# Patient Record
Sex: FEMALE | Race: BLACK OR AFRICAN AMERICAN | NOT HISPANIC OR LATINO | Employment: OTHER | URBAN - METROPOLITAN AREA
[De-identification: names, ages, dates, MRNs, and addresses within clinical notes are randomized per-mention and may not be internally consistent; named-entity substitution may affect disease eponyms.]

---

## 2017-01-13 ENCOUNTER — OFFICE VISIT (OUTPATIENT)
Dept: RHEUMATOLOGY | Facility: CLINIC | Age: 62
End: 2017-01-13
Payer: MEDICARE

## 2017-01-13 ENCOUNTER — HOSPITAL ENCOUNTER (OUTPATIENT)
Dept: RADIOLOGY | Facility: HOSPITAL | Age: 62
Discharge: HOME | End: 2017-01-13
Attending: INTERNAL MEDICINE
Payer: MEDICARE

## 2017-01-13 ENCOUNTER — PATIENT MESSAGE (OUTPATIENT)
Dept: RHEUMATOLOGY | Facility: CLINIC | Age: 62
End: 2017-01-13

## 2017-01-13 VITALS
DIASTOLIC BLOOD PRESSURE: 90 MMHG | HEIGHT: 65 IN | HEART RATE: 85 BPM | BODY MASS INDEX: 38.57 KG/M2 | SYSTOLIC BLOOD PRESSURE: 170 MMHG | WEIGHT: 231.5 LBS

## 2017-01-13 DIAGNOSIS — M10.9 ACUTE GOUT OF LEFT WRIST, UNSPECIFIED CAUSE: ICD-10-CM

## 2017-01-13 DIAGNOSIS — M25.50 ARTHRALGIA, UNSPECIFIED JOINT: ICD-10-CM

## 2017-01-13 DIAGNOSIS — M25.50 ARTHRALGIA, UNSPECIFIED JOINT: Primary | ICD-10-CM

## 2017-01-13 PROCEDURE — 3080F DIAST BP >= 90 MM HG: CPT | Mod: S$GLB,,, | Performed by: INTERNAL MEDICINE

## 2017-01-13 PROCEDURE — 77077 JOINT SURVEY SINGLE VIEW: CPT | Mod: TC

## 2017-01-13 PROCEDURE — 3077F SYST BP >= 140 MM HG: CPT | Mod: S$GLB,,, | Performed by: INTERNAL MEDICINE

## 2017-01-13 PROCEDURE — 99499 UNLISTED E&M SERVICE: CPT | Mod: S$PBB,,, | Performed by: INTERNAL MEDICINE

## 2017-01-13 PROCEDURE — 99214 OFFICE O/P EST MOD 30 MIN: CPT | Mod: S$GLB,,, | Performed by: INTERNAL MEDICINE

## 2017-01-13 PROCEDURE — 99999 PR PBB SHADOW E&M-EST. PATIENT-LVL IV: CPT | Mod: PBBFAC,,, | Performed by: INTERNAL MEDICINE

## 2017-01-13 PROCEDURE — 77077 JOINT SURVEY SINGLE VIEW: CPT | Mod: 26,,, | Performed by: RADIOLOGY

## 2017-01-13 PROCEDURE — 1159F MED LIST DOCD IN RCRD: CPT | Mod: S$GLB,,, | Performed by: INTERNAL MEDICINE

## 2017-01-13 RX ORDER — COLCHICINE 0.6 MG/1
TABLET ORAL
Qty: 90 TABLET | Refills: 3 | Status: SHIPPED | OUTPATIENT
Start: 2017-01-13 | End: 2017-03-06 | Stop reason: SDUPTHER

## 2017-01-13 RX ORDER — ALLOPURINOL 300 MG/1
300 TABLET ORAL DAILY
Qty: 90 TABLET | Refills: 1 | Status: SHIPPED | OUTPATIENT
Start: 2017-01-13 | End: 2017-05-02 | Stop reason: SDUPTHER

## 2017-01-13 NOTE — PROGRESS NOTES
Subjective:       Patient ID: Wendy Viveros is a 59 y.o. female.    Chief Complaint:joint pain      HPI 58 yo F with PMH of gout, gerd, DMII, CKD, HTN , left shoulder adhesive capsulitis here for f/u of gout.  Diagnosed with gout in early 50s. She had podagra episodes and also has affected ankles to.  She has been taking for allopurinol for years. She is on allopurinol 200mg  A day since April.  She takes indomethacin twice a week.  Tries to avoid shrimp. Denies alcohol use or juices.  Last gout flare around sep 2015.   Reports  Left shoulder  Pain for a year.     Interval history: Patient is here for follow of gout.  She takes indomethacin for gout attacks.  Denies any gout attacks since last visit.  She is taking allopurinol 100mg a day instead of 300mg for a year.  She has pain in hands and wrists. Reports that when she does something repetitive, the hand will lock and cramp.  Reports pain in right ankle that shoots up the hip.  Reports stiffness in left knee.  Denies tenderness or swelling of the big toe.  Denies alcohol use or seafood. She eats fresh foods.   She takes tramadol one morning, one in afternoon and one at bedtime.    Reports that pain is worse in morning. She has tried tramadol alone with no improvement.  Reports taking flexeril as needed.          Review of Systems   Constitutional: Negative for chills, diaphoresis, activity change and appetite change.   HENT: Negative for congestion, ear discharge, ear pain, facial swelling, mouth sores, sinus pressure, sneezing, sore throat, tinnitus and trouble swallowing.    Eyes: Negative for photophobia, pain, discharge, redness, itching and visual disturbance.   Respiratory: Negative for apnea, chest tightness, shortness of breath, wheezing and stridor.    Cardiovascular: Negative for leg swelling.   Gastrointestinal: Negative for nausea, abdominal pain, diarrhea, constipation, blood in stool, abdominal distention and anal bleeding.   Endocrine: Negative  for cold intolerance and heat intolerance.   Genitourinary: Negative for dysuria and difficulty urinating.   Musculoskeletal: Positive for arthralgias. Negative for myalgias, back pain, joint swelling, gait problem, neck pain and neck stiffness.   Skin: Negative for color change, pallor, rash and wound.   Neurological: Negative for dizziness, seizures, light-headedness and numbness.   Hematological: Negative for adenopathy. Does not bruise/bleed easily.   Psychiatric/Behavioral: Negative for sleep disturbance. The patient is not nervous/anxious.              Objective:      Physical Exam   Constitutional: She is oriented to person, place, and time.   HENT:   Head: Normocephalic and atraumatic.   Right Ear: External ear normal.   Left Ear: External ear normal.   Nose: Nose normal.   Mouth/Throat: Oropharynx is clear and moist. No oropharyngeal exudate.   Eyes: Conjunctivae and EOM are normal. Pupils are equal, round, and reactive to light. Right eye exhibits no discharge. Left eye exhibits no discharge. No scleral icterus.   Neck: Neck supple. No JVD present. No thyromegaly present.   Cardiovascular: Normal rate, regular rhythm, normal heart sounds and intact distal pulses.  Exam reveals no gallop and no friction rub.    No murmur heard.  Pulmonary/Chest: Effort normal and breath sounds normal. No respiratory distress. She has no wheezes. She has no rales. She exhibits no tenderness.   Abdominal: Soft. Bowel sounds are normal. She exhibits no distension and no mass. There is no tenderness. There is no rebound and no guarding.   Lymphadenopathy:     She has no cervical adenopathy.   Neurological: She is alert and oriented to person, place, and time. No cranial nerve deficit. Gait normal. Coordination normal.   Skin: Skin is dry. No rash noted. No erythema. No pallor.     Psychiatric: Affect and judgment normal.   Musculoskeletal: She exhibits tenderness. She exhibits no edema.       Shoulder L- limited abduction to  160 degrees   hands, wrists, knees- FROM  Right ankle/right foot - tenderness, swelling and warmth with podagra    LABS:  Uric acid- 6.3 <7.8<7.9  Esr-35  Ccp,rf-negative    Imaging:   Shoulder xrays: (2011)   There is degenerative change on the left with humeral spurring as well as   some cystic change in both the glenoid and humeral head. On the right,   there is a lesser degree of degenerative change. The acromiohumeral   interval is maintained bilaterally.      Assessment:     58 yo F with PMH of gout, gerd, DMII, CKD, HTN , left shoulder adhesive capsulitis s/p repair on march 2016 here for f/u of gout. She also has adhesive capsulitis of left shoulder since 2009 and received PT.   She comes in today with flare of left.wrist/hand. She has no been taking allopurinol at 300 mg a day which I had recommended.  Will treat acute flare and also increase her allopurinol    Regarding her diffuse arthralgias, I suspect she has DJD in setting of obesity and deconditioning.  She takes tramadol 50mg pO TID with no improvement. I told her that given her CKD, I cannot give her NSAIDS and recommended referral to pain management but patient declined.  Plan:       *  -start colchicine 1.2 mg po x1 then 0.6 mg an hour later than 0.6 mg po day  -labs today  -for now,continue allopurinol 300mg po qday until I get repeat uric acid  -asked patient to avoid nsaids  Encouraged weight loss for obesity  rtc in 3-4 months

## 2017-01-13 NOTE — MR AVS SNAPSHOT
[facility] Rheumatology  [address] [address]  Phone: [phone]  Fax: [phone]                  Wendy Viveros   2017 11:30 AM   Office Visit    Description:  Female : 1955   Provider:  Mindy Vazquez MD   Department:  [facility] - Rheumatology           Diagnoses this Visit        Comments    Arthralgia, unspecified joint    -  Primary            To Do List           Future Appointments        Provider Department Dept Phone    2017 12:30 PM NOM [id number] LIMIT [facility] [phone]    3/10/2017 11:30 AM SPECIMEN, [facility] [phone]    3/14/2017 1:00 PM Mindy Vazquez MD [facility] [phone]      Goals (5 Years of Data)     None       These Medications        Disp Refills Start End    colchicine 0.6 mg tablet 90 tablet 3 2017     Take 2 tablets at once and then one tablet an hour later and then start one pill daily the next day    Pharmacy: [facility] [address] [place] [address] Ph #: [phone]       allopurinol (ZYLOPRIM) 300 MG tablet 90 tablet 1 2017     Take 1 tablet (300 mg total) by mouth once daily. - Oral    Pharmacy: [address] Ph #: [phone]         [facility]     [facility] Care [facility] -  Assistance  Registered nurses in the [facility] provide clinical advisement, health education, appointment booking, and other advisory services.  Call for this free service at [phone].             Medications           Message regarding Medications     Verify the changes and/or additions to your medication regime listed below are the same as discussed with your clinician today.  If any of these changes or additions are incorrect, please notify your healthcare provider.        START taking these NEW medications        Refills    colchicine 0.6  "mg tablet 3    Sig: Take 2 tablets at once and then one tablet an hour later and then start one pill daily the next day    Class: Normal           Verify that the below list of medications is an accurate representation of the medications you are currently taking.  If none reported, the list may be blank. If incorrect, please contact your healthcare provider. Carry this list with you in case of emergency.           Current Medications     allopurinol (ZYLOPRIM) 100 MG tablet Take 1 tablet (100 mg total) by mouth once daily.    allopurinol (ZYLOPRIM) 300 MG tablet Take 1 tablet (300 mg total) by mouth once daily.    amlodipine (NORVASC) 10 MG tablet Take 1 tablet (10 mg total) by mouth once daily.    aspirin 81 mg Tab Take 1 tablet by mouth Daily.    blood sugar diagnostic (TRUETEST TEST STRIPS) Strp Pt is testing 3 times daily    cholecalciferol, vitamin D3, 2,000 unit Tab Take 1 tablet by mouth Once a week.    cloNIDine (CATAPRES) 0.1 MG tablet Take 1 tablet (0.1 mg total) by mouth 2 (two) times daily.    colchicine 0.6 mg tablet Take 2 tablets and one hour later take a third tablet then tomorrow take one tablet daily    cyanocobalamin, vitamin B-12, (VITAMIN B-12) 50 mcg tablet Take 50 mcg by mouth once daily.    cyclobenzaprine (FLEXERIL) 10 MG tablet TAKE ONE TABLET BY MOUTH ONCE AT NIGHT    indomethacin (INDOCIN) 50 MG capsule TAKE ONE CAPSULE BY MOUTH THREE TIMES DAILY AS NEEDED    insulin aspart (NOVOLOG) 100 unit/mL injection Inject 10 Units into the skin 3 (three) times daily before meals.    insulin glargine (LANTUS) 100 unit/mL injection Inject 30 Units into the skin every evening.    insulin syringe-needle U-100 0.3 mL 30 gauge x 5/16 Syrg Pt is injecting 4 times daily    insulin syringe-needle U-100 1/2 mL 30 x 5/16" Syrg     lancets Misc by Misc.(Non-Drug; Combo Route) route. Pt is testing 3 times daily    metoprolol tartrate (LOPRESSOR) 50 MG tablet Take 1 tablet (50 mg total) by mouth once daily.    " "promethazine (PHENERGAN) 6.25 mg/5 mL syrup Take 20 mLs (25 mg total) by mouth once daily.    RANITIDINE HCL (ZANTAC 75 ORAL) Take 1 tablet by mouth.    spironolactone (ALDACTONE) 50 MG tablet Take 1 tablet (50 mg total) by mouth once daily.    SYRINGE, DISPOSABLE, MISC by Misc.(Non-Drug; Combo Route) route. Pt is injecting 4 times daily    tramadol (ULTRAM) 50 mg tablet Take 1 tablet (50 mg total) by mouth once daily.    colchicine 0.6 mg tablet Take 2 tablets at once and then one tablet an hour later and then start one pill daily the next day    furosemide (LASIX) 40 MG tablet Take 1 tablet (40 mg total) by mouth once daily.           Clinical Reference Information           Vital Signs - Last Recorded  Most recent update: 1/13/2017 11:35 AM by Vilma Crespo MA    BP Pulse Ht Wt BMI    (!) 170/90 85 5' 5" (1.651 m) 105 kg (231 lb 8 oz) 38.52 kg/m2      Blood Pressure          Most Recent Value    BP  (!)  170/90      Allergies as of 1/13/2017     Cozaar  [Losartan]    Lisinopril      Immunizations Administered on Date of Encounter - 1/13/2017     None      Orders Placed During Today's Visit     Future Labs/Procedures Expected by Expires    Comprehensive metabolic panel  1/13/2017 3/14/2018    URIC ACID  1/13/2017 3/14/2018    XR Arthritis Survey  1/13/2017 1/13/2018      "

## 2017-02-17 RX ORDER — INDOMETHACIN 50 MG/1
CAPSULE ORAL
Qty: 90 CAPSULE | Refills: 0 | Status: SHIPPED | OUTPATIENT
Start: 2017-02-17 | End: 2017-10-17 | Stop reason: SDUPTHER

## 2017-03-02 ENCOUNTER — HOSPITAL ENCOUNTER (EMERGENCY)
Facility: OTHER | Age: 62
Discharge: HOME | End: 2017-03-03
Attending: EMERGENCY MEDICINE
Payer: MEDICARE

## 2017-03-02 VITALS
BODY MASS INDEX: 38.32 KG/M2 | RESPIRATION RATE: 18 BRPM | HEIGHT: 65 IN | SYSTOLIC BLOOD PRESSURE: 155 MMHG | DIASTOLIC BLOOD PRESSURE: 80 MMHG | OXYGEN SATURATION: 98 % | WEIGHT: 230 LBS | TEMPERATURE: 98 F | HEART RATE: 72 BPM

## 2017-03-02 DIAGNOSIS — R19.7 NAUSEA, VOMITING, AND DIARRHEA: ICD-10-CM

## 2017-03-02 DIAGNOSIS — R10.84 ABDOMINAL PAIN, GENERALIZED: Primary | ICD-10-CM

## 2017-03-02 DIAGNOSIS — R11.2 NAUSEA, VOMITING, AND DIARRHEA: ICD-10-CM

## 2017-03-02 LAB
ALBUMIN SERPL BCP-MCNC: 4.2 G/DL
ALP SERPL-CCNC: 76 U/L
ALT SERPL W/O P-5'-P-CCNC: 20 U/L
ANION GAP SERPL CALC-SCNC: 12 MMOL/L
AST SERPL-CCNC: 19 U/L
BASOPHILS # BLD AUTO: 0.02 K/UL
BASOPHILS NFR BLD: 0.2 %
BILIRUB SERPL-MCNC: 4.3 MG/DL
BUN SERPL-MCNC: 19 MG/DL
CALCIUM SERPL-MCNC: 9.9 MG/DL
CHLORIDE SERPL-SCNC: 102 MMOL/L
CO2 SERPL-SCNC: 26 MMOL/L
CREAT SERPL-MCNC: 1.2 MG/DL
DIFFERENTIAL METHOD: ABNORMAL
EOSINOPHIL # BLD AUTO: 0.1 K/UL
EOSINOPHIL NFR BLD: 0.9 %
ERYTHROCYTE [DISTWIDTH] IN BLOOD BY AUTOMATED COUNT: 18.9 %
EST. GFR  (AFRICAN AMERICAN): 56 ML/MIN/1.73 M^2
EST. GFR  (NON AFRICAN AMERICAN): 49 ML/MIN/1.73 M^2
GLUCOSE SERPL-MCNC: 203 MG/DL
HCT VFR BLD AUTO: 33.9 %
HGB BLD-MCNC: 12.1 G/DL
LIPASE SERPL-CCNC: 20 U/L
LYMPHOCYTES # BLD AUTO: 2.5 K/UL
LYMPHOCYTES NFR BLD: 19 %
MCH RBC QN AUTO: 32.7 PG
MCHC RBC AUTO-ENTMCNC: 35.7 %
MCV RBC AUTO: 92 FL
MONOCYTES # BLD AUTO: 1 K/UL
MONOCYTES NFR BLD: 7.4 %
NEUTROPHILS # BLD AUTO: 9.3 K/UL
NEUTROPHILS NFR BLD: 72 %
PLATELET # BLD AUTO: 114 K/UL
PMV BLD AUTO: 10.6 FL
POTASSIUM SERPL-SCNC: 3.5 MMOL/L
PROT SERPL-MCNC: 7.6 G/DL
RBC # BLD AUTO: 3.7 M/UL
SODIUM SERPL-SCNC: 140 MMOL/L
WBC # BLD AUTO: 12.92 K/UL

## 2017-03-02 PROCEDURE — 80053 COMPREHEN METABOLIC PANEL: CPT

## 2017-03-02 PROCEDURE — 25000003 PHARM REV CODE 250: Performed by: PHYSICIAN ASSISTANT

## 2017-03-02 PROCEDURE — 96360 HYDRATION IV INFUSION INIT: CPT

## 2017-03-02 PROCEDURE — 25500020 PHARM REV CODE 255: Performed by: EMERGENCY MEDICINE

## 2017-03-02 PROCEDURE — 99284 EMERGENCY DEPT VISIT MOD MDM: CPT | Mod: 25

## 2017-03-02 PROCEDURE — 85025 COMPLETE CBC W/AUTO DIFF WBC: CPT

## 2017-03-02 PROCEDURE — 83690 ASSAY OF LIPASE: CPT

## 2017-03-02 RX ORDER — ONDANSETRON 4 MG/1
4 TABLET, ORALLY DISINTEGRATING ORAL EVERY 8 HOURS PRN
Qty: 12 TABLET | Refills: 0 | Status: SHIPPED | OUTPATIENT
Start: 2017-03-02 | End: 2017-07-13

## 2017-03-02 RX ORDER — DICYCLOMINE HYDROCHLORIDE 10 MG/1
10 CAPSULE ORAL
Qty: 20 CAPSULE | Refills: 0 | Status: SHIPPED | OUTPATIENT
Start: 2017-03-02 | End: 2017-04-01

## 2017-03-02 RX ORDER — SYRINGE,SAFETY WITH NEEDLE,1ML 25GX1"
SYRINGE (EA) MISCELLANEOUS
Qty: 200 EACH | Refills: 0 | Status: ON HOLD | OUTPATIENT
Start: 2017-03-02 | End: 2017-07-20 | Stop reason: HOSPADM

## 2017-03-02 RX ADMIN — IOHEXOL 75 ML: 350 INJECTION, SOLUTION INTRAVENOUS at 10:03

## 2017-03-02 RX ADMIN — SODIUM CHLORIDE 1000 ML: 0.9 INJECTION, SOLUTION INTRAVENOUS at 09:03

## 2017-03-02 NOTE — ED AVS SNAPSHOT
[facility]  [address]  [facility] [address]               Wendy Viveros   3/2/2017  8:01 PM   ED    Description:  Female : 1955   Department:  [facility]           Your Care was Coordinated By:     Provider Role From To    Yobany Ramírez MD Attending Provider 17 --    Katherine Brunson PA-C Physician Assistant 17 --      Reason for Visit     GI Problem           Diagnoses this Visit        Comments    Abdominal pain, generalized    -  Primary     Nausea, vomiting, and diarrhea           ED Disposition     None           To Do List           Follow-up Information     Follow up with Roger Miller MD In 2 days.    Specialty:  Family Medicine    Contact information:    [address]  [facility] [address]  [facility] [id number] [phone]          Follow up with [facility].    Specialty:  Emergency Medicine    Why:  If symptoms worsen    Contact information:    [address]  [facility] [address] [phone]       These Medications        Disp Refills Start End    dicyclomine (BENTYL) 10 MG capsule 20 capsule 0 3/2/2017 2017    Take 1 capsule (10 mg total) by mouth 4 (four) times daily before meals and nightly. - Oral    Pharmacy: [facility] [address] [place] [address] Ph #: [phone]       ondansetron (ZOFRAN-ODT) 4 MG TbDL 12 tablet 0 3/2/2017     Take 1 tablet (4 mg total) by mouth every 8 (eight) hours as needed. - Oral    Pharmacy: [facility] [address] [place], [place] [address] [facility] Ph #: [phone]         [facility]     [facility] Care [facility] -  Assistance  Registered nurses in the [facility] provide clinical advisement, health education, appointment booking, and other advisory services.  Call for this free service at [phone].             Medications           Message regarding  Medications     Verify the changes and/or additions to your medication regime listed below are the same as discussed with your clinician today.  If any of these changes or additions are incorrect, please notify your healthcare provider.        START taking these NEW medications        Refills    dicyclomine (BENTYL) 10 MG capsule 0    Sig: Take 1 capsule (10 mg total) by mouth 4 (four) times daily before meals and nightly.    Class: Print    Route: Oral    ondansetron (ZOFRAN-ODT) 4 MG TbDL 0    Sig: Take 1 tablet (4 mg total) by mouth every 8 (eight) hours as needed.    Class: Print    Route: Oral      These medications were administered today        Dose Freq    sodium chloride 0.9% bolus 1,000 mL 1,000 mL ED 1 Time    Sig: Inject 1,000 mLs into the vein ED 1 Time.    Class: Normal    Route: Intravenous    omnipaque 350 iohexol 350 mg iodine/mL      Notes to Pharmacy: Created by cabinet override    omnipaque 350 iohexol 75 mL 75 mL IMG once as needed    Sig: Inject 75 mLs into the vein ONCE PRN for contrast.    Class: Normal    Route: Intravenous           Verify that the below list of medications is an accurate representation of the medications you are currently taking.  If none reported, the list may be blank. If incorrect, please contact your healthcare provider. Carry this list with you in case of emergency.           Current Medications     amlodipine (NORVASC) 10 MG tablet Take 1 tablet (10 mg total) by mouth once daily.    aspirin 81 mg Tab Take 1 tablet by mouth Daily.    cholecalciferol, vitamin D3, 2,000 unit Tab Take 1 tablet by mouth Once a week.    cloNIDine (CATAPRES) 0.1 MG tablet Take 1 tablet (0.1 mg total) by mouth 2 (two) times daily.    colchicine 0.6 mg tablet Take 2 tablets and one hour later take a third tablet then tomorrow take one tablet daily    cyanocobalamin, vitamin B-12, (VITAMIN B-12) 50 mcg tablet Take 50 mcg by mouth once daily.    indomethacin (INDOCIN) 50 MG capsule TAKE ONE  CAPSULE BY MOUTH THREE TIMES DAILY AS NEEDED    insulin aspart (NOVOLOG) 100 unit/mL injection Inject 10 Units into the skin 3 (three) times daily before meals.    insulin glargine (LANTUS) 100 unit/mL injection Inject 30 Units into the skin every evening.    metoprolol tartrate (LOPRESSOR) 50 MG tablet Take 1 tablet (50 mg total) by mouth once daily.    RANITIDINE HCL (ZANTAC 75 ORAL) Take 1 tablet by mouth.    spironolactone (ALDACTONE) 50 MG tablet Take 1 tablet (50 mg total) by mouth once daily.    tramadol (ULTRAM) 50 mg tablet Take 1 tablet (50 mg total) by mouth once daily.    allopurinol (ZYLOPRIM) 100 MG tablet Take 1 tablet (100 mg total) by mouth once daily.    allopurinol (ZYLOPRIM) 300 MG tablet Take 1 tablet (300 mg total) by mouth once daily.    blood sugar diagnostic (TRUETEST TEST STRIPS) Strp Pt is testing 3 times daily    colchicine 0.6 mg tablet Take 2 tablets at once and then one tablet an hour later and then start one pill daily the next day    cyclobenzaprine (FLEXERIL) 10 MG tablet TAKE ONE TABLET BY MOUTH ONCE AT NIGHT    dicyclomine (BENTYL) 10 MG capsule Take 1 capsule (10 mg total) by mouth 4 (four) times daily before meals and nightly.    furosemide (LASIX) 40 MG tablet Take 1 tablet (40 mg total) by mouth once daily.    insulin syringe-needle U-100 0.3 mL 30 gauge x 5/16 Syrg Pt is injecting 4 times daily    insulin syringe-needle U-100 1/2 mL 30 gauge x 5/16 Syrg USE AS DIRECTED 4 TIMES DAILY    lancets Misc by Misc.(Non-Drug; Combo Route) route. Pt is testing 3 times daily    omnipaque 350 iohexol 350 mg iodine/mL     ondansetron (ZOFRAN-ODT) 4 MG TbDL Take 1 tablet (4 mg total) by mouth every 8 (eight) hours as needed.    promethazine (PHENERGAN) 6.25 mg/5 mL syrup Take 20 mLs (25 mg total) by mouth once daily.    SYRINGE, DISPOSABLE, MISC by Misc.(Non-Drug; Combo Route) route. Pt is injecting 4 times daily           Clinical Reference Information           Your Vitals Were     BP  "Pulse Temp Resp Height Weight    177/77 76 98 °F (36.7 °C) 18 5' 5" (1.651 m) 104.3 kg (230 lb)    SpO2 BMI             98% 38.27 kg/m2         Allergies as of 3/2/2017        Reactions    Cozaar  [Losartan]     Other reaction(s): blurry vision    Lisinopril     Other reaction(s): cough      Immunizations Administered on Date of Encounter - 3/2/2017     None      ED Micro, Lab, POCT     Start Ordered       Status Ordering Provider    03/02/17 2040 03/02/17 2039  CBC auto differential  STAT      Final result     03/02/17 2040 03/02/17 2039  Comprehensive metabolic panel  STAT      Final result     03/02/17 2040 03/02/17 2039  Lipase  STAT      Final result       ED Imaging Orders     Start Ordered       Status Ordering Provider    03/02/17 2205 03/02/17 2205  CT Abdomen Pelvis With Contrast  1 time imaging      Final result       Discharge References/Attachments     ABDOMINAL PAIN, ADULT (ENGLISH)    ABDOMINAL PAIN, UNKNOWN CAUSE, (FEMALE) (ENGLISH)    NAUSEA AND VOMITING, HOW TO CONTROL (ENGLISH)    VOMITING AND DIARRHEA, SELF-CARE FOR (ENGLISH)      Your Scheduled Appointments     Mar 10, 2017 11:30 AM CST   Non-Fasting Lab with SPECIMEN, [facility]    [address]  [place] [id number] [phone]            Mar 14, 2017  1:00 PM CDT   Established Patient Visit with MD Milo Edwards valerio - Rheumatology ([facility] )    [address] [facility] [place] [address] [phone]               [facility] complies with applicable Federal civil rights laws and does not discriminate on the basis of race, color, national origin, age, disability, or sex.        Language Assistance Services     ATTENTION: Language assistance services are available, free of charge. Please call [phone].      ATENCIÓN: Si habla español, tiene a rajput disposición servicios gratuitos de asistencia lingüística. Llame al [phone].     CHÚ Ý: N?u b?n " nói Ti?ng Vi?t, có các d?ch v? h? tr? ngôn ng? mi?n phí dành cho b?n. G?i s? [phone].

## 2017-03-03 NOTE — ED PROVIDER NOTES
Encounter Date: 3/2/2017       History     Chief Complaint   Patient presents with    GI Problem     abd pain, vomiting and diarrhea that began yesterday     Review of patient's allergies indicates:   Allergen Reactions    Cozaar  [losartan]      Other reaction(s): blurry vision    Lisinopril      Other reaction(s): cough     HPI Comments: Patient is a 61 y.o. female with a past medical history of HTN, HLD, DM, CKD, presenting to the emergency department with complaints of abdominal pain, nausea, vomiting and diarrhea.  The patient reports that yesterday evening she had one episode of nausea and vomiting with associated abdominal pain and cramping.  She reports that she had another episode early this morning at approximately 3:30 AM.  She states that throughout the day, she's had persistent diarrhea, is unable to quantify, episode she has had.  She denies fever or chills.  She denies taking any medication for symptoms thus far.    The history is provided by the patient.     Past Medical History:   Diagnosis Date    Abnormal EKG     Aortic valve regurgitation     Arthritis     CKD (chronic kidney disease) stage 2, GFR 60-89 ml/min 8/8/2014    CKD (chronic kidney disease) stage 2, GFR 60-89 ml/min 8/8/2014    Diabetes mellitus     Diabetes mellitus type II     GERD (gastroesophageal reflux disease)     Gout, unspecified     Heart murmur     Hyperlipidemia     Hypertension     Tendonitis      Past Surgical History:   Procedure Laterality Date    breast reduction      BREAST SURGERY      CHOLECYSTECTOMY      JOINT REPLACEMENT      POLYPECTOMY  05/05/2016    TOTAL SHOULDER ARTHROPLASTY Left     TUBAL LIGATION       Family History   Problem Relation Age of Onset    Heart disease Mother      MI at 71    Hypertension Mother     Hypertension Brother     Diabetes Brother     Hypertension Brother     Breast cancer Neg Hx     Colon cancer Neg Hx     Ovarian cancer Neg Hx      Social History    Substance Use Topics    Smoking status: Never Smoker    Smokeless tobacco: None    Alcohol use No     Review of Systems   Constitutional: Negative for activity change, appetite change, chills, fatigue and fever.   HENT: Negative for congestion, rhinorrhea, sinus pressure, sneezing, sore throat and trouble swallowing.    Eyes: Negative for photophobia and visual disturbance.   Respiratory: Negative for cough, chest tightness, shortness of breath and wheezing.    Cardiovascular: Negative for chest pain and palpitations.   Gastrointestinal: Positive for abdominal pain, diarrhea, nausea and vomiting. Negative for constipation.   Genitourinary: Negative for dysuria, hematuria and urgency.   Musculoskeletal: Negative for back pain, myalgias, neck pain and neck stiffness.   Skin: Negative for color change and wound.   Neurological: Negative for dizziness, weakness, light-headedness, numbness and headaches.   Psychiatric/Behavioral: Negative for agitation and confusion.       Physical Exam   Initial Vitals   BP Pulse Resp Temp SpO2   03/02/17 1947 03/02/17 1947 03/02/17 1947 03/02/17 1947 03/02/17 1947   172/80 104 16 98.4 °F (36.9 °C) 100 %     Physical Exam    Nursing note and vitals reviewed.  Constitutional: She appears well-developed and well-nourished. She is not diaphoretic. She is cooperative.  Non-toxic appearance. She does not have a sickly appearance. She does not appear ill. No distress.   Well appearing, obese, -American female unaccompanied in the emergency department.  She is speaking in clear and full sentences, moving all extremities, ambulates without difficulty.  She is in no acute distress.   HENT:   Head: Normocephalic and atraumatic.   Right Ear: External ear normal.   Left Ear: External ear normal.   Nose: Nose normal.   Mouth/Throat: Uvula is midline, oropharynx is clear and moist and mucous membranes are normal.   Eyes: Conjunctivae and EOM are normal.   Neck: Normal range of motion.  Neck supple.   Cardiovascular: Normal rate, regular rhythm and normal heart sounds.   Pulmonary/Chest: Breath sounds normal. No respiratory distress. She has no wheezes. She has no rhonchi. She has no rales.   Abdominal: Soft. Bowel sounds are normal. She exhibits no distension. There is generalized tenderness. There is no rebound and no guarding.   Musculoskeletal: Normal range of motion.   Neurological: She is alert and oriented to person, place, and time. GCS eye subscore is 4. GCS verbal subscore is 5. GCS motor subscore is 6.   Skin: Skin is warm.   Psychiatric: She has a normal mood and affect. Her behavior is normal. Judgment and thought content normal.         ED Course   Procedures  Labs Reviewed   CBC W/ AUTO DIFFERENTIAL - Abnormal; Notable for the following:        Result Value    WBC 12.92 (*)     RBC 3.70 (*)     Hematocrit 33.9 (*)     MCH 32.7 (*)     RDW 18.9 (*)     Platelets 114 (*)     Gran # 9.3 (*)     All other components within normal limits   COMPREHENSIVE METABOLIC PANEL - Abnormal; Notable for the following:     Glucose 203 (*)     Total Bilirubin 4.3 (*)     eGFR if  56 (*)     eGFR if non  49 (*)     All other components within normal limits   LIPASE        Imaging Results         CT Abdomen Pelvis With Contrast (Final result) Result time:  03/02/17 23:35:17    Final result by Marin Ramos MD (03/02/17 23:35:17)    Impression:       Mild wall thickening and fluid distention of the small bowel loops without evidence for transition point.  The findings are suggestive of mild nonspecific enteritis.    Hepatosplenomegaly.    Status post cholecystectomy.    Additional findings as above.              Electronically signed by: MARIN RAMOS MD  Date:     03/02/17  Time:    23:35     Narrative:    Exam: [id number]  03/02/17  22:23:37 [id number] ([facility]) : CT ABDOMEN PELVIS WITH CONTRAST    Technique:    Axial CT Scan of the abdomen and pelvis was performed from the lung base  to the public symphysis after the intravenous administration of  75 cc of Omni 350. Coronal and Sagittal reformats were obtained. Delayed images were also obtained    Comparison:     None     Findings:      The lung bases are within normal limits.  The heart is normal in appearance.  There are no pericardial effusions.  There is tortuosity of the abdomen aorta.  There are scattered atherosclerotic calcifications involving the abdominal aorta.  The portal vein and mesenteric veins are within normal limits.  There is no evidence of lymphadenopathy in the abdomen or pelvis.    There is a small hiatal hernia.  The distal stomach is unremarkable.  The duodenum is within normal limits.  Mild wall thickening involving the small bowel loops.  There is fluid distention of the small bowel loops.  No definitive transition point is identified.  No significant air-fluid levels are noted.  The appendix is not visualized.  There are no secondary findings of acute appendicitis.  There is scattered colonic diverticula without evidence of acute diverticulitis.    The liver is enlarged.  The patient is status post cholecystectomy.  The biliary tree is within normal limits.  The spleen is enlarged measuring 15 cm.  The pancreas is within normal limits.  The adrenal glands are unremarkable.    There is a 1.2 cm cyst in the upper pole of the right kidney.  The reminder of the kidneys are within normal limits.  The ureters and the urinary bladder are within normal limits.  The uterus is unremarkable.    There is no evidence of free fluid in the abdomen or pelvis.  There is no evidence of free air.      The patient appears status post an anterior abdominal wall hernia repair.  There is no evidence of a recurrent hernia.  There is a surgical clip in the left paracolic gutter.    There are extensive degenerative changes with vacuum disc phenomenon at the L5-S1 level.  No fractures are identified.  There are degenerative changes throughout  the thoracolumbar spine.                Medical Decision Making:   Clinical Tests:   Lab Tests: Reviewed and Ordered  The following lab test(s) were unremarkable: CBC, CMP and Lipase  Other:   I have discussed this case with another health care provider.       <> Summary of the Discussion: Dr. Armando  This note was created using Dragon Medical Dictation. There may be typographical errors secondary to dictation.     Urgent evaluation of a 61 y.o. female with a past medical history of HTN, HLD, DM, CKD, presenting to the emergency department complaining of abdominal pain with nausea, vomiting and diarrhea. Patient is afebrile, nontoxic appearing and hemodynamically stable.  Physical exam reveals regular rate and rhythm, lungs are clear to auscultation bilaterally.  Generalized tenderness to palpation of the abdomen with no rebound, guarding, mass.  Moist mucous membranes.  I'll plan to obtain blood work, administer IV fluids and reassess.  CBC shows leukocytosis at 12.9, H&H is 12.1/33.9.  CMP shows glucose of 203.  Lipase is normal.  Will obtain CT scan.    CT scan shows mild wall thickening and fluid distention of the small bowel loops suggestive for mild nonspecific enteritis. Will plan to discharge home with a prescription for bentyl and zofran. She will be counseled on symptomatic care and treatment. She is stable for discharge home. The patient was instructed to follow up with a primary care provider in 2 days or to return to the emergency department for worsening symptoms. The treatment plan was discussed with the patient who demonstrated understanding and comfort with plan. The patient's history, physical exam, and plan of care was discussed with and agreed upon with my supervising physician.     Past Medical History:   Diagnosis Date    Abnormal EKG     Aortic valve regurgitation     Arthritis     CKD (chronic kidney disease) stage 2, GFR 60-89 ml/min 8/8/2014    CKD (chronic kidney disease) stage 2,  GFR 60-89 ml/min 8/8/2014    Diabetes mellitus     Diabetes mellitus type II     GERD (gastroesophageal reflux disease)     Gout, unspecified     Heart murmur     Hyperlipidemia     Hypertension     Tendonitis                      ED Course     Clinical Impression:     1. Abdominal pain, generalized    2. Nausea, vomiting, and diarrhea       Disposition:   Disposition: Discharged  Condition: Stable       Katherine Brunson PA-C  03/03/17 0032

## 2017-03-03 NOTE — ED NOTES
Vomiting and diarrhea started yesterday. States diarrhea is black     LOC: The patient is awake, alert and aware of environment with an appropriate affect, the patient is oriented x 3 and speaking appropriately.  APPEARANCE: Patient resting comfortably and in no acute distress, patient is clean and well groomed, patient's clothing is properly fastened.  SKIN: The skin is warm and dry, patient has normal skin turgor and moist mucus membranes, skin intact, no breakdown or brusing noted.  MUSKULOSKELETAL: Patient moving all extremities well, no obvious swelling or deformities noted.  RESPIRATORY: Airway is open and patent, respirations are spontaneous, patient has a normal effort and rate. Breath sounds are clear and equal bilaterally.  CARDIAC: Normal heart sounds. No peripheral edema.  ABDOMEN: Soft and non tender to palpation, no distention noted. Bowel sounds present.  NEURO: No neuro deficits, hand grasp equal, no drift noted, no facial droop noted. Speech is clear.

## 2017-03-06 ENCOUNTER — OFFICE VISIT (OUTPATIENT)
Dept: INTERNAL MEDICINE | Facility: CLINIC | Age: 62
End: 2017-03-06
Attending: FAMILY MEDICINE
Payer: MEDICARE

## 2017-03-06 VITALS
DIASTOLIC BLOOD PRESSURE: 70 MMHG | OXYGEN SATURATION: 97 % | HEART RATE: 77 BPM | SYSTOLIC BLOOD PRESSURE: 150 MMHG | WEIGHT: 230.81 LBS | BODY MASS INDEX: 38.45 KG/M2 | HEIGHT: 65 IN

## 2017-03-06 DIAGNOSIS — R11.0 NAUSEA: Primary | ICD-10-CM

## 2017-03-06 PROCEDURE — 1160F RVW MEDS BY RX/DR IN RCRD: CPT | Mod: S$GLB,,, | Performed by: FAMILY MEDICINE

## 2017-03-06 PROCEDURE — 3077F SYST BP >= 140 MM HG: CPT | Mod: S$GLB,,, | Performed by: FAMILY MEDICINE

## 2017-03-06 PROCEDURE — 3078F DIAST BP <80 MM HG: CPT | Mod: S$GLB,,, | Performed by: FAMILY MEDICINE

## 2017-03-06 PROCEDURE — 99999 PR PBB SHADOW E&M-EST. PATIENT-LVL V: CPT | Mod: PBBFAC,,, | Performed by: FAMILY MEDICINE

## 2017-03-06 PROCEDURE — 99213 OFFICE O/P EST LOW 20 MIN: CPT | Mod: S$GLB,,, | Performed by: FAMILY MEDICINE

## 2017-03-06 RX ORDER — AMLODIPINE BESYLATE 10 MG/1
TABLET ORAL
COMMUNITY
Start: 2017-02-17 | End: 2017-03-06 | Stop reason: SDUPTHER

## 2017-03-10 ENCOUNTER — LAB VISIT (OUTPATIENT)
Dept: LAB | Facility: OTHER | Age: 62
End: 2017-03-10
Attending: INTERNAL MEDICINE
Payer: MEDICARE

## 2017-03-10 DIAGNOSIS — E11.9 TYPE 2 DIABETES MELLITUS WITHOUT COMPLICATION: ICD-10-CM

## 2017-03-10 DIAGNOSIS — M25.50 ARTHRALGIA, UNSPECIFIED JOINT: ICD-10-CM

## 2017-03-10 LAB
ALBUMIN SERPL BCP-MCNC: 4.1 G/DL
ALP SERPL-CCNC: 75 U/L
ALT SERPL W/O P-5'-P-CCNC: 19 U/L
ANION GAP SERPL CALC-SCNC: 12 MMOL/L
AST SERPL-CCNC: 18 U/L
BILIRUB SERPL-MCNC: 2.5 MG/DL
BUN SERPL-MCNC: 20 MG/DL
CALCIUM SERPL-MCNC: 10.2 MG/DL
CHLORIDE SERPL-SCNC: 104 MMOL/L
CO2 SERPL-SCNC: 24 MMOL/L
CREAT SERPL-MCNC: 1.2 MG/DL
CREAT UR-MCNC: 190 MG/DL
EST. GFR  (AFRICAN AMERICAN): 56 ML/MIN/1.73 M^2
EST. GFR  (NON AFRICAN AMERICAN): 48.6 ML/MIN/1.73 M^2
GLUCOSE SERPL-MCNC: 218 MG/DL
MICROALBUMIN UR DL<=1MG/L-MCNC: 943 UG/ML
MICROALBUMIN/CREATININE RATIO: 496.3 UG/MG
POTASSIUM SERPL-SCNC: 4.1 MMOL/L
PROT SERPL-MCNC: 7.5 G/DL
SODIUM SERPL-SCNC: 140 MMOL/L
URATE SERPL-MCNC: 5.4 MG/DL

## 2017-03-10 PROCEDURE — 84550 ASSAY OF BLOOD/URIC ACID: CPT

## 2017-03-10 PROCEDURE — 36415 COLL VENOUS BLD VENIPUNCTURE: CPT

## 2017-03-10 PROCEDURE — 80053 COMPREHEN METABOLIC PANEL: CPT

## 2017-03-10 PROCEDURE — 82570 ASSAY OF URINE CREATININE: CPT

## 2017-03-10 NOTE — PROGRESS NOTES
Called [facility] pharmacy to dc colchicine 0.6 mg tablet as advised by RONI Miller

## 2017-03-13 ENCOUNTER — TELEPHONE (OUTPATIENT)
Dept: RHEUMATOLOGY | Facility: CLINIC | Age: 62
End: 2017-03-13

## 2017-03-13 NOTE — PROGRESS NOTES
Subjective:       Patient ID: Wendy Viveros is a 61 y.o. female.    Chief Complaint: Follow-up    HPI Comments: Pt presents today for an ED f/u for n/v and black stools. In ED w/u neg and pt asked to see GI. She has this appt in 1 week. Today states that symptoms are better but still has some black stools. When asked further, pt does agree that this may be related to the iron that she has been asked to take for anemia.      Review of Systems   Constitutional: Negative.    Eyes: Negative.    Respiratory: Negative for cough, chest tightness and shortness of breath.    Cardiovascular: Negative for chest pain, palpitations and leg swelling.   Gastrointestinal: Positive for blood in stool, constipation, nausea and vomiting. Negative for abdominal distention, abdominal pain, anal bleeding, diarrhea and rectal pain.   Musculoskeletal: Negative.  Negative for joint swelling.   Skin: Negative.    Neurological: Negative for dizziness, weakness, light-headedness and headaches.       Objective:      Physical Exam   Constitutional: She is oriented to person, place, and time. She appears well-developed and well-nourished.   HENT:   Head: Normocephalic and atraumatic.   Eyes: Conjunctivae and EOM are normal. Pupils are equal, round, and reactive to light.   Neck: Normal range of motion. Neck supple. No thyromegaly present.   Cardiovascular: Normal rate, regular rhythm, normal heart sounds and intact distal pulses.    No murmur heard.  Pulmonary/Chest: Effort normal and breath sounds normal. No respiratory distress. She has no wheezes. She has no rales. She exhibits no tenderness.   Abdominal: Soft. Bowel sounds are normal. She exhibits no distension and no mass. There is no tenderness. There is no rebound and no guarding.   Musculoskeletal: Normal range of motion. She exhibits no edema.   Lymphadenopathy:     She has no cervical adenopathy.   Neurological: She is alert and oriented to person, place, and time.   Skin: Skin is  warm. No erythema.   Psychiatric: She has a normal mood and affect. Her behavior is normal. Judgment and thought content normal.       Assessment:       1. Nausea        Plan:       F/u w GI. ED prompts d/w pt  Pt clinically stable and suspect that she had viral enteritis and dark stool as a result of the iron. But, will defer to GI for additional work up  F/u with PCP for symptoms

## 2017-03-14 ENCOUNTER — TELEPHONE (OUTPATIENT)
Dept: INTERNAL MEDICINE | Facility: CLINIC | Age: 62
End: 2017-03-14

## 2017-03-14 ENCOUNTER — OFFICE VISIT (OUTPATIENT)
Dept: RHEUMATOLOGY | Facility: CLINIC | Age: 62
End: 2017-03-14
Payer: MEDICARE

## 2017-03-14 VITALS
HEART RATE: 64 BPM | DIASTOLIC BLOOD PRESSURE: 77 MMHG | WEIGHT: 234.69 LBS | BODY MASS INDEX: 39.1 KG/M2 | SYSTOLIC BLOOD PRESSURE: 151 MMHG | HEIGHT: 65 IN

## 2017-03-14 DIAGNOSIS — M75.02 ADHESIVE CAPSULITIS OF LEFT SHOULDER: Primary | ICD-10-CM

## 2017-03-14 DIAGNOSIS — M10.9 GOUT, ARTHRITIS: ICD-10-CM

## 2017-03-14 PROCEDURE — 99214 OFFICE O/P EST MOD 30 MIN: CPT | Mod: S$GLB,,, | Performed by: INTERNAL MEDICINE

## 2017-03-14 PROCEDURE — 1160F RVW MEDS BY RX/DR IN RCRD: CPT | Mod: S$GLB,,, | Performed by: INTERNAL MEDICINE

## 2017-03-14 PROCEDURE — 3078F DIAST BP <80 MM HG: CPT | Mod: S$GLB,,, | Performed by: INTERNAL MEDICINE

## 2017-03-14 PROCEDURE — 99499 UNLISTED E&M SERVICE: CPT | Mod: S$PBB,,, | Performed by: INTERNAL MEDICINE

## 2017-03-14 PROCEDURE — 99999 PR PBB SHADOW E&M-EST. PATIENT-LVL IV: CPT | Mod: PBBFAC,,, | Performed by: INTERNAL MEDICINE

## 2017-03-14 PROCEDURE — 3077F SYST BP >= 140 MM HG: CPT | Mod: S$GLB,,, | Performed by: INTERNAL MEDICINE

## 2017-03-14 RX ORDER — PREDNISONE 20 MG/1
20 TABLET ORAL DAILY
Qty: 10 TABLET | Refills: 0 | Status: SHIPPED | OUTPATIENT
Start: 2017-03-14 | End: 2017-03-24

## 2017-03-14 ASSESSMENT — ROUTINE ASSESSMENT OF PATIENT INDEX DATA (RAPID3)
PSYCHOLOGICAL DISTRESS SCORE: 2.2
AM STIFFNESS SCORE: 1, YES
TOTAL RAPID3 SCORE: 6.67
FATIGUE SCORE: 5
MDHAQ FUNCTION SCORE: .9
PATIENT GLOBAL ASSESSMENT SCORE: 7
PAIN SCORE: 10

## 2017-03-14 NOTE — TELEPHONE ENCOUNTER
----- Message from Mindy Vzaquez MD sent at 3/14/2017  1:26 PM CDT -----  Dear Dr. Miller    My name is Mindy Vazquez, rheumatologist for patient above.  She comes in with swelling of left wrist and left knee.  I would like to give her prednisone 20mg a day for 5 days.  She is a diabetic. Would you mind overseeing her glucose if she has issues with prednisone induced hyperglycemia.  Thank you in advance.    Mindy

## 2017-03-14 NOTE — PROGRESS NOTES
Subjective:       Patient ID: Wendy Viveros is a 59 y.o. female.    Chief Complaint:joint pain      HPI 58 yo F with PMH of gout, gerd, DMII, CKD, HTN , left shoulder adhesive capsulitis here for f/u of gout.  Diagnosed with gout in early 50s. She had podagra episodes and also has affected ankles to.  She has been taking for allopurinol for years. She is on allopurinol 200mg  A day since April.  She takes indomethacin twice a week.  Tries to avoid shrimp. Denies alcohol use or juices.  Last gout flare around sep 2015.   Reports  Left shoulder  Pain for a year.     Interval history: Patient is here for follow of gout.  She has continued pain in left wrist.   Reports that she has pain in left wrist with lifting.  Reports stiffness in knees.She is taking allopurinol 300mg a day .  Denies tenderness or swelling of the big toe.  Denies alcohol use or seafood. She eats fresh foods.   She takes tramadol as needed.   Pain in left wrist is 6/10.  Reports taking flexeril as needed.          Review of Systems   Constitutional: Negative for chills, diaphoresis, activity change and appetite change.   HENT: Negative for congestion, ear discharge, ear pain, facial swelling, mouth sores, sinus pressure, sneezing, sore throat, tinnitus and trouble swallowing.    Eyes: Negative for photophobia, pain, discharge, redness, itching and visual disturbance.   Respiratory: Negative for apnea, chest tightness, shortness of breath, wheezing and stridor.    Cardiovascular: Negative for leg swelling.   Gastrointestinal: Negative for nausea, abdominal pain, diarrhea, constipation, blood in stool, abdominal distention and anal bleeding.   Endocrine: Negative for cold intolerance and heat intolerance.   Genitourinary: Negative for dysuria and difficulty urinating.   Musculoskeletal: Positive for arthralgias. Negative for myalgias, back pain, joint swelling, gait problem, neck pain and neck stiffness.   Skin: Negative for color change, pallor,  rash and wound.   Neurological: Negative for dizziness, seizures, light-headedness and numbness.   Hematological: Negative for adenopathy. Does not bruise/bleed easily.   Psychiatric/Behavioral: Negative for sleep disturbance. The patient is not nervous/anxious.              Objective:      Physical Exam   Constitutional: She is oriented to person, place, and time.   HENT:   Head: Normocephalic and atraumatic.   Right Ear: External ear normal.   Left Ear: External ear normal.   Nose: Nose normal.   Mouth/Throat: Oropharynx is clear and moist. No oropharyngeal exudate.   Eyes: Conjunctivae and EOM are normal. Pupils are equal, round, and reactive to light. Right eye exhibits no discharge. Left eye exhibits no discharge. No scleral icterus.   Neck: Neck supple. No JVD present. No thyromegaly present.   Cardiovascular: Normal rate, regular rhythm, normal heart sounds and intact distal pulses.  Exam reveals no gallop and no friction rub.    No murmur heard.  Pulmonary/Chest: Effort normal and breath sounds normal. No respiratory distress. She has no wheezes. She has no rales. She exhibits no tenderness.   Abdominal: Soft. Bowel sounds are normal. She exhibits no distension and no mass. There is no tenderness. There is no rebound and no guarding.   Lymphadenopathy:     She has no cervical adenopathy.   Neurological: She is alert and oriented to person, place, and time. No cranial nerve deficit. Gait normal. Coordination normal.   Skin: Skin is dry. No rash noted. No erythema. No pallor.     Psychiatric: Affect and judgment normal.   Musculoskeletal: She exhibits tenderness. She exhibits no edema.       Shoulder L- limited abduction to 160 degrees  Left wrist with mild swelling and tenderness  Left knee with mild swelling and tenderness      LABS:  Uric acid- 6.3 <7.8<7.9  Esr-35  Ccp,rf-negative    Imaging:   Shoulder xrays: (2011)   There is degenerative change on the left with humeral spurring as well as   some cystic  change in both the glenoid and humeral head. On the right,   there is a lesser degree of degenerative change. The acromiohumeral   interval is maintained bilaterally.      Assessment:     60 yo F with PMH of gout, gerd, DMII, CKD, HTN , left shoulder adhesive capsulitis s/p repair on march 2016 here for f/u of gout. She also has adhesive capsulitis of left shoulder since 2009 and received PT.   She comes in today with left wrist/swelling and left knee swelling. Told patient I suspect she may have early RA in addition to gout.  Her gout history consists of history of podagra and hyperuricemia.   I told her that given her CKD, I cannot give her NSAIDS and recommended referral to pain management but patient declined.  Plan:       *  -labs reviewed  -continue allopurinol 300mg po qday   (did not tolerate higher doses of colcrys and her uric acid is at goal,so do need to add colcrys)  Start prednisone 20mg a day for 5 days ( I told patient and pcp). Patient will contact pcp if she has any issues with hyperglycemia  -asked patient to avoid nsaids  Encouraged weight loss for obesity  Follow up with pcp for HTN  rtc in 3-4 months

## 2017-03-14 NOTE — MR AVS SNAPSHOT
[facility] - Rheumatology  [address]  [facility] [address]  Phone: [phone]  Fax: [phone]                  Wendy Viveros   3/14/2017 1:00 PM   Office Visit    Description:  Female : 1955   Provider:  Mindy Vazquez MD   Department:  [facility] - Rheumatology           Reason for Visit     Follow-up                To Do List           Future Appointments        Provider Department Dept Phone    2017 1:30 PM Mindy Vazquez MD [facility] Rheumatology [phone]      Goals (5 Years of Data)     None       These Medications        Disp Refills Start End    predniSONE (DELTASONE) 20 MG tablet 10 tablet 0 3/14/2017 3/24/2017    Take 1 tablet (20 mg total) by mouth once daily. - Oral    Pharmacy: [facility] [address] [place] [address] [address] [place] [address] #: [phone]         [facility]     [facility] Nurse Care Line -  Assistance  Registered nurses in the [facility] Call Center provide clinical advisement, health education, appointment booking, and other advisory services.  Call for this free service at [phone].             Medications           Message regarding Medications     Verify the changes and/or additions to your medication regime listed below are the same as discussed with your clinician today.  If any of these changes or additions are incorrect, please notify your healthcare provider.        START taking these NEW medications        Refills    predniSONE (DELTASONE) 20 MG tablet 0    Sig: Take 1 tablet (20 mg total) by mouth once daily.    Class: Normal    Route: Oral           Verify that the below list of medications is an accurate representation of the medications you are currently taking.  If none reported, the list may be blank. If incorrect, please contact your healthcare provider. Carry this list with you in case of emergency.           Current Medications     allopurinol (ZYLOPRIM) 300 MG tablet Take 1 tablet (300 mg  total) by mouth once daily.    amlodipine (NORVASC) 10 MG tablet Take 1 tablet (10 mg total) by mouth once daily.    aspirin 81 mg Tab Take 1 tablet by mouth Daily.    blood sugar diagnostic (TRUETEST TEST STRIPS) Strp Pt is testing 3 times daily    cloNIDine (CATAPRES) 0.1 MG tablet Take 1 tablet (0.1 mg total) by mouth 2 (two) times daily.    cyanocobalamin, vitamin B-12, (VITAMIN B-12) 50 mcg tablet Take 50 mcg by mouth once daily.    cyclobenzaprine (FLEXERIL) 10 MG tablet TAKE ONE TABLET BY MOUTH ONCE AT NIGHT    dicyclomine (BENTYL) 10 MG capsule Take 1 capsule (10 mg total) by mouth 4 (four) times daily before meals and nightly.    furosemide (LASIX) 40 MG tablet Take 1 tablet (40 mg total) by mouth once daily.    indomethacin (INDOCIN) 50 MG capsule TAKE ONE CAPSULE BY MOUTH THREE TIMES DAILY AS NEEDED    insulin aspart (NOVOLOG) 100 unit/mL injection Inject 10 Units into the skin 3 (three) times daily before meals.    insulin glargine (LANTUS) 100 unit/mL injection Inject 30 Units into the skin every evening.    insulin syringe-needle U-100 0.3 mL 30 gauge x 5/16 Syrg Pt is injecting 4 times daily    insulin syringe-needle U-100 1/2 mL 30 gauge x 5/16 Syrg USE AS DIRECTED 4 TIMES DAILY    lancets Misc by Misc.(Non-Drug; Combo Route) route. Pt is testing 3 times daily    metoprolol tartrate (LOPRESSOR) 50 MG tablet Take 1 tablet (50 mg total) by mouth once daily.    ondansetron (ZOFRAN-ODT) 4 MG TbDL Take 1 tablet (4 mg total) by mouth every 8 (eight) hours as needed.    RANITIDINE HCL (ZANTAC 75 ORAL) Take 1 tablet by mouth.    spironolactone (ALDACTONE) 50 MG tablet Take 1 tablet (50 mg total) by mouth once daily.    SYRINGE, DISPOSABLE, MISC by Misc.(Non-Drug; Combo Route) route. Pt is injecting 4 times daily    tramadol (ULTRAM) 50 mg tablet Take 1 tablet (50 mg total) by mouth once daily.    allopurinol (ZYLOPRIM) 100 MG tablet Take 1 tablet (100 mg total) by mouth once daily.    cholecalciferol,  "vitamin D3, 2,000 unit Tab Take 1 tablet by mouth Once a week.    colchicine 0.6 mg tablet Take 2 tablets and one hour later take a third tablet then tomorrow take one tablet daily    predniSONE (DELTASONE) 20 MG tablet Take 1 tablet (20 mg total) by mouth once daily.    promethazine (PHENERGAN) 6.25 mg/5 mL syrup Take 20 mLs (25 mg total) by mouth once daily.           Clinical Reference Information           Your Vitals Were     BP Pulse Height Weight BMI    151/77 (BP Location: Left arm, Patient Position: Sitting, BP Method: Automatic) 64 5' 5" (1.651 m) 106.5 kg (234 lb 11.2 oz) 39.06 kg/m2      Blood Pressure          Most Recent Value    BP  (!)  151/77      Allergies as of 3/14/2017     Cozaar  [Losartan]    Lisinopril      Immunizations Administered on Date of Encounter - 3/14/2017     None      Language Assistance Services     ATTENTION: Language assistance services are available, free of charge. Please call [phone].      ATENCIÓN: Si betitola manuel, tiene a rajput disposición servicios gratuitos de asistencia lingüística. Llame al [phone].     [facility] Ý: N?u b?n nói Ti?ng Vi?t, có các d?ch v? h? tr? ngôn ng? mi?n phí dành cho b?n. G?i s? [phone].         [facility] complies with applicable Federal civil rights laws and does not discriminate on the basis of race, color, national origin, age, disability, or sex.        "

## 2017-05-02 ENCOUNTER — OFFICE VISIT (OUTPATIENT)
Dept: RHEUMATOLOGY | Facility: CLINIC | Age: 62
End: 2017-05-02
Payer: MEDICARE

## 2017-05-02 VITALS
BODY MASS INDEX: 38.82 KG/M2 | WEIGHT: 233 LBS | SYSTOLIC BLOOD PRESSURE: 145 MMHG | DIASTOLIC BLOOD PRESSURE: 70 MMHG | HEART RATE: 69 BPM | HEIGHT: 65 IN

## 2017-05-02 DIAGNOSIS — M25.551 RIGHT HIP PAIN: ICD-10-CM

## 2017-05-02 DIAGNOSIS — M10.9 GOUTY ARTHRITIS: Primary | ICD-10-CM

## 2017-05-02 PROCEDURE — 3077F SYST BP >= 140 MM HG: CPT | Mod: S$GLB,,, | Performed by: INTERNAL MEDICINE

## 2017-05-02 PROCEDURE — 99999 PR PBB SHADOW E&M-EST. PATIENT-LVL IV: CPT | Mod: PBBFAC,,, | Performed by: INTERNAL MEDICINE

## 2017-05-02 PROCEDURE — 99214 OFFICE O/P EST MOD 30 MIN: CPT | Mod: S$GLB,,, | Performed by: INTERNAL MEDICINE

## 2017-05-02 PROCEDURE — 99499 UNLISTED E&M SERVICE: CPT | Mod: S$PBB,,, | Performed by: INTERNAL MEDICINE

## 2017-05-02 PROCEDURE — 3078F DIAST BP <80 MM HG: CPT | Mod: S$GLB,,, | Performed by: INTERNAL MEDICINE

## 2017-05-02 PROCEDURE — 1160F RVW MEDS BY RX/DR IN RCRD: CPT | Mod: S$GLB,,, | Performed by: INTERNAL MEDICINE

## 2017-05-02 RX ORDER — ALLOPURINOL 300 MG/1
300 TABLET ORAL DAILY
Qty: 90 TABLET | Refills: 1 | Status: SHIPPED | OUTPATIENT
Start: 2017-05-02 | End: 2017-07-27 | Stop reason: SDUPTHER

## 2017-05-02 RX ORDER — DICLOFENAC SODIUM 10 MG/G
2 GEL TOPICAL 3 TIMES DAILY
Qty: 100 G | Refills: 11 | Status: ON HOLD | OUTPATIENT
Start: 2017-05-02 | End: 2017-07-20 | Stop reason: HOSPADM

## 2017-05-02 ASSESSMENT — ROUTINE ASSESSMENT OF PATIENT INDEX DATA (RAPID3)
TOTAL RAPID3 SCORE: 6.61
WHEN YOU AWAKENED IN THE MORNING OVER THE LAST WEEK, PLEASE INDICATE THE AMOUNT OF TIME IT TAKES UNTIL YOU ARE AS LIMBER AS YOU WILL BE FOR THE DAY: 3 HRS.
PAIN SCORE: 9
FATIGUE SCORE: 9
PSYCHOLOGICAL DISTRESS SCORE: 2.2
MDHAQ FUNCTION SCORE: .7
AM STIFFNESS SCORE: 1, YES
PATIENT GLOBAL ASSESSMENT SCORE: 8.5

## 2017-05-02 NOTE — MR AVS SNAPSHOT
[facility] - Rheumatology  [address]  [facility] [address]  Phone: [phone]  Fax: [phone]                  Wendy Viveros   2017 1:30 PM   Office Visit    Description:  Female : 1955   Provider:  Mindy Vazquez MD   Department:  [facility] - Rheumatology           Reason for Visit     Follow-up                To Do List           Future Appointments        Provider Department Dept Phone    2017 2:40 PM Roger Miller MD [facility] Internal Medicine [phone]      Goals (5 Years of Data)     None       These Medications        Disp Refills Start End    allopurinol (ZYLOPRIM) 300 MG tablet 90 tablet 1 2017     Take 1 tablet (300 mg total) by mouth once daily. - Oral    Pharmacy: [facility] [address] [place] [address] Ph #: [phone]       diclofenac sodium 1 % Gel 100 g 11 2017    Apply 2 g topically 3 (three) times daily. - Topical    Pharmacy: [address] Ph #: [phone]         [facility] Call     [facility] On Call Nurse Care Line -  Assistance  Unless otherwise directed by your provider, please contact [facility], our nurse care line that is available for  assistance.     Registered nurses in the [facility] Center provide: appointment scheduling, clinical advisement, health education, and other advisory services.  Call: [phone] (toll free)               Medications           Message regarding Medications     Verify the changes and/or additions to your medication regime listed below are the same as discussed with your clinician today.  If any of these changes or additions are incorrect, please notify your healthcare provider.        START taking these NEW medications        Refills    diclofenac sodium 1 % Gel 11    Sig: Apply 2 g topically 3 (three) times daily.    Class: Normal    Route: Topical      STOP taking these  medications     diclofenac (VOLTAREN) 75 MG EC tablet Take 1 tablet (75 mg total) by mouth 2 (two) times daily. For gout attack           Verify that the below list of medications is an accurate representation of the medications you are currently taking.  If none reported, the list may be blank. If incorrect, please contact your healthcare provider. Carry this list with you in case of emergency.           Current Medications     allopurinol (ZYLOPRIM) 300 MG tablet Take 1 tablet (300 mg total) by mouth once daily.    amlodipine (NORVASC) 10 MG tablet Take 1 tablet (10 mg total) by mouth once daily.    aspirin 81 mg Tab Take 1 tablet by mouth Daily.    blood sugar diagnostic (TRUETEST TEST STRIPS) Strp Pt is testing 3 times daily    cholecalciferol, vitamin D3, 2,000 unit Tab Take 1 tablet by mouth Once a week.    cloNIDine (CATAPRES) 0.1 MG tablet Take 1 tablet (0.1 mg total) by mouth 2 (two) times daily.    cyanocobalamin, vitamin B-12, (VITAMIN B-12) 50 mcg tablet Take 50 mcg by mouth once daily.    cyclobenzaprine (FLEXERIL) 10 MG tablet TAKE ONE TABLET BY MOUTH ONCE AT NIGHT    furosemide (LASIX) 40 MG tablet Take 1 tablet (40 mg total) by mouth once daily.    indomethacin (INDOCIN) 50 MG capsule TAKE ONE CAPSULE BY MOUTH THREE TIMES DAILY AS NEEDED    insulin aspart (NOVOLOG) 100 unit/mL injection Inject 10 Units into the skin 3 (three) times daily before meals.    insulin glargine (LANTUS) 100 unit/mL injection Inject 30 Units into the skin every evening.    insulin syringe-needle U-100 0.3 mL 30 gauge x 5/16 Syrg Pt is injecting 4 times daily    insulin syringe-needle U-100 1/2 mL 30 gauge x 5/16 Syrg USE AS DIRECTED 4 TIMES DAILY    lancets Misc by Misc.(Non-Drug; Combo Route) route. Pt is testing 3 times daily    metoprolol tartrate (LOPRESSOR) 50 MG tablet Take 1 tablet (50 mg total) by mouth once daily.    promethazine (PHENERGAN) 6.25 mg/5 mL syrup Take 20 mLs (25 mg total) by mouth once daily.     "RANITIDINE HCL (ZANTAC 75 ORAL) Take 1 tablet by mouth.    spironolactone (ALDACTONE) 50 MG tablet Take 1 tablet (50 mg total) by mouth once daily.    SYRINGE, DISPOSABLE, MISC by Misc.(Non-Drug; Combo Route) route. Pt is injecting 4 times daily    tramadol (ULTRAM) 50 mg tablet Take 1 tablet (50 mg total) by mouth once daily.    allopurinol (ZYLOPRIM) 100 MG tablet Take 1 tablet (100 mg total) by mouth once daily.    colchicine 0.6 mg tablet Take 2 tablets and one hour later take a third tablet then tomorrow take one tablet daily    diclofenac sodium 1 % Gel Apply 2 g topically 3 (three) times daily.    ondansetron (ZOFRAN-ODT) 4 MG TbDL Take 1 tablet (4 mg total) by mouth every 8 (eight) hours as needed.           Clinical Reference Information           Your Vitals Were     BP Pulse Height Weight BMI    145/70 (BP Location: Left arm, Patient Position: Sitting, BP Method: Automatic) 69 5' 5" (1.651 m) 105.7 kg (233 lb) 38.77 kg/m2      Blood Pressure          Most Recent Value    BP  (!)  145/70      Allergies as of 5/2/2017     Colchicine Analogues    Cozaar  [Losartan]    Lisinopril      Immunizations Administered on Date of Encounter - 5/2/2017     None      Language Assistance Services     ATTENTION: Language assistance services are available, free of charge. Please call [phone].      ATENCIÓN: Si habla español, tiene a rajput disposición servicios gratuitos de asistencia lingüística. Llame al [phone].     [facility] Ý: N?u b?n nói Ti?ng Vi?t, có các d?ch v? h? tr? ngôn ng? mi?n phí dành cho b?n. G?i s? [phone].         [facility] complies with applicable Federal civil rights laws and does not discriminate on the basis of race, color, national origin, age, disability, or sex.        "

## 2017-05-02 NOTE — PROGRESS NOTES
Subjective:       Patient ID: Wendy Viveros is a 59 y.o. female.    Chief Complaint:joint pain      HPI 60 yo F with PMH of gout, gerd, DMII, CKD, HTN , left shoulder adhesive capsulitis here for f/u of gout.  Diagnosed with gout in early 50s. She had podagra episodes and also has affected ankles to.  She has been taking for allopurinol for years. She is on allopurinol 200mg  A day since April.  She takes indomethacin twice a week.  Tries to avoid shrimp. Denies alcohol use or juices.  Last gout flare around sep 2015.   Reports  Left shoulder  Pain for a year.     Interval history: Patient is here for follow of gout and joint pain. She took prednisone for a few days and it improved the swelling and pain but glucose increased.  She has pain in right lateral hip for a few days. Pain today is 8/10 and aching.  She also has pain in lower back.  Reports swelling in ankles but not painful.    Reports stiffness in knees.She is taking allopurinol 300mg a day .  Denies tenderness or swelling of the big toe.  Denies alcohol use or seafood. She eats fresh foods.   She takes tramadol as needed.          Review of Systems   Constitutional: Negative for chills, diaphoresis, activity change and appetite change.   HENT: Negative for congestion, ear discharge, ear pain, facial swelling, mouth sores, sinus pressure, sneezing, sore throat, tinnitus and trouble swallowing.    Eyes: Negative for photophobia, pain, discharge, redness, itching and visual disturbance.   Respiratory: Negative for apnea, chest tightness, shortness of breath, wheezing and stridor.    Cardiovascular: Negative for leg swelling.   Gastrointestinal: Negative for nausea, abdominal pain, diarrhea, constipation, blood in stool, abdominal distention and anal bleeding.   Endocrine: Negative for cold intolerance and heat intolerance.   Genitourinary: Negative for dysuria and difficulty urinating.   Musculoskeletal: Positive for arthralgias. Negative for myalgias,  back pain, joint swelling, gait problem, neck pain and neck stiffness.   Skin: Negative for color change, pallor, rash and wound.   Neurological: Negative for dizziness, seizures, light-headedness and numbness.   Hematological: Negative for adenopathy. Does not bruise/bleed easily.   Psychiatric/Behavioral: Negative for sleep disturbance. The patient is not nervous/anxious.              Objective:      Physical Exam   Constitutional: She is oriented to person, place, and time.   HENT:   Head: Normocephalic and atraumatic.   Right Ear: External ear normal.   Left Ear: External ear normal.   Nose: Nose normal.   Mouth/Throat: Oropharynx is clear and moist. No oropharyngeal exudate.   Eyes: Conjunctivae and EOM are normal. Pupils are equal, round, and reactive to light. Right eye exhibits no discharge. Left eye exhibits no discharge. No scleral icterus.   Neck: Neck supple. No JVD present. No thyromegaly present.   Cardiovascular: Normal rate, regular rhythm, normal heart sounds and intact distal pulses.  Exam reveals no gallop and no friction rub.    No murmur heard.  Pulmonary/Chest: Effort normal and breath sounds normal. No respiratory distress. She has no wheezes. She has no rales. She exhibits no tenderness.   Abdominal: Soft. Bowel sounds are normal. She exhibits no distension and no mass. There is no tenderness. There is no rebound and no guarding.   Lymphadenopathy:     She has no cervical adenopathy.   Neurological: She is alert and oriented to person, place, and time. No cranial nerve deficit. Gait normal. Coordination normal.   Skin: Skin is dry. No rash noted. No erythema. No pallor.     Psychiatric: Affect and judgment normal.   Musculoskeletal: She exhibits tenderness. She exhibits no edema.       Shoulder L- limited abduction to 160 degrees  Left wrist with mild swelling and tenderness  Left knee with mild swelling and tenderness      LABS:  Uric acid- 6.3 <7.8<7.9  Esr-35  Ccp,rf-negative    Imaging:    Shoulder xrays: (2011)   There is degenerative change on the left with humeral spurring as well as   some cystic change in both the glenoid and humeral head. On the right,   there is a lesser degree of degenerative change. The acromiohumeral   interval is maintained bilaterally.      Assessment:     60 yo F with PMH of gout, gerd, DMII, CKD, HTN , left shoulder adhesive capsulitis s/p repair on march 2016 here for f/u of gout. She also has adhesive capsulitis of left shoulder since 2009 and received PT.   She comes in for follow up of left wrist/swelling and left knee swelling which resolved with prednisone. Will continue to treat as  Gout.  If she continues to have swelling despite normal uric acid levels, need to consider seronegative RA as well.  Her gout history consists of history of podagra and hyperuricemia.   I told her that given her CKD, I cannot give her NSAIDS and recommended referral to pain management but patient declined.  She complains of right hip pain for which I offered PT but she declines.  Plan:       *  -labs reviewed  -continue allopurinol 300mg po qday   (did not tolerate higher doses of colcrys and her uric acid is at goal,so do need to add colcrys)  Start diclofenac gel to right hip  -asked patient to avoid nsaids  Encouraged weight loss for obesity  Follow up with pcp for HTN  rtc in 3-4 months

## 2017-05-09 ENCOUNTER — OFFICE VISIT (OUTPATIENT)
Dept: INTERNAL MEDICINE | Facility: CLINIC | Age: 62
End: 2017-05-09
Attending: FAMILY MEDICINE
Payer: MEDICARE

## 2017-05-09 ENCOUNTER — LAB VISIT (OUTPATIENT)
Dept: LAB | Facility: OTHER | Age: 62
End: 2017-05-09
Attending: FAMILY MEDICINE
Payer: MEDICARE

## 2017-05-09 VITALS
HEIGHT: 65 IN | WEIGHT: 231.69 LBS | SYSTOLIC BLOOD PRESSURE: 134 MMHG | BODY MASS INDEX: 38.6 KG/M2 | DIASTOLIC BLOOD PRESSURE: 80 MMHG | HEART RATE: 76 BPM

## 2017-05-09 DIAGNOSIS — Z00.00 PREVENTATIVE HEALTH CARE: Primary | ICD-10-CM

## 2017-05-09 DIAGNOSIS — I10 ESSENTIAL HYPERTENSION: ICD-10-CM

## 2017-05-09 DIAGNOSIS — K21.9 GASTROESOPHAGEAL REFLUX DISEASE WITHOUT ESOPHAGITIS: ICD-10-CM

## 2017-05-09 DIAGNOSIS — E11.8 TYPE 2 DIABETES MELLITUS WITH COMPLICATION, UNSPECIFIED LONG TERM INSULIN USE STATUS: ICD-10-CM

## 2017-05-09 PROCEDURE — 99396 PREV VISIT EST AGE 40-64: CPT | Mod: S$GLB,,, | Performed by: FAMILY MEDICINE

## 2017-05-09 PROCEDURE — 83036 HEMOGLOBIN GLYCOSYLATED A1C: CPT

## 2017-05-09 PROCEDURE — 3075F SYST BP GE 130 - 139MM HG: CPT | Mod: S$GLB,,, | Performed by: FAMILY MEDICINE

## 2017-05-09 PROCEDURE — 99499 UNLISTED E&M SERVICE: CPT | Mod: S$PBB,,, | Performed by: FAMILY MEDICINE

## 2017-05-09 PROCEDURE — 99999 PR PBB SHADOW E&M-EST. PATIENT-LVL III: CPT | Mod: PBBFAC,,, | Performed by: FAMILY MEDICINE

## 2017-05-09 PROCEDURE — 36415 COLL VENOUS BLD VENIPUNCTURE: CPT

## 2017-05-09 PROCEDURE — 3079F DIAST BP 80-89 MM HG: CPT | Mod: S$GLB,,, | Performed by: FAMILY MEDICINE

## 2017-05-09 RX ORDER — TRAMADOL HYDROCHLORIDE 50 MG/1
50 TABLET ORAL DAILY
Qty: 30 TABLET | Refills: 2 | Status: SHIPPED | OUTPATIENT
Start: 2017-05-09 | End: 2017-10-17 | Stop reason: SDUPTHER

## 2017-05-09 RX ORDER — DEXTROSE 4 G
TABLET,CHEWABLE ORAL
Qty: 1 EACH | Refills: 0 | Status: SHIPPED | OUTPATIENT
Start: 2017-05-09 | End: 2023-11-18

## 2017-05-09 RX ORDER — FUROSEMIDE 40 MG/1
40 TABLET ORAL
Qty: 90 TABLET | Refills: 3 | Status: SHIPPED | OUTPATIENT
Start: 2017-05-09 | End: 2019-08-05 | Stop reason: SDUPTHER

## 2017-05-09 RX ORDER — INSULIN GLARGINE 100 [IU]/ML
40 INJECTION, SOLUTION SUBCUTANEOUS NIGHTLY
Qty: 36 ML | Refills: 3 | Status: ON HOLD | OUTPATIENT
Start: 2017-05-09 | End: 2017-07-20 | Stop reason: HOSPADM

## 2017-05-09 NOTE — PROGRESS NOTES
"CHIEF COMPLAINT:  Annual checkup in a pt w/diabetes and gout    HISTORY OF PRESENT ILLNESS:  This 61-year-old woman is here for her annual checkup. She is really without complaints today except for recent poor control of her blood sugar.      She has CKD stage III with a baseline creatinine of 1.2.  She has tried many therapies for her gout.  She has true hyperuricemia.  She notes that only Indocin works for her.    She is diabetic taking only insulin.  She is no longer taking metformin due to diarrhea.  She has been feeling well recently, but states her blood sugars have been running greater than 200 in the morning.  They tend to improve throughout the day.     She is doing well on her antihypertensive medications.      The patient has a history of stable hyperlipidemia on current medications.  The patient denies chest pain or shortness of breath today.  The patient denies muscle aches or myalgias suggestive of myositis.    The patient used to see Dr. Grubbs.      MEDICAL HISTORY:  1.  Diabetes   2.  Hypertension.  3.  Hyperlipidemia.  4.  Gout.  5.  Osteoarthritis, particularly the right knee, which is bone on bone.  6.  Esophageal reflux.    REVIEW OF SYSTEMS:  Chronically overweight.  She gets a yearly eye exam and has   no known diabetic retinopathy.  No chest pain or shortness of breath.  No GI   symptoms.  No headaches or visual changes.  Overdue for routine colonoscopy, which has been recommended.    PHYSICAL EXAMINATION:  Blood pressure 134/80, pulse 76, height 5' 5" (1.651 m), weight 105.1 kg (231 lb 11.3 oz).    APPEARANCE:  Very pleasant 61-year-old black female.  HEENT: Normocephalic atraumatic anicteric  HEART:  Regular rhythm.  LUNGS:  Clear.  ABDOMEN:  Obese, soft, nontender.  Protective Sensation (w/ 10 gram monofilament): Intact  Visual Inspection (Evidence of Foot Deformity, Ulceration, Nails Intact, Skin Intact): Normal  Pedal Pulses: Present    Assessment:   Annual exam   Diabetes with " hyperglycemia.    Hypertension  Hyperlipidemia    PLAN:  1.  Increase Lantus to 40 units qhs.     2.  NovoLog 10 units with meals.  3.  endocrine and rheumatology.  4.  She will continue on antihypertensive medications.  We will update her A1c today and have her back in 3 months

## 2017-05-10 LAB
ESTIMATED AVG GLUCOSE: 169 MG/DL
HBA1C MFR BLD HPLC: 7.5 %

## 2017-05-11 ENCOUNTER — TELEPHONE (OUTPATIENT)
Dept: INTERNAL MEDICINE | Facility: CLINIC | Age: 62
End: 2017-05-11

## 2017-05-11 DIAGNOSIS — E11.00 UNCONTROLLED TYPE 2 DIABETES MELLITUS WITH HYPEROSMOLARITY WITHOUT COMA, WITH LONG-TERM CURRENT USE OF INSULIN: Primary | ICD-10-CM

## 2017-05-11 DIAGNOSIS — Z79.4 UNCONTROLLED TYPE 2 DIABETES MELLITUS WITH HYPEROSMOLARITY WITHOUT COMA, WITH LONG-TERM CURRENT USE OF INSULIN: Primary | ICD-10-CM

## 2017-05-11 NOTE — TELEPHONE ENCOUNTER
----- Message from Roger Miller MD sent at 5/10/2017 10:13 AM CDT -----  A1c is surprisingly good at 7.5.  The increase in her Lantus insulin from yesterday should be sufficient.  Followup in 3 months with repeat A1c prior.

## 2017-05-19 ENCOUNTER — PATIENT MESSAGE (OUTPATIENT)
Dept: INTERNAL MEDICINE | Facility: CLINIC | Age: 62
End: 2017-05-19

## 2017-05-22 ENCOUNTER — TELEPHONE (OUTPATIENT)
Dept: INTERNAL MEDICINE | Facility: CLINIC | Age: 62
End: 2017-05-22

## 2017-05-22 NOTE — TELEPHONE ENCOUNTER
----- Message from Mayuri Herron sent at 5/19/2017  4:27 PM CDT -----  Contact: pt  _  1st Request  _  2nd Request  _  3rd Request        Who: pt    Why: did you receive medical clearance from [facility]?    What Number to Call Back:[phone]    When to Expect a call back: (Before the end of the day)   -- if the call is after 12:00, the call back will be tomorrow.

## 2017-05-30 ENCOUNTER — PATIENT MESSAGE (OUTPATIENT)
Dept: INTERNAL MEDICINE | Facility: CLINIC | Age: 62
End: 2017-05-30

## 2017-05-30 RX ORDER — METOPROLOL TARTRATE 50 MG/1
50 TABLET ORAL DAILY
Qty: 90 TABLET | Refills: 3 | Status: ON HOLD | OUTPATIENT
Start: 2017-05-30 | End: 2017-07-20 | Stop reason: HOSPADM

## 2017-05-30 RX ORDER — AMLODIPINE BESYLATE 10 MG/1
10 TABLET ORAL DAILY
Qty: 90 TABLET | Refills: 3 | Status: SHIPPED | OUTPATIENT
Start: 2017-05-30 | End: 2018-06-01 | Stop reason: SDUPTHER

## 2017-05-30 RX ORDER — SPIRONOLACTONE 50 MG/1
50 TABLET, FILM COATED ORAL DAILY
Qty: 90 TABLET | Refills: 3 | Status: SHIPPED | OUTPATIENT
Start: 2017-05-30 | End: 2017-10-17 | Stop reason: SDUPTHER

## 2017-06-13 ENCOUNTER — HOSPITAL ENCOUNTER (EMERGENCY)
Facility: OTHER | Age: 62
Discharge: HOME | End: 2017-06-13
Attending: EMERGENCY MEDICINE
Payer: MEDICARE

## 2017-06-13 VITALS
WEIGHT: 225 LBS | OXYGEN SATURATION: 98 % | TEMPERATURE: 99 F | SYSTOLIC BLOOD PRESSURE: 186 MMHG | BODY MASS INDEX: 37.49 KG/M2 | DIASTOLIC BLOOD PRESSURE: 91 MMHG | HEIGHT: 65 IN | RESPIRATION RATE: 16 BRPM | HEART RATE: 87 BPM

## 2017-06-13 DIAGNOSIS — B02.9 HERPES ZOSTER WITHOUT COMPLICATION: Primary | ICD-10-CM

## 2017-06-13 PROCEDURE — 99283 EMERGENCY DEPT VISIT LOW MDM: CPT

## 2017-06-13 RX ORDER — ACYCLOVIR 800 MG/1
800 TABLET ORAL
Qty: 40 TABLET | Refills: 0 | Status: ON HOLD | OUTPATIENT
Start: 2017-06-13 | End: 2017-07-20 | Stop reason: HOSPADM

## 2017-06-14 NOTE — ED TRIAGE NOTES
Patient presents to ED with c/o rash to chest and R side of neck that began today. She reports mild itching and pain. Denies fever.

## 2017-06-14 NOTE — ED PROVIDER NOTES
Encounter Date: 6/13/2017    SCRIBE #1 NOTE: I, Perla Kasper , am scribing for, and in the presence of, Dr. Drummond.       History     Chief Complaint   Patient presents with    Rash     with neck and head pain X a week where rash is     Review of patient's allergies indicates:   Allergen Reactions    Colchicine analogues      diarrhea    Cozaar  [losartan]      Other reaction(s): blurry vision    Lisinopril      Other reaction(s): cough     Time seen by provider: 10:01 PM     This is a 61 y.o. female who presents with complaint of rash. Symptoms began this morning. Pt noticed symptoms after showering. Rash is located on the right shoulder and neck, and described as mildly tender. Pt reports mild right ear pain, denies fever, chills, congestion, rhinorrhea, vision disturbance, nausea, vomiting, abdominal pain, chest pain, SOB, or myalgias. She reports taking Naproxen with little relief. Pt denies previously experiencing symptoms.       The history is provided by the patient.     Past Medical History:   Diagnosis Date    Abnormal EKG     Aortic valve regurgitation     Arthritis     CKD (chronic kidney disease) stage 2, GFR 60-89 ml/min 8/8/2014    CKD (chronic kidney disease) stage 2, GFR 60-89 ml/min 8/8/2014    Diabetes mellitus     Diabetes mellitus type II     GERD (gastroesophageal reflux disease)     Gout, unspecified     Heart murmur     Hyperlipidemia     Hypertension     Tendonitis      Past Surgical History:   Procedure Laterality Date    breast reduction      BREAST SURGERY      CHOLECYSTECTOMY      JOINT REPLACEMENT      POLYPECTOMY  05/05/2016    TOTAL SHOULDER ARTHROPLASTY Left     TUBAL LIGATION       Family History   Problem Relation Age of Onset    Heart disease Mother      MI at 71    Hypertension Mother     Hypertension Brother     Diabetes Brother     Hypertension Brother     Breast cancer Neg Hx     Colon cancer Neg Hx     Ovarian cancer Neg Hx      Social History    Substance Use Topics    Smoking status: Never Smoker    Smokeless tobacco: Not on file    Alcohol use No     Review of Systems   Constitutional: Negative for chills and fever.   HENT: Positive for ear pain (right). Negative for congestion, rhinorrhea and sore throat.    Eyes: Negative for visual disturbance.   Respiratory: Negative for shortness of breath.    Cardiovascular: Negative for chest pain.   Gastrointestinal: Negative for abdominal pain, nausea and vomiting.   Genitourinary: Negative for dysuria.   Musculoskeletal: Negative for back pain and myalgias.   Skin: Positive for rash (located on the neck and right shoulder).   Neurological: Negative for weakness.   Hematological: Does not bruise/bleed easily.       Physical Exam     Initial Vitals [06/13/17 2023]   BP Pulse Resp Temp SpO2   (!) 186/91 87 16 98.8 °F (37.1 °C) 98 %     Physical Exam    Nursing note and vitals reviewed.  Constitutional: She appears well-developed and well-nourished. She is not diaphoretic. No distress.   HENT:   Head: Normocephalic and atraumatic.   Right Ear: External ear normal.   Left Ear: External ear normal.   No lesions present in the ear canal or on the ear, nose, periorbital region, or any other area of the face.   Eyes: EOM are normal. Pupils are equal, round, and reactive to light. Right eye exhibits no discharge. Left eye exhibits no discharge.   Neck: Normal range of motion. Neck supple.   Cardiovascular: Normal rate, regular rhythm and normal heart sounds. Exam reveals no gallop and no friction rub.    No murmur heard.  Pulmonary/Chest: Breath sounds normal. No respiratory distress. She has no wheezes. She has no rhonchi. She has no rales.   Abdominal: Soft. There is no tenderness. There is no rebound and no guarding.   Musculoskeletal: Normal range of motion. She exhibits no edema or tenderness.   Neurological: She is alert and oriented to person, place, and time.   Skin: Skin is warm and dry. Rash noted. Rash is  vesicular.   Vesicular lesions on the right lateral neck and shoulder (posterior and anterior). Tender to touch.    Psychiatric: She has a normal mood and affect. Her behavior is normal. Judgment and thought content normal.         ED Course   Procedures  Labs Reviewed - No data to display          Medical Decision Making:   ED Management:  Patient with history and physical consistent with herpes zoster, shingles, outbreak.  No lesions noted in the ear, eyes, or nose.  Her neck pain is entirely superficial where the rash is.  There is no midline tenderness, she is very supple, very unlikely to be meningitic.  Afebrile as well.  Begun on acyclovir, counseled follow-up with primary care.    I did have an extensive talk regarding signs to return for and need for follow up. Patient expressed understanding and will monitor symptoms closely and follow-up as needed.    FARTUN Drummond M.D.  06/14/2017  4:22 AM              Scribe Attestation:   Scribe #1: I performed the above scribed service and the documentation accurately describes the services I performed. I attest to the accuracy of the note.    Attending Attestation:           Physician Attestation for Scribe:  Physician Attestation Statement for Scribe #1: I, Dr. Drummond, reviewed documentation, as scribed by Perla Kasper  in my presence, and it is both accurate and complete.                 ED Course     Clinical Impression:     1. Herpes zoster without complication                Alejo Drummond MD  06/14/17 0423

## 2017-06-14 NOTE — PROVIDER PROGRESS NOTES - EMERGENCY DEPT.
Encounter Date: 6/13/2017    ED Physician Progress Notes        Physician Note:   I evaluated the patient in triage and performed medical screening exam. Patient stable at this time and awaiting bed. Chief complaint and vital signs reviewed.

## 2017-06-23 ENCOUNTER — LAB VISIT (OUTPATIENT)
Dept: LAB | Facility: OTHER | Age: 62
End: 2017-06-23
Attending: FAMILY MEDICINE
Payer: MEDICARE

## 2017-06-23 ENCOUNTER — OFFICE VISIT (OUTPATIENT)
Dept: INTERNAL MEDICINE | Facility: CLINIC | Age: 62
End: 2017-06-23
Attending: FAMILY MEDICINE
Payer: MEDICARE

## 2017-06-23 VITALS
DIASTOLIC BLOOD PRESSURE: 72 MMHG | SYSTOLIC BLOOD PRESSURE: 138 MMHG | BODY MASS INDEX: 38.75 KG/M2 | HEIGHT: 65 IN | HEART RATE: 79 BPM | WEIGHT: 232.56 LBS

## 2017-06-23 DIAGNOSIS — I10 ESSENTIAL HYPERTENSION: ICD-10-CM

## 2017-06-23 DIAGNOSIS — E78.01 HYPERLIPIDEMIA TYPE II: ICD-10-CM

## 2017-06-23 DIAGNOSIS — E11.00 UNCONTROLLED TYPE 2 DIABETES MELLITUS WITH HYPEROSMOLARITY WITHOUT COMA, WITH LONG-TERM CURRENT USE OF INSULIN: ICD-10-CM

## 2017-06-23 DIAGNOSIS — E66.01 MORBID OBESITY DUE TO EXCESS CALORIES: ICD-10-CM

## 2017-06-23 DIAGNOSIS — B02.9 HERPES ZOSTER WITHOUT COMPLICATION: Primary | ICD-10-CM

## 2017-06-23 DIAGNOSIS — Z79.4 UNCONTROLLED TYPE 2 DIABETES MELLITUS WITH HYPEROSMOLARITY WITHOUT COMA, WITH LONG-TERM CURRENT USE OF INSULIN: ICD-10-CM

## 2017-06-23 LAB
ESTIMATED AVG GLUCOSE: 154 MG/DL
HBA1C MFR BLD HPLC: 7 %

## 2017-06-23 PROCEDURE — 83036 HEMOGLOBIN GLYCOSYLATED A1C: CPT

## 2017-06-23 PROCEDURE — 36415 COLL VENOUS BLD VENIPUNCTURE: CPT

## 2017-06-23 PROCEDURE — 99214 OFFICE O/P EST MOD 30 MIN: CPT | Mod: S$GLB,,, | Performed by: FAMILY MEDICINE

## 2017-06-23 PROCEDURE — 3045F PR MOST RECENT HEMOGLOBIN A1C LEVEL 7.0-9.0%: CPT | Mod: S$GLB,,, | Performed by: FAMILY MEDICINE

## 2017-06-23 PROCEDURE — 99499 UNLISTED E&M SERVICE: CPT | Mod: S$PBB,,, | Performed by: FAMILY MEDICINE

## 2017-06-23 PROCEDURE — 99999 PR PBB SHADOW E&M-EST. PATIENT-LVL III: CPT | Mod: PBBFAC,,, | Performed by: FAMILY MEDICINE

## 2017-06-23 RX ORDER — MUPIROCIN 20 MG/G
OINTMENT TOPICAL 3 TIMES DAILY
Qty: 30 G | Refills: 3 | Status: SHIPPED | OUTPATIENT
Start: 2017-06-23 | End: 2017-07-03

## 2017-06-23 RX ORDER — PEN NEEDLE, DIABETIC 29 G X1/2"
NEEDLE, DISPOSABLE MISCELLANEOUS
COMMUNITY
Start: 2017-05-31 | End: 2017-07-13 | Stop reason: SDUPTHER

## 2017-06-23 RX ORDER — CYCLOBENZAPRINE HCL 10 MG
TABLET ORAL
Qty: 30 TABLET | Refills: 2 | Status: SHIPPED | OUTPATIENT
Start: 2017-06-23 | End: 2017-10-17 | Stop reason: SDUPTHER

## 2017-06-23 NOTE — PROGRESS NOTES
"CHIEF COMPLAINT:  ER follow-up for shingles in a pt w/diabetes and gout    HISTORY OF PRESENT ILLNESS:  This 61-year-old woman is here for ER follow-up regarding right C-1 shingles.     She was treated appropriately with antivirals.  No vesicles are present at this time.  No neuropathic pain is present.  She does have some muscular pain secondary to all of the inflammation involving the trapezius on the right.      Other than that she is really without complaints today except for recent poor control of her blood sugar.      She has CKD stage III with a baseline creatinine of 1.2.  She has tried many therapies for her gout.  She has true hyperuricemia.  She notes that only Indocin works for her.    She is diabetic taking only insulin.  She is no longer taking metformin due to diarrhea.  She has been feeling well recently, but states her blood sugars have been all over the place.     She is doing well on her antihypertensive medications.      The patient has a history of stable hyperlipidemia on current medications.  The patient denies chest pain or shortness of breath today.  The patient denies muscle aches or myalgias suggestive of myositis.    The patient used to see Dr. Grubbs.      MEDICAL HISTORY:  1.  Diabetes   2.  Hypertension.  3.  Hyperlipidemia.  4.  Gout.  5.  Osteoarthritis, particularly the right knee, which is bone on bone.  6.  Esophageal reflux.    REVIEW OF SYSTEMS:  Chronically overweight.  She gets a yearly eye exam and has   no known diabetic retinopathy.  No chest pain or shortness of breath.  No GI   symptoms.  No headaches or visual changes.  Overdue for routine colonoscopy, which has been recommended.    PHYSICAL EXAMINATION:  Blood pressure 138/72, pulse 79, height 5' 5" (1.651 m), weight 105.5 kg (232 lb 9.4 oz).    APPEARANCE:  Very pleasant 61-year-old black female.  HEENT: Normocephalic atraumatic anicteric  HEART:  Regular rhythm.  LUNGS:  Clear.  ABDOMEN:  Obese, soft, " nontender.  Protective Sensation (w/ 10 gram monofilament): Intact  Visual Inspection (Evidence of Foot Deformity, Ulceration, Nails Intact, Skin Intact): Normal  Pedal Pulses: Present    Assessment:   Right-sided C1 zoster, improving  Diabetes with hyperglycemia.    Hypertension  Hyperlipidemia    PLAN:  1.  Lantus to 40 units qhs.    NovoLog 10 units with meals.  2.  Bactroban to prevent secondary bacterial infection  3.  endocrine and rheumatology.  4.  She will continue on antihypertensive medications.  We will update her A1c today.  If it is good we can see her back in 6 months.  If it is not good we will make a change and bring her back in 3 months.

## 2017-07-10 DIAGNOSIS — Z12.11 COLON CANCER SCREENING: ICD-10-CM

## 2017-07-13 ENCOUNTER — HOSPITAL ENCOUNTER (INPATIENT)
Facility: OTHER | Age: 62
LOS: 2 days | Discharge: HOME | DRG: 812 | End: 2017-07-20
Attending: EMERGENCY MEDICINE | Admitting: EMERGENCY MEDICINE
Payer: MEDICARE

## 2017-07-13 DIAGNOSIS — E11.8 TYPE 2 DIABETES MELLITUS WITH COMPLICATION, UNSPECIFIED LONG TERM INSULIN USE STATUS: ICD-10-CM

## 2017-07-13 DIAGNOSIS — D69.6 THROMBOCYTOPENIA: ICD-10-CM

## 2017-07-13 DIAGNOSIS — D59.9 ACQUIRED HEMOLYTIC ANEMIA: ICD-10-CM

## 2017-07-13 DIAGNOSIS — D59.8 OTHER ACQUIRED HEMOLYTIC ANEMIAS: ICD-10-CM

## 2017-07-13 DIAGNOSIS — D64.9 ANEMIA, UNSPECIFIED: Primary | ICD-10-CM

## 2017-07-13 DIAGNOSIS — R06.02 SOB (SHORTNESS OF BREATH): ICD-10-CM

## 2017-07-13 DIAGNOSIS — R17 SCLERAL ICTERUS: ICD-10-CM

## 2017-07-13 DIAGNOSIS — R17 JAUNDICE: ICD-10-CM

## 2017-07-13 DIAGNOSIS — E83.52 HYPERCALCEMIA: ICD-10-CM

## 2017-07-13 DIAGNOSIS — N28.9 RENAL INSUFFICIENCY: ICD-10-CM

## 2017-07-13 DIAGNOSIS — R73.9 HYPERGLYCEMIA: ICD-10-CM

## 2017-07-13 PROBLEM — R16.2 HEPATOSPLENOMEGALY: Status: ACTIVE | Noted: 2017-07-13

## 2017-07-13 PROBLEM — D58.9 HEMOLYTIC ANEMIA: Status: ACTIVE | Noted: 2017-07-13

## 2017-07-13 PROBLEM — E80.6 HYPERBILIRUBINEMIA: Status: ACTIVE | Noted: 2017-07-13

## 2017-07-13 LAB
25(OH)D3+25(OH)D2 SERPL-MCNC: 33 NG/ML
ABO + RH BLD: NORMAL
ALBUMIN SERPL BCP-MCNC: 4 G/DL
ALP SERPL-CCNC: 102 U/L
ALT SERPL W/O P-5'-P-CCNC: 16 U/L
ANION GAP SERPL CALC-SCNC: 11 MMOL/L
ANISOCYTOSIS BLD QL SMEAR: ABNORMAL
AST SERPL-CCNC: 16 U/L
BASOPHILS # BLD AUTO: 0.05 K/UL
BASOPHILS NFR BLD: 0.4 %
BILIRUB DIRECT SERPL-MCNC: 0.9 MG/DL
BILIRUB SERPL-MCNC: 4.1 MG/DL
BILIRUB UR QL STRIP: NEGATIVE
BLD GP AB SCN CELLS X3 SERPL QL: NORMAL
BNP SERPL-MCNC: 17 PG/ML
BUN SERPL-MCNC: 32 MG/DL
CALCIUM SERPL-MCNC: 10.7 MG/DL
CHLORIDE SERPL-SCNC: 105 MMOL/L
CLARITY UR: CLEAR
CO2 SERPL-SCNC: 23 MMOL/L
COLOR UR: ABNORMAL
CREAT SERPL-MCNC: 1.3 MG/DL
DAT IGG-SP REAG RBC-IMP: NORMAL
DIFFERENTIAL METHOD: ABNORMAL
EOSINOPHIL # BLD AUTO: 0.3 K/UL
EOSINOPHIL NFR BLD: 2.5 %
ERYTHROCYTE [DISTWIDTH] IN BLOOD BY AUTOMATED COUNT: 22.1 %
EST. GFR  (AFRICAN AMERICAN): 51 ML/MIN/1.73 M^2
EST. GFR  (NON AFRICAN AMERICAN): 44 ML/MIN/1.73 M^2
FERRITIN SERPL-MCNC: 639 NG/ML
FOLATE SERPL-MCNC: 16.2 NG/ML
GIANT PLATELETS BLD QL SMEAR: PRESENT
GLUCOSE SERPL-MCNC: 282 MG/DL
GLUCOSE UR QL STRIP: NEGATIVE
HAPTOGLOB SERPL-MCNC: <10 MG/DL
HCT VFR BLD AUTO: 22.5 %
HGB BLD-MCNC: 7.8 G/DL
HGB UR QL STRIP: NEGATIVE
INR PPP: 1
IRON SERPL-MCNC: 143 UG/DL
KETONES UR QL STRIP: NEGATIVE
LDH SERPL L TO P-CCNC: 208 U/L
LEUKOCYTE ESTERASE UR QL STRIP: NEGATIVE
LYMPHOCYTES # BLD AUTO: 2.5 K/UL
LYMPHOCYTES NFR BLD: 19.9 %
MAGNESIUM SERPL-MCNC: 1.8 MG/DL
MCH RBC QN AUTO: 34.2 PG
MCHC RBC AUTO-ENTMCNC: 34.7 %
MCV RBC AUTO: 99 FL
MONOCYTES # BLD AUTO: 0.9 K/UL
MONOCYTES NFR BLD: 7.1 %
NEUTROPHILS # BLD AUTO: 8.7 K/UL
NEUTROPHILS NFR BLD: 70.1 %
NITRITE UR QL STRIP: NEGATIVE
PH UR STRIP: 6 [PH] (ref 5–8)
PHOSPHATE SERPL-MCNC: 3.1 MG/DL
PLATELET # BLD AUTO: 116 K/UL
PLATELET BLD QL SMEAR: ABNORMAL
PMV BLD AUTO: 10.5 FL
POCT GLUCOSE: 276 MG/DL (ref 70–110)
POCT GLUCOSE: 281 MG/DL (ref 70–110)
POCT GLUCOSE: 350 MG/DL (ref 70–110)
POLYCHROMASIA BLD QL SMEAR: ABNORMAL
POTASSIUM SERPL-SCNC: 4.2 MMOL/L
PROT SERPL-MCNC: 7.7 G/DL
PROT UR QL STRIP: NEGATIVE
PROTHROMBIN TIME: 11.2 SEC
PTH-INTACT SERPL-MCNC: 44.7 PG/ML
RBC # BLD AUTO: 2.28 M/UL
RETICS/RBC NFR AUTO: 14.5 %
SATURATED IRON: 51 %
SODIUM SERPL-SCNC: 139 MMOL/L
SP GR UR STRIP: 1.01 (ref 1–1.03)
TOTAL IRON BINDING CAPACITY: 278 UG/DL
TRANSFERRIN SERPL-MCNC: 188 MG/DL
TRANSFERRIN SERPL-MCNC: 188 MG/DL
TROPONIN I SERPL DL<=0.01 NG/ML-MCNC: 0.01 NG/ML
TSH SERPL DL<=0.005 MIU/L-ACNC: 1.84 UIU/ML
URN SPEC COLLECT METH UR: ABNORMAL
UROBILINOGEN UR STRIP-ACNC: ABNORMAL EU/DL
VIT B12 SERPL-MCNC: 626 PG/ML
WBC # BLD AUTO: 12.61 K/UL

## 2017-07-13 PROCEDURE — 84100 ASSAY OF PHOSPHORUS: CPT

## 2017-07-13 PROCEDURE — G0378 HOSPITAL OBSERVATION PER HR: HCPCS

## 2017-07-13 PROCEDURE — 82746 ASSAY OF FOLIC ACID SERUM: CPT

## 2017-07-13 PROCEDURE — 81003 URINALYSIS AUTO W/O SCOPE: CPT

## 2017-07-13 PROCEDURE — 99204 OFFICE O/P NEW MOD 45 MIN: CPT | Mod: ,,, | Performed by: INTERNAL MEDICINE

## 2017-07-13 PROCEDURE — 80053 COMPREHEN METABOLIC PANEL: CPT

## 2017-07-13 PROCEDURE — 25000003 PHARM REV CODE 250: Performed by: PHYSICIAN ASSISTANT

## 2017-07-13 PROCEDURE — 96372 THER/PROPH/DIAG INJ SC/IM: CPT

## 2017-07-13 PROCEDURE — 83970 ASSAY OF PARATHORMONE: CPT

## 2017-07-13 PROCEDURE — 85025 COMPLETE CBC W/AUTO DIFF WBC: CPT

## 2017-07-13 PROCEDURE — 99285 EMERGENCY DEPT VISIT HI MDM: CPT | Mod: 25

## 2017-07-13 PROCEDURE — 83036 HEMOGLOBIN GLYCOSYLATED A1C: CPT

## 2017-07-13 PROCEDURE — 83880 ASSAY OF NATRIURETIC PEPTIDE: CPT

## 2017-07-13 PROCEDURE — 82306 VITAMIN D 25 HYDROXY: CPT

## 2017-07-13 PROCEDURE — 63600175 PHARM REV CODE 636 W HCPCS: Performed by: PHYSICIAN ASSISTANT

## 2017-07-13 PROCEDURE — 86880 COOMBS TEST DIRECT: CPT

## 2017-07-13 PROCEDURE — 84443 ASSAY THYROID STIM HORMONE: CPT

## 2017-07-13 PROCEDURE — 99223 1ST HOSP IP/OBS HIGH 75: CPT | Mod: ,,, | Performed by: HOSPITALIST

## 2017-07-13 PROCEDURE — 86850 RBC ANTIBODY SCREEN: CPT

## 2017-07-13 PROCEDURE — 93005 ELECTROCARDIOGRAM TRACING: CPT

## 2017-07-13 PROCEDURE — 82962 GLUCOSE BLOOD TEST: CPT

## 2017-07-13 PROCEDURE — 83540 ASSAY OF IRON: CPT

## 2017-07-13 PROCEDURE — 82248 BILIRUBIN DIRECT: CPT

## 2017-07-13 PROCEDURE — 83735 ASSAY OF MAGNESIUM: CPT

## 2017-07-13 PROCEDURE — 82607 VITAMIN B-12: CPT

## 2017-07-13 PROCEDURE — 11000001 HC ACUTE MED/SURG PRIVATE ROOM

## 2017-07-13 PROCEDURE — 84484 ASSAY OF TROPONIN QUANT: CPT

## 2017-07-13 PROCEDURE — 93010 ELECTROCARDIOGRAM REPORT: CPT | Mod: ,,, | Performed by: INTERNAL MEDICINE

## 2017-07-13 PROCEDURE — 36415 COLL VENOUS BLD VENIPUNCTURE: CPT

## 2017-07-13 PROCEDURE — 86900 BLOOD TYPING SEROLOGIC ABO: CPT

## 2017-07-13 PROCEDURE — 83010 ASSAY OF HAPTOGLOBIN QUANT: CPT

## 2017-07-13 PROCEDURE — 85610 PROTHROMBIN TIME: CPT

## 2017-07-13 PROCEDURE — 83615 LACTATE (LD) (LDH) ENZYME: CPT

## 2017-07-13 PROCEDURE — 85045 AUTOMATED RETICULOCYTE COUNT: CPT

## 2017-07-13 PROCEDURE — 82728 ASSAY OF FERRITIN: CPT

## 2017-07-13 RX ORDER — RAMELTEON 8 MG/1
8 TABLET ORAL NIGHTLY PRN
Status: DISCONTINUED | OUTPATIENT
Start: 2017-07-13 | End: 2017-07-20 | Stop reason: HOSPADM

## 2017-07-13 RX ORDER — FAMOTIDINE 20 MG/1
20 TABLET, FILM COATED ORAL DAILY
Status: DISCONTINUED | OUTPATIENT
Start: 2017-07-14 | End: 2017-07-13

## 2017-07-13 RX ORDER — CLONIDINE HYDROCHLORIDE 0.1 MG/1
0.1 TABLET ORAL 2 TIMES DAILY
Status: DISCONTINUED | OUTPATIENT
Start: 2017-07-13 | End: 2017-07-20 | Stop reason: HOSPADM

## 2017-07-13 RX ORDER — GLUCAGON 1 MG
1 KIT INJECTION
Status: DISCONTINUED | OUTPATIENT
Start: 2017-07-13 | End: 2017-07-20 | Stop reason: HOSPADM

## 2017-07-13 RX ORDER — METOPROLOL SUCCINATE 50 MG/1
50 TABLET, EXTENDED RELEASE ORAL DAILY
Status: DISCONTINUED | OUTPATIENT
Start: 2017-07-14 | End: 2017-07-20 | Stop reason: HOSPADM

## 2017-07-13 RX ORDER — HEPARIN SODIUM 5000 [USP'U]/ML
5000 INJECTION, SOLUTION INTRAVENOUS; SUBCUTANEOUS EVERY 12 HOURS
Status: DISCONTINUED | OUTPATIENT
Start: 2017-07-13 | End: 2017-07-20 | Stop reason: HOSPADM

## 2017-07-13 RX ORDER — IBUPROFEN 200 MG
16 TABLET ORAL
Status: DISCONTINUED | OUTPATIENT
Start: 2017-07-13 | End: 2017-07-20 | Stop reason: HOSPADM

## 2017-07-13 RX ORDER — ACETAMINOPHEN 325 MG/1
650 TABLET ORAL EVERY 8 HOURS PRN
Status: DISCONTINUED | OUTPATIENT
Start: 2017-07-13 | End: 2017-07-20 | Stop reason: HOSPADM

## 2017-07-13 RX ORDER — INSULIN ASPART 100 [IU]/ML
0-5 INJECTION, SOLUTION INTRAVENOUS; SUBCUTANEOUS
Status: DISCONTINUED | OUTPATIENT
Start: 2017-07-13 | End: 2017-07-14

## 2017-07-13 RX ORDER — NAPROXEN SODIUM 220 MG/1
81 TABLET, FILM COATED ORAL DAILY
Status: DISCONTINUED | OUTPATIENT
Start: 2017-07-14 | End: 2017-07-20 | Stop reason: HOSPADM

## 2017-07-13 RX ORDER — POLYETHYLENE GLYCOL 3350 17 G/17G
17 POWDER, FOR SOLUTION ORAL DAILY
Status: DISCONTINUED | OUTPATIENT
Start: 2017-07-14 | End: 2017-07-20 | Stop reason: HOSPADM

## 2017-07-13 RX ORDER — OXYCODONE HYDROCHLORIDE 5 MG/1
5 TABLET ORAL EVERY 4 HOURS PRN
Status: DISCONTINUED | OUTPATIENT
Start: 2017-07-13 | End: 2017-07-20 | Stop reason: HOSPADM

## 2017-07-13 RX ORDER — IBUPROFEN 200 MG
24 TABLET ORAL
Status: DISCONTINUED | OUTPATIENT
Start: 2017-07-13 | End: 2017-07-20 | Stop reason: HOSPADM

## 2017-07-13 RX ORDER — METOPROLOL SUCCINATE 50 MG/1
50 TABLET, EXTENDED RELEASE ORAL DAILY
COMMUNITY
End: 2017-07-13 | Stop reason: SDUPTHER

## 2017-07-13 RX ORDER — FAMOTIDINE 20 MG/1
20 TABLET, FILM COATED ORAL 2 TIMES DAILY
Status: DISCONTINUED | OUTPATIENT
Start: 2017-07-13 | End: 2017-07-20 | Stop reason: HOSPADM

## 2017-07-13 RX ORDER — AMLODIPINE BESYLATE 5 MG/1
10 TABLET ORAL DAILY
Status: DISCONTINUED | OUTPATIENT
Start: 2017-07-14 | End: 2017-07-20 | Stop reason: HOSPADM

## 2017-07-13 RX ORDER — INSULIN ASPART 100 [IU]/ML
7 INJECTION, SOLUTION INTRAVENOUS; SUBCUTANEOUS
Status: DISCONTINUED | OUTPATIENT
Start: 2017-07-13 | End: 2017-07-14

## 2017-07-13 RX ADMIN — INSULIN ASPART 7 UNITS: 100 INJECTION, SOLUTION INTRAVENOUS; SUBCUTANEOUS at 05:07

## 2017-07-13 RX ADMIN — FAMOTIDINE 20 MG: 20 TABLET, FILM COATED ORAL at 09:07

## 2017-07-13 RX ADMIN — INSULIN ASPART 2 UNITS: 100 INJECTION, SOLUTION INTRAVENOUS; SUBCUTANEOUS at 09:07

## 2017-07-13 RX ADMIN — INSULIN DETEMIR 30 UNITS: 100 INJECTION, SOLUTION SUBCUTANEOUS at 09:07

## 2017-07-13 RX ADMIN — HEPARIN SODIUM 5000 UNITS: 5000 INJECTION, SOLUTION INTRAVENOUS; SUBCUTANEOUS at 09:07

## 2017-07-13 RX ADMIN — CLONIDINE HYDROCHLORIDE 0.1 MG: 0.1 TABLET ORAL at 09:07

## 2017-07-13 NOTE — ED NOTES
Pt presents with SOB per registration, I went to assess pt, BBS=, clear to A&P, SaO2 100%, pt to go to triage for further vital signs and treatment

## 2017-07-13 NOTE — HPI
For pleasant 61-year-old -American female who awoke from sleep with shortness of breath shortly thereafter followed by dizziness.  She denies any antecedent symptoms.  Specifically, she had no fever, diarrhea, nausea, or abnormal bleeding.    She was recently treated for herpes zoster on June 13 but reports that she only took her antiviral medications for 3 or 4 days.  Otherwise she has no new medications.

## 2017-07-13 NOTE — PROGRESS NOTES
Pt arrived to floor via stretcher with transport, and transferred self to bed. VS takena and stable on RA and afebrile. Telemetry applied.  IVF started, SCDs/TEDs applied, oriented to room, call light placed within reach, bed low and locked, and family at bedside.Denied pain/SOB. No acute distress noted at this time. Will continue to monitor.

## 2017-07-13 NOTE — ASSESSMENT & PLAN NOTE
New onset anemia concerning for possible hemolysis. Supported by increased retic count. LDH and haptoglobin pending. The pre-existing mild thrombocytopenia may indicate some broader immunological issue.  Hepatosplenomegaly on ultrasound noted.  Depending on the results of additional lab studies she may need additional imaging.    Rec: Check SPEP, cold agglutinins. If LDH and hapto are consistent with hemolysis would start steroids -  1 mg/kg prenisone.

## 2017-07-13 NOTE — ED NOTES
Dr. Cordon at bedside - states at this time no blood transfusions until she is seen by GI and Heme - pt needs to have own blood worked up at this time

## 2017-07-13 NOTE — ASSESSMENT & PLAN NOTE
Present since march and unchanged- uncertain significance but may be part of an immune mediated process.    Rec: check ALFREDO, lupus anti-coagulant

## 2017-07-13 NOTE — H&P
History & Physical  Hospital Medicine      SUBJECTIVE:     Chief Complaint/Reason for Admission:   Shortness of breath    History of Present Illness:  Patient is a 61 y.o. female presents with 4 day history of worsening dyspnea on exertion.  Feels worse lying down flat, lying on side helps.  Feels fatigue.  Daughter states this has been going on for last year.  No pain.  No fever.  No NVDC.  Denies any bleeding, no blood in stools.  Denies heavy NSAID use.    Hgb was 12.1, and is now 7.8.  Total bilirubin elevated going back as far as 2010, now 4.1, with direct 0.9.  Has jaundice.      Denies ever having colonoscopy.      (Not in a hospital admission)  SEE HOME MED LIST      Review of patient's allergies indicates:   Allergen Reactions    Colchicine analogues      diarrhea    Cozaar  [losartan]      Other reaction(s): blurry vision    Lisinopril      Other reaction(s): cough        Past Medical History:   Diagnosis Date    Abnormal EKG     Anemia 7/13/2017    Aortic valve regurgitation     Arthritis     CKD (chronic kidney disease) stage 2, GFR 60-89 ml/min 8/8/2014    CKD (chronic kidney disease) stage 2, GFR 60-89 ml/min 8/8/2014    Diabetes mellitus     Diabetes mellitus type II     GERD (gastroesophageal reflux disease)     Gout, unspecified     Heart murmur     Hyperlipidemia     Hypertension     Tendonitis      Past Surgical History:   Procedure Laterality Date    breast reduction      BREAST SURGERY      CHOLECYSTECTOMY      JOINT REPLACEMENT      POLYPECTOMY  05/05/2016    TOTAL SHOULDER ARTHROPLASTY Left     TUBAL LIGATION       Family History   Problem Relation Age of Onset    Heart disease Mother      MI at 71    Hypertension Mother     Hypertension Brother     Diabetes Brother     Hypertension Brother     Breast cancer Neg Hx     Colon cancer Neg Hx     Ovarian cancer Neg Hx      Social History   Substance Use Topics    Smoking status: Never Smoker    Smokeless tobacco:  Not on file    Alcohol use No       Review of Systems:  Constitutional: no fever or chills  Eyes: no visual changes  ENT: no nasal congestion or sore throat  Respiratory: no cough or shortness of breath  Cardiovascular: no chest pain or palpitations  Gastrointestinal: no nausea or vomiting, no abdominal pain or change in bowel habits  Genitourinary: no hematuria or dysuria  Integument/Breast: no rash or pruritis  Hematologic/Lymphatic: no easy bruising or lymphadenopathy  Musculoskeletal: no arthralgias or myalgias  Neurological: no seizures or tremors  Behavioral/Psych: no auditory or visual hallucinations  Endocrine: no heat or cold intolerance    OBJECTIVE:     Vital Signs (Most Recent)  Temp: 98.6 °F (37 °C) (07/13/17 0843)  Pulse: 86 (07/13/17 1017)  Resp: 19 (07/13/17 1017)  BP: (!) 155/71 (07/13/17 1017)  SpO2: 100 % (07/13/17 1017)    Physical Exam:  General: well developed, well nourished, no distress  Head: normocephalic, atraumatic  Eyes:  jaundice  Throat: lips, mucosa, and tongue normal; teeth and gums normal and OP clear  Neck: supple, symmetrical, trachea midline, no JVD and thyroid not enlarged, symmetric, no tenderness/mass/nodules  Lungs:  clear to auscultation bilaterally and normal respiratory effort  Heart: regular rate and rhythm and systolic murmur: systolic ejection 3/6, L2ICS at 2nd left intercostal space  Abdomen: soft, non-tender non-distented; bowel sounds normal; no masses,  no organomegaly  Extremities: no cyanosis or edema, or clubbing  Skin: Skin color, texture, turgor normal. No rashes or lesions  Lymph Nodes: No cervical or supraclavicular adenopathy  Neurologic: Normal strength and tone. No focal numbness or weakness      Laboratory  CBC:     Recent Labs  Lab 07/13/17  0940   WBC 12.61   RBC 2.28*   HGB 7.8*   HCT 22.5*   *   MCV 99*   MCH 34.2*   MCHC 34.7     CMP:     Recent Labs  Lab 07/13/17  0940   *   CALCIUM 10.7*   ALBUMIN 4.0   PROT 7.7      K 4.2    CO2 23      BUN 32*   CREATININE 1.3   ALKPHOS 102   ALT 16   AST 16   BILITOT 4.1*       Recent Labs  Lab 07/13/17  1012   COLORU Margot   SPECGRAV 1.015   PHUR 6.0   PROTEINUA Negative   NITRITE Negative   LEUKOCYTESUR Negative   UROBILINOGEN 2.0-3.0*       Diagnostic Results:  CXR 7/13/17:  IMPRESSION:  No evidence of acute intrathoracic disease.      ABD US 7/13/17:  Findings: Pancreas obscured by gas.  Aorta and IVC unremarkable.  Gallbladder removed.  Liver measures 22.5 cm, the right kidney 10.5, the left kidney 10.2, and the spleen 13.6 cm.  There is a right midpole cyst measuring 1.4 cm.  Spleen is enlarged.  Kidney show nothing worrisome.  There is hepatomegaly without focal lesion.  IMPRESSION:  Hepatosplenomegaly.      ASSESSMENT/PLAN:     Active Hospital Problems    Diagnosis  POA    *Symptomatic anemia [D64.9]  Yes    Anemia [D64.9]  Yes    Jaundice [R17]  Yes    Hyperbilirubinemia [E80.6]  Yes    Shortness of breath [R06.02]  Yes    Hepatosplenomegaly [R16.2]  Yes    Hypercalcemia [E83.52]  Yes    Hemolytic anemia [D58.9]  Yes    Thrombocytopenia [D69.6]  Yes    Diabetes mellitus, type II [E11.9]  Yes    CKD (chronic kidney disease) stage 2, GFR 60-89 ml/min [N18.2]  Yes    Diabetes type 2, uncontrolled [E11.65]  Yes    HTN (hypertension) [I10]  Yes    Hyperlipidemia type II [E78.01]  Yes    Chronic gout [M1A.9XX0]  Yes      Resolved Hospital Problems    Diagnosis Date Resolved POA   No resolved problems to display.         Plan:     HENRY / SOB / Symptomatic anemia   - Suspect secondary to anemia  - Troponin 0.009; BNP = 17; EKG c LVH, stable    Anemia  - Denies any bleeding  - Consistent with hemolytic process  - Hgb 12.1 >> 7.8 over last 4 months  - Indirect bilirubin elevated  - Hepatosplenomegaly noted on CT 3/2/17 and Abd US 7/13/17  - Fe, B-12, folate, retic, LDH, haptoglobin, [place] pending.  - Hematology consult.    - GI consulted     Hepatosplenomegaly  - Hepatitis  profile negative 2015  - Hematology to see    Thrombocytopenia  - Mild  - Hematology to see.  - Monitor    Hypercalcemia  - PTH pending  - Vit D pending    HTN  - Continue home med and monitor    DM-2  - A1c = 7.0  - Accucheck and NISS for now.     Chronic Gout  - On allopurinol at home.    HLD  - Lipid profile historically fair control.     ADRIÁN SCD Heparin, consider holding heparin.

## 2017-07-13 NOTE — ED NOTES
Increasing SOB x4 days worse at night and on exertion. Slight jaundice, pale conjunctiva. AAOx3, respirations even and unlabored, HR regular and WNL. Peripheral pulses intact.     APPEARANCE: Patient resting comfortably and in no acute distress, patient is clean and well groomed, patient's clothing is properly fastened.  SKIN: The skin is warm and dry, patient has normal skin turgor and moist mucus membranes, skin intact, no breakdown or brusing noted.  MUSKULOSKELETAL: Patient moving all extremities well, no obvious swelling or deformities noted.  RESPIRATORY: Airway is open and patent, respirations are spontaneous, patient has a normal effort and rate. Breath sounds are clear and equal bilaterally.  CARDIAC: Normal heart sounds. No peripheral edema.  ABDOMEN: Soft and non tender to palpation, no distention noted. Bowel sounds present.

## 2017-07-13 NOTE — ED PROVIDER NOTES
Encounter Date: 7/13/2017    SCRIBE #1 NOTE: IYenny, am scribing for, and in the presence of, Dr. Ramírez.       History     Chief Complaint   Patient presents with    Shortness of Breath     pt with SOB , dizziness x one week.     07/13/2017  9: AM     Chief Complaint: SOB      The patient is a 61 y.o. female who is presenting to ED for evaluation of SOB since one week ago. She states that her episodes of SOB occurs at night when at rest and improves during the day. She notes one episode of SOB during the day while grocery shopping two days ago. She also notes that her SOB improves when laying on her left side. She also c/o lightheadedness, dizziness, weakness, and pruritus over generalized body. Her daughter notes that pt looks slightly pale. Pt states that her blood sugar levels have been in the 200s recently. She denies smoking cigarettes or EtOH intake. Pt has a PMHx of abnormal EKG; aortic valve regurgitation; DM type II; GERD; Gout, unspecified; Heart murmur; HLD and HTN. Pt has a PSHx that includes tubal ligation; cholecystectomy; breast reduction; breast surgery; joint replacement; total shoulder arthroplasty ; and POLYPECTOM.        The history is provided by the patient and a relative.     Review of patient's allergies indicates:   Allergen Reactions    Colchicine analogues      diarrhea    Cozaar  [losartan]      Other reaction(s): blurry vision    Lisinopril      Other reaction(s): cough     Past Medical History:   Diagnosis Date    Abnormal EKG     Aortic valve regurgitation     Arthritis     CKD (chronic kidney disease) stage 2, GFR 60-89 ml/min 8/8/2014    CKD (chronic kidney disease) stage 2, GFR 60-89 ml/min 8/8/2014    Diabetes mellitus     Diabetes mellitus type II     GERD (gastroesophageal reflux disease)     Gout, unspecified     Heart murmur     Hyperlipidemia     Hypertension     Tendonitis      Past Surgical History:   Procedure Laterality Date    breast reduction       BREAST SURGERY      CHOLECYSTECTOMY      JOINT REPLACEMENT      POLYPECTOMY  05/05/2016    TOTAL SHOULDER ARTHROPLASTY Left     TUBAL LIGATION       Family History   Problem Relation Age of Onset    Heart disease Mother      MI at 71    Hypertension Mother     Hypertension Brother     Diabetes Brother     Hypertension Brother     Breast cancer Neg Hx     Colon cancer Neg Hx     Ovarian cancer Neg Hx      Social History   Substance Use Topics    Smoking status: Never Smoker    Smokeless tobacco: Not on file    Alcohol use No     Review of Systems   Constitutional: Negative for fever.   HENT: Negative for sore throat.    Respiratory: Positive for shortness of breath.    Cardiovascular: Negative for chest pain.   Gastrointestinal: Negative for nausea.   Genitourinary: Negative for dysuria.   Musculoskeletal: Negative for back pain.   Skin: Positive for pallor. Negative for rash.        Positive for pruritus.   Neurological: Positive for dizziness, weakness and headaches.   Hematological: Does not bruise/bleed easily.       Physical Exam     Initial Vitals [07/13/17 0843]   BP Pulse Resp Temp SpO2   (!) 170/72 83 18 98.6 °F (37 °C) 98 %      MAP       104.67         Physical Exam    Nursing note and vitals reviewed.  Constitutional: She appears well-developed and well-nourished. She is not diaphoretic. No distress.   HENT:   Head: Normocephalic and atraumatic.   Mouth/Throat: Oropharynx is clear and moist.   Eyes: Conjunctivae and EOM are normal. Pupils are equal, round, and reactive to light. No scleral icterus.   Neck: Normal range of motion. Neck supple.   Cardiovascular: Normal rate, regular rhythm, S1 normal, S2 normal and normal heart sounds. Exam reveals no gallop and no friction rub.    No murmur heard.  Pulmonary/Chest: Breath sounds normal. No respiratory distress. She has no wheezes. She has no rhonchi. She has no rales.   Abdominal: Soft. Bowel sounds are normal. There is no tenderness.  There is no rebound and no guarding.   Genitourinary: Rectal exam shows guaiac negative stool. Guaiac negative stool.   Genitourinary Comments: Brown stool.    Musculoskeletal: Normal range of motion. She exhibits no edema or tenderness.   No lower extremity edema.    Lymphadenopathy:     She has no cervical adenopathy.   Neurological: She is alert and oriented to person, place, and time.   Skin: Skin is warm and dry. No rash noted. No pallor.   Psychiatric: She has a normal mood and affect. Her behavior is normal. Judgment and thought content normal.         ED Course   Procedures  Labs Reviewed   POCT GLUCOSE - Abnormal; Notable for the following:        Result Value    POCT Glucose 276 (*)     All other components within normal limits   CBC W/ AUTO DIFFERENTIAL   COMPREHENSIVE METABOLIC PANEL   URINALYSIS   PROTIME-INR   TROPONIN I   B-TYPE NATRIURETIC PEPTIDE   TYPE & SCREEN   POCT GLUCOSE MONITORING CONTINUOUS     EKG Readings: (Independently Interpreted)   9:28 AM - Normal Sinus Rhythm. 84 BPM. No ischemic changes.               Medical Decision Making:   Clinical Tests:   Lab Tests: Ordered  Radiological Study: Ordered and Reviewed            Scribe Attestation:   Scribe #1: I performed the above scribed service and the documentation accurately describes the services I performed. I attest to the accuracy of the note.    Attending Attestation:           Physician Attestation for Scribe:  Physician Attestation Statement for Scribe #1: I, Dr. Ramírez, reviewed documentation, as scribed by Yenny Mccabe in my presence, and it is both accurate and complete.         Attending ED Notes:   Emergent evaluation of 61-year-old female with progressively worsening shortness of breath over the past week.  Patient is afebrile, nontoxic-appearing with stable vital signs except for elevation in blood pressure.  Patient in no acute respiratory distress.  Lungs clear to auscultation bilaterally.  No acute findings on urinary  analysis.  Blood glucose is 276.  No clinical or diagnostic evidence of DKA.  No elevation of white blood cell count.  H&H is 7.8 and 22.5.  Platelets of 116.  On CMP patient has a BUN of 32.  Calcium at 10.7.  Blood glucose of 282.  Total bili of 4.1.  BNP is 17.  No acute findings on chest x-ray.  The patient is extensively counseled on her diagnosis and treatment including all diagnostic, laboratory and physical exam findings.  The patient is admitted in stable condition.          ED Course     Clinical Impression:     1. SOB (shortness of breath)                                 Yobany Ramírez MD  07/13/17 0615

## 2017-07-13 NOTE — CONSULTS
[facility]  Hematology/Oncology  Consult Note    Patient Name: Wendy Viveros  MRN: [id number]  Admission Date: 7/13/2017  Hospital Length of Stay: 1 days  Code Status: Full Code   Attending Provider: Yobany Ramírez MD  Consulting Provider: Haris Marvin MD  Primary Care Physician: Roger Miller MD  Principal Problem:Symptomatic anemia    Consults  Subjective:     HPI:  For pleasant 61-year-old -American female who awoke from sleep with shortness of breath shortly thereafter followed by dizziness.  She denies any antecedent symptoms.  Specifically, she had no fever, diarrhea, nausea, or abnormal bleeding.    She was recently treated for herpes zoster on June 13 but reports that she only took her antiviral medications for 3 or 4 days.  Otherwise she has no new medications.      Oncology Treatment Plan:   [No treatment plan]    Medications:  Continuous Infusions:   Scheduled Meds:   insulin aspart  7 Units Subcutaneous TIDWM    insulin detemir  30 Units Subcutaneous QHS     PRN Meds:dextrose 50%, dextrose 50%, glucagon (human recombinant), glucose, glucose, insulin aspart     Review of patient's allergies indicates:   Allergen Reactions    Colchicine analogues      diarrhea    Cozaar  [losartan]      Other reaction(s): blurry vision    Lisinopril      Other reaction(s): cough        Past Medical History:   Diagnosis Date    Abnormal EKG     Anemia 7/13/2017    Aortic valve regurgitation     Arthritis     CKD (chronic kidney disease) stage 2, GFR 60-89 ml/min 8/8/2014    CKD (chronic kidney disease) stage 2, GFR 60-89 ml/min 8/8/2014    Diabetes mellitus     Diabetes mellitus type II     GERD (gastroesophageal reflux disease)     Gout, unspecified     Heart murmur     Hyperlipidemia     Hypertension     Tendonitis      Past Surgical History:   Procedure Laterality Date    breast reduction      BREAST SURGERY      CHOLECYSTECTOMY      JOINT REPLACEMENT       POLYPECTOMY  05/05/2016    TOTAL SHOULDER ARTHROPLASTY Left     TUBAL LIGATION       Family History     Problem Relation (Age of Onset)    Diabetes Brother    Heart disease Mother    Hypertension Mother, Brother, Brother        Social History Main Topics    Smoking status: Never Smoker    Smokeless tobacco: Not on file    Alcohol use No    Drug use: No    Sexual activity: Yes     Partners: Male     Birth control/ protection: None       Review of Systems   Constitutional: Negative for activity change, appetite change, fever and unexpected weight change.   HENT: Negative for trouble swallowing.    Respiratory: Positive for shortness of breath. Negative for cough.    Gastrointestinal: Negative for abdominal pain, anal bleeding, constipation, diarrhea and nausea.   Genitourinary: Negative for hematuria.   Musculoskeletal: Negative for back pain.   Skin: Positive for rash (resolving zoster rash right neck).   Neurological: Positive for dizziness. Negative for weakness and headaches.   Psychiatric/Behavioral: Negative for dysphoric mood. The patient is not nervous/anxious.      Objective:     Vital Signs (Most Recent):  Temp: 98.6 °F (37 °C) (07/13/17 0843)  Pulse: 94 (07/13/17 1630)  Resp: 13 (07/13/17 1630)  BP: (!) 147/65 (07/13/17 1630)  SpO2: 99 % (07/13/17 1630) Vital Signs (24h Range):  Temp:  [98.6 °F (37 °C)] 98.6 °F (37 °C)  Pulse:  [80-94] 94  Resp:  [13-19] 13  SpO2:  [98 %-100 %] 99 %  BP: (147-170)/(65-72) 147/65     Weight: 99.8 kg (220 lb)  Body mass index is 36.61 kg/m².  Body surface area is 2.14 meters squared.    No intake or output data in the 24 hours ending 07/13/17 1701    Physical Exam   Constitutional: She is oriented to person, place, and time. She appears well-developed and well-nourished. No distress.   HENT:   Head: Normocephalic and atraumatic.   Mouth/Throat: Oropharynx is clear and moist. No oropharyngeal exudate.   Eyes: No scleral icterus.   Cardiovascular: Normal rate, regular  rhythm and normal heart sounds.    Pulmonary/Chest: Effort normal and breath sounds normal. No respiratory distress. She has no wheezes.   Abdominal: Soft. She exhibits no mass. There is no tenderness.   Lymphadenopathy:     She has no cervical adenopathy.   Neurological: She is alert and oriented to person, place, and time. No cranial nerve deficit.   Skin: Rash noted. Erythema: residual changes right base of the neck.   Psychiatric: She has a normal mood and affect. Her behavior is normal. Thought content normal.   Vitals reviewed.      Significant Labs: retic 14.5, Hgb 7.8, plts 116,000  Peripheral smear shows polychromasia without schistocytes.  All pertinent labs from the last 24 hours have been reviewed.    Diagnostic Results:  I have reviewed all pertinent imaging results/findings within the past 24 hours.    Assessment/Plan:     Thrombocytopenia    Present since march and unchanged- uncertain significance but may be part of an immune mediated process.    Rec: check ALFREDO, lupus anti-coagulant        Anemia    New onset anemia concerning for possible hemolysis. Supported by increased retic count. LDH and haptoglobin pending. The pre-existing mild thrombocytopenia may indicate some broader immunological issue.  Hepatosplenomegaly on ultrasound noted.  Depending on the results of additional lab studies she may need additional imaging.    Rec: Check SPEP, cold agglutinins. If LDH and hapto are consistent with hemolysis would start steroids -  1 mg/kg prenisone.            Thank you for your consult. I will follow-up with patient. Please contact us if you have any additional questions.    Haris Marvin MD  Hematology/Oncology  [facility]

## 2017-07-13 NOTE — SUBJECTIVE & OBJECTIVE
Oncology Treatment Plan:   [No treatment plan]    Medications:  Continuous Infusions:   Scheduled Meds:   insulin aspart  7 Units Subcutaneous TIDWM    insulin detemir  30 Units Subcutaneous QHS     PRN Meds:dextrose 50%, dextrose 50%, glucagon (human recombinant), glucose, glucose, insulin aspart     Review of patient's allergies indicates:   Allergen Reactions    Colchicine analogues      diarrhea    Cozaar  [losartan]      Other reaction(s): blurry vision    Lisinopril      Other reaction(s): cough        Past Medical History:   Diagnosis Date    Abnormal EKG     Anemia 7/13/2017    Aortic valve regurgitation     Arthritis     CKD (chronic kidney disease) stage 2, GFR 60-89 ml/min 8/8/2014    CKD (chronic kidney disease) stage 2, GFR 60-89 ml/min 8/8/2014    Diabetes mellitus     Diabetes mellitus type II     GERD (gastroesophageal reflux disease)     Gout, unspecified     Heart murmur     Hyperlipidemia     Hypertension     Tendonitis      Past Surgical History:   Procedure Laterality Date    breast reduction      BREAST SURGERY      CHOLECYSTECTOMY      JOINT REPLACEMENT      POLYPECTOMY  05/05/2016    TOTAL SHOULDER ARTHROPLASTY Left     TUBAL LIGATION       Family History     Problem Relation (Age of Onset)    Diabetes Brother    Heart disease Mother    Hypertension Mother, Brother, Brother        Social History Main Topics    Smoking status: Never Smoker    Smokeless tobacco: Not on file    Alcohol use No    Drug use: No    Sexual activity: Yes     Partners: Male     Birth control/ protection: None       Review of Systems   Constitutional: Negative for activity change, appetite change, fever and unexpected weight change.   HENT: Negative for trouble swallowing.    Respiratory: Positive for shortness of breath. Negative for cough.    Gastrointestinal: Negative for abdominal pain, anal bleeding, constipation, diarrhea and nausea.   Genitourinary: Negative for hematuria.    Musculoskeletal: Negative for back pain.   Skin: Positive for rash (resolving zoster rash right neck).   Neurological: Positive for dizziness. Negative for weakness and headaches.   Psychiatric/Behavioral: Negative for dysphoric mood. The patient is not nervous/anxious.      Objective:     Vital Signs (Most Recent):  Temp: 98.6 °F (37 °C) (07/13/17 0843)  Pulse: 94 (07/13/17 1630)  Resp: 13 (07/13/17 1630)  BP: (!) 147/65 (07/13/17 1630)  SpO2: 99 % (07/13/17 1630) Vital Signs (24h Range):  Temp:  [98.6 °F (37 °C)] 98.6 °F (37 °C)  Pulse:  [80-94] 94  Resp:  [13-19] 13  SpO2:  [98 %-100 %] 99 %  BP: (147-170)/(65-72) 147/65     Weight: 99.8 kg (220 lb)  Body mass index is 36.61 kg/m².  Body surface area is 2.14 meters squared.    No intake or output data in the 24 hours ending 07/13/17 1701    Physical Exam   Constitutional: She is oriented to person, place, and time. She appears well-developed and well-nourished. No distress.   HENT:   Head: Normocephalic and atraumatic.   Mouth/Throat: Oropharynx is clear and moist. No oropharyngeal exudate.   Eyes: No scleral icterus.   Cardiovascular: Normal rate, regular rhythm and normal heart sounds.    Pulmonary/Chest: Effort normal and breath sounds normal. No respiratory distress. She has no wheezes.   Abdominal: Soft. She exhibits no mass. There is no tenderness.   Lymphadenopathy:     She has no cervical adenopathy.   Neurological: She is alert and oriented to person, place, and time. No cranial nerve deficit.   Skin: Rash noted. Erythema: residual changes right base of the neck.   Psychiatric: She has a normal mood and affect. Her behavior is normal. Thought content normal.   Vitals reviewed.      Significant Labs: retic 14.5, Hgb 7.8, plts 116,000  Peripheral smear shows polychromasia without schistocytes.  All pertinent labs from the last 24 hours have been reviewed.    Diagnostic Results:  I have reviewed all pertinent imaging results/findings within the past 24  hours.

## 2017-07-14 ENCOUNTER — TELEPHONE (OUTPATIENT)
Dept: HEMATOLOGY/ONCOLOGY | Facility: CLINIC | Age: 62
End: 2017-07-14

## 2017-07-14 LAB
ALBUMIN SERPL BCP-MCNC: 3.7 G/DL
ALP SERPL-CCNC: 94 U/L
ALT SERPL W/O P-5'-P-CCNC: 17 U/L
ANION GAP SERPL CALC-SCNC: 9 MMOL/L
AST SERPL-CCNC: 15 U/L
BASOPHILS # BLD AUTO: 0.03 K/UL
BASOPHILS NFR BLD: 0.3 %
BILIRUB SERPL-MCNC: 4.1 MG/DL
BUN SERPL-MCNC: 27 MG/DL
CALCIUM SERPL-MCNC: 10.5 MG/DL
CHLORIDE SERPL-SCNC: 105 MMOL/L
CO2 SERPL-SCNC: 25 MMOL/L
CREAT SERPL-MCNC: 1.2 MG/DL
DIFFERENTIAL METHOD: ABNORMAL
EOSINOPHIL # BLD AUTO: 0.3 K/UL
EOSINOPHIL NFR BLD: 2.4 %
ERYTHROCYTE [DISTWIDTH] IN BLOOD BY AUTOMATED COUNT: 22.2 %
EST. GFR  (AFRICAN AMERICAN): 56 ML/MIN/1.73 M^2
EST. GFR  (NON AFRICAN AMERICAN): 49 ML/MIN/1.73 M^2
ESTIMATED AVG GLUCOSE: 154 MG/DL
GLUCOSE SERPL-MCNC: 184 MG/DL
HBA1C MFR BLD HPLC: 7 %
HCT VFR BLD AUTO: 20.2 %
HGB BLD-MCNC: 6.8 G/DL
LDH SERPL L TO P-CCNC: 192 U/L
LYMPHOCYTES # BLD AUTO: 3.1 K/UL
LYMPHOCYTES NFR BLD: 28.9 %
MCH RBC QN AUTO: 33.5 PG
MCHC RBC AUTO-ENTMCNC: 33.7 %
MCV RBC AUTO: 100 FL
MONOCYTES # BLD AUTO: 0.6 K/UL
MONOCYTES NFR BLD: 5.5 %
NEUTROPHILS # BLD AUTO: 6.6 K/UL
NEUTROPHILS NFR BLD: 62 %
PLATELET # BLD AUTO: 101 K/UL
PMV BLD AUTO: 9.7 FL
POCT GLUCOSE: 219 MG/DL (ref 70–110)
POCT GLUCOSE: 303 MG/DL (ref 70–110)
POCT GLUCOSE: 323 MG/DL (ref 70–110)
POCT GLUCOSE: 340 MG/DL (ref 70–110)
POTASSIUM SERPL-SCNC: 4 MMOL/L
PROT SERPL-MCNC: 7.1 G/DL
RBC # BLD AUTO: 2.03 M/UL
SODIUM SERPL-SCNC: 139 MMOL/L
WBC # BLD AUTO: 10.61 K/UL

## 2017-07-14 PROCEDURE — 85025 COMPLETE CBC W/AUTO DIFF WBC: CPT

## 2017-07-14 PROCEDURE — 63600175 PHARM REV CODE 636 W HCPCS: Performed by: HOSPITALIST

## 2017-07-14 PROCEDURE — 84165 PROTEIN E-PHORESIS SERUM: CPT | Mod: 26,,, | Performed by: PATHOLOGY

## 2017-07-14 PROCEDURE — 86334 IMMUNOFIX E-PHORESIS SERUM: CPT

## 2017-07-14 PROCEDURE — 80053 COMPREHEN METABOLIC PANEL: CPT

## 2017-07-14 PROCEDURE — 83615 LACTATE (LD) (LDH) ENZYME: CPT

## 2017-07-14 PROCEDURE — G0378 HOSPITAL OBSERVATION PER HR: HCPCS

## 2017-07-14 PROCEDURE — 99225 PR SUBSEQUENT OBSERVATION CARE,LEVEL II: CPT | Mod: ,,, | Performed by: PHYSICIAN ASSISTANT

## 2017-07-14 PROCEDURE — 11000001 HC ACUTE MED/SURG PRIVATE ROOM

## 2017-07-14 PROCEDURE — 86334 IMMUNOFIX E-PHORESIS SERUM: CPT | Mod: 26,,, | Performed by: PATHOLOGY

## 2017-07-14 PROCEDURE — 85598 HEXAGNAL PHOSPH PLTLT NEUTRL: CPT

## 2017-07-14 PROCEDURE — 25000003 PHARM REV CODE 250: Performed by: PHYSICIAN ASSISTANT

## 2017-07-14 PROCEDURE — 86038 ANTINUCLEAR ANTIBODIES: CPT

## 2017-07-14 PROCEDURE — 86157 COLD AGGLUTININ TITER: CPT

## 2017-07-14 PROCEDURE — 63600175 PHARM REV CODE 636 W HCPCS: Performed by: PHYSICIAN ASSISTANT

## 2017-07-14 PROCEDURE — 36415 COLL VENOUS BLD VENIPUNCTURE: CPT

## 2017-07-14 PROCEDURE — 85613 RUSSELL VIPER VENOM DILUTED: CPT

## 2017-07-14 PROCEDURE — 84165 PROTEIN E-PHORESIS SERUM: CPT

## 2017-07-14 RX ORDER — INSULIN ASPART 100 [IU]/ML
12 INJECTION, SOLUTION INTRAVENOUS; SUBCUTANEOUS
Status: DISCONTINUED | OUTPATIENT
Start: 2017-07-14 | End: 2017-07-14

## 2017-07-14 RX ORDER — INSULIN ASPART 100 [IU]/ML
10 INJECTION, SOLUTION INTRAVENOUS; SUBCUTANEOUS
Status: DISCONTINUED | OUTPATIENT
Start: 2017-07-14 | End: 2017-07-14

## 2017-07-14 RX ORDER — INSULIN ASPART 100 [IU]/ML
1-10 INJECTION, SOLUTION INTRAVENOUS; SUBCUTANEOUS
Status: DISCONTINUED | OUTPATIENT
Start: 2017-07-14 | End: 2017-07-20 | Stop reason: HOSPADM

## 2017-07-14 RX ORDER — PREDNISONE 20 MG/1
100 TABLET ORAL DAILY
Status: DISCONTINUED | OUTPATIENT
Start: 2017-07-14 | End: 2017-07-17

## 2017-07-14 RX ORDER — INSULIN ASPART 100 [IU]/ML
15 INJECTION, SOLUTION INTRAVENOUS; SUBCUTANEOUS
Status: DISCONTINUED | OUTPATIENT
Start: 2017-07-15 | End: 2017-07-15

## 2017-07-14 RX ADMIN — INSULIN ASPART 8 UNITS: 100 INJECTION, SOLUTION INTRAVENOUS; SUBCUTANEOUS at 05:07

## 2017-07-14 RX ADMIN — ASPIRIN 81 MG CHEWABLE TABLET 81 MG: 81 TABLET CHEWABLE at 09:07

## 2017-07-14 RX ADMIN — INSULIN ASPART 12 UNITS: 100 INJECTION, SOLUTION INTRAVENOUS; SUBCUTANEOUS at 01:07

## 2017-07-14 RX ADMIN — HEPARIN SODIUM 5000 UNITS: 5000 INJECTION, SOLUTION INTRAVENOUS; SUBCUTANEOUS at 09:07

## 2017-07-14 RX ADMIN — INSULIN ASPART 8 UNITS: 100 INJECTION, SOLUTION INTRAVENOUS; SUBCUTANEOUS at 01:07

## 2017-07-14 RX ADMIN — INSULIN DETEMIR 10 UNITS: 100 INJECTION, SOLUTION SUBCUTANEOUS at 10:07

## 2017-07-14 RX ADMIN — INSULIN ASPART 7 UNITS: 100 INJECTION, SOLUTION INTRAVENOUS; SUBCUTANEOUS at 08:07

## 2017-07-14 RX ADMIN — FAMOTIDINE 20 MG: 20 TABLET, FILM COATED ORAL at 09:07

## 2017-07-14 RX ADMIN — CLONIDINE HYDROCHLORIDE 0.1 MG: 0.1 TABLET ORAL at 09:07

## 2017-07-14 RX ADMIN — AMLODIPINE BESYLATE 10 MG: 5 TABLET ORAL at 09:07

## 2017-07-14 RX ADMIN — INSULIN ASPART 4 UNITS: 100 INJECTION, SOLUTION INTRAVENOUS; SUBCUTANEOUS at 09:07

## 2017-07-14 RX ADMIN — POLYETHYLENE GLYCOL 3350 17 G: 17 POWDER, FOR SOLUTION ORAL at 09:07

## 2017-07-14 RX ADMIN — INSULIN ASPART 2 UNITS: 100 INJECTION, SOLUTION INTRAVENOUS; SUBCUTANEOUS at 08:07

## 2017-07-14 RX ADMIN — INSULIN ASPART 12 UNITS: 100 INJECTION, SOLUTION INTRAVENOUS; SUBCUTANEOUS at 04:07

## 2017-07-14 RX ADMIN — METOPROLOL SUCCINATE 50 MG: 50 TABLET, EXTENDED RELEASE ORAL at 09:07

## 2017-07-14 NOTE — PLAN OF CARE
Problem: Patient Care Overview  Goal: Plan of Care Review  Outcome: Ongoing (interventions implemented as appropriate)  Patient remains free from injury or falls. Vital signs stable throughout night on room air. Positions self independently. No c/o of pain, dizziness, or SOB. Telemetry maintained. Pt was incontinent x1 per daughter. BG monitored and pt stated concern of lower dose detemir compared to home dosage. Education provided. Daughter at bedside. Bed in low locked position and call light within reach. Will continue to monitor.

## 2017-07-14 NOTE — PHYSICIAN QUERY
PT Name: Wendy Viveros                     7/14-Observation...DC  MR #: [id number]     Physician Query Form - Documentation Clarification      CDS: Xena Siegel RN, CCDS       Contact information: [email or web address]    This form is a permanent document in the medical record.     Query Date: July 14, 2017    By submitting this query, we are merely seeking further clarification of documentation. Please utilize your independent clinical judgment when addressing the question(s) below.    The Medical record reflects the following:    Supporting Clinical Findings Location in Medical Record     Diabetes type 2, uncontrolled    DM-2  - A1c = 7.0  - Accucheck and NISS for now.    7/13-H&P Active Problem List    7/13-H&P     Glucose 276, 281, 350, 219    HbA1C 7.0  Est Avg Glucose 154   7/13, 7/14-POCT    7/13-Labs                                                                            Doctor, Please specify diagnosis or diagnoses associated with above clinical findings.    Provider Use Only     [   ] DM-2 with hyperglycemia     [   ] DM-2 with hypoglycemia     [   ] Other dx (please specify)______________________                                                                                                          [   ] Clinically undetermined

## 2017-07-14 NOTE — PLAN OF CARE
Problem: Patient Care Overview  Goal: Plan of Care Review  Outcome: Ongoing (interventions implemented as appropriate)  Remains free from fall, injury, and skin breakdown. Ambulated independently to bathroom.  VS stable on RA and afebrile. Telemetry maintained. Positions self independently. Denied pain/SOB. TEDs/SCDs maintained. BG monitored. Tolerating ordered diet. IV site WNL. Pt refused prednisone; Dr Cordon made aware.  Plan of care reviewed with patient and all questions answered. Bed low, locked w/ bed alarm on. Call light within reach. Purposeful rounding performed. No other complaints at this time.

## 2017-07-14 NOTE — PLAN OF CARE
DC Planning:    Writer met with patient at bedside to discuss plan of care, daughter present as well. Patient reports living on her own, requests bedside commode, denies need for any other DME.     Patient requests info on Medicaid transportation, informed patient she can call [facility] customer service to initiate scheduled rides.     No other needs expressed, CM to follow and remain supportive.     07/14/17 1329   Discharge Assessment   Assessment Type Discharge Planning Assessment   Confirmed/corrected address and phone number on facesheet? Yes   Assessment information obtained from? Patient   Communicated expected length of stay with patient/caregiver yes   Prior to hospitilization cognitive status: Alert/Oriented   Prior to hospitalization functional status: Independent   Current cognitive status: Alert/Oriented   Current Functional Status: Independent   Lives With alone   Able to Return to Prior Arrangements yes   Is patient able to care for self after discharge? Yes   How many people do you have in your home that can help with your care after discharge? 0   Who are your caregiver(s) and their phone number(s)? Neftali Collier, brother, [phone]; Rosalba Guadarrama, [phone]   Patient currently being followed by outpatient case management? No   Patient currently receives home health services? No   Does the patient currently use HME? No   Patient currently receives private duty nursing? N/A   Patient currently receives any other outside agency services? No   Equipment Currently Used at Home none   Do you have any problems affording any of your prescribed medications? No   Is the patient taking medications as prescribed? yes   Do you have any financial concerns preventing you from receiving the healthcare you need? No   Does the patient have transportation to healthcare appointments? Yes   Transportation Available family or friend will provide   Discharge Plan A Home   Patient/Family In Agreement With Plan  yes

## 2017-07-14 NOTE — CONSULTS
[facility]  Gastroenterology  Consult Note    Patient Name: Wendy Viveros  MRN: [id number]  Admission Date: 7/13/2017  Hospital Length of Stay: 1 days  Code Status: Full Code   Attending Provider: Terry Mcgill MD   Consulting Provider: Placido Bryant MD  Primary Care Physician: Roger Miller MD  Principal Problem:Symptomatic anemia    Inpatient consult to Gastroenterology  Consult performed by: PLACIDO BRYANT  Consult ordered by: TERRY MCGILL        Subjective:     HPI: 62 y/o woman c/o weakness and dyspnea. Found to have a profound anemia. There has been no gross bleeding. She denies abdominal pain, N/V, constipation, diarrhea, melena and hematochezia. She has elevated bilirubin. No known hx of liver disease.     Past Medical History:   Diagnosis Date    Abnormal EKG     Anemia 7/13/2017    Aortic valve regurgitation     Arthritis     CKD (chronic kidney disease) stage 2, GFR 60-89 ml/min 8/8/2014    CKD (chronic kidney disease) stage 2, GFR 60-89 ml/min 8/8/2014    Diabetes mellitus     Diabetes mellitus type II     GERD (gastroesophageal reflux disease)     Gout, unspecified     Heart murmur     Hyperlipidemia     Hypertension     Tendonitis        Past Surgical History:   Procedure Laterality Date    breast reduction      BREAST SURGERY      CHOLECYSTECTOMY      JOINT REPLACEMENT      POLYPECTOMY  05/05/2016    TOTAL SHOULDER ARTHROPLASTY Left     TUBAL LIGATION         Review of patient's allergies indicates:   Allergen Reactions    Colchicine analogues      diarrhea    Cozaar  [losartan]      Other reaction(s): blurry vision    Lisinopril      Other reaction(s): cough     Family History     Problem Relation (Age of Onset)    Diabetes Brother    Heart disease Mother    Hypertension Mother, Brother, Brother        Social History Main Topics    Smoking status: Never Smoker    Smokeless tobacco: Not on file    Alcohol use No    Drug use: No    Sexual  activity: Yes     Partners: Male     Birth control/ protection: None     Review of Systems   Constitutional: Positive for fatigue. Negative for chills and fever.   HENT: Negative for sore throat and trouble swallowing.    Respiratory: Positive for shortness of breath. Negative for chest tightness.    Cardiovascular: Negative for chest pain and palpitations.   Gastrointestinal: Negative for abdominal distention, abdominal pain, anal bleeding, blood in stool, constipation, diarrhea, nausea, rectal pain and vomiting.   Genitourinary: Negative for dysuria.     Objective:     Vital Signs (Most Recent):  Temp: 98.2 °F (36.8 °C) (07/14/17 0754)  Pulse: 94 (07/14/17 0754)  Resp: 18 (07/14/17 0754)  BP: (!) 158/74 (07/14/17 0754)  SpO2: 96 % (07/14/17 0754) Vital Signs (24h Range):  Temp:  [98.2 °F (36.8 °C)-98.5 °F (36.9 °C)] 98.2 °F (36.8 °C)  Pulse:  [] 94  Resp:  [13-19] 18  SpO2:  [96 %-100 %] 96 %  BP: (141-177)/(65-74) 158/74     Weight: 99.8 kg (220 lb) (07/13/17 0843)  Body mass index is 36.61 kg/m².      Intake/Output Summary (Last 24 hours) at 07/14/17 0941  Last data filed at 07/14/17 0200   Gross per 24 hour   Intake              240 ml   Output                0 ml   Net              240 ml       Lines/Drains/Airways     Peripheral Intravenous Line                 Peripheral IV - Single Lumen 07/13/17 0917 Right Antecubital 1 day                Physical Exam   Constitutional: She appears well-developed and well-nourished.   HENT:   Head: Normocephalic and atraumatic.   Eyes: Scleral icterus is present.   Cardiovascular: Normal rate.    Murmur heard.  Pulmonary/Chest: Effort normal and breath sounds normal.   Abdominal: Soft. Bowel sounds are normal. She exhibits no distension and no mass. There is no tenderness. There is no rebound and no guarding.   Psychiatric: She has a normal mood and affect. Her behavior is normal.   Vitals reviewed.      Significant Labs:  CBC:   Recent Labs  Lab 07/13/17  0940  07/14/17  0513   WBC 12.61 10.61   HGB 7.8* 6.8*   HCT 22.5* 20.2*   * 101*     CMP:   Recent Labs  Lab 07/14/17  0513   *   CALCIUM 10.5   ALBUMIN 3.7   PROT 7.1      K 4.0   CO2 25      BUN 27*   CREATININE 1.2   ALKPHOS 94   ALT 17   AST 15   BILITOT 4.1*     Coagulation:   Recent Labs  Lab 07/13/17  0940   INR 1.0       Significant Imaging:  Imaging results within the past 24 hours have been reviewed.    Assessment/Plan:     Active Diagnoses:    Diagnosis Date Noted POA    PRINCIPAL PROBLEM:  Symptomatic anemia [D64.9] 07/13/2017 Yes    Anemia [D64.9] 07/13/2017 Yes    Jaundice [R17] 07/13/2017 Yes    Hyperbilirubinemia [E80.6] 07/13/2017 Yes    Shortness of breath [R06.02] 07/13/2017 Yes    Hepatosplenomegaly [R16.2] 07/13/2017 Yes    Hypercalcemia [E83.52] 07/13/2017 Yes    Hemolytic anemia [D58.9] 07/13/2017 Yes    Thrombocytopenia [D69.6] 07/13/2017 Yes    Diabetes mellitus, type II [E11.9] 03/01/2016 Yes    CKD (chronic kidney disease) stage 2, GFR 60-89 ml/min [N18.2] 08/08/2014 Yes    Diabetes type 2, uncontrolled [E11.65] 07/31/2012 Yes    HTN (hypertension) [I10] 07/31/2012 Yes    Hyperlipidemia type II [E78.01] 07/31/2012 Yes    Chronic gout [M1A.9XX0] 07/31/2012 Yes      Problems Resolved During this Admission:    Diagnosis Date Noted Date Resolved POA     Symptomatic anemia. No gross GI blood loss. Labs suggest hemolytic anemia (Low haptoglobin, elevated retic count, etc. Will defer to hematology. At some point she will need a colonoscopy for screening purposes. She has never had one.    Her elevated bili is likely from hemolysis. Previous hepatitis serology was negative.     Thank you for your consult.     Toribio Boothe MD  Gastroenterology  [facility]

## 2017-07-14 NOTE — TELEPHONE ENCOUNTER
----- Message from Briana Vasquez MD sent at 7/14/2017 11:25 AM CDT -----  Contact: [employer]   This consult was done by Dr. Marvin yesterday- are they calling in again?  If so they need to look at chart    ----- Message -----  From: Betty Hdez MA  Sent: 7/14/2017  11:19 AM  To: Briana Vasquez MD    I did confirm with the floor .  ----- Message -----  From: Grisel Adams  Sent: 7/14/2017  11:14 AM  To: Christina [facility]LETCH[facility] Staff    Inpatient consult is needed.  336   Call [phone]

## 2017-07-14 NOTE — PROGRESS NOTES
Emphasized to patient earlier today importance of taking prednisone for treatment per hematology recommendations.     Discussed with patient and family it is unclear how severe anemia will go and she would be at risk for consequences of inadequate perfusion including cardiac ischemia, stroke, or even death.      I discussed that insulin dose could be increased and patient still refuses prednisone.      Discussed case with Dr. Vasquez and hematology would be by tomorrow to see patient to discuss treatment options, or to recommend outpatient follow up if patient declines treatment.

## 2017-07-14 NOTE — PROGRESS NOTES
Patient seen by Dr. Marvin yesterday and his consult is charted  Labs reviewed  Recommend Prednisone 1 mg/kg qd  Will await further lab results that are pending

## 2017-07-15 LAB
ALBUMIN SERPL BCP-MCNC: 3.7 G/DL
ALP SERPL-CCNC: 96 U/L
ALT SERPL W/O P-5'-P-CCNC: 25 U/L
ANION GAP SERPL CALC-SCNC: 9 MMOL/L
AST SERPL-CCNC: 19 U/L
BASOPHILS # BLD AUTO: 0.04 K/UL
BASOPHILS NFR BLD: 0.4 %
BILIRUB SERPL-MCNC: 4.7 MG/DL
BUN SERPL-MCNC: 29 MG/DL
CALCIUM SERPL-MCNC: 10.4 MG/DL
CHLORIDE SERPL-SCNC: 105 MMOL/L
CO2 SERPL-SCNC: 25 MMOL/L
CREAT SERPL-MCNC: 1.3 MG/DL
DIFFERENTIAL METHOD: ABNORMAL
EOSINOPHIL # BLD AUTO: 0.2 K/UL
EOSINOPHIL NFR BLD: 1.8 %
ERYTHROCYTE [DISTWIDTH] IN BLOOD BY AUTOMATED COUNT: 23.4 %
EST. GFR  (AFRICAN AMERICAN): 51 ML/MIN/1.73 M^2
EST. GFR  (NON AFRICAN AMERICAN): 44 ML/MIN/1.73 M^2
GLUCOSE SERPL-MCNC: 231 MG/DL
HCT VFR BLD AUTO: 19.5 %
HGB BLD-MCNC: 6.8 G/DL
LYMPHOCYTES # BLD AUTO: 3.3 K/UL
LYMPHOCYTES NFR BLD: 29.7 %
MCH RBC QN AUTO: 35.1 PG
MCHC RBC AUTO-ENTMCNC: 34.9 %
MCV RBC AUTO: 101 FL
MONOCYTES # BLD AUTO: 0.7 K/UL
MONOCYTES NFR BLD: 6.7 %
NEUTROPHILS # BLD AUTO: 6.7 K/UL
NEUTROPHILS NFR BLD: 60.3 %
PLATELET # BLD AUTO: 99 K/UL
PMV BLD AUTO: 9.6 FL
POCT GLUCOSE: 178 MG/DL (ref 70–110)
POCT GLUCOSE: 250 MG/DL (ref 70–110)
POCT GLUCOSE: 272 MG/DL (ref 70–110)
POCT GLUCOSE: 289 MG/DL (ref 70–110)
POCT GLUCOSE: 347 MG/DL (ref 70–110)
POCT GLUCOSE: 410 MG/DL (ref 70–110)
POTASSIUM SERPL-SCNC: 4.2 MMOL/L
PROT SERPL-MCNC: 7.1 G/DL
RBC # BLD AUTO: 1.94 M/UL
SODIUM SERPL-SCNC: 139 MMOL/L
WBC # BLD AUTO: 11.02 K/UL

## 2017-07-15 PROCEDURE — G0378 HOSPITAL OBSERVATION PER HR: HCPCS

## 2017-07-15 PROCEDURE — 80053 COMPREHEN METABOLIC PANEL: CPT

## 2017-07-15 PROCEDURE — 11000001 HC ACUTE MED/SURG PRIVATE ROOM

## 2017-07-15 PROCEDURE — 36415 COLL VENOUS BLD VENIPUNCTURE: CPT

## 2017-07-15 PROCEDURE — 63600175 PHARM REV CODE 636 W HCPCS: Performed by: PHYSICIAN ASSISTANT

## 2017-07-15 PROCEDURE — 85025 COMPLETE CBC W/AUTO DIFF WBC: CPT

## 2017-07-15 PROCEDURE — 99225 PR SUBSEQUENT OBSERVATION CARE,LEVEL II: CPT | Mod: ,,, | Performed by: PHYSICIAN ASSISTANT

## 2017-07-15 PROCEDURE — 25000003 PHARM REV CODE 250: Performed by: PHYSICIAN ASSISTANT

## 2017-07-15 RX ORDER — SODIUM CHLORIDE 9 MG/ML
INJECTION, SOLUTION INTRAVENOUS CONTINUOUS
Status: DISCONTINUED | OUTPATIENT
Start: 2017-07-15 | End: 2017-07-20 | Stop reason: HOSPADM

## 2017-07-15 RX ORDER — INSULIN ASPART 100 [IU]/ML
20 INJECTION, SOLUTION INTRAVENOUS; SUBCUTANEOUS
Status: DISCONTINUED | OUTPATIENT
Start: 2017-07-15 | End: 2017-07-18

## 2017-07-15 RX ADMIN — FAMOTIDINE 20 MG: 20 TABLET, FILM COATED ORAL at 09:07

## 2017-07-15 RX ADMIN — HEPARIN SODIUM 5000 UNITS: 5000 INJECTION, SOLUTION INTRAVENOUS; SUBCUTANEOUS at 09:07

## 2017-07-15 RX ADMIN — INSULIN DETEMIR 10 UNITS: 100 INJECTION, SOLUTION SUBCUTANEOUS at 08:07

## 2017-07-15 RX ADMIN — ASPIRIN 81 MG CHEWABLE TABLET 81 MG: 81 TABLET CHEWABLE at 09:07

## 2017-07-15 RX ADMIN — INSULIN ASPART 4 UNITS: 100 INJECTION, SOLUTION INTRAVENOUS; SUBCUTANEOUS at 06:07

## 2017-07-15 RX ADMIN — INSULIN ASPART 20 UNITS: 100 INJECTION, SOLUTION INTRAVENOUS; SUBCUTANEOUS at 06:07

## 2017-07-15 RX ADMIN — SODIUM CHLORIDE: 0.9 INJECTION, SOLUTION INTRAVENOUS at 02:07

## 2017-07-15 RX ADMIN — INSULIN ASPART 15 UNITS: 100 INJECTION, SOLUTION INTRAVENOUS; SUBCUTANEOUS at 08:07

## 2017-07-15 RX ADMIN — POLYETHYLENE GLYCOL 3350 17 G: 17 POWDER, FOR SOLUTION ORAL at 09:07

## 2017-07-15 RX ADMIN — INSULIN ASPART 20 UNITS: 100 INJECTION, SOLUTION INTRAVENOUS; SUBCUTANEOUS at 02:07

## 2017-07-15 RX ADMIN — HEPARIN SODIUM 5000 UNITS: 5000 INJECTION, SOLUTION INTRAVENOUS; SUBCUTANEOUS at 08:07

## 2017-07-15 RX ADMIN — METOPROLOL SUCCINATE 50 MG: 50 TABLET, EXTENDED RELEASE ORAL at 08:07

## 2017-07-15 RX ADMIN — INSULIN ASPART 6 UNITS: 100 INJECTION, SOLUTION INTRAVENOUS; SUBCUTANEOUS at 08:07

## 2017-07-15 RX ADMIN — AMLODIPINE BESYLATE 10 MG: 5 TABLET ORAL at 08:07

## 2017-07-15 RX ADMIN — INSULIN ASPART 1 UNITS: 100 INJECTION, SOLUTION INTRAVENOUS; SUBCUTANEOUS at 10:07

## 2017-07-15 RX ADMIN — INSULIN ASPART 8 UNITS: 100 INJECTION, SOLUTION INTRAVENOUS; SUBCUTANEOUS at 02:07

## 2017-07-15 RX ADMIN — CLONIDINE HYDROCHLORIDE 0.1 MG: 0.1 TABLET ORAL at 09:07

## 2017-07-15 NOTE — ASSESSMENT & PLAN NOTE
- Low haptoglobin, elevated retic count  - advised by Heme/Onc to begin treatment with Prednisone  - patient with significant concern/distress regarding use of prednisone, and refusing this medication at this time. She and her daughter state their daughter/sister (respectivly) had polymyositis, and had been on long term prednisone treatment, and developed pneumonia and passed at a young age very suddenly 2/2 pulmonary infection while immunosuppressed. Request discussion with Heme/Onc before patient will even consider steroid treatment.

## 2017-07-15 NOTE — SUBJECTIVE & OBJECTIVE
Interval History: Still declines prednisone treatment, and awaiting d/w hematology. Otherwise feeling ok, no new concerns today. No significant overnight events.     Review of Systems   Constitutional: Positive for activity change (decreased), appetite change (decreased) and fatigue. Negative for chills, diaphoresis and fever.   Eyes: Negative for visual disturbance.   Respiratory: Positive for shortness of breath (occasional episodes, unprovoked). Negative for cough and wheezing.    Cardiovascular: Positive for palpitations (during episodes of SOB). Negative for chest pain and leg swelling.   Gastrointestinal: Negative for abdominal distention, abdominal pain, constipation, diarrhea, nausea and vomiting.   Genitourinary: Negative for dysuria, frequency, hematuria and urgency.   Skin: Positive for color change (yellowing of eyes) and pallor. Negative for rash and wound.   Neurological: Positive for weakness and light-headedness (with positional changes). Negative for dizziness, syncope, numbness and headaches.     Objective:     Vital Signs (Most Recent):  Temp: 98.5 °F (36.9 °C) (07/15/17 0800)  Pulse: 86 (07/15/17 1200)  Resp: 18 (07/15/17 0800)  BP: 139/67 (07/15/17 0800)  SpO2: 98 % (07/15/17 0800) Vital Signs (24h Range):  Temp:  [98.4 °F (36.9 °C)-98.5 °F (36.9 °C)] 98.5 °F (36.9 °C)  Pulse:  [] 86  Resp:  [18] 18  SpO2:  [98 %-99 %] 98 %  BP: (126-143)/(58-67) 139/67     Weight: 99.8 kg (220 lb)  Body mass index is 36.61 kg/m².    Intake/Output Summary (Last 24 hours) at 07/15/17 1417  Last data filed at 07/15/17 0300   Gross per 24 hour   Intake              360 ml   Output                0 ml   Net              360 ml      Physical Exam   Constitutional: She is oriented to person, place, and time. She appears well-developed and well-nourished. No distress.   Mildly obese, sitting on edge of bed.    HENT:   Head: Normocephalic and atraumatic.   Eyes: EOM are normal. Pupils are equal, round, and  reactive to light. No scleral icterus.   Jaundice, mild conjunctival pallor.    Neck: Normal range of motion. Neck supple. No JVD present. No tracheal deviation present.   Cardiovascular: Normal rate, regular rhythm and intact distal pulses.  Exam reveals no gallop and no friction rub.    Murmur heard.  Pulmonary/Chest: Effort normal and breath sounds normal. No respiratory distress. She has no wheezes. She has no rales.   Abdominal: Soft. Bowel sounds are normal. She exhibits no distension. There is no tenderness. There is no guarding.   Musculoskeletal: Normal range of motion. She exhibits no deformity.   Neurological: She is alert and oriented to person, place, and time. No cranial nerve deficit. She exhibits normal muscle tone.   Skin: Skin is warm and dry. No rash noted. She is not diaphoretic. No erythema. No pallor.   Psychiatric: She has a normal mood and affect. Her behavior is normal. Judgment and thought content normal.   Nursing note and vitals reviewed.      Significant Labs:   Recent Lab Results       07/15/17  0817 07/15/17  0521 07/14/17  2144 07/14/17  1709      Albumin  3.7       Alkaline Phosphatase  96       ALT  25       Anion Gap  9       AST  19       Baso #  0.04       Basophil%  0.4       Total Bilirubin  4.7  Comment:  For infants and newborns, interpretation of results should be based  on gestational age, weight and in agreement with clinical  observations.  Premature Infant recommended reference ranges:  Up to 24 hours.............<8.0 mg/dL  Up to 48 hours............<12.0 mg/dL  3-5 days..................<15.0 mg/dL  6-29 days.................<15.0 mg/dL  (H)       BUN, Bld  29(H)       Calcium  10.4       Chloride  105       CO2  25       Creatinine  1.3       Differential Method  Automated       eGFR if   51(A)       eGFR if non   44  Comment:  Calculation used to obtain the estimated glomerular filtration  rate (eGFR) is the CKD-EPI equation. Since  race is unknown   in our information system, the eGFR values for   -American and Non--American patients are given   for each creatinine result.  (A)       Eos #  0.2       Eosinophil%  1.8       Glucose  231(H)       Gran #  6.7       Gran%  60.3       Hematocrit  19.5  Comment:  HCT critical result(s) called and verbal readback obtained from   Margot ALONSO RN, 07/15/2017 06:12  (LL)       Hemoglobin  6.8(L)       Lymph #  3.3       Lymph%  29.7       MCH  35.1(H)       MCHC  34.9       MCV  101(H)       Mono #  0.7       Mono%  6.7       MPV  9.6       Platelets  99(L)       POCT Glucose 272(H)  303(H) 340(H)     Potassium  4.2       Total Protein  7.1       RBC  1.94(L)       RDW  23.4(H)       Sodium  139       WBC  11.02           All pertinent labs within the past 24 hours have been reviewed.    Significant Imaging: I have reviewed all pertinent imaging results/findings within the past 24 hours.

## 2017-07-15 NOTE — ASSESSMENT & PLAN NOTE
- Denies any bleeding  - Consistent with hemolytic process  - Hgb 12.1 >> 7.8 over last 4 months  - Indirect bilirubin elevated  - Hepatosplenomegaly noted on CT 3/2/17 and Abd US 7/13/17  - Fe 143, B-12 626, folate 16.2, retic 14.5, , haptoglobin <10, Selma negative.  - Hematology consult:    SPEP= 6.8   Cold agglutinins- pending   If LDH and hapto are consistent with hemolysis would start steroids -  1 mg/kg prenisone.   - Prednisone ordered   - GI consulted: needs screening colonoscopy

## 2017-07-15 NOTE — HPI
Patient is a 61 y.o. female presents with 4 day history of worsening dyspnea on exertion.  Feels worse lying down flat, lying on side helps.  Feels fatigue.  Daughter states this has been going on for last year.  No pain.  No fever.  No NVDC.  Denies any bleeding, no blood in stools.  Denies heavy NSAID use.    Hgb was 12.1, and is now 7.8.  Total bilirubin elevated going back as far as 2010, now 4.1, with direct 0.9.  Has jaundice.       Denies ever having colonoscopy.

## 2017-07-15 NOTE — ASSESSMENT & PLAN NOTE
- Denies any bleeding  - Consistent with hemolytic process  - Hgb 12.1 >> 7.8 over last 4 months  - Indirect bilirubin elevated  - Hepatosplenomegaly noted on CT 3/2/17 and Abd US 7/13/17  - Fe 143, B-12 626, folate 16.2, retic 14.5, , haptoglobin <10, Selma negative.  - Hematology consult: Check SPEP, cold agglutinins. If LDH and hapto are consistent with hemolysis would start steroids -  1 mg/kg prenisone.   - Prednisone ordered   - GI consulted: needs screening colonoscopy

## 2017-07-15 NOTE — PLAN OF CARE
Problem: Patient Care Overview  Goal: Plan of Care Review  Outcome: Ongoing (interventions implemented as appropriate)  Patient remains free from injury or falls. Vital signs stable throughout night on room air. Pt denies dizziness or SOB. Positions self independently. No c/o pain. Telemetry maintained. Daughter at bedside participating in care. POC and education provided frequently. Bed in low locked position and call light within reach. Will continue to monitor.

## 2017-07-15 NOTE — PROGRESS NOTES
[facility]  Gastroenterology  Progress Note    Patient Name: Wendy Viveros  MRN: [id number]  Admission Date: 7/13/2017  Hospital Length of Stay: 1 days  Code Status: Full Code   Attending Provider: Eugene Cordon MD  Consulting Provider: Toribio Boothe MD  Primary Care Physician: Roger Miller MD  Principal Problem: Symptomatic anemia    Subjective:     Interval History: No new complaints. No gross bleeding. No abdominal pain. Refusing prednisone because of complications her child had from the medication.    Review of Systems   Constitutional: Negative for fever.   HENT: Negative for trouble swallowing.    Respiratory: Negative for shortness of breath.    Cardiovascular: Negative for chest pain.   Gastrointestinal: Negative for abdominal pain, anal bleeding, blood in stool, nausea and vomiting.     Objective:     Vital Signs (Most Recent):  Temp: 98.5 °F (36.9 °C) (07/15/17 0800)  Pulse: 86 (07/15/17 1000)  Resp: 18 (07/15/17 0800)  BP: 139/67 (07/15/17 0800)  SpO2: 98 % (07/15/17 0800) Vital Signs (24h Range):  Temp:  [98 °F (36.7 °C)-98.5 °F (36.9 °C)] 98.5 °F (36.9 °C)  Pulse:  [] 86  Resp:  [18] 18  SpO2:  [97 %-99 %] 98 %  BP: (126-143)/(58-67) 139/67     Weight: 99.8 kg (220 lb) (07/13/17 0843)  Body mass index is 36.61 kg/m².      Intake/Output Summary (Last 24 hours) at 07/15/17 1217  Last data filed at 07/15/17 0300   Gross per 24 hour   Intake              360 ml   Output                0 ml   Net              360 ml       Lines/Drains/Airways     Peripheral Intravenous Line                 Peripheral IV - Single Lumen 07/13/17 0917 Right Antecubital 2 days                Physical Exam   Constitutional: She appears well-developed and well-nourished. No distress.   Eyes: Scleral icterus is present.   Cardiovascular: Normal rate, regular rhythm and normal heart sounds.    Pulmonary/Chest: Effort normal and breath sounds normal.   Abdominal: Soft. She exhibits no mass. There  is no tenderness. There is no guarding.   Psychiatric: She has a normal mood and affect.   Vitals reviewed.      Significant Labs:  CBC:   Recent Labs  Lab 07/14/17  0513 07/15/17  0521   WBC 10.61 11.02   HGB 6.8* 6.8*   HCT 20.2* 19.5*   * 99*     CMP:   Recent Labs  Lab 07/15/17  0521   *   CALCIUM 10.4   ALBUMIN 3.7   PROT 7.1      K 4.2   CO2 25      BUN 29*   CREATININE 1.3   ALKPHOS 96   ALT 25   AST 19   BILITOT 4.7*     Coagulation: No results for input(s): INR, APTT in the last 48 hours.    Invalid input(s): PT      Significant Imaging:  Imaging results within the past 24 hours have been reviewed.    Assessment/Plan:     Active Diagnoses:    Diagnosis Date Noted POA    PRINCIPAL PROBLEM:  Symptomatic anemia [D64.9] 07/13/2017 Yes    Jaundice [R17] 07/13/2017 Yes    Hyperbilirubinemia [E80.6] 07/13/2017 Yes    Shortness of breath [R06.02] 07/13/2017 Yes    Hepatosplenomegaly [R16.2] 07/13/2017 Yes    Hypercalcemia [E83.52] 07/13/2017 Yes    Hemolytic anemia [D58.9] 07/13/2017 Yes    Thrombocytopenia [D69.6] 07/13/2017 Yes    Anemia [D64.9] 07/13/2017 Yes    Diabetes mellitus, type II [E11.9] 03/01/2016 Yes    CKD (chronic kidney disease) stage 2, GFR 60-89 ml/min [N18.2] 08/08/2014 Yes    Diabetes type 2, uncontrolled [E11.65] 07/31/2012 Yes    HTN (hypertension) [I10] 07/31/2012 Yes    Hyperlipidemia type II [E78.01] 07/31/2012 Yes    Chronic gout [M1A.9XX0] 07/31/2012 Yes      Problems Resolved During this Admission:    Diagnosis Date Noted Date Resolved POA     Symptomatic anemia. No gross GI blood loss. Labs suggest hemolytic anemia (Low haptoglobin, elevated retic count, etc. Will defer to hematology. Currently refusing prednisone. At some point she will need a colonoscopy for screening purposes. She has never had one.     Her elevated bili is likely from hemolysis. Previous hepatitis serology was negative.      Thank you for your consult.     Toribio Boothe,  MD  Gastroenterology  [facility]

## 2017-07-15 NOTE — ASSESSMENT & PLAN NOTE
- present since March  - ? Immune mediated ?  - Mild  - Hematology:   - check ALFREDO, Lupus anti-coagulant  - Monitor

## 2017-07-15 NOTE — PROGRESS NOTES
[facility] Hospital Medicine  Progress Note    Patient Name: Wendy Viveros  MRN: [id number]  Patient Class: OP- Observation   Admission Date: 7/13/2017  Length of Stay: 1 days  Attending Physician: Eugene Cordon MD  Primary Care Provider: Roger Miller MD        Subjective:     Principal Problem:Symptomatic anemia    HPI:  Patient is a 61 y.o. female presents with 4 day history of worsening dyspnea on exertion.  Feels worse lying down flat, lying on side helps.  Feels fatigue.  Daughter states this has been going on for last year.  No pain.  No fever.  No NVDC.  Denies any bleeding, no blood in stools.  Denies heavy NSAID use.    Hgb was 12.1, and is now 7.8.  Total bilirubin elevated going back as far as 2010, now 4.1, with direct 0.9.  Has jaundice.       Denies ever having colonoscopy.    Hospital Course:  The patient was admitted for symptomatic anemia. She is being worked up for hemolytic anemia. Steroids were scheduled to begin on prednisone 7/14/17, but she refuses until further discussion with Hematologoy. Blood sugar continue to be elevated despite increase in long acting insulin begun on 7/15/17. Increased mealtime insulin, and will hydrate gently. Patient still awaiting conversation with hematology regarding alternative options to prednisone.     Interval History: Still declines prednisone treatment, and awaiting d/w hematology. Otherwise feeling ok, no new concerns today. No significant overnight events.     Review of Systems   Constitutional: Positive for activity change (decreased), appetite change (decreased) and fatigue. Negative for chills, diaphoresis and fever.   Eyes: Negative for visual disturbance.   Respiratory: Positive for shortness of breath (occasional episodes, unprovoked). Negative for cough and wheezing.    Cardiovascular: Positive for palpitations (during episodes of SOB). Negative for chest pain and leg swelling.   Gastrointestinal: Negative for abdominal  distention, abdominal pain, constipation, diarrhea, nausea and vomiting.   Genitourinary: Negative for dysuria, frequency, hematuria and urgency.   Skin: Positive for color change (yellowing of eyes) and pallor. Negative for rash and wound.   Neurological: Positive for weakness and light-headedness (with positional changes). Negative for dizziness, syncope, numbness and headaches.     Objective:     Vital Signs (Most Recent):  Temp: 98.5 °F (36.9 °C) (07/15/17 0800)  Pulse: 86 (07/15/17 1200)  Resp: 18 (07/15/17 0800)  BP: 139/67 (07/15/17 0800)  SpO2: 98 % (07/15/17 0800) Vital Signs (24h Range):  Temp:  [98.4 °F (36.9 °C)-98.5 °F (36.9 °C)] 98.5 °F (36.9 °C)  Pulse:  [] 86  Resp:  [18] 18  SpO2:  [98 %-99 %] 98 %  BP: (126-143)/(58-67) 139/67     Weight: 99.8 kg (220 lb)  Body mass index is 36.61 kg/m².    Intake/Output Summary (Last 24 hours) at 07/15/17 1417  Last data filed at 07/15/17 0300   Gross per 24 hour   Intake              360 ml   Output                0 ml   Net              360 ml      Physical Exam   Constitutional: She is oriented to person, place, and time. She appears well-developed and well-nourished. No distress.   Mildly obese, sitting on edge of bed.    HENT:   Head: Normocephalic and atraumatic.   Eyes: EOM are normal. Pupils are equal, round, and reactive to light. No scleral icterus.   Jaundice, mild conjunctival pallor.    Neck: Normal range of motion. Neck supple. No JVD present. No tracheal deviation present.   Cardiovascular: Normal rate, regular rhythm and intact distal pulses.  Exam reveals no gallop and no friction rub.    Murmur heard.  Pulmonary/Chest: Effort normal and breath sounds normal. No respiratory distress. She has no wheezes. She has no rales.   Abdominal: Soft. Bowel sounds are normal. She exhibits no distension. There is no tenderness. There is no guarding.   Musculoskeletal: Normal range of motion. She exhibits no deformity.   Neurological: She is alert and  oriented to person, place, and time. No cranial nerve deficit. She exhibits normal muscle tone.   Skin: Skin is warm and dry. No rash noted. She is not diaphoretic. No erythema. No pallor.   Psychiatric: She has a normal mood and affect. Her behavior is normal. Judgment and thought content normal.   Nursing note and vitals reviewed.      Significant Labs:   Recent Lab Results       07/15/17  0817 07/15/17  0521 07/14/17  2144 07/14/17  1709      Albumin  3.7       Alkaline Phosphatase  96       ALT  25       Anion Gap  9       AST  19       Baso #  0.04       Basophil%  0.4       Total Bilirubin  4.7  Comment:  For infants and newborns, interpretation of results should be based  on gestational age, weight and in agreement with clinical  observations.  Premature Infant recommended reference ranges:  Up to 24 hours.............<8.0 mg/dL  Up to 48 hours............<12.0 mg/dL  3-5 days..................<15.0 mg/dL  6-29 days.................<15.0 mg/dL  (H)       BUN, Bld  29(H)       Calcium  10.4       Chloride  105       CO2  25       Creatinine  1.3       Differential Method  Automated       eGFR if   51(A)       eGFR if non   44  Comment:  Calculation used to obtain the estimated glomerular filtration  rate (eGFR) is the CKD-EPI equation. Since race is unknown   in our information system, the eGFR values for   -American and Non--American patients are given   for each creatinine result.  (A)       Eos #  0.2       Eosinophil%  1.8       Glucose  231(H)       Gran #  6.7       Gran%  60.3       Hematocrit  19.5  Comment:  HCT critical result(s) called and verbal readback obtained from   Margot ALONSO RN, 07/15/2017 06:12  (LL)       Hemoglobin  6.8(L)       Lymph #  3.3       Lymph%  29.7       MCH  35.1(H)       MCHC  34.9       MCV  101(H)       Mono #  0.7       Mono%  6.7       MPV  9.6       Platelets  99(L)       POCT Glucose 272(H)  303(H) 340(H)     Potassium  4.2        Total Protein  7.1       RBC  1.94(L)       RDW  23.4(H)       Sodium  139       WBC  11.02           All pertinent labs within the past 24 hours have been reviewed.    Significant Imaging: I have reviewed all pertinent imaging results/findings within the past 24 hours.    Assessment/Plan:      Hemolytic anemia    - Low haptoglobin, elevated retic count  - advised by Heme/Onc to begin treatment with Prednisone  - patient with significant concern/distress regarding use of prednisone, and refusing this medication at this time. She and her daughter state their daughter/sister (respectivly) had polymyositis, and had been on long term prednisone treatment, and developed pneumonia and passed at a young age very suddenly 2/2 pulmonary infection while immunosuppressed. Request discussion with Heme/Onc before patient will even consider steroid treatment.           * Symptomatic anemia    - Suspect secondary to anemia  - Troponin 0.009; BNP = 17; EKG c LVH, stable  - schedule screening colonoscopy as outpatient         Hyperbilirubinemia    - 2/2 hemolysis          Anemia    - Denies any bleeding  - Consistent with hemolytic process  - Hgb 12.1 >> 7.8 over last 4 months  - Indirect bilirubin elevated  - Hepatosplenomegaly noted on CT 3/2/17 and Abd US 7/13/17  - Fe 143, B-12 626, folate 16.2, retic 14.5, , haptoglobin <10, Selma negative.  - Hematology consult:    SPEP= 6.8   Cold agglutinins- pending   If LDH and hapto are consistent with hemolysis would start steroids -  1 mg/kg prenisone.   - Prednisone ordered   - GI consulted: needs screening colonoscopy           Hepatosplenomegaly    - Hepatitis profile negative 2015  - Hematology to see          Jaundice    - 2/2 hemolysis          Thrombocytopenia    - present since March  - ? Immune mediated ?  - Mild  - Hematology:   - check ALFREDO, Lupus anti-coagulant  - Monitor        Hypercalcemia    - PTH 44.7  - Vit D 33          HTN (hypertension)    - Continue  home med and monitor          Diabetes mellitus, type II    - A1c = 7.0  - Accucheck and NISS for now          Diabetes type 2, uncontrolled    - A1c = 7.0  - Accucheck and NISS for now  - CBG very elevated today  - will begin gently rehydaration   - will increased detemir this am to 55  - will increase mealtime aspart to 20 u per meal   - monitor           Chronic gout    - On allopurinol at home.          Hyperlipidemia type II    - Lipid profile historically fair control.             VTE Risk Mitigation         Ordered     heparin (porcine) injection 5,000 Units  Every 12 hours     Route:  Subcutaneous        07/13/17 1742     Medium Risk of VTE  Once      07/13/17 1742     Place ADRIÁN hose  Until discontinued      07/13/17 1742     Place sequential compression device  Until discontinued      07/13/17 1742          Wendy Banegas PA-C  Department of Hospital Medicine   [facility]

## 2017-07-15 NOTE — SUBJECTIVE & OBJECTIVE
Interval History: Patient new to me. No new symptoms. GI & heme/onc following.No significant overnight events.         Review of Systems   Constitutional: Positive for activity change (decreased), appetite change (decreased) and fatigue. Negative for chills, diaphoresis and fever.   Eyes: Negative for visual disturbance.   Respiratory: Positive for shortness of breath (occasional episodes, unprovoked). Negative for cough and wheezing.    Cardiovascular: Positive for palpitations (during episodes of SOB). Negative for chest pain and leg swelling.   Gastrointestinal: Negative for abdominal distention, abdominal pain, constipation, diarrhea, nausea and vomiting.   Genitourinary: Negative for dysuria, frequency, hematuria and urgency.   Skin: Positive for color change (yellowing of eyes) and pallor. Negative for rash and wound.   Neurological: Positive for weakness and light-headedness (with positional changes). Negative for dizziness, syncope, numbness and headaches.     Objective:     Vital Signs (Most Recent):  Temp: 98.5 °F (36.9 °C) (07/1/17 1901)  Pulse: 91 (07/14/17 1901)  Resp: 18 (07/14/17 2002)  BP: (!) 135/62 (07/14/17 1901)  SpO2: 99 % (07/14/17 1901)      Weight: 99.8 kg (220 lb)  Body mass index is 36.61 kg/m².  No intake or output data in the 24 hours ending 07/14/17 0237   Physical Exam   Constitutional: She is oriented to person, place, and time. She appears well-developed and well-nourished. No distress.   Mildly obese, sitting on edge of bed.    HENT:   Head: Normocephalic and atraumatic.   Eyes: EOM are normal. Pupils are equal, round, and reactive to light. No scleral icterus.   Jaundice, mild conjunctival pallor.    Neck: Normal range of motion. Neck supple. No JVD present. No tracheal deviation present.   Cardiovascular: Normal rate, regular rhythm and intact distal pulses.  Exam reveals no gallop and no friction rub.    Murmur heard.  Pulmonary/Chest: Effort normal and breath sounds normal. No  respiratory distress. She has no wheezes. She has no rales.   Abdominal: Soft. Bowel sounds are normal. She exhibits no distension. There is no tenderness. There is no guarding.   Musculoskeletal: Normal range of motion. She exhibits no deformity.   Neurological: She is alert and oriented to person, place, and time. No cranial nerve deficit. She exhibits normal muscle tone.   Skin: Skin is warm and dry. No rash noted. She is not diaphoretic. No erythema. No pallor.   Psychiatric: She has a normal mood and affect. Her behavior is normal. Judgment and thought content normal.   Nursing note and vitals reviewed.      Significant Labs:   A1C:   Recent Labs  Lab 05/09/17  1519 06/23/17  1031 07/13/17  1747   HGBA1C 7.5* 7.0* 7.0*       CBC:   Recent Labs  Lab 07/13/17  0940 07/14/17  0513   WBC 12.61 10.61   HGB 7.8* 6.8*   HCT 22.5* 20.2*   * 101*     CMP:   Recent Labs  Lab 07/13/17  0940 07/14/17  0513    139   K 4.2 4.0    105   CO2 23 25   * 184*   BUN 32* 27*   CREATININE 1.3 1.2   CALCIUM 10.7* 10.5   PROT 7.7 7.1   ALBUMIN 4.0 3.7   BILITOT 4.1* 4.1*   ALKPHOS 102 94   AST 16 15   ALT 16 17   ANIONGAP 11 9   EGFRNONAA 44* 49*     Coagulation:   Recent Labs  Lab 07/13/17 0940   INR 1.0     All pertinent labs within the past 24 hours have been reviewed.    Significant Imaging: I have reviewed all pertinent imaging results/findings within the past 24 hours.

## 2017-07-15 NOTE — PROGRESS NOTES
[facility] Hospital Medicine  Progress Note    Patient Name: Wendy Viveros  MRN: [id number]  Patient Class: OP- Observation   Admission Date: 7/13/2017  Length of Stay: 1 days  Attending Physician: Eugene Cordon MD  Primary Care Provider: Roger Miller MD        Subjective:     Principal Problem:Symptomatic anemia    HPI:  Patient is a 61 y.o. female presents with 4 day history of worsening dyspnea on exertion.  Feels worse lying down flat, lying on side helps.  Feels fatigue.  Daughter states this has been going on for last year.  No pain.  No fever.  No NVDC.  Denies any bleeding, no blood in stools.  Denies heavy NSAID use.    Hgb was 12.1, and is now 7.8.  Total bilirubin elevated going back as far as 2010, now 4.1, with direct 0.9.  Has jaundice.       Denies ever having colonoscopy.    Hospital Course:  The patient was admitted for symptomatic anemia. She is being worked up for hemolytic anemia. Steroids were scheduled to begin on prednisone today, but refuses until further discussion with Hematologoy.     Interval History: Patient new to me. No new symptoms. GI & heme/onc following.No significant overnight events.         Review of Systems   Constitutional: Positive for activity change (decreased), appetite change (decreased) and fatigue. Negative for chills, diaphoresis and fever.   Eyes: Negative for visual disturbance.   Respiratory: Positive for shortness of breath (occasional episodes, unprovoked). Negative for cough and wheezing.    Cardiovascular: Positive for palpitations (during episodes of SOB). Negative for chest pain and leg swelling.   Gastrointestinal: Negative for abdominal distention, abdominal pain, constipation, diarrhea, nausea and vomiting.   Genitourinary: Negative for dysuria, frequency, hematuria and urgency.   Skin: Positive for color change (yellowing of eyes) and pallor. Negative for rash and wound.   Neurological: Positive for weakness and  light-headedness (with positional changes). Negative for dizziness, syncope, numbness and headaches.     Objective:     Vital Signs (Most Recent):  Temp: 98.5 °F (36.9 °C) (07/1/17 1901)  Pulse: 91 (07/14/17 1901)  Resp: 18 (07/14/17 2002)  BP: (!) 135/62 (07/14/17 1901)  SpO2: 99 % (07/14/17 1901)      Weight: 99.8 kg (220 lb)  Body mass index is 36.61 kg/m².  No intake or output data in the 24 hours ending 07/14/17 0237   Physical Exam   Constitutional: She is oriented to person, place, and time. She appears well-developed and well-nourished. No distress.   Mildly obese, sitting on edge of bed.    HENT:   Head: Normocephalic and atraumatic.   Eyes: EOM are normal. Pupils are equal, round, and reactive to light. No scleral icterus.   Jaundice, mild conjunctival pallor.    Neck: Normal range of motion. Neck supple. No JVD present. No tracheal deviation present.   Cardiovascular: Normal rate, regular rhythm and intact distal pulses.  Exam reveals no gallop and no friction rub.    Murmur heard.  Pulmonary/Chest: Effort normal and breath sounds normal. No respiratory distress. She has no wheezes. She has no rales.   Abdominal: Soft. Bowel sounds are normal. She exhibits no distension. There is no tenderness. There is no guarding.   Musculoskeletal: Normal range of motion. She exhibits no deformity.   Neurological: She is alert and oriented to person, place, and time. No cranial nerve deficit. She exhibits normal muscle tone.   Skin: Skin is warm and dry. No rash noted. She is not diaphoretic. No erythema. No pallor.   Psychiatric: She has a normal mood and affect. Her behavior is normal. Judgment and thought content normal.   Nursing note and vitals reviewed.      Significant Labs:   A1C:   Recent Labs  Lab 05/09/17  1519 06/23/17  1031 07/13/17  1747   HGBA1C 7.5* 7.0* 7.0*       CBC:   Recent Labs  Lab 07/13/17  0940 07/14/17  0513   WBC 12.61 10.61   HGB 7.8* 6.8*   HCT 22.5* 20.2*   * 101*     CMP:   Recent  Labs  Lab 07/13/17  0940 07/14/17  0513    139   K 4.2 4.0    105   CO2 23 25   * 184*   BUN 32* 27*   CREATININE 1.3 1.2   CALCIUM 10.7* 10.5   PROT 7.7 7.1   ALBUMIN 4.0 3.7   BILITOT 4.1* 4.1*   ALKPHOS 102 94   AST 16 15   ALT 16 17   ANIONGAP 11 9   EGFRNONAA 44* 49*     Coagulation:   Recent Labs  Lab 07/13/17  0940   INR 1.0     All pertinent labs within the past 24 hours have been reviewed.    Significant Imaging: I have reviewed all pertinent imaging results/findings within the past 24 hours.    Assessment/Plan:      Hemolytic anemia    - Low haptoglobin, elevated retic count  - advised by Heme/Onc to begin treatment with Prednisone  - patient with significant concern/distress regarding use of prednisone, and refusing this medication at this time. She and her daughter state their daughter/sister (respectivly) had polymyositis, and had been on long term prednisone treatment, and developed pneumonia and passed at a young age very suddenly 2/2 pulmonary infection while immunosuppressed. Request discussion with Heme/Onc before patient will even consider steroid treatment.           * Symptomatic anemia    - Suspect secondary to anemia  - Troponin 0.009; BNP = 17; EKG c LVH, stable  - schedule screening colonoscopy as outpatient         Hyperbilirubinemia    - 2/2 hemolysis          Anemia    - Denies any bleeding  - Consistent with hemolytic process  - Hgb 12.1 >> 7.8 over last 4 months  - Indirect bilirubin elevated  - Hepatosplenomegaly noted on CT 3/2/17 and Abd US 7/13/17  - Fe 143, B-12 626, folate 16.2, retic 14.5, , haptoglobin <10, Selma negative.  - Hematology consult: Check SPEP, cold agglutinins. If LDH and hapto are consistent with hemolysis would start steroids -  1 mg/kg prenisone.   - Prednisone ordered   - GI consulted: needs screening colonoscopy           Hepatosplenomegaly    - Hepatitis profile negative 2015  - Hematology to see          Jaundice    - 2/2  hemolysis          Thrombocytopenia    - present since March  - ? Immune mediated ?  - Mild  - Hematology:   - check ALFREDO, Lupus anti-coagulant  - Monitor        Hypercalcemia    - PTH pending  - Vit D pending          HTN (hypertension)    - Continue home med and monitor          Diabetes mellitus, type II    - A1c = 7.0  - Accucheck and NISS for now          Chronic gout    - On allopurinol at home.          Hyperlipidemia type II    - Lipid profile historically fair control.             VTE Risk Mitigation         Ordered     heparin (porcine) injection 5,000 Units  Every 12 hours     Route:  Subcutaneous        07/13/17 1742     Medium Risk of VTE  Once      07/13/17 1742     Place ADRIÁN hose  Until discontinued      07/13/17 1742     Place sequential compression device  Until discontinued      07/13/17 1742          Wendy Banegas PA-C  Department of Hospital Medicine   [facility]

## 2017-07-15 NOTE — HOSPITAL COURSE
"The patient was admitted for symptomatic anemia. She is being worked up for hemolytic anemia. Steroids were scheduled to begin on prednisone 7/14/17, but she refuses until further discussion with Hematology. Blood sugar continue to be elevated despite increase in long acting insulin begun on 7/15/17. Increased mealtime insulin, and will hydrate gently. Patient still awaiting conversation with hematology regarding alternative options to prednisone.   Discussed with patient initiation of steroid therapy with Dexamethasone. Encouraged she accept this therapy option. Patient requesting "time to decide." Will request answer by end of day, and continue to monitor CBG and adjust insulin PRN in that time. Otherwise only other treatment option as discussed with Dr. Freeman, is Rituxin as an outpatient.   Patient took 40mg daily of dexamethasone for 4 days. Will send her home with Solu cortef taper to f/u with Heme/Onc.   "

## 2017-07-15 NOTE — ASSESSMENT & PLAN NOTE
- A1c = 7.0  - Accucheck and NISS for now  - CBG very elevated today  - will begin gently rehydaration   - will increased detemir this am to 55  - will increase mealtime aspart to 20 u per meal   - monitor

## 2017-07-15 NOTE — ASSESSMENT & PLAN NOTE
- Suspect secondary to anemia  - Troponin 0.009; BNP = 17; EKG c LVH, stable  - schedule screening colonoscopy as outpatient

## 2017-07-16 LAB
ALBUMIN SERPL BCP-MCNC: 3.4 G/DL
ALP SERPL-CCNC: 88 U/L
ALT SERPL W/O P-5'-P-CCNC: 30 U/L
ANION GAP SERPL CALC-SCNC: 8 MMOL/L
AST SERPL-CCNC: 20 U/L
BASOPHILS # BLD AUTO: 0.03 K/UL
BASOPHILS NFR BLD: 0.3 %
BILIRUB SERPL-MCNC: 3.9 MG/DL
BUN SERPL-MCNC: 26 MG/DL
CA TITR SERPL: NORMAL TITER
CALCIUM SERPL-MCNC: 9.8 MG/DL
CHLORIDE SERPL-SCNC: 108 MMOL/L
CO2 SERPL-SCNC: 24 MMOL/L
CREAT SERPL-MCNC: 1.2 MG/DL
DIFFERENTIAL METHOD: ABNORMAL
EOSINOPHIL # BLD AUTO: 0.2 K/UL
EOSINOPHIL NFR BLD: 2 %
ERYTHROCYTE [DISTWIDTH] IN BLOOD BY AUTOMATED COUNT: 23.2 %
EST. GFR  (AFRICAN AMERICAN): 56 ML/MIN/1.73 M^2
EST. GFR  (NON AFRICAN AMERICAN): 49 ML/MIN/1.73 M^2
GLUCOSE SERPL-MCNC: 121 MG/DL
HCT VFR BLD AUTO: 18.3 %
HGB BLD-MCNC: 6.3 G/DL
LYMPHOCYTES # BLD AUTO: 3.1 K/UL
LYMPHOCYTES NFR BLD: 30.6 %
MCH RBC QN AUTO: 34.8 PG
MCHC RBC AUTO-ENTMCNC: 34.4 %
MCV RBC AUTO: 101 FL
MONOCYTES # BLD AUTO: 0.6 K/UL
MONOCYTES NFR BLD: 6 %
NEUTROPHILS # BLD AUTO: 6.1 K/UL
NEUTROPHILS NFR BLD: 60.2 %
PLATELET # BLD AUTO: 100 K/UL
PMV BLD AUTO: 9.8 FL
POCT GLUCOSE: 142 MG/DL (ref 70–110)
POCT GLUCOSE: 176 MG/DL (ref 70–110)
POCT GLUCOSE: 179 MG/DL (ref 70–110)
POCT GLUCOSE: 205 MG/DL (ref 70–110)
POCT GLUCOSE: 237 MG/DL (ref 70–110)
POTASSIUM SERPL-SCNC: 3.9 MMOL/L
PROT SERPL-MCNC: 6.6 G/DL
RBC # BLD AUTO: 1.81 M/UL
SODIUM SERPL-SCNC: 140 MMOL/L
WBC # BLD AUTO: 10.06 K/UL

## 2017-07-16 PROCEDURE — 36415 COLL VENOUS BLD VENIPUNCTURE: CPT

## 2017-07-16 PROCEDURE — 25000003 PHARM REV CODE 250: Performed by: PHYSICIAN ASSISTANT

## 2017-07-16 PROCEDURE — 11000001 HC ACUTE MED/SURG PRIVATE ROOM

## 2017-07-16 PROCEDURE — 99225 PR SUBSEQUENT OBSERVATION CARE,LEVEL II: CPT | Mod: ,,, | Performed by: PHYSICIAN ASSISTANT

## 2017-07-16 PROCEDURE — G0378 HOSPITAL OBSERVATION PER HR: HCPCS

## 2017-07-16 PROCEDURE — 80053 COMPREHEN METABOLIC PANEL: CPT

## 2017-07-16 PROCEDURE — 85025 COMPLETE CBC W/AUTO DIFF WBC: CPT

## 2017-07-16 RX ADMIN — INSULIN ASPART 2 UNITS: 100 INJECTION, SOLUTION INTRAVENOUS; SUBCUTANEOUS at 12:07

## 2017-07-16 RX ADMIN — ASPIRIN 81 MG CHEWABLE TABLET 81 MG: 81 TABLET CHEWABLE at 09:07

## 2017-07-16 RX ADMIN — FAMOTIDINE 20 MG: 20 TABLET, FILM COATED ORAL at 09:07

## 2017-07-16 RX ADMIN — HEPARIN SODIUM 5000 UNITS: 5000 INJECTION, SOLUTION INTRAVENOUS; SUBCUTANEOUS at 09:07

## 2017-07-16 RX ADMIN — SODIUM CHLORIDE: 0.9 INJECTION, SOLUTION INTRAVENOUS at 09:07

## 2017-07-16 RX ADMIN — AMLODIPINE BESYLATE 10 MG: 5 TABLET ORAL at 09:07

## 2017-07-16 RX ADMIN — CLONIDINE HYDROCHLORIDE 0.1 MG: 0.1 TABLET ORAL at 09:07

## 2017-07-16 RX ADMIN — INSULIN ASPART 4 UNITS: 100 INJECTION, SOLUTION INTRAVENOUS; SUBCUTANEOUS at 05:07

## 2017-07-16 RX ADMIN — INSULIN ASPART 20 UNITS: 100 INJECTION, SOLUTION INTRAVENOUS; SUBCUTANEOUS at 05:07

## 2017-07-16 RX ADMIN — METOPROLOL SUCCINATE 50 MG: 50 TABLET, EXTENDED RELEASE ORAL at 09:07

## 2017-07-16 RX ADMIN — INSULIN ASPART 20 UNITS: 100 INJECTION, SOLUTION INTRAVENOUS; SUBCUTANEOUS at 12:07

## 2017-07-16 RX ADMIN — INSULIN ASPART 20 UNITS: 100 INJECTION, SOLUTION INTRAVENOUS; SUBCUTANEOUS at 08:07

## 2017-07-16 NOTE — ASSESSMENT & PLAN NOTE
- Low haptoglobin, elevated retic count  - advised by Heme/Onc to begin treatment with Prednisone  - patient with significant concern/distress regarding use of prednisone, and refusing this medication at this time. She and her daughter state their daughter/sister (respectivly) had polymyositis, and had been on long term prednisone treatment, and developed pneumonia and passed at a young age very suddenly 2/2 pulmonary infection while immunosuppressed. Request discussion with Heme/Onc before patient will even consider steroid treatment.   - d/w patient alternative steroid options - Dexamethasone. Also discussed Rituxin, and informed that this is an outpatient treatment option. Will give patient to end of day, while monitoring her hyperglycemia treatments, to make decision if she will allow steroid treatment to proceed.

## 2017-07-16 NOTE — ASSESSMENT & PLAN NOTE
- present since March  - ? Immune mediated ?  - Mild  - Hematology:   - check ALFREDO <1:160, Lupus anti-coagulant pending  - Monitor

## 2017-07-16 NOTE — PROGRESS NOTES
"[facility] Hospital Medicine  Progress Note    Patient Name: Wendy Viveros  MRN: [id number]  Patient Class: OP- Observation   Admission Date: 7/13/2017  Length of Stay: 1 days  Attending Physician: Adam Bello MD  Primary Care Provider: Roger Miller MD        Subjective:     Principal Problem:Symptomatic anemia    HPI:  Patient is a 61 y.o. female presents with 4 day history of worsening dyspnea on exertion.  Feels worse lying down flat, lying on side helps.  Feels fatigue.  Daughter states this has been going on for last year.  No pain.  No fever.  No NVDC.  Denies any bleeding, no blood in stools.  Denies heavy NSAID use.    Hgb was 12.1, and is now 7.8.  Total bilirubin elevated going back as far as 2010, now 4.1, with direct 0.9.  Has jaundice.       Denies ever having colonoscopy.    Hospital Course:  The patient was admitted for symptomatic anemia. She is being worked up for hemolytic anemia. Steroids were scheduled to begin on prednisone 7/14/17, but she refuses until further discussion with Hematologoy. Blood sugar continue to be elevated despite increase in long acting insulin begun on 7/15/17. Increased mealtime insulin, and will hydrate gently. Patient still awaiting conversation with hematology regarding alternative options to prednisone.   Discussed with patient initiation of steroid therapy with Dexamethasone. Encouraged she accept this therapy option. Patient requesting "time to decide." Will request answer by end of day, and continue t monitor CBG and adjust insulin PRN in that time. Otherwise only other treatment option as discussed with Dr. Freeman, is Rituxin as an outpatient.     Interval History: Feeling the same. Still declines prednisone. Sugars a with better control. No significant overnight events.     Review of Systems   Constitutional: Positive for activity change (decreased), appetite change (decreased) and fatigue. Negative for chills, diaphoresis and " fever.   Eyes: Negative for visual disturbance.   Respiratory: Positive for shortness of breath (occasional episodes, unprovoked). Negative for cough and wheezing.    Cardiovascular: Positive for palpitations (during episodes of SOB). Negative for chest pain and leg swelling.   Gastrointestinal: Negative for abdominal distention, abdominal pain, constipation, diarrhea, nausea and vomiting.   Genitourinary: Negative for dysuria, frequency, hematuria and urgency.   Skin: Positive for color change (yellowing of eyes) and pallor. Negative for rash and wound.   Neurological: Positive for weakness and light-headedness (with positional changes). Negative for dizziness, syncope, numbness and headaches.     Objective:     Vital Signs (Most Recent):  Temp: 98.5 °F (36.9 °C) (07/16/17 1200)  Pulse: 89 (07/16/17 1600)  Resp: 18 (07/16/17 1200)  BP: (!) 152/67 (07/16/17 1200)  SpO2: 97 % (07/16/17 1336) Vital Signs (24h Range):  Temp:  [98.5 °F (36.9 °C)-98.8 °F (37.1 °C)] 98.5 °F (36.9 °C)  Pulse:  [78-92] 89  Resp:  [17-18] 18  SpO2:  [91 %-100 %] 97 %  BP: (135-152)/(61-72) 152/67     Weight: 99.8 kg (220 lb)  Body mass index is 36.61 kg/m².    Intake/Output Summary (Last 24 hours) at 07/16/17 1733  Last data filed at 07/15/17 2246   Gross per 24 hour   Intake                0 ml   Output                0 ml   Net                0 ml      Physical Exam   Constitutional: She is oriented to person, place, and time. She appears well-developed and well-nourished. No distress.   Mildly obese, sitting on edge of bed.    HENT:   Head: Normocephalic and atraumatic.   Eyes: EOM are normal. Pupils are equal, round, and reactive to light. No scleral icterus.   Jaundice, mild conjunctival pallor.    Neck: Normal range of motion. Neck supple. No JVD present. No tracheal deviation present.   Cardiovascular: Normal rate, regular rhythm and intact distal pulses.  Exam reveals no gallop and no friction rub.    Murmur heard.  Pulmonary/Chest:  Effort normal and breath sounds normal. No respiratory distress. She has no wheezes. She has no rales.   Abdominal: Soft. Bowel sounds are normal. She exhibits no distension. There is no tenderness. There is no guarding.   Musculoskeletal: Normal range of motion. She exhibits no deformity.   Neurological: She is alert and oriented to person, place, and time. No cranial nerve deficit. She exhibits normal muscle tone.   Skin: Skin is warm and dry. No rash noted. She is not diaphoretic. No erythema. No pallor.   Psychiatric: She has a normal mood and affect. Her behavior is normal. Judgment and thought content normal.   Nursing note and vitals reviewed.      Significant Labs:   BMP:   Recent Labs  Lab 07/16/17 0517   *      K 3.9      CO2 24   BUN 26*   CREATININE 1.2   CALCIUM 9.8     CBC:   Recent Labs  Lab 07/15/17  0521 07/16/17  0517   WBC 11.02 10.06   HGB 6.8* 6.3*   HCT 19.5* 18.3*   PLT 99* 100*     CMP:   Recent Labs  Lab 07/15/17  0521 07/16/17  0517    140   K 4.2 3.9    108   CO2 25 24   * 121*   BUN 29* 26*   CREATININE 1.3 1.2   CALCIUM 10.4 9.8   PROT 7.1 6.6   ALBUMIN 3.7 3.4*   BILITOT 4.7* 3.9*   ALKPHOS 96 88   AST 19 20   ALT 25 30   ANIONGAP 9 8   EGFRNONAA 44* 49*     All pertinent labs within the past 24 hours have been reviewed.    Significant Imaging: I have reviewed all pertinent imaging results/findings within the past 24 hours.    Assessment/Plan:      Hemolytic anemia    - Low haptoglobin, elevated retic count  - advised by Heme/Onc to begin treatment with Prednisone  - patient with significant concern/distress regarding use of prednisone, and refusing this medication at this time. She and her daughter state their daughter/sister (respectivly) had polymyositis, and had been on long term prednisone treatment, and developed pneumonia and passed at a young age very suddenly 2/2 pulmonary infection while immunosuppressed. Request discussion with Heme/Onc  before patient will even consider steroid treatment.   - d/w patient alternative steroid options - Dexamethasone. Also discussed Rituxin, and informed that this is an outpatient treatment option. Will give patient to end of day, while monitoring her hyperglycemia treatments, to make decision if she will allow steroid treatment to proceed.           * Symptomatic anemia    - Suspect secondary to anemia  - Troponin 0.009; BNP = 17; EKG c LVH, stable  - schedule screening colonoscopy as outpatient         Hyperbilirubinemia    - 2/2 hemolysis          Anemia    - Denies any bleeding  - Consistent with hemolytic process  - Hgb 12.1 >> 7.8 over last 4 months  - Indirect bilirubin elevated  - Hepatosplenomegaly noted on CT 3/2/17 and Abd US 7/13/17  - Fe 143, B-12 626, folate 16.2, retic 14.5, , haptoglobin <10, Selma negative.  - Hematology consult:    SPEP= 6.8   Cold agglutinins- pending   If LDH and hapto are consistent with hemolysis would start steroids -  1 mg/kg prenisone.   - Prednisone ordered, but patient refused    - Dexamethasone discussed, awaiting patient decision  - GI consulted: needs screening colonoscopy           Hepatosplenomegaly    - Hepatitis profile negative 2015  - Hematology to see          Jaundice    - 2/2 hemolysis          Thrombocytopenia    - present since March  - ? Immune mediated ?  - Mild  - Hematology:   - check ALFREDO <1:160, Lupus anti-coagulant pending  - Monitor        Hypercalcemia    - PTH 44.7  - Vit D 33          HTN (hypertension)    - Continue home med and monitor          Diabetes mellitus, type II    - A1c = 7.0  - Accucheck and NISS for now          Diabetes type 2, uncontrolled    - A1c = 7.0  - Accucheck and NISS for now  - CBG mildly elevated today  - will continue gentle rehydaration   - will increased detemir this am to 60  - will increase mealtime aspart to 20 u per meal   - monitor           Chronic gout    - On allopurinol at home.          Hyperlipidemia  type II    - Lipid profile historically fair control.             VTE Risk Mitigation         Ordered     heparin (porcine) injection 5,000 Units  Every 12 hours     Route:  Subcutaneous        07/13/17 1742     Medium Risk of VTE  Once      07/13/17 1742     Place ADRIÁN hose  Until discontinued      07/13/17 1742     Place sequential compression device  Until discontinued      07/13/17 1742          Wendy Banegas PA-C  Department of Hospital Medicine   [facility]

## 2017-07-16 NOTE — ASSESSMENT & PLAN NOTE
- A1c = 7.0  - Accucheck and NISS for now  - CBG mildly elevated today  - will continue gentle rehydaration   - will increased detemir this am to 60  - will increase mealtime aspart to 20 u per meal   - monitor

## 2017-07-16 NOTE — SUBJECTIVE & OBJECTIVE
Interval History: Feeling the same. Still declines prednisone. Sugars a with better control. No significant overnight events.     Review of Systems   Constitutional: Positive for activity change (decreased), appetite change (decreased) and fatigue. Negative for chills, diaphoresis and fever.   Eyes: Negative for visual disturbance.   Respiratory: Positive for shortness of breath (occasional episodes, unprovoked). Negative for cough and wheezing.    Cardiovascular: Positive for palpitations (during episodes of SOB). Negative for chest pain and leg swelling.   Gastrointestinal: Negative for abdominal distention, abdominal pain, constipation, diarrhea, nausea and vomiting.   Genitourinary: Negative for dysuria, frequency, hematuria and urgency.   Skin: Positive for color change (yellowing of eyes) and pallor. Negative for rash and wound.   Neurological: Positive for weakness and light-headedness (with positional changes). Negative for dizziness, syncope, numbness and headaches.     Objective:     Vital Signs (Most Recent):  Temp: 98.5 °F (36.9 °C) (07/16/17 1200)  Pulse: 89 (07/16/17 1600)  Resp: 18 (07/16/17 1200)  BP: (!) 152/67 (07/16/17 1200)  SpO2: 97 % (07/16/17 1336) Vital Signs (24h Range):  Temp:  [98.5 °F (36.9 °C)-98.8 °F (37.1 °C)] 98.5 °F (36.9 °C)  Pulse:  [78-92] 89  Resp:  [17-18] 18  SpO2:  [91 %-100 %] 97 %  BP: (135-152)/(61-72) 152/67     Weight: 99.8 kg (220 lb)  Body mass index is 36.61 kg/m².    Intake/Output Summary (Last 24 hours) at 07/16/17 1733  Last data filed at 07/15/17 2246   Gross per 24 hour   Intake                0 ml   Output                0 ml   Net                0 ml      Physical Exam   Constitutional: She is oriented to person, place, and time. She appears well-developed and well-nourished. No distress.   Mildly obese, sitting on edge of bed.    HENT:   Head: Normocephalic and atraumatic.   Eyes: EOM are normal. Pupils are equal, round, and reactive to light. No scleral  icterus.   Jaundice, mild conjunctival pallor.    Neck: Normal range of motion. Neck supple. No JVD present. No tracheal deviation present.   Cardiovascular: Normal rate, regular rhythm and intact distal pulses.  Exam reveals no gallop and no friction rub.    Murmur heard.  Pulmonary/Chest: Effort normal and breath sounds normal. No respiratory distress. She has no wheezes. She has no rales.   Abdominal: Soft. Bowel sounds are normal. She exhibits no distension. There is no tenderness. There is no guarding.   Musculoskeletal: Normal range of motion. She exhibits no deformity.   Neurological: She is alert and oriented to person, place, and time. No cranial nerve deficit. She exhibits normal muscle tone.   Skin: Skin is warm and dry. No rash noted. She is not diaphoretic. No erythema. No pallor.   Psychiatric: She has a normal mood and affect. Her behavior is normal. Judgment and thought content normal.   Nursing note and vitals reviewed.      Significant Labs:   BMP:   Recent Labs  Lab 07/16/17 0517   *      K 3.9      CO2 24   BUN 26*   CREATININE 1.2   CALCIUM 9.8     CBC:   Recent Labs  Lab 07/15/17  0521 07/16/17  0517   WBC 11.02 10.06   HGB 6.8* 6.3*   HCT 19.5* 18.3*   PLT 99* 100*     CMP:   Recent Labs  Lab 07/15/17  0521 07/16/17  0517    140   K 4.2 3.9    108   CO2 25 24   * 121*   BUN 29* 26*   CREATININE 1.3 1.2   CALCIUM 10.4 9.8   PROT 7.1 6.6   ALBUMIN 3.7 3.4*   BILITOT 4.7* 3.9*   ALKPHOS 96 88   AST 19 20   ALT 25 30   ANIONGAP 9 8   EGFRNONAA 44* 49*     All pertinent labs within the past 24 hours have been reviewed.    Significant Imaging: I have reviewed all pertinent imaging results/findings within the past 24 hours.

## 2017-07-16 NOTE — ASSESSMENT & PLAN NOTE
- Denies any bleeding  - Consistent with hemolytic process  - Hgb 12.1 >> 7.8 over last 4 months  - Indirect bilirubin elevated  - Hepatosplenomegaly noted on CT 3/2/17 and Abd US 7/13/17  - Fe 143, B-12 626, folate 16.2, retic 14.5, , haptoglobin <10, Selma negative.  - Hematology consult:    SPEP= 6.8   Cold agglutinins- pending   If LDH and hapto are consistent with hemolysis would start steroids -  1 mg/kg prenisone.   - Prednisone ordered, but patient refused    - Dexamethasone discussed, awaiting patient decision  - GI consulted: needs screening colonoscopy

## 2017-07-16 NOTE — PLAN OF CARE
Problem: Patient Care Overview  Goal: Plan of Care Review  Outcome: Ongoing (interventions implemented as appropriate)  Patient remains free from injury or falls. Vital signs stable throughout night on room air. Positions self independently. No c/o pain. No c/o SOB or dizziness. Telemetry maintained. Daughter at bedside. Bed in low locked position and call light within reach. Will continue to monitor.

## 2017-07-17 ENCOUNTER — TELEPHONE (OUTPATIENT)
Dept: HEMATOLOGY/ONCOLOGY | Facility: CLINIC | Age: 62
End: 2017-07-17

## 2017-07-17 LAB
ALBUMIN SERPL BCP-MCNC: 3.3 G/DL
ALBUMIN SERPL ELPH-MCNC: 3.98 G/DL
ALP SERPL-CCNC: 87 U/L
ALPHA1 GLOB SERPL ELPH-MCNC: 0.33 G/DL
ALPHA2 GLOB SERPL ELPH-MCNC: 0.46 G/DL
ALT SERPL W/O P-5'-P-CCNC: 36 U/L
ANA SER QL IF: NORMAL
ANION GAP SERPL CALC-SCNC: 8 MMOL/L
APTT HEX PL PPP: NEGATIVE S
AST SERPL-CCNC: 23 U/L
B-GLOBULIN SERPL ELPH-MCNC: 0.83 G/DL
BASOPHILS # BLD AUTO: 0.03 K/UL
BASOPHILS NFR BLD: 0.3 %
BILIRUB SERPL-MCNC: 3.7 MG/DL
BUN SERPL-MCNC: 23 MG/DL
CALCIUM SERPL-MCNC: 9.4 MG/DL
CHLORIDE SERPL-SCNC: 108 MMOL/L
CO2 SERPL-SCNC: 22 MMOL/L
CREAT SERPL-MCNC: 1.2 MG/DL
DIFFERENTIAL METHOD: ABNORMAL
EOSINOPHIL # BLD AUTO: 0.2 K/UL
EOSINOPHIL NFR BLD: 1.6 %
ERYTHROCYTE [DISTWIDTH] IN BLOOD BY AUTOMATED COUNT: 23.4 %
EST. GFR  (AFRICAN AMERICAN): 56 ML/MIN/1.73 M^2
EST. GFR  (NON AFRICAN AMERICAN): 49 ML/MIN/1.73 M^2
GAMMA GLOB SERPL ELPH-MCNC: 1.2 G/DL
GLUCOSE SERPL-MCNC: 194 MG/DL
HCT VFR BLD AUTO: 18.2 %
HGB BLD-MCNC: 6.2 G/DL
LYMPHOCYTES # BLD AUTO: 2.7 K/UL
LYMPHOCYTES NFR BLD: 28.8 %
MCH RBC QN AUTO: 35 PG
MCHC RBC AUTO-ENTMCNC: 34.1 %
MCV RBC AUTO: 103 FL
MONOCYTES # BLD AUTO: 0.5 K/UL
MONOCYTES NFR BLD: 5.7 %
NEUTROPHILS # BLD AUTO: 5.9 K/UL
NEUTROPHILS NFR BLD: 62.3 %
PATHOLOGIST INTERPRETATION SPE: NORMAL
PLATELET # BLD AUTO: 102 K/UL
PLATELET BLD QL SMEAR: ABNORMAL
PMV BLD AUTO: 9.7 FL
POCT GLUCOSE: 140 MG/DL (ref 70–110)
POCT GLUCOSE: 191 MG/DL (ref 70–110)
POCT GLUCOSE: 260 MG/DL (ref 70–110)
POCT GLUCOSE: 272 MG/DL (ref 70–110)
POTASSIUM SERPL-SCNC: 3.7 MMOL/L
PROT SERPL-MCNC: 6.4 G/DL
PROT SERPL-MCNC: 6.8 G/DL
RBC # BLD AUTO: 1.77 M/UL
RETICS/RBC NFR AUTO: 16.3 %
SODIUM SERPL-SCNC: 138 MMOL/L
WBC # BLD AUTO: 9.46 K/UL

## 2017-07-17 PROCEDURE — 11000001 HC ACUTE MED/SURG PRIVATE ROOM

## 2017-07-17 PROCEDURE — 85025 COMPLETE CBC W/AUTO DIFF WBC: CPT

## 2017-07-17 PROCEDURE — 80053 COMPREHEN METABOLIC PANEL: CPT

## 2017-07-17 PROCEDURE — G0378 HOSPITAL OBSERVATION PER HR: HCPCS

## 2017-07-17 PROCEDURE — 25000003 PHARM REV CODE 250: Performed by: PHYSICIAN ASSISTANT

## 2017-07-17 PROCEDURE — 94799 UNLISTED PULMONARY SVC/PX: CPT

## 2017-07-17 PROCEDURE — 88188 FLOWCYTOMETRY/READ 9-15: CPT

## 2017-07-17 PROCEDURE — 99225 PR SUBSEQUENT OBSERVATION CARE,LEVEL II: CPT | Mod: ,,, | Performed by: PHYSICIAN ASSISTANT

## 2017-07-17 PROCEDURE — 85045 AUTOMATED RETICULOCYTE COUNT: CPT

## 2017-07-17 PROCEDURE — 63600175 PHARM REV CODE 636 W HCPCS: Performed by: PHYSICIAN ASSISTANT

## 2017-07-17 PROCEDURE — 36415 COLL VENOUS BLD VENIPUNCTURE: CPT

## 2017-07-17 PROCEDURE — 99213 OFFICE O/P EST LOW 20 MIN: CPT | Mod: ,,, | Performed by: INTERNAL MEDICINE

## 2017-07-17 PROCEDURE — 88184 FLOWCYTOMETRY/ TC 1 MARKER: CPT | Mod: 91

## 2017-07-17 RX ORDER — DEXAMETHASONE 4 MG/1
40 TABLET ORAL DAILY
Status: COMPLETED | OUTPATIENT
Start: 2017-07-17 | End: 2017-07-20

## 2017-07-17 RX ADMIN — FAMOTIDINE 20 MG: 20 TABLET, FILM COATED ORAL at 08:07

## 2017-07-17 RX ADMIN — AMLODIPINE BESYLATE 10 MG: 5 TABLET ORAL at 08:07

## 2017-07-17 RX ADMIN — INSULIN ASPART 20 UNITS: 100 INJECTION, SOLUTION INTRAVENOUS; SUBCUTANEOUS at 12:07

## 2017-07-17 RX ADMIN — INSULIN ASPART 6 UNITS: 100 INJECTION, SOLUTION INTRAVENOUS; SUBCUTANEOUS at 09:07

## 2017-07-17 RX ADMIN — CLONIDINE HYDROCHLORIDE 0.1 MG: 0.1 TABLET ORAL at 09:07

## 2017-07-17 RX ADMIN — INSULIN ASPART 6 UNITS: 100 INJECTION, SOLUTION INTRAVENOUS; SUBCUTANEOUS at 08:07

## 2017-07-17 RX ADMIN — HEPARIN SODIUM 5000 UNITS: 5000 INJECTION, SOLUTION INTRAVENOUS; SUBCUTANEOUS at 08:07

## 2017-07-17 RX ADMIN — METOPROLOL SUCCINATE 50 MG: 50 TABLET, EXTENDED RELEASE ORAL at 08:07

## 2017-07-17 RX ADMIN — INSULIN DETEMIR 5 UNITS: 100 INJECTION, SOLUTION SUBCUTANEOUS at 12:07

## 2017-07-17 RX ADMIN — INSULIN ASPART 20 UNITS: 100 INJECTION, SOLUTION INTRAVENOUS; SUBCUTANEOUS at 08:07

## 2017-07-17 RX ADMIN — HEPARIN SODIUM 5000 UNITS: 5000 INJECTION, SOLUTION INTRAVENOUS; SUBCUTANEOUS at 09:07

## 2017-07-17 RX ADMIN — FAMOTIDINE 20 MG: 20 TABLET, FILM COATED ORAL at 09:07

## 2017-07-17 RX ADMIN — INSULIN ASPART 20 UNITS: 100 INJECTION, SOLUTION INTRAVENOUS; SUBCUTANEOUS at 06:07

## 2017-07-17 RX ADMIN — ASPIRIN 81 MG CHEWABLE TABLET 81 MG: 81 TABLET CHEWABLE at 09:07

## 2017-07-17 RX ADMIN — SODIUM CHLORIDE: 0.9 INJECTION, SOLUTION INTRAVENOUS at 12:07

## 2017-07-17 RX ADMIN — DEXAMETHASONE 40 MG: 4 TABLET ORAL at 04:07

## 2017-07-17 RX ADMIN — INSULIN ASPART 2 UNITS: 100 INJECTION, SOLUTION INTRAVENOUS; SUBCUTANEOUS at 12:07

## 2017-07-17 NOTE — TELEPHONE ENCOUNTER
Message given to Dr driscoll to contact the nurse at [facility] ward. She is questioning the prednisone order he wrote for the patient.

## 2017-07-17 NOTE — ASSESSMENT & PLAN NOTE
- A1c = 7.0  - Accucheck and NISS for now  - CBG again elevated today  - will continue gentle rehydaration   - will increased detemir this am to 65 QHS  - will maintain mealtime aspart at 20 u per meal   - monitor

## 2017-07-17 NOTE — SUBJECTIVE & OBJECTIVE
Interval History: SOB/palpitations, and dizziness with walking/positional changes only at night. Otherwise, symptoms well-controlled, no active blood losses. Would like to discuss treatment options with Hematologist. No new symptoms. No significant overnight events.      Review of Systems   Constitutional: Positive for activity change (decreased), appetite change (decreased) and fatigue. Negative for chills, diaphoresis and fever.   Eyes: Negative for visual disturbance.   Respiratory: Positive for shortness of breath (occasional episodes, unprovoked). Negative for cough and wheezing.    Cardiovascular: Positive for palpitations (during episodes of SOB). Negative for chest pain and leg swelling.   Gastrointestinal: Negative for abdominal distention, abdominal pain, constipation, diarrhea, nausea and vomiting.   Genitourinary: Negative for dysuria, frequency, hematuria and urgency.   Skin: Positive for color change (yellowing of eyes) and pallor. Negative for rash and wound.   Neurological: Positive for weakness and light-headedness (with positional changes). Negative for dizziness, syncope, numbness and headaches.     Objective:     Vital Signs (Most Recent):  Temp: 99 °F (37.2 °C) (07/17/17 0715)  Pulse: 93 (07/17/17 0852)  Resp: 16 (07/17/17 0715)  BP: 133/68 (07/17/17 0851)  SpO2: 99 % (07/17/17 0936) Vital Signs (24h Range):  Temp:  [98.3 °F (36.8 °C)-99.3 °F (37.4 °C)] 99 °F (37.2 °C)  Pulse:  [78-96] 93  Resp:  [16-18] 16  SpO2:  [95 %-99 %] 99 %  BP: (133-152)/(63-68) 133/68     Weight: 99.8 kg (220 lb)  Body mass index is 36.61 kg/m².    Intake/Output Summary (Last 24 hours) at 07/17/17 1135  Last data filed at 07/16/17 1700   Gross per 24 hour   Intake             1146 ml   Output             1800 ml   Net             -654 ml      Physical Exam   Constitutional: She is oriented to person, place, and time. She appears well-developed and well-nourished. No distress.   Mildly obese, sitting on edge of bed.     HENT:   Head: Normocephalic and atraumatic.   Eyes: EOM are normal. Pupils are equal, round, and reactive to light. No scleral icterus.   Jaundice, mild conjunctival pallor.    Neck: Normal range of motion. Neck supple. No JVD present. No tracheal deviation present.   Cardiovascular: Normal rate, regular rhythm and intact distal pulses.  Exam reveals no gallop and no friction rub.    Murmur heard.  Pulmonary/Chest: Effort normal and breath sounds normal. No respiratory distress. She has no wheezes. She has no rales.   Abdominal: Soft. Bowel sounds are normal. She exhibits no distension. There is no tenderness. There is no guarding.   Musculoskeletal: Normal range of motion. She exhibits no deformity.   Neurological: She is alert and oriented to person, place, and time. No cranial nerve deficit. She exhibits normal muscle tone.   Skin: Skin is warm and dry. No rash noted. She is not diaphoretic. No erythema. No pallor.   Psychiatric: She has a normal mood and affect. Her behavior is normal. Judgment and thought content normal.   Nursing note and vitals reviewed.      Significant Labs:   BMP:   Recent Labs  Lab 07/17/17  0506   *      K 3.7      CO2 22*   BUN 23   CREATININE 1.2   CALCIUM 9.4     CBC:   Recent Labs  Lab 07/16/17  0517 07/17/17  0506   WBC 10.06 9.46   HGB 6.3* 6.2*   HCT 18.3* 18.2*   * 102*     CMP:   Recent Labs  Lab 07/16/17  0517 07/17/17  0506    138   K 3.9 3.7    108   CO2 24 22*   * 194*   BUN 26* 23   CREATININE 1.2 1.2   CALCIUM 9.8 9.4   PROT 6.6 6.4   ALBUMIN 3.4* 3.3*   BILITOT 3.9* 3.7*   ALKPHOS 88 87   AST 20 23   ALT 30 36   ANIONGAP 8 8   EGFRNONAA 49* 49*     All pertinent labs within the past 24 hours have been reviewed.    Significant Imaging: I have reviewed all pertinent imaging results/findings within the past 24 hours.

## 2017-07-17 NOTE — PLAN OF CARE
Resting in chair at this time.  VSS on RA and afebrile this shift.  Telemetry monitored wo event, NSR. WO CO pain.  Blood glucose monitored ac.Good appetite and good UOP this shift, independent, BRP.  Repositions self independently in bed and ambulating around room.  Free from injury or skin breakdown; Fall precautions maintained and call light in reach.  POC updated questions answered and comments acknowledged.  Purposeful hourly rounding completed this shift.    Patient will discuss with Hemoc prior to decision, requiring more information RT pharmacological options for Planning Treatment.

## 2017-07-17 NOTE — TELEPHONE ENCOUNTER
----- Message from Grisel Adams sent at 7/17/2017  8:39 AM CDT -----  Contact: [facility]  They have questions regarding the treatment plan.   Call [phone]

## 2017-07-17 NOTE — PROGRESS NOTES
Workup consistent with hemolytic anemia.  The direct and indirect Selma test are negative and cold agglutinins are negative.  LDH is been normal bilirubin, elevated reticulocyte count and decreased haptoglobin support the diagnosis.   She has been reluctant to start steroid therapy.  Her hemoglobin has gradually declined reaching 6.2 g/dL today.    I discussed her situation at length with the patient and her daughter.  Although we do not have a clear diagnosis yet it still seems most likely that there is an immunological component.  There are some additional tests that we'll need to be done to rule out other entities; however, as those could take some time, I recommended that she initiate steroid therapy which if successful, could facilitate her hospital discharge.    We discussed different types of corticosteroid usage including prednisone or dexamethasone.  They would like to start with dexamethasone and that is certainly reasonable.  Starting dose 340 mg a day for 4 days.  I did tell her that any type of steroid therapy was likely to make her diabetic control more difficult.

## 2017-07-17 NOTE — PLAN OF CARE
0854-Writer arranged appointment for Hematology at [facility] for tomorrow, Tuesday, July 18, 2017 at 01:40pm with Dr. Renteria.

## 2017-07-17 NOTE — ASSESSMENT & PLAN NOTE
- Low haptoglobin, elevated retic count  - advised by Heme/Onc to begin treatment with Prednisone  - patient with significant concern/distress regarding use of prednisone, and refusing this medication at this time. She and her daughter state their daughter/sister (respectively) had polymyositis, and had been on long term prednisone treatment, and developed pneumonia and passed at a young age very suddenly 2/2 pulmonary infection while immunosuppressed. Request discussion with Heme/Onc before patient will even consider steroid treatment.   - d/w patient alternative steroid options - Dexamethasone. Also discussed Rituxin, and informed that this is an outpatient treatment option. Will give patient to end of day, while monitoring her hyperglycemia treatments, to make decision if she will allow steroid treatment to proceed.   - patient declines treatment with dexamethasone, patient and daughter desire to speak to hematologist.   - awaiting further Heme/Onc recommendations

## 2017-07-17 NOTE — ASSESSMENT & PLAN NOTE
- Denies any bleeding  - Consistent with hemolytic process  - Hgb 12.1 >> 7.8 over last 4 months  - Indirect bilirubin elevated  - Hepatosplenomegaly noted on CT 3/2/17 and Abd US 7/13/17  - Fe 143, B-12 626, folate 16.2, retic 14.5, , haptoglobin <10, Selma negative.  - Hematology consult:    SPEP= 6.8   Cold agglutinins- <1:64   If LDH and hapto are consistent with hemolysis would start steroids -  1 mg/kg prenisone.   - Prednisone ordered, but patient refused    - Dexamethasone discussed, patient declined   - GI consulted: needs screening colonoscopy

## 2017-07-17 NOTE — PLAN OF CARE
9:26 AM   Verified notification of Verbalized concerns over 4 days correlatting with the refusal of the planned treatment;  MD Yon-Stated he will round Today to address the patients concerns.    9:31 AM   Wendy Banegas PA-C aware.    11:26 AM   Telemetry monitor removed per RIVERA Garcia, monitor Tech.    3:24 PM   New Orders RCVD.    Up in recliner, at this time.  VSS on RA and afebrile this shift.  WO CO pain/SOB/HENRY.  Good appetite and good UOP this shift, independent with BRP.  Repositions self independently and ambulating around room.   Free from injury or skin breakdown.   Fall precautions maintained and call light in reach.  POC updated questions answered and comments acknowledged.  Purposeful hourly rounding completed this shift.    Relieved after Concerns addressed and 1st dose of recommended treatment implemented.  Blood Sugar Monitored AC, required coverage x 2 this shift.

## 2017-07-17 NOTE — ASSESSMENT & PLAN NOTE
- Suspect secondary to hemolytic anemia (see A&P for hemolytic anemia)   - Troponin 0.009; BNP = 17; EKG c LVH, stable  - schedule screening colonoscopy as outpatient

## 2017-07-18 PROBLEM — D64.9 ABSOLUTE ANEMIA: Status: RESOLVED | Noted: 2017-07-18 | Resolved: 2017-07-18

## 2017-07-18 PROBLEM — D64.9 ANEMIA, UNSPECIFIED: Status: RESOLVED | Noted: 2017-07-18 | Resolved: 2017-07-18

## 2017-07-18 PROBLEM — R06.02 SHORTNESS OF BREATH: Status: RESOLVED | Noted: 2017-07-13 | Resolved: 2017-07-18

## 2017-07-18 LAB
ALBUMIN SERPL BCP-MCNC: 3.5 G/DL
ALP SERPL-CCNC: 92 U/L
ALT SERPL W/O P-5'-P-CCNC: 41 U/L
ANION GAP SERPL CALC-SCNC: 7 MMOL/L
AST SERPL-CCNC: 23 U/L
BASOPHILS # BLD AUTO: 0.02 K/UL
BASOPHILS NFR BLD: 0.2 %
BILIRUB SERPL-MCNC: 2.5 MG/DL
BUN SERPL-MCNC: 21 MG/DL
CALCIUM SERPL-MCNC: 10 MG/DL
CHLORIDE SERPL-SCNC: 108 MMOL/L
CO2 SERPL-SCNC: 24 MMOL/L
CREAT SERPL-MCNC: 1.2 MG/DL
DIFFERENTIAL METHOD: ABNORMAL
EOSINOPHIL # BLD AUTO: 0 K/UL
EOSINOPHIL NFR BLD: 0.2 %
ERYTHROCYTE [DISTWIDTH] IN BLOOD BY AUTOMATED COUNT: 23.9 %
EST. GFR  (AFRICAN AMERICAN): 56 ML/MIN/1.73 M^2
EST. GFR  (NON AFRICAN AMERICAN): 49 ML/MIN/1.73 M^2
GLUCOSE SERPL-MCNC: 279 MG/DL
HCT VFR BLD AUTO: 18.6 %
HGB BLD-MCNC: 6.5 G/DL
INTERPRETATION SERPL IFE-IMP: NORMAL
LYMPHOCYTES # BLD AUTO: 1.5 K/UL
LYMPHOCYTES NFR BLD: 13.5 %
MCH RBC QN AUTO: 35.9 PG
MCHC RBC AUTO-ENTMCNC: 34.9 %
MCV RBC AUTO: 103 FL
MONOCYTES # BLD AUTO: 0.3 K/UL
MONOCYTES NFR BLD: 2.8 %
NEUTROPHILS # BLD AUTO: 9.2 K/UL
NEUTROPHILS NFR BLD: 83.3 %
PATHOLOGIST INTERPRETATION IFE: NORMAL
PLATELET # BLD AUTO: 108 K/UL
PMV BLD AUTO: 9.5 FL
POCT GLUCOSE: 270 MG/DL (ref 70–110)
POCT GLUCOSE: 293 MG/DL (ref 70–110)
POCT GLUCOSE: 313 MG/DL (ref 70–110)
POCT GLUCOSE: 324 MG/DL (ref 70–110)
POTASSIUM SERPL-SCNC: 4.4 MMOL/L
PROT SERPL-MCNC: 6.9 G/DL
RBC # BLD AUTO: 1.81 M/UL
SODIUM SERPL-SCNC: 139 MMOL/L
WBC # BLD AUTO: 11.18 K/UL

## 2017-07-18 PROCEDURE — 85025 COMPLETE CBC W/AUTO DIFF WBC: CPT

## 2017-07-18 PROCEDURE — 80053 COMPREHEN METABOLIC PANEL: CPT

## 2017-07-18 PROCEDURE — G0378 HOSPITAL OBSERVATION PER HR: HCPCS

## 2017-07-18 PROCEDURE — 36415 COLL VENOUS BLD VENIPUNCTURE: CPT

## 2017-07-18 PROCEDURE — 25000003 PHARM REV CODE 250: Performed by: PHYSICIAN ASSISTANT

## 2017-07-18 PROCEDURE — 99226 PR SUBSEQUENT OBSERVATION CARE,LEVEL III: CPT | Mod: ,,, | Performed by: NURSE PRACTITIONER

## 2017-07-18 PROCEDURE — 63600175 PHARM REV CODE 636 W HCPCS: Performed by: PHYSICIAN ASSISTANT

## 2017-07-18 PROCEDURE — 94799 UNLISTED PULMONARY SVC/PX: CPT

## 2017-07-18 PROCEDURE — 11000001 HC ACUTE MED/SURG PRIVATE ROOM

## 2017-07-18 RX ORDER — INSULIN ASPART 100 [IU]/ML
25 INJECTION, SOLUTION INTRAVENOUS; SUBCUTANEOUS
Status: DISCONTINUED | OUTPATIENT
Start: 2017-07-19 | End: 2017-07-19

## 2017-07-18 RX ADMIN — INSULIN ASPART 6 UNITS: 100 INJECTION, SOLUTION INTRAVENOUS; SUBCUTANEOUS at 02:07

## 2017-07-18 RX ADMIN — INSULIN ASPART 8 UNITS: 100 INJECTION, SOLUTION INTRAVENOUS; SUBCUTANEOUS at 06:07

## 2017-07-18 RX ADMIN — INSULIN ASPART 10 UNITS: 100 INJECTION, SOLUTION INTRAVENOUS; SUBCUTANEOUS at 09:07

## 2017-07-18 RX ADMIN — AMLODIPINE BESYLATE 10 MG: 5 TABLET ORAL at 08:07

## 2017-07-18 RX ADMIN — HEPARIN SODIUM 5000 UNITS: 5000 INJECTION, SOLUTION INTRAVENOUS; SUBCUTANEOUS at 09:07

## 2017-07-18 RX ADMIN — DEXAMETHASONE 40 MG: 4 TABLET ORAL at 08:07

## 2017-07-18 RX ADMIN — INSULIN ASPART 8 UNITS: 100 INJECTION, SOLUTION INTRAVENOUS; SUBCUTANEOUS at 12:07

## 2017-07-18 RX ADMIN — INSULIN ASPART 6 UNITS: 100 INJECTION, SOLUTION INTRAVENOUS; SUBCUTANEOUS at 08:07

## 2017-07-18 RX ADMIN — INSULIN ASPART 20 UNITS: 100 INJECTION, SOLUTION INTRAVENOUS; SUBCUTANEOUS at 05:07

## 2017-07-18 RX ADMIN — CLONIDINE HYDROCHLORIDE 0.1 MG: 0.1 TABLET ORAL at 08:07

## 2017-07-18 RX ADMIN — INSULIN ASPART 20 UNITS: 100 INJECTION, SOLUTION INTRAVENOUS; SUBCUTANEOUS at 12:07

## 2017-07-18 RX ADMIN — INSULIN ASPART 20 UNITS: 100 INJECTION, SOLUTION INTRAVENOUS; SUBCUTANEOUS at 08:07

## 2017-07-18 RX ADMIN — CLONIDINE HYDROCHLORIDE 0.1 MG: 0.1 TABLET ORAL at 09:07

## 2017-07-18 RX ADMIN — HEPARIN SODIUM 5000 UNITS: 5000 INJECTION, SOLUTION INTRAVENOUS; SUBCUTANEOUS at 08:07

## 2017-07-18 RX ADMIN — FAMOTIDINE 20 MG: 20 TABLET, FILM COATED ORAL at 09:07

## 2017-07-18 RX ADMIN — METOPROLOL SUCCINATE 50 MG: 50 TABLET, EXTENDED RELEASE ORAL at 08:07

## 2017-07-18 RX ADMIN — ASPIRIN 81 MG CHEWABLE TABLET 81 MG: 81 TABLET CHEWABLE at 09:07

## 2017-07-18 RX ADMIN — FAMOTIDINE 20 MG: 20 TABLET, FILM COATED ORAL at 08:07

## 2017-07-18 NOTE — SUBJECTIVE & OBJECTIVE
Interval History: No events overnight.     Review of Systems   Constitutional: Positive for activity change (decreased), appetite change (decreased) and fatigue. Negative for chills, diaphoresis and fever.   Eyes: Negative for visual disturbance.   Respiratory: Positive for shortness of breath (occasional episodes, unprovoked). Negative for cough and wheezing.    Cardiovascular: Positive for palpitations (during episodes of SOB). Negative for chest pain and leg swelling.   Gastrointestinal: Negative for abdominal distention, abdominal pain, constipation, diarrhea, nausea and vomiting.   Genitourinary: Negative for dysuria, frequency, hematuria and urgency.   Skin: Positive for color change (yellowing of eyes) and pallor. Negative for rash and wound.   Neurological: Positive for weakness and light-headedness (with positional changes). Negative for dizziness, syncope, numbness and headaches.     Objective:     Vital Signs (Most Recent):  Temp: 98.2 °F (36.8 °C) (07/18/17 0735)  Pulse: 92 (07/18/17 0735)  Resp: 18 (07/18/17 0735)  BP: (!) 145/67 (07/18/17 0735)  SpO2: 96 % (07/18/17 0735) Vital Signs (24h Range):  Temp:  [98.2 °F (36.8 °C)-99.1 °F (37.3 °C)] 98.2 °F (36.8 °C)  Pulse:  [81-92] 92  Resp:  [18] 18  SpO2:  [95 %-100 %] 96 %  BP: (137-151)/(66-78) 145/67     Weight: 99.8 kg (220 lb)  Body mass index is 36.61 kg/m².    Intake/Output Summary (Last 24 hours) at 07/18/17 1022  Last data filed at 07/17/17 1815   Gross per 24 hour   Intake          3396.25 ml   Output             1200 ml   Net          2196.25 ml      Physical Exam   Constitutional: She is oriented to person, place, and time. She appears well-developed and well-nourished. No distress.   Mildly obese, sitting on edge of bed.    HENT:   Head: Normocephalic and atraumatic.   Eyes: EOM are normal. Pupils are equal, round, and reactive to light. No scleral icterus.   Jaundice, mild conjunctival pallor.    Neck: Normal range of motion. Neck supple. No  JVD present. No tracheal deviation present.   Cardiovascular: Normal rate, regular rhythm and intact distal pulses.  Exam reveals no gallop and no friction rub.    Murmur heard.  Pulmonary/Chest: Effort normal and breath sounds normal. No respiratory distress. She has no wheezes. She has no rales.   Abdominal: Soft. Bowel sounds are normal. She exhibits no distension. There is no tenderness. There is no guarding.   Musculoskeletal: Normal range of motion. She exhibits no deformity.   Neurological: She is alert and oriented to person, place, and time. No cranial nerve deficit. She exhibits normal muscle tone.   Skin: Skin is warm and dry. No rash noted. She is not diaphoretic. No erythema. No pallor.   Psychiatric: She has a normal mood and affect. Her behavior is normal. Judgment and thought content normal.   Nursing note and vitals reviewed.      Significant Labs:   CBC:   Recent Labs  Lab 07/17/17  0506 07/18/17  0452   WBC 9.46 11.18   HGB 6.2* 6.5*   HCT 18.2* 18.6*   * 108*     CMP:   Recent Labs  Lab 07/17/17  0506 07/18/17  0452    139   K 3.7 4.4    108   CO2 22* 24   * 279*   BUN 23 21   CREATININE 1.2 1.2   CALCIUM 9.4 10.0   PROT 6.4 6.9   ALBUMIN 3.3* 3.5   BILITOT 3.7* 2.5*   ALKPHOS 87 92   AST 23 23   ALT 36 41   ANIONGAP 8 7*   EGFRNONAA 49* 49*       Significant Imaging: I have reviewed all pertinent imaging results/findings within the past 24 hours.

## 2017-07-18 NOTE — PLAN OF CARE
ATTN: TEAM DC PLANNING     PER SSW requests bedside commode, denies need for any other DME IF MD TEAM AGREES - IF NOT PT NEED TO RECEIVE BSC FROM PCP .       PER SSW Patient requests info on Medicaid transportation, informed patient she can call [facility] customer service to initiate scheduled rides. - RN CM WILL F/U AND PUT INFO ON AVS      No other needs  - IF ANY ARISE PLEASE CONTACT ALLY / SSW     Susu Toscano RN  Case management 7/18/201712:18 PM  # [phone] (FAX) [phone]             07/18/17 1211   Discharge Assessment   Assessment Type Discharge Planning Reassessment

## 2017-07-18 NOTE — PLAN OF CARE
Problem: Patient Care Overview  Goal: Plan of Care Review  Outcome: Ongoing (interventions implemented as appropriate)  Patient on RA.  No distress.  Will continue to monitor.

## 2017-07-18 NOTE — PLAN OF CARE
ATTN: TEAM DC PLANNING    OBSERVATION DAY #5     PER SSW requests bedside commode, denies need for any other DME IF MD TEAM AGREES - IF NOT PT NEED TO RECEIVE BSC FROM PCP .       PER W Patient requests info on Medicaid transportation # [id number] , informed patient she can call [facility] customer service to initiate scheduled rides. - RN CM WILL F/U AND PUT INFO ON AVS      No other needs  - IF ANY ARISE PLEASE CONTACT ALLY / SSW     Susu Toscano RN  Case management # [phone] (FAX) [phone]     07/18/17 172   Discharge Assessment   Assessment Type Discharge Planning Reassessment

## 2017-07-18 NOTE — PLAN OF CARE
Problem: Patient Care Overview  Goal: Plan of Care Review  Outcome: Ongoing (interventions implemented as appropriate)  Pt free of trauma, falls, and injury. Pt VSS and afebrile throughout shift. Pt covered for hyperglycemia with insulin x 3. Pt free of skin breakdown. Pt denies presence of pain at this time. Pt has been eating and voiding adequately throughout shift. Pt has call light in reach, bed alarm on, bed brakes on, side rails up x2, bed in low position, IS at bedside, and nonskid socks on. Pt resting comfortably in bed in no distress with daughter at the bedside. Will continue to monitor.

## 2017-07-18 NOTE — ASSESSMENT & PLAN NOTE
- Denies any bleeding  - Consistent with hemolytic process  - Hgb 12.1 >> 7.8 over last 4 months  - Indirect bilirubin elevated  - Hepatosplenomegaly noted on CT 3/2/17 and Abd US 7/13/17  - Fe 143, B-12 626, folate 16.2, retic 14.5, , haptoglobin <10, Selma negative.  - Hematology consult:    SPEP= 6.8   Cold agglutinins- <1:64   If LDH and hapto are consistent with hemolysis would start steroids -  1 mg/kg prenisone.   - Prednisone ordered, but patient refused    - Dexamethasone discussed and initiated   - GI consulted: needs screening colonoscopy

## 2017-07-18 NOTE — PROGRESS NOTES
Patient is without acute visceral signs. No active blood loss though with a significant anemia. No signs of cholangitis or visceral ischemia. Imaging and labs reviewed.  T<100 HR 89  Slight icterus no temporal wasting  Neck supple no mass  Heart no gallops  Chest clear no wheeze  Abdomen soft non-tender active sounds, large liver palpable spleen no ascites  Extremities no cyanosis or edema    Total bilirubin is 2.5    Agree with indication for eventual colonoscopy which can be done as an outpatient.

## 2017-07-18 NOTE — ASSESSMENT & PLAN NOTE
- Low haptoglobin, elevated retic count  - advised by Heme/Onc to begin treatment with Prednisone  - patient with significant concern/distress regarding use of prednisone, and refusing this medication at this time. She and her daughter state their daughter/sister (respectively) had polymyositis, and had been on long term prednisone treatment, and developed pneumonia and passed at a young age very suddenly 2/2 pulmonary infection while immunosuppressed. Request discussion with Heme/Onc before patient will even consider steroid treatment.   - d/w patient alternative steroid options - Dexamethasone. Also discussed Rituxin, and informed that this is an outpatient treatment option. Will give patient to end of day, while monitoring her hyperglycemia treatments, to make decision if she will allow steroid treatment to proceed.   - awaiting further Heme/Onc recommendations     -Dexamethasone 40mg/day started 7/17

## 2017-07-18 NOTE — PROGRESS NOTES
"[facility] Hospital Medicine  Progress Note    Patient Name: Wendy Viveros  MRN: [id number]  Patient Class: OP- Observation   Admission Date: 7/13/2017  Length of Stay: 1 days  Attending Physician: Jarvis Badillo MD  Primary Care Provider: Roger Miller MD        Subjective:     Principal Problem:Hemolytic anemia    HPI:  Patient is a 61 y.o. female presents with 4 day history of worsening dyspnea on exertion.  Feels worse lying down flat, lying on side helps.  Feels fatigue.  Daughter states this has been going on for last year.  No pain.  No fever.  No NVDC.  Denies any bleeding, no blood in stools.  Denies heavy NSAID use.    Hgb was 12.1, and is now 7.8.  Total bilirubin elevated going back as far as 2010, now 4.1, with direct 0.9.  Has jaundice.       Denies ever having colonoscopy.    Hospital Course:  The patient was admitted for symptomatic anemia. She is being worked up for hemolytic anemia. Steroids were scheduled to begin on prednisone 7/14/17, but she refuses until further discussion with Hematology. Blood sugar continue to be elevated despite increase in long acting insulin begun on 7/15/17. Increased mealtime insulin, and will hydrate gently. Patient still awaiting conversation with hematology regarding alternative options to prednisone.   Discussed with patient initiation of steroid therapy with Dexamethasone. Encouraged she accept this therapy option. Patient requesting "time to decide." Will request answer by end of day, and continue to monitor CBG and adjust insulin PRN in that time. Otherwise only other treatment option as discussed with Dr. Freeman, is Rituxin as an outpatient.     Interval History: No events overnight.     Review of Systems   Constitutional: Positive for activity change (decreased), appetite change (decreased) and fatigue. Negative for chills, diaphoresis and fever.   Eyes: Negative for visual disturbance.   Respiratory: Positive for shortness of " breath (occasional episodes, unprovoked). Negative for cough and wheezing.    Cardiovascular: Positive for palpitations (during episodes of SOB). Negative for chest pain and leg swelling.   Gastrointestinal: Negative for abdominal distention, abdominal pain, constipation, diarrhea, nausea and vomiting.   Genitourinary: Negative for dysuria, frequency, hematuria and urgency.   Skin: Positive for color change (yellowing of eyes) and pallor. Negative for rash and wound.   Neurological: Positive for weakness and light-headedness (with positional changes). Negative for dizziness, syncope, numbness and headaches.     Objective:     Vital Signs (Most Recent):  Temp: 98.2 °F (36.8 °C) (07/18/17 0735)  Pulse: 92 (07/18/17 0735)  Resp: 18 (07/18/17 0735)  BP: (!) 145/67 (07/18/17 0735)  SpO2: 96 % (07/18/17 0735) Vital Signs (24h Range):  Temp:  [98.2 °F (36.8 °C)-99.1 °F (37.3 °C)] 98.2 °F (36.8 °C)  Pulse:  [81-92] 92  Resp:  [18] 18  SpO2:  [95 %-100 %] 96 %  BP: (137-151)/(66-78) 145/67     Weight: 99.8 kg (220 lb)  Body mass index is 36.61 kg/m².    Intake/Output Summary (Last 24 hours) at 07/18/17 1022  Last data filed at 07/17/17 1815   Gross per 24 hour   Intake          3396.25 ml   Output             1200 ml   Net          2196.25 ml      Physical Exam   Constitutional: She is oriented to person, place, and time. She appears well-developed and well-nourished. No distress.   Mildly obese, sitting on edge of bed.    HENT:   Head: Normocephalic and atraumatic.   Eyes: EOM are normal. Pupils are equal, round, and reactive to light. No scleral icterus.   Jaundice, mild conjunctival pallor.    Neck: Normal range of motion. Neck supple. No JVD present. No tracheal deviation present.   Cardiovascular: Normal rate, regular rhythm and intact distal pulses.  Exam reveals no gallop and no friction rub.    Murmur heard.  Pulmonary/Chest: Effort normal and breath sounds normal. No respiratory distress. She has no wheezes. She has  no rales.   Abdominal: Soft. Bowel sounds are normal. She exhibits no distension. There is no tenderness. There is no guarding.   Musculoskeletal: Normal range of motion. She exhibits no deformity.   Neurological: She is alert and oriented to person, place, and time. No cranial nerve deficit. She exhibits normal muscle tone.   Skin: Skin is warm and dry. No rash noted. She is not diaphoretic. No erythema. No pallor.   Psychiatric: She has a normal mood and affect. Her behavior is normal. Judgment and thought content normal.   Nursing note and vitals reviewed.      Significant Labs:   CBC:   Recent Labs  Lab 07/17/17  0506 07/18/17 0452   WBC 9.46 11.18   HGB 6.2* 6.5*   HCT 18.2* 18.6*   * 108*     CMP:   Recent Labs  Lab 07/17/17 0506 07/18/17 0452    139   K 3.7 4.4    108   CO2 22* 24   * 279*   BUN 23 21   CREATININE 1.2 1.2   CALCIUM 9.4 10.0   PROT 6.4 6.9   ALBUMIN 3.3* 3.5   BILITOT 3.7* 2.5*   ALKPHOS 87 92   AST 23 23   ALT 36 41   ANIONGAP 8 7*   EGFRNONAA 49* 49*       Significant Imaging: I have reviewed all pertinent imaging results/findings within the past 24 hours.    Assessment/Plan:      * Hemolytic anemia    - Low haptoglobin, elevated retic count  - advised by Heme/Onc to begin treatment with Prednisone  - patient with significant concern/distress regarding use of prednisone, and refusing this medication at this time. She and her daughter state their daughter/sister (respectively) had polymyositis, and had been on long term prednisone treatment, and developed pneumonia and passed at a young age very suddenly 2/2 pulmonary infection while immunosuppressed. Request discussion with Heme/Onc before patient will even consider steroid treatment.   - d/w patient alternative steroid options - Dexamethasone. Also discussed Rituxin, and informed that this is an outpatient treatment option. Will give patient to end of day, while monitoring her hyperglycemia treatments, to make  decision if she will allow steroid treatment to proceed.   - awaiting further Heme/Onc recommendations     -Dexamethasone 40mg/day started 7/17          Diabetes type 2, uncontrolled    - A1c = 7.0  - Accucheck and ISS for now  - CBG again elevated today  - will continue gentle rehydaration   - will increased detemir this am to 65 QHS  - will maintain mealtime aspart at 20 u per meal   - monitor           HTN (hypertension)    - Continue home med and monitor          Hyperlipidemia type II    - Lipid profile historically fair control.           CKD (chronic kidney disease) stage 2, GFR 60-89 ml/min    Creatinine 1.2  Will monitor          Thrombocytopenia    - present since March  - ? Immune mediated ?  - Mild  - Hematology:   - check ALFREDO <1:160, Lupus anti-coagulant pending  - Monitor        Hypercalcemia    - PTH 44.7  - Vit D 33          Hepatosplenomegaly    - Hepatitis profile negative 2015  - Hematology to see          Symptomatic anemia    - Suspect secondary to hemolytic anemia (see A&P for hemolytic anemia)   - Troponin 0.009; BNP = 17; EKG c LVH, stable  - schedule screening colonoscopy as outpatient         Hyperbilirubinemia    - 2/2 hemolysis          Jaundice    - 2/2 hemolysis          Anemia    - Denies any bleeding  - Consistent with hemolytic process  - Hgb 12.1 >> 7.8 over last 4 months  - Indirect bilirubin elevated  - Hepatosplenomegaly noted on CT 3/2/17 and Abd US 7/13/17  - Fe 143, B-12 626, folate 16.2, retic 14.5, , haptoglobin <10, Selma negative.  - Hematology consult:    SPEP= 6.8   Cold agglutinins- <1:64   If LDH and hapto are consistent with hemolysis would start steroids -  1 mg/kg prenisone.   - Prednisone ordered, but patient refused    - Dexamethasone discussed and initiated   - GI consulted: needs screening colonoscopy           Diabetes mellitus, type II    - A1c = 7.0  - Accucheck and NISS for now          Chronic gout    - On allopurinol at home.            VTE Risk  Mitigation         Ordered     heparin (porcine) injection 5,000 Units  Every 12 hours     Route:  Subcutaneous        07/13/17 1742     Medium Risk of VTE  Once      07/13/17 1742     Place ADRIÁN hose  Until discontinued      07/13/17 1742     Place sequential compression device  Until discontinued      07/13/17 1742          Moi Ratliff NP  Department of Hospital Medicine   [facility]

## 2017-07-19 LAB
ALBUMIN SERPL BCP-MCNC: 3.5 G/DL
ALP SERPL-CCNC: 87 U/L
ALT SERPL W/O P-5'-P-CCNC: 41 U/L
ANION GAP SERPL CALC-SCNC: 10 MMOL/L
AST SERPL-CCNC: 24 U/L
BASOPHILS # BLD AUTO: 0.01 K/UL
BASOPHILS NFR BLD: 0.1 %
BILIRUB SERPL-MCNC: 1.8 MG/DL
BUN SERPL-MCNC: 26 MG/DL
CALCIUM SERPL-MCNC: 10 MG/DL
CHLORIDE SERPL-SCNC: 109 MMOL/L
CO2 SERPL-SCNC: 21 MMOL/L
CREAT SERPL-MCNC: 1.3 MG/DL
DIFFERENTIAL METHOD: ABNORMAL
EOSINOPHIL # BLD AUTO: 0 K/UL
EOSINOPHIL NFR BLD: 0 %
ERYTHROCYTE [DISTWIDTH] IN BLOOD BY AUTOMATED COUNT: 25 %
EST. GFR  (AFRICAN AMERICAN): 51 ML/MIN/1.73 M^2
EST. GFR  (NON AFRICAN AMERICAN): 44 ML/MIN/1.73 M^2
GLUCOSE SERPL-MCNC: 187 MG/DL
HCT VFR BLD AUTO: 20.9 %
HGB BLD-MCNC: 7.1 G/DL
LA PPP-IMP: NEGATIVE
LYMPHOCYTES # BLD AUTO: 2.8 K/UL
LYMPHOCYTES NFR BLD: 17.9 %
MCH RBC QN AUTO: 36.2 PG
MCHC RBC AUTO-ENTMCNC: 34 %
MCV RBC AUTO: 107 FL
MONOCYTES # BLD AUTO: 0.9 K/UL
MONOCYTES NFR BLD: 5.6 %
NEUTROPHILS # BLD AUTO: 11.8 K/UL
NEUTROPHILS NFR BLD: 76.4 %
PLATELET # BLD AUTO: 131 K/UL
PMV BLD AUTO: 9.8 FL
POCT GLUCOSE: 128 MG/DL (ref 70–110)
POCT GLUCOSE: 233 MG/DL (ref 70–110)
POCT GLUCOSE: 279 MG/DL (ref 70–110)
POCT GLUCOSE: 414 MG/DL (ref 70–110)
POCT GLUCOSE: 414 MG/DL (ref 70–110)
POCT GLUCOSE: 433 MG/DL (ref 70–110)
POTASSIUM SERPL-SCNC: 3.9 MMOL/L
PROT SERPL-MCNC: 6.8 G/DL
RBC # BLD AUTO: 1.96 M/UL
SODIUM SERPL-SCNC: 140 MMOL/L
WBC # BLD AUTO: 15.63 K/UL

## 2017-07-19 PROCEDURE — 80053 COMPREHEN METABOLIC PANEL: CPT

## 2017-07-19 PROCEDURE — 94799 UNLISTED PULMONARY SVC/PX: CPT

## 2017-07-19 PROCEDURE — 11000001 HC ACUTE MED/SURG PRIVATE ROOM

## 2017-07-19 PROCEDURE — 25000003 PHARM REV CODE 250: Performed by: PHYSICIAN ASSISTANT

## 2017-07-19 PROCEDURE — 99233 SBSQ HOSP IP/OBS HIGH 50: CPT | Mod: ,,, | Performed by: INTERNAL MEDICINE

## 2017-07-19 PROCEDURE — 85025 COMPLETE CBC W/AUTO DIFF WBC: CPT

## 2017-07-19 PROCEDURE — 36415 COLL VENOUS BLD VENIPUNCTURE: CPT

## 2017-07-19 PROCEDURE — 63600175 PHARM REV CODE 636 W HCPCS: Performed by: PHYSICIAN ASSISTANT

## 2017-07-19 PROCEDURE — 99225 PR SUBSEQUENT OBSERVATION CARE,LEVEL II: CPT | Mod: ,,, | Performed by: NURSE PRACTITIONER

## 2017-07-19 PROCEDURE — 99900035 HC TECH TIME PER 15 MIN (STAT)

## 2017-07-19 RX ORDER — INSULIN ASPART 100 [IU]/ML
15 INJECTION, SOLUTION INTRAVENOUS; SUBCUTANEOUS
Status: DISCONTINUED | OUTPATIENT
Start: 2017-07-19 | End: 2017-07-20 | Stop reason: HOSPADM

## 2017-07-19 RX ADMIN — RAMELTEON 8 MG: 8 TABLET, FILM COATED ORAL at 12:07

## 2017-07-19 RX ADMIN — INSULIN ASPART 15 UNITS: 100 INJECTION, SOLUTION INTRAVENOUS; SUBCUTANEOUS at 05:07

## 2017-07-19 RX ADMIN — AMLODIPINE BESYLATE 10 MG: 5 TABLET ORAL at 08:07

## 2017-07-19 RX ADMIN — CLONIDINE HYDROCHLORIDE 0.1 MG: 0.1 TABLET ORAL at 09:07

## 2017-07-19 RX ADMIN — INSULIN ASPART 10 UNITS: 100 INJECTION, SOLUTION INTRAVENOUS; SUBCUTANEOUS at 06:07

## 2017-07-19 RX ADMIN — INSULIN ASPART 2 UNITS: 100 INJECTION, SOLUTION INTRAVENOUS; SUBCUTANEOUS at 10:07

## 2017-07-19 RX ADMIN — INSULIN ASPART 5 UNITS: 100 INJECTION, SOLUTION INTRAVENOUS; SUBCUTANEOUS at 09:07

## 2017-07-19 RX ADMIN — FAMOTIDINE 20 MG: 20 TABLET, FILM COATED ORAL at 09:07

## 2017-07-19 RX ADMIN — HEPARIN SODIUM 5000 UNITS: 5000 INJECTION, SOLUTION INTRAVENOUS; SUBCUTANEOUS at 09:07

## 2017-07-19 RX ADMIN — INSULIN ASPART 2 UNITS: 100 INJECTION, SOLUTION INTRAVENOUS; SUBCUTANEOUS at 02:07

## 2017-07-19 RX ADMIN — HEPARIN SODIUM 5000 UNITS: 5000 INJECTION, SOLUTION INTRAVENOUS; SUBCUTANEOUS at 08:07

## 2017-07-19 RX ADMIN — METOPROLOL SUCCINATE 50 MG: 50 TABLET, EXTENDED RELEASE ORAL at 08:07

## 2017-07-19 RX ADMIN — DEXAMETHASONE 40 MG: 4 TABLET ORAL at 08:07

## 2017-07-19 RX ADMIN — FAMOTIDINE 20 MG: 20 TABLET, FILM COATED ORAL at 08:07

## 2017-07-19 NOTE — PROGRESS NOTES
"HEMATOLOGY CONSULT PROGRESS NOTE    Patient seen and examined, chart reviewed.  Events of last 24 hours noted.  Today she feels "fine" and has no specific complaints.  On examination she is alert, oriented x 3, in NAD.  Lungs are clear.  CVS: S1, S2, no murmurs, rubs, or gallops.  Abdomen is soft, nontender.  Liver is palpable but spleen is nonpalpable, even though it is reported to be enlarged on her recent ultrasound..  She is s/p bilateral reduction mamoplasties.  There is no inguinal cervical, supraclavicular, or axillary adenopathy.  Motor function 5/5, cranial nerves WNL, and DTRs 1 (+) bilaterally.    Today's labs:  WBCs 15,600 /mm3, Hg 7.1 gr/dl, Ht 20.9 % and platelets 131,000 /mm3.  MCV is 107.  Total bilirubin is 1.8 mg/dl, decreased from 3.9 mg/dl three days ago.    The etiology of her hemolytic episode is unclear.  Clearly she hemolyzed given the undetectable haptoglobin, but with a negative Selma test, this may not be autoimmune hemolysis.  Also, the latest LDH was normal, which suggests that there is no ongoing hemolysis.    RECOMMEND  Repeat CBC and check reticulocyte count tomorrow.   Repeat LDH tomorrow.  Follow up on PNH workup.  If her H/H continues to improve, she may be ready for discharge within the next 24-48 hours.   Even though the role of steroids in non autoimmune hemolysis may be limited, would recommend that she be discharged on prednisone 60 mg or an equivalent dose of hydrocortisone (if she specifically refuses prednisone) and anticipate a rapid taper so long as her LDH and H/H remain normal.  Her multiple questions were answered to her satisfaction.    Above recommendations were discussed with Dr. Shepard.        "

## 2017-07-19 NOTE — ASSESSMENT & PLAN NOTE
- A1c = 7.0  - Accucheck and ISS for now  - CBG again elevated today  - will continue gentle rehydaration   - will increased detemir this am to 65 QHS  - will maintain mealtime aspart at 15 u per meal   - monitor

## 2017-07-19 NOTE — SUBJECTIVE & OBJECTIVE
Interval History: BG elevated. Insulin increased last night. BG this 's. Insulin decreased.    Review of Systems   Constitutional: Positive for activity change (decreased), appetite change (decreased) and fatigue. Negative for chills, diaphoresis and fever.   Eyes: Negative for visual disturbance.   Respiratory: Positive for shortness of breath (occasional episodes, unprovoked). Negative for cough and wheezing.    Cardiovascular: Positive for palpitations (during episodes of SOB). Negative for chest pain and leg swelling.   Gastrointestinal: Negative for abdominal distention, abdominal pain, constipation, diarrhea, nausea and vomiting.   Genitourinary: Negative for dysuria, frequency, hematuria and urgency.   Skin: Positive for color change (yellowing of eyes) and pallor. Negative for rash and wound.   Neurological: Positive for weakness and light-headedness (with positional changes). Negative for dizziness, syncope, numbness and headaches.     Objective:     Vital Signs (Most Recent):  Temp: 98.3 °F (36.8 °C) (07/19/17 0846)  Pulse: 67 (07/19/17 0846)  Resp: 20 (07/19/17 0846)  BP: (!) 177/80 (07/19/17 0846)  SpO2: 100 % (07/19/17 0846) Vital Signs (24h Range):  Temp:  [98 °F (36.7 °C)-98.6 °F (37 °C)] 98.3 °F (36.8 °C)  Pulse:  [64-91] 67  Resp:  [18-20] 20  SpO2:  [96 %-100 %] 100 %  BP: (146-177)/(70-83) 177/80     Weight: 99.8 kg (220 lb)  Body mass index is 36.61 kg/m².    Intake/Output Summary (Last 24 hours) at 07/19/17 1004  Last data filed at 07/19/17 0600   Gross per 24 hour   Intake              480 ml   Output              750 ml   Net             -270 ml      Physical Exam   Constitutional: She is oriented to person, place, and time. She appears well-developed and well-nourished. No distress.   Mildly obese, sitting on edge of bed.    HENT:   Head: Normocephalic and atraumatic.   Eyes: EOM are normal. Pupils are equal, round, and reactive to light. No scleral icterus.   Jaundice, mild  conjunctival pallor.    Neck: Normal range of motion. Neck supple. No JVD present. No tracheal deviation present.   Cardiovascular: Normal rate, regular rhythm and intact distal pulses.  Exam reveals no gallop and no friction rub.    Murmur heard.  Pulmonary/Chest: Effort normal and breath sounds normal. No respiratory distress. She has no wheezes. She has no rales.   Abdominal: Soft. Bowel sounds are normal. She exhibits no distension. There is no tenderness. There is no guarding.   Musculoskeletal: Normal range of motion. She exhibits no deformity.   Neurological: She is alert and oriented to person, place, and time. No cranial nerve deficit. She exhibits normal muscle tone.   Skin: Skin is warm and dry. No rash noted. She is not diaphoretic. No erythema. No pallor.   Psychiatric: She has a normal mood and affect. Her behavior is normal. Judgment and thought content normal.   Nursing note and vitals reviewed.      Significant Labs:   CBC:   Recent Labs  Lab 07/18/17  0452 07/19/17  0513   WBC 11.18 15.63*   HGB 6.5* 7.1*   HCT 18.6* 20.9*   * 131*     CMP:   Recent Labs  Lab 07/18/17  0452 07/19/17  0513    140   K 4.4 3.9    109   CO2 24 21*   * 187*   BUN 21 26*   CREATININE 1.2 1.3   CALCIUM 10.0 10.0   PROT 6.9 6.8   ALBUMIN 3.5 3.5   BILITOT 2.5* 1.8*   ALKPHOS 92 87   AST 23 24   ALT 41 41   ANIONGAP 7* 10   EGFRNONAA 49* 44*       Significant Imaging: I have reviewed all pertinent imaging results/findings within the past 24 hours.

## 2017-07-19 NOTE — PROGRESS NOTES
"[facility] Hospital Medicine  Progress Note    Patient Name: Wendy Viveros  MRN: [id number]  Patient Class: OP- Observation   Admission Date: 7/13/2017  Length of Stay: 1 days  Attending Physician: Jarvis Badillo MD  Primary Care Provider: Roger Miller MD        Subjective:     Principal Problem:Hemolytic anemia    HPI:  Patient is a 61 y.o. female presents with 4 day history of worsening dyspnea on exertion.  Feels worse lying down flat, lying on side helps.  Feels fatigue.  Daughter states this has been going on for last year.  No pain.  No fever.  No NVDC.  Denies any bleeding, no blood in stools.  Denies heavy NSAID use.    Hgb was 12.1, and is now 7.8.  Total bilirubin elevated going back as far as 2010, now 4.1, with direct 0.9.  Has jaundice.       Denies ever having colonoscopy.    Hospital Course:  The patient was admitted for symptomatic anemia. She is being worked up for hemolytic anemia. Steroids were scheduled to begin on prednisone 7/14/17, but she refuses until further discussion with Hematology. Blood sugar continue to be elevated despite increase in long acting insulin begun on 7/15/17. Increased mealtime insulin, and will hydrate gently. Patient still awaiting conversation with hematology regarding alternative options to prednisone.   Discussed with patient initiation of steroid therapy with Dexamethasone. Encouraged she accept this therapy option. Patient requesting "time to decide." Will request answer by end of day, and continue to monitor CBG and adjust insulin PRN in that time. Otherwise only other treatment option as discussed with Dr. Freeman, is Rituxin as an outpatient.     Interval History: BG elevated. Insulin increased last night. BG this 's. Insulin decreased.    Review of Systems   Constitutional: Positive for activity change (decreased), appetite change (decreased) and fatigue. Negative for chills, diaphoresis and fever.   Eyes: Negative for " visual disturbance.   Respiratory: Positive for shortness of breath (occasional episodes, unprovoked). Negative for cough and wheezing.    Cardiovascular: Positive for palpitations (during episodes of SOB). Negative for chest pain and leg swelling.   Gastrointestinal: Negative for abdominal distention, abdominal pain, constipation, diarrhea, nausea and vomiting.   Genitourinary: Negative for dysuria, frequency, hematuria and urgency.   Skin: Positive for color change (yellowing of eyes) and pallor. Negative for rash and wound.   Neurological: Positive for weakness and light-headedness (with positional changes). Negative for dizziness, syncope, numbness and headaches.     Objective:     Vital Signs (Most Recent):  Temp: 98.3 °F (36.8 °C) (07/19/17 0846)  Pulse: 67 (07/19/17 0846)  Resp: 20 (07/19/17 0846)  BP: (!) 177/80 (07/19/17 0846)  SpO2: 100 % (07/19/17 0846) Vital Signs (24h Range):  Temp:  [98 °F (36.7 °C)-98.6 °F (37 °C)] 98.3 °F (36.8 °C)  Pulse:  [64-91] 67  Resp:  [18-20] 20  SpO2:  [96 %-100 %] 100 %  BP: (146-177)/(70-83) 177/80     Weight: 99.8 kg (220 lb)  Body mass index is 36.61 kg/m².    Intake/Output Summary (Last 24 hours) at 07/19/17 1004  Last data filed at 07/19/17 0600   Gross per 24 hour   Intake              480 ml   Output              750 ml   Net             -270 ml      Physical Exam   Constitutional: She is oriented to person, place, and time. She appears well-developed and well-nourished. No distress.   Mildly obese, sitting on edge of bed.    HENT:   Head: Normocephalic and atraumatic.   Eyes: EOM are normal. Pupils are equal, round, and reactive to light. No scleral icterus.   Jaundice, mild conjunctival pallor.    Neck: Normal range of motion. Neck supple. No JVD present. No tracheal deviation present.   Cardiovascular: Normal rate, regular rhythm and intact distal pulses.  Exam reveals no gallop and no friction rub.    Murmur heard.  Pulmonary/Chest: Effort normal and breath  sounds normal. No respiratory distress. She has no wheezes. She has no rales.   Abdominal: Soft. Bowel sounds are normal. She exhibits no distension. There is no tenderness. There is no guarding.   Musculoskeletal: Normal range of motion. She exhibits no deformity.   Neurological: She is alert and oriented to person, place, and time. No cranial nerve deficit. She exhibits normal muscle tone.   Skin: Skin is warm and dry. No rash noted. She is not diaphoretic. No erythema. No pallor.   Psychiatric: She has a normal mood and affect. Her behavior is normal. Judgment and thought content normal.   Nursing note and vitals reviewed.      Significant Labs:   CBC:   Recent Labs  Lab 07/18/17 0452 07/19/17  0513   WBC 11.18 15.63*   HGB 6.5* 7.1*   HCT 18.6* 20.9*   * 131*     CMP:   Recent Labs  Lab 07/18/17 0452 07/19/17 0513    140   K 4.4 3.9    109   CO2 24 21*   * 187*   BUN 21 26*   CREATININE 1.2 1.3   CALCIUM 10.0 10.0   PROT 6.9 6.8   ALBUMIN 3.5 3.5   BILITOT 2.5* 1.8*   ALKPHOS 92 87   AST 23 24   ALT 41 41   ANIONGAP 7* 10   EGFRNONAA 49* 44*       Significant Imaging: I have reviewed all pertinent imaging results/findings within the past 24 hours.    Assessment/Plan:      * Hemolytic anemia    - Low haptoglobin, elevated retic count  - advised by Heme/Onc to begin treatment with Prednisone  - patient with significant concern/distress regarding use of prednisone, and refusing this medication at this time. She and her daughter state their daughter/sister (respectively) had polymyositis, and had been on long term prednisone treatment, and developed pneumonia and passed at a young age very suddenly 2/2 pulmonary infection while immunosuppressed. Request discussion with Heme/Onc before patient will even consider steroid treatment.   - d/w patient alternative steroid options - Dexamethasone. Also discussed Rituxin, and informed that this is an outpatient treatment option. Will give patient  to end of day, while monitoring her hyperglycemia treatments, to make decision if she will allow steroid treatment to proceed.   - awaiting further Heme/Onc recommendations     -Dexamethasone 40mg/day started 7/17  -Patient would like hydrocortisone to go home          Diabetes type 2, uncontrolled    - A1c = 7.0  - Accucheck and ISS for now  - CBG again elevated today  - will continue gentle rehydaration   - will increased detemir this am to 65 QHS  - will maintain mealtime aspart at 15 u per meal   - monitor           HTN (hypertension)    - Continue home med and monitor          Hyperlipidemia type II    - Lipid profile historically fair control.           CKD (chronic kidney disease) stage 2, GFR 60-89 ml/min    Creatinine 1.3  Will monitor          Thrombocytopenia    - present since March  - ? Immune mediated ?  - Mild  - Hematology:   - check ALFREDO <1:160, Lupus anti-coagulant pending  - Monitor        Hypercalcemia    - PTH 44.7  - Vit D 33          Hepatosplenomegaly    - Hepatitis profile negative 2015  - Hematology to see          Symptomatic anemia    - Suspect secondary to hemolytic anemia (see A&P for hemolytic anemia)   - Troponin 0.009; BNP = 17; EKG c LVH, stable  - schedule screening colonoscopy as outpatient         Hyperbilirubinemia    - 2/2 hemolysis          Jaundice    - 2/2 hemolysis          Anemia    - Denies any bleeding  - Consistent with hemolytic process  - Hgb 12.1 >> 7.8 over last 4 months  - Indirect bilirubin elevated  - Hepatosplenomegaly noted on CT 3/2/17 and Abd US 7/13/17  - Fe 143, B-12 626, folate 16.2, retic 14.5, , haptoglobin <10, Selma negative.  - Hematology consult:    SPEP= 6.8   Cold agglutinins- <1:64   If LDH and hapto are consistent with hemolysis would start steroids -  1 mg/kg prenisone.   - Prednisone ordered, but patient refused    - Dexamethasone discussed and initiated   - GI consulted: needs screening colonoscopy           Diabetes mellitus, type II     - A1c = 7.0  - Accucheck and NISS for now          Chronic gout    - On allopurinol at home.            VTE Risk Mitigation         Ordered     heparin (porcine) injection 5,000 Units  Every 12 hours     Route:  Subcutaneous        07/13/17 1742     Medium Risk of VTE  Once      07/13/17 1742     Place ADRIÁN hose  Until discontinued      07/13/17 1742     Place sequential compression device  Until discontinued      07/13/17 1742          Moi Ratliff NP  Department of Hospital Medicine   [facility]

## 2017-07-19 NOTE — PROGRESS NOTES
This lady is feeling better and she is tolerating her diet well. She ahs no GI complaints and has normal bowel habits. No signs of blood loss or ischemic symptoms.    T<100 HR 80  Anicteric no temporal wasting  Neck supple no mass  Heart no gallops  Chest clear no wheeze  Abdomen soft active sounds no tenderness or mass or ascites  Extremities no cyanosis or clubbing    Labs: total bilirubin continues a downward trend.  Plan out patient follow up. Please call if we are needed. Thanks.

## 2017-07-19 NOTE — PLAN OF CARE
Problem: Patient Care Overview  Goal: Plan of Care Review  Outcome: Ongoing (interventions implemented as appropriate)  Room air. Will continue to monitor

## 2017-07-19 NOTE — ASSESSMENT & PLAN NOTE
- Low haptoglobin, elevated retic count  - advised by Heme/Onc to begin treatment with Prednisone  - patient with significant concern/distress regarding use of prednisone, and refusing this medication at this time. She and her daughter state their daughter/sister (respectively) had polymyositis, and had been on long term prednisone treatment, and developed pneumonia and passed at a young age very suddenly 2/2 pulmonary infection while immunosuppressed. Request discussion with Heme/Onc before patient will even consider steroid treatment.   - d/w patient alternative steroid options - Dexamethasone. Also discussed Rituxin, and informed that this is an outpatient treatment option. Will give patient to end of day, while monitoring her hyperglycemia treatments, to make decision if she will allow steroid treatment to proceed.   - awaiting further Heme/Onc recommendations     -Dexamethasone 40mg/day started 7/17  -Patient would like hydrocortisone to go home

## 2017-07-19 NOTE — PLAN OF CARE
Problem: Patient Care Overview  Goal: Plan of Care Review  Patient awake and oriented. Tolerates PO meds well. Patient ambulates w/o difficulty. Scheduled insulin held this morning per Moi NP. No complaints of pain or discomfort. Bed locked and low. Call bell in reach.

## 2017-07-20 VITALS
HEART RATE: 78 BPM | BODY MASS INDEX: 36.65 KG/M2 | SYSTOLIC BLOOD PRESSURE: 162 MMHG | RESPIRATION RATE: 18 BRPM | TEMPERATURE: 99 F | WEIGHT: 220 LBS | HEIGHT: 65 IN | OXYGEN SATURATION: 98 % | DIASTOLIC BLOOD PRESSURE: 75 MMHG

## 2017-07-20 LAB
ALBUMIN SERPL BCP-MCNC: 3.6 G/DL
ALP SERPL-CCNC: 82 U/L
ALT SERPL W/O P-5'-P-CCNC: 49 U/L
ANION GAP SERPL CALC-SCNC: 9 MMOL/L
ANISOCYTOSIS BLD QL SMEAR: ABNORMAL
AST SERPL-CCNC: 19 U/L
BASOPHILS # BLD AUTO: ABNORMAL K/UL
BASOPHILS NFR BLD: 0 %
BILIRUB SERPL-MCNC: 1.7 MG/DL
BUN SERPL-MCNC: 30 MG/DL
CALCIUM SERPL-MCNC: 10.1 MG/DL
CHLORIDE SERPL-SCNC: 106 MMOL/L
CO2 SERPL-SCNC: 23 MMOL/L
CREAT SERPL-MCNC: 1.4 MG/DL
DIFFERENTIAL METHOD: ABNORMAL
EOSINOPHIL # BLD AUTO: ABNORMAL K/UL
EOSINOPHIL NFR BLD: 0 %
ERYTHROCYTE [DISTWIDTH] IN BLOOD BY AUTOMATED COUNT: 24.6 %
EST. GFR  (AFRICAN AMERICAN): 46 ML/MIN/1.73 M^2
EST. GFR  (NON AFRICAN AMERICAN): 40 ML/MIN/1.73 M^2
FLOW CYTOMETRY SPECIALIST REVIEW: NORMAL
GLUCOSE SERPL-MCNC: 291 MG/DL
HCT VFR BLD AUTO: 21.8 %
HGB BLD-MCNC: 7.4 G/DL
LDH SERPL L TO P-CCNC: 241 U/L
LYMPHOCYTES # BLD AUTO: ABNORMAL K/UL
LYMPHOCYTES NFR BLD: 17 %
MCH RBC QN AUTO: 36.3 PG
MCHC RBC AUTO-ENTMCNC: 33.9 G/DL
MCV RBC AUTO: 107 FL
METAMYELOCYTES NFR BLD MANUAL: 1 %
MONOCYTES # BLD AUTO: ABNORMAL K/UL
MONOCYTES NFR BLD: 6 %
NEUTROPHILS NFR BLD: 75 %
NEUTS BAND NFR BLD MANUAL: 1 %
NRBC BLD-RTO: 1 /100 WBC
PLATELET # BLD AUTO: 131 K/UL
PLATELET BLD QL SMEAR: ABNORMAL
PMV BLD AUTO: 9.8 FL
PNH GRANULOCYTES: 0 % (ref 0–0.01)
PNH MONOCYTES: 0 % (ref 0–0.05)
PNH RBC-COMPLETE AG LOSS: 0 % (ref 0–0.01)
PNH RBC-PARTIAL AG LOSS: 0.01 % (ref 0–0.99)
POCT GLUCOSE: 224 MG/DL (ref 70–110)
POCT GLUCOSE: 235 MG/DL (ref 70–110)
POCT GLUCOSE: 371 MG/DL (ref 70–110)
POLYCHROMASIA BLD QL SMEAR: ABNORMAL
POTASSIUM SERPL-SCNC: 4 MMOL/L
PROT SERPL-MCNC: 6.8 G/DL
RBC # BLD AUTO: 2.04 M/UL
RETICS/RBC NFR AUTO: 21.9 %
SODIUM SERPL-SCNC: 138 MMOL/L
WBC # BLD AUTO: 13.37 K/UL

## 2017-07-20 PROCEDURE — 85027 COMPLETE CBC AUTOMATED: CPT

## 2017-07-20 PROCEDURE — 63600175 PHARM REV CODE 636 W HCPCS: Performed by: PHYSICIAN ASSISTANT

## 2017-07-20 PROCEDURE — 85007 BL SMEAR W/DIFF WBC COUNT: CPT

## 2017-07-20 PROCEDURE — 25000003 PHARM REV CODE 250: Performed by: PHYSICIAN ASSISTANT

## 2017-07-20 PROCEDURE — 83615 LACTATE (LD) (LDH) ENZYME: CPT

## 2017-07-20 PROCEDURE — 99239 HOSP IP/OBS DSCHRG MGMT >30: CPT | Mod: ,,, | Performed by: NURSE PRACTITIONER

## 2017-07-20 PROCEDURE — 80053 COMPREHEN METABOLIC PANEL: CPT

## 2017-07-20 PROCEDURE — 85045 AUTOMATED RETICULOCYTE COUNT: CPT

## 2017-07-20 PROCEDURE — 36415 COLL VENOUS BLD VENIPUNCTURE: CPT

## 2017-07-20 RX ORDER — INSULIN ASPART 100 [IU]/ML
15 INJECTION, SOLUTION INTRAVENOUS; SUBCUTANEOUS
Qty: 13.5 ML | Refills: 1 | Status: SHIPPED | OUTPATIENT
Start: 2017-07-20 | End: 2017-10-17 | Stop reason: SDUPTHER

## 2017-07-20 RX ORDER — INSULIN GLARGINE 100 [IU]/ML
65 INJECTION, SOLUTION SUBCUTANEOUS NIGHTLY
Qty: 19.5 ML | Refills: 1 | Status: SHIPPED | OUTPATIENT
Start: 2017-07-20 | End: 2017-10-17 | Stop reason: SDUPTHER

## 2017-07-20 RX ORDER — HYDROCORTISONE 20 MG/1
100 TABLET ORAL DAILY
Qty: 18 TABLET | Refills: 0 | Status: SHIPPED | OUTPATIENT
Start: 2017-07-20 | End: 2017-07-30

## 2017-07-20 RX ORDER — METOPROLOL SUCCINATE 50 MG/1
50 TABLET, EXTENDED RELEASE ORAL DAILY
Qty: 30 TABLET | Refills: 1 | Status: SHIPPED | OUTPATIENT
Start: 2017-07-20 | End: 2017-10-17

## 2017-07-20 RX ADMIN — DEXAMETHASONE 40 MG: 4 TABLET ORAL at 08:07

## 2017-07-20 RX ADMIN — INSULIN ASPART 4 UNITS: 100 INJECTION, SOLUTION INTRAVENOUS; SUBCUTANEOUS at 08:07

## 2017-07-20 RX ADMIN — HEPARIN SODIUM 5000 UNITS: 5000 INJECTION, SOLUTION INTRAVENOUS; SUBCUTANEOUS at 08:07

## 2017-07-20 RX ADMIN — INSULIN ASPART 15 UNITS: 100 INJECTION, SOLUTION INTRAVENOUS; SUBCUTANEOUS at 08:07

## 2017-07-20 RX ADMIN — FAMOTIDINE 20 MG: 20 TABLET, FILM COATED ORAL at 08:07

## 2017-07-20 RX ADMIN — METOPROLOL SUCCINATE 50 MG: 50 TABLET, EXTENDED RELEASE ORAL at 08:07

## 2017-07-20 RX ADMIN — AMLODIPINE BESYLATE 10 MG: 5 TABLET ORAL at 08:07

## 2017-07-20 NOTE — PLAN OF CARE
Problem: Patient Care Overview  Goal: Plan of Care Review  Pt currently on  RA . Pt in no distress at this time. Sats  99 %. Will continue to monitor. IS complete

## 2017-07-20 NOTE — DISCHARGE SUMMARY
"[facility] Hospital Medicine  Discharge Summary      Patient Name: Wendy Viveros  MRN: [id number]  Admission Date: 7/13/2017  Hospital Length of Stay: 2 days  Discharge Date and Time:  07/20/2017 9:05 AM  Attending Physician: Jarvis Badillo MD   Discharging Provider: Moi Ratliff NP  Primary Care Provider: Roger Miller MD      HPI:   Patient is a 61 y.o. female presents with 4 day history of worsening dyspnea on exertion.  Feels worse lying down flat, lying on side helps.  Feels fatigue.  Daughter states this has been going on for last year.  No pain.  No fever.  No NVDC.  Denies any bleeding, no blood in stools.  Denies heavy NSAID use.    Hgb was 12.1, and is now 7.8.  Total bilirubin elevated going back as far as 2010, now 4.1, with direct 0.9.  Has jaundice.       Denies ever having colonoscopy.    * No surgery found *      Indwelling Lines/Drains at time of discharge:   Lines/Drains/Airways          No matching active lines, drains, or airways        Hospital Course:   The patient was admitted for symptomatic anemia. She is being worked up for hemolytic anemia. Steroids were scheduled to begin on prednisone 7/14/17, but she refuses until further discussion with Hematology. Blood sugar continue to be elevated despite increase in long acting insulin begun on 7/15/17. Increased mealtime insulin, and will hydrate gently. Patient still awaiting conversation with hematology regarding alternative options to prednisone.   Discussed with patient initiation of steroid therapy with Dexamethasone. Encouraged she accept this therapy option. Patient requesting "time to decide." Will request answer by end of day, and continue to monitor CBG and adjust insulin PRN in that time. Otherwise only other treatment option as discussed with Dr. Freeman, is Rituxin as an outpatient.   Patient took 40mg daily of dexamethasone for 4 days. Will send her home with Lee hardwickef taper to f/u with " Heme/Onc.      Consults:   Consults         Status Ordering Provider     Inpatient consult to Gastroenterology  Once     Specialty:  Gastroenterology  Provider:  Toni Garcia MD    Completed RHINA BERRIOS     Inpatient consult to Hematology  Once     Provider:  Briana Vasquez MD    Acknowledged TERRY MCGILL          Significant Diagnostic Studies: Labs:   BMP:   Recent Labs  Lab 07/19/17  0513 07/20/17  0523   * 291*    138   K 3.9 4.0    106   CO2 21* 23   BUN 26* 30*   CREATININE 1.3 1.4   CALCIUM 10.0 10.1    and CBC   Recent Labs  Lab 07/19/17  0513 07/20/17  0523   WBC 15.63* 13.37*   HGB 7.1* 7.4*   HCT 20.9* 21.8*   * 131*       Pending Diagnostic Studies:     Procedure Component Value Units Date/Time    [facility], PI-LINKED ANTIGENS [[id number]] Collected:  07/17/17 1524    Order Status:  Sent Lab Status:  In process Updated:  07/17/17 2207    Specimen:  Blood from Blood         Final Active Diagnoses:    Diagnosis Date Noted POA    PRINCIPAL PROBLEM:  Hemolytic anemia [D58.9] 07/13/2017 Yes    Diabetes type 2, uncontrolled [E11.65] 07/31/2012 Yes    HTN (hypertension) [I10] 07/31/2012 Yes    Hyperlipidemia type II [E78.01] 07/31/2012 Yes    CKD (chronic kidney disease) stage 2, GFR 60-89 ml/min [N18.2] 08/08/2014 Yes    Jaundice [R17] 07/13/2017 Yes    Hyperbilirubinemia [E80.6] 07/13/2017 Yes    Symptomatic anemia [D64.9] 07/13/2017 Yes    Hepatosplenomegaly [R16.2] 07/13/2017 Yes    Hypercalcemia [E83.52] 07/13/2017 Yes    Thrombocytopenia [D69.6] 07/13/2017 Yes    Anemia [D64.9] 07/13/2017 Yes    Diabetes mellitus, type II [E11.9] 03/01/2016 Yes    Chronic gout [M1A.9XX0] 07/31/2012 Yes      Problems Resolved During this Admission:    Diagnosis Date Noted Date Resolved POA    Anemia, unspecified [D64.9] 07/18/2017 07/18/2017 Yes    Absolute anemia [D64.9] 07/18/2017 07/18/2017 Yes    Shortness of breath [R06.02] 07/13/2017 07/18/2017 Yes      * Hemolytic  anemia    - Low haptoglobin, elevated retic count  - advised by Heme/Onc to begin treatment with Prednisone  - patient with significant concern/distress regarding use of prednisone, and refusing this medication at this time. She and her daughter state their daughter/sister (respectively) had polymyositis, and had been on long term prednisone treatment, and developed pneumonia and passed at a young age very suddenly 2/2 pulmonary infection while immunosuppressed. Request discussion with Heme/Onc before patient will even consider steroid treatment.   - d/w patient alternative steroid options - Dexamethasone. Also discussed Rituxin, and informed that this is an outpatient treatment option. Will give patient to end of day, while monitoring her hyperglycemia treatments, to make decision if she will allow steroid treatment to proceed.   - awaiting further Heme/Onc recommendations     -Dexamethasone 40mg/day started 7/17  -Patient would like hydrocortisone to go home          Diabetes type 2, uncontrolled    - A1c = 7.0  - Accucheck and ISS for now  - CBG again elevated today  - will continue gentle rehydaration   - will increased detemir this am to 65 QHS  - will maintain mealtime aspart at 15 u per meal   - monitor           HTN (hypertension)    - Continue home med and monitor          Hyperlipidemia type II    - Lipid profile historically fair control.           CKD (chronic kidney disease) stage 2, GFR 60-89 ml/min    Creatinine 1.4  Will monitor          Thrombocytopenia    - present since March  - ? Immune mediated ?  - Mild  - Hematology:   - check ALFREDO <1:160, Lupus anti-coagulant pending  - Monitor        Hypercalcemia    - PTH 44.7  - Vit D 33          Hepatosplenomegaly    - Hepatitis profile negative 2015  - Hematology to see          Symptomatic anemia    - Suspect secondary to hemolytic anemia (see A&P for hemolytic anemia)   - Troponin 0.009; BNP = 17; EKG c LVH, stable  - schedule screening colonoscopy as  "outpatient         Hyperbilirubinemia    - 2/2 hemolysis          Jaundice    - 2/2 hemolysis          Anemia    - Denies any bleeding  - Consistent with hemolytic process  - Hgb 12.1 >> 7.8 over last 4 months  - Indirect bilirubin elevated  - Hepatosplenomegaly noted on CT 3/2/17 and Abd US 7/13/17  - Fe 143, B-12 626, folate 16.2, retic 14.5, , haptoglobin <10, [place] negative.  - Hematology consult:    SPEP= 6.8   Cold agglutinins- <1:64   If LDH and hapto are consistent with hemolysis would start steroids -  1 mg/kg prenisone.   - Prednisone ordered, but patient refused    - Dexamethasone discussed and initiated   - GI consulted: needs screening colonoscopy           Diabetes mellitus, type II    - A1c = 7.0  - Accucheck and NISS for now          Chronic gout    - On allopurinol at home.              Discharged Condition: stable    Disposition: Home or Self Care    Follow Up:  Follow-up Information     Toribio Boothe MD.    Specialty:  Gastroenterology  Why:  Need screening colonoscopy  Contact information:  [address]  [address]  [facility]  [facility] [address]  [phone]             Aruna Renteria MD. Go in 4 days.    Specialty:  Hematology and Oncology  Why:  Appointment scheduled for 01:30pm on Tuesday, July 25, 2017  Contact information:  [address]  [facility] [address]  [phone]             Roger Miller MD In 1 week.    Specialty:  Family Medicine  Contact information:  [address] [facility] [address] [facility]  [address]  [facility] [address]  [phone]                 Patient Instructions:     COMMODE FOR HOME USE   Order Specific Question Answer Comments   Type: Standard    Height: 5' 5" (1.651 m)    Weight: 99.8 kg (220 lb)    Does patient have medical equipment at home? none    Length of need (1-99 months): 99      COMMODE FOR HOME USE   Order Specific Question Answer Comments   Type: Standard    Height: 5' 5" (1.651 m)    Weight: 99.8 kg (220 lb)    Does patient " have medical equipment at home? none    Length of need (1-99 months): 99      Diet Diabetic 1800 Calories     Activity as tolerated     Call MD for:  persistent nausea and vomiting or diarrhea     Call MD for:  difficulty breathing or increased cough     Call MD for:  increased confusion or weakness     Call MD for:  persistent dizziness, light-headedness, or visual disturbances       Medications:  Reconciled Home Medications:   Current Discharge Medication List      START taking these medications    Details   hydrocortisone (CORTEF) 20 MG Tab Take 5 tablets (100 mg total) by mouth once daily. For 2 days. Then 3 pills daily for 2 days. Then 1 pill daily for 2 days  Qty: 18 tablet, Refills: 0      insulin aspart (NOVOLOG) 100 unit/mL InPn pen Inject 15 Units into the skin 3 (three) times daily with meals.  Qty: 13.5 mL, Refills: 1      insulin glargine (LANTUS SOLOSTAR) 100 unit/mL (3 mL) InPn pen Inject 65 Units into the skin every evening.  Qty: 19.5 mL, Refills: 1         CONTINUE these medications which have CHANGED    Details   metoprolol succinate (TOPROL-XL) 50 MG 24 hr tablet Take 1 tablet (50 mg total) by mouth once daily.  Qty: 30 tablet, Refills: 1         CONTINUE these medications which have NOT CHANGED    Details   allopurinol (ZYLOPRIM) 300 MG tablet Take 1 tablet (300 mg total) by mouth once daily.  Qty: 90 tablet, Refills: 1      amlodipine (NORVASC) 10 MG tablet Take 1 tablet (10 mg total) by mouth once daily.  Qty: 90 tablet, Refills: 3      aspirin 81 mg Tab Take 1 tablet by mouth every evening.       blood sugar diagnostic (TRUETEST TEST STRIPS) Strp Pt is testing 3 times daily  Qty: 300 each, Refills: 3      blood-glucose meter Misc One Touch Verio  Please provide strips to test qid  Qty: 1 each, Refills: 0    Associated Diagnoses: Type 2 diabetes mellitus with complication, unspecified long term insulin use status      cholecalciferol, vitamin D3, 2,000 unit Tab Take 1 tablet by mouth once  daily.       cloNIDine (CATAPRES) 0.1 MG tablet Take 1 tablet (0.1 mg total) by mouth 2 (two) times daily.  Qty: 180 tablet, Refills: 3      cyanocobalamin, vitamin B-12, (VITAMIN B-12) 50 mcg tablet Take 50 mcg by mouth once daily.      cyclobenzaprine (FLEXERIL) 10 MG tablet TAKE ONE TABLET BY MOUTH ONCE AT NIGHT  Qty: 30 tablet, Refills: 2      indomethacin (INDOCIN) 50 MG capsule TAKE ONE CAPSULE BY MOUTH THREE TIMES DAILY AS NEEDED  Qty: 90 capsule, Refills: 0      lancets Misc by Misc.(Non-Drug; Combo Route) route. Pt is testing 3 times daily      RANITIDINE HCL (ZANTAC 75 ORAL) Take 1 tablet by mouth 2 (two) times daily.       spironolactone (ALDACTONE) 50 MG tablet Take 1 tablet (50 mg total) by mouth once daily.  Qty: 90 tablet, Refills: 3      tramadol (ULTRAM) 50 mg tablet Take 1 tablet (50 mg total) by mouth once daily.  Qty: 30 tablet, Refills: 2      furosemide (LASIX) 40 MG tablet Take 1 tablet (40 mg total) by mouth as needed.  Qty: 90 tablet, Refills: 3         STOP taking these medications       acyclovir (ZOVIRAX) 800 MG Tab Comments:   Reason for Stopping:         insulin aspart (NOVOLOG) 100 unit/mL injection Comments:   Reason for Stopping:         insulin glargine (LANTUS) 100 unit/mL injection Comments:   Reason for Stopping:         insulin syringe-needle U-100 1/2 mL 30 gauge x 5/16 Syrg Comments:   Reason for Stopping:         metoprolol tartrate (LOPRESSOR) 50 MG tablet Comments:   Reason for Stopping:         diclofenac sodium 1 % Gel Comments:   Reason for Stopping:             Time spent on the discharge of patient: 45 minutes    Moi Ratliff NP  Department of Hospital Medicine  [facility]

## 2017-07-20 NOTE — PLAN OF CARE
DC Dispo:    Patient to D/C home with self care. Appointment with [facility] clinic scheduled 7/25/17 at 13:40. No needs expressed, CM to remain supportive.     07/20/17 0910   Final Note   Assessment Type Final Discharge Note   Discharge Disposition Home   Discharge planning education complete? Yes   Hospital Follow Up  Appt(s) scheduled? Yes   Discharge plans and expectations educations in teach back method with documentation complete? Yes   Offered [facility] -- Bedside Delivery? n/a   Discharge/Hospital Encounter Summary to ([facility]) PCP n/a   Referral to Outpatient Case Management complete? n/a   30 day supply of medicines given at discharge, if documented non-compliance / non-adherence? n/a   Any social issues identified prior to discharge? n/a   Did you assess the readiness or willingness of the family or caregiver to support self management of care? Yes

## 2017-07-20 NOTE — PLAN OF CARE
"Problem: Patient Care Overview  Goal: Plan of Care Review  Outcome: Ongoing (interventions implemented as appropriate)  PLAN OF CARE REVIEWED WITH PT.  PT AA+OX4.  PT'S DAUGHTER AT BEDSIDE.  ABLE TO MAKE NEEDS KNOWN.  DOES NOT APPEAR TO BE IN ANY DISTRESS.  NO C/O PAIN.  REQUIRES STAND BY ASSIST WITH ADLS.  CONT. OF B/B.  WHILE ROUNDING PT'S DAUGHTER STATED "SOUNDS LIKE SHE HAS SLEEP APNEA.  SHE MAKES SNORING SOUNDS."  ON CALL PHYSICIAN (PHILOMENA) NOTIFIED BY THIS NURSE (RM).  OXYGEN ORDERED FOR PT.  PT .  SCHEDULED AND PRN INSULIN GIVEN AS ORDERED.  PT ALSO REFUSED SCHEDULED ASA.  ON CALL PHYSICIAN (PHILOMENA) NOTIFIED BY THIS NURSE (RM).  PT REMAINS FREE FROM FALLS, INJURY, AND TRAUMA.  FALL PRECAUTIONS IN PLACE.  BED IN LOWEST POSITION WITH WHEELS LOCKED.  CALL LIGHT WITHIN REACH.  WILL CONTINUE TO MONITOR.        "

## 2017-07-20 NOTE — NURSING
PT'S .  PT REFUSED PRN SLIDING SCALE OF INSULIN.  ON CALL PHYSICIAN (PHILOMENA) NOTIFIED BY THIS NURSE (RM).  WILL CONTINUE TO MONITOR.

## 2017-07-20 NOTE — NURSING
PT'S /81 HR 64.  ON CALL PHYSICIAN (PHILOMENA) NOTIFIED BY THIS NURSE (RM).  NO NEW ORDERS GIVEN.  PT HAS SCHEDULED BP MEDS DUE TO BE GIVEN AT 0900.  WILL CONTINUE TO MONITOR.

## 2017-07-20 NOTE — NURSING
Pt free from injury/falls. IV d/c'd. Discharge instructions provided. Verbalized understanding. Awaiting transport for discharge.

## 2017-07-24 ENCOUNTER — PATIENT OUTREACH (OUTPATIENT)
Dept: ADMINISTRATIVE | Facility: CLINIC | Age: 62
End: 2017-07-24

## 2017-07-24 NOTE — TELEPHONE ENCOUNTER
Appeared as pt was to receive bedside commode, spoke with GENIE Grimaldo. States patient did not qualify. Called patient and notified.

## 2017-07-24 NOTE — PATIENT INSTRUCTIONS
Anemia  Anemia is a condition that occurs when your body does not have enough healthy red blood cells (RBCs). Your RBCs are the parts of your blood that carry oxygen throughout your body. A protein called hemoglobin allows your RBCs to absorb and release oxygen. Without enough RBCs or hemoglobin, your body doesn't get enough oxygen. Symptoms of anemia may then occur.    Symptoms of anemia  Some people with anemia have no symptoms. But most people have symptoms that range from mild to severe. These can include:  · Tiredness (fatigue)  · Weakness  · Pale skin  · Shortness of breath  · Dizziness or fainting  · Rapid heartbeat  · Trouble doing normal amounts of activity  · Jaundice (yellowing of your eyes, skin, or mouth; dark urine)  Causes of anemia  Anemia can occur when your body:  · Loses too much blood  · Does not make enough RBCs  · Destroys your RBCs at a faster rate than it can replace them  · Does not make a normal amount of hemoglobin in your RBCs  These problems can occur for many reasons, including:  · A condition that you are born with (congenital or inherited). This includes sickle cell disease or thalassemia.  · Heavy bleeding for any reason, including injury, surgery, childbirth, or even heavy menstrual periods.  · Being low in certain nutrients, such as iron, folate, or vitamin B12. This may be due to poor diet. Also, a condition like celiac disease or Crohn's disease can cause poor absorption of these nutrients  · Certain chronic conditions like diabetes, arthritis, or kidney disease.  · Certain chronic infections like tuberculosis or HIV.  · Exposure to certain medications, such as those used for chemotherapy.  There are different types of anemia. Your doctor can tell you more about the type of anemia you have and what may have caused it.  Diagnosing anemia  To diagnose anemia, your doctor gives you blood tests. These can include:  · Complete blood cell count (CBC). This test measures the amounts  of the different types of blood cells.  · Blood smear. This test checks the size and shape of your blood cells. To perform the test, your doctor views a drop of your blood under a microscope. Your doctor uses a stain to make the blood cells easier to see.  · Iron studies. These tests measure the amount of iron in your blood. Your body needs iron to make hemoglobin in your RBCs.  · Vitamin B12 and folate studies. These tests check for some of the components that help give RBCs a normal size and shape.  · Reticulocyte count. This test measures the amount of new RBCs that your bone marrow makes.  · Hemoglobin electrophoresis. This test checks for problems with your hemoglobin in RBCs.  Treating anemia  Treatment for anemia is based on the type of anemia, its cause, and the severity of your symptoms. Treatments may include:  · Diet changes. This involves increasing the amount of certain nutrients in your diet, such as iron, vitamin B12, or folate. Your doctor may also prescribe nutrient supplements.  · Medications. Certain medications treat the cause of your anemia. Others help build new RBCs or relieve symptoms. If a medication is the cause of your anemia, you may need to stop or change it.  · Blood transfusions. Replacing some of your blood can increase the number of healthy RBCs in your body.  · Surgery. In some cases, your doctor can do surgery to treat the underlying cause of anemia. If you need surgery, your doctor will explain the procedure and outline the risks and benefits for you.  Long-term concerns  If you have a certain type of anemia, you can expect a full recovery after treatment. If you have other types of anemia (especially a type you're born with), you will need to manage it for life. Your doctor can tell you more.  Date Last Reviewed: 4/27/2015  © 0516-5510 [employer] [address] [place] [address], [place] [address]. All rights reserved. This information is not intended as a substitute for  professional medical care. Always follow your healthcare professional's instructions.

## 2017-07-25 ENCOUNTER — INITIAL CONSULT (OUTPATIENT)
Dept: HEMATOLOGY/ONCOLOGY | Facility: CLINIC | Age: 62
End: 2017-07-25
Payer: MEDICARE

## 2017-07-25 ENCOUNTER — LAB VISIT (OUTPATIENT)
Dept: LAB | Facility: HOSPITAL | Age: 62
End: 2017-07-25
Attending: INTERNAL MEDICINE
Payer: MEDICARE

## 2017-07-25 VITALS
TEMPERATURE: 98 F | OXYGEN SATURATION: 98 % | HEART RATE: 80 BPM | DIASTOLIC BLOOD PRESSURE: 80 MMHG | WEIGHT: 232.56 LBS | HEIGHT: 65 IN | RESPIRATION RATE: 14 BRPM | BODY MASS INDEX: 38.75 KG/M2 | SYSTOLIC BLOOD PRESSURE: 184 MMHG

## 2017-07-25 DIAGNOSIS — D59.8 OTHER ACQUIRED HEMOLYTIC ANEMIAS: Primary | ICD-10-CM

## 2017-07-25 DIAGNOSIS — D59.8 OTHER ACQUIRED HEMOLYTIC ANEMIAS: ICD-10-CM

## 2017-07-25 LAB
BASOPHILS # BLD AUTO: 0.02 K/UL
BASOPHILS NFR BLD: 0.2 %
DIFFERENTIAL METHOD: ABNORMAL
EOSINOPHIL # BLD AUTO: 0.1 K/UL
EOSINOPHIL NFR BLD: 0.8 %
ERYTHROCYTE [DISTWIDTH] IN BLOOD BY AUTOMATED COUNT: 20.5 %
HCT VFR BLD AUTO: 27 %
HGB BLD-MCNC: 9.6 G/DL
LYMPHOCYTES # BLD AUTO: 1.6 K/UL
LYMPHOCYTES NFR BLD: 13.2 %
MCH RBC QN AUTO: 36.6 PG
MCHC RBC AUTO-ENTMCNC: 35.6 G/DL
MCV RBC AUTO: 103 FL
MONOCYTES # BLD AUTO: 0.8 K/UL
MONOCYTES NFR BLD: 6.6 %
NEUTROPHILS # BLD AUTO: 9.7 K/UL
NEUTROPHILS NFR BLD: 78.2 %
PLATELET # BLD AUTO: 157 K/UL
PMV BLD AUTO: 9.7 FL
RBC # BLD AUTO: 2.62 M/UL
WBC # BLD AUTO: 12.39 K/UL

## 2017-07-25 PROCEDURE — 99999 PR PBB SHADOW E&M-EST. PATIENT-LVL III: CPT | Mod: PBBFAC,,, | Performed by: INTERNAL MEDICINE

## 2017-07-25 PROCEDURE — 85025 COMPLETE CBC W/AUTO DIFF WBC: CPT

## 2017-07-25 PROCEDURE — 36415 COLL VENOUS BLD VENIPUNCTURE: CPT

## 2017-07-25 PROCEDURE — 99499 UNLISTED E&M SERVICE: CPT | Mod: S$PBB,,, | Performed by: INTERNAL MEDICINE

## 2017-07-25 PROCEDURE — 99214 OFFICE O/P EST MOD 30 MIN: CPT | Mod: S$GLB,,, | Performed by: INTERNAL MEDICINE

## 2017-07-25 NOTE — LETTER
July 26, 2017      Adam Bello MD  [address]  [facility] [address]           [facility]  [address]  [facility] [address]  Phone: [phone]          Patient: Wendy Viveros   MR Number: [id number]   YOB: 1955   Date of Visit: 7/25/2017       Dear Dr. Adam Bello:    Thank you for referring Wendy Viveros to me for evaluation. Attached you will find relevant portions of my assessment and plan of care.    If you have questions, please do not hesitate to call me. I look forward to following Wendy Viveros along with you.    Sincerely,    Aruna Renteria MD    Enclosure  CC:  No Recipients    If you would like to receive this communication electronically, please contact [email or web address] or [phone] to request more information on [employer] Link access.    For providers and/or their staff who would like to refer a patient to [facility], please contact us through our one-stop-shop provider referral line, [facility], at [phone].    If you feel you have received this communication in error or would no longer like to receive these types of communications, please e-mail [email or web address]

## 2017-07-26 NOTE — PROGRESS NOTES
Subjective:       Patient ID: Wendy Viveros is a 61 y.o. female.    Chief Complaint: Leukemia    Wendy presents with her daughter for hospital follow-up after presenting with symptomatic anemia. 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 MIKKI was negative. She presented with new acute severe macrocytic anemia in July. CBC was normal in March 2017. She denies any acute interval events prior to hospital presentation. She was treated for 5 days with acyclovir for HSC infection. She was not treated with other antibiotics. She has not started any new routine medications. She has not started any new OTC or herbal medications. She received blood transfusions in the hospital and she started a moderate dose steroid taper. CBC has improved today from 7 to 9 grams. The only active medication she is on that is known to cause medication induced hemolytic anemia is Lasix which she rarely takes PRN.    HPI  Review of Systems   Constitutional: Positive for fatigue.   HENT: Negative.    Eyes: Negative.    Respiratory: Positive for shortness of breath.    Cardiovascular: Negative.    Gastrointestinal: Negative.    Endocrine: Negative.    Genitourinary: Negative.    Musculoskeletal: Negative.    Skin: Negative.    Allergic/Immunologic: Negative for environmental allergies, food allergies and immunocompromised state.   Neurological: Negative.    Hematological: Negative for adenopathy. Does not bruise/bleed easily.   Psychiatric/Behavioral: Negative.        Objective:      Physical Exam   Constitutional: She is oriented to person, place, and time. She appears well-developed and well-nourished.   HENT:   Head: Normocephalic and atraumatic.   Eyes: Conjunctivae are normal. No scleral icterus.   Neck: Normal range of motion. Neck supple.   Cardiovascular: Normal rate and intact distal pulses.    Pulmonary/Chest: Effort normal. No respiratory distress.   Abdominal: Soft. She exhibits no distension. There is no tenderness.    Musculoskeletal: Normal range of motion. She exhibits no edema.   Neurological: She is alert and oriented to person, place, and time. No cranial nerve deficit.   Skin: Skin is warm and dry.   Psychiatric: She has a normal mood and affect. Her behavior is normal.   Nursing note and vitals reviewed.      Assessment:       1. Other acquired hemolytic anemias        Plan:       CBC is recovering. Monitor on steroid taper and at completion.  If hemolysis returns plan marrow biopsy.  Return visit in 4 weeks with CBC, retic, and haptoglobin

## 2017-07-27 ENCOUNTER — OFFICE VISIT (OUTPATIENT)
Dept: INTERNAL MEDICINE | Facility: CLINIC | Age: 62
End: 2017-07-27
Attending: FAMILY MEDICINE
Payer: MEDICARE

## 2017-07-27 VITALS
BODY MASS INDEX: 38.89 KG/M2 | HEIGHT: 65 IN | SYSTOLIC BLOOD PRESSURE: 130 MMHG | DIASTOLIC BLOOD PRESSURE: 82 MMHG | HEART RATE: 72 BPM | WEIGHT: 233.44 LBS

## 2017-07-27 DIAGNOSIS — D64.9 SYMPTOMATIC ANEMIA: Primary | ICD-10-CM

## 2017-07-27 DIAGNOSIS — E78.01 HYPERLIPIDEMIA TYPE II: ICD-10-CM

## 2017-07-27 DIAGNOSIS — E66.01 MORBID OBESITY DUE TO EXCESS CALORIES: ICD-10-CM

## 2017-07-27 DIAGNOSIS — I10 ESSENTIAL HYPERTENSION: ICD-10-CM

## 2017-07-27 DIAGNOSIS — E11.8 TYPE 2 DIABETES MELLITUS WITH COMPLICATION, UNSPECIFIED LONG TERM INSULIN USE STATUS: ICD-10-CM

## 2017-07-27 PROCEDURE — 99214 OFFICE O/P EST MOD 30 MIN: CPT | Mod: S$GLB,,, | Performed by: FAMILY MEDICINE

## 2017-07-27 PROCEDURE — 3045F PR MOST RECENT HEMOGLOBIN A1C LEVEL 7.0-9.0%: CPT | Mod: S$GLB,,, | Performed by: FAMILY MEDICINE

## 2017-07-27 PROCEDURE — 99499 UNLISTED E&M SERVICE: CPT | Mod: S$PBB,,, | Performed by: FAMILY MEDICINE

## 2017-07-27 PROCEDURE — 99999 PR PBB SHADOW E&M-EST. PATIENT-LVL III: CPT | Mod: PBBFAC,,, | Performed by: FAMILY MEDICINE

## 2017-07-27 RX ORDER — ALLOPURINOL 300 MG/1
300 TABLET ORAL DAILY
Qty: 90 TABLET | Refills: 1 | Status: SHIPPED | OUTPATIENT
Start: 2017-07-27 | End: 2017-07-27 | Stop reason: SDUPTHER

## 2017-07-27 RX ORDER — ALLOPURINOL 300 MG/1
300 TABLET ORAL DAILY
Qty: 90 TABLET | Refills: 1 | Status: SHIPPED | OUTPATIENT
Start: 2017-07-27 | End: 2017-10-17 | Stop reason: SDUPTHER

## 2017-07-27 NOTE — PROGRESS NOTES
"CHIEF COMPLAINT:  Hospital follow-up for hemolytic anemia in a pt w/diabetes and gout    HISTORY OF PRESENT ILLNESS:  This 61-year-old woman is here for hospital follow-up due to hemolytic anemia.  She had a profound drop in her hemoglobin and hematocrit.  Was ultimately determined by hematology that this was in an auto immune nature in all likelihood.  Her hemoglobin has responded nicely to steroids.  She has completed her steroid course.  The plan is to follow-up CBC in 1 month.  If symptoms recur she will need a bone marrow biopsy.     Other than that she is really without complaints today except for recent poor control of her blood sugar.      She has CKD stage III with a baseline creatinine of 1.2.  She has tried many therapies for her gout.  She has true hyperuricemia.  She notes that only Indocin works for her.    She is diabetic taking only insulin.  She is no longer taking metformin due to diarrhea.  She has been feeling well recently, but states her blood sugars have been all over the place.     She is doing well on her antihypertensive medications.      The patient has a history of stable hyperlipidemia on current medications.  The patient denies chest pain or shortness of breath today.  The patient denies muscle aches or myalgias suggestive of myositis.    The patient used to see Dr. Grubbs.      MEDICAL HISTORY:  1.  Diabetes   2.  Hypertension.  3.  Hyperlipidemia.  4.  Gout.  5.  Osteoarthritis, particularly the right knee, which is bone on bone.  6.  Esophageal reflux.    REVIEW OF SYSTEMS:  Chronically overweight.  She gets a yearly eye exam and has   no known diabetic retinopathy.  No chest pain or shortness of breath.  No GI   symptoms.  No headaches or visual changes.  Overdue for routine colonoscopy, which has been recommended.    PHYSICAL EXAMINATION:  Blood pressure 130/82, pulse 72, height 5' 5" (1.651 m), weight 105.9 kg (233 lb 7.5 oz).    APPEARANCE:  Very pleasant 61-year-old black " female.  HEENT: Normocephalic atraumatic anicteric  HEART:  Regular rhythm.  LUNGS:  Clear.  ABDOMEN:  Obese, soft, nontender.  Protective Sensation (w/ 10 gram monofilament): Intact  Visual Inspection (Evidence of Foot Deformity, Ulceration, Nails Intact, Skin Intact): Normal  Pedal Pulses: Present    Assessment:   Hemolytic anemia, improving  Diabetes with hyperglycemia.    Hypertension, condition is well controlled on current medication regimen  Hyperlipidemia    PLAN:  1.  Lantus 40 units qhs.    NovoLog 10 units with meals.  2.  Repeat CBC and hematology visit in one month  3.  endocrine and rheumatology.  4.  She will continue on antihypertensive medications.  A1c couple of weeks ago 7.7.

## 2017-07-31 NOTE — PHYSICIAN QUERY
PT Name: Wendy Viveros                   MR #: [id number]     Physician Query Form - Documentation Clarification      CDS: Xena Siegel RN, CCDS       Contact information: [email or web address]    This form is a permanent document in the medical record.     Query Date: July 31, 2017    By submitting this query, we are merely seeking further clarification of documentation. Please utilize your independent clinical judgment when addressing the question(s) below.    The Medical record reflects the following:    Supporting Clinical Findings Location in Medical Record     Diabetes type 2, uncontrolled    DM-2  - A1c = 7.0  - Accucheck and NISS for now.    7/13-H&P Active Problem List    7/13-H&P     Glucose 276, 281, 350, 219    HbA1C 7.0  Est Avg Glucose 154   7/13, 7/14-POCT    7/13-Labs                                                                            Doctor, Please specify diagnosis or diagnoses associated with above clinical findings.    Provider Use Only     [ X  ] DM-2 with hyperglycemia     [   ] DM-2 with hypoglycemia     [   ] Other dx (please specify)______________________                                                                                                          [   ] Clinically undetermined

## 2017-08-04 DIAGNOSIS — Z12.11 COLON CANCER SCREENING: ICD-10-CM

## 2017-08-22 ENCOUNTER — LAB VISIT (OUTPATIENT)
Dept: LAB | Facility: HOSPITAL | Age: 62
End: 2017-08-22
Attending: INTERNAL MEDICINE
Payer: MEDICARE

## 2017-08-22 ENCOUNTER — OFFICE VISIT (OUTPATIENT)
Dept: HEMATOLOGY/ONCOLOGY | Facility: CLINIC | Age: 62
End: 2017-08-22
Payer: MEDICARE

## 2017-08-22 VITALS
OXYGEN SATURATION: 97 % | SYSTOLIC BLOOD PRESSURE: 180 MMHG | BODY MASS INDEX: 38.93 KG/M2 | HEIGHT: 65 IN | RESPIRATION RATE: 18 BRPM | HEART RATE: 79 BPM | DIASTOLIC BLOOD PRESSURE: 85 MMHG | WEIGHT: 233.69 LBS | TEMPERATURE: 99 F

## 2017-08-22 DIAGNOSIS — D59.8 OTHER ACQUIRED HEMOLYTIC ANEMIAS: ICD-10-CM

## 2017-08-22 DIAGNOSIS — D59.8 OTHER ACQUIRED HEMOLYTIC ANEMIAS: Primary | ICD-10-CM

## 2017-08-22 LAB
BASOPHILS # BLD AUTO: 0.04 K/UL
BASOPHILS NFR BLD: 0.3 %
DIFFERENTIAL METHOD: ABNORMAL
EOSINOPHIL # BLD AUTO: 0.2 K/UL
EOSINOPHIL NFR BLD: 1.4 %
ERYTHROCYTE [DISTWIDTH] IN BLOOD BY AUTOMATED COUNT: 18.5 %
HAPTOGLOB SERPL-MCNC: 18 MG/DL
HCT VFR BLD AUTO: 33.1 %
HGB BLD-MCNC: 11.8 G/DL
LYMPHOCYTES # BLD AUTO: 3.7 K/UL
LYMPHOCYTES NFR BLD: 31.2 %
MCH RBC QN AUTO: 33.6 PG
MCHC RBC AUTO-ENTMCNC: 35.6 G/DL
MCV RBC AUTO: 94 FL
MONOCYTES # BLD AUTO: 1 K/UL
MONOCYTES NFR BLD: 8.1 %
NEUTROPHILS # BLD AUTO: 6.9 K/UL
NEUTROPHILS NFR BLD: 58.1 %
PLATELET # BLD AUTO: 189 K/UL
PMV BLD AUTO: 10.1 FL
RBC # BLD AUTO: 3.51 M/UL
RETICS/RBC NFR AUTO: 9.7 %
WBC # BLD AUTO: 11.84 K/UL

## 2017-08-22 PROCEDURE — 85045 AUTOMATED RETICULOCYTE COUNT: CPT

## 2017-08-22 PROCEDURE — 3008F BODY MASS INDEX DOCD: CPT | Mod: S$GLB,,, | Performed by: INTERNAL MEDICINE

## 2017-08-22 PROCEDURE — 99999 PR PBB SHADOW E&M-EST. PATIENT-LVL III: CPT | Mod: PBBFAC,,, | Performed by: INTERNAL MEDICINE

## 2017-08-22 PROCEDURE — 36415 COLL VENOUS BLD VENIPUNCTURE: CPT

## 2017-08-22 PROCEDURE — 3079F DIAST BP 80-89 MM HG: CPT | Mod: S$GLB,,, | Performed by: INTERNAL MEDICINE

## 2017-08-22 PROCEDURE — 99499 UNLISTED E&M SERVICE: CPT | Mod: S$PBB,,, | Performed by: INTERNAL MEDICINE

## 2017-08-22 PROCEDURE — 85025 COMPLETE CBC W/AUTO DIFF WBC: CPT

## 2017-08-22 PROCEDURE — 3077F SYST BP >= 140 MM HG: CPT | Mod: S$GLB,,, | Performed by: INTERNAL MEDICINE

## 2017-08-22 PROCEDURE — 83010 ASSAY OF HAPTOGLOBIN QUANT: CPT

## 2017-08-22 PROCEDURE — 99215 OFFICE O/P EST HI 40 MIN: CPT | Mod: S$GLB,,, | Performed by: INTERNAL MEDICINE

## 2017-08-23 NOTE — PROGRESS NOTES
Subjective:       Patient ID: Wendy Viveros is a 61 y.o. female.    Chief Complaint: Other acquired hemolytic anemias; Bleeding/Bruising (on skin); and Acne    Wendy presents with her daughter for hospital follow-up after presenting with symptomatic anemia. 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 MIKKI was negative. She presented with new acute severe macrocytic anemia in July. CBC was normal in March 2017. She denies any acute interval events prior to hospital presentation. She was treated for 5 days with acyclovir for HSC infection. She was not treated with other antibiotics. She has not started any new routine medications. She has not started any new OTC or herbal medications. She received blood transfusions in the hospital and she started a moderate dose steroid taper. CBC has improved today from 7 to 9 grams. The only active medication she is on that is known to cause medication induced hemolytic anemia is Lasix which she rarely takes PRN.    Follow-up Visit 8/22/17  Return visit for evaluation of non-immune hemolysis (unknown etiology).   Off steroid therapy now for about 2 week.  Hemoglobin near normal at 11.8 grams.      Acne   Associated symptoms include fatigue.     Review of Systems   Constitutional: Positive for fatigue.   HENT: Negative.    Eyes: Negative.    Respiratory: Positive for shortness of breath.    Cardiovascular: Negative.    Gastrointestinal: Negative.    Endocrine: Negative.    Genitourinary: Negative.    Musculoskeletal: Negative.    Skin: Negative.    Allergic/Immunologic: Negative for environmental allergies, food allergies and immunocompromised state.   Neurological: Negative.    Hematological: Negative for adenopathy. Does not bruise/bleed easily.   Psychiatric/Behavioral: Negative.        Objective:      Physical Exam   Constitutional: She is oriented to person, place, and time. She appears well-developed and well-nourished.   HENT:   Head: Normocephalic and atraumatic.    Eyes: Conjunctivae are normal. No scleral icterus.   Neck: Normal range of motion. Neck supple.   Cardiovascular: Normal rate and intact distal pulses.    Pulmonary/Chest: Effort normal. No respiratory distress.   Abdominal: Soft. She exhibits no distension. There is no tenderness.   Musculoskeletal: Normal range of motion. She exhibits no edema.   Neurological: She is alert and oriented to person, place, and time. No cranial nerve deficit.   Skin: Skin is warm and dry.   Psychiatric: She has a normal mood and affect. Her behavior is normal.   Nursing note and vitals reviewed.      Assessment:       1. Other acquired hemolytic anemias        Plan:       CBC is recovering.   If hemolysis returns plan marrow biopsy.  Return visit in 4 months with CBC, retic, and haptoglobin

## 2017-10-17 ENCOUNTER — OFFICE VISIT (OUTPATIENT)
Dept: INTERNAL MEDICINE | Facility: CLINIC | Age: 62
End: 2017-10-17
Attending: FAMILY MEDICINE
Payer: MEDICARE

## 2017-10-17 VITALS
WEIGHT: 235.88 LBS | SYSTOLIC BLOOD PRESSURE: 140 MMHG | HEART RATE: 74 BPM | HEIGHT: 65 IN | DIASTOLIC BLOOD PRESSURE: 72 MMHG | BODY MASS INDEX: 39.3 KG/M2

## 2017-10-17 DIAGNOSIS — E11.8 TYPE 2 DIABETES MELLITUS WITH COMPLICATION, WITH LONG-TERM CURRENT USE OF INSULIN: ICD-10-CM

## 2017-10-17 DIAGNOSIS — M25.561 CHRONIC PAIN OF RIGHT KNEE: ICD-10-CM

## 2017-10-17 DIAGNOSIS — I10 ESSENTIAL HYPERTENSION: ICD-10-CM

## 2017-10-17 DIAGNOSIS — K21.9 GASTROESOPHAGEAL REFLUX DISEASE WITHOUT ESOPHAGITIS: ICD-10-CM

## 2017-10-17 DIAGNOSIS — G89.29 CHRONIC RIGHT SHOULDER PAIN: Primary | ICD-10-CM

## 2017-10-17 DIAGNOSIS — Z79.4 TYPE 2 DIABETES MELLITUS WITH COMPLICATION, WITH LONG-TERM CURRENT USE OF INSULIN: ICD-10-CM

## 2017-10-17 DIAGNOSIS — E78.01 HYPERLIPIDEMIA TYPE II: ICD-10-CM

## 2017-10-17 DIAGNOSIS — G89.29 CHRONIC PAIN OF RIGHT KNEE: ICD-10-CM

## 2017-10-17 DIAGNOSIS — Z12.31 SCREENING MAMMOGRAM, ENCOUNTER FOR: ICD-10-CM

## 2017-10-17 DIAGNOSIS — M25.511 CHRONIC RIGHT SHOULDER PAIN: Primary | ICD-10-CM

## 2017-10-17 DIAGNOSIS — M1A.9XX0 CHRONIC GOUT WITHOUT TOPHUS, UNSPECIFIED CAUSE, UNSPECIFIED SITE: ICD-10-CM

## 2017-10-17 PROBLEM — D64.9 ANEMIA: Status: RESOLVED | Noted: 2017-07-13 | Resolved: 2017-10-17

## 2017-10-17 PROBLEM — E80.6 HYPERBILIRUBINEMIA: Status: RESOLVED | Noted: 2017-07-13 | Resolved: 2017-10-17

## 2017-10-17 PROBLEM — R17 JAUNDICE: Status: RESOLVED | Noted: 2017-07-13 | Resolved: 2017-10-17

## 2017-10-17 PROCEDURE — 99499 UNLISTED E&M SERVICE: CPT | Mod: S$PBB,,, | Performed by: FAMILY MEDICINE

## 2017-10-17 PROCEDURE — 99214 OFFICE O/P EST MOD 30 MIN: CPT | Mod: S$GLB,,, | Performed by: FAMILY MEDICINE

## 2017-10-17 PROCEDURE — 99999 PR PBB SHADOW E&M-EST. PATIENT-LVL III: CPT | Mod: PBBFAC,,, | Performed by: FAMILY MEDICINE

## 2017-10-17 RX ORDER — ALLOPURINOL 300 MG/1
300 TABLET ORAL DAILY
Qty: 90 TABLET | Refills: 3 | Status: SHIPPED | OUTPATIENT
Start: 2017-10-17 | End: 2018-12-02 | Stop reason: SDUPTHER

## 2017-10-17 RX ORDER — CLONIDINE HYDROCHLORIDE 0.1 MG/1
0.1 TABLET ORAL 2 TIMES DAILY
Qty: 180 TABLET | Refills: 3 | Status: SHIPPED | OUTPATIENT
Start: 2017-10-17 | End: 2019-09-01 | Stop reason: SDUPTHER

## 2017-10-17 RX ORDER — METOPROLOL TARTRATE 50 MG/1
50 TABLET ORAL DAILY
Qty: 90 TABLET | Refills: 3 | Status: SHIPPED | OUTPATIENT
Start: 2017-10-17 | End: 2018-12-08 | Stop reason: SDUPTHER

## 2017-10-17 RX ORDER — INSULIN GLARGINE 100 [IU]/ML
45 INJECTION, SOLUTION SUBCUTANEOUS NIGHTLY
Qty: 40.5 ML | Refills: 3 | Status: SHIPPED | OUTPATIENT
Start: 2017-10-17 | End: 2018-02-15 | Stop reason: SDUPTHER

## 2017-10-17 RX ORDER — INSULIN ASPART 100 [IU]/ML
15 INJECTION, SOLUTION INTRAVENOUS; SUBCUTANEOUS
Qty: 40.5 ML | Refills: 3 | Status: SHIPPED | OUTPATIENT
Start: 2017-10-17 | End: 2018-02-15 | Stop reason: SDUPTHER

## 2017-10-17 RX ORDER — INDOMETHACIN 50 MG/1
50 CAPSULE ORAL 3 TIMES DAILY PRN
Qty: 90 CAPSULE | Refills: 0 | Status: SHIPPED | OUTPATIENT
Start: 2017-10-17 | End: 2018-02-15 | Stop reason: SDUPTHER

## 2017-10-17 RX ORDER — CALCIUM CARB/VITAMIN D3/VIT K1 500-100-40
TABLET,CHEWABLE ORAL 4 TIMES DAILY
COMMUNITY
End: 2017-10-17 | Stop reason: SDUPTHER

## 2017-10-17 RX ORDER — CYCLOBENZAPRINE HCL 10 MG
TABLET ORAL
Qty: 30 TABLET | Refills: 2 | Status: SHIPPED | OUTPATIENT
Start: 2017-10-17 | End: 2019-08-05 | Stop reason: SDUPTHER

## 2017-10-17 RX ORDER — CALCIUM CARB/VITAMIN D3/VIT K1 500-100-40
TABLET,CHEWABLE ORAL
Qty: 300 EACH | Refills: 3 | COMMUNITY
Start: 2017-10-17 | End: 2018-04-26 | Stop reason: SDUPTHER

## 2017-10-17 RX ORDER — TRAMADOL HYDROCHLORIDE 50 MG/1
50 TABLET ORAL DAILY
Qty: 30 TABLET | Refills: 0 | Status: SHIPPED | OUTPATIENT
Start: 2017-10-17 | End: 2021-03-02

## 2017-10-17 RX ORDER — LANCETS
EACH MISCELLANEOUS
Qty: 200 EACH | Refills: 3 | Status: CANCELLED | OUTPATIENT
Start: 2017-10-17

## 2017-10-17 RX ORDER — METOPROLOL TARTRATE 50 MG/1
TABLET ORAL
COMMUNITY
Start: 2017-09-01 | End: 2017-10-17 | Stop reason: SDUPTHER

## 2017-10-17 RX ORDER — SPIRONOLACTONE 50 MG/1
50 TABLET, FILM COATED ORAL DAILY
Qty: 90 TABLET | Refills: 3 | Status: SHIPPED | OUTPATIENT
Start: 2017-10-17 | End: 2018-12-08 | Stop reason: SDUPTHER

## 2017-10-17 NOTE — PROGRESS NOTES
CHIEF COMPLAINT:   follow-up for hemolytic anemia in a pt w/diabetes and gout    HISTORY OF PRESENT ILLNESS:  This 61-year-old woman is here for follow-up of hemolytic anemia.  She had a profound drop in her hemoglobin and hematocrit.  Was ultimately determined by hematology that this was in an auto immune nature in all likelihood.  Her hemoglobin has responded nicely to steroids.  She completed her steroid course.  She has had a nice stabilization in her CBC.     She continues to have problems with right knee and shoulder pain.  She sees an outside orthopedist so we don't have any records.  She is currently using tramadol 3 times a day.  I discussed with her that I cannot continue to provide that.  She may benefit from visiting with her orthopedist and get a more precise diagnosis or treatment.    She has CKD stage III with a baseline creatinine of 1.2.  She has tried many therapies for her gout.  She has true hyperuricemia.  She notes that only Indocin works for her.    She is diabetic taking only insulin.  She is no longer taking metformin due to diarrhea.  She has been feeling well recently, but states her blood sugars have been all over the place.     She is doing well on her antihypertensive medications.      The patient has a history of stable hyperlipidemia on current medications.  The patient denies chest pain or shortness of breath today.  The patient denies muscle aches or myalgias suggestive of myositis.    The patient used to see Dr. Grubbs.      MEDICAL HISTORY:  1.  Diabetes   2.  Hypertension.  3.  Hyperlipidemia.  4.  Gout.  5.  Osteoarthritis, particularly the right knee, which is bone on bone.  6.  Esophageal reflux.    REVIEW OF SYSTEMS:  Chronically overweight.  She gets a yearly eye exam and has   no known diabetic retinopathy.  No chest pain or shortness of breath.  No GI   symptoms.  No headaches or visual changes.  Overdue for routine colonoscopy, which has been recommended.    PHYSICAL  "EXAMINATION:  Blood pressure (!) 140/72, pulse 74, height 5' 5" (1.651 m), weight 107 kg (235 lb 14.3 oz).    APPEARANCE:  Very pleasant 61-year-old black female.  HEENT: Normocephalic atraumatic anicteric  HEART:  Regular rhythm.  LUNGS:  Clear.  ABDOMEN:  Obese, soft, nontender.  Protective Sensation (w/ 10 gram monofilament): Intact  Visual Inspection (Evidence of Foot Deformity, Ulceration, Nails Intact, Skin Intact): Normal  Pedal Pulses: Present    Assessment:   Right knee and shoulder pain management outside orthopedist  Hemolytic anemia, seemingly resolved  Diabetes with hyperglycemia.    Hypertension, condition is well controlled on current medication regimen  Hyperlipidemia    PLAN:  1.  Lantus 40 units qhs.    NovoLog 10 units with meals.  2.  She is going to follow-up with her outside orthopedist.  We had a long discussion today.  She understands that chronic opioids cannot be prescribed going forward.  She will discuss this with her outside orthopedist.  3.  endocrine and rheumatology.  4.  She will continue on antihypertensive medications.  A1c in July was 7  Answers for HPI/ROS submitted by the patient on 10/11/2017   Diabetes problem  Diabetes type: type 2  MedicAlert ID: No  Disease duration: 9 years  blurred vision: No  chest pain: No  fatigue: Yes  foot paresthesias: No  foot ulcerations: No  polydipsia: No  polyphagia: No  polyuria: No  visual change: No  weakness: No  weight loss: No  Symptom course: improving  confusion: No  dizziness: No  headaches: No  hunger: No  mood changes: No  nervous/anxious: No  pallor: No  seizures: No  sleepiness: No  speech difficulty: No  sweats: No  tremors: No  blackouts: No  hospitalization: No  nocturnal hypoglycemia: No  required assistance: No  required glucagon: No  CVA: No  heart disease: No  impotence: No  nephropathy: Yes  peripheral neuropathy: No  PVD: No  retinopathy: No  autonomic neuropathy: No  CAD risks: hypertension, obesity  Current treatments: " insulin injections  Treatment compliance: all of the time  Dose schedule: pre-breakfast, pre-lunch, pre-dinner, at bedtime  Given by: patient  Injection sites: abdominal wall, thighs  Home blood tests: 3-4 x per day  Home urines: <1 x per month  Monitoring compliance: good  Blood glucose trend: fluctuating minimally  Weight trend: fluctuating minimally  Current diet: low salt  Meal planning: avoidance of concentrated sweets  Exercise: intermittently  Dietitian visit: No  Eye exam current: No  Sees podiatrist: No

## 2017-11-21 RX ORDER — TRAMADOL HYDROCHLORIDE 50 MG/1
TABLET ORAL
Qty: 30 TABLET | Refills: 0 | Status: SHIPPED | OUTPATIENT
Start: 2017-11-21 | End: 2018-02-20

## 2017-12-01 DIAGNOSIS — E11.9 TYPE 2 DIABETES MELLITUS WITHOUT COMPLICATION: ICD-10-CM

## 2017-12-04 ENCOUNTER — PATIENT MESSAGE (OUTPATIENT)
Dept: HEMATOLOGY/ONCOLOGY | Facility: CLINIC | Age: 62
End: 2017-12-04

## 2017-12-04 ENCOUNTER — TELEPHONE (OUTPATIENT)
Dept: HEMATOLOGY/ONCOLOGY | Facility: CLINIC | Age: 62
End: 2017-12-04

## 2017-12-04 NOTE — TELEPHONE ENCOUNTER
----- Message from Mayte Hughes sent at 12/4/2017 11:33 AM CST -----  Contact: Pt  Pt has an appt this afternoon and she will not be able to make it         Pt call back number [phone]

## 2017-12-16 ENCOUNTER — HOSPITAL ENCOUNTER (EMERGENCY)
Facility: OTHER | Age: 62
Discharge: HOME | End: 2017-12-16
Attending: EMERGENCY MEDICINE
Payer: MEDICARE

## 2017-12-16 VITALS
TEMPERATURE: 99 F | BODY MASS INDEX: 38.65 KG/M2 | DIASTOLIC BLOOD PRESSURE: 75 MMHG | OXYGEN SATURATION: 100 % | RESPIRATION RATE: 14 BRPM | HEIGHT: 65 IN | HEART RATE: 70 BPM | WEIGHT: 232 LBS | SYSTOLIC BLOOD PRESSURE: 168 MMHG

## 2017-12-16 DIAGNOSIS — R10.11 RUQ PAIN: Primary | ICD-10-CM

## 2017-12-16 LAB
ALBUMIN SERPL BCP-MCNC: 4.1 G/DL
ALP SERPL-CCNC: 90 U/L
ALT SERPL W/O P-5'-P-CCNC: 28 U/L
ANION GAP SERPL CALC-SCNC: 7 MMOL/L
ANISOCYTOSIS BLD QL SMEAR: SLIGHT
AST SERPL-CCNC: 23 U/L
BACTERIA #/AREA URNS HPF: ABNORMAL /HPF
BASOPHILS # BLD AUTO: 0.02 K/UL
BASOPHILS NFR BLD: 0.2 %
BILIRUB SERPL-MCNC: 1.8 MG/DL
BILIRUB UR QL STRIP: NEGATIVE
BUN SERPL-MCNC: 18 MG/DL
CALCIUM SERPL-MCNC: 10.2 MG/DL
CHLORIDE SERPL-SCNC: 104 MMOL/L
CLARITY UR: CLEAR
CO2 SERPL-SCNC: 27 MMOL/L
COLOR UR: YELLOW
CREAT SERPL-MCNC: 1.3 MG/DL
DACRYOCYTES BLD QL SMEAR: ABNORMAL
DIFFERENTIAL METHOD: ABNORMAL
EOSINOPHIL # BLD AUTO: 0.2 K/UL
EOSINOPHIL NFR BLD: 1.8 %
ERYTHROCYTE [DISTWIDTH] IN BLOOD BY AUTOMATED COUNT: 19.2 %
EST. GFR  (AFRICAN AMERICAN): 51 ML/MIN/1.73 M^2
EST. GFR  (NON AFRICAN AMERICAN): 44 ML/MIN/1.73 M^2
GLUCOSE SERPL-MCNC: 298 MG/DL
GLUCOSE UR QL STRIP: NEGATIVE
HCT VFR BLD AUTO: 33.5 %
HGB BLD-MCNC: 11.8 G/DL
HGB UR QL STRIP: NEGATIVE
HYALINE CASTS #/AREA URNS LPF: 2 /LPF
INR PPP: 1
KETONES UR QL STRIP: NEGATIVE
LEUKOCYTE ESTERASE UR QL STRIP: NEGATIVE
LYMPHOCYTES # BLD AUTO: 3.7 K/UL
LYMPHOCYTES NFR BLD: 34.5 %
MCH RBC QN AUTO: 33.1 PG
MCHC RBC AUTO-ENTMCNC: 35.2 G/DL
MCV RBC AUTO: 94 FL
MICROSCOPIC COMMENT: ABNORMAL
MONOCYTES # BLD AUTO: 0.7 K/UL
MONOCYTES NFR BLD: 6.3 %
NEUTROPHILS # BLD AUTO: 6 K/UL
NEUTROPHILS NFR BLD: 57.2 %
NITRITE UR QL STRIP: NEGATIVE
PH UR STRIP: 5 [PH] (ref 5–8)
PLATELET # BLD AUTO: 170 K/UL
PLATELET BLD QL SMEAR: ABNORMAL
PMV BLD AUTO: 9.8 FL
POIKILOCYTOSIS BLD QL SMEAR: SLIGHT
POLYCHROMASIA BLD QL SMEAR: ABNORMAL
POTASSIUM SERPL-SCNC: 4.3 MMOL/L
PROT SERPL-MCNC: 8 G/DL
PROT UR QL STRIP: ABNORMAL
PROTHROMBIN TIME: 11 SEC
RBC # BLD AUTO: 3.56 M/UL
RBC #/AREA URNS HPF: 3 /HPF (ref 0–4)
SODIUM SERPL-SCNC: 138 MMOL/L
SP GR UR STRIP: 1.02 (ref 1–1.03)
SQUAMOUS #/AREA URNS HPF: 2 /HPF
URN SPEC COLLECT METH UR: ABNORMAL
UROBILINOGEN UR STRIP-ACNC: ABNORMAL EU/DL
WBC # BLD AUTO: 10.82 K/UL
WBC #/AREA URNS HPF: 3 /HPF (ref 0–5)
WBC CLUMPS URNS QL MICRO: ABNORMAL
YEAST URNS QL MICRO: ABNORMAL

## 2017-12-16 PROCEDURE — 81000 URINALYSIS NONAUTO W/SCOPE: CPT

## 2017-12-16 PROCEDURE — 80053 COMPREHEN METABOLIC PANEL: CPT

## 2017-12-16 PROCEDURE — 85610 PROTHROMBIN TIME: CPT

## 2017-12-16 PROCEDURE — 25000003 PHARM REV CODE 250: Performed by: EMERGENCY MEDICINE

## 2017-12-16 PROCEDURE — 96374 THER/PROPH/DIAG INJ IV PUSH: CPT

## 2017-12-16 PROCEDURE — 63600175 PHARM REV CODE 636 W HCPCS: Performed by: EMERGENCY MEDICINE

## 2017-12-16 PROCEDURE — 99283 EMERGENCY DEPT VISIT LOW MDM: CPT | Mod: 25

## 2017-12-16 PROCEDURE — 96361 HYDRATE IV INFUSION ADD-ON: CPT

## 2017-12-16 PROCEDURE — 85025 COMPLETE CBC W/AUTO DIFF WBC: CPT

## 2017-12-16 RX ORDER — SODIUM CHLORIDE 9 MG/ML
500 INJECTION, SOLUTION INTRAVENOUS
Status: COMPLETED | OUTPATIENT
Start: 2017-12-16 | End: 2017-12-16

## 2017-12-16 RX ORDER — IBUPROFEN 400 MG/1
400 TABLET ORAL
Status: COMPLETED | OUTPATIENT
Start: 2017-12-16 | End: 2017-12-16

## 2017-12-16 RX ORDER — ONDANSETRON 2 MG/ML
4 INJECTION INTRAMUSCULAR; INTRAVENOUS
Status: COMPLETED | OUTPATIENT
Start: 2017-12-16 | End: 2017-12-16

## 2017-12-16 RX ADMIN — ONDANSETRON 4 MG: 2 INJECTION INTRAMUSCULAR; INTRAVENOUS at 05:12

## 2017-12-16 RX ADMIN — SODIUM CHLORIDE 500 ML: 0.9 INJECTION, SOLUTION INTRAVENOUS at 05:12

## 2017-12-16 RX ADMIN — IBUPROFEN 400 MG: 400 TABLET, FILM COATED ORAL at 05:12

## 2017-12-16 NOTE — ED PROVIDER NOTES
"Encounter Date: 12/16/2017    SCRIBE #1 NOTE: I, Rosa Celeste, am scribing for, and in the presence of, Dr. Drummond.       History     Chief Complaint   Patient presents with    Flank Pain     right flank pain radiating to RUQ x 1 weeks; cold symptoms x 2 weeks    Dizziness     "vertigo" x 2 days when going to lying to standing position or when bending over; history of vertigo     Time seen by provider: 4:16 PM    This is a 61 y.o. female who presents with complaint of right flank pain that has been constant for the past week.  The patient describes her discomfort as an "aching" sensation that radiates to the RUQ.  She also endorses nausea and dizziness, but she denies fever, chills, vomiting, diarrhea, constipation, dysuria, changes in urinary frequency and urgency, and hematuria.  She reports no identifying, alleviating, or exacerbating factors.  The patient admits that she has had similar episodes in the past, but only after eating.  As per medical records, she has history of HTN, HLD, hepatitis, gout, NIDDM, hepatosplenomegaly, and Stage 2 CKD.  She has pertinent surgical history of tubal ligation and cholecystectomy.          The history is provided by the patient and medical records.     Review of patient's allergies indicates:   Allergen Reactions    Colchicine analogues      diarrhea    Cozaar  [losartan]      Other reaction(s): blurry vision    Lisinopril      Other reaction(s): cough     Past Medical History:   Diagnosis Date    Abnormal EKG     Anemia 7/13/2017    Aortic valve regurgitation     Arthritis     CKD (chronic kidney disease) stage 2, GFR 60-89 ml/min 8/8/2014    CKD (chronic kidney disease) stage 2, GFR 60-89 ml/min 8/8/2014    Diabetes mellitus     Diabetes mellitus type II     GERD (gastroesophageal reflux disease)     Gout, unspecified     Heart murmur     Hyperlipidemia     Hypertension     Tendonitis      Past Surgical History:   Procedure Laterality Date    breast " reduction      BREAST SURGERY      CHOLECYSTECTOMY      JOINT REPLACEMENT      POLYPECTOMY  05/05/2016    TOTAL SHOULDER ARTHROPLASTY Left     TUBAL LIGATION       Family History   Problem Relation Age of Onset    Heart disease Mother      MI at 71    Hypertension Mother     Hypertension Brother     Diabetes Brother     Hypertension Brother     Breast cancer Neg Hx     Colon cancer Neg Hx     Ovarian cancer Neg Hx      Social History   Substance Use Topics    Smoking status: Never Smoker    Smokeless tobacco: Not on file    Alcohol use No     Review of Systems   Constitutional: Negative for chills and fever.   HENT: Negative for facial swelling.    Respiratory: Negative for shortness of breath.    Cardiovascular: Negative for chest pain.   Gastrointestinal: Positive for abdominal pain (RUQ) and nausea. Negative for constipation, diarrhea and vomiting.   Endocrine: Negative for polyuria.   Genitourinary: Positive for flank pain (right). Negative for dysuria, frequency, hematuria and urgency.   Musculoskeletal: Negative for myalgias.   Skin: Negative for rash.   Neurological: Positive for dizziness. Negative for headaches.       Physical Exam     Initial Vitals [12/16/17 1523]   BP Pulse Resp Temp SpO2   (!) 168/73 71 14 98.3 °F (36.8 °C) 99 %      MAP       104.67         Physical Exam    Nursing note and vitals reviewed.  Constitutional: She appears well-developed and well-nourished. She is not diaphoretic. No distress.   HENT:   Head: Normocephalic and atraumatic.   Right Ear: External ear normal.   Left Ear: External ear normal.   Eyes: EOM are normal. Right eye exhibits no discharge. Left eye exhibits no discharge.   Neck: Normal range of motion.   Cardiovascular: Normal rate, regular rhythm and normal heart sounds. Exam reveals no gallop and no friction rub.    No murmur heard.  Pulmonary/Chest: Breath sounds normal. No respiratory distress. She has no wheezes. She has no rhonchi. She has no  rales.   Abdominal: Soft. There is tenderness in the right upper quadrant. There is no rebound, no guarding, no CVA tenderness and negative Lewis's sign.   RUQ tenderness to palpation.  Negative Lewis's sign.  No epigastric tenderness to palpation.  No CVA tenderness.     Musculoskeletal: Normal range of motion. She exhibits no edema or tenderness.   Neurological: She is alert and oriented to person, place, and time.   Skin: Skin is warm and dry. No rash and no abscess noted. No erythema. No pallor.   Psychiatric: She has a normal mood and affect. Her behavior is normal. Judgment and thought content normal.         ED Course   Procedures  Labs Reviewed   CBC W/ AUTO DIFFERENTIAL - Abnormal; Notable for the following:        Result Value    RBC 3.56 (*)     Hemoglobin 11.8 (*)     Hematocrit 33.5 (*)     MCH 33.1 (*)     RDW 19.2 (*)     All other components within normal limits   COMPREHENSIVE METABOLIC PANEL - Abnormal; Notable for the following:     Glucose 298 (*)     Total Bilirubin 1.8 (*)     Anion Gap 7 (*)     eGFR if  51 (*)     eGFR if non  44 (*)     All other components within normal limits   PROTIME-INR   URINALYSIS     Imaging Results    None                Medical Decision Making:   History:   Old Medical Records: I decided to obtain old medical records.  Clinical Tests:   Lab Tests: Ordered and Reviewed  ED Management:  Well-appearing patient presents complaining of acute on chronic right upper quadrant pain.  She is status post cholecystectomy several years ago.  She also reports history of hepatitis.  She also reports hepatomegaly but she doesn't know why.  Exam is relatively benign with some slight right upper quadrant tenderness.  Lab work including LFTs without any concerning findings.  Patient discharged to continue workup with primary care and GI.    I did have an extensive talk regarding signs to return for and need for follow up. Patient expressed  understanding and will monitor symptoms closely and follow-up as needed.    FARTUN Drummond M.D.  12/16/2017  9:57 PM              Scribe Attestation:   Scribe #1: I performed the above scribed service and the documentation accurately describes the services I performed. I attest to the accuracy of the note.    Attending Attestation:           Physician Attestation for Scribe:  Physician Attestation Statement for Scribe #1: I, Hoda Cortés, reviewed documentation, as scribed by Rosa Celeste in my presence, and it is both accurate and complete.                 ED Course      Clinical Impression:     1. RUQ pain                                Alejo Drummond MD  12/16/17 2209

## 2017-12-16 NOTE — ED TRIAGE NOTES
Pt presents to the ed with c/o of RUQ pain x4 days with no relief from OTC medications. + nausea but denies any vomiting. Pt reports diarrhea.  Pt reports constant pain that  intensifies with touch. Pt reports she had  Fever a week ago but is currently afebrile. Pt denies any SOB or chills. Pt repots some dizziness but has hx of vertigo.

## 2018-02-09 DIAGNOSIS — E11.9 TYPE 2 DIABETES MELLITUS WITHOUT COMPLICATION: ICD-10-CM

## 2018-02-15 ENCOUNTER — PATIENT MESSAGE (OUTPATIENT)
Dept: INTERNAL MEDICINE | Facility: CLINIC | Age: 63
End: 2018-02-15

## 2018-02-15 DIAGNOSIS — E11.9 DIABETES MELLITUS WITHOUT COMPLICATION: Primary | ICD-10-CM

## 2018-02-15 RX ORDER — INSULIN ASPART 100 [IU]/ML
15 INJECTION, SOLUTION INTRAVENOUS; SUBCUTANEOUS
Qty: 40.5 ML | Refills: 3 | Status: SHIPPED | OUTPATIENT
Start: 2018-02-15 | End: 2018-04-26 | Stop reason: SDUPTHER

## 2018-02-15 RX ORDER — INSULIN GLARGINE 100 [IU]/ML
45 INJECTION, SOLUTION SUBCUTANEOUS NIGHTLY
Qty: 40.5 ML | Refills: 3 | Status: SHIPPED | OUTPATIENT
Start: 2018-02-15 | End: 2018-04-13

## 2018-02-15 RX ORDER — INDOMETHACIN 50 MG/1
CAPSULE ORAL
Qty: 90 CAPSULE | Refills: 0 | Status: SHIPPED | OUTPATIENT
Start: 2018-02-15 | End: 2018-02-20 | Stop reason: SDUPTHER

## 2018-02-19 RX ORDER — OXYCODONE AND ACETAMINOPHEN 5; 325 MG/1; MG/1
TABLET ORAL
COMMUNITY
Start: 2018-01-30 | End: 2018-02-20

## 2018-02-19 RX ORDER — ASPIRIN 325 MG
TABLET, DELAYED RELEASE (ENTERIC COATED) ORAL
Refills: 0 | COMMUNITY
Start: 2018-01-09 | End: 2018-02-20

## 2018-02-20 ENCOUNTER — OFFICE VISIT (OUTPATIENT)
Dept: INTERNAL MEDICINE | Facility: CLINIC | Age: 63
End: 2018-02-20
Attending: FAMILY MEDICINE
Payer: MEDICARE

## 2018-02-20 ENCOUNTER — LAB VISIT (OUTPATIENT)
Dept: LAB | Facility: OTHER | Age: 63
End: 2018-02-20
Attending: FAMILY MEDICINE
Payer: MEDICARE

## 2018-02-20 VITALS
OXYGEN SATURATION: 100 % | HEIGHT: 65 IN | BODY MASS INDEX: 37.65 KG/M2 | SYSTOLIC BLOOD PRESSURE: 152 MMHG | HEART RATE: 73 BPM | DIASTOLIC BLOOD PRESSURE: 74 MMHG | WEIGHT: 226 LBS

## 2018-02-20 DIAGNOSIS — E78.01 HYPERLIPIDEMIA TYPE II: ICD-10-CM

## 2018-02-20 DIAGNOSIS — N18.30 CKD (CHRONIC KIDNEY DISEASE) STAGE 3, GFR 30-59 ML/MIN: ICD-10-CM

## 2018-02-20 DIAGNOSIS — Z79.4 TYPE 2 DIABETES MELLITUS WITH COMPLICATION, WITH LONG-TERM CURRENT USE OF INSULIN: ICD-10-CM

## 2018-02-20 DIAGNOSIS — Z00.00 PREVENTATIVE HEALTH CARE: Primary | ICD-10-CM

## 2018-02-20 DIAGNOSIS — E11.8 TYPE 2 DIABETES MELLITUS WITH COMPLICATION, WITH LONG-TERM CURRENT USE OF INSULIN: ICD-10-CM

## 2018-02-20 DIAGNOSIS — Z12.31 SCREENING MAMMOGRAM, ENCOUNTER FOR: ICD-10-CM

## 2018-02-20 LAB
ALBUMIN SERPL BCP-MCNC: 4.1 G/DL
ALP SERPL-CCNC: 109 U/L
ALT SERPL W/O P-5'-P-CCNC: 9 U/L
ANION GAP SERPL CALC-SCNC: 6 MMOL/L
AST SERPL-CCNC: 11 U/L
BILIRUB SERPL-MCNC: 2 MG/DL
BUN SERPL-MCNC: 23 MG/DL
CALCIUM SERPL-MCNC: 10.9 MG/DL
CHLORIDE SERPL-SCNC: 107 MMOL/L
CHOLEST SERPL-MCNC: 184 MG/DL
CHOLEST SERPL-MCNC: 184 MG/DL
CHOLEST/HDLC SERPL: 7.4 {RATIO}
CHOLEST/HDLC SERPL: 7.4 {RATIO}
CO2 SERPL-SCNC: 27 MMOL/L
CREAT SERPL-MCNC: 1.3 MG/DL
CREAT UR-MCNC: 225.6 MG/DL
EST. GFR  (AFRICAN AMERICAN): 50 ML/MIN/1.73 M^2
EST. GFR  (NON AFRICAN AMERICAN): 44 ML/MIN/1.73 M^2
ESTIMATED AVG GLUCOSE: 126 MG/DL
GLUCOSE SERPL-MCNC: 266 MG/DL
HBA1C MFR BLD HPLC: 6 %
HDLC SERPL-MCNC: 25 MG/DL
HDLC SERPL-MCNC: 25 MG/DL
HDLC SERPL: 13.6 %
HDLC SERPL: 13.6 %
LDLC SERPL CALC-MCNC: 104.4 MG/DL
LDLC SERPL CALC-MCNC: 104.4 MG/DL
MICROALBUMIN UR DL<=1MG/L-MCNC: 278 UG/ML
MICROALBUMIN/CREATININE RATIO: 123.2 UG/MG
NONHDLC SERPL-MCNC: 159 MG/DL
NONHDLC SERPL-MCNC: 159 MG/DL
POTASSIUM SERPL-SCNC: 4.2 MMOL/L
PROT SERPL-MCNC: 7.8 G/DL
SODIUM SERPL-SCNC: 140 MMOL/L
TRIGL SERPL-MCNC: 273 MG/DL
TRIGL SERPL-MCNC: 273 MG/DL
TSH SERPL DL<=0.005 MIU/L-ACNC: 2 UIU/ML

## 2018-02-20 PROCEDURE — 80053 COMPREHEN METABOLIC PANEL: CPT

## 2018-02-20 PROCEDURE — 84443 ASSAY THYROID STIM HORMONE: CPT

## 2018-02-20 PROCEDURE — 83036 HEMOGLOBIN GLYCOSYLATED A1C: CPT

## 2018-02-20 PROCEDURE — 82570 ASSAY OF URINE CREATININE: CPT

## 2018-02-20 PROCEDURE — 99396 PREV VISIT EST AGE 40-64: CPT | Mod: S$GLB,,, | Performed by: FAMILY MEDICINE

## 2018-02-20 PROCEDURE — 99999 PR PBB SHADOW E&M-EST. PATIENT-LVL IV: CPT | Mod: PBBFAC,,, | Performed by: FAMILY MEDICINE

## 2018-02-20 PROCEDURE — 82043 UR ALBUMIN QUANTITATIVE: CPT

## 2018-02-20 PROCEDURE — 80061 LIPID PANEL: CPT

## 2018-02-20 PROCEDURE — 36415 COLL VENOUS BLD VENIPUNCTURE: CPT

## 2018-02-20 PROCEDURE — 99499 UNLISTED E&M SERVICE: CPT | Mod: S$GLB,,, | Performed by: FAMILY MEDICINE

## 2018-02-20 RX ORDER — INDOMETHACIN 50 MG/1
CAPSULE ORAL
Qty: 90 CAPSULE | Refills: 0 | Status: SHIPPED | OUTPATIENT
Start: 2018-02-20 | End: 2018-04-26 | Stop reason: SDUPTHER

## 2018-02-20 NOTE — PROGRESS NOTES
"CHIEF COMPLAINT:  Annual checkup in a pt w/diabetes and gout    HISTORY OF PRESENT ILLNESS:  This 62-year-old woman is here for her annual checkup. She is really without complaints today except for recent poor control of her blood sugar.      She has CKD stage III with a baseline creatinine of 1.2.  She has tried many therapies for her gout.  She has true hyperuricemia.  She notes that only Indocin works for her.  She would benefit from seeing nephrology.    She is diabetic taking only insulin.  She is no longer taking metformin due to diarrhea.  She has been feeling well recently, but states her blood sugars have been running better.     She is doing well on her antihypertensive medications.      The patient has a history of stable hyperlipidemia on current medications.  The patient denies chest pain or shortness of breath today.  The patient denies muscle aches or myalgias suggestive of myositis.    The patient used to see Dr. Grubbs.      MEDICAL HISTORY:  1.  Diabetes   2.  Hypertension.  3.  Hyperlipidemia.  4.  Gout.  5.  Osteoarthritis, particularly the right knee, which is bone on bone.  6.  Esophageal reflux.    REVIEW OF SYSTEMS:  Chronically overweight.  She gets a yearly eye exam and has   no known diabetic retinopathy.  No chest pain or shortness of breath.  No GI   symptoms.  No headaches or visual changes.  Overdue for routine colonoscopy, which has been recommended.    PHYSICAL EXAMINATION:  Blood pressure (!) 152/74, pulse 73, height 5' 5" (1.651 m), weight 102.5 kg (225 lb 15.5 oz), SpO2 100 %.    APPEARANCE:  Very pleasant 62-year-old black female.  HEENT: Normocephalic atraumatic anicteric  HEART:  Regular rhythm.  LUNGS:  Clear.  ABDOMEN:  Obese, soft, nontender.  Protective Sensation (w/ 10 gram monofilament): Intact  Visual Inspection (Evidence of Foot Deformity, Ulceration, Nails Intact, Skin Intact): Normal  Pedal Pulses: Present    Assessment:   Annual exam   Diabetes, condition is well " controlled on current medication regimen    Hypertension  Hyperlipidemia  CKD 3    PLAN:  1.  Lantus 45 units qhs.     2.  NovoLog 15 units with meals.  3.  endocrine and rheumatology.  4.  She will continue on antihypertensive medications.  Nephrology consult  We will update her BW and microalb today and have her back in 3 months

## 2018-02-28 ENCOUNTER — TELEPHONE (OUTPATIENT)
Dept: INTERNAL MEDICINE | Facility: CLINIC | Age: 63
End: 2018-02-28

## 2018-02-28 NOTE — TELEPHONE ENCOUNTER
----- Message from Roger Miller MD sent at 2/21/2018  4:17 PM CST -----  Blood work looks good.  Cholesterol is good.  Diabetes is better than ever.  No changes in medications.  Followup as scheduled in 6 months.

## 2018-02-28 NOTE — TELEPHONE ENCOUNTER
Pt inform that her Urine shows minimal kidney damage from her diabetes and that it is actually better than last year.No changes in medication and f/u as schedule,Pt agrees and verbalize and has no further questions.

## 2018-02-28 NOTE — TELEPHONE ENCOUNTER
----- Message from Roger Miller MD sent at 2/21/2018  4:17 PM CST -----  Urine shows minimal kidney damage from the diabetes.  It is actually much better than last year.  No changes in medications.  Followup as scheduled.

## 2018-02-28 NOTE — TELEPHONE ENCOUNTER
"----- Message from Eliza Mendoza sent at 2/28/2018 11:09 AM CST -----  Contact: Patient herself  X  1st Request  _  2nd Request  _  3rd Request    Who: Wendy Viveros (mrn# [id number])    Why: Patient called and said, "she's returning your call and would like you to please call again."      Please do so at your earliest convenience.       THANKS!    What Number to Call Back:  [phone]    When to Expect a call back: (Before the end of the day)   -- if the call is after 12:00, the call back will be tomorrow.                          "

## 2018-03-06 ENCOUNTER — PATIENT MESSAGE (OUTPATIENT)
Dept: INTERNAL MEDICINE | Facility: CLINIC | Age: 63
End: 2018-03-06

## 2018-03-06 DIAGNOSIS — E11.9 DIABETES MELLITUS WITHOUT COMPLICATION: Primary | ICD-10-CM

## 2018-03-06 RX ORDER — INSULIN GLARGINE 100 [IU]/ML
45 INJECTION, SOLUTION SUBCUTANEOUS NIGHTLY
Qty: 13.5 ML | Refills: 11 | Status: SHIPPED | OUTPATIENT
Start: 2018-03-06 | End: 2018-05-01 | Stop reason: CLARIF

## 2018-03-13 ENCOUNTER — HOSPITAL ENCOUNTER (OUTPATIENT)
Dept: RADIOLOGY | Facility: OTHER | Age: 63
Discharge: HOME | End: 2018-03-13
Attending: FAMILY MEDICINE
Payer: MEDICARE

## 2018-03-13 DIAGNOSIS — Z12.31 SCREENING MAMMOGRAM, ENCOUNTER FOR: ICD-10-CM

## 2018-03-13 PROCEDURE — 77067 SCR MAMMO BI INCL CAD: CPT | Mod: TC

## 2018-03-13 PROCEDURE — 77067 SCR MAMMO BI INCL CAD: CPT | Mod: 26,,, | Performed by: RADIOLOGY

## 2018-03-13 PROCEDURE — 77063 BREAST TOMOSYNTHESIS BI: CPT | Mod: 26,,, | Performed by: RADIOLOGY

## 2018-03-16 ENCOUNTER — PATIENT MESSAGE (OUTPATIENT)
Dept: INTERNAL MEDICINE | Facility: CLINIC | Age: 63
End: 2018-03-16

## 2018-03-16 NOTE — TELEPHONE ENCOUNTER
Pt inform that medication is ready at [facility] but pt states she wants all her scripts to go to [facility] here. [facility] remove and correct rx remains.Pt agrees and verbalize.

## 2018-03-19 RX ORDER — SYRINGE,SAFETY WITH NEEDLE,1ML 25GX1"
SYRINGE (EA) MISCELLANEOUS
Qty: 200 EACH | Refills: 0 | Status: SHIPPED | OUTPATIENT
Start: 2018-03-19 | End: 2018-04-26 | Stop reason: SDUPTHER

## 2018-04-13 ENCOUNTER — OFFICE VISIT (OUTPATIENT)
Dept: NEPHROLOGY | Facility: CLINIC | Age: 63
End: 2018-04-13
Payer: MEDICARE

## 2018-04-13 VITALS
WEIGHT: 235.25 LBS | BODY MASS INDEX: 39.2 KG/M2 | SYSTOLIC BLOOD PRESSURE: 150 MMHG | HEIGHT: 65 IN | OXYGEN SATURATION: 96 % | HEART RATE: 80 BPM | DIASTOLIC BLOOD PRESSURE: 80 MMHG

## 2018-04-13 DIAGNOSIS — E11.22 CONTROLLED TYPE 2 DIABETES MELLITUS WITH STAGE 3 CHRONIC KIDNEY DISEASE, WITH LONG-TERM CURRENT USE OF INSULIN: Primary | ICD-10-CM

## 2018-04-13 DIAGNOSIS — Z79.4 CONTROLLED TYPE 2 DIABETES MELLITUS WITH STAGE 3 CHRONIC KIDNEY DISEASE, WITH LONG-TERM CURRENT USE OF INSULIN: Primary | ICD-10-CM

## 2018-04-13 DIAGNOSIS — N18.30 CONTROLLED TYPE 2 DIABETES MELLITUS WITH STAGE 3 CHRONIC KIDNEY DISEASE, WITH LONG-TERM CURRENT USE OF INSULIN: Primary | ICD-10-CM

## 2018-04-13 PROCEDURE — 3044F HG A1C LEVEL LT 7.0%: CPT | Mod: CPTII,S$GLB,, | Performed by: INTERNAL MEDICINE

## 2018-04-13 PROCEDURE — 3079F DIAST BP 80-89 MM HG: CPT | Mod: CPTII,S$GLB,, | Performed by: INTERNAL MEDICINE

## 2018-04-13 PROCEDURE — 99204 OFFICE O/P NEW MOD 45 MIN: CPT | Mod: S$GLB,,, | Performed by: INTERNAL MEDICINE

## 2018-04-13 PROCEDURE — 99999 PR PBB SHADOW E&M-EST. PATIENT-LVL III: CPT | Mod: PBBFAC,,, | Performed by: INTERNAL MEDICINE

## 2018-04-13 PROCEDURE — 3077F SYST BP >= 140 MM HG: CPT | Mod: CPTII,S$GLB,, | Performed by: INTERNAL MEDICINE

## 2018-04-13 NOTE — LETTER
April 16, 2018      Roger Miller MD  [address]  [facility] [address]           [facility] - Nephrology  [address]  [facility] [address]  Phone: [phone]  Fax: [phone]          Patient: Wendy Viveros   MR Number: [id number]   YOB: 1955   Date of Visit: 4/13/2018       Dear Dr. Roger Miller:    Thank you for referring Wendy Viveros to me for evaluation. Attached you will find relevant portions of my assessment and plan of care.    If you have questions, please do not hesitate to call me. I look forward to following Wendy Viveros along with you.    Sincerely,        Enclosure  CC:  No Recipients    If you would like to receive this communication electronically, please contact [email or web address] or [phone] to request more information on [facility] access.    For providers and/or their staff who would like to refer a patient to [facility], please contact us through our one-stop-shop provider referral line, [facility], at [phone].    If you feel you have received this communication in error or would no longer like to receive these types of communications, please e-mail [email or web address]

## 2018-04-17 NOTE — PROGRESS NOTES
Subjective:       Patient ID: Wendy Viveros is a 62 y.o. Black or  female who presents for new evaluation of Chronic Kidney Disease    HPI  Ms. Viveros is a 62 year old woman with medical history of diabetes, hypertension presenting for evaluation of chronic kidney disease.  Patient creatinine mainly 1.2-1.3mg/dL for the past few years.  Patient reports blood sugars well-controlled, blood pressure better controlled at home (reports white coat hypertension).  She denies any NSAID use, reports adequate fluid intake.  She otherwise denies any fever, chest pain, shortness of breath, abdominal pain, diarrhea, dysuria/hematuria.      Review of Systems   Constitutional: Negative for appetite change, fatigue and fever.   Respiratory: Negative for chest tightness and shortness of breath.    Cardiovascular: Negative for chest pain and leg swelling.   Gastrointestinal: Negative for abdominal pain, constipation, diarrhea, nausea and vomiting.   Genitourinary: Negative for difficulty urinating, dysuria, flank pain, frequency, hematuria and urgency.   Musculoskeletal: Negative for arthralgias, joint swelling and myalgias.   Skin: Negative for rash and wound.   Neurological: Negative for dizziness, weakness and light-headedness.   All other systems reviewed and are negative.      Objective:      Physical Exam   Constitutional: She appears well-developed and well-nourished.   Cardiovascular: Normal rate, regular rhythm and normal heart sounds.  Exam reveals no gallop and no friction rub.    No murmur heard.  Pulmonary/Chest: Effort normal and breath sounds normal. No respiratory distress. She has no wheezes. She has no rales.   Abdominal: Soft. Bowel sounds are normal. There is no tenderness.   Musculoskeletal: She exhibits no edema.   Neurological: She is alert.   Skin: Skin is warm and dry. No rash noted. No erythema.   Psychiatric: She has a normal mood and affect.       Assessment:       1. Controlled type 2  diabetes mellitus with stage 3 chronic kidney disease, with long-term current use of insulin        Plan:     Ms. Viveros is a 62 year old woman with medical history of diabetes, hypertension presenting for evaluation of chronic kidney disease.  Patient creatinine mainly 1.2-1.3mg/dL for the past few years, consistent with CKD stage IIIa, likely due to diabetic nephropathy.  Patient microalbuminuria improved since last year, will continue to trend along with creatinine (not on ACE/ARB due to side effects).  Stressed importance of blood pressure/glycemic control, along with weight loss and avoidance of any/all NSAID's, to prevent any further progression of kidney disease, patient voiced understanding.     - Anemia: Hgb at goal, no indication for erythropoetin therapy  - Bone/mineral metabolism: PTH previously at goal for stage CKD, will continue to trend    Return to clinic in 6 months with renal/heme panel, iron/TIBC/ferritin, urinalysis/culture, urine protein/creatinine ratio, PTH/VitD prior to next visit

## 2018-04-26 ENCOUNTER — PATIENT MESSAGE (OUTPATIENT)
Dept: INTERNAL MEDICINE | Facility: CLINIC | Age: 63
End: 2018-04-26

## 2018-04-26 DIAGNOSIS — M10.9 GOUT, UNSPECIFIED CAUSE, UNSPECIFIED CHRONICITY, UNSPECIFIED SITE: ICD-10-CM

## 2018-04-26 DIAGNOSIS — E11.9 DIABETES MELLITUS WITHOUT COMPLICATION: ICD-10-CM

## 2018-04-26 DIAGNOSIS — E11.9 DIABETES MELLITUS WITHOUT COMPLICATION: Primary | ICD-10-CM

## 2018-04-26 RX ORDER — CALCIUM CARB/VITAMIN D3/VIT K1 500-100-40
TABLET,CHEWABLE ORAL
Qty: 300 EACH | Refills: 3 | Status: SHIPPED | OUTPATIENT
Start: 2018-04-26 | End: 2021-03-02

## 2018-04-26 RX ORDER — INSULIN ASPART 100 [IU]/ML
15 INJECTION, SOLUTION INTRAVENOUS; SUBCUTANEOUS
Qty: 30 ML | Refills: 5 | Status: SHIPPED | OUTPATIENT
Start: 2018-04-26 | End: 2019-05-14 | Stop reason: SDUPTHER

## 2018-04-26 RX ORDER — SYRINGE,SAFETY WITH NEEDLE,1ML 25GX1"
SYRINGE (EA) MISCELLANEOUS
Qty: 200 EACH | Refills: 0 | Status: SHIPPED | OUTPATIENT
Start: 2018-04-26 | End: 2021-03-02

## 2018-04-26 RX ORDER — INDOMETHACIN 50 MG/1
CAPSULE ORAL
Qty: 90 CAPSULE | Refills: 0 | Status: SHIPPED | OUTPATIENT
Start: 2018-04-26 | End: 2018-05-01

## 2018-04-26 RX ORDER — CALCIUM CARB/VITAMIN D3/VIT K1 500-100-40
TABLET,CHEWABLE ORAL
Qty: 300 EACH | Refills: 3 | Status: CANCELLED | COMMUNITY
Start: 2018-04-26

## 2018-04-26 RX ORDER — INDOMETHACIN 50 MG/1
CAPSULE ORAL
Qty: 90 CAPSULE | Refills: 0 | Status: SHIPPED | OUTPATIENT
Start: 2018-04-26 | End: 2018-12-02 | Stop reason: SDUPTHER

## 2018-04-26 RX ORDER — INSULIN ASPART 100 [IU]/ML
15 INJECTION, SOLUTION INTRAVENOUS; SUBCUTANEOUS
Qty: 40.5 ML | Refills: 3 | Status: CANCELLED | OUTPATIENT
Start: 2018-04-26 | End: 2019-04-26

## 2018-04-26 RX ORDER — SYRINGE,SAFETY WITH NEEDLE,1ML 25GX1"
SYRINGE (EA) MISCELLANEOUS
Qty: 200 EACH | Refills: 0 | Status: CANCELLED | OUTPATIENT
Start: 2018-04-26

## 2018-05-01 ENCOUNTER — OFFICE VISIT (OUTPATIENT)
Dept: INTERNAL MEDICINE | Facility: CLINIC | Age: 63
End: 2018-05-01
Attending: FAMILY MEDICINE
Payer: MEDICARE

## 2018-05-01 VITALS
HEART RATE: 63 BPM | WEIGHT: 232.13 LBS | HEIGHT: 65 IN | BODY MASS INDEX: 38.67 KG/M2 | DIASTOLIC BLOOD PRESSURE: 86 MMHG | SYSTOLIC BLOOD PRESSURE: 170 MMHG | OXYGEN SATURATION: 98 %

## 2018-05-01 DIAGNOSIS — N18.30 CKD (CHRONIC KIDNEY DISEASE) STAGE 3, GFR 30-59 ML/MIN: ICD-10-CM

## 2018-05-01 DIAGNOSIS — E11.8 TYPE 2 DIABETES MELLITUS WITH COMPLICATION, WITH LONG-TERM CURRENT USE OF INSULIN: Primary | ICD-10-CM

## 2018-05-01 DIAGNOSIS — Z79.4 TYPE 2 DIABETES MELLITUS WITH COMPLICATION, WITH LONG-TERM CURRENT USE OF INSULIN: Primary | ICD-10-CM

## 2018-05-01 DIAGNOSIS — I10 ESSENTIAL HYPERTENSION: ICD-10-CM

## 2018-05-01 PROCEDURE — 99214 OFFICE O/P EST MOD 30 MIN: CPT | Mod: S$GLB,,, | Performed by: FAMILY MEDICINE

## 2018-05-01 PROCEDURE — 99499 UNLISTED E&M SERVICE: CPT | Mod: S$GLB,,, | Performed by: FAMILY MEDICINE

## 2018-05-01 PROCEDURE — 3079F DIAST BP 80-89 MM HG: CPT | Mod: CPTII,S$GLB,, | Performed by: FAMILY MEDICINE

## 2018-05-01 PROCEDURE — 99999 PR PBB SHADOW E&M-EST. PATIENT-LVL III: CPT | Mod: PBBFAC,,, | Performed by: FAMILY MEDICINE

## 2018-05-01 PROCEDURE — 3044F HG A1C LEVEL LT 7.0%: CPT | Mod: CPTII,S$GLB,, | Performed by: FAMILY MEDICINE

## 2018-05-01 PROCEDURE — 3077F SYST BP >= 140 MM HG: CPT | Mod: CPTII,S$GLB,, | Performed by: FAMILY MEDICINE

## 2018-05-01 RX ORDER — INSULIN DEGLUDEC 100 U/ML
45 INJECTION, SOLUTION SUBCUTANEOUS NIGHTLY
Qty: 15 ML | Refills: 11 | Status: SHIPPED | OUTPATIENT
Start: 2018-05-01 | End: 2019-02-06

## 2018-05-01 RX ORDER — INSULIN DEGLUDEC 100 U/ML
45 INJECTION, SOLUTION SUBCUTANEOUS NIGHTLY
Qty: 15 ML | Refills: 11 | Status: SHIPPED | OUTPATIENT
Start: 2018-05-01 | End: 2019-05-01

## 2018-05-02 NOTE — PROGRESS NOTES
"CHIEF COMPLAINT:  Follow-up in a pt w/diabetes and gout    HISTORY OF PRESENT ILLNESS:  This 62-year-old woman is here for follow-up of her diabetes.  Her insurance company recently changed which insulin they will cover.  We need to go ahead and get her on the new prescription for long-acting insulin.. She is really without complaints today.  Most recent A1c was very good.    She has CKD stage III with a baseline creatinine of 1.2.  She has tried many therapies for her gout.  She has true hyperuricemia.  She notes that only Indocin works for her.  She sees nephrology.    She is diabetic taking only insulin.  She is no longer taking metformin due to diarrhea.  She has been feeling well recently.     She is doing well on her antihypertensive medications.      The patient has a history of stable hyperlipidemia on current medications.  The patient denies chest pain or shortness of breath today.  The patient denies muscle aches or myalgias suggestive of myositis.    The patient used to see Dr. Grubbs.      MEDICAL HISTORY:  1.  Diabetes   2.  Hypertension.  3.  Hyperlipidemia.  4.  Gout.  5.  Osteoarthritis, particularly the right knee, which is bone on bone.  6.  Esophageal reflux.    REVIEW OF SYSTEMS:  Chronically overweight.  She gets a yearly eye exam and has   no known diabetic retinopathy.  No chest pain or shortness of breath.  No GI   symptoms.  No headaches or visual changes.  Overdue for routine colonoscopy, which has been recommended.    PHYSICAL EXAMINATION:  Blood pressure (!) 170/86, pulse 63, height 5' 5" (1.651 m), weight 105.3 kg (232 lb 2.3 oz), SpO2 98 %.    APPEARANCE:  Very pleasant 62-year-old black female.  HEENT: Normocephalic atraumatic anicteric  HEART:  Regular rhythm.  LUNGS:  Clear.  ABDOMEN:  Obese, soft, nontender.  Protective Sensation (w/ 10 gram monofilament): Intact  Visual Inspection (Evidence of Foot Deformity, Ulceration, Nails Intact, Skin Intact): Normal  Pedal Pulses: " Present    Assessment:   Diabetes, condition is well controlled on current medication regimen    Hypertension, condition is not well controlled  Hyperlipidemia  CKD 3    PLAN:  1.  Tresiba 45 units qhs.     2.  NovoLog 15 units with meals.  3.  endocrine and rheumatology.  4.  She will continue on antihypertensive medications.  Nephrology following  RTC 6 months  Follow-up with diabetic education in 2 weeks to make sure that the new insulin and its dose are still effective  Answers for HPI/ROS submitted by the patient on 4/29/2018   Diabetes problem  Diabetes type: type 2  MedicAlert ID: No  Disease duration: 9 years  blurred vision: No  chest pain: No  fatigue: No  foot paresthesias: Yes  foot ulcerations: No  polydipsia: No  polyphagia: No  polyuria: No  visual change: No  weakness: No  weight loss: No  Symptom course: stable  confusion: No  dizziness: No  headaches: No  hunger: No  mood changes: No  nervous/anxious: No  pallor: No  seizures: No  sleepiness: No  speech difficulty: No  sweats: No  tremors: No  blackouts: No  hospitalization: No  nocturnal hypoglycemia: No  required assistance: No  required glucagon: No  CVA: No  heart disease: No  impotence: No  nephropathy: Yes  peripheral neuropathy: Yes  PVD: No  retinopathy: No  autonomic neuropathy: No  CAD risks: family history, hypertension, obesity  Current treatments: diet, insulin injections  Treatment compliance: all of the time  Dose schedule: pre-breakfast, pre-lunch, pre-dinner, at bedtime  Given by: patient  Injection sites: abdominal wall, thighs  Home blood tests: 3-4 x per day  Home urines: <1 x per month  Monitoring compliance: good  Blood glucose trend: no change  Weight trend: stable  Current diet: generally healthy  Meal planning: none, avoidance of concentrated sweets  Exercise: daily  Dietitian visit: No  Eye exam current: Yes  Sees podiatrist: No

## 2018-05-02 NOTE — PROGRESS NOTES
Patient, Wendy Viveros (MRN #[id number]), presented with a recorded BMI of 38.63 kg/m^2 and a documented comorbidity(s):  - Diabetes Mellitus Type 2  - Hypertension  to which the severe obesity is a contributing factor. This is consistent with the definition of severe obesity (BMI 35.0-35.9) with comorbidity (ICD-10 E66.01, Z68.35). The patient's severe obesity was monitored, evaluated, addressed and/or treated. This addendum to the medical record is made on 05/02/2018.

## 2018-06-01 DIAGNOSIS — I10 HYPERTENSION, UNSPECIFIED TYPE: Primary | ICD-10-CM

## 2018-06-01 RX ORDER — AMLODIPINE BESYLATE 10 MG/1
10 TABLET ORAL DAILY
Qty: 90 TABLET | Refills: 0 | Status: SHIPPED | OUTPATIENT
Start: 2018-06-01 | End: 2018-09-11 | Stop reason: SDUPTHER

## 2018-06-01 RX ORDER — AMLODIPINE BESYLATE 10 MG/1
10 TABLET ORAL DAILY
Qty: 90 TABLET | Refills: 3 | Status: CANCELLED | OUTPATIENT
Start: 2018-06-01 | End: 2019-06-01

## 2018-08-08 ENCOUNTER — OFFICE VISIT (OUTPATIENT)
Dept: INTERNAL MEDICINE | Facility: CLINIC | Age: 63
End: 2018-08-08
Attending: FAMILY MEDICINE
Payer: MEDICARE

## 2018-08-08 ENCOUNTER — LAB VISIT (OUTPATIENT)
Dept: LAB | Facility: OTHER | Age: 63
End: 2018-08-08
Attending: FAMILY MEDICINE
Payer: MEDICARE

## 2018-08-08 VITALS
SYSTOLIC BLOOD PRESSURE: 128 MMHG | OXYGEN SATURATION: 97 % | DIASTOLIC BLOOD PRESSURE: 76 MMHG | HEIGHT: 65 IN | BODY MASS INDEX: 38.82 KG/M2 | WEIGHT: 233 LBS | HEART RATE: 69 BPM

## 2018-08-08 DIAGNOSIS — E78.01 HYPERLIPIDEMIA TYPE II: ICD-10-CM

## 2018-08-08 DIAGNOSIS — N18.30 CKD (CHRONIC KIDNEY DISEASE) STAGE 3, GFR 30-59 ML/MIN: ICD-10-CM

## 2018-08-08 DIAGNOSIS — Z79.4 TYPE 2 DIABETES MELLITUS WITH COMPLICATION, WITH LONG-TERM CURRENT USE OF INSULIN: ICD-10-CM

## 2018-08-08 DIAGNOSIS — Z79.4 TYPE 2 DIABETES MELLITUS WITH COMPLICATION, WITH LONG-TERM CURRENT USE OF INSULIN: Primary | ICD-10-CM

## 2018-08-08 DIAGNOSIS — I10 ESSENTIAL HYPERTENSION: ICD-10-CM

## 2018-08-08 DIAGNOSIS — E11.8 TYPE 2 DIABETES MELLITUS WITH COMPLICATION, WITH LONG-TERM CURRENT USE OF INSULIN: Primary | ICD-10-CM

## 2018-08-08 DIAGNOSIS — E11.8 TYPE 2 DIABETES MELLITUS WITH COMPLICATION, WITH LONG-TERM CURRENT USE OF INSULIN: ICD-10-CM

## 2018-08-08 DIAGNOSIS — Z12.4 PAP SMEAR FOR CERVICAL CANCER SCREENING: ICD-10-CM

## 2018-08-08 LAB
ALBUMIN SERPL BCP-MCNC: 4.2 G/DL
ALP SERPL-CCNC: 102 U/L
ALT SERPL W/O P-5'-P-CCNC: 13 U/L
ANION GAP SERPL CALC-SCNC: 11 MMOL/L
AST SERPL-CCNC: 12 U/L
BILIRUB SERPL-MCNC: 2.3 MG/DL
BUN SERPL-MCNC: 23 MG/DL
CALCIUM SERPL-MCNC: 10.8 MG/DL
CHLORIDE SERPL-SCNC: 106 MMOL/L
CO2 SERPL-SCNC: 26 MMOL/L
CREAT SERPL-MCNC: 1.4 MG/DL
EST. GFR  (AFRICAN AMERICAN): 46 ML/MIN/1.73 M^2
EST. GFR  (NON AFRICAN AMERICAN): 40 ML/MIN/1.73 M^2
ESTIMATED AVG GLUCOSE: 134 MG/DL
GLUCOSE SERPL-MCNC: 153 MG/DL
HBA1C MFR BLD HPLC: 6.3 %
POTASSIUM SERPL-SCNC: 4.2 MMOL/L
PROT SERPL-MCNC: 8.1 G/DL
SODIUM SERPL-SCNC: 143 MMOL/L

## 2018-08-08 PROCEDURE — 3044F HG A1C LEVEL LT 7.0%: CPT | Mod: CPTII,S$GLB,, | Performed by: FAMILY MEDICINE

## 2018-08-08 PROCEDURE — 83036 HEMOGLOBIN GLYCOSYLATED A1C: CPT

## 2018-08-08 PROCEDURE — 80053 COMPREHEN METABOLIC PANEL: CPT

## 2018-08-08 PROCEDURE — 99214 OFFICE O/P EST MOD 30 MIN: CPT | Mod: S$GLB,,, | Performed by: FAMILY MEDICINE

## 2018-08-08 PROCEDURE — 99999 PR PBB SHADOW E&M-EST. PATIENT-LVL IV: CPT | Mod: PBBFAC,,, | Performed by: FAMILY MEDICINE

## 2018-08-08 PROCEDURE — 3074F SYST BP LT 130 MM HG: CPT | Mod: CPTII,S$GLB,, | Performed by: FAMILY MEDICINE

## 2018-08-08 PROCEDURE — 3008F BODY MASS INDEX DOCD: CPT | Mod: CPTII,S$GLB,, | Performed by: FAMILY MEDICINE

## 2018-08-08 PROCEDURE — 3078F DIAST BP <80 MM HG: CPT | Mod: CPTII,S$GLB,, | Performed by: FAMILY MEDICINE

## 2018-08-08 PROCEDURE — 36415 COLL VENOUS BLD VENIPUNCTURE: CPT

## 2018-08-08 RX ORDER — GABAPENTIN 100 MG/1
100 CAPSULE ORAL 3 TIMES DAILY
Qty: 90 CAPSULE | Refills: 11 | Status: SHIPPED | OUTPATIENT
Start: 2018-08-08 | End: 2018-09-04

## 2018-08-08 RX ORDER — PEN NEEDLE, DIABETIC 31 GX5/16"
NEEDLE, DISPOSABLE MISCELLANEOUS
COMMUNITY
Start: 2018-05-01 | End: 2018-09-04 | Stop reason: SDUPTHER

## 2018-08-08 NOTE — PROGRESS NOTES
"CHIEF COMPLAINT:  Follow-up in a pt w/diabetes and gout    HISTORY OF PRESENT ILLNESS:  This 62-year-old woman is here for follow-up of her diabetes.  She does well on long-acting insulin.  Most recent A1c was very good.    Recently started having bilateral ft pain most consistent with diabetic neuropathy.    She has CKD stage III with a baseline creatinine of 1.2.  She has tried many therapies for her gout.  She has true hyperuricemia.  She notes that only Indocin works for her.  She sees nephrology.    She is diabetic taking only insulin.  She is no longer taking metformin due to diarrhea.  She has been feeling well recently.     She is doing well on her antihypertensive medications.      The patient has a history of stable hyperlipidemia on current medications.  The patient denies chest pain or shortness of breath today.  The patient denies muscle aches or myalgias suggestive of myositis.    The patient used to see Dr. Grubbs.      MEDICAL HISTORY:  1.  Diabetes   2.  Hypertension.  3.  Hyperlipidemia.  4.  Gout.  5.  Osteoarthritis, particularly the right knee, which is bone on bone.  6.  Esophageal reflux.    REVIEW OF SYSTEMS:  Chronically overweight.  She gets a yearly eye exam and has   no known diabetic retinopathy.  No chest pain or shortness of breath.  No GI   symptoms.  No headaches or visual changes.  Overdue for routine colonoscopy, which has been recommended.    PHYSICAL EXAMINATION:  Blood pressure 128/76, pulse 69, height 5' 5" (1.651 m), weight 105.7 kg (233 lb 0.4 oz), SpO2 97 %.    APPEARANCE:  Very pleasant 62-year-old black female.  HEENT: Normocephalic atraumatic anicteric  HEART:  Regular rhythm.  LUNGS:  Clear.  ABDOMEN:  Obese, soft, nontender.  Protective Sensation (w/ 10 gram monofilament): Intact  Visual Inspection (Evidence of Foot Deformity, Ulceration, Nails Intact, Skin Intact): Normal  Pedal Pulses: Present    Assessment:   Diabetes, condition is well controlled on current " medication regimen    Hypertension, condition is not well controlled  Hyperlipidemia  CKD 3  jocelyn foot pain c/w diabetic neuropathy    PLAN:  1.  Tresiba 45 units qhs.     2.  NovoLog 15 units with meals.  3.  endocrine and rheumatology.  4.  A1c and CMP today  Nephrology following  Gabapentin  Podiatry  RTC 6 months          Answers for HPI/ROS submitted by the patient on 8/6/2018   Diabetes problem  Diabetes type: type 2  MedicAlert ID: No  Disease duration: 9 years  blurred vision: No  chest pain: No  fatigue: No  foot paresthesias: Yes  foot ulcerations: No  polydipsia: No  polyphagia: No  polyuria: No  visual change: No  weakness: No  weight loss: No  Symptom course: improving  confusion: No  dizziness: Yes  headaches: No  hunger: No  mood changes: No  nervous/anxious: No  pallor: No  seizures: No  sleepiness: No  speech difficulty: No  sweats: No  tremors: No  blackouts: No  hospitalization: No  nocturnal hypoglycemia: No  required assistance: No  required glucagon: No  CVA: No  heart disease: No  impotence: No  nephropathy: Yes  peripheral neuropathy: Yes  PVD: No  retinopathy: No  autonomic neuropathy: Yes  CAD risks: hypertension, obesity  Current treatments: diet, insulin injections, oral agent (dual therapy)  Treatment compliance: all of the time  Dose schedule: pre-breakfast, pre-lunch, pre-dinner, at bedtime  Given by: patient  Injection sites: abdominal wall, thighs  Home blood tests: 3-4 x per day  Home urines: <1 x per month  Monitoring compliance: good  Blood glucose trend: decreasing steadily  Weight trend: fluctuating minimally  Current diet: low salt  Meal planning: avoidance of concentrated sweets  Exercise: every other day  Dietitian visit: No  Eye exam current: Yes  Sees podiatrist: No

## 2018-08-09 ENCOUNTER — TELEPHONE (OUTPATIENT)
Dept: INTERNAL MEDICINE | Facility: CLINIC | Age: 63
End: 2018-08-09

## 2018-08-10 DIAGNOSIS — Z12.11 COLON CANCER SCREENING: ICD-10-CM

## 2018-08-14 ENCOUNTER — TELEPHONE (OUTPATIENT)
Dept: INTERNAL MEDICINE | Facility: CLINIC | Age: 63
End: 2018-08-14

## 2018-08-14 NOTE — TELEPHONE ENCOUNTER
----- Message from Moraima Plata sent at 8/14/2018  2:52 PM CDT -----  Contact: [facility]            Name of Who is Calling: AUTUMN GRAY [[id number]]      What is the request in detail: PH calling in regards to orders for pt's 1 touch meter.. Please send clinical notes.. Fax: [phone].. Please advise      Can the clinic reply by [facility]: no      What [facility]: [phone]

## 2018-08-22 ENCOUNTER — TELEPHONE (OUTPATIENT)
Dept: INTERNAL MEDICINE | Facility: CLINIC | Age: 63
End: 2018-08-22

## 2018-08-22 NOTE — TELEPHONE ENCOUNTER
----- Message from Sara Neumann sent at 8/22/2018  4:16 PM CDT -----  Contact: Elvia from [employer]  Name of Who is Calling: Elvia from [employer]    What is the request in detail:Elvia from [employer] is requesting a signed order of test frequency and current clinic notes......Please contact to further discuss and advise      Can the clinic reply by [facility]: No  What Number to Call Back if not in [facility]: [phone]

## 2018-09-04 ENCOUNTER — PATIENT MESSAGE (OUTPATIENT)
Dept: INTERNAL MEDICINE | Facility: CLINIC | Age: 63
End: 2018-09-04

## 2018-09-04 DIAGNOSIS — I10 HYPERTENSION, UNSPECIFIED TYPE: ICD-10-CM

## 2018-09-04 DIAGNOSIS — E11.9 DIABETES MELLITUS WITHOUT COMPLICATION: Primary | ICD-10-CM

## 2018-09-04 RX ORDER — AMLODIPINE BESYLATE 10 MG/1
10 TABLET ORAL DAILY
Qty: 90 TABLET | Refills: 0 | OUTPATIENT
Start: 2018-09-04 | End: 2019-09-04

## 2018-09-05 RX ORDER — PEN NEEDLE, DIABETIC 31 GX5/16"
NEEDLE, DISPOSABLE MISCELLANEOUS
Qty: 100 EACH | Refills: 11 | Status: SHIPPED | OUTPATIENT
Start: 2018-09-05 | End: 2018-09-11 | Stop reason: SDUPTHER

## 2018-09-06 ENCOUNTER — TELEPHONE (OUTPATIENT)
Dept: PODIATRY | Facility: CLINIC | Age: 63
End: 2018-09-06

## 2018-09-06 NOTE — TELEPHONE ENCOUNTER
I have attempted without success to contact this patient by phone, Made appointment for 9-24-18 at 9:15am.                                           ----- Message from Lucia Topete sent at 9/6/2018  4:23 PM CDT -----  Contact: Pt Portal Request  From: Wendy Viveros     Sent: 9/6/2018  2:59 PM CDT       To: Lucia PULIDO  Subject: RE: Appointment Request    A regular exam, and because of the diabetes. You can schedule an appointment time any day after 10 am  ----- Message -----  From: Lucia PULIDO  Sent: 9/6/2018 11:42 AM CDT  To: Wnedy Viveros  Subject: RE: Appointment Request  What are you coming to Podiatry for?      ----- Message -----     From: Wendy Viveros     Sent: 9/5/2018 11:24 PM CDT       To: Patient Appointment Schedule Request Mailing List  Subject: Appointment Request    Appointment Request From: Wendy Viveros    With Provider: Dr. Herve Dunlap    Preferred Date Range: Any date 9/12/2018 or later    Preferred Times: Any time    Reason for visit: Diabetic patient who need to see a podiatrist    Comments:  Referred by my primary care Dr. Miller, I'm a diabetic patient who hasn't seen a podiatrist.

## 2018-09-08 DIAGNOSIS — I10 HYPERTENSION, UNSPECIFIED TYPE: ICD-10-CM

## 2018-09-10 ENCOUNTER — PATIENT MESSAGE (OUTPATIENT)
Dept: INTERNAL MEDICINE | Facility: CLINIC | Age: 63
End: 2018-09-10

## 2018-09-10 DIAGNOSIS — E11.9 DIABETES MELLITUS WITHOUT COMPLICATION: ICD-10-CM

## 2018-09-10 DIAGNOSIS — I10 HYPERTENSION, UNSPECIFIED TYPE: ICD-10-CM

## 2018-09-10 RX ORDER — AMLODIPINE BESYLATE 10 MG/1
10 TABLET ORAL DAILY
Qty: 90 TABLET | Refills: 0 | Status: CANCELLED | OUTPATIENT
Start: 2018-09-10 | End: 2019-09-10

## 2018-09-10 RX ORDER — AMLODIPINE BESYLATE 10 MG/1
10 TABLET ORAL DAILY
Qty: 90 TABLET | Refills: 0 | OUTPATIENT
Start: 2018-09-10 | End: 2019-09-10

## 2018-09-11 RX ORDER — AMLODIPINE BESYLATE 10 MG/1
10 TABLET ORAL DAILY
Qty: 90 TABLET | Refills: 0 | Status: SHIPPED | OUTPATIENT
Start: 2018-09-11 | End: 2018-12-08 | Stop reason: SDUPTHER

## 2018-09-11 RX ORDER — PEN NEEDLE, DIABETIC 31 GX5/16"
NEEDLE, DISPOSABLE MISCELLANEOUS
Qty: 100 EACH | Refills: 11 | Status: SHIPPED | OUTPATIENT
Start: 2018-09-11 | End: 2021-02-28 | Stop reason: SDUPTHER

## 2018-09-18 ENCOUNTER — OFFICE VISIT (OUTPATIENT)
Dept: OBSTETRICS AND GYNECOLOGY | Facility: CLINIC | Age: 63
End: 2018-09-18
Attending: OBSTETRICS & GYNECOLOGY
Payer: MEDICARE

## 2018-09-18 VITALS
SYSTOLIC BLOOD PRESSURE: 136 MMHG | WEIGHT: 240.75 LBS | DIASTOLIC BLOOD PRESSURE: 76 MMHG | BODY MASS INDEX: 40.06 KG/M2

## 2018-09-18 DIAGNOSIS — Z78.0 POSTMENOPAUSAL STATUS: ICD-10-CM

## 2018-09-18 DIAGNOSIS — Z12.4 PAP SMEAR FOR CERVICAL CANCER SCREENING: ICD-10-CM

## 2018-09-18 DIAGNOSIS — Z01.419 WELL WOMAN EXAM WITH ROUTINE GYNECOLOGICAL EXAM: Primary | ICD-10-CM

## 2018-09-18 PROCEDURE — 88175 CYTOPATH C/V AUTO FLUID REDO: CPT

## 2018-09-18 PROCEDURE — G0101 CA SCREEN;PELVIC/BREAST EXAM: HCPCS | Mod: S$PBB,,, | Performed by: OBSTETRICS & GYNECOLOGY

## 2018-09-18 PROCEDURE — 3078F DIAST BP <80 MM HG: CPT | Mod: CPTII,,, | Performed by: OBSTETRICS & GYNECOLOGY

## 2018-09-18 PROCEDURE — 99212 OFFICE O/P EST SF 10 MIN: CPT | Mod: PBBFAC | Performed by: OBSTETRICS & GYNECOLOGY

## 2018-09-18 PROCEDURE — 99999 PR PBB SHADOW E&M-EST. PATIENT-LVL II: CPT | Mod: PBBFAC,,, | Performed by: OBSTETRICS & GYNECOLOGY

## 2018-09-18 PROCEDURE — 3075F SYST BP GE 130 - 139MM HG: CPT | Mod: CPTII,,, | Performed by: OBSTETRICS & GYNECOLOGY

## 2018-09-18 NOTE — LETTER
September 18, 2018      Roger Miller MD  [address]  [facility] [address]  [facility] [address]           [facility] [address]  [address]  [facility] [address]  Phone: [phone]  Fax: [phone]          Patient: Wendy Viveros   MR Number: [id number]   YOB: 1955   Date of Visit: 9/18/2018       Dear Dr. Roger Miller:    Thank you for referring Wendy Viveros to me for evaluation. Attached you will find relevant portions of my assessment and plan of care.    If you have questions, please do not hesitate to call me. I look forward to following Wendy Viveros along with you.    Sincerely,    Marko Ferreira MD    Enclosure  CC:  No Recipients    If you would like to receive this communication electronically, please contact [email or web address] or [phone] to request more information on [employer] Link access.    For providers and/or their staff who would like to refer a patient to [facility], please contact us through our one-stop-shop provider referral line, [facility], at [phone].    If you feel you have received this communication in error or would no longer like to receive these types of communications, please e-mail [email or web address]

## 2018-09-18 NOTE — PROGRESS NOTES
Wendy Viveros is a 62 y.o. year old  female who presents for routine GYN exam.  She is postmenopausal, not on HRT.  No bleeding.  Reports some hot flashes / sweats.  S/P D&C, hysteroscopy, polypectomy 2016 for postmenopausal bleeding- no bleeding since the procedure.  S/P right knee replacement 2018.  Mammogram 3/2018 was negative.  No GYN complaints.    Mammogram 3/13/18: Negative      Past Medical History:   Diagnosis Date    Abnormal EKG     Anemia 2017    Aortic valve regurgitation     Arthritis     CKD (chronic kidney disease) stage 2, GFR 60-89 ml/min 2014    CKD (chronic kidney disease) stage 2, GFR 60-89 ml/min 2014    Diabetes mellitus     Diabetes mellitus type II     GERD (gastroesophageal reflux disease)     Gout, unspecified     Heart murmur     Hyperlipidemia     Hypertension     Tendonitis        Past Surgical History:   Procedure Laterality Date    breast reduction      CHOLECYSTECTOMY      [id number] N/A 2016    [facility]    JOINT REPLACEMENT      KNEE SURGERY  2018    POLYPECTOMY  2016    POLYPECTOMY-HYSTEROSCOPIC N/A 2016    [facility]    TOTAL REDUCTION MAMMOPLASTY      TOTAL SHOULDER ARTHROPLASTY Left     TUBAL LIGATION         OB History      Para Term  AB Living    4 3 3   1 3    SAB TAB Ectopic Multiple Live Births    1                    ROS:  GENERAL: Feeling well overall.   SKIN: Denies rash or lesions.   HEAD: Denies head injury or headache.   NODES: Denies enlarged lymph nodes.   CHEST: Denies chest pain or shortness of breath.   CARDIOVASCULAR: Denies palpitations or left sided chest pain.   ABDOMEN: No abdominal pain, nausea, vomiting or rectal bleeding.   URINARY: No dysuria or hematuria.  REPRODUCTIVE: See HPI.   BREASTS: Denies pain, lumps, or nipple discharge.   HEMATOLOGIC: No easy bruisability or excessive  bleeding.   MUSCULOSKELETAL: Reports left knee discomfort.  NEUROLOGIC: Denies syncope or weakness.   PSYCHIATRIC: Denies depression.     PE:   (chaperone present during entire exam)  APPEARANCE: Well nourished, well developed, in no acute distress.  BREASTS: Symmetrical.  S/P bilateral reduction.  No palpable masses, nipple discharge or adenopathy bilaterally.  ABDOMEN: Soft. No tenderness or masses. No hernias. No CVA tenderness.  VULVA:  Atrophic. No lesions. Normal female genitalia.  URETHRAL MEATUS: Normal size and location, no lesions, no prolapse.  URETHRA: No masses, tenderness, prolapse or scarring.  VAGINA: Atrophic.  No lesions, no discharge, no significant cystocele or rectocele.  CERVIX: No lesions and discharge. PAP done.  UTERUS: Normal size, regular shape, mobile, non-tender, bladder base nontender.  ADNEXA: No masses, tenderness or CDS nodularity.  ANUS PERINEUM: Normal.    Diagnosis:  1. Well woman exam with routine gynecological exam    2. Postmenopausal status    3. Pap smear for cervical cancer screening          PLAN:    Orders Placed This Encounter    Liquid-based pap smear, screening       Patient was counseled today on postmenopausal issues.  She will try Black Cohosh to help with hot flashes.    Follow-up in 1 year.

## 2018-09-21 ENCOUNTER — PATIENT MESSAGE (OUTPATIENT)
Dept: OBSTETRICS AND GYNECOLOGY | Facility: CLINIC | Age: 63
End: 2018-09-21

## 2018-09-24 ENCOUNTER — OFFICE VISIT (OUTPATIENT)
Dept: PODIATRY | Facility: CLINIC | Age: 63
End: 2018-09-24
Payer: MEDICARE

## 2018-09-24 VITALS
BODY MASS INDEX: 37.83 KG/M2 | DIASTOLIC BLOOD PRESSURE: 65 MMHG | HEIGHT: 65 IN | WEIGHT: 227.06 LBS | SYSTOLIC BLOOD PRESSURE: 146 MMHG | HEART RATE: 73 BPM

## 2018-09-24 DIAGNOSIS — N18.2 CKD (CHRONIC KIDNEY DISEASE) STAGE 2, GFR 60-89 ML/MIN: ICD-10-CM

## 2018-09-24 DIAGNOSIS — M20.41 HAMMER TOES OF BOTH FEET: ICD-10-CM

## 2018-09-24 DIAGNOSIS — B35.1 ONYCHOMYCOSIS DUE TO DERMATOPHYTE: ICD-10-CM

## 2018-09-24 DIAGNOSIS — L60.0 INGROWN NAIL: ICD-10-CM

## 2018-09-24 DIAGNOSIS — E11.42 DIABETIC PERIPHERAL NEUROPATHY ASSOCIATED WITH TYPE 2 DIABETES MELLITUS: Primary | ICD-10-CM

## 2018-09-24 DIAGNOSIS — M20.12 VALGUS DEFORMITY OF BOTH GREAT TOES: ICD-10-CM

## 2018-09-24 DIAGNOSIS — M20.42 HAMMER TOES OF BOTH FEET: ICD-10-CM

## 2018-09-24 DIAGNOSIS — M20.11 VALGUS DEFORMITY OF BOTH GREAT TOES: ICD-10-CM

## 2018-09-24 PROCEDURE — 3078F DIAST BP <80 MM HG: CPT | Mod: CPTII,,,

## 2018-09-24 PROCEDURE — 3008F BODY MASS INDEX DOCD: CPT | Mod: CPTII,,,

## 2018-09-24 PROCEDURE — 99213 OFFICE O/P EST LOW 20 MIN: CPT | Mod: PBBFAC,PN,25

## 2018-09-24 PROCEDURE — 3044F HG A1C LEVEL LT 7.0%: CPT | Mod: CPTII,,,

## 2018-09-24 PROCEDURE — 99999 PR PBB SHADOW E&M-EST. PATIENT-LVL III: CPT | Mod: PBBFAC,,,

## 2018-09-24 PROCEDURE — 3077F SYST BP >= 140 MM HG: CPT | Mod: CPTII,,,

## 2018-09-24 PROCEDURE — 99203 OFFICE O/P NEW LOW 30 MIN: CPT | Mod: S$PBB,25,,

## 2018-09-24 PROCEDURE — 11721 DEBRIDE NAIL 6 OR MORE: CPT | Mod: Q9,S$PBB,,

## 2018-09-24 PROCEDURE — 11721 DEBRIDE NAIL 6 OR MORE: CPT | Mod: Q9,PBBFAC,PN

## 2018-09-24 NOTE — PROGRESS NOTES
Subjective:      Patient ID: Wendy Viveros is a 62 y.o. female.    Chief Complaint: Nail Care (phani Miller PCP, 8/8/18 A1C 6.3)    Wendy is a 62 y.o. female who presents to the clinic for evaluation and treatment of high risk feet. Wendy has a past medical history of Abnormal EKG, Anemia (7/13/2017), Aortic valve regurgitation, Arthritis, CKD (chronic kidney disease) stage 2, GFR 60-89 ml/min (8/8/2014), CKD (chronic kidney disease) stage 2, GFR 60-89 ml/min (8/8/2014), Diabetes mellitus, Diabetes mellitus type II, GERD (gastroesophageal reflux disease), Gout, unspecified, Heart murmur, Hyperlipidemia, Hypertension, and Tendonitis. The patient's chief complaint is long, thick toenails. Reports burning to feet.    PCP: Roger Miller MD        Current shoe gear:  Affected Foot: Casual shoes     Unaffected Foot: Casual shoes    Hemoglobin A1C   Date Value Ref Range Status   08/08/2018 6.3 (H) 4.0 - 5.6 % Final     Comment:     ADA Screening Guidelines:  5.7-6.4%  Consistent with prediabetes  >or=6.5%  Consistent with diabetes  High levels of fetal hemoglobin interfere with the HbA1C  assay. Heterozygous hemoglobin variants (HbS, HgC, etc)do  not significantly interfere with this assay.   However, presence of multiple variants may affect accuracy.     02/20/2018 6.0 (H) 4.0 - 5.6 % Final     Comment:     According to ADA guidelines, hemoglobin A1c <7.0% represents  optimal control in non-pregnant diabetic patients. Different  metrics may apply to specific patient populations.   Standards of Medical Care in Diabetes-2016.  For the purpose of screening for the presence of diabetes:  <5.7%     Consistent with the absence of diabetes  5.7-6.4%  Consistent with increasing risk for diabetes   (prediabetes)  >or=6.5%  Consistent with diabetes  Currently, no consensus exists for use of hemoglobin A1c  for diagnosis of diabetes for children.  This Hemoglobin A1c assay has significant  interference with fetal   hemoglobin   (HbF). The results are invalid for patients with abnormal amounts of   HbF,   including those with known Hereditary Persistence   of Fetal Hemoglobin. Heterozygous hemoglobin variants (HbAS, HbAC,   HbAD, HbAE, HbA2) do not significantly interfere with this assay;   however, presence of multiple variants in a sample may impact the %   interference.     07/13/2017 7.0 (H) 4.0 - 5.6 % Final     Comment:     According to ADA guidelines, hemoglobin A1c <7.0% represents  optimal control in non-pregnant diabetic patients. Different  metrics may apply to specific patient populations.   Standards of Medical Care in Diabetes-2016.  For the purpose of screening for the presence of diabetes:  <5.7%     Consistent with the absence of diabetes  5.7-6.4%  Consistent with increasing risk for diabetes   (prediabetes)  >or=6.5%  Consistent with diabetes  Currently, no consensus exists for use of hemoglobin A1c  for diagnosis of diabetes for children.  This Hemoglobin A1c assay has significant interference with fetal   hemoglobin   (HbF). The results are invalid for patients with abnormal amounts of   HbF,   including those with known Hereditary Persistence   of Fetal Hemoglobin. Heterozygous hemoglobin variants (HbAS, HbAC,   HbAD, HbAE, HbA2) do not significantly interfere with this assay;   however, presence of multiple variants in a sample may impact the %   interference.         Past Medical History:   Diagnosis Date    Abnormal EKG     Anemia 7/13/2017    Aortic valve regurgitation     Arthritis     CKD (chronic kidney disease) stage 2, GFR 60-89 ml/min 8/8/2014    CKD (chronic kidney disease) stage 2, GFR 60-89 ml/min 8/8/2014    Diabetes mellitus     Diabetes mellitus type II     GERD (gastroesophageal reflux disease)     Gout, unspecified     Heart murmur     Hyperlipidemia     Hypertension     Tendonitis        Past Surgical History:   Procedure Laterality Date    breast  "reduction      CHOLECYSTECTOMY      [id number] N/A 5/5/2016    [facility]    JOINT REPLACEMENT      KNEE SURGERY  2018    POLYPECTOMY  05/05/2016    POLYPECTOMY-HYSTEROSCOPIC N/A 5/5/2016    [facility] at [facility]    TOTAL REDUCTION MAMMOPLASTY      TOTAL SHOULDER ARTHROPLASTY Left     TUBAL LIGATION         Family History   Problem Relation Age of Onset    Heart disease Mother         MI at 71    Hypertension Mother     Hypertension Brother     Diabetes Brother     Hypertension Brother     Breast cancer Neg Hx     Colon cancer Neg Hx     Ovarian cancer Neg Hx        Social History     Socioeconomic History    Marital status: Single     Spouse name: None    Number of children: None    Years of education: None    Highest education level: None   Social Needs    Financial resource strain: None    Food insecurity - worry: None    Food insecurity - inability: None    Transportation needs - medical: None    Transportation needs - non-medical: None   Occupational History    None   Tobacco Use    Smoking status: Never Smoker   Substance and Sexual Activity    Alcohol use: No     Alcohol/week: 0.0 oz    Drug use: No    Sexual activity: No     Partners: Male     Birth control/protection: None   Other Topics Concern    None   Social History Narrative    Retired from Dept of        Current Outpatient Medications   Medication Sig Dispense Refill    allopurinol (ZYLOPRIM) 300 MG tablet Take 1 tablet (300 mg total) by mouth once daily. 90 tablet 3    amLODIPine (NORVASC) 10 MG tablet Take 1 tablet (10 mg total) by mouth once daily. 90 tablet 0    aspirin 81 mg Tab Take 1 tablet by mouth every evening.       BD ULTRA-FINE SHORT PEN NEEDLE 31 gauge x 5/16" Ndle Pt is injecting 4 times daily. 100 each 11    blood sugar diagnostic Strp Pt tests tid 300 each 3    blood-glucose meter Misc One Touch " Verio  Please provide strips to test qid 1 each 0    cholecalciferol, vitamin D3, 2,000 unit Tab Take 1 tablet by mouth once daily.       cloNIDine (CATAPRES) 0.1 MG tablet Take 1 tablet (0.1 mg total) by mouth 2 (two) times daily. (Patient taking differently: Take 0.1 mg by mouth once. ) 180 tablet 3    cyanocobalamin, vitamin B-12, (VITAMIN B-12) 50 mcg tablet Take 50 mcg by mouth once daily.      cyclobenzaprine (FLEXERIL) 10 MG tablet TAKE ONE TABLET BY MOUTH ONCE AT NIGHT 30 tablet 2    indomethacin (INDOCIN) 50 MG capsule TAKE ONE CAPSULE BY MOUTH THREE TIMES A DAY AS NEEDED 90 capsule 0    insulin aspart U-100 (NOVOLOG) 100 unit/mL InPn pen Inject 15 Units into the skin 3 (three) times daily with meals. 30 mL 5    insulin degludec (TRESIBA FLEXTOUCH U-100) 100 unit/mL (3 mL) InPn Inject 45 Units into the skin every evening. 15 mL 11    insulin degludec (TRESIBA FLEXTOUCH U-100) 100 unit/mL (3 mL) InPn Inject 45 Units into the skin every evening. 15 mL 11    insulin syringe-needle U-100 0.3 mL 31 gauge x 5/16 Syrg Pt is injecting 4 times daily 300 each 3    insulin syringe-needle U-100 1/2 mL 30 gauge x 5/16 Syrg USE AS DIRECTED 4 TIMES DAILY 200 each 0    lancets Misc by Misc.(Non-Drug; Combo Route) route. Pt is testing 3 times daily      metoprolol tartrate (LOPRESSOR) 50 MG tablet Take 1 tablet (50 mg total) by mouth once daily. 90 tablet 3    ranitidine (ZANTAC 75) 75 MG tablet Take 1 tablet (75 mg total) by mouth 2 (two) times daily.      spironolactone (ALDACTONE) 50 MG tablet Take 1 tablet (50 mg total) by mouth once daily. 90 tablet 3    tramadol (ULTRAM) 50 mg tablet Take 1 tablet (50 mg total) by mouth once daily. 30 tablet 0    furosemide (LASIX) 40 MG tablet Take 1 tablet (40 mg total) by mouth as needed. 90 tablet 3     No current facility-administered medications for this visit.        Review of patient's allergies indicates:   Allergen Reactions    Colchicine analogues       diarrhea    Cozaar  [losartan]      Other reaction(s): blurry vision    Lisinopril      Other reaction(s): cough    Percocet [oxycodone-acetaminophen]      Pt gets pain from taking medicine.         ROS  ROS:  Constitution: Negative for chills, fever, weakness and malaise/fatigue.   HEENT: Negative for headaches.   Cardiovascular: Negative for chest pain and claudication.   Respiratory: Negative for cough and shortness of breath.   Musculoskeletal: (+) for foot pain.  Negative for muscle cramps and muscle weakness.   Gastrointestinal: Negative for nausea and vomiting.   Neurological:(+) for numbness, tingling and paresthesias.   Dermatological:  - for skin rash, (--) for keratosis, (+) for fungal toenails, (--) for wound.          Objective:      Physical Exam  Constitutional:  Patient is oriented to person, place, and time. Vital signs are normal.  Appears well-developed and well-nourished.     Vascular:  Dorsalis pedis pulses are 2/4 on the right side, and 2/4 on the left side.   Posterior tibial pulses are 2/4 on the right side, and 2/4 on the left side.   (+) digital hair growth, capillary fill time to all toes <3 seconds, toes are warm touch, trace pedal swelling    Skin/Dermatological:  Skin is warm and intact.  No cyanosis or clubbing.  No rashes noted.  No open wounds.  Right 1, 2, 3, 4, 5 and left 1, 2, 3, 4, 5  toenails yellow discolored, thickened 2-4 mm to base with subungual debris.  B/l great toenails are cryptotic  (--) keratosis     Musculoskeletal:      Hallux abducto valgus bilaterally, hammertoes 2-5 observed.  Pedal rom within normal limits.  (--) ankle joint DF restriction with both knee flexed and extened.    Neurological:  (+) deficits to sharp/dull, light touch or vibratory sensation bilateral feet, ten points tested.   Muscle strength to tibialis anterior, extensor hallucis longus, extensor digitorum longus, peroneal muscles, flexor hallucis/digotorum longus, posterior tibial and  gastrosoleal complex is 5/5, normal tone without assymmetry   Patellar reflexes are 2+ on the right side and 2+ on the left side.  Achilles reflexes are 2+ on the right side and 2+ on the left side.          Assessment:       Encounter Diagnoses   Name Primary?    Diabetic peripheral neuropathy associated with type 2 diabetes mellitus Yes    Hammer toes of both feet     Valgus deformity of both great toes     CKD (chronic kidney disease) stage 2, GFR 60-89 ml/min     Onychomycosis due to dermatophyte     Ingrown nail          Plan:       Wendy was seen today for nail care.    Diagnoses and all orders for this visit:    Diabetic peripheral neuropathy associated with type 2 diabetes mellitus    Hammer toes of both feet    Valgus deformity of both great toes    CKD (chronic kidney disease) stage 2, GFR 60-89 ml/min    Onychomycosis due to dermatophyte    Ingrown nail      I counseled the patient on her conditions, their implications and medical management.    Shoe inspection. Diabetic Foot Education. Patient reminded of the importance of good nutrition and blood sugar control to help prevent podiatric complications of diabetes. Patient instructed on proper foot hygeine. We discussed wearing proper shoe gear, daily foot inspections, never walking without protective shoe gear, never putting sharp instruments to feet.  We also discussed padding and shoes with high toe boxes for foot deformities.    Kerasal to toenails    - With patient's permission, right 1, 2, 3, 4, 5 and left 1, 2, 3, 4, 5 toenails were aggressively reduced and debrided  to their soft tissue attachment mechanically with nail nipper, removing all offending nail and debris.  Patient relates relief following the procedure. Patient will continue to monitor the areas daily, inspect feet, wear protective shoe gear when ambulatory, moisturizer to maintain skin integrity and follow in this office in approximately 2-3 months, sooner p.r.n.    Consider  phenol ablation of the ingrown toenails in the future.    Arik Marcus, DPM

## 2018-09-24 NOTE — LETTER
October 1, 2018      No Recipients           [facility]  [address] [address]  Phone: [phone]  Fax: [phone]          Patient: Wendy Viveros   MR Number: [id number]   YOB: 1955   Date of Visit: 9/24/2018       Dear :    Thank you for referring Wendy Viveros to me for evaluation. Attached you will find relevant portions of my assessment and plan of care.    If you have questions, please do not hesitate to call me. I look forward to following Wendy Viveros along with you.    Sincerely,    Xochilt Lux  CC:  No Recipients    If you would like to receive this communication electronically, please contact [email or web address] or [phone] to request more information on [facility] access.    For providers and/or their staff who would like to refer a patient to [facility], please contact us through our one-stop-shop provider referral line, Saad Alvarez, at [phone].    If you feel you have received this communication in error or would no longer like to receive these types of communications, please e-mail [email or web address]

## 2018-12-02 DIAGNOSIS — M10.9 GOUT, UNSPECIFIED CAUSE, UNSPECIFIED CHRONICITY, UNSPECIFIED SITE: ICD-10-CM

## 2018-12-03 RX ORDER — ALLOPURINOL 300 MG/1
300 TABLET ORAL DAILY
Qty: 90 TABLET | Refills: 3 | Status: SHIPPED | OUTPATIENT
Start: 2018-12-03 | End: 2019-12-02 | Stop reason: SDUPTHER

## 2018-12-03 RX ORDER — INDOMETHACIN 50 MG/1
CAPSULE ORAL
Qty: 90 CAPSULE | Refills: 0 | Status: SHIPPED | OUTPATIENT
Start: 2018-12-03 | End: 2019-05-14 | Stop reason: SDUPTHER

## 2018-12-08 DIAGNOSIS — I10 HYPERTENSION, UNSPECIFIED TYPE: ICD-10-CM

## 2018-12-10 RX ORDER — AMLODIPINE BESYLATE 10 MG/1
10 TABLET ORAL DAILY
Qty: 90 TABLET | Refills: 0 | Status: SHIPPED | OUTPATIENT
Start: 2018-12-10 | End: 2019-03-05 | Stop reason: SDUPTHER

## 2018-12-10 RX ORDER — METOPROLOL TARTRATE 50 MG/1
50 TABLET ORAL DAILY
Qty: 90 TABLET | Refills: 3 | Status: SHIPPED | OUTPATIENT
Start: 2018-12-10 | End: 2019-12-02 | Stop reason: SDUPTHER

## 2018-12-10 RX ORDER — SPIRONOLACTONE 50 MG/1
50 TABLET, FILM COATED ORAL DAILY
Qty: 90 TABLET | Refills: 3 | Status: SHIPPED | OUTPATIENT
Start: 2018-12-10 | End: 2019-12-02 | Stop reason: SDUPTHER

## 2018-12-11 ENCOUNTER — LAB VISIT (OUTPATIENT)
Dept: LAB | Facility: HOSPITAL | Age: 63
End: 2018-12-11
Attending: FAMILY MEDICINE
Payer: MEDICARE

## 2018-12-11 DIAGNOSIS — Z12.11 COLON CANCER SCREENING: ICD-10-CM

## 2018-12-11 PROCEDURE — 82274 ASSAY TEST FOR BLOOD FECAL: CPT

## 2018-12-12 LAB — HEMOCCULT STL QL IA: NEGATIVE

## 2018-12-13 ENCOUNTER — PATIENT MESSAGE (OUTPATIENT)
Dept: INTERNAL MEDICINE | Facility: CLINIC | Age: 63
End: 2018-12-13

## 2018-12-17 ENCOUNTER — TELEPHONE (OUTPATIENT)
Dept: PODIATRY | Facility: CLINIC | Age: 63
End: 2018-12-17

## 2018-12-17 NOTE — TELEPHONE ENCOUNTER
Left voicemail for patient to call office to reschedule appointment scheduled 12/17/2018.  will no longer have clinic on Mondays.

## 2019-02-06 ENCOUNTER — LAB VISIT (OUTPATIENT)
Dept: LAB | Facility: OTHER | Age: 64
End: 2019-02-06
Attending: FAMILY MEDICINE
Payer: MEDICARE

## 2019-02-06 ENCOUNTER — OFFICE VISIT (OUTPATIENT)
Dept: INTERNAL MEDICINE | Facility: CLINIC | Age: 64
End: 2019-02-06
Attending: FAMILY MEDICINE
Payer: MEDICARE

## 2019-02-06 VITALS
DIASTOLIC BLOOD PRESSURE: 70 MMHG | OXYGEN SATURATION: 98 % | SYSTOLIC BLOOD PRESSURE: 130 MMHG | WEIGHT: 242.5 LBS | HEIGHT: 65 IN | HEART RATE: 69 BPM | BODY MASS INDEX: 40.4 KG/M2

## 2019-02-06 DIAGNOSIS — I10 ESSENTIAL HYPERTENSION: ICD-10-CM

## 2019-02-06 DIAGNOSIS — R42 VERTIGO: ICD-10-CM

## 2019-02-06 DIAGNOSIS — Z79.4 TYPE 2 DIABETES MELLITUS WITH COMPLICATION, WITH LONG-TERM CURRENT USE OF INSULIN: ICD-10-CM

## 2019-02-06 DIAGNOSIS — E78.01 HYPERLIPIDEMIA TYPE II: ICD-10-CM

## 2019-02-06 DIAGNOSIS — N18.30 CKD (CHRONIC KIDNEY DISEASE) STAGE 3, GFR 30-59 ML/MIN: ICD-10-CM

## 2019-02-06 DIAGNOSIS — Z79.4 TYPE 2 DIABETES MELLITUS WITH COMPLICATION, WITH LONG-TERM CURRENT USE OF INSULIN: Primary | ICD-10-CM

## 2019-02-06 DIAGNOSIS — E11.8 TYPE 2 DIABETES MELLITUS WITH COMPLICATION, WITH LONG-TERM CURRENT USE OF INSULIN: Primary | ICD-10-CM

## 2019-02-06 DIAGNOSIS — E11.8 TYPE 2 DIABETES MELLITUS WITH COMPLICATION, WITH LONG-TERM CURRENT USE OF INSULIN: ICD-10-CM

## 2019-02-06 DIAGNOSIS — Z12.39 SCREENING FOR BREAST CANCER: ICD-10-CM

## 2019-02-06 LAB
ALBUMIN SERPL BCP-MCNC: 4 G/DL
ALP SERPL-CCNC: 92 U/L
ALT SERPL W/O P-5'-P-CCNC: 12 U/L
ANION GAP SERPL CALC-SCNC: 12 MMOL/L
AST SERPL-CCNC: 12 U/L
BASOPHILS # BLD AUTO: 0.05 K/UL
BASOPHILS NFR BLD: 0.4 %
BILIRUB SERPL-MCNC: 2.1 MG/DL
BUN SERPL-MCNC: 26 MG/DL
CALCIUM SERPL-MCNC: 10.7 MG/DL
CHLORIDE SERPL-SCNC: 105 MMOL/L
CHOLEST SERPL-MCNC: 165 MG/DL
CHOLEST/HDLC SERPL: 5.9 {RATIO}
CO2 SERPL-SCNC: 23 MMOL/L
CREAT SERPL-MCNC: 1.3 MG/DL
DIFFERENTIAL METHOD: ABNORMAL
EOSINOPHIL # BLD AUTO: 0.3 K/UL
EOSINOPHIL NFR BLD: 2.3 %
ERYTHROCYTE [DISTWIDTH] IN BLOOD BY AUTOMATED COUNT: 18.6 %
EST. GFR  (AFRICAN AMERICAN): 50 ML/MIN/1.73 M^2
EST. GFR  (NON AFRICAN AMERICAN): 43 ML/MIN/1.73 M^2
ESTIMATED AVG GLUCOSE: 157 MG/DL
GLUCOSE SERPL-MCNC: 159 MG/DL
HBA1C MFR BLD HPLC: 7.1 %
HCT VFR BLD AUTO: 33 %
HDLC SERPL-MCNC: 28 MG/DL
HDLC SERPL: 17 %
HGB BLD-MCNC: 11.6 G/DL
LDLC SERPL CALC-MCNC: 104.4 MG/DL
LYMPHOCYTES # BLD AUTO: 3.5 K/UL
LYMPHOCYTES NFR BLD: 27.1 %
MCH RBC QN AUTO: 32.1 PG
MCHC RBC AUTO-ENTMCNC: 35.2 G/DL
MCV RBC AUTO: 91 FL
MONOCYTES # BLD AUTO: 0.9 K/UL
MONOCYTES NFR BLD: 6.8 %
NEUTROPHILS # BLD AUTO: 8.1 K/UL
NEUTROPHILS NFR BLD: 62.8 %
NONHDLC SERPL-MCNC: 137 MG/DL
PLATELET # BLD AUTO: 180 K/UL
PMV BLD AUTO: 10.2 FL
POTASSIUM SERPL-SCNC: 4.2 MMOL/L
PROT SERPL-MCNC: 7.9 G/DL
RBC # BLD AUTO: 3.61 M/UL
SODIUM SERPL-SCNC: 140 MMOL/L
TRIGL SERPL-MCNC: 163 MG/DL
TSH SERPL DL<=0.005 MIU/L-ACNC: 2.66 UIU/ML
WBC # BLD AUTO: 12.93 K/UL

## 2019-02-06 PROCEDURE — 80061 LIPID PANEL: CPT

## 2019-02-06 PROCEDURE — 99214 OFFICE O/P EST MOD 30 MIN: CPT | Mod: S$GLB,,, | Performed by: FAMILY MEDICINE

## 2019-02-06 PROCEDURE — 85025 COMPLETE CBC W/AUTO DIFF WBC: CPT

## 2019-02-06 PROCEDURE — 99499 RISK ADDL DX/OHS AUDIT: ICD-10-PCS | Mod: S$GLB,,, | Performed by: FAMILY MEDICINE

## 2019-02-06 PROCEDURE — 83036 HEMOGLOBIN GLYCOSYLATED A1C: CPT

## 2019-02-06 PROCEDURE — 84443 ASSAY THYROID STIM HORMONE: CPT

## 2019-02-06 PROCEDURE — 3045F PR MOST RECENT HEMOGLOBIN A1C LEVEL 7.0-9.0%: CPT | Mod: CPTII,S$GLB,, | Performed by: FAMILY MEDICINE

## 2019-02-06 PROCEDURE — 3045F PR MOST RECENT HEMOGLOBIN A1C LEVEL 7.0-9.0%: ICD-10-PCS | Mod: CPTII,S$GLB,, | Performed by: FAMILY MEDICINE

## 2019-02-06 PROCEDURE — 99499 UNLISTED E&M SERVICE: CPT | Mod: S$GLB,,, | Performed by: FAMILY MEDICINE

## 2019-02-06 PROCEDURE — 99999 PR PBB SHADOW E&M-EST. PATIENT-LVL IV: CPT | Mod: PBBFAC,,, | Performed by: FAMILY MEDICINE

## 2019-02-06 PROCEDURE — 36415 COLL VENOUS BLD VENIPUNCTURE: CPT

## 2019-02-06 PROCEDURE — 3008F BODY MASS INDEX DOCD: CPT | Mod: CPTII,S$GLB,, | Performed by: FAMILY MEDICINE

## 2019-02-06 PROCEDURE — 3078F PR MOST RECENT DIASTOLIC BLOOD PRESSURE < 80 MM HG: ICD-10-PCS | Mod: CPTII,S$GLB,, | Performed by: FAMILY MEDICINE

## 2019-02-06 PROCEDURE — 99999 PR PBB SHADOW E&M-EST. PATIENT-LVL IV: ICD-10-PCS | Mod: PBBFAC,,, | Performed by: FAMILY MEDICINE

## 2019-02-06 PROCEDURE — 3075F PR MOST RECENT SYSTOLIC BLOOD PRESS GE 130-139MM HG: ICD-10-PCS | Mod: CPTII,S$GLB,, | Performed by: FAMILY MEDICINE

## 2019-02-06 PROCEDURE — 80053 COMPREHEN METABOLIC PANEL: CPT

## 2019-02-06 PROCEDURE — 3078F DIAST BP <80 MM HG: CPT | Mod: CPTII,S$GLB,, | Performed by: FAMILY MEDICINE

## 2019-02-06 PROCEDURE — 3075F SYST BP GE 130 - 139MM HG: CPT | Mod: CPTII,S$GLB,, | Performed by: FAMILY MEDICINE

## 2019-02-06 PROCEDURE — 99214 PR OFFICE/OUTPT VISIT, EST, LEVL IV, 30-39 MIN: ICD-10-PCS | Mod: S$GLB,,, | Performed by: FAMILY MEDICINE

## 2019-02-06 PROCEDURE — 3008F PR BODY MASS INDEX (BMI) DOCUMENTED: ICD-10-PCS | Mod: CPTII,S$GLB,, | Performed by: FAMILY MEDICINE

## 2019-02-06 RX ORDER — MECLIZINE HYDROCHLORIDE 25 MG/1
25 TABLET ORAL 3 TIMES DAILY PRN
Qty: 25 TABLET | Refills: 0 | Status: SHIPPED | OUTPATIENT
Start: 2019-02-06 | End: 2019-08-05 | Stop reason: SDUPTHER

## 2019-02-06 RX ORDER — INSULIN DEGLUDEC 100 U/ML
45 INJECTION, SOLUTION SUBCUTANEOUS NIGHTLY
Qty: 45 ML | Refills: 3 | Status: SHIPPED | OUTPATIENT
Start: 2019-02-06 | End: 2020-02-26 | Stop reason: SDUPTHER

## 2019-02-07 NOTE — PROGRESS NOTES
"CHIEF COMPLAINT:  Follow-up in a pt w/diabetes and gout    HISTORY OF PRESENT ILLNESS:  This 63-year-old woman is here for follow-up of her diabetes.  She does well on long-acting insulin.  Most recent A1c was very good.    Recently started having bilateral ft pain most consistent with diabetic neuropathy.    She has CKD stage III with a baseline creatinine of 1.2.  She has tried many therapies for her gout.  She has true hyperuricemia.  She notes that only Indocin works for her.  She sees nephrology.    She is diabetic taking only insulin.  She is no longer taking metformin due to diarrhea.  She has been feeling well recently.     She is doing well on her antihypertensive medications.      The patient has a history of stable hyperlipidemia on current medications.  The patient denies chest pain or shortness of breath today.  The patient denies muscle aches or myalgias suggestive of myositis.    The patient used to see Dr. Grubbs.      MEDICAL HISTORY:  1.  Diabetes   2.  Hypertension.  3.  Hyperlipidemia.  4.  Gout.  5.  Osteoarthritis, particularly the right knee, which is bone on bone.  6.  Esophageal reflux.    REVIEW OF SYSTEMS:  Chronically overweight.  She gets a yearly eye exam and has   no known diabetic retinopathy.  No chest pain or shortness of breath.  No GI   symptoms.  No headaches or visual changes.  Overdue for routine colonoscopy, which has been recommended.    PHYSICAL EXAMINATION:  Blood pressure 130/70, pulse 69, height 5' 5" (1.651 m), weight 110 kg (242 lb 8.1 oz), SpO2 98 %.    APPEARANCE:  Very pleasant 62-year-old black female.  HEENT: Normocephalic atraumatic anicteric  HEART:  Regular rhythm.  LUNGS:  Clear.  ABDOMEN:  Obese, soft, nontender.  Protective Sensation (w/ 10 gram monofilament): Intact  Visual Inspection (Evidence of Foot Deformity, Ulceration, Nails Intact, Skin Intact): Normal  Pedal Pulses: Present    Assessment:   Diabetes, condition is well controlled on current medication " regimen    Hypertension, condition is not well controlled  Hyperlipidemia  CKD 3  jocelyn foot pain c/w diabetic neuropathy    PLAN:  1.  Tresiba 45 units qhs.     2.  NovoLog 15 units with meals.  3.  endocrine and rheumatology.  4.  A1c and CMP today look good  Nephrology following  Gabapentin  Podiatry  RTC 6 months      Answers for HPI/ROS submitted by the patient on 2/5/2019   Diabetes problem  Diabetes type: type 2  MedicAlert ID: No  Disease duration: 10 years  blurred vision: No  chest pain: No  fatigue: No  foot paresthesias: Yes  foot ulcerations: No  polydipsia: No  polyphagia: No  polyuria: No  visual change: No  weakness: No  weight loss: No  Symptom course: stable  confusion: No  dizziness: Yes  headaches: No  hunger: No  mood changes: No  nervous/anxious: No  pallor: No  seizures: No  sleepiness: No  speech difficulty: No  sweats: No  tremors: No  blackouts: No  hospitalization: No  nocturnal hypoglycemia: No  required assistance: No  required glucagon: No  CVA: No  heart disease: No  impotence: No  nephropathy: No  peripheral neuropathy: Yes  PVD: No  retinopathy: No  autonomic neuropathy: No  CAD risks: family history, hypertension, obesity  Current treatments: insulin injections, oral agent (dual therapy)  Treatment compliance: most of the time  Dose schedule: pre-breakfast, pre-lunch, pre-dinner, at bedtime  Given by: patient  Injection sites: abdominal wall, thighs  Home blood tests: 3-4 x per day  Home urines: <1 x per month  Monitoring compliance: good  Blood glucose trend: fluctuating minimally  Weight trend: fluctuating dramatically  Current diet: low salt  Meal planning: avoidance of concentrated sweets  Exercise: three times a week  Dietitian visit: No  Eye exam current: Yes  Sees podiatrist: Yes

## 2019-02-11 NOTE — PROGRESS NOTES
Patient, Wendy Viveros (MRN #[id number]), presented with a recorded BMI of 40.36 kg/m^2 consistent with the definition of morbid obesity (ICD-10 E66.01). The patient's morbid obesity was monitored, evaluated, addressed and/or treated. This addendum to the medical record is made on 02/11/2019.

## 2019-02-13 ENCOUNTER — TELEPHONE (OUTPATIENT)
Dept: PRIMARY CARE CLINIC | Facility: CLINIC | Age: 64
End: 2019-02-13

## 2019-02-13 NOTE — TELEPHONE ENCOUNTER
----- Message from Roger Miller MD sent at 2/7/2019  7:51 AM CST -----  Blood work looks good.  A1c is 7.1.  No changes in medications.  Followup as scheduled in 6 months.

## 2019-02-13 NOTE — PLAN OF CARE
07/13/17 1617   WILSON Message   Medicare Outpatient and Observation Notification regarding financial responsibility Signed/date by patient/caregiver   Date WILSON was signed 07/13/17   Time WILSON was signed 1616      Yes

## 2019-03-05 DIAGNOSIS — I10 HYPERTENSION, UNSPECIFIED TYPE: ICD-10-CM

## 2019-03-07 RX ORDER — AMLODIPINE BESYLATE 10 MG/1
10 TABLET ORAL DAILY
Qty: 90 TABLET | Refills: 0 | Status: SHIPPED | OUTPATIENT
Start: 2019-03-07 | End: 2019-06-02 | Stop reason: SDUPTHER

## 2019-03-12 ENCOUNTER — PES CALL (OUTPATIENT)
Dept: ADMINISTRATIVE | Facility: CLINIC | Age: 64
End: 2019-03-12

## 2019-04-11 DIAGNOSIS — E11.8 TYPE 2 DIABETES MELLITUS WITH COMPLICATION, WITH LONG-TERM CURRENT USE OF INSULIN: Primary | ICD-10-CM

## 2019-04-11 DIAGNOSIS — Z79.4 TYPE 2 DIABETES MELLITUS WITH COMPLICATION, WITH LONG-TERM CURRENT USE OF INSULIN: Primary | ICD-10-CM

## 2019-05-03 DIAGNOSIS — E11.9 TYPE 2 DIABETES MELLITUS WITHOUT COMPLICATION: ICD-10-CM

## 2019-05-14 DIAGNOSIS — M10.9 GOUT, UNSPECIFIED CAUSE, UNSPECIFIED CHRONICITY, UNSPECIFIED SITE: ICD-10-CM

## 2019-05-14 DIAGNOSIS — E11.9 DIABETES MELLITUS WITHOUT COMPLICATION: ICD-10-CM

## 2019-05-15 RX ORDER — INDOMETHACIN 50 MG/1
CAPSULE ORAL
Qty: 90 CAPSULE | Refills: 0 | Status: SHIPPED | OUTPATIENT
Start: 2019-05-15 | End: 2019-09-01 | Stop reason: SDUPTHER

## 2019-05-15 RX ORDER — INSULIN ASPART 100 [IU]/ML
15 INJECTION, SOLUTION INTRAVENOUS; SUBCUTANEOUS
Qty: 30 ML | Refills: 5 | Status: SHIPPED | OUTPATIENT
Start: 2019-05-15 | End: 2021-03-02

## 2019-05-16 ENCOUNTER — TELEPHONE (OUTPATIENT)
Dept: DIABETES | Facility: OTHER | Age: 64
End: 2019-05-16

## 2019-05-16 ENCOUNTER — HOSPITAL ENCOUNTER (OUTPATIENT)
Dept: DIABETES | Facility: OTHER | Age: 64
Discharge: HOME | End: 2019-05-16
Attending: FAMILY MEDICINE
Payer: MEDICARE

## 2019-05-16 VITALS — BODY MASS INDEX: 39.45 KG/M2 | HEIGHT: 65 IN | WEIGHT: 236.75 LBS

## 2019-05-16 DIAGNOSIS — E11.8 TYPE 2 DIABETES MELLITUS WITH COMPLICATION, UNSPECIFIED WHETHER LONG TERM INSULIN USE: Primary | ICD-10-CM

## 2019-05-16 DIAGNOSIS — E11.8 TYPE 2 DIABETES MELLITUS WITH COMPLICATION, WITH LONG-TERM CURRENT USE OF INSULIN: Primary | ICD-10-CM

## 2019-05-16 DIAGNOSIS — Z79.4 TYPE 2 DIABETES MELLITUS WITH COMPLICATION, WITH LONG-TERM CURRENT USE OF INSULIN: Primary | ICD-10-CM

## 2019-05-16 PROCEDURE — G0108 DIAB MANAGE TRN  PER INDIV: HCPCS

## 2019-05-17 NOTE — PROGRESS NOTES
Diabetes Education  Author: Kaley Rodrigues RN  Date: 5/17/2019    Diabetes Care Management Summary  Diabetes Education Record Assessment/Progress: Initial  Current Diabetes Risk Level: Low         Diabetes Type  Diabetes Type : Type II    Diabetes History  Diabetes Diagnosis: >10 years  Current Treatment: Insulin  Reviewed Problem List with Patient: Yes    Health Maintenance was reviewed today with patient. Discussed with patient importance of routine eye exams, foot exams/foot care, blood work (i.e.: A1c, microalbumin, and lipid), dental visits, yearly flu vaccine, and pneumonia vaccine as indicated by PCP. Patient verbalized understanding.     Health Maintenance Topics with due status: Not Due       Topic Last Completion Date    TETANUS VACCINE 05/11/2016    Influenza Vaccine 11/23/2016    Pap Smear with HPV Cotest 09/18/2018    Fecal Occult Blood Test (FOBT)/[employer]Kit 12/11/2018    Lipid Panel 02/06/2019    Hemoglobin A1c 02/06/2019     Health Maintenance Due   Topic Date Due    Low Dose Statin  12/27/1975    Foot Exam  03/01/2017    Urine Microalbumin  02/20/2019    Mammogram  03/13/2019    Eye Exam  05/16/2019       Nutrition  Meal Plan 24 Hour Recall - Breakfast: skipped - yesterday: oatmeal w/ raisins and walnuts - coffee w/ sugar and cream   Meal Plan 24 Hour Recall - Lunch: skipped   Meal Plan 24 Hour Recall - Dinner: smoked turkey, noodles w/ broccoli - iced tea, sweet   Meal Plan 24 Hour Recall - Snack: popcorn     Monitoring   Self Monitoring : SMBG: knows should be 3x, usually 2x - before meals - 135 -140  Blood Glucose Logs: No  Do you use a personal continuous glucose monitor?: No  In the last month, how often have you had a low blood sugar reaction?: never  What are your symptoms of low blood sugar?: feels lows when too much less than 150 - gets jittery   How do you treat high blood sugar?: stomach cramps/not feeling good     Exercise   Exercise Type: (does light stretching )  Frequency:  Never    Current Diabetes Treatment   Current Treatment: Insulin    Social History  Preferred Learning Method: Reading Materials  Primary Support: Self  Educational Level: Some College  Occupation: retired   Smoking Status: Ex Smoker  Alcohol Use: Never                                Barriers to Change  Barriers to Change: None  Learning Challenges : None    Readiness to Learn   Readiness to Learn : Acceptance    Cultural Influences  Cultural Influences: No    Diabetes Education Assessment/Progress  Diabetes Disease Process (diabetes disease process and treatment options): Not Covered/Deferred  Nutrition (Incorporating nutritional management into one's lifestyle): Discussion, Requires Assistance Family/SO, Written Materials Provided, Individual Session, Demonstration(ed on importance of eliminating sweet drinks for better BG control and assisting w/ weight loss)  Physical Activity (incorporating physical activity into one's lifestyle): Not Covered/Deferred  Medications (states correct name, dose, onset, peak, duration, side effects & timing of meds): Discussion, Requires Assistance Family/SO, Individual Session, Written Materials Provided(pt on tresiba/novolog, cannot tolerate metformin - has never tried any other meds - discussed weekly GLP-1 option to assist w/ weight loss and possible wean/reducing mealtime insulin - pt denies hx of thyroid cancer - ed on how trulicity controls BG)  Monitoring (monitoring blood glucose/other parameters & using results): Discussion, Requires Assistance Family/SO, Individual Session, Written Materials Provided(reviewed BG goals, pt willing to increase monitoring while treatments adjusts, discussed possibility of CGM if needed)  Acute Complications (preventing, detecting, and treating acute complications): Discussion, Requires Assistance Family/SO, Individual Session(reviewed s/s of low BG and how to prevent/treat)  Chronic Complications (preventing, detecting, and treating chronic  complications): Discussion, Requires Assistance Family/SO, Individual Session(reviewed current A1C, goal A1C and importance of keeping BG well controlled to prevent complications r/t DM)  Clinical (diabetes, other pertinent medical history, and relevant comorbidities reviewed during visit): Discussion, Requires Assistance Family/SO, Individual Session(discussed role of weight loss in DM managment, discussed weight loss benefits of GLP-1)  Cognitive (knowledge of self-management skills, functional health literacy): Discussion, Requires Assistance Family/SO, Individual Session  Psychosocial (emotional response to diabetes): Discussion, Requires Assistance Family/SO, Individual Session  Diabetes Distress and Support Systems: Discussion, Requires Assistance Family/SO, Individual Session  Behavioral (readiness for change, lifestyle practices, self-care behaviors): Discussion, Requires Assistance Family/SO, Individual Session(pt w/ needle fatigue, very excited to try new medication to help reduce number of sticks/day - also motivated to work on weight loss )    Goals  Patient has selected/evaluated goals during today's session: Yes, selected  Monitoring: Set(pt will SMBG TID and bring logs to all future appointments)  Start Date: 05/16/19  Medications: Set(pt will start trial of trulicity as prescribed by PCP)  Start Date: 05/16/19         Diabetes Care Plan/Intervention  Education Plan/Intervention: Individual Follow-Up DSMT    Diabetes Meal Plan  Restrictions: Restricted Carbohydrate  Carbohydrate Per Meal: 30-45g    Today's Self-Management Care Plan was developed with the patient's input and is based on barriers identified during today's assessment.    The long and short-term goals in the care plan were written with the patient/caregiver's input. The patient has agreed to work toward these goals to improve her overall diabetes control.      The patient received a copy of today's self-management plan and verbalized  understanding of the care plan, goals, and all of today's instructions.      The patient was encouraged to communicate with her physician and care team regarding her condition(s) and treatment.  I provided the patient with my contact information today and encouraged her to contact me via phone or patient portal as needed.     Education Units of Time   Time Spent: 60 min

## 2019-06-02 DIAGNOSIS — I10 HYPERTENSION, UNSPECIFIED TYPE: ICD-10-CM

## 2019-06-03 RX ORDER — AMLODIPINE BESYLATE 10 MG/1
10 TABLET ORAL DAILY
Qty: 90 TABLET | Refills: 0 | Status: SHIPPED | OUTPATIENT
Start: 2019-06-03 | End: 2019-09-01 | Stop reason: SDUPTHER

## 2019-06-13 ENCOUNTER — HOSPITAL ENCOUNTER (OUTPATIENT)
Dept: DIABETES | Facility: OTHER | Age: 64
Discharge: HOME | End: 2019-06-13
Attending: FAMILY MEDICINE
Payer: MEDICARE

## 2019-06-13 VITALS — HEIGHT: 65 IN | WEIGHT: 234.13 LBS | BODY MASS INDEX: 39.01 KG/M2

## 2019-06-13 DIAGNOSIS — N18.30 CKD (CHRONIC KIDNEY DISEASE) STAGE 3, GFR 30-59 ML/MIN: ICD-10-CM

## 2019-06-13 DIAGNOSIS — Z79.4 TYPE 2 DIABETES MELLITUS WITH COMPLICATION, WITH LONG-TERM CURRENT USE OF INSULIN: Primary | ICD-10-CM

## 2019-06-13 DIAGNOSIS — E11.8 TYPE 2 DIABETES MELLITUS WITH COMPLICATION, WITH LONG-TERM CURRENT USE OF INSULIN: Primary | ICD-10-CM

## 2019-06-13 PROCEDURE — G0108 DIAB MANAGE TRN  PER INDIV: HCPCS

## 2019-06-14 NOTE — PROGRESS NOTES
Diabetes Education  Author: Kaley Rodrigues RN  Date: 2019    Diabetes Care Management Summary  Diabetes Education Record Assessment/Progress: Comprehensive/Group  Current Diabetes Risk Level: Low         Diabetes Type  Diabetes Type : Type II    Diabetes History  Current Treatment: Insulin, Injectable  Reviewed Problem List with Patient: Yes    Health Maintenance was reviewed today with patient. Discussed with patient importance of routine eye exams, foot exams/foot care, blood work (i.e.: A1c, microalbumin, and lipid), dental visits, yearly flu vaccine, and pneumonia vaccine as indicated by PCP. Patient verbalized understanding.     Health Maintenance Topics with due status: Not Due       Topic Last Completion Date    TETANUS VACCINE 2016    Influenza Vaccine 2016    Pap Smear with HPV Cotest 2018    Fecal Occult Blood Test (FOBT)/FitKit 2018    Lipid Panel 2019    Hemoglobin A1c 2019     Health Maintenance Due   Topic Date Due    Low Dose Statin  1975    Foot Exam  2017    Urine Microalbumin  2019    Mammogram  2019    Eye Exam  2019       Nutrition  Meal Plan 24 Hour Recall - Breakfast: skipped - yesterday leftover lamb, hummus - coffee w/ cream and sugar   Meal Plan 24 Hour Recall - Lunch: skipped   Meal Plan 24 Hour Recall - Dinner: salsbury steak, creamed potatoes, spinach - iced tea (powdered presweetened)   Meal Plan 24 Hour Recall - Snack: apple, thin crackers (almond flavored), cheese     Monitoring   Self Monitoring : SMBG: before brunch/eatin, 102, 121   Blood Glucose Logs: No  Do you use a personal continuous glucose monitor?: No    Exercise   Frequency: Never    Current Diabetes Treatment   Current Treatment: Insulin, Injectable                                                    Diabetes Education Assessment/Progress  Diabetes Disease Process (diabetes disease process and treatment options): Not Covered/Deferred  Nutrition  (Incorporating nutritional management into one's lifestyle): Discussion, Requires Assistance Family/SO, Individual Session(pt still working on eliminating sweet drinks from diet)  Physical Activity (incorporating physical activity into one's lifestyle): Not Covered/Deferred  Medications (states correct name, dose, onset, peak, duration, side effects & timing of meds): Discussion, Requires Assistance Family/SO, Individual Session(pt taking MDI as prescribed: 4 shots per day, has started trulicity, has noticed decreased appetite - wants to wait 1 more month before increasing)  Monitoring (monitoring blood glucose/other parameters & using results): Discussion, Requires Assistance Family/SO, Individual Session, Written Materials Provided(pt has done much better testing before meals, having hypoglycemia symptoms in 100's, fearful on dropping lower)  Acute Complications (preventing, detecting, and treating acute complications): Discussion, Requires Assistance Family/SO, Individual Session  Chronic Complications (preventing, detecting, and treating chronic complications): Not Covered/Deferred  Clinical (diabetes, other pertinent medical history, and relevant comorbidities reviewed during visit): Discussion, Requires Assistance Family/SO, Individual Session  Cognitive (knowledge of self-management skills, functional health literacy): Discussion, Individual Session, Requires Assistance Family/SO  Psychosocial (emotional response to diabetes): Discussion, Requires Assistance Family/SO, Individual Session  Diabetes Distress and Support Systems: Discussion, Requires Assistance Family/SO, Individual Session  Behavioral (readiness for change, lifestyle practices, self-care behaviors): Discussion, Requires Assistance Family/SO, Individual Session(pt working really hard to make changes to help keep BG well controlled and not burn out with diabetes regime)    Goals  Patient has selected/evaluated goals during today's session: Yes,  evaluated  Monitoring: In Progress  Medications: In Progress         Diabetes Care Plan/Intervention  Education Plan/Intervention: Individual Follow-Up DSMT    Diabetes Meal Plan  Restrictions: Restricted Carbohydrate  Carbohydrate Per Meal: 30-45g    Today's Self-Management Care Plan was developed with the patient's input and is based on barriers identified during today's assessment.    The long and short-term goals in the care plan were written with the patient/caregiver's input. The patient has agreed to work toward these goals to improve her overall diabetes control.      The patient received a copy of today's self-management plan and verbalized understanding of the care plan, goals, and all of today's instructions.      The patient was encouraged to communicate with her physician and care team regarding her condition(s) and treatment.  I provided the patient with my contact information today and encouraged her to contact me via phone or patient portal as needed.     Education Units of Time   Time Spent: 30 min

## 2019-07-26 DIAGNOSIS — E11.9 TYPE 2 DIABETES MELLITUS WITHOUT COMPLICATION, UNSPECIFIED WHETHER LONG TERM INSULIN USE: ICD-10-CM

## 2019-07-30 ENCOUNTER — HOSPITAL ENCOUNTER (OUTPATIENT)
Dept: RADIOLOGY | Facility: OTHER | Age: 64
Discharge: HOME | End: 2019-07-30
Attending: FAMILY MEDICINE
Payer: MEDICARE

## 2019-07-30 DIAGNOSIS — Z12.39 SCREENING FOR BREAST CANCER: ICD-10-CM

## 2019-07-30 PROCEDURE — 77067 MAMMO DIGITAL SCREENING BILAT WITH TOMOSYNTHESIS_CAD: ICD-10-PCS | Mod: 26,,, | Performed by: RADIOLOGY

## 2019-07-30 PROCEDURE — 77067 SCR MAMMO BI INCL CAD: CPT | Mod: TC

## 2019-07-30 PROCEDURE — 77067 SCR MAMMO BI INCL CAD: CPT | Mod: 26,,, | Performed by: RADIOLOGY

## 2019-07-30 PROCEDURE — 77063 BREAST TOMOSYNTHESIS BI: CPT | Mod: 26,,, | Performed by: RADIOLOGY

## 2019-07-30 PROCEDURE — 77063 MAMMO DIGITAL SCREENING BILAT WITH TOMOSYNTHESIS_CAD: ICD-10-PCS | Mod: 26,,, | Performed by: RADIOLOGY

## 2019-08-05 ENCOUNTER — LAB VISIT (OUTPATIENT)
Dept: LAB | Facility: OTHER | Age: 64
End: 2019-08-05
Attending: FAMILY MEDICINE
Payer: MEDICARE

## 2019-08-05 ENCOUNTER — OFFICE VISIT (OUTPATIENT)
Dept: INTERNAL MEDICINE | Facility: CLINIC | Age: 64
End: 2019-08-05
Attending: FAMILY MEDICINE
Payer: MEDICARE

## 2019-08-05 VITALS
WEIGHT: 233 LBS | BODY MASS INDEX: 38.82 KG/M2 | HEART RATE: 72 BPM | SYSTOLIC BLOOD PRESSURE: 124 MMHG | OXYGEN SATURATION: 96 % | HEIGHT: 65 IN | DIASTOLIC BLOOD PRESSURE: 80 MMHG

## 2019-08-05 DIAGNOSIS — I10 ESSENTIAL HYPERTENSION: ICD-10-CM

## 2019-08-05 DIAGNOSIS — E11.8 TYPE 2 DIABETES MELLITUS WITH COMPLICATION, WITH LONG-TERM CURRENT USE OF INSULIN: ICD-10-CM

## 2019-08-05 DIAGNOSIS — Z79.4 TYPE 2 DIABETES MELLITUS WITH COMPLICATION, WITH LONG-TERM CURRENT USE OF INSULIN: ICD-10-CM

## 2019-08-05 DIAGNOSIS — R42 VERTIGO: Primary | ICD-10-CM

## 2019-08-05 DIAGNOSIS — E78.01 HYPERLIPIDEMIA TYPE II: ICD-10-CM

## 2019-08-05 DIAGNOSIS — N18.30 CKD (CHRONIC KIDNEY DISEASE) STAGE 3, GFR 30-59 ML/MIN: ICD-10-CM

## 2019-08-05 LAB
ESTIMATED AVG GLUCOSE: 137 MG/DL (ref 68–131)
HBA1C MFR BLD HPLC: 6.4 % (ref 4–5.6)

## 2019-08-05 PROCEDURE — 83036 HEMOGLOBIN GLYCOSYLATED A1C: CPT

## 2019-08-05 PROCEDURE — 99999 PR PBB SHADOW E&M-EST. PATIENT-LVL III: CPT | Mod: PBBFAC,,, | Performed by: FAMILY MEDICINE

## 2019-08-05 PROCEDURE — 3074F PR MOST RECENT SYSTOLIC BLOOD PRESSURE < 130 MM HG: ICD-10-PCS | Mod: CPTII,S$GLB,, | Performed by: FAMILY MEDICINE

## 2019-08-05 PROCEDURE — 3045F PR MOST RECENT HEMOGLOBIN A1C LEVEL 7.0-9.0%: CPT | Mod: CPTII,S$GLB,, | Performed by: FAMILY MEDICINE

## 2019-08-05 PROCEDURE — 3045F PR MOST RECENT HEMOGLOBIN A1C LEVEL 7.0-9.0%: ICD-10-PCS | Mod: CPTII,S$GLB,, | Performed by: FAMILY MEDICINE

## 2019-08-05 PROCEDURE — 3079F PR MOST RECENT DIASTOLIC BLOOD PRESSURE 80-89 MM HG: ICD-10-PCS | Mod: CPTII,S$GLB,, | Performed by: FAMILY MEDICINE

## 2019-08-05 PROCEDURE — 99499 UNLISTED E&M SERVICE: CPT | Mod: S$GLB,,, | Performed by: FAMILY MEDICINE

## 2019-08-05 PROCEDURE — 99214 PR OFFICE/OUTPT VISIT, EST, LEVL IV, 30-39 MIN: ICD-10-PCS | Mod: S$GLB,,, | Performed by: FAMILY MEDICINE

## 2019-08-05 PROCEDURE — 3008F PR BODY MASS INDEX (BMI) DOCUMENTED: ICD-10-PCS | Mod: CPTII,S$GLB,, | Performed by: FAMILY MEDICINE

## 2019-08-05 PROCEDURE — 36415 COLL VENOUS BLD VENIPUNCTURE: CPT

## 2019-08-05 PROCEDURE — 99999 PR PBB SHADOW E&M-EST. PATIENT-LVL III: ICD-10-PCS | Mod: PBBFAC,,, | Performed by: FAMILY MEDICINE

## 2019-08-05 PROCEDURE — 99499 RISK ADDL DX/OHS AUDIT: ICD-10-PCS | Mod: S$GLB,,, | Performed by: FAMILY MEDICINE

## 2019-08-05 PROCEDURE — 3079F DIAST BP 80-89 MM HG: CPT | Mod: CPTII,S$GLB,, | Performed by: FAMILY MEDICINE

## 2019-08-05 PROCEDURE — 3074F SYST BP LT 130 MM HG: CPT | Mod: CPTII,S$GLB,, | Performed by: FAMILY MEDICINE

## 2019-08-05 PROCEDURE — 99214 OFFICE O/P EST MOD 30 MIN: CPT | Mod: S$GLB,,, | Performed by: FAMILY MEDICINE

## 2019-08-05 PROCEDURE — 3008F BODY MASS INDEX DOCD: CPT | Mod: CPTII,S$GLB,, | Performed by: FAMILY MEDICINE

## 2019-08-05 RX ORDER — MECLIZINE HYDROCHLORIDE 25 MG/1
25 TABLET ORAL 3 TIMES DAILY PRN
Qty: 25 TABLET | Refills: 2 | Status: SHIPPED | OUTPATIENT
Start: 2019-08-05 | End: 2021-03-04

## 2019-08-05 RX ORDER — FUROSEMIDE 40 MG/1
40 TABLET ORAL
Qty: 90 TABLET | Refills: 3 | Status: SHIPPED | OUTPATIENT
Start: 2019-08-05 | End: 2021-03-04

## 2019-08-05 RX ORDER — CYCLOBENZAPRINE HCL 10 MG
TABLET ORAL
Qty: 30 TABLET | Refills: 2 | Status: SHIPPED | OUTPATIENT
Start: 2019-08-05 | End: 2020-02-28 | Stop reason: SDUPTHER

## 2019-08-05 NOTE — PROGRESS NOTES
Patient, Wendy Viveros (MRN #[id number]), presented with a recorded BMI of 38.78 kg/m^2 and a documented comorbidity(s):  - Diabetes Mellitus Type 2  - Hypertension  - Hyperlipidemia  to which the severe obesity is a contributing factor. This is consistent with the definition of severe obesity (BMI 35.0-39.9) with comorbidity (ICD-10 E66.01, Z68.35). The patient's severe obesity was monitored, evaluated, addressed and/or treated. This addendum to the medical record is made on 08/05/2019.

## 2019-08-05 NOTE — PROGRESS NOTES
"CHIEF COMPLAINT:  Vertigo in a pt w/diabetes and gout    HISTORY OF PRESENT ILLNESS:  This 63-year-old woman is here for follow-up of her vetigo.  Still every am having 5 minutes of vertigo before getting out of bed.    In terms of her diabetes, she does well on long-acting insulin.  Most recent A1c was very good.    Recently started having bilateral ft pain most consistent with diabetic neuropathy.    She has CKD stage III with a baseline creatinine of 1.2.  She has tried many therapies for her gout.  She has true hyperuricemia.  She notes that only Indocin works for her.  She sees nephrology.    She is diabetic taking only insulin.  She is no longer taking metformin due to diarrhea.  She has been feeling well recently.     She is doing well on her antihypertensive medications.      The patient has a history of stable hyperlipidemia on current medications.  The patient denies chest pain or shortness of breath today.  The patient denies muscle aches or myalgias suggestive of myositis.    The patient used to see Dr. Grubbs.      MEDICAL HISTORY:  1.  Diabetes   2.  Hypertension.  3.  Hyperlipidemia.  4.  Gout.  5.  Osteoarthritis, particularly the right knee, which is bone on bone.  6.  Esophageal reflux.    REVIEW OF SYSTEMS:  Chronically overweight.  She gets a yearly eye exam and has   no known diabetic retinopathy.  No chest pain or shortness of breath.  No GI   symptoms.  No headaches or visual changes.  Overdue for routine colonoscopy, which has been recommended.    PHYSICAL EXAMINATION:  Blood pressure 124/80, pulse 72, height 5' 5" (1.651 m), weight 105.7 kg (233 lb 0.4 oz), SpO2 96 %.    APPEARANCE:  Very pleasant 63-year-old black female.  HEENT: Normocephalic atraumatic anicteric  HEART:  Regular rhythm.  LUNGS:  Clear.  ABDOMEN:  Obese, soft, nontender.  Protective Sensation (w/ 10 gram monofilament): Intact  Visual Inspection (Evidence of Foot Deformity, Ulceration, Nails Intact, Skin Intact): " Normal  Pedal Pulses: Present    Assessment:   > 6 months of vertigo  Diabetes, condition is well controlled on current medication regimen    Hypertension, condition is not well controlled  Hyperlipidemia  CKD 3  jocelyn foot pain c/w diabetic neuropathy    PLAN:  ENT referral and Antivert refilled  1.  Tresiba 45 units qhs.     2.  NovoLog 15 units with meals.  3.  endocrine and rheumatology.  4.  A1c today   Nephrology following  Gabapentin  Podiatry  RTC 6 months

## 2019-08-08 ENCOUNTER — TELEPHONE (OUTPATIENT)
Dept: INTERNAL MEDICINE | Facility: CLINIC | Age: 64
End: 2019-08-08

## 2019-09-01 DIAGNOSIS — I10 HYPERTENSION, UNSPECIFIED TYPE: ICD-10-CM

## 2019-09-01 DIAGNOSIS — M10.9 GOUT, UNSPECIFIED CAUSE, UNSPECIFIED CHRONICITY, UNSPECIFIED SITE: ICD-10-CM

## 2019-09-03 RX ORDER — INDOMETHACIN 50 MG/1
CAPSULE ORAL
Qty: 90 CAPSULE | Refills: 0 | Status: SHIPPED | OUTPATIENT
Start: 2019-09-03 | End: 2020-02-27 | Stop reason: SDUPTHER

## 2019-09-03 RX ORDER — AMLODIPINE BESYLATE 10 MG/1
10 TABLET ORAL DAILY
Qty: 90 TABLET | Refills: 0 | Status: SHIPPED | OUTPATIENT
Start: 2019-09-03 | End: 2019-12-02 | Stop reason: SDUPTHER

## 2019-09-03 RX ORDER — CLONIDINE HYDROCHLORIDE 0.1 MG/1
0.1 TABLET ORAL 2 TIMES DAILY
Qty: 180 TABLET | Refills: 3 | Status: SHIPPED | OUTPATIENT
Start: 2019-09-03 | End: 2020-09-08 | Stop reason: SDUPTHER

## 2019-09-16 RX ORDER — PEN NEEDLE, DIABETIC 29 G X1/2"
NEEDLE, DISPOSABLE MISCELLANEOUS
Qty: 100 EACH | Refills: 11 | Status: SHIPPED | OUTPATIENT
Start: 2019-09-16 | End: 2020-11-23

## 2019-09-29 ENCOUNTER — PATIENT OUTREACH (OUTPATIENT)
Dept: ADMINISTRATIVE | Facility: OTHER | Age: 64
End: 2019-09-29

## 2019-09-29 NOTE — LETTER
AUTHORIZATION FOR RELEASE OF   CONFIDENTIAL INFORMATION    Dear Kevin Melgar III, MD,    We are seeing Wendy Viveros, date of birth 1955, in the clinic at [facility] [place] [facility]. Roger Miller MD is the patient's PCP. Wendy Viveros has an outstanding lab/procedure at the time we reviewed her chart. In order to help keep her health information updated, she has authorized us to request the following medical record(s):        (  )  MAMMOGRAM                                      (  )  COLONOSCOPY      (  )  PAP SMEAR                                          (  )  OUTSIDE LAB RESULTS     (  )  DEXA SCAN                                          ( x )  EYE EXAM            (  )  FOOT EXAM                                          (  )  ENTIRE RECORD     (  )  OUTSIDE IMMUNIZATIONS                 (  )  _______________         Please fax records to [facility], Christopher J Wormuth, MD, [phone]     If you have any questions, please contact Iliana Betancourt at [phone].           Patient Name: Wendy Viveros  : 1955  Patient Phone #: [phone]

## 2019-10-01 ENCOUNTER — OFFICE VISIT (OUTPATIENT)
Dept: OBSTETRICS AND GYNECOLOGY | Facility: CLINIC | Age: 64
End: 2019-10-01
Attending: OBSTETRICS & GYNECOLOGY
Payer: MEDICARE

## 2019-10-01 VITALS
HEIGHT: 65 IN | DIASTOLIC BLOOD PRESSURE: 80 MMHG | WEIGHT: 235.69 LBS | BODY MASS INDEX: 39.27 KG/M2 | SYSTOLIC BLOOD PRESSURE: 138 MMHG

## 2019-10-01 DIAGNOSIS — Z01.419 WELL WOMAN EXAM WITH ROUTINE GYNECOLOGICAL EXAM: Primary | ICD-10-CM

## 2019-10-01 DIAGNOSIS — Z78.0 POSTMENOPAUSAL STATUS: ICD-10-CM

## 2019-10-01 PROCEDURE — 3079F DIAST BP 80-89 MM HG: CPT | Mod: CPTII,S$GLB,, | Performed by: OBSTETRICS & GYNECOLOGY

## 2019-10-01 PROCEDURE — G0101 CA SCREEN;PELVIC/BREAST EXAM: HCPCS | Mod: S$GLB,,, | Performed by: OBSTETRICS & GYNECOLOGY

## 2019-10-01 PROCEDURE — 99999 PR PBB SHADOW E&M-EST. PATIENT-LVL III: CPT | Mod: PBBFAC,,, | Performed by: OBSTETRICS & GYNECOLOGY

## 2019-10-01 PROCEDURE — 3079F PR MOST RECENT DIASTOLIC BLOOD PRESSURE 80-89 MM HG: ICD-10-PCS | Mod: CPTII,S$GLB,, | Performed by: OBSTETRICS & GYNECOLOGY

## 2019-10-01 PROCEDURE — 3075F SYST BP GE 130 - 139MM HG: CPT | Mod: CPTII,S$GLB,, | Performed by: OBSTETRICS & GYNECOLOGY

## 2019-10-01 PROCEDURE — 3075F PR MOST RECENT SYSTOLIC BLOOD PRESS GE 130-139MM HG: ICD-10-PCS | Mod: CPTII,S$GLB,, | Performed by: OBSTETRICS & GYNECOLOGY

## 2019-10-01 PROCEDURE — G0101 PR CA SCREEN;PELVIC/BREAST EXAM: ICD-10-PCS | Mod: S$GLB,,, | Performed by: OBSTETRICS & GYNECOLOGY

## 2019-10-01 PROCEDURE — 99999 PR PBB SHADOW E&M-EST. PATIENT-LVL III: ICD-10-PCS | Mod: PBBFAC,,, | Performed by: OBSTETRICS & GYNECOLOGY

## 2019-10-01 NOTE — PROGRESS NOTES
Wendy Viveros is a 63 y.o. year old  female who presents for routine GYN exam.  She is postmenopausal, not taking hormones.  Denies bleeding or reports occasional hot flashes.  S/P D&C, hysteroscopy, polypectomy .   No GYN complaints.    Pap 2018:  Negative    Mammogram 2019:  Negative    Past Medical History:   Diagnosis Date    Abnormal EKG     Anemia 2017    Aortic valve regurgitation     Arthritis     CKD (chronic kidney disease) stage 2, GFR 60-89 ml/min 2014    CKD (chronic kidney disease) stage 2, GFR 60-89 ml/min 2014    Diabetes mellitus     Diabetes mellitus type II     GERD (gastroesophageal reflux disease)     Gout, unspecified     Heart murmur     Hyperlipidemia     Hypertension     Tendonitis        Past Surgical History:   Procedure Laterality Date    breast reduction      CHOLECYSTECTOMY      JOINT REPLACEMENT      KNEE SURGERY  2018    POLYPECTOMY  2016    TOTAL REDUCTION MAMMOPLASTY      TOTAL SHOULDER ARTHROPLASTY Left     TUBAL LIGATION         OB History        4    Para   3    Term   3            AB   1    Living   3       SAB   1    TAB        Ectopic        Multiple        Live Births                       ROS:  GENERAL: Feeling well overall.   SKIN: Denies rash or lesions.   HEAD: Reports vertigo.   NODES: Denies enlarged lymph nodes.   CHEST: Denies chest pain or shortness of breath.   CARDIOVASCULAR: Denies palpitations or left sided chest pain.   ABDOMEN: No abdominal pain, nausea, vomiting or rectal bleeding.   URINARY: No dysuria or hematuria.  REPRODUCTIVE: See HPI.   BREASTS: Denies pain, lumps, or nipple discharge.   HEMATOLOGIC: No easy bruisability or excessive bleeding.   MUSCULOSKELETAL: Reports left knee discomfort.  NEUROLOGIC: Denies syncope or weakness.   PSYCHIATRIC: Denies depression.     PE:   (chaperone present during entire exam)  APPEARANCE: Well nourished, well developed, in no acute  distress.  BREASTS: Symmetrical.  S/P bilateral reduction. No palpable masses, nipple discharge or adenopathy bilaterally.  ABDOMEN: Soft. No tenderness or masses. No hernias. No CVA tenderness.  VULVA: Atrophic. No lesions. Normal female genitalia.  URETHRAL MEATUS: Normal size and location, no lesions, no prolapse.  URETHRA: No masses, tenderness, prolapse or scarring.  VAGINA: Atrophic.  No lesions, no abnormal discharge, no significant cystocele or rectocele.  CERVIX: No lesions and discharge.   UTERUS: Normal size, regular shape, mobile, non-tender, bladder base nontender.  ADNEXA: No masses, tenderness or CDS nodularity.  ANUS PERINEUM: Normal.    Diagnosis:  1. Well woman exam with routine gynecological exam    2. Postmenopausal status          PLAN:         Patient was counseled today on postmenopausal issues.    Follow-up in 1 year.

## 2019-11-22 DIAGNOSIS — E11.9 TYPE 2 DIABETES MELLITUS WITHOUT COMPLICATION: ICD-10-CM

## 2019-11-27 ENCOUNTER — PATIENT OUTREACH (OUTPATIENT)
Dept: ADMINISTRATIVE | Facility: OTHER | Age: 64
End: 2019-11-27

## 2019-12-02 ENCOUNTER — TELEPHONE (OUTPATIENT)
Dept: FAMILY MEDICINE | Facility: CLINIC | Age: 64
End: 2019-12-02

## 2019-12-02 DIAGNOSIS — M10.9 GOUT, UNSPECIFIED CAUSE, UNSPECIFIED CHRONICITY, UNSPECIFIED SITE: ICD-10-CM

## 2019-12-02 DIAGNOSIS — I10 HYPERTENSION, UNSPECIFIED TYPE: ICD-10-CM

## 2019-12-02 RX ORDER — METOPROLOL TARTRATE 50 MG/1
50 TABLET ORAL DAILY
Qty: 90 TABLET | Refills: 3 | Status: SHIPPED | OUTPATIENT
Start: 2019-12-02 | End: 2020-11-24 | Stop reason: SDUPTHER

## 2019-12-02 RX ORDER — ALLOPURINOL 300 MG/1
300 TABLET ORAL DAILY
Qty: 90 TABLET | Refills: 3 | Status: SHIPPED | OUTPATIENT
Start: 2019-12-02 | End: 2020-11-24 | Stop reason: SDUPTHER

## 2019-12-02 RX ORDER — AMLODIPINE BESYLATE 10 MG/1
10 TABLET ORAL DAILY
Qty: 90 TABLET | Refills: 0 | Status: SHIPPED | OUTPATIENT
Start: 2019-12-02 | End: 2020-02-26 | Stop reason: SDUPTHER

## 2019-12-02 RX ORDER — SPIRONOLACTONE 50 MG/1
50 TABLET, FILM COATED ORAL DAILY
Qty: 90 TABLET | Refills: 3 | Status: SHIPPED | OUTPATIENT
Start: 2019-12-02 | End: 2020-11-24 | Stop reason: SDUPTHER

## 2019-12-04 ENCOUNTER — PATIENT OUTREACH (OUTPATIENT)
Dept: ADMINISTRATIVE | Facility: OTHER | Age: 64
End: 2019-12-04

## 2019-12-04 DIAGNOSIS — Z12.11 ENCOUNTER FOR FIT (FECAL IMMUNOCHEMICAL TEST) SCREENING: Primary | ICD-10-CM

## 2019-12-10 ENCOUNTER — PATIENT OUTREACH (OUTPATIENT)
Dept: ADMINISTRATIVE | Facility: HOSPITAL | Age: 64
End: 2019-12-10

## 2019-12-16 ENCOUNTER — PATIENT OUTREACH (OUTPATIENT)
Dept: ADMINISTRATIVE | Facility: OTHER | Age: 64
End: 2019-12-16

## 2019-12-17 ENCOUNTER — OFFICE VISIT (OUTPATIENT)
Dept: OBSTETRICS AND GYNECOLOGY | Facility: CLINIC | Age: 64
End: 2019-12-17
Attending: OBSTETRICS & GYNECOLOGY
Payer: MEDICARE

## 2019-12-17 VITALS
DIASTOLIC BLOOD PRESSURE: 89 MMHG | BODY MASS INDEX: 40.39 KG/M2 | WEIGHT: 242.75 LBS | SYSTOLIC BLOOD PRESSURE: 136 MMHG

## 2019-12-17 DIAGNOSIS — Z78.0 POSTMENOPAUSAL STATUS: ICD-10-CM

## 2019-12-17 DIAGNOSIS — R82.90 ABNORMAL URINE: ICD-10-CM

## 2019-12-17 DIAGNOSIS — N76.0 ACUTE VAGINITIS: Primary | ICD-10-CM

## 2019-12-17 PROCEDURE — 87481 CANDIDA DNA AMP PROBE: CPT | Mod: 59

## 2019-12-17 PROCEDURE — 99213 OFFICE O/P EST LOW 20 MIN: CPT | Mod: S$GLB,,, | Performed by: OBSTETRICS & GYNECOLOGY

## 2019-12-17 PROCEDURE — 99213 PR OFFICE/OUTPT VISIT, EST, LEVL III, 20-29 MIN: ICD-10-PCS | Mod: S$GLB,,, | Performed by: OBSTETRICS & GYNECOLOGY

## 2019-12-17 PROCEDURE — 3079F DIAST BP 80-89 MM HG: CPT | Mod: CPTII,S$GLB,, | Performed by: OBSTETRICS & GYNECOLOGY

## 2019-12-17 PROCEDURE — 87491 CHLMYD TRACH DNA AMP PROBE: CPT

## 2019-12-17 PROCEDURE — 3008F BODY MASS INDEX DOCD: CPT | Mod: CPTII,S$GLB,, | Performed by: OBSTETRICS & GYNECOLOGY

## 2019-12-17 PROCEDURE — 87661 TRICHOMONAS VAGINALIS AMPLIF: CPT

## 2019-12-17 PROCEDURE — 87086 URINE CULTURE/COLONY COUNT: CPT

## 2019-12-17 PROCEDURE — 3008F PR BODY MASS INDEX (BMI) DOCUMENTED: ICD-10-PCS | Mod: CPTII,S$GLB,, | Performed by: OBSTETRICS & GYNECOLOGY

## 2019-12-17 PROCEDURE — 99999 PR PBB SHADOW E&M-EST. PATIENT-LVL II: CPT | Mod: PBBFAC,,, | Performed by: OBSTETRICS & GYNECOLOGY

## 2019-12-17 PROCEDURE — 3075F SYST BP GE 130 - 139MM HG: CPT | Mod: CPTII,S$GLB,, | Performed by: OBSTETRICS & GYNECOLOGY

## 2019-12-17 PROCEDURE — 99999 PR PBB SHADOW E&M-EST. PATIENT-LVL II: ICD-10-PCS | Mod: PBBFAC,,, | Performed by: OBSTETRICS & GYNECOLOGY

## 2019-12-17 PROCEDURE — 3079F PR MOST RECENT DIASTOLIC BLOOD PRESSURE 80-89 MM HG: ICD-10-PCS | Mod: CPTII,S$GLB,, | Performed by: OBSTETRICS & GYNECOLOGY

## 2019-12-17 PROCEDURE — 87801 DETECT AGNT MULT DNA AMPLI: CPT

## 2019-12-17 PROCEDURE — 3075F PR MOST RECENT SYSTOLIC BLOOD PRESS GE 130-139MM HG: ICD-10-PCS | Mod: CPTII,S$GLB,, | Performed by: OBSTETRICS & GYNECOLOGY

## 2019-12-17 NOTE — PROGRESS NOTES
Chief Complaint   Patient presents with    Vaginal Discharge       HPI:  Wendy Viveros is a 63 y.o. female patient  who presents today for evaluation of vaginitis.  She is postmenopausal, not on HRT.  Denies bleeding, flashes, and sweats.  For the past week, she describes having an increased thin, watery vaginal discharge with an odor.  Denies itching and irritation.  She also reports noting cloudy urine for the past week, but no urinary symptoms.  No urgency frequency or dysuria.  No pelvic pain.  No fever.       No LMP recorded. Patient is postmenopausal.     Pap 2018:  Negative    Past Medical History:   Diagnosis Date    Abnormal EKG     Anemia 2017    Aortic valve regurgitation     Arthritis     CKD (chronic kidney disease) stage 2, GFR 60-89 ml/min 2014    CKD (chronic kidney disease) stage 2, GFR 60-89 ml/min 2014    Diabetes mellitus     Diabetes mellitus type II     GERD (gastroesophageal reflux disease)     Gout, unspecified     Heart murmur     Hyperlipidemia     Hypertension     Tendonitis        Past Surgical History:   Procedure Laterality Date    breast reduction      CHOLECYSTECTOMY      JOINT REPLACEMENT      KNEE SURGERY  2018    POLYPECTOMY  2016    TOTAL REDUCTION MAMMOPLASTY      TOTAL SHOULDER ARTHROPLASTY Left     TUBAL LIGATION           ROS:  GENERAL: Feeling well overall.   SKIN: Denies rash or lesions.   HEAD: Denies head injury or headache.   NODES: Denies enlarged lymph nodes.   CHEST: Denies chest pain or shortness of breath.   CARDIOVASCULAR: Denies palpitations or left sided chest pain.   ABDOMEN: No abdominal pain, nausea, vomiting or rectal bleeding.   URINARY: Reports cloudy urine.  No dysuria or hematuria.  REPRODUCTIVE: See HPI.   BREASTS: Denies pain, lumps, or nipple discharge.   HEMATOLOGIC: No easy bruisability or excessive bleeding.   MUSCULOSKELETAL: Denies joint pain or swelling.   NEUROLOGIC: Denies syncope or  weakness.   PSYCHIATRIC: Denies depression.    PE:   (chaperone present during entire exam)  APPEARANCE: Well nourished, well developed, in no acute distress.  ABDOMEN: Soft. No tenderness or masses. No CVA tenderness.  VULVA: No lesions. Normal female genitalia.  URETHRAL MEATUS: Normal size and location, no lesions, no prolapse.  VAGINA: Moist and well rugated, increased thin discharge.  CERVIX: No lesions and discharge.       Diagnosis:  1. Acute vaginitis    2. Postmenopausal status    3. Abnormal urine          PLAN:    Orders Placed This Encounter    Urine culture    C. trachomatis/N. gonorrhoeae by AMP DNA    Vaginosis Screen by DNA Probe       Patient was counseled today on vaginitis: etiologies, diagnosis, and treatment.  We will contact her in several days for results of the Affirm and GC/CT.  We also discussed her cloudy urine for which a culture will be obtained..      Follow-up for annual exam    Total time of visit: 20 minutes (counseling >75% of time)

## 2019-12-18 LAB
BACTERIAL VAGINOSIS DNA: NEGATIVE
CANDIDA GLABRATA DNA: NEGATIVE
CANDIDA KRUSEI DNA: NEGATIVE
CANDIDA RRNA VAG QL PROBE: NEGATIVE
T VAGINALIS RRNA GENITAL QL PROBE: POSITIVE

## 2019-12-19 ENCOUNTER — PATIENT OUTREACH (OUTPATIENT)
Dept: ADMINISTRATIVE | Facility: HOSPITAL | Age: 64
End: 2019-12-19

## 2019-12-19 ENCOUNTER — TELEPHONE (OUTPATIENT)
Dept: OBSTETRICS AND GYNECOLOGY | Facility: CLINIC | Age: 64
End: 2019-12-19

## 2019-12-19 ENCOUNTER — PATIENT MESSAGE (OUTPATIENT)
Dept: ADMINISTRATIVE | Facility: HOSPITAL | Age: 64
End: 2019-12-19

## 2019-12-19 LAB
BACTERIA UR CULT: NO GROWTH
C TRACH DNA SPEC QL NAA+PROBE: NOT DETECTED
N GONORRHOEA DNA SPEC QL NAA+PROBE: NOT DETECTED

## 2019-12-19 RX ORDER — METRONIDAZOLE 500 MG/1
500 TABLET ORAL 2 TIMES DAILY
Qty: 14 TABLET | Refills: 0 | Status: SHIPPED | OUTPATIENT
Start: 2019-12-19 | End: 2019-12-26

## 2019-12-19 NOTE — TELEPHONE ENCOUNTER
Called patient:    Discussed results of Affirm: +Trich    Rx Flagyl 500 mg bid x 7 days, no alcohol.    Her partner needs to see his MD for similar treatment.    Aware of negative GC/CT.

## 2019-12-19 NOTE — PROGRESS NOTES
FitKit was given to patient on 12/19/2019 9:46 AM       Cici KIM LPN  Clinical Care Coordinator  Internal Medicine  [facility]  [phone]

## 2019-12-23 ENCOUNTER — TELEPHONE (OUTPATIENT)
Dept: OBSTETRICS AND GYNECOLOGY | Facility: CLINIC | Age: 64
End: 2019-12-23

## 2019-12-23 ENCOUNTER — LAB VISIT (OUTPATIENT)
Dept: LAB | Facility: HOSPITAL | Age: 64
End: 2019-12-23
Attending: FAMILY MEDICINE
Payer: MEDICARE

## 2019-12-23 DIAGNOSIS — Z12.11 ENCOUNTER FOR FIT (FECAL IMMUNOCHEMICAL TEST) SCREENING: ICD-10-CM

## 2019-12-23 PROCEDURE — 82274 ASSAY TEST FOR BLOOD FECAL: CPT

## 2019-12-23 NOTE — TELEPHONE ENCOUNTER
"Called patient. No answer. Left voice message for patient to call the office.     Per Dr. Ferreira, "Please let her know that her recent urine culture was negative - no sign of bladder infection or UTI.   Thanks. "  "

## 2019-12-23 NOTE — TELEPHONE ENCOUNTER
----- Message from Marko Ferreira MD sent at 12/20/2019  5:11 PM CST -----  Please let her know that her recent urine culture was negative - no sign of bladder infection or UTI.  Thanks.

## 2020-01-01 NOTE — PROGRESS NOTES
"[facility] Hospital Medicine  Progress Note    Patient Name: Wendy Viveros  MRN: [id number]  Patient Class: OP- Observation   Admission Date: 7/13/2017  Length of Stay: 1 days  Attending Physician: Adam Bello MD  Primary Care Provider: Roger Miller MD        Subjective:     Principal Problem:Hemolytic anemia    HPI:  Patient is a 61 y.o. female presents with 4 day history of worsening dyspnea on exertion.  Feels worse lying down flat, lying on side helps.  Feels fatigue.  Daughter states this has been going on for last year.  No pain.  No fever.  No NVDC.  Denies any bleeding, no blood in stools.  Denies heavy NSAID use.    Hgb was 12.1, and is now 7.8.  Total bilirubin elevated going back as far as 2010, now 4.1, with direct 0.9.  Has jaundice.       Denies ever having colonoscopy.    Hospital Course:  The patient was admitted for symptomatic anemia. She is being worked up for hemolytic anemia. Steroids were scheduled to begin on prednisone 7/14/17, but she refuses until further discussion with Hematology. Blood sugar continue to be elevated despite increase in long acting insulin begun on 7/15/17. Increased mealtime insulin, and will hydrate gently. Patient still awaiting conversation with hematology regarding alternative options to prednisone.   Discussed with patient initiation of steroid therapy with Dexamethasone. Encouraged she accept this therapy option. Patient requesting "time to decide." Will request answer by end of day, and continue to monitor CBG and adjust insulin PRN in that time. Otherwise only other treatment option as discussed with Dr. Freeman, is Rituxin as an outpatient.     Interval History: SOB/palpitations, and dizziness with walking/positional changes only at night. Otherwise, symptoms well-controlled, no active blood losses. Would like to discuss treatment options with Hematologist. No new symptoms. No significant overnight events.      Review of Systems " Nazia Tho, [phone]    SW received MD consult for maternal/infant issues; infant was UDS positive for THC.  Grecia Sim is a 37 year old G1 now P1 female/  She presented 2020 at 37w2d (KATIE:  2020), for IOL due to cHTN.  Pregnancy was complicated by chronic HTN, morbid obesity, marijuana use, inconsistent prenatal care, advanced maternal age, desire for tubal sterilization.    Mother gave birth via  , Low Transverse, on 2020 10:03 AM  to a 6 lb 9 oz (2977 g) male, with apgars 8 & 9.  She has not yet completed birth registry paperwork to name her baby.    Grecia Sim lives with SO.  She reports having all supplies needed, and good family support.  She has no concerns for discharge at all.       She did have marijuana use throughout pregnancy.  She was smoking daily but cut back once she found out about pregnancy.  She does not intend to continue smoking marijuana.    Infant UDS positive for THC.  ELOISE will call report to Department of Children and Families, Division of [place] [employer] (AKA the [place] [employer] [place] [employer]), [phone].  This report will not affect discharge plan for infant.    Infant to discharge home in the care of the mother, when medically stable.    SW following.            Constitutional: Positive for activity change (decreased), appetite change (decreased) and fatigue. Negative for chills, diaphoresis and fever.   Eyes: Negative for visual disturbance.   Respiratory: Positive for shortness of breath (occasional episodes, unprovoked). Negative for cough and wheezing.    Cardiovascular: Positive for palpitations (during episodes of SOB). Negative for chest pain and leg swelling.   Gastrointestinal: Negative for abdominal distention, abdominal pain, constipation, diarrhea, nausea and vomiting.   Genitourinary: Negative for dysuria, frequency, hematuria and urgency.   Skin: Positive for color change (yellowing of eyes) and pallor. Negative for rash and wound.   Neurological: Positive for weakness and light-headedness (with positional changes). Negative for dizziness, syncope, numbness and headaches.     Objective:     Vital Signs (Most Recent):  Temp: 99 °F (37.2 °C) (07/17/17 0715)  Pulse: 93 (07/17/17 0852)  Resp: 16 (07/17/17 0715)  BP: 133/68 (07/17/17 0851)  SpO2: 99 % (07/17/17 0936) Vital Signs (24h Range):  Temp:  [98.3 °F (36.8 °C)-99.3 °F (37.4 °C)] 99 °F (37.2 °C)  Pulse:  [78-96] 93  Resp:  [16-18] 16  SpO2:  [95 %-99 %] 99 %  BP: (133-152)/(63-68) 133/68     Weight: 99.8 kg (220 lb)  Body mass index is 36.61 kg/m².    Intake/Output Summary (Last 24 hours) at 07/17/17 1135  Last data filed at 07/16/17 1700   Gross per 24 hour   Intake             1146 ml   Output             1800 ml   Net             -654 ml      Physical Exam   Constitutional: She is oriented to person, place, and time. She appears well-developed and well-nourished. No distress.   Mildly obese, sitting on edge of bed.    HENT:   Head: Normocephalic and atraumatic.   Eyes: EOM are normal. Pupils are equal, round, and reactive to light. No scleral icterus.   Jaundice, mild conjunctival pallor.    Neck: Normal range of motion. Neck supple. No JVD present. No tracheal deviation present.   Cardiovascular:  Normal rate, regular rhythm and intact distal pulses.  Exam reveals no gallop and no friction rub.    Murmur heard.  Pulmonary/Chest: Effort normal and breath sounds normal. No respiratory distress. She has no wheezes. She has no rales.   Abdominal: Soft. Bowel sounds are normal. She exhibits no distension. There is no tenderness. There is no guarding.   Musculoskeletal: Normal range of motion. She exhibits no deformity.   Neurological: She is alert and oriented to person, place, and time. No cranial nerve deficit. She exhibits normal muscle tone.   Skin: Skin is warm and dry. No rash noted. She is not diaphoretic. No erythema. No pallor.   Psychiatric: She has a normal mood and affect. Her behavior is normal. Judgment and thought content normal.   Nursing note and vitals reviewed.      Significant Labs:   BMP:   Recent Labs  Lab 07/17/17  0506   *      K 3.7      CO2 22*   BUN 23   CREATININE 1.2   CALCIUM 9.4     CBC:   Recent Labs  Lab 07/16/17  0517 07/17/17  0506   WBC 10.06 9.46   HGB 6.3* 6.2*   HCT 18.3* 18.2*   * 102*     CMP:   Recent Labs  Lab 07/16/17  0517 07/17/17  0506    138   K 3.9 3.7    108   CO2 24 22*   * 194*   BUN 26* 23   CREATININE 1.2 1.2   CALCIUM 9.8 9.4   PROT 6.6 6.4   ALBUMIN 3.4* 3.3*   BILITOT 3.9* 3.7*   ALKPHOS 88 87   AST 20 23   ALT 30 36   ANIONGAP 8 8   EGFRNONAA 49* 49*     All pertinent labs within the past 24 hours have been reviewed.    Significant Imaging: I have reviewed all pertinent imaging results/findings within the past 24 hours.    Assessment/Plan:      Symptomatic anemia    - Suspect secondary to hemolytic anemia (see A&P for hemolytic anemia)   - Troponin 0.009; BNP = 17; EKG c LVH, stable  - schedule screening colonoscopy as outpatient         * Hemolytic anemia    - Low haptoglobin, elevated retic count  - advised by Heme/Onc to begin treatment with Prednisone  - patient with significant concern/distress regarding use  of prednisone, and refusing this medication at this time. She and her daughter state their daughter/sister (respectively) had polymyositis, and had been on long term prednisone treatment, and developed pneumonia and passed at a young age very suddenly 2/2 pulmonary infection while immunosuppressed. Request discussion with Heme/Onc before patient will even consider steroid treatment.   - d/w patient alternative steroid options - Dexamethasone. Also discussed Rituxin, and informed that this is an outpatient treatment option. Will give patient to end of day, while monitoring her hyperglycemia treatments, to make decision if she will allow steroid treatment to proceed.   - patient declines treatment with dexamethasone, patient and daughter desire to speak to hematologist.   - awaiting further Heme/Onc recommendations           Hyperbilirubinemia    - 2/2 hemolysis          Anemia    - Denies any bleeding  - Consistent with hemolytic process  - Hgb 12.1 >> 7.8 over last 4 months  - Indirect bilirubin elevated  - Hepatosplenomegaly noted on CT 3/2/17 and Abd US 7/13/17  - Fe 143, B-12 626, folate 16.2, retic 14.5, , haptoglobin <10, [place] negative.  - Hematology consult:    SPEP= 6.8   Cold agglutinins- <1:64   If LDH and hapto are consistent with hemolysis would start steroids -  1 mg/kg prenisone.   - Prednisone ordered, but patient refused    - Dexamethasone discussed, patient declined   - GI consulted: needs screening colonoscopy           Hepatosplenomegaly    - Hepatitis profile negative 2015  - Hematology to see          Jaundice    - 2/2 hemolysis          Thrombocytopenia    - present since March  - ? Immune mediated ?  - Mild  - Hematology:   - check ALFREDO <1:160, Lupus anti-coagulant pending  - Monitor        Hypercalcemia    - PTH 44.7  - Vit D 33          HTN (hypertension)    - Continue home med and monitor          Diabetes mellitus, type II    - A1c = 7.0  - Accucheck and NISS for now           Diabetes type 2, uncontrolled    - A1c = 7.0  - Accucheck and NISS for now  - CBG again elevated today  - will continue gentle rehydaration   - will increased detemir this am to 65 QHS  - will maintain mealtime aspart at 20 u per meal   - monitor           Chronic gout    - On allopurinol at home.          Hyperlipidemia type II    - Lipid profile historically fair control.           CKD (chronic kidney disease) stage 2, GFR 60-89 ml/min                VTE Risk Mitigation         Ordered     heparin (porcine) injection 5,000 Units  Every 12 hours     Route:  Subcutaneous        07/13/17 1742     Medium Risk of VTE  Once      07/13/17 1742     Place ADRIÁN hose  Until discontinued      07/13/17 1742     Place sequential compression device  Until discontinued      07/13/17 1742          CRISTIANA WagonerC  Department of Hospital Medicine   [facility]

## 2020-01-02 ENCOUNTER — TELEPHONE (OUTPATIENT)
Dept: INTERNAL MEDICINE | Facility: CLINIC | Age: 65
End: 2020-01-02

## 2020-01-02 LAB — HEMOCCULT STL QL IA: NEGATIVE

## 2020-01-02 NOTE — TELEPHONE ENCOUNTER
Left message  ----- Message from Roger Miller MD sent at 1/2/2020  3:20 PM CST -----  The patient's recent Fit Kit was negative for blood.

## 2020-01-05 ENCOUNTER — PATIENT MESSAGE (OUTPATIENT)
Dept: INTERNAL MEDICINE | Facility: CLINIC | Age: 65
End: 2020-01-05

## 2020-01-05 DIAGNOSIS — R05.9 COUGH: Primary | ICD-10-CM

## 2020-01-06 RX ORDER — CODEINE PHOSPHATE AND GUAIFENESIN 10; 100 MG/5ML; MG/5ML
SOLUTION ORAL
Qty: 120 ML | Refills: 0 | Status: SHIPPED | OUTPATIENT
Start: 2020-01-06 | End: 2020-01-28

## 2020-01-28 ENCOUNTER — OFFICE VISIT (OUTPATIENT)
Dept: INTERNAL MEDICINE | Facility: CLINIC | Age: 65
End: 2020-01-28
Attending: FAMILY MEDICINE
Payer: MEDICARE

## 2020-01-28 ENCOUNTER — TELEPHONE (OUTPATIENT)
Dept: INTERNAL MEDICINE | Facility: CLINIC | Age: 65
End: 2020-01-28

## 2020-01-28 ENCOUNTER — LAB VISIT (OUTPATIENT)
Dept: LAB | Facility: OTHER | Age: 65
End: 2020-01-28
Attending: FAMILY MEDICINE
Payer: MEDICARE

## 2020-01-28 VITALS
HEART RATE: 65 BPM | DIASTOLIC BLOOD PRESSURE: 72 MMHG | WEIGHT: 230.63 LBS | OXYGEN SATURATION: 100 % | SYSTOLIC BLOOD PRESSURE: 110 MMHG | BODY MASS INDEX: 38.42 KG/M2 | HEIGHT: 65 IN

## 2020-01-28 DIAGNOSIS — R05.9 COUGH: ICD-10-CM

## 2020-01-28 DIAGNOSIS — E11.8 TYPE 2 DIABETES MELLITUS WITH COMPLICATION, WITH LONG-TERM CURRENT USE OF INSULIN: ICD-10-CM

## 2020-01-28 DIAGNOSIS — Z79.4 TYPE 2 DIABETES MELLITUS WITH COMPLICATION, WITH LONG-TERM CURRENT USE OF INSULIN: ICD-10-CM

## 2020-01-28 DIAGNOSIS — E11.8 TYPE 2 DIABETES MELLITUS WITH COMPLICATION, WITH LONG-TERM CURRENT USE OF INSULIN: Primary | ICD-10-CM

## 2020-01-28 DIAGNOSIS — M79.10 MYALGIA: ICD-10-CM

## 2020-01-28 DIAGNOSIS — I10 ESSENTIAL HYPERTENSION: ICD-10-CM

## 2020-01-28 DIAGNOSIS — Z79.4 TYPE 2 DIABETES MELLITUS WITH COMPLICATION, WITH LONG-TERM CURRENT USE OF INSULIN: Primary | ICD-10-CM

## 2020-01-28 DIAGNOSIS — M1A.9XX0 CHRONIC GOUT WITHOUT TOPHUS, UNSPECIFIED CAUSE, UNSPECIFIED SITE: ICD-10-CM

## 2020-01-28 DIAGNOSIS — N18.30 CKD (CHRONIC KIDNEY DISEASE) STAGE 3, GFR 30-59 ML/MIN: ICD-10-CM

## 2020-01-28 LAB
ALBUMIN SERPL BCP-MCNC: 4.4 G/DL (ref 3.5–5.2)
ALP SERPL-CCNC: 82 U/L (ref 55–135)
ALT SERPL W/O P-5'-P-CCNC: 12 U/L (ref 10–44)
ANION GAP SERPL CALC-SCNC: 11 MMOL/L (ref 8–16)
AST SERPL-CCNC: 12 U/L (ref 10–40)
BASOPHILS # BLD AUTO: 0.05 K/UL (ref 0–0.2)
BASOPHILS NFR BLD: 0.5 % (ref 0–1.9)
BILIRUB SERPL-MCNC: 2.2 MG/DL (ref 0.1–1)
BUN SERPL-MCNC: 32 MG/DL (ref 8–23)
CALCIUM SERPL-MCNC: 10.7 MG/DL (ref 8.7–10.5)
CHLORIDE SERPL-SCNC: 100 MMOL/L (ref 95–110)
CHOLEST SERPL-MCNC: 174 MG/DL (ref 120–199)
CHOLEST/HDLC SERPL: 6.4 {RATIO} (ref 2–5)
CO2 SERPL-SCNC: 27 MMOL/L (ref 23–29)
CREAT SERPL-MCNC: 1.4 MG/DL (ref 0.5–1.4)
DIFFERENTIAL METHOD: ABNORMAL
EOSINOPHIL # BLD AUTO: 0.3 K/UL (ref 0–0.5)
EOSINOPHIL NFR BLD: 3 % (ref 0–8)
ERYTHROCYTE [DISTWIDTH] IN BLOOD BY AUTOMATED COUNT: 17.5 % (ref 11.5–14.5)
EST. GFR  (AFRICAN AMERICAN): 45 ML/MIN/1.73 M^2
EST. GFR  (NON AFRICAN AMERICAN): 39 ML/MIN/1.73 M^2
ESTIMATED AVG GLUCOSE: 154 MG/DL (ref 68–131)
GLUCOSE SERPL-MCNC: 211 MG/DL (ref 70–110)
HBA1C MFR BLD HPLC: 7 % (ref 4–5.6)
HCT VFR BLD AUTO: 34.6 % (ref 37–48.5)
HDLC SERPL-MCNC: 27 MG/DL (ref 40–75)
HDLC SERPL: 15.5 % (ref 20–50)
HGB BLD-MCNC: 12 G/DL (ref 12–16)
IMM GRANULOCYTES # BLD AUTO: 0.1 K/UL (ref 0–0.04)
IMM GRANULOCYTES NFR BLD AUTO: 0.9 % (ref 0–0.5)
LDLC SERPL CALC-MCNC: 96.4 MG/DL (ref 63–159)
LYMPHOCYTES # BLD AUTO: 3.3 K/UL (ref 1–4.8)
LYMPHOCYTES NFR BLD: 30.1 % (ref 18–48)
MCH RBC QN AUTO: 32.6 PG (ref 27–31)
MCHC RBC AUTO-ENTMCNC: 34.7 G/DL (ref 32–36)
MCV RBC AUTO: 94 FL (ref 82–98)
MONOCYTES # BLD AUTO: 0.8 K/UL (ref 0.3–1)
MONOCYTES NFR BLD: 7.5 % (ref 4–15)
NEUTROPHILS # BLD AUTO: 6.3 K/UL (ref 1.8–7.7)
NEUTROPHILS NFR BLD: 58 % (ref 38–73)
NONHDLC SERPL-MCNC: 147 MG/DL
NRBC BLD-RTO: 0 /100 WBC
PLATELET # BLD AUTO: 155 K/UL (ref 150–350)
PMV BLD AUTO: 10.8 FL (ref 9.2–12.9)
POTASSIUM SERPL-SCNC: 3.9 MMOL/L (ref 3.5–5.1)
PROT SERPL-MCNC: 8.1 G/DL (ref 6–8.4)
RBC # BLD AUTO: 3.68 M/UL (ref 4–5.4)
SODIUM SERPL-SCNC: 138 MMOL/L (ref 136–145)
TRIGL SERPL-MCNC: 253 MG/DL (ref 30–150)
TSH SERPL DL<=0.005 MIU/L-ACNC: 2.38 UIU/ML (ref 0.4–4)
WBC # BLD AUTO: 10.84 K/UL (ref 3.9–12.7)

## 2020-01-28 PROCEDURE — 99499 RISK ADDL DX/OHS AUDIT: ICD-10-PCS | Mod: S$GLB,,, | Performed by: FAMILY MEDICINE

## 2020-01-28 PROCEDURE — 99999 PR PBB SHADOW E&M-EST. PATIENT-LVL III: CPT | Mod: PBBFAC,,, | Performed by: FAMILY MEDICINE

## 2020-01-28 PROCEDURE — 84443 ASSAY THYROID STIM HORMONE: CPT

## 2020-01-28 PROCEDURE — 99999 PR PBB SHADOW E&M-EST. PATIENT-LVL III: ICD-10-PCS | Mod: PBBFAC,,, | Performed by: FAMILY MEDICINE

## 2020-01-28 PROCEDURE — 3074F SYST BP LT 130 MM HG: CPT | Mod: CPTII,S$GLB,, | Performed by: FAMILY MEDICINE

## 2020-01-28 PROCEDURE — 85025 COMPLETE CBC W/AUTO DIFF WBC: CPT

## 2020-01-28 PROCEDURE — 3044F PR MOST RECENT HEMOGLOBIN A1C LEVEL <7.0%: ICD-10-PCS | Mod: CPTII,S$GLB,, | Performed by: FAMILY MEDICINE

## 2020-01-28 PROCEDURE — 3074F PR MOST RECENT SYSTOLIC BLOOD PRESSURE < 130 MM HG: ICD-10-PCS | Mod: CPTII,S$GLB,, | Performed by: FAMILY MEDICINE

## 2020-01-28 PROCEDURE — 83036 HEMOGLOBIN GLYCOSYLATED A1C: CPT

## 2020-01-28 PROCEDURE — 80053 COMPREHEN METABOLIC PANEL: CPT

## 2020-01-28 PROCEDURE — 3044F HG A1C LEVEL LT 7.0%: CPT | Mod: CPTII,S$GLB,, | Performed by: FAMILY MEDICINE

## 2020-01-28 PROCEDURE — 99214 PR OFFICE/OUTPT VISIT, EST, LEVL IV, 30-39 MIN: ICD-10-PCS | Mod: S$GLB,,, | Performed by: FAMILY MEDICINE

## 2020-01-28 PROCEDURE — 3078F DIAST BP <80 MM HG: CPT | Mod: CPTII,S$GLB,, | Performed by: FAMILY MEDICINE

## 2020-01-28 PROCEDURE — 36415 COLL VENOUS BLD VENIPUNCTURE: CPT

## 2020-01-28 PROCEDURE — 80061 LIPID PANEL: CPT

## 2020-01-28 PROCEDURE — 99499 UNLISTED E&M SERVICE: CPT | Mod: S$GLB,,, | Performed by: FAMILY MEDICINE

## 2020-01-28 PROCEDURE — 99214 OFFICE O/P EST MOD 30 MIN: CPT | Mod: S$GLB,,, | Performed by: FAMILY MEDICINE

## 2020-01-28 PROCEDURE — 3078F PR MOST RECENT DIASTOLIC BLOOD PRESSURE < 80 MM HG: ICD-10-PCS | Mod: CPTII,S$GLB,, | Performed by: FAMILY MEDICINE

## 2020-01-28 RX ORDER — PREDNISONE 10 MG/1
10 TABLET ORAL 2 TIMES DAILY
Qty: 10 TABLET | Refills: 0 | Status: SHIPPED | OUTPATIENT
Start: 2020-01-28 | End: 2020-03-18 | Stop reason: SDUPTHER

## 2020-01-28 RX ORDER — CODEINE PHOSPHATE AND GUAIFENESIN 10; 100 MG/5ML; MG/5ML
5 SOLUTION ORAL 3 TIMES DAILY PRN
Qty: 120 ML | Refills: 0 | Status: SHIPPED | OUTPATIENT
Start: 2020-01-28 | End: 2020-02-08

## 2020-01-28 NOTE — TELEPHONE ENCOUNTER
----- Message from Frances Rooney sent at 1/28/2020  2:41 PM CST -----  Contact: pt  Name of Who is Calling: Wendy Viveros      What is the request in detail: pt is at the pharmacy states that she was supposed to have a cough syrup called into the pharmacy and they don't have it. Please contact to further discuss and advise.       Can the clinic reply by [facility]: N       What Number to Call Back [facility]: [phone]

## 2020-01-28 NOTE — PROGRESS NOTES
"CHIEF COMPLAINT:  Follow-up in a pt w/diabetes and gout    HISTORY OF PRESENT ILLNESS:  This 64-year-old woman is here for follow-up of her diabetes.  She does well on long-acting insulin.  Most recent A1c was very good.    Paroxysmal non productive cough, worse at night for 4 weeks.  No fever chills are reported.  Symptoms are slowly improving.    Recently started having bilateral ft pain most consistent with diabetic neuropathy.    She has CKD stage III with a baseline creatinine of 1.2.  She has tried many therapies for her gout.  She has true hyperuricemia.  She notes that only Indocin works for her.  She sees nephrology.    She is diabetic taking only insulin.  She is no longer taking metformin due to diarrhea.  She has been feeling well recently.     She is doing well on her antihypertensive medications.      The patient has a history of stable hyperlipidemia on current medications.  The patient denies chest pain or shortness of breath today.  The patient denies muscle aches or myalgias suggestive of myositis.    The patient used to see Dr. Grubbs.      MEDICAL HISTORY:  1.  Diabetes   2.  Hypertension.  3.  Hyperlipidemia.  4.  Gout.  5.  Osteoarthritis, particularly the right knee, which is bone on bone.  6.  Esophageal reflux.    REVIEW OF SYSTEMS:  Chronically overweight.  She gets a yearly eye exam and has   no known diabetic retinopathy.  No chest pain or shortness of breath.  No GI   symptoms.  No headaches or visual changes.  Overdue for routine colonoscopy, which has been recommended.    PHYSICAL EXAMINATION:  Blood pressure 110/72, pulse 65, height 5' 5" (1.651 m), weight 104.6 kg (230 lb 9.6 oz), SpO2 100 %.    APPEARANCE:  Very pleasant 64-year-old black female.  HEENT: Normocephalic atraumatic anicteric  HEART:  Regular rhythm.  LUNGS:  Clear.  ABDOMEN:  Obese, soft, nontender.    Assessment:   Diabetes, condition is well controlled on current medication regimen    Hypertension, condition is not " well controlled  Hyperlipidemia  CKD 3  jocelyn foot pain c/w diabetic neuropathy  Bronchitis    PLAN:  1.  Tresiba 45 units qhs.     2.  NovoLog 15 units with meals.  3.  endocrine and rheumatology.  4.  Blood work today  Nephrology following  Prednisone and Robitussin AC  RTC 6 months

## 2020-01-28 NOTE — TELEPHONE ENCOUNTER
Pt stated she was informed by doctor at office visit on today the he would send in a prescription for cough medicine to pharmacy. Pt is at pharmacy and no order for cough medicine was sent in. Message sent to provider

## 2020-01-29 ENCOUNTER — TELEPHONE (OUTPATIENT)
Dept: INTERNAL MEDICINE | Facility: CLINIC | Age: 65
End: 2020-01-29

## 2020-01-29 NOTE — TELEPHONE ENCOUNTER
----- Message from Roger Miller MD sent at 1/29/2020 12:32 PM CST -----  Blood work looks good as we expected.  Diabetes is well controlled.  No changes in medications.  Followup as scheduled.

## 2020-01-31 ENCOUNTER — PATIENT OUTREACH (OUTPATIENT)
Dept: ADMINISTRATIVE | Facility: OTHER | Age: 65
End: 2020-01-31

## 2020-01-31 ENCOUNTER — OFFICE VISIT (OUTPATIENT)
Dept: RHEUMATOLOGY | Facility: CLINIC | Age: 65
End: 2020-01-31
Payer: MEDICARE

## 2020-01-31 ENCOUNTER — LAB VISIT (OUTPATIENT)
Dept: LAB | Facility: HOSPITAL | Age: 65
End: 2020-01-31
Attending: INTERNAL MEDICINE
Payer: MEDICARE

## 2020-01-31 VITALS
HEART RATE: 76 BPM | DIASTOLIC BLOOD PRESSURE: 76 MMHG | BODY MASS INDEX: 39.41 KG/M2 | WEIGHT: 236.56 LBS | SYSTOLIC BLOOD PRESSURE: 150 MMHG | HEIGHT: 65 IN | TEMPERATURE: 98 F

## 2020-01-31 DIAGNOSIS — M79.10 MYALGIA: ICD-10-CM

## 2020-01-31 DIAGNOSIS — M79.10 MYALGIA: Primary | ICD-10-CM

## 2020-01-31 DIAGNOSIS — M15.9 PRIMARY OSTEOARTHRITIS INVOLVING MULTIPLE JOINTS: ICD-10-CM

## 2020-01-31 DIAGNOSIS — M1A.00X0 IDIOPATHIC CHRONIC GOUT WITHOUT TOPHUS, UNSPECIFIED SITE: ICD-10-CM

## 2020-01-31 LAB
CK SERPL-CCNC: 35 U/L (ref 20–180)
CRP SERPL-MCNC: 6.1 MG/L (ref 0–8.2)
ERYTHROCYTE [SEDIMENTATION RATE] IN BLOOD BY WESTERGREN METHOD: 5 MM/HR (ref 0–36)
URATE SERPL-MCNC: 4.9 MG/DL (ref 2.4–5.7)

## 2020-01-31 PROCEDURE — 3008F BODY MASS INDEX DOCD: CPT | Mod: CPTII,S$GLB,, | Performed by: INTERNAL MEDICINE

## 2020-01-31 PROCEDURE — 3077F PR MOST RECENT SYSTOLIC BLOOD PRESSURE >= 140 MM HG: ICD-10-PCS | Mod: CPTII,S$GLB,, | Performed by: INTERNAL MEDICINE

## 2020-01-31 PROCEDURE — 3077F SYST BP >= 140 MM HG: CPT | Mod: CPTII,S$GLB,, | Performed by: INTERNAL MEDICINE

## 2020-01-31 PROCEDURE — 82085 ASSAY OF ALDOLASE: CPT

## 2020-01-31 PROCEDURE — 1101F PT FALLS ASSESS-DOCD LE1/YR: CPT | Mod: CPTII,S$GLB,, | Performed by: INTERNAL MEDICINE

## 2020-01-31 PROCEDURE — 36415 COLL VENOUS BLD VENIPUNCTURE: CPT

## 2020-01-31 PROCEDURE — 99999 PR PBB SHADOW E&M-EST. PATIENT-LVL III: CPT | Mod: PBBFAC,,, | Performed by: INTERNAL MEDICINE

## 2020-01-31 PROCEDURE — 85652 RBC SED RATE AUTOMATED: CPT

## 2020-01-31 PROCEDURE — 86140 C-REACTIVE PROTEIN: CPT

## 2020-01-31 PROCEDURE — 99214 OFFICE O/P EST MOD 30 MIN: CPT | Mod: S$GLB,,, | Performed by: INTERNAL MEDICINE

## 2020-01-31 PROCEDURE — 3008F PR BODY MASS INDEX (BMI) DOCUMENTED: ICD-10-PCS | Mod: CPTII,S$GLB,, | Performed by: INTERNAL MEDICINE

## 2020-01-31 PROCEDURE — 1101F PR PT FALLS ASSESS DOC 0-1 FALLS W/OUT INJ PAST YR: ICD-10-PCS | Mod: CPTII,S$GLB,, | Performed by: INTERNAL MEDICINE

## 2020-01-31 PROCEDURE — 3078F PR MOST RECENT DIASTOLIC BLOOD PRESSURE < 80 MM HG: ICD-10-PCS | Mod: CPTII,S$GLB,, | Performed by: INTERNAL MEDICINE

## 2020-01-31 PROCEDURE — 84550 ASSAY OF BLOOD/URIC ACID: CPT

## 2020-01-31 PROCEDURE — 99214 PR OFFICE/OUTPT VISIT, EST, LEVL IV, 30-39 MIN: ICD-10-PCS | Mod: S$GLB,,, | Performed by: INTERNAL MEDICINE

## 2020-01-31 PROCEDURE — 99999 PR PBB SHADOW E&M-EST. PATIENT-LVL III: ICD-10-PCS | Mod: PBBFAC,,, | Performed by: INTERNAL MEDICINE

## 2020-01-31 PROCEDURE — 82550 ASSAY OF CK (CPK): CPT

## 2020-01-31 PROCEDURE — 3078F DIAST BP <80 MM HG: CPT | Mod: CPTII,S$GLB,, | Performed by: INTERNAL MEDICINE

## 2020-01-31 ASSESSMENT — ROUTINE ASSESSMENT OF PATIENT INDEX DATA (RAPID3)
AM STIFFNESS SCORE: 1, YES
MDHAQ FUNCTION SCORE: 1.9
PSYCHOLOGICAL DISTRESS SCORE: 2.2
PATIENT GLOBAL ASSESSMENT SCORE: 7.5
PAIN SCORE: 9
WHEN YOU AWAKENED IN THE MORNING OVER THE LAST WEEK, PLEASE INDICATE THE AMOUNT OF TIME IT TAKES UNTIL YOU ARE AS LIMBER AS YOU WILL BE FOR THE DAY: 45 MINUTES
FATIGUE SCORE: 4
TOTAL RAPID3 SCORE: 7.61

## 2020-01-31 NOTE — PROGRESS NOTES
History of present illness:  64-year-old female previously seen by Tanvi Colunga and George.  She carries a diagnosis of gout.  She has been on allopurinol 300 mg daily.  She has had no recent gout attacks.  She also has a history of osteoarthritis.  She has a history of rotator cuff syndrome and underwent surgery in 2016.  She also had surgery on her right knee but does not know what type of surgery.    She comes in because of bilateral arm pain.  It began 2 weeks ago.  She had no history of antecedent trauma or URI.  She had not been more active.  The pain wakes her up at night.  It is better when she starts moving around.  She has no pain during the day.  She describes it as a dull ache.  She has had no swelling in the arm.  The pain sometimes radiates across the upper back.  She had a similar problem in her left forearm in 2010.  That pain resolved.    She is taking no medication for the pain.  She had taken Flexeril in the past for muscle spasms.  She is not using heat, ice, or topical medications.    She has no unexplained fevers.  She denies any headache, rash, conjunctivitis, oral ulcers.  She has dryness of her mouth but not her eyes.  She has no Raynaud's phenomena, pleurisy, vaginal discharge or ulcers, chronic or bloody diarrhea.  She has no morning stiffness.    Physical examination:  Musculoskeletal:  She has full range of motion of the cervical spine but has some pain on range of motion.  She has decreased range of motion of the left shoulder but no significant pain on range of motion.  She has no tender areas across the shoulder girdle or into the forearms.  Elbows, wrists, small joints of the hands are unremarkable.  She has no tender areas to palpation.  She has good range of motion lumbar spine without pain on range of motion.  Hips are unremarkable.  She has decreased range of motion of the left knee with bony hypertrophy but not swelling.  She has no pain on range of motion.  Right knee has  postsurgical changes.  Ankles and small joints the feet are unremarkable.    Assessment:  1.  She has underlying osteoarthritis  2.  She has inter critical gout  3.  The pain in her upper extremities is either muscular or neuropathic.  I do not think it is referred pain from the neck.    Plans:  1.  Laboratory studies are obtained  2.  I placed her back on Flexeril at bedtime  3.  Return to see me as needed.

## 2020-01-31 NOTE — LETTER
January 31, 2020      Roger Miller MD  [address]  [address]  [facility] [address]           [facility] - Rheumatology  [address]  [place] [address]  Phone: [phone]  Fax: [phone]          Patient: Wendy Viveros   MR Number: [id number]   YOB: 1955   Date of Visit: 1/31/2020       Dear Dr. Roger Miller:    Thank you for referring Wendy Viveros to me for evaluation. Attached you will find relevant portions of my assessment and plan of care.    If you have questions, please do not hesitate to call me. I look forward to following Wendy Viveros along with you.    Sincerely,    Colby Stacy MD    Enclosure  CC:  No Recipients    If you would like to receive this communication electronically, please contact [email or web address] or [phone] to request more information on [employer] access.    For providers and/or their staff who would like to refer a patient to [facility], please contact us through our one-stop-shop provider referral line, [facility], at [phone].    If you feel you have received this communication in error or would no longer like to receive these types of communications, please e-mail [email or web address]

## 2020-02-03 LAB — ALDOLASE SERPL-CCNC: 3.3 U/L (ref 1.2–7.6)

## 2020-02-26 DIAGNOSIS — E11.8 TYPE 2 DIABETES MELLITUS WITH COMPLICATION, WITH LONG-TERM CURRENT USE OF INSULIN: ICD-10-CM

## 2020-02-26 DIAGNOSIS — Z79.4 TYPE 2 DIABETES MELLITUS WITH COMPLICATION, WITH LONG-TERM CURRENT USE OF INSULIN: ICD-10-CM

## 2020-02-26 DIAGNOSIS — I10 HYPERTENSION, UNSPECIFIED TYPE: ICD-10-CM

## 2020-02-27 ENCOUNTER — PATIENT MESSAGE (OUTPATIENT)
Dept: INTERNAL MEDICINE | Facility: CLINIC | Age: 65
End: 2020-02-27

## 2020-02-27 DIAGNOSIS — M10.9 GOUT, UNSPECIFIED CAUSE, UNSPECIFIED CHRONICITY, UNSPECIFIED SITE: ICD-10-CM

## 2020-02-27 RX ORDER — INSULIN DEGLUDEC 100 U/ML
45 INJECTION, SOLUTION SUBCUTANEOUS NIGHTLY
Qty: 45 ML | Refills: 3 | Status: SHIPPED | OUTPATIENT
Start: 2020-02-27 | End: 2021-02-26

## 2020-02-27 RX ORDER — INDOMETHACIN 50 MG/1
CAPSULE ORAL
Qty: 90 CAPSULE | Refills: 0 | Status: SHIPPED | OUTPATIENT
Start: 2020-02-27 | End: 2020-07-30 | Stop reason: SDUPTHER

## 2020-02-27 RX ORDER — AMLODIPINE BESYLATE 10 MG/1
10 TABLET ORAL DAILY
Qty: 90 TABLET | Refills: 3 | Status: SHIPPED | OUTPATIENT
Start: 2020-02-27 | End: 2021-02-28 | Stop reason: SDUPTHER

## 2020-02-28 ENCOUNTER — PATIENT MESSAGE (OUTPATIENT)
Dept: INTERNAL MEDICINE | Facility: CLINIC | Age: 65
End: 2020-02-28

## 2020-02-28 RX ORDER — CYCLOBENZAPRINE HCL 10 MG
TABLET ORAL
Qty: 30 TABLET | Refills: 0 | Status: SHIPPED | OUTPATIENT
Start: 2020-02-28 | End: 2021-02-28 | Stop reason: SDUPTHER

## 2020-03-18 ENCOUNTER — HOSPITAL ENCOUNTER (OUTPATIENT)
Dept: RADIOLOGY | Facility: OTHER | Age: 65
Discharge: HOME | End: 2020-03-18
Attending: FAMILY MEDICINE
Payer: MEDICARE

## 2020-03-18 ENCOUNTER — OFFICE VISIT (OUTPATIENT)
Dept: INTERNAL MEDICINE | Facility: CLINIC | Age: 65
End: 2020-03-18
Attending: FAMILY MEDICINE
Payer: MEDICARE

## 2020-03-18 VITALS
WEIGHT: 227.94 LBS | OXYGEN SATURATION: 96 % | SYSTOLIC BLOOD PRESSURE: 120 MMHG | HEIGHT: 65 IN | DIASTOLIC BLOOD PRESSURE: 72 MMHG | BODY MASS INDEX: 37.98 KG/M2 | HEART RATE: 76 BPM

## 2020-03-18 DIAGNOSIS — R05.3 CHRONIC COUGH: ICD-10-CM

## 2020-03-18 DIAGNOSIS — R05.3 CHRONIC COUGH: Primary | ICD-10-CM

## 2020-03-18 DIAGNOSIS — E11.8 TYPE 2 DIABETES MELLITUS WITH COMPLICATION, WITH LONG-TERM CURRENT USE OF INSULIN: ICD-10-CM

## 2020-03-18 DIAGNOSIS — Z79.4 TYPE 2 DIABETES MELLITUS WITH COMPLICATION, WITH LONG-TERM CURRENT USE OF INSULIN: ICD-10-CM

## 2020-03-18 DIAGNOSIS — I10 ESSENTIAL HYPERTENSION: ICD-10-CM

## 2020-03-18 DIAGNOSIS — N18.30 CKD (CHRONIC KIDNEY DISEASE) STAGE 3, GFR 30-59 ML/MIN: ICD-10-CM

## 2020-03-18 PROCEDURE — 3074F PR MOST RECENT SYSTOLIC BLOOD PRESSURE < 130 MM HG: ICD-10-PCS | Mod: CPTII,S$GLB,, | Performed by: FAMILY MEDICINE

## 2020-03-18 PROCEDURE — 3078F PR MOST RECENT DIASTOLIC BLOOD PRESSURE < 80 MM HG: ICD-10-PCS | Mod: CPTII,S$GLB,, | Performed by: FAMILY MEDICINE

## 2020-03-18 PROCEDURE — 71046 X-RAY EXAM CHEST 2 VIEWS: CPT | Mod: TC,FY

## 2020-03-18 PROCEDURE — 99999 PR PBB SHADOW E&M-EST. PATIENT-LVL III: ICD-10-PCS | Mod: PBBFAC,,, | Performed by: FAMILY MEDICINE

## 2020-03-18 PROCEDURE — 3008F PR BODY MASS INDEX (BMI) DOCUMENTED: ICD-10-PCS | Mod: CPTII,S$GLB,, | Performed by: FAMILY MEDICINE

## 2020-03-18 PROCEDURE — 99999 PR PBB SHADOW E&M-EST. PATIENT-LVL III: CPT | Mod: PBBFAC,,, | Performed by: FAMILY MEDICINE

## 2020-03-18 PROCEDURE — 99214 OFFICE O/P EST MOD 30 MIN: CPT | Mod: S$GLB,,, | Performed by: FAMILY MEDICINE

## 2020-03-18 PROCEDURE — 3051F HG A1C>EQUAL 7.0%<8.0%: CPT | Mod: CPTII,S$GLB,, | Performed by: FAMILY MEDICINE

## 2020-03-18 PROCEDURE — 99214 PR OFFICE/OUTPT VISIT, EST, LEVL IV, 30-39 MIN: ICD-10-PCS | Mod: S$GLB,,, | Performed by: FAMILY MEDICINE

## 2020-03-18 PROCEDURE — 3051F PR MOST RECENT HEMOGLOBIN A1C LEVEL 7.0 - < 8.0%: ICD-10-PCS | Mod: CPTII,S$GLB,, | Performed by: FAMILY MEDICINE

## 2020-03-18 PROCEDURE — 71046 X-RAY EXAM CHEST 2 VIEWS: CPT | Mod: 26,,, | Performed by: INTERNAL MEDICINE

## 2020-03-18 PROCEDURE — 71046 XR CHEST PA AND LATERAL: ICD-10-PCS | Mod: 26,,, | Performed by: INTERNAL MEDICINE

## 2020-03-18 PROCEDURE — 1101F PR PT FALLS ASSESS DOC 0-1 FALLS W/OUT INJ PAST YR: ICD-10-PCS | Mod: CPTII,S$GLB,, | Performed by: FAMILY MEDICINE

## 2020-03-18 PROCEDURE — 3074F SYST BP LT 130 MM HG: CPT | Mod: CPTII,S$GLB,, | Performed by: FAMILY MEDICINE

## 2020-03-18 PROCEDURE — 1101F PT FALLS ASSESS-DOCD LE1/YR: CPT | Mod: CPTII,S$GLB,, | Performed by: FAMILY MEDICINE

## 2020-03-18 PROCEDURE — 3078F DIAST BP <80 MM HG: CPT | Mod: CPTII,S$GLB,, | Performed by: FAMILY MEDICINE

## 2020-03-18 PROCEDURE — 3008F BODY MASS INDEX DOCD: CPT | Mod: CPTII,S$GLB,, | Performed by: FAMILY MEDICINE

## 2020-03-18 RX ORDER — ALBUTEROL SULFATE 90 UG/1
2 AEROSOL, METERED RESPIRATORY (INHALATION) EVERY 6 HOURS PRN
Qty: 18 G | Refills: 2 | Status: SHIPPED | OUTPATIENT
Start: 2020-03-18 | End: 2021-03-04

## 2020-03-18 RX ORDER — PREDNISONE 10 MG/1
10 TABLET ORAL 2 TIMES DAILY
Qty: 14 TABLET | Refills: 0 | Status: SHIPPED | OUTPATIENT
Start: 2020-03-18 | End: 2021-03-02

## 2020-03-18 RX ORDER — CODEINE PHOSPHATE AND GUAIFENESIN 10; 100 MG/5ML; MG/5ML
5 SOLUTION ORAL 3 TIMES DAILY PRN
Qty: 120 ML | Refills: 0 | Status: SHIPPED | OUTPATIENT
Start: 2020-03-18 | End: 2020-03-28

## 2020-03-18 NOTE — PROGRESS NOTES
"CHIEF COMPLAINT:  Follow-up in a pt w/diabetes and gout    HISTORY OF PRESENT ILLNESS:  This 64-year-old woman is here for follow-up of her diabetes.  She does well on long-acting insulin.  Most recent A1c was very good.    Paroxysmal non productive cough, worse at night for 12 weeks.  No fever chills are reported.  Symptoms are slowly improving.  She was seen about 2 months ago and treated with prednisone and Robitussin AC which improved her symptoms transiently.  She remains bothered by the cough at this time.    Recently started having bilateral ft pain most consistent with diabetic neuropathy.    She has CKD stage III with a baseline creatinine of 1.2.  She has tried many therapies for her gout.  She has true hyperuricemia.  She notes that only Indocin works for her.  She sees nephrology.    She is diabetic taking only insulin.  She is no longer taking metformin due to diarrhea.  She has been feeling well recently.     She is doing well on her antihypertensive medications.      The patient has a history of stable hyperlipidemia on current medications.  The patient denies chest pain or shortness of breath today.  The patient denies muscle aches or myalgias suggestive of myositis.    The patient used to see Dr. Grubbs.      MEDICAL HISTORY:  1.  Diabetes   2.  Hypertension.  3.  Hyperlipidemia.  4.  Gout.  5.  Osteoarthritis, particularly the right knee, which is bone on bone.  6.  Esophageal reflux.    REVIEW OF SYSTEMS:  Chronically overweight.  She gets a yearly eye exam and has   no known diabetic retinopathy.  No chest pain or shortness of breath.  No GI   symptoms.  No headaches or visual changes.      PHYSICAL EXAMINATION:  Blood pressure 120/72, pulse 76, height 5' 5" (1.651 m), weight 103.4 kg (227 lb 15.3 oz), SpO2 96 %.    APPEARANCE:  Very pleasant 64-year-old black female.  HEENT: Normocephalic atraumatic anicteric  HEART:  Regular rhythm.  LUNGS:  Clear.  ABDOMEN:  Obese, soft, " nontender.    Assessment:   3 months cough, mostly at night and in am  Diabetes, condition is well controlled on current medication regimen    Hypertension, condition is not well controlled  Hyperlipidemia  CKD 3  jocelyn foot pain c/w diabetic neuropathy  Bronchitis    PLAN:  1.   Prednisone, albuterol inh and Robitussin AC.  CXR is normal.  She is comfortable giving it some more time.  2.  Tresiba 45 units qhs and NovoLog 15 units with meals.  3.  endocrine and rheumatology.  4.  Blood work today  Nephrology following    RTC 6 months

## 2020-03-19 ENCOUNTER — TELEPHONE (OUTPATIENT)
Dept: INTERNAL MEDICINE | Facility: CLINIC | Age: 65
End: 2020-03-19

## 2020-03-19 ENCOUNTER — PATIENT MESSAGE (OUTPATIENT)
Dept: ADMINISTRATIVE | Facility: HOSPITAL | Age: 65
End: 2020-03-19

## 2020-03-19 NOTE — TELEPHONE ENCOUNTER
----- Message from Roger Milelr MD sent at 3/18/2020  5:25 PM CDT -----  CXR is normal as we expected.  No changes in medications.  Followup as scheduled with her cardiologist.

## 2020-03-19 NOTE — TELEPHONE ENCOUNTER
PalindromX message sent to pt via pt portal regarding, results of chest xray .Normal as  expected.  No changes in medications.  Followup as scheduled with her cardiologist.   David Collier LPN

## 2020-05-05 ENCOUNTER — TELEPHONE (OUTPATIENT)
Dept: INTERNAL MEDICINE | Facility: CLINIC | Age: 65
End: 2020-05-05

## 2020-05-05 NOTE — TELEPHONE ENCOUNTER
----- Message from Kaity Villasenor PharmD sent at 5/5/2020  9:20 AM CDT -----  Good morning,    Ms Viveros insurance is no longer covered for Novolog insulin. The insurance preferred Humalog as an alternative. Please consult with MD and erx us fax us a new rx if MD prefers to change to HUMALOG as an alternative.    Thank you

## 2020-05-14 ENCOUNTER — TELEPHONE (OUTPATIENT)
Dept: INTERNAL MEDICINE | Facility: CLINIC | Age: 65
End: 2020-05-14

## 2020-05-14 RX ORDER — INSULIN LISPRO 100 [IU]/ML
INJECTION, SOLUTION INTRAVENOUS; SUBCUTANEOUS
COMMUNITY
End: 2020-05-16

## 2020-05-14 NOTE — TELEPHONE ENCOUNTER
----- Message from Nereyda Collier sent at 5/14/2020 12:07 PM CDT -----  Hi, Mrs. Viveros is requesting a new insulin prescription that can be used with her insurance that they'll possibly cover. We have 2 options (INSULIN LISPRO;HUMALOG) that can possibly be covered by her insurance

## 2020-07-02 DIAGNOSIS — E11.9 TYPE 2 DIABETES MELLITUS WITHOUT COMPLICATION: ICD-10-CM

## 2020-07-30 ENCOUNTER — OFFICE VISIT (OUTPATIENT)
Dept: INTERNAL MEDICINE | Facility: CLINIC | Age: 65
End: 2020-07-30
Attending: FAMILY MEDICINE
Payer: MEDICARE

## 2020-07-30 ENCOUNTER — TELEPHONE (OUTPATIENT)
Dept: INTERNAL MEDICINE | Facility: CLINIC | Age: 65
End: 2020-07-30

## 2020-07-30 ENCOUNTER — LAB VISIT (OUTPATIENT)
Dept: LAB | Facility: OTHER | Age: 65
End: 2020-07-30
Attending: FAMILY MEDICINE
Payer: MEDICARE

## 2020-07-30 VITALS
SYSTOLIC BLOOD PRESSURE: 126 MMHG | WEIGHT: 231.5 LBS | BODY MASS INDEX: 38.57 KG/M2 | HEART RATE: 91 BPM | HEIGHT: 65 IN | OXYGEN SATURATION: 96 % | DIASTOLIC BLOOD PRESSURE: 78 MMHG

## 2020-07-30 DIAGNOSIS — M54.2 NECK PAIN: Primary | ICD-10-CM

## 2020-07-30 DIAGNOSIS — Z79.4 TYPE 2 DIABETES MELLITUS WITH COMPLICATION, WITH LONG-TERM CURRENT USE OF INSULIN: ICD-10-CM

## 2020-07-30 DIAGNOSIS — Z12.39 ENCOUNTER FOR SCREENING FOR MALIGNANT NEOPLASM OF BREAST: Primary | ICD-10-CM

## 2020-07-30 DIAGNOSIS — I10 ESSENTIAL HYPERTENSION: ICD-10-CM

## 2020-07-30 DIAGNOSIS — Z12.39 ENCOUNTER FOR OTHER SCREENING FOR MALIGNANT NEOPLASM OF BREAST: ICD-10-CM

## 2020-07-30 DIAGNOSIS — E11.8 TYPE 2 DIABETES MELLITUS WITH COMPLICATION, WITH LONG-TERM CURRENT USE OF INSULIN: ICD-10-CM

## 2020-07-30 DIAGNOSIS — N18.30 CKD (CHRONIC KIDNEY DISEASE) STAGE 3, GFR 30-59 ML/MIN: ICD-10-CM

## 2020-07-30 DIAGNOSIS — Z00.00 ENCOUNTER FOR ANNUAL HEALTH EXAMINATION: ICD-10-CM

## 2020-07-30 DIAGNOSIS — Z12.39 SCREENING FOR BREAST CANCER: ICD-10-CM

## 2020-07-30 DIAGNOSIS — Z00.00 PREVENTATIVE HEALTH CARE: ICD-10-CM

## 2020-07-30 DIAGNOSIS — M10.9 GOUT, UNSPECIFIED CAUSE, UNSPECIFIED CHRONICITY, UNSPECIFIED SITE: ICD-10-CM

## 2020-07-30 PROCEDURE — 99499 UNLISTED E&M SERVICE: CPT | Mod: S$GLB,,, | Performed by: FAMILY MEDICINE

## 2020-07-30 PROCEDURE — 99214 OFFICE O/P EST MOD 30 MIN: CPT | Mod: S$GLB,,, | Performed by: FAMILY MEDICINE

## 2020-07-30 PROCEDURE — 99499 RISK ADDL DX/OHS AUDIT: ICD-10-PCS | Mod: S$GLB,,, | Performed by: FAMILY MEDICINE

## 2020-07-30 PROCEDURE — 1101F PR PT FALLS ASSESS DOC 0-1 FALLS W/OUT INJ PAST YR: ICD-10-PCS | Mod: CPTII,S$GLB,, | Performed by: FAMILY MEDICINE

## 2020-07-30 PROCEDURE — 3051F PR MOST RECENT HEMOGLOBIN A1C LEVEL 7.0 - < 8.0%: ICD-10-PCS | Mod: CPTII,S$GLB,, | Performed by: FAMILY MEDICINE

## 2020-07-30 PROCEDURE — 3074F SYST BP LT 130 MM HG: CPT | Mod: CPTII,S$GLB,, | Performed by: FAMILY MEDICINE

## 2020-07-30 PROCEDURE — 99214 PR OFFICE/OUTPT VISIT, EST, LEVL IV, 30-39 MIN: ICD-10-PCS | Mod: S$GLB,,, | Performed by: FAMILY MEDICINE

## 2020-07-30 PROCEDURE — 1101F PT FALLS ASSESS-DOCD LE1/YR: CPT | Mod: CPTII,S$GLB,, | Performed by: FAMILY MEDICINE

## 2020-07-30 PROCEDURE — 3008F PR BODY MASS INDEX (BMI) DOCUMENTED: ICD-10-PCS | Mod: CPTII,S$GLB,, | Performed by: FAMILY MEDICINE

## 2020-07-30 PROCEDURE — 3074F PR MOST RECENT SYSTOLIC BLOOD PRESSURE < 130 MM HG: ICD-10-PCS | Mod: CPTII,S$GLB,, | Performed by: FAMILY MEDICINE

## 2020-07-30 PROCEDURE — 3078F DIAST BP <80 MM HG: CPT | Mod: CPTII,S$GLB,, | Performed by: FAMILY MEDICINE

## 2020-07-30 PROCEDURE — 3008F BODY MASS INDEX DOCD: CPT | Mod: CPTII,S$GLB,, | Performed by: FAMILY MEDICINE

## 2020-07-30 PROCEDURE — 99999 PR PBB SHADOW E&M-EST. PATIENT-LVL III: CPT | Mod: PBBFAC,,, | Performed by: FAMILY MEDICINE

## 2020-07-30 PROCEDURE — 3078F PR MOST RECENT DIASTOLIC BLOOD PRESSURE < 80 MM HG: ICD-10-PCS | Mod: CPTII,S$GLB,, | Performed by: FAMILY MEDICINE

## 2020-07-30 PROCEDURE — 99999 PR PBB SHADOW E&M-EST. PATIENT-LVL III: ICD-10-PCS | Mod: PBBFAC,,, | Performed by: FAMILY MEDICINE

## 2020-07-30 PROCEDURE — 83036 HEMOGLOBIN GLYCOSYLATED A1C: CPT

## 2020-07-30 PROCEDURE — 3051F HG A1C>EQUAL 7.0%<8.0%: CPT | Mod: CPTII,S$GLB,, | Performed by: FAMILY MEDICINE

## 2020-07-30 PROCEDURE — 36415 COLL VENOUS BLD VENIPUNCTURE: CPT

## 2020-07-30 RX ORDER — CYCLOBENZAPRINE HCL 10 MG
10 TABLET ORAL 3 TIMES DAILY PRN
Qty: 20 TABLET | Refills: 0 | Status: SHIPPED | OUTPATIENT
Start: 2020-07-30 | End: 2020-08-09

## 2020-07-30 RX ORDER — NABUMETONE 500 MG/1
500 TABLET, FILM COATED ORAL 2 TIMES DAILY
Qty: 60 TABLET | Refills: 3 | Status: SHIPPED | OUTPATIENT
Start: 2020-07-30 | End: 2021-01-29 | Stop reason: SDUPTHER

## 2020-07-30 NOTE — PROGRESS NOTES
"CHIEF COMPLAINT:  Follow-up in a pt w/diabetes and gout    HISTORY OF PRESENT ILLNESS:  This 64-year-old woman is here for 2 weeks worsening right cervical and trapezius pain.  No trauma No radiculopathy.    She also needs follow-up of her diabetes.  She does well on long-acting insulin.  Most recent A1c was very good.    Recently started having bilateral ft pain most consistent with diabetic neuropathy.    She has CKD stage III with a baseline creatinine of 1.2.  She has tried many therapies for her gout.  She has true hyperuricemia.  She notes that only Indocin works for her.  She sees nephrology.    She is diabetic taking only insulin.  She is no longer taking metformin due to diarrhea.  She has been feeling well recently.     She is doing well on her antihypertensive medications.      The patient has a history of stable hyperlipidemia on current medications.  The patient denies chest pain or shortness of breath today.  The patient denies muscle aches or myalgias suggestive of myositis.    MEDICAL HISTORY:  1.  Diabetes   2.  Hypertension.  3.  Hyperlipidemia.  4.  Gout.  5.  Osteoarthritis, particularly the right knee, which is bone on bone.  6.  Esophageal reflux.    REVIEW OF SYSTEMS:  Chronically overweight.  She gets a yearly eye exam and has   no known diabetic retinopathy.  No chest pain or shortness of breath.  No GI   symptoms.  No headaches or visual changes.     PHYSICAL EXAMINATION:  Blood pressure 126/78, pulse 91, height 5' 5" (1.651 m), weight 105 kg (231 lb 7.7 oz), SpO2 96 %.    APPEARANCE:  Very pleasant 64-year-old black female.  HEENT: Normocephalic atraumatic anicteric  HEART:  Regular rhythm.  LUNGS:  Clear.  ABDOMEN:  Obese, soft, nontender.    Assessment:   2 weeks worsening right cervical and trapezius pain  Diabetes, condition is well controlled on current medication regimen    Hypertension, condition is not well controlled  Hyperlipidemia  CKD 3  jocelyn foot pain c/w diabetic " neuropathy  Bronchitis    PLAN:  1.  Tresiba 45 units qhs.     2.  NovoLog 15 units with meals.  3.  endocrine and rheumatology.  4.  Blood work today  Nephrology following  relafen and flexeril  Pain in 10 days  RTC 6 months

## 2020-07-31 LAB
ESTIMATED AVG GLUCOSE: 128 MG/DL (ref 68–131)
HBA1C MFR BLD HPLC: 6.1 % (ref 4–5.6)

## 2020-08-03 RX ORDER — INDOMETHACIN 50 MG/1
CAPSULE ORAL
Qty: 90 CAPSULE | Refills: 0 | Status: SHIPPED | OUTPATIENT
Start: 2020-08-03 | End: 2021-02-28 | Stop reason: SDUPTHER

## 2020-08-12 ENCOUNTER — PATIENT OUTREACH (OUTPATIENT)
Dept: ADMINISTRATIVE | Facility: OTHER | Age: 65
End: 2020-08-12

## 2020-08-12 DIAGNOSIS — Z12.31 BREAST CANCER SCREENING BY MAMMOGRAM: Primary | ICD-10-CM

## 2020-08-13 ENCOUNTER — OFFICE VISIT (OUTPATIENT)
Dept: PAIN MEDICINE | Facility: CLINIC | Age: 65
End: 2020-08-13
Attending: FAMILY MEDICINE
Payer: MEDICARE

## 2020-08-13 VITALS
HEIGHT: 65 IN | SYSTOLIC BLOOD PRESSURE: 160 MMHG | WEIGHT: 227.06 LBS | BODY MASS INDEX: 37.83 KG/M2 | TEMPERATURE: 98 F | OXYGEN SATURATION: 100 % | DIASTOLIC BLOOD PRESSURE: 86 MMHG | RESPIRATION RATE: 18 BRPM | HEART RATE: 71 BPM

## 2020-08-13 DIAGNOSIS — M47.812 SPONDYLOSIS OF CERVICAL REGION WITHOUT MYELOPATHY OR RADICULOPATHY: ICD-10-CM

## 2020-08-13 DIAGNOSIS — M62.838 MUSCLE SPASM: Primary | ICD-10-CM

## 2020-08-13 DIAGNOSIS — M54.2 NECK PAIN: ICD-10-CM

## 2020-08-13 PROCEDURE — 3079F PR MOST RECENT DIASTOLIC BLOOD PRESSURE 80-89 MM HG: ICD-10-PCS | Mod: CPTII,S$GLB,, | Performed by: ANESTHESIOLOGY

## 2020-08-13 PROCEDURE — 99204 PR OFFICE/OUTPT VISIT, NEW, LEVL IV, 45-59 MIN: ICD-10-PCS | Mod: S$GLB,,, | Performed by: ANESTHESIOLOGY

## 2020-08-13 PROCEDURE — 3008F BODY MASS INDEX DOCD: CPT | Mod: CPTII,S$GLB,, | Performed by: ANESTHESIOLOGY

## 2020-08-13 PROCEDURE — 99204 OFFICE O/P NEW MOD 45 MIN: CPT | Mod: S$GLB,,, | Performed by: ANESTHESIOLOGY

## 2020-08-13 PROCEDURE — 3077F PR MOST RECENT SYSTOLIC BLOOD PRESSURE >= 140 MM HG: ICD-10-PCS | Mod: CPTII,S$GLB,, | Performed by: ANESTHESIOLOGY

## 2020-08-13 PROCEDURE — 3077F SYST BP >= 140 MM HG: CPT | Mod: CPTII,S$GLB,, | Performed by: ANESTHESIOLOGY

## 2020-08-13 PROCEDURE — 3079F DIAST BP 80-89 MM HG: CPT | Mod: CPTII,S$GLB,, | Performed by: ANESTHESIOLOGY

## 2020-08-13 PROCEDURE — 1101F PR PT FALLS ASSESS DOC 0-1 FALLS W/OUT INJ PAST YR: ICD-10-PCS | Mod: CPTII,S$GLB,, | Performed by: ANESTHESIOLOGY

## 2020-08-13 PROCEDURE — 99999 PR PBB SHADOW E&M-EST. PATIENT-LVL IV: CPT | Mod: PBBFAC,,, | Performed by: ANESTHESIOLOGY

## 2020-08-13 PROCEDURE — 99999 PR PBB SHADOW E&M-EST. PATIENT-LVL IV: ICD-10-PCS | Mod: PBBFAC,,, | Performed by: ANESTHESIOLOGY

## 2020-08-13 PROCEDURE — 1101F PT FALLS ASSESS-DOCD LE1/YR: CPT | Mod: CPTII,S$GLB,, | Performed by: ANESTHESIOLOGY

## 2020-08-13 PROCEDURE — 3008F PR BODY MASS INDEX (BMI) DOCUMENTED: ICD-10-PCS | Mod: CPTII,S$GLB,, | Performed by: ANESTHESIOLOGY

## 2020-08-13 NOTE — LETTER
August 13, 2020      Roger Miller MD  [address]  [address]  [facility] [address]           [facility] [address]  [address]  [facility] [address]  Phone: [phone]  Fax: [phone]          Patient: Wendy Viveros   MR Number: [id number]   YOB: 1955   Date of Visit: 8/13/2020       Dear Dr. Roger Miller:    Thank you for referring Wendy Viveros to me for evaluation. Attached you will find relevant portions of my assessment and plan of care.    If you have questions, please do not hesitate to call me. I look forward to following Wendy Viveros along with you.    Sincerely,    Jillian Velez MD    Enclosure  CC:  No Recipients    If you would like to receive this communication electronically, please contact [email or web address] or [phone] to request more information on [employer] access.    For providers and/or their staff who would like to refer a patient to [facility], please contact us through our one-stop-shop provider referral line, [facility], at [phone].    If you feel you have received this communication in error or would no longer like to receive these types of communications, please e-mail [email or web address]

## 2020-08-13 NOTE — PROGRESS NOTES
Subjective:      Patient ID: Wendy Viveros is a 64 y.o. female.    Chief Complaint: Neck pain    Referred by: Roger Miller*     HPI   Patient presents with c/o neck pain for 3 weeks on the right side that radiates down to the suprascapular area. She has been taking flexeril with some relief, but she can only take it at nighttime because of sedation. She does not report any tingling, numbness, weakness, bowel/bladder incontinence, fever/chills or night sweats.      Interventional Pain History  None      Past Medical History:   Diagnosis Date    Abnormal EKG     Anemia 7/13/2017    Aortic valve regurgitation     Arthritis     CKD (chronic kidney disease) stage 2, GFR 60-89 ml/min 8/8/2014    CKD (chronic kidney disease) stage 2, GFR 60-89 ml/min 8/8/2014    Diabetes mellitus     Diabetes mellitus type II     GERD (gastroesophageal reflux disease)     Gout, unspecified     Heart murmur     Hyperlipidemia     Hypertension     Tendonitis        Past Surgical History:   Procedure Laterality Date    breast reduction      CHOLECYSTECTOMY      JOINT REPLACEMENT      KNEE SURGERY  2018    POLYPECTOMY  05/05/2016    TOTAL REDUCTION MAMMOPLASTY      TOTAL SHOULDER ARTHROPLASTY Left     TUBAL LIGATION         Review of patient's allergies indicates:   Allergen Reactions    Colchicine analogues      diarrhea    Lisinopril      Other reaction(s): cough  Other reaction(s): cough    Losartan      Other reaction(s): blurry vision  Other reaction(s): blurry vision    Percocet [oxycodone-acetaminophen]      Pt gets pain from taking medicine.       Current Outpatient Medications   Medication Sig Dispense Refill    albuterol (PROVENTIL/VENTOLIN HFA) 90 mcg/actuation inhaler Inhale 2 puffs into the lungs every 6 (six) hours as needed for Wheezing. 18 g 2    allopurinoL (ZYLOPRIM) 300 MG tablet Take 1 tablet (300 mg total) by mouth once daily. 90 tablet 3    amLODIPine (NORVASC) 10 MG tablet Take 1  "tablet (10 mg total) by mouth once daily. 90 tablet 3    aspirin 81 mg Tab Take 1 tablet by mouth every evening.       BD ULTRA-FINE SHORT PEN NEEDLE 31 gauge x 5/16" Ndle Pt is injecting 4 times daily. 100 each 11    blood sugar diagnostic Strp Pt tests tid 300 each 3    blood-glucose meter Misc One Touch Verio  Please provide strips to test qid 1 each 0    cholecalciferol, vitamin D3, 2,000 unit Tab Take 1 tablet by mouth once daily.       cloNIDine (CATAPRES) 0.1 MG tablet Take 1 tablet (0.1 mg total) by mouth 2 (two) times daily. 180 tablet 3    cyanocobalamin, vitamin B-12, (VITAMIN B-12) 50 mcg tablet Take 50 mcg by mouth once daily.      cyclobenzaprine (FLEXERIL) 10 MG tablet TAKE ONE TABLET BY MOUTH ONCE AT NIGHT 30 tablet 0    dulaglutide (TRULICITY) 0.75 mg/0.5 mL PnIj Inject 0.5 mLs (0.75 mg total) into the skin every 7 days. 2 mL 2    indomethacin (INDOCIN) 50 MG capsule TAKE ONE CAPSULE BY MOUTH THREE TIMES A DAY AS NEEDED 90 capsule 0    insulin degludec (TRESIBA FLEXTOUCH U-100) 100 unit/mL (3 mL) InPn Inject 45 Units into the skin every evening. 45 mL 3    insulin lispro (HUMALOG KWIKPEN INSULIN) 100 unit/mL pen Inject 15 Units into the skin 3 (three) times daily before meals. 15 mL 11    lancets Misc by Misc.(Non-Drug; Combo Route) route. Pt is testing 3 times daily      meclizine (ANTIVERT) 25 mg tablet Take 1 tablet (25 mg total) by mouth 3 (three) times daily as needed. 25 tablet 2    metoprolol tartrate (LOPRESSOR) 50 MG tablet Take 1 tablet (50 mg total) by mouth once daily. 90 tablet 3    nabumetone (RELAFEN) 500 MG tablet Take 1 tablet (500 mg total) by mouth 2 (two) times daily. 60 tablet 3    spironolactone (ALDACTONE) 50 MG tablet Take 1 tablet (50 mg total) by mouth once daily. 90 tablet 3    furosemide (LASIX) 40 MG tablet Take 1 tablet (40 mg total) by mouth as needed. 90 tablet 3    insulin aspart U-100 (NOVOLOG) 100 unit/mL (3 mL) InPn pen Inject 15 Units into the " "skin 3 (three) times daily with meals. (Patient not taking: Reported on 5/14/2020) 30 mL 5    insulin lispro (HUMALOG U-100 INSULIN) 100 unit/mL injection Inject 15 Units into the skin 3 (three) times daily before meals. (Patient not taking: Reported on 8/13/2020) 13.5 mL 0    insulin syringe-needle U-100 0.3 mL 31 gauge x 5/16 Syrg Pt is injecting 4 times daily (Patient not taking: Reported on 8/13/2020) 300 each 3    insulin syringe-needle U-100 1/2 mL 30 gauge x 5/16 Syrg USE AS DIRECTED 4 TIMES DAILY (Patient not taking: Reported on 8/13/2020) 200 each 0    pen needle, diabetic 31 gauge x 1/4" Ndle USE 1 TO TEST 4 TIMES A DAY (Patient not taking: Reported on 8/13/2020) 100 each 11    predniSONE (DELTASONE) 10 MG tablet Take 1 tablet (10 mg total) by mouth 2 (two) times daily. (Patient not taking: Reported on 8/13/2020) 14 tablet 0    ranitidine (ZANTAC 75) 75 MG tablet Take 1 tablet (75 mg total) by mouth 2 (two) times daily. (Patient not taking: Reported on 3/18/2020)      tramadol (ULTRAM) 50 mg tablet Take 1 tablet (50 mg total) by mouth once daily. (Patient not taking: Reported on 8/13/2020) 30 tablet 0     No current facility-administered medications for this visit.        Family History   Problem Relation Age of Onset    Heart disease Mother         MI at 71    Hypertension Mother     Hypertension Brother     Diabetes Brother     Hypertension Brother     Breast cancer Neg Hx     Colon cancer Neg Hx     Ovarian cancer Neg Hx        Social History     Socioeconomic History    Marital status: Single     Spouse name: Not on file    Number of children: Not on file    Years of education: Not on file    Highest education level: Not on file   Occupational History    Not on file   Social Needs    Financial resource strain: Not on file    Food insecurity     Worry: Not on file     Inability: Not on file    Transportation needs     Medical: Not on file     Non-medical: Not on file   Tobacco Use " "   Smoking status: Never Smoker    Smokeless tobacco: Never Used   Substance and Sexual Activity    Alcohol use: No     Alcohol/week: 0.0 standard drinks    Drug use: No    Sexual activity: Not Currently     Partners: Male     Birth control/protection: None   Lifestyle    Physical activity     Days per week: Not on file     Minutes per session: Not on file    Stress: Not on file   Relationships    Social connections     Talks on phone: Not on file     Gets together: Not on file     Attends Restorationist service: Not on file     Active member of club or organization: Not on file     Attends meetings of clubs or organizations: Not on file     Relationship status: Not on file   Other Topics Concern    Not on file   Social History Narrative    Retired from Dept of            Review of Systems   Cardiovascular:        Diabetes, hypertension, hyperlipidemia   Musculoskeletal: Positive for arthritis and joint pain.   Gastrointestinal:        GERD           Objective:   BP (!) 160/86   Pulse 71   Temp 97.5 °F (36.4 °C)   Resp 18   Ht 5' 5" (1.651 m)   Wt 103 kg (227 lb 1.2 oz)   SpO2 100%   BMI 37.79 kg/m²   Pain Disability Index Review:  Last 3 PDI Scores 8/13/2020   Pain Disability Index (PDI) 25     Normocephalic.  Atraumatic.  Affect appropriate.  Breathing unlabored.  Extra ocular muscles intact.           General    Constitutional: She is oriented to person, place, and time.   Neurological: She is alert and oriented to person, place, and time. She has normal reflexes.   Psychiatric: She has a normal mood and affect. Her behavior is normal. Judgment and thought content normal.     General Musculoskeletal Exam   Gait: normal     Back (L-Spine & T-Spine) / Neck (C-Spine) Exam     Tenderness Right paramedian tenderness of the Upper C-Spine and Lower C-Spine.     Neck (C-Spine) Range of Motion   Flexion:     Normal  Extension: Normal  Right Lateral Bend: normal  Left Lateral Bend: normal  Right " Rotation: normal  Left Rotation: normal    Spinal Sensation   Right Side Sensation  C-Spine Level: normal   Left Side Sensation  C-Spine Level: normal    Neck (C-Spine) Tests   Spurling's Test   Left:  Negative  Right: negative      Muscle Strength   Right Upper Extremity   Biceps: 5/5/5   Deltoid:  5/5  Triceps:  5/5  Wrist extension: 5/5/5   Wrist flexion: 5/5/5   Finger Flexors:  5/5  Finger Extensors:  5/5  Left Upper Extremity  Biceps: 5/5/5   Deltoid:  5/5  Triceps:  5/5  Wrist extension: 5/5/5   Wrist flexion: 5/5/5   Finger Flexors:  5/5  Finger Extensors:  5/5    Reflexes     Left Side  Biceps:  2+  Triceps:  2+  Brachioradialis:  2+  Left Caro's Sign:  Absent    Right Side   Biceps:  2+  Triceps:  2+  Brachioradialis:  2+  Right Caro's Sign:  absent        Assessment:       Encounter Diagnoses   Name Primary?   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 The following is the plan we agreed on:  1. Referral for PT  2. Recommended tylenol OTC 500mg tabs, 2 tabs bid  3. Recommended half tab flexeril in the morning as needed.  4. RTC in 6 weeks.  5. Consider Trigger point injection and imaging if no relief in 6 weeks.        Diagnoses and all orders for this visit:    Muscle spasm    Neck pain  -     Ambulatory referral/consult to Pain Clinic  -     Ambulatory referral/consult to Physical/Occupational Therapy; Future    Spondylosis of cervical region without myelopathy or radiculopathy     Ulises Marie MD [facility] pain fellow   I have personally taken the history and examined this patient and agree with the fellow's note as stated above.

## 2020-08-24 NOTE — ASSESSMENT & PLAN NOTE
- A1c = 7.0  - Accucheck and ISS for now  - CBG again elevated today  - will continue gentle rehydaration   - will increased detemir this am to 65 QHS  - will maintain mealtime aspart at 20 u per meal   - monitor      My findings and recommendations TODAY are based on the patient's symptoms, exam, diagnostic testing and records.

## 2020-08-26 ENCOUNTER — HOSPITAL ENCOUNTER (OUTPATIENT)
Dept: RADIOLOGY | Facility: OTHER | Age: 65
Discharge: HOME | End: 2020-08-26
Attending: FAMILY MEDICINE
Payer: MEDICARE

## 2020-08-26 DIAGNOSIS — Z12.31 BREAST CANCER SCREENING BY MAMMOGRAM: ICD-10-CM

## 2020-08-26 PROCEDURE — 77067 SCR MAMMO BI INCL CAD: CPT | Mod: TC

## 2020-08-26 PROCEDURE — 77067 SCR MAMMO BI INCL CAD: CPT | Mod: 26,,, | Performed by: RADIOLOGY

## 2020-08-26 PROCEDURE — 77063 BREAST TOMOSYNTHESIS BI: CPT | Mod: 26,,, | Performed by: RADIOLOGY

## 2020-08-26 PROCEDURE — 77063 MAMMO DIGITAL SCREENING BILAT WITH TOMOSYNTHESIS_CAD: ICD-10-PCS | Mod: 26,,, | Performed by: RADIOLOGY

## 2020-08-26 PROCEDURE — 77067 MAMMO DIGITAL SCREENING BILAT WITH TOMOSYNTHESIS_CAD: ICD-10-PCS | Mod: 26,,, | Performed by: RADIOLOGY

## 2020-09-08 DIAGNOSIS — I10 HYPERTENSION, UNSPECIFIED TYPE: ICD-10-CM

## 2020-09-08 RX ORDER — CLONIDINE HYDROCHLORIDE 0.1 MG/1
0.1 TABLET ORAL 2 TIMES DAILY
Qty: 180 TABLET | Refills: 3 | Status: SHIPPED | OUTPATIENT
Start: 2020-09-08 | End: 2021-03-04

## 2020-10-07 ENCOUNTER — PATIENT MESSAGE (OUTPATIENT)
Dept: ADMINISTRATIVE | Facility: HOSPITAL | Age: 65
End: 2020-10-07

## 2020-10-30 ENCOUNTER — PATIENT MESSAGE (OUTPATIENT)
Dept: ADMINISTRATIVE | Facility: HOSPITAL | Age: 65
End: 2020-10-30

## 2020-11-23 RX ORDER — PEN NEEDLE, DIABETIC 29 G X1/2"
NEEDLE, DISPOSABLE MISCELLANEOUS
Qty: 400 EACH | Refills: 0 | Status: SHIPPED | OUTPATIENT
Start: 2020-11-23 | End: 2021-02-28 | Stop reason: SDUPTHER

## 2020-11-24 DIAGNOSIS — M10.9 GOUT, UNSPECIFIED CAUSE, UNSPECIFIED CHRONICITY, UNSPECIFIED SITE: ICD-10-CM

## 2020-11-24 DIAGNOSIS — I10 HYPERTENSION, UNSPECIFIED TYPE: ICD-10-CM

## 2020-12-01 RX ORDER — ALLOPURINOL 300 MG/1
300 TABLET ORAL DAILY
Qty: 90 TABLET | Refills: 3 | Status: SHIPPED | OUTPATIENT
Start: 2020-12-01 | End: 2021-03-04

## 2020-12-01 RX ORDER — SPIRONOLACTONE 50 MG/1
50 TABLET, FILM COATED ORAL DAILY
Qty: 90 TABLET | Refills: 3 | Status: SHIPPED | OUTPATIENT
Start: 2020-12-01 | End: 2021-03-04

## 2020-12-01 RX ORDER — METOPROLOL TARTRATE 50 MG/1
50 TABLET ORAL DAILY
Qty: 90 TABLET | Refills: 3 | Status: SHIPPED | OUTPATIENT
Start: 2020-12-01 | End: 2021-03-04

## 2021-01-05 ENCOUNTER — PATIENT MESSAGE (OUTPATIENT)
Dept: ADMINISTRATIVE | Facility: HOSPITAL | Age: 66
End: 2021-01-05

## 2021-01-21 DIAGNOSIS — E11.9 TYPE 2 DIABETES MELLITUS WITHOUT COMPLICATION: ICD-10-CM

## 2021-01-29 DIAGNOSIS — M54.2 NECK PAIN: ICD-10-CM

## 2021-01-29 RX ORDER — NABUMETONE 500 MG/1
500 TABLET, FILM COATED ORAL 2 TIMES DAILY
Qty: 60 TABLET | Refills: 3 | Status: SHIPPED | OUTPATIENT
Start: 2021-01-29 | End: 2021-06-02 | Stop reason: SDUPTHER

## 2021-02-05 DIAGNOSIS — E11.9 TYPE 2 DIABETES MELLITUS WITHOUT COMPLICATION: ICD-10-CM

## 2021-02-12 NOTE — PLAN OF CARE
My findings and recommendations are based on patient's symptoms, eye exam, diagnostic testing, and records. Problem: Patient Care Overview  Goal: Plan of Care Review  Outcome: Ongoing (interventions implemented as appropriate)  No change in respiratory status, will continue to monitor.

## 2021-02-28 DIAGNOSIS — M10.9 GOUT, UNSPECIFIED CAUSE, UNSPECIFIED CHRONICITY, UNSPECIFIED SITE: ICD-10-CM

## 2021-02-28 DIAGNOSIS — I10 HYPERTENSION, UNSPECIFIED TYPE: ICD-10-CM

## 2021-03-01 RX ORDER — AMLODIPINE BESYLATE 10 MG/1
10 TABLET ORAL DAILY
Qty: 90 TABLET | Refills: 3 | Status: SHIPPED | OUTPATIENT
Start: 2021-03-01 | End: 2021-03-04

## 2021-03-01 RX ORDER — INDOMETHACIN 50 MG/1
CAPSULE ORAL
Qty: 90 CAPSULE | Refills: 0 | Status: SHIPPED | OUTPATIENT
Start: 2021-03-01 | End: 2021-03-04

## 2021-03-01 RX ORDER — CYCLOBENZAPRINE HCL 10 MG
TABLET ORAL
Qty: 30 TABLET | Refills: 0 | Status: SHIPPED | OUTPATIENT
Start: 2021-03-01 | End: 2021-03-04

## 2021-03-02 ENCOUNTER — OFFICE VISIT (OUTPATIENT)
Dept: INTERNAL MEDICINE | Facility: CLINIC | Age: 66
End: 2021-03-02
Attending: FAMILY MEDICINE
Payer: MEDICARE

## 2021-03-02 ENCOUNTER — LAB VISIT (OUTPATIENT)
Dept: LAB | Facility: OTHER | Age: 66
End: 2021-03-02
Attending: FAMILY MEDICINE
Payer: MEDICARE

## 2021-03-02 VITALS
SYSTOLIC BLOOD PRESSURE: 132 MMHG | BODY MASS INDEX: 38.64 KG/M2 | WEIGHT: 231.94 LBS | HEART RATE: 79 BPM | HEIGHT: 65 IN | OXYGEN SATURATION: 96 % | DIASTOLIC BLOOD PRESSURE: 80 MMHG

## 2021-03-02 DIAGNOSIS — N18.31 STAGE 3A CHRONIC KIDNEY DISEASE: ICD-10-CM

## 2021-03-02 DIAGNOSIS — R42 VERTIGO: ICD-10-CM

## 2021-03-02 DIAGNOSIS — Z79.4 TYPE 2 DIABETES MELLITUS WITH COMPLICATION, WITH LONG-TERM CURRENT USE OF INSULIN: ICD-10-CM

## 2021-03-02 DIAGNOSIS — E11.8 TYPE 2 DIABETES MELLITUS WITH COMPLICATION, WITH LONG-TERM CURRENT USE OF INSULIN: ICD-10-CM

## 2021-03-02 DIAGNOSIS — I10 ESSENTIAL HYPERTENSION: ICD-10-CM

## 2021-03-02 DIAGNOSIS — Z79.4 TYPE 2 DIABETES MELLITUS WITH COMPLICATION, WITH LONG-TERM CURRENT USE OF INSULIN: Primary | ICD-10-CM

## 2021-03-02 DIAGNOSIS — E11.8 TYPE 2 DIABETES MELLITUS WITH COMPLICATION, WITH LONG-TERM CURRENT USE OF INSULIN: Primary | ICD-10-CM

## 2021-03-02 LAB
ALBUMIN SERPL BCP-MCNC: 4.1 G/DL (ref 3.5–5.2)
ALP SERPL-CCNC: 119 U/L (ref 55–135)
ALT SERPL W/O P-5'-P-CCNC: 12 U/L (ref 10–44)
ANION GAP SERPL CALC-SCNC: 11 MMOL/L (ref 8–16)
AST SERPL-CCNC: 12 U/L (ref 10–40)
BILIRUB SERPL-MCNC: 2.5 MG/DL (ref 0.1–1)
BUN SERPL-MCNC: 28 MG/DL (ref 8–23)
CALCIUM SERPL-MCNC: 10.8 MG/DL (ref 8.7–10.5)
CHLORIDE SERPL-SCNC: 104 MMOL/L (ref 95–110)
CO2 SERPL-SCNC: 26 MMOL/L (ref 23–29)
CREAT SERPL-MCNC: 1.3 MG/DL (ref 0.5–1.4)
EST. GFR  (AFRICAN AMERICAN): 49 ML/MIN/1.73 M^2
EST. GFR  (NON AFRICAN AMERICAN): 43 ML/MIN/1.73 M^2
GLUCOSE SERPL-MCNC: 208 MG/DL (ref 70–110)
POTASSIUM SERPL-SCNC: 4.3 MMOL/L (ref 3.5–5.1)
PROT SERPL-MCNC: 8 G/DL (ref 6–8.4)
SODIUM SERPL-SCNC: 141 MMOL/L (ref 136–145)
TSH SERPL DL<=0.005 MIU/L-ACNC: 2.68 UIU/ML (ref 0.4–4)

## 2021-03-02 PROCEDURE — 84443 ASSAY THYROID STIM HORMONE: CPT

## 2021-03-02 PROCEDURE — 3288F PR FALLS RISK ASSESSMENT DOCUMENTED: ICD-10-PCS | Mod: CPTII,S$GLB,, | Performed by: FAMILY MEDICINE

## 2021-03-02 PROCEDURE — 1159F MED LIST DOCD IN RCRD: CPT | Mod: S$GLB,,, | Performed by: FAMILY MEDICINE

## 2021-03-02 PROCEDURE — 99999 PR PBB SHADOW E&M-EST. PATIENT-LVL III: ICD-10-PCS | Mod: PBBFAC,,, | Performed by: FAMILY MEDICINE

## 2021-03-02 PROCEDURE — 99499 RISK ADDL DX/OHS AUDIT: ICD-10-PCS | Mod: S$GLB,,, | Performed by: FAMILY MEDICINE

## 2021-03-02 PROCEDURE — 3044F PR MOST RECENT HEMOGLOBIN A1C LEVEL <7.0%: ICD-10-PCS | Mod: CPTII,S$GLB,, | Performed by: FAMILY MEDICINE

## 2021-03-02 PROCEDURE — 3008F PR BODY MASS INDEX (BMI) DOCUMENTED: ICD-10-PCS | Mod: CPTII,S$GLB,, | Performed by: FAMILY MEDICINE

## 2021-03-02 PROCEDURE — 3008F BODY MASS INDEX DOCD: CPT | Mod: CPTII,S$GLB,, | Performed by: FAMILY MEDICINE

## 2021-03-02 PROCEDURE — 1159F PR MEDICATION LIST DOCUMENTED IN MEDICAL RECORD: ICD-10-PCS | Mod: S$GLB,,, | Performed by: FAMILY MEDICINE

## 2021-03-02 PROCEDURE — 3044F HG A1C LEVEL LT 7.0%: CPT | Mod: CPTII,S$GLB,, | Performed by: FAMILY MEDICINE

## 2021-03-02 PROCEDURE — 83036 HEMOGLOBIN GLYCOSYLATED A1C: CPT

## 2021-03-02 PROCEDURE — 1101F PT FALLS ASSESS-DOCD LE1/YR: CPT | Mod: CPTII,S$GLB,, | Performed by: FAMILY MEDICINE

## 2021-03-02 PROCEDURE — 99214 OFFICE O/P EST MOD 30 MIN: CPT | Mod: S$GLB,,, | Performed by: FAMILY MEDICINE

## 2021-03-02 PROCEDURE — 3079F PR MOST RECENT DIASTOLIC BLOOD PRESSURE 80-89 MM HG: ICD-10-PCS | Mod: CPTII,S$GLB,, | Performed by: FAMILY MEDICINE

## 2021-03-02 PROCEDURE — 3075F SYST BP GE 130 - 139MM HG: CPT | Mod: CPTII,S$GLB,, | Performed by: FAMILY MEDICINE

## 2021-03-02 PROCEDURE — 3075F PR MOST RECENT SYSTOLIC BLOOD PRESS GE 130-139MM HG: ICD-10-PCS | Mod: CPTII,S$GLB,, | Performed by: FAMILY MEDICINE

## 2021-03-02 PROCEDURE — 80061 LIPID PANEL: CPT

## 2021-03-02 PROCEDURE — 3288F FALL RISK ASSESSMENT DOCD: CPT | Mod: CPTII,S$GLB,, | Performed by: FAMILY MEDICINE

## 2021-03-02 PROCEDURE — 99214 PR OFFICE/OUTPT VISIT, EST, LEVL IV, 30-39 MIN: ICD-10-PCS | Mod: S$GLB,,, | Performed by: FAMILY MEDICINE

## 2021-03-02 PROCEDURE — 1125F AMNT PAIN NOTED PAIN PRSNT: CPT | Mod: S$GLB,,, | Performed by: FAMILY MEDICINE

## 2021-03-02 PROCEDURE — 99499 UNLISTED E&M SERVICE: CPT | Mod: S$GLB,,, | Performed by: FAMILY MEDICINE

## 2021-03-02 PROCEDURE — 1125F PR PAIN SEVERITY QUANTIFIED, PAIN PRESENT: ICD-10-PCS | Mod: S$GLB,,, | Performed by: FAMILY MEDICINE

## 2021-03-02 PROCEDURE — 1101F PR PT FALLS ASSESS DOC 0-1 FALLS W/OUT INJ PAST YR: ICD-10-PCS | Mod: CPTII,S$GLB,, | Performed by: FAMILY MEDICINE

## 2021-03-02 PROCEDURE — 36415 COLL VENOUS BLD VENIPUNCTURE: CPT

## 2021-03-02 PROCEDURE — 3079F DIAST BP 80-89 MM HG: CPT | Mod: CPTII,S$GLB,, | Performed by: FAMILY MEDICINE

## 2021-03-02 PROCEDURE — 99999 PR PBB SHADOW E&M-EST. PATIENT-LVL III: CPT | Mod: PBBFAC,,, | Performed by: FAMILY MEDICINE

## 2021-03-02 PROCEDURE — 80053 COMPREHEN METABOLIC PANEL: CPT

## 2021-03-02 RX ORDER — INSULIN DEGLUDEC 100 U/ML
INJECTION, SOLUTION SUBCUTANEOUS NIGHTLY
COMMUNITY
End: 2021-03-02 | Stop reason: SDUPTHER

## 2021-03-02 RX ORDER — INSULIN DEGLUDEC 100 U/ML
INJECTION, SOLUTION SUBCUTANEOUS
Qty: 15 ML | Refills: 5 | Status: SHIPPED | OUTPATIENT
Start: 2021-03-02 | End: 2021-06-02 | Stop reason: SDUPTHER

## 2021-03-03 ENCOUNTER — PATIENT OUTREACH (OUTPATIENT)
Dept: ADMINISTRATIVE | Facility: OTHER | Age: 66
End: 2021-03-03

## 2021-03-03 DIAGNOSIS — Z12.11 ENCOUNTER FOR FIT (FECAL IMMUNOCHEMICAL TEST) SCREENING: Primary | ICD-10-CM

## 2021-03-03 LAB
CHOLEST SERPL-MCNC: 165 MG/DL (ref 120–199)
CHOLEST/HDLC SERPL: 6.3 {RATIO} (ref 2–5)
ESTIMATED AVG GLUCOSE: 166 MG/DL (ref 68–131)
HBA1C MFR BLD: 7.4 % (ref 4–5.6)
HDLC SERPL-MCNC: 26 MG/DL (ref 40–75)
HDLC SERPL: 15.8 % (ref 20–50)
LDLC SERPL CALC-MCNC: 108.6 MG/DL (ref 63–159)
NONHDLC SERPL-MCNC: 139 MG/DL
TRIGL SERPL-MCNC: 152 MG/DL (ref 30–150)

## 2021-03-04 ENCOUNTER — OFFICE VISIT (OUTPATIENT)
Dept: OBSTETRICS AND GYNECOLOGY | Facility: CLINIC | Age: 66
End: 2021-03-04
Attending: OBSTETRICS & GYNECOLOGY
Payer: MEDICARE

## 2021-03-04 VITALS
WEIGHT: 231.5 LBS | SYSTOLIC BLOOD PRESSURE: 128 MMHG | HEIGHT: 65 IN | DIASTOLIC BLOOD PRESSURE: 84 MMHG | BODY MASS INDEX: 38.57 KG/M2

## 2021-03-04 DIAGNOSIS — Z01.419 WOMEN'S ANNUAL ROUTINE GYNECOLOGICAL EXAMINATION: Primary | ICD-10-CM

## 2021-03-04 DIAGNOSIS — Z12.4 PAP SMEAR FOR CERVICAL CANCER SCREENING: ICD-10-CM

## 2021-03-04 DIAGNOSIS — Z78.0 POSTMENOPAUSAL STATUS: ICD-10-CM

## 2021-03-04 DIAGNOSIS — Z12.31 VISIT FOR SCREENING MAMMOGRAM: ICD-10-CM

## 2021-03-04 PROCEDURE — 88175 CYTOPATH C/V AUTO FLUID REDO: CPT | Performed by: OBSTETRICS & GYNECOLOGY

## 2021-03-04 PROCEDURE — 1101F PT FALLS ASSESS-DOCD LE1/YR: CPT | Mod: CPTII,S$GLB,, | Performed by: OBSTETRICS & GYNECOLOGY

## 2021-03-04 PROCEDURE — 3288F FALL RISK ASSESSMENT DOCD: CPT | Mod: CPTII,S$GLB,, | Performed by: OBSTETRICS & GYNECOLOGY

## 2021-03-04 PROCEDURE — 1126F AMNT PAIN NOTED NONE PRSNT: CPT | Mod: S$GLB,,, | Performed by: OBSTETRICS & GYNECOLOGY

## 2021-03-04 PROCEDURE — 3008F BODY MASS INDEX DOCD: CPT | Mod: CPTII,S$GLB,, | Performed by: OBSTETRICS & GYNECOLOGY

## 2021-03-04 PROCEDURE — 99999 PR PBB SHADOW E&M-EST. PATIENT-LVL III: ICD-10-PCS | Mod: PBBFAC,,, | Performed by: OBSTETRICS & GYNECOLOGY

## 2021-03-04 PROCEDURE — G0101 PR CA SCREEN;PELVIC/BREAST EXAM: ICD-10-PCS | Mod: S$GLB,,, | Performed by: OBSTETRICS & GYNECOLOGY

## 2021-03-04 PROCEDURE — 99999 PR PBB SHADOW E&M-EST. PATIENT-LVL III: CPT | Mod: PBBFAC,,, | Performed by: OBSTETRICS & GYNECOLOGY

## 2021-03-04 PROCEDURE — 3008F PR BODY MASS INDEX (BMI) DOCUMENTED: ICD-10-PCS | Mod: CPTII,S$GLB,, | Performed by: OBSTETRICS & GYNECOLOGY

## 2021-03-04 PROCEDURE — 1126F PR PAIN SEVERITY QUANTIFIED, NO PAIN PRESENT: ICD-10-PCS | Mod: S$GLB,,, | Performed by: OBSTETRICS & GYNECOLOGY

## 2021-03-04 PROCEDURE — G0101 CA SCREEN;PELVIC/BREAST EXAM: HCPCS | Mod: S$GLB,,, | Performed by: OBSTETRICS & GYNECOLOGY

## 2021-03-04 PROCEDURE — 3288F PR FALLS RISK ASSESSMENT DOCUMENTED: ICD-10-PCS | Mod: CPTII,S$GLB,, | Performed by: OBSTETRICS & GYNECOLOGY

## 2021-03-04 PROCEDURE — 1101F PR PT FALLS ASSESS DOC 0-1 FALLS W/OUT INJ PAST YR: ICD-10-PCS | Mod: CPTII,S$GLB,, | Performed by: OBSTETRICS & GYNECOLOGY

## 2021-03-04 RX ORDER — ALBUTEROL SULFATE 0.83 MG/ML
2.5 SOLUTION RESPIRATORY (INHALATION)
COMMUNITY
End: 2021-10-05 | Stop reason: SDUPTHER

## 2021-03-04 RX ORDER — ASPIRIN 81 MG/1
TABLET ORAL
COMMUNITY
End: 2023-11-18 | Stop reason: ALTCHOICE

## 2021-03-04 RX ORDER — CLONIDINE HYDROCHLORIDE 0.1 MG/1
0.1 TABLET ORAL
COMMUNITY
End: 2021-10-05 | Stop reason: SDUPTHER

## 2021-03-04 RX ORDER — AMLODIPINE BESYLATE 10 MG/1
10 TABLET ORAL
COMMUNITY
End: 2021-06-02 | Stop reason: SDUPTHER

## 2021-03-04 RX ORDER — SPIRONOLACTONE 50 MG/1
50 TABLET, FILM COATED ORAL
COMMUNITY
End: 2021-06-02 | Stop reason: SDUPTHER

## 2021-03-04 RX ORDER — INDOMETHACIN 50 MG/1
50 CAPSULE ORAL
COMMUNITY
End: 2021-06-02 | Stop reason: SDUPTHER

## 2021-03-04 RX ORDER — CYCLOBENZAPRINE HCL 10 MG
10 TABLET ORAL
COMMUNITY
End: 2021-10-05 | Stop reason: SDUPTHER

## 2021-03-04 RX ORDER — FUROSEMIDE 40 MG/1
40 TABLET ORAL
COMMUNITY
End: 2021-06-02 | Stop reason: SDUPTHER

## 2021-03-04 RX ORDER — ALLOPURINOL 300 MG/1
300 TABLET ORAL
COMMUNITY
End: 2021-06-02 | Stop reason: SDUPTHER

## 2021-03-04 RX ORDER — METOPROLOL TARTRATE 50 MG/1
50 TABLET ORAL
COMMUNITY
End: 2021-06-02 | Stop reason: SDUPTHER

## 2021-03-05 ENCOUNTER — PATIENT OUTREACH (OUTPATIENT)
Dept: ADMINISTRATIVE | Facility: HOSPITAL | Age: 66
End: 2021-03-05

## 2021-03-08 ENCOUNTER — PATIENT OUTREACH (OUTPATIENT)
Dept: ADMINISTRATIVE | Facility: HOSPITAL | Age: 66
End: 2021-03-08

## 2021-03-09 ENCOUNTER — LAB VISIT (OUTPATIENT)
Dept: LAB | Facility: HOSPITAL | Age: 66
End: 2021-03-09
Attending: FAMILY MEDICINE
Payer: MEDICARE

## 2021-03-09 DIAGNOSIS — Z12.11 ENCOUNTER FOR FIT (FECAL IMMUNOCHEMICAL TEST) SCREENING: ICD-10-CM

## 2021-03-09 PROCEDURE — 82274 ASSAY TEST FOR BLOOD FECAL: CPT | Performed by: FAMILY MEDICINE

## 2021-03-12 ENCOUNTER — PATIENT MESSAGE (OUTPATIENT)
Dept: OBSTETRICS AND GYNECOLOGY | Facility: CLINIC | Age: 66
End: 2021-03-12

## 2021-03-12 LAB
CLINICAL INFO: NORMAL
CYTO CVX: NORMAL
CYTOLOGIST CVX/VAG CYTO: NORMAL
CYTOLOGY CMNT CVX/VAG CYTO-IMP: NORMAL
CYTOLOGY PAP THIN PREP EXPLANATION: NORMAL
DATE OF PREVIOUS PAP: NORMAL
DATE PREVIOUS BX: NO
LMP START DATE: NORMAL
SPECIMEN SOURCE CVX/VAG CYTO: NORMAL
STAT OF ADQ CVX/VAG CYTO-IMP: NORMAL

## 2021-03-15 LAB — HEMOCCULT STL QL IA: NEGATIVE

## 2021-04-05 ENCOUNTER — PATIENT MESSAGE (OUTPATIENT)
Dept: ADMINISTRATIVE | Facility: HOSPITAL | Age: 66
End: 2021-04-05

## 2021-04-15 ENCOUNTER — CLINICAL SUPPORT (OUTPATIENT)
Dept: AUDIOLOGY | Facility: CLINIC | Age: 66
End: 2021-04-15
Payer: MEDICARE

## 2021-04-15 ENCOUNTER — OFFICE VISIT (OUTPATIENT)
Dept: OTOLARYNGOLOGY | Facility: CLINIC | Age: 66
End: 2021-04-15
Payer: MEDICARE

## 2021-04-15 VITALS
WEIGHT: 231 LBS | HEART RATE: 90 BPM | SYSTOLIC BLOOD PRESSURE: 156 MMHG | DIASTOLIC BLOOD PRESSURE: 83 MMHG | BODY MASS INDEX: 38.44 KG/M2

## 2021-04-15 DIAGNOSIS — R42 VERTIGO: ICD-10-CM

## 2021-04-15 DIAGNOSIS — H81.8X9 OTHER DISORDERS OF VESTIBULAR FUNCTION, UNSPECIFIED EAR: Primary | ICD-10-CM

## 2021-04-15 PROCEDURE — 92567 TYMPANOMETRY: CPT | Mod: S$GLB,,, | Performed by: AUDIOLOGIST

## 2021-04-15 PROCEDURE — 99999 PR PBB SHADOW E&M-EST. PATIENT-LVL I: CPT | Mod: PBBFAC,,, | Performed by: AUDIOLOGIST

## 2021-04-15 PROCEDURE — 92557 COMPREHENSIVE HEARING TEST: CPT | Mod: S$GLB,,, | Performed by: AUDIOLOGIST

## 2021-04-15 PROCEDURE — 3008F BODY MASS INDEX DOCD: CPT | Mod: CPTII,S$GLB,, | Performed by: OTOLARYNGOLOGY

## 2021-04-15 PROCEDURE — 1159F PR MEDICATION LIST DOCUMENTED IN MEDICAL RECORD: ICD-10-PCS | Mod: S$GLB,,, | Performed by: OTOLARYNGOLOGY

## 2021-04-15 PROCEDURE — 1159F MED LIST DOCD IN RCRD: CPT | Mod: S$GLB,,, | Performed by: OTOLARYNGOLOGY

## 2021-04-15 PROCEDURE — 99999 PR PBB SHADOW E&M-EST. PATIENT-LVL IV: CPT | Mod: PBBFAC,,, | Performed by: OTOLARYNGOLOGY

## 2021-04-15 PROCEDURE — 99204 OFFICE O/P NEW MOD 45 MIN: CPT | Mod: S$GLB,,, | Performed by: OTOLARYNGOLOGY

## 2021-04-15 PROCEDURE — 92557 PR COMPREHENSIVE HEARING TEST: ICD-10-PCS | Mod: S$GLB,,, | Performed by: AUDIOLOGIST

## 2021-04-15 PROCEDURE — 99999 PR PBB SHADOW E&M-EST. PATIENT-LVL IV: ICD-10-PCS | Mod: PBBFAC,,, | Performed by: OTOLARYNGOLOGY

## 2021-04-15 PROCEDURE — 99204 PR OFFICE/OUTPT VISIT, NEW, LEVL IV, 45-59 MIN: ICD-10-PCS | Mod: S$GLB,,, | Performed by: OTOLARYNGOLOGY

## 2021-04-15 PROCEDURE — 3008F PR BODY MASS INDEX (BMI) DOCUMENTED: ICD-10-PCS | Mod: CPTII,S$GLB,, | Performed by: OTOLARYNGOLOGY

## 2021-04-15 PROCEDURE — 99999 PR PBB SHADOW E&M-EST. PATIENT-LVL I: ICD-10-PCS | Mod: PBBFAC,,, | Performed by: AUDIOLOGIST

## 2021-04-15 PROCEDURE — 92567 PR TYMPA2METRY: ICD-10-PCS | Mod: S$GLB,,, | Performed by: AUDIOLOGIST

## 2021-05-27 ENCOUNTER — OFFICE VISIT (OUTPATIENT)
Dept: INTERNAL MEDICINE | Facility: CLINIC | Age: 66
End: 2021-05-27
Attending: FAMILY MEDICINE
Payer: MEDICARE

## 2021-05-27 VITALS
DIASTOLIC BLOOD PRESSURE: 66 MMHG | OXYGEN SATURATION: 96 % | SYSTOLIC BLOOD PRESSURE: 138 MMHG | BODY MASS INDEX: 38.48 KG/M2 | HEART RATE: 72 BPM | WEIGHT: 231.25 LBS

## 2021-05-27 DIAGNOSIS — I10 ESSENTIAL HYPERTENSION: ICD-10-CM

## 2021-05-27 DIAGNOSIS — Z79.4 TYPE 2 DIABETES MELLITUS WITH COMPLICATION, WITH LONG-TERM CURRENT USE OF INSULIN: ICD-10-CM

## 2021-05-27 DIAGNOSIS — N18.31 STAGE 3A CHRONIC KIDNEY DISEASE: ICD-10-CM

## 2021-05-27 DIAGNOSIS — E11.8 TYPE 2 DIABETES MELLITUS WITH COMPLICATION, WITH LONG-TERM CURRENT USE OF INSULIN: ICD-10-CM

## 2021-05-27 DIAGNOSIS — L30.9 DERMATITIS: Primary | ICD-10-CM

## 2021-05-27 PROCEDURE — 3008F PR BODY MASS INDEX (BMI) DOCUMENTED: ICD-10-PCS | Mod: CPTII,S$GLB,, | Performed by: FAMILY MEDICINE

## 2021-05-27 PROCEDURE — 3288F PR FALLS RISK ASSESSMENT DOCUMENTED: ICD-10-PCS | Mod: CPTII,S$GLB,, | Performed by: FAMILY MEDICINE

## 2021-05-27 PROCEDURE — 99499 RISK ADDL DX/OHS AUDIT: ICD-10-PCS | Mod: S$GLB,,, | Performed by: FAMILY MEDICINE

## 2021-05-27 PROCEDURE — 3288F FALL RISK ASSESSMENT DOCD: CPT | Mod: CPTII,S$GLB,, | Performed by: FAMILY MEDICINE

## 2021-05-27 PROCEDURE — 3051F HG A1C>EQUAL 7.0%<8.0%: CPT | Mod: CPTII,S$GLB,, | Performed by: FAMILY MEDICINE

## 2021-05-27 PROCEDURE — 1125F PR PAIN SEVERITY QUANTIFIED, PAIN PRESENT: ICD-10-PCS | Mod: S$GLB,,, | Performed by: FAMILY MEDICINE

## 2021-05-27 PROCEDURE — 3051F PR MOST RECENT HEMOGLOBIN A1C LEVEL 7.0 - < 8.0%: ICD-10-PCS | Mod: CPTII,S$GLB,, | Performed by: FAMILY MEDICINE

## 2021-05-27 PROCEDURE — 1101F PR PT FALLS ASSESS DOC 0-1 FALLS W/OUT INJ PAST YR: ICD-10-PCS | Mod: CPTII,S$GLB,, | Performed by: FAMILY MEDICINE

## 2021-05-27 PROCEDURE — 99499 UNLISTED E&M SERVICE: CPT | Mod: S$GLB,,, | Performed by: FAMILY MEDICINE

## 2021-05-27 PROCEDURE — 1159F MED LIST DOCD IN RCRD: CPT | Mod: S$GLB,,, | Performed by: FAMILY MEDICINE

## 2021-05-27 PROCEDURE — 1159F PR MEDICATION LIST DOCUMENTED IN MEDICAL RECORD: ICD-10-PCS | Mod: S$GLB,,, | Performed by: FAMILY MEDICINE

## 2021-05-27 PROCEDURE — 1125F AMNT PAIN NOTED PAIN PRSNT: CPT | Mod: S$GLB,,, | Performed by: FAMILY MEDICINE

## 2021-05-27 PROCEDURE — 3008F BODY MASS INDEX DOCD: CPT | Mod: CPTII,S$GLB,, | Performed by: FAMILY MEDICINE

## 2021-05-27 PROCEDURE — 99999 PR PBB SHADOW E&M-EST. PATIENT-LVL III: CPT | Mod: PBBFAC,,, | Performed by: FAMILY MEDICINE

## 2021-05-27 PROCEDURE — 99999 PR PBB SHADOW E&M-EST. PATIENT-LVL III: ICD-10-PCS | Mod: PBBFAC,,, | Performed by: FAMILY MEDICINE

## 2021-05-27 PROCEDURE — 1101F PT FALLS ASSESS-DOCD LE1/YR: CPT | Mod: CPTII,S$GLB,, | Performed by: FAMILY MEDICINE

## 2021-05-27 PROCEDURE — 99214 PR OFFICE/OUTPT VISIT, EST, LEVL IV, 30-39 MIN: ICD-10-PCS | Mod: S$GLB,,, | Performed by: FAMILY MEDICINE

## 2021-05-27 PROCEDURE — 99214 OFFICE O/P EST MOD 30 MIN: CPT | Mod: S$GLB,,, | Performed by: FAMILY MEDICINE

## 2021-05-27 RX ORDER — MOMETASONE FUROATE 1 MG/G
CREAM TOPICAL DAILY
Qty: 45 G | Refills: 1 | Status: SHIPPED | OUTPATIENT
Start: 2021-05-27 | End: 2023-11-18

## 2021-06-02 ENCOUNTER — PATIENT MESSAGE (OUTPATIENT)
Dept: INTERNAL MEDICINE | Facility: CLINIC | Age: 66
End: 2021-06-02

## 2021-06-02 DIAGNOSIS — I10 ESSENTIAL HYPERTENSION: Primary | ICD-10-CM

## 2021-06-02 DIAGNOSIS — M1A.9XX0 CHRONIC GOUT WITHOUT TOPHUS, UNSPECIFIED CAUSE, UNSPECIFIED SITE: ICD-10-CM

## 2021-06-02 DIAGNOSIS — M54.2 NECK PAIN: ICD-10-CM

## 2021-06-02 DIAGNOSIS — E11.8 TYPE 2 DIABETES MELLITUS WITH COMPLICATION, WITH LONG-TERM CURRENT USE OF INSULIN: ICD-10-CM

## 2021-06-02 DIAGNOSIS — Z79.4 TYPE 2 DIABETES MELLITUS WITH COMPLICATION, WITH LONG-TERM CURRENT USE OF INSULIN: ICD-10-CM

## 2021-06-03 RX ORDER — METOPROLOL TARTRATE 50 MG/1
50 TABLET ORAL 2 TIMES DAILY
Qty: 190 TABLET | Refills: 1 | Status: SHIPPED | OUTPATIENT
Start: 2021-06-03 | End: 2021-10-05 | Stop reason: SDUPTHER

## 2021-06-03 RX ORDER — INSULIN LISPRO 100 [IU]/ML
15 INJECTION, SOLUTION INTRAVENOUS; SUBCUTANEOUS
Qty: 15 ML | Refills: 11 | Status: SHIPPED | OUTPATIENT
Start: 2021-06-03 | End: 2023-11-18

## 2021-06-03 RX ORDER — ALLOPURINOL 300 MG/1
300 TABLET ORAL DAILY
Qty: 90 TABLET | Refills: 1 | Status: SHIPPED | OUTPATIENT
Start: 2021-06-03 | End: 2021-10-04 | Stop reason: SDUPTHER

## 2021-06-03 RX ORDER — INSULIN DEGLUDEC 100 U/ML
INJECTION, SOLUTION SUBCUTANEOUS
Qty: 15 PEN | Refills: 2 | Status: SHIPPED | OUTPATIENT
Start: 2021-06-03 | End: 2023-11-18

## 2021-06-03 RX ORDER — INDOMETHACIN 50 MG/1
50 CAPSULE ORAL 2 TIMES DAILY WITH MEALS
Qty: 180 CAPSULE | Refills: 1 | Status: SHIPPED | OUTPATIENT
Start: 2021-06-03 | End: 2023-11-18

## 2021-06-03 RX ORDER — SPIRONOLACTONE 50 MG/1
50 TABLET, FILM COATED ORAL DAILY
Qty: 90 TABLET | Refills: 1 | Status: SHIPPED | OUTPATIENT
Start: 2021-06-03 | End: 2021-10-05 | Stop reason: SDUPTHER

## 2021-06-03 RX ORDER — NABUMETONE 500 MG/1
500 TABLET, FILM COATED ORAL 2 TIMES DAILY
Qty: 180 TABLET | Refills: 1 | Status: SHIPPED | OUTPATIENT
Start: 2021-06-03 | End: 2021-07-03

## 2021-06-03 RX ORDER — FUROSEMIDE 40 MG/1
40 TABLET ORAL DAILY
Qty: 90 TABLET | Refills: 1 | Status: ON HOLD | OUTPATIENT
Start: 2021-06-03 | End: 2023-11-19

## 2021-06-03 RX ORDER — AMLODIPINE BESYLATE 10 MG/1
10 TABLET ORAL DAILY
Qty: 90 TABLET | Refills: 1 | Status: SHIPPED | OUTPATIENT
Start: 2021-06-03 | End: 2021-10-04 | Stop reason: SDUPTHER

## 2021-08-04 ENCOUNTER — PATIENT MESSAGE (OUTPATIENT)
Dept: ADMINISTRATIVE | Facility: HOSPITAL | Age: 66
End: 2021-08-04

## 2021-08-27 ENCOUNTER — HOSPITAL ENCOUNTER (OUTPATIENT)
Dept: RADIOLOGY | Facility: OTHER | Age: 66
Discharge: HOME | End: 2021-08-27
Attending: OBSTETRICS & GYNECOLOGY
Payer: MEDICARE

## 2021-08-27 DIAGNOSIS — Z12.31 VISIT FOR SCREENING MAMMOGRAM: ICD-10-CM

## 2021-08-27 PROCEDURE — 77067 SCR MAMMO BI INCL CAD: CPT | Mod: 26,,, | Performed by: RADIOLOGY

## 2021-08-27 PROCEDURE — 77067 MAMMO DIGITAL SCREENING BILAT WITH TOMO: ICD-10-PCS | Mod: 26,,, | Performed by: RADIOLOGY

## 2021-08-27 PROCEDURE — 77067 SCR MAMMO BI INCL CAD: CPT | Mod: TC

## 2021-08-27 PROCEDURE — 77063 MAMMO DIGITAL SCREENING BILAT WITH TOMO: ICD-10-PCS | Mod: 26,,, | Performed by: RADIOLOGY

## 2021-08-27 PROCEDURE — 77063 BREAST TOMOSYNTHESIS BI: CPT | Mod: 26,,, | Performed by: RADIOLOGY

## 2021-09-06 ENCOUNTER — PATIENT MESSAGE (OUTPATIENT)
Dept: OBSTETRICS AND GYNECOLOGY | Facility: CLINIC | Age: 66
End: 2021-09-06

## 2021-10-04 ENCOUNTER — PATIENT MESSAGE (OUTPATIENT)
Dept: INTERNAL MEDICINE | Facility: CLINIC | Age: 66
End: 2021-10-04

## 2021-10-04 DIAGNOSIS — R05.3 CHRONIC COUGH: ICD-10-CM

## 2021-10-04 DIAGNOSIS — I10 ESSENTIAL HYPERTENSION: ICD-10-CM

## 2021-10-04 DIAGNOSIS — M54.2 NECK PAIN: Primary | ICD-10-CM

## 2021-10-05 ENCOUNTER — PATIENT MESSAGE (OUTPATIENT)
Dept: ADMINISTRATIVE | Facility: HOSPITAL | Age: 66
End: 2021-10-05

## 2021-10-05 RX ORDER — ALBUTEROL SULFATE 0.83 MG/ML
2.5 SOLUTION RESPIRATORY (INHALATION) 4 TIMES DAILY PRN
Qty: 3 ML | Refills: 1 | Status: SHIPPED | OUTPATIENT
Start: 2021-10-05 | End: 2023-11-18

## 2021-10-05 RX ORDER — CYCLOBENZAPRINE HCL 10 MG
10 TABLET ORAL 3 TIMES DAILY PRN
Qty: 30 TABLET | Refills: 1 | Status: SHIPPED | OUTPATIENT
Start: 2021-10-05 | End: 2023-11-18

## 2021-10-05 RX ORDER — METOPROLOL TARTRATE 50 MG/1
50 TABLET ORAL 2 TIMES DAILY
Qty: 190 TABLET | Refills: 1 | Status: SHIPPED | OUTPATIENT
Start: 2021-10-05 | End: 2023-11-18

## 2021-10-05 RX ORDER — CLONIDINE HYDROCHLORIDE 0.1 MG/1
0.1 TABLET ORAL DAILY
Qty: 30 TABLET | Refills: 1 | Status: SHIPPED | OUTPATIENT
Start: 2021-10-05 | End: 2023-11-18

## 2021-10-05 RX ORDER — SPIRONOLACTONE 50 MG/1
50 TABLET, FILM COATED ORAL DAILY
Qty: 90 TABLET | Refills: 1 | Status: SHIPPED | OUTPATIENT
Start: 2021-10-05 | End: 2022-05-30

## 2021-10-06 DIAGNOSIS — E11.9 TYPE 2 DIABETES MELLITUS WITHOUT COMPLICATION, UNSPECIFIED WHETHER LONG TERM INSULIN USE: ICD-10-CM

## 2021-10-14 ENCOUNTER — PATIENT MESSAGE (OUTPATIENT)
Dept: INTERNAL MEDICINE | Facility: CLINIC | Age: 66
End: 2021-10-14

## 2021-10-15 ENCOUNTER — TELEPHONE (OUTPATIENT)
Dept: INTERNAL MEDICINE | Facility: CLINIC | Age: 66
End: 2021-10-15

## 2021-11-01 ENCOUNTER — PES CALL (OUTPATIENT)
Dept: ADMINISTRATIVE | Facility: CLINIC | Age: 66
End: 2021-11-01
Payer: MEDICARE

## 2021-12-20 ENCOUNTER — TELEPHONE (OUTPATIENT)
Dept: INTERNAL MEDICINE | Facility: CLINIC | Age: 66
End: 2021-12-20
Payer: MEDICARE

## 2021-12-23 ENCOUNTER — TELEPHONE (OUTPATIENT)
Dept: INTERNAL MEDICINE | Facility: CLINIC | Age: 66
End: 2021-12-23
Payer: MEDICARE

## 2022-01-02 ENCOUNTER — PATIENT MESSAGE (OUTPATIENT)
Dept: ADMINISTRATIVE | Facility: HOSPITAL | Age: 67
End: 2022-01-02
Payer: MEDICARE

## 2022-01-03 ENCOUNTER — TELEPHONE (OUTPATIENT)
Dept: INTERNAL MEDICINE | Facility: CLINIC | Age: 67
End: 2022-01-03
Payer: MEDICARE

## 2022-01-04 ENCOUNTER — TELEPHONE (OUTPATIENT)
Dept: INTERNAL MEDICINE | Facility: CLINIC | Age: 67
End: 2022-01-04
Payer: MEDICARE

## 2022-01-04 NOTE — TELEPHONE ENCOUNTER
Scrubbing schedules to offer virtuals to symptomatic pts due to covid uptick and also to make sure hosp and ER f/u pts are not symptomatic, and are already established w/ PCP    Pt has upcoming hosp f/u appt  Couldn't find hosp encounter notes  Called pt to make sure it's not covid related  LVM and sent portal msg  Routing to staff for f/u

## 2022-02-23 DIAGNOSIS — E11.9 TYPE 2 DIABETES MELLITUS WITHOUT COMPLICATION: ICD-10-CM

## 2022-02-23 DIAGNOSIS — N18.9 CHRONIC KIDNEY DISEASE, UNSPECIFIED CKD STAGE: ICD-10-CM

## 2022-03-25 DIAGNOSIS — Z12.11 COLON CANCER SCREENING: ICD-10-CM

## 2022-04-01 ENCOUNTER — PATIENT OUTREACH (OUTPATIENT)
Dept: INTERNAL MEDICINE | Facility: CLINIC | Age: 67
End: 2022-04-01
Payer: MEDICARE

## 2022-04-01 ENCOUNTER — PATIENT MESSAGE (OUTPATIENT)
Dept: INTERNAL MEDICINE | Facility: CLINIC | Age: 67
End: 2022-04-01
Payer: MEDICARE

## 2022-04-01 DIAGNOSIS — Z13.6 ENCOUNTER FOR LIPID SCREENING FOR CARDIOVASCULAR DISEASE: Primary | ICD-10-CM

## 2022-04-01 DIAGNOSIS — Z13.220 ENCOUNTER FOR LIPID SCREENING FOR CARDIOVASCULAR DISEASE: Primary | ICD-10-CM

## 2022-04-01 DIAGNOSIS — Z78.0 POSTMENOPAUSAL: ICD-10-CM

## 2022-04-04 ENCOUNTER — PES CALL (OUTPATIENT)
Dept: ADMINISTRATIVE | Facility: CLINIC | Age: 67
End: 2022-04-04
Payer: MEDICARE

## 2022-04-08 ENCOUNTER — PATIENT OUTREACH (OUTPATIENT)
Dept: INTERNAL MEDICINE | Facility: CLINIC | Age: 67
End: 2022-04-08
Payer: MEDICARE

## 2022-04-08 ENCOUNTER — PATIENT MESSAGE (OUTPATIENT)
Dept: INTERNAL MEDICINE | Facility: CLINIC | Age: 67
End: 2022-04-08
Payer: MEDICARE

## 2022-04-25 ENCOUNTER — OFFICE VISIT (OUTPATIENT)
Dept: NEUROLOGY | Facility: CLINIC | Age: 67
End: 2022-04-25
Payer: MEDICARE

## 2022-04-25 VITALS
BODY MASS INDEX: 38.49 KG/M2 | WEIGHT: 231 LBS | HEART RATE: 65 BPM | DIASTOLIC BLOOD PRESSURE: 75 MMHG | HEIGHT: 65 IN | SYSTOLIC BLOOD PRESSURE: 154 MMHG

## 2022-04-25 DIAGNOSIS — Z79.4 TYPE 2 DIABETES MELLITUS WITH OTHER SPECIFIED COMPLICATION, WITH LONG-TERM CURRENT USE OF INSULIN: Primary | ICD-10-CM

## 2022-04-25 DIAGNOSIS — E11.69 TYPE 2 DIABETES MELLITUS WITH OTHER SPECIFIED COMPLICATION, WITH LONG-TERM CURRENT USE OF INSULIN: Primary | ICD-10-CM

## 2022-04-25 DIAGNOSIS — E78.01 HYPERLIPIDEMIA TYPE II: ICD-10-CM

## 2022-04-25 DIAGNOSIS — I10 PRIMARY HYPERTENSION: ICD-10-CM

## 2022-04-25 DIAGNOSIS — Z86.73 HISTORY OF STROKE: ICD-10-CM

## 2022-04-25 PROCEDURE — 99499 RISK ADDL DX/OHS AUDIT: ICD-10-PCS | Mod: S$GLB,,, | Performed by: PSYCHIATRY & NEUROLOGY

## 2022-04-25 PROCEDURE — 1101F PR PT FALLS ASSESS DOC 0-1 FALLS W/OUT INJ PAST YR: ICD-10-PCS | Mod: CPTII,S$GLB,, | Performed by: PSYCHIATRY & NEUROLOGY

## 2022-04-25 PROCEDURE — 1126F AMNT PAIN NOTED NONE PRSNT: CPT | Mod: CPTII,S$GLB,, | Performed by: PSYCHIATRY & NEUROLOGY

## 2022-04-25 PROCEDURE — 99204 OFFICE O/P NEW MOD 45 MIN: CPT | Mod: S$GLB,,, | Performed by: PSYCHIATRY & NEUROLOGY

## 2022-04-25 PROCEDURE — 1159F MED LIST DOCD IN RCRD: CPT | Mod: CPTII,S$GLB,, | Performed by: PSYCHIATRY & NEUROLOGY

## 2022-04-25 PROCEDURE — 1101F PT FALLS ASSESS-DOCD LE1/YR: CPT | Mod: CPTII,S$GLB,, | Performed by: PSYCHIATRY & NEUROLOGY

## 2022-04-25 PROCEDURE — 3288F FALL RISK ASSESSMENT DOCD: CPT | Mod: CPTII,S$GLB,, | Performed by: PSYCHIATRY & NEUROLOGY

## 2022-04-25 PROCEDURE — 99999 PR PBB SHADOW E&M-EST. PATIENT-LVL III: ICD-10-PCS | Mod: PBBFAC,,, | Performed by: PSYCHIATRY & NEUROLOGY

## 2022-04-25 PROCEDURE — 3008F BODY MASS INDEX DOCD: CPT | Mod: CPTII,S$GLB,, | Performed by: PSYCHIATRY & NEUROLOGY

## 2022-04-25 PROCEDURE — 3288F PR FALLS RISK ASSESSMENT DOCUMENTED: ICD-10-PCS | Mod: CPTII,S$GLB,, | Performed by: PSYCHIATRY & NEUROLOGY

## 2022-04-25 PROCEDURE — 99499 UNLISTED E&M SERVICE: CPT | Mod: S$GLB,,, | Performed by: PSYCHIATRY & NEUROLOGY

## 2022-04-25 PROCEDURE — 3077F SYST BP >= 140 MM HG: CPT | Mod: CPTII,S$GLB,, | Performed by: PSYCHIATRY & NEUROLOGY

## 2022-04-25 PROCEDURE — 3051F HG A1C>EQUAL 7.0%<8.0%: CPT | Mod: CPTII,S$GLB,, | Performed by: PSYCHIATRY & NEUROLOGY

## 2022-04-25 PROCEDURE — 99999 PR PBB SHADOW E&M-EST. PATIENT-LVL III: CPT | Mod: PBBFAC,,, | Performed by: PSYCHIATRY & NEUROLOGY

## 2022-04-25 PROCEDURE — 99204 PR OFFICE/OUTPT VISIT, NEW, LEVL IV, 45-59 MIN: ICD-10-PCS | Mod: S$GLB,,, | Performed by: PSYCHIATRY & NEUROLOGY

## 2022-04-25 PROCEDURE — 3078F DIAST BP <80 MM HG: CPT | Mod: CPTII,S$GLB,, | Performed by: PSYCHIATRY & NEUROLOGY

## 2022-04-25 PROCEDURE — 1159F PR MEDICATION LIST DOCUMENTED IN MEDICAL RECORD: ICD-10-PCS | Mod: CPTII,S$GLB,, | Performed by: PSYCHIATRY & NEUROLOGY

## 2022-04-25 PROCEDURE — 3077F PR MOST RECENT SYSTOLIC BLOOD PRESSURE >= 140 MM HG: ICD-10-PCS | Mod: CPTII,S$GLB,, | Performed by: PSYCHIATRY & NEUROLOGY

## 2022-04-25 PROCEDURE — 3051F PR MOST RECENT HEMOGLOBIN A1C LEVEL 7.0 - < 8.0%: ICD-10-PCS | Mod: CPTII,S$GLB,, | Performed by: PSYCHIATRY & NEUROLOGY

## 2022-04-25 PROCEDURE — 1126F PR PAIN SEVERITY QUANTIFIED, NO PAIN PRESENT: ICD-10-PCS | Mod: CPTII,S$GLB,, | Performed by: PSYCHIATRY & NEUROLOGY

## 2022-04-25 PROCEDURE — 3008F PR BODY MASS INDEX (BMI) DOCUMENTED: ICD-10-PCS | Mod: CPTII,S$GLB,, | Performed by: PSYCHIATRY & NEUROLOGY

## 2022-04-25 PROCEDURE — 3078F PR MOST RECENT DIASTOLIC BLOOD PRESSURE < 80 MM HG: ICD-10-PCS | Mod: CPTII,S$GLB,, | Performed by: PSYCHIATRY & NEUROLOGY

## 2022-04-25 RX ORDER — FLUOXETINE HYDROCHLORIDE 20 MG/1
20 CAPSULE ORAL DAILY
COMMUNITY

## 2022-04-25 RX ORDER — CLOPIDOGREL BISULFATE 75 MG/1
75 TABLET ORAL DAILY
COMMUNITY
End: 2023-11-18 | Stop reason: ALTCHOICE

## 2022-04-25 RX ORDER — ATORVASTATIN CALCIUM 80 MG/1
80 TABLET, FILM COATED ORAL DAILY
COMMUNITY
End: 2023-11-18

## 2022-04-25 RX ORDER — INSULIN GLARGINE 100 [IU]/ML
INJECTION, SOLUTION SUBCUTANEOUS
COMMUNITY
End: 2023-11-18

## 2022-04-25 NOTE — PROGRESS NOTES
"Vascular Neurology  Clinic Note    ___________________  ASSESSMENT & PLAN    Problem List Items Addressed This Visit        1 - High    History of stroke    Current Assessment & Plan     Etiology: Undetermined - Workup Incomplete (do not have the records to see what was completed)  · Diagnostic Orders: obtain outside records / scans  · Secondary stroke prevention: Continue DAPT (had been on this prior to stroke per patient)  · Continue current statin therapy w/ goal LDL < 70  · Blood pressure goal < 130/80 mmHg   · Stroke Risk Factors Addressed: HTN, DM (goal A1c < 7), HLD  · Stroke education administered                Unprioritized    HTN (hypertension)    Hyperlipidemia type II    Diabetes mellitus, type II - Primary    Relevant Medications    insulin (LANTUS SOLOSTAR U-100 INSULIN) glargine 100 units/mL (3mL) SubQ pen          Reason For Visit (Chief Complaint): f/u for stroke    HPI: 66 y.o. right handed female with hx of stroke in November 2021 and seen at [facility] for hospitalization. There are no records or scans available.    Patient woke up with trying to get to crying grandson and had a fall, subsequently had right sided weakness and speech difficulties and eventually went to [facility]. Had had knee surgery on left knee (TKR) within the week prior. Hx of HTN, DM. Was at [facility] for 3 weeks and then was in [facility] for 3 weeks. She currently does outpatient physical therapy 3x/ week. Had been on aspirin and plavix before the stroke per the patient - for her "blood flow".      Other:     Relevant Labwork:  Recent Labs   Lab 03/02/21  1501 09/28/21  1600   Hemoglobin A1C 7.4 H 7.2 H   LDL Cholesterol 108.6  --    HDL 26 L  --    Triglycerides 152 H  --    Cholesterol 165  --          I reviewed the above labwork.    Review of Systems  Msk: negative for muscle pain  Skin: negative for pruritis  Neuro: negative for headache  All others negative    Past Medical History  Past Medical History:   Diagnosis " "Date    Abnormal EKG     Anemia 7/13/2017    Aortic valve regurgitation     Arthritis     CKD (chronic kidney disease) stage 2, GFR 60-89 ml/min 8/8/2014    CKD (chronic kidney disease) stage 2, GFR 60-89 ml/min 8/8/2014    Diabetes mellitus     Diabetes mellitus type II     GERD (gastroesophageal reflux disease)     Gout, unspecified     Heart murmur     Hyperlipidemia     Hypertension     Tendonitis      Family History  No relevant history   Social History  Social History     Socioeconomic History    Marital status: Single   Tobacco Use    Smoking status: Never Smoker    Smokeless tobacco: Never Used   Substance and Sexual Activity    Alcohol use: No     Alcohol/week: 0.0 standard drinks    Drug use: No    Sexual activity: Not Currently     Partners: Male     Birth control/protection: None   Social History Narrative    Retired from Dept of         Medication List with Changes/Refills   Current Medications    ALBUTEROL (PROVENTIL) 2.5 MG /3 ML (0.083 %) NEBULIZER SOLUTION    Take 3 mLs (2.5 mg total) by nebulization 4 (four) times daily as needed for Wheezing.    ALLOPURINOL (ZYLOPRIM) 300 MG TABLET    Take 1 tablet by mouth once daily    AMLODIPINE (NORVASC) 10 MG TABLET    Take 1 tablet (10 mg total) by mouth once daily.    ASPIRIN (ECOTRIN) 81 MG EC TABLET    Take by mouth.    ATORVASTATIN (LIPITOR) 80 MG TABLET    Take 80 mg by mouth once daily.    BD ULTRA-FINE SHORT PEN NEEDLE 31 GAUGE X 5/16" NDLE    Use as directed 4 times daily.    BLOOD SUGAR DIAGNOSTIC STRP    Pt tests tid    BLOOD-GLUCOSE METER MISC    One Touch Verio  Please provide strips to test qid    CHOLECALCIFEROL, VITAMIN D3, 2,000 UNIT TAB    Take 1 tablet by mouth once daily.     CLONIDINE (CATAPRES) 0.1 MG TABLET    Take 1 tablet (0.1 mg total) by mouth once daily.    CLOPIDOGREL (PLAVIX) 75 MG TABLET    Take 75 mg by mouth once daily.    CYANOCOBALAMIN, VITAMIN B-12, (VITAMIN B-12) 50 MCG TABLET    Take 50 " "mcg by mouth once daily.    CYCLOBENZAPRINE (FLEXERIL) 10 MG TABLET    Take 1 tablet (10 mg total) by mouth 3 (three) times daily as needed for Muscle spasms.    FLUOXETINE 20 MG CAPSULE    Take 20 mg by mouth once daily.    FUROSEMIDE (LASIX) 40 MG TABLET    Take 1 tablet (40 mg total) by mouth once daily.    INDOMETHACIN (INDOCIN) 50 MG CAPSULE    Take 1 capsule (50 mg total) by mouth 2 (two) times daily with meals.    INSULIN (LANTUS SOLOSTAR U-100 INSULIN) GLARGINE 100 UNITS/ML (3ML) SUBQ PEN    Inject into the skin.    INSULIN DEGLUDEC (TRESIBA FLEXTOUCH U-100) 100 UNIT/ML (3 ML) INSULIN PEN    Inject 45 Units into skin every evening.    INSULIN LISPRO (HUMALOG KWIKPEN INSULIN) 100 UNIT/ML PEN    Inject 15 Units into the skin 3 (three) times daily before meals.    LANCETS MISC    by Misc.(Non-Drug; Combo Route) route. Pt is testing 3 times daily    METOPROLOL TARTRATE (LOPRESSOR) 50 MG TABLET    Take 1 tablet (50 mg total) by mouth 2 (two) times daily.    MOMETASONE 0.1% (ELOCON) 0.1 % CREAM    Apply topically once daily.    PEN NEEDLE, DIABETIC 31 GAUGE X 3/16" NDLE    use four times daily as directed    SPIRONOLACTONE (ALDACTONE) 50 MG TABLET    Take 1 tablet (50 mg total) by mouth once daily.       EXAM  Vital Signs:  Vitals - 1 value per visit 3/4/2021 3/4/2021 4/15/2021 5/27/2021 5/27/2021 4/25/2022 4/25/2022   SYSTOLIC - 128 156 - 138 - 154   DIASTOLIC - 84 83 - 66 - 75   Pulse - - 90 - 72 - 65   Temp - - - - - - -   Resp - - - - - - -   SPO2 - - - - 96 - -   Weight (lb) - 231.48 231 - 231.26 - 231   Weight (kg) - 105 104.781 - 104.9 - 104.781   Height - 65 - - - - 65   BMI (Calculated) - 38.5 - - - - 38.4   VISIT REPORT - - - - - - -   Pain Score  0 - - 8 - 0 -   Some recent data might be hidden       General: well appearing without discomfort   Mental Status:alert, oriented to person - place - age - month   Language: able to name, repeat, comprehend commands   Cranial Nerves: EOMI,mild R facial " weakness, tongue to midline; mild dysarthria  Motor: 4/5 R UE extensor, 4/5 R LE flexor  Sensory: intact light touch bilaterally  Coordination: no ataxia on finger-to-nose or heel-to-shin testing; no truncal ataxia  Gait & Stance: wheelchair    Stroke Scales      MD Ambrose  Vascular Neurology  Office [phone]  Fax [phone]

## 2022-04-28 PROBLEM — Z86.73 HISTORY OF STROKE: Status: ACTIVE | Noted: 2022-04-28

## 2022-04-28 NOTE — ASSESSMENT & PLAN NOTE
Etiology: Undetermined - Workup Incomplete (do not have the records to see what was completed)  · Diagnostic Orders: obtain outside records / scans  · Secondary stroke prevention: Continue DAPT (had been on this prior to stroke per patient)  · Continue current statin therapy w/ goal LDL < 70  · Blood pressure goal < 130/80 mmHg   · Stroke Risk Factors Addressed: HTN, DM (goal A1c < 7), HLD  · Stroke education administered

## 2022-04-28 NOTE — PROGRESS NOTES
Patient, Wendy Viveros (MRN #[id number]), presented with a recorded BMI of 38.44 kg/m^2 and a documented comorbidity(s):  - Diabetes Mellitus Type 2  - Hypertension  - Hyperlipidemia  to which the severe obesity is a contributing factor. This is consistent with the definition of severe obesity (BMI 35.0-39.9) with comorbidity (ICD-10 E66.01, Z68.35). The patient's severe obesity was monitored, evaluated, addressed and/or treated. This addendum to the medical record is made on 04/28/2022.

## 2022-05-28 DIAGNOSIS — I10 ESSENTIAL HYPERTENSION: ICD-10-CM

## 2022-05-28 NOTE — TELEPHONE ENCOUNTER
Care Due:                  Date            Visit Type   Department     Provider  --------------------------------------------------------------------------------                                MYCHART                              FOLLOWUP/OF  [facility] INTERNAL  Roger Steel  Last Visit: 05-      FICE VISIT   [facility]  Next Visit: None Scheduled  None         None Found                                                            Last  Test          Frequency    Reason                     Performed    Due Date  --------------------------------------------------------------------------------    Office Visit  12 months..  allopurinoL, amLODIPine,   05- 05-                             furosemide, insulin,                             metoprolol,                             spironolactone...........    CBC.........  12 months..  allopurinoL..............  Not Found    Overdue    CMP.........  12 months..  allopurinoL, furosemide,   03- 02-                             insulin, spironolactone..    HBA1C.......  6 months...  insulin..................  03- 08-    Uric Acid...  12 months..  allopurinoL..............  Not Found    Overdue    [employer] Embedded Care Gaps. Reference number: [id number]. 5/28/2022   5:31:11 AM CDT

## 2022-05-28 NOTE — TELEPHONE ENCOUNTER
Refill Routing Note   Medication(s) are not appropriate for processing by [facility] for the following reason(s):      - Required vitals are abnormal    [facility] action(s):  Defer Medication-related problems identified:   Requires labs  Requires appointment     Medication Therapy Plan: Needs an OV with PCP, lov 5/27/21; Labs (CBC, CMP, a1c, uric acid) overdue, Last BP 04/25/22 (!) 154/75  Medication reconciliation completed: No     Appointments  past 12m or future 3m with PCP    Date Provider   Last Visit   5/27/2021 Roger Miller MD   Next Visit   Visit date not found Roger Miller MD   ED visits in past 90 days: 0        Note composed:2:28 PM 05/28/2022

## 2022-05-30 ENCOUNTER — PATIENT MESSAGE (OUTPATIENT)
Dept: ADMINISTRATIVE | Facility: HOSPITAL | Age: 67
End: 2022-05-30
Payer: MEDICARE

## 2022-05-30 RX ORDER — SPIRONOLACTONE 50 MG/1
TABLET, FILM COATED ORAL
Qty: 90 TABLET | Refills: 0 | Status: ON HOLD | OUTPATIENT
Start: 2022-05-30 | End: 2023-11-30 | Stop reason: HOSPADM

## 2022-06-02 ENCOUNTER — HOSPITAL ENCOUNTER (OUTPATIENT)
Dept: RADIOLOGY | Facility: OTHER | Age: 67
Discharge: HOME | End: 2022-06-02
Attending: FAMILY MEDICINE
Payer: MEDICARE

## 2022-06-02 DIAGNOSIS — Z78.0 POSTMENOPAUSAL: ICD-10-CM

## 2022-06-02 PROCEDURE — 77080 DEXA BONE DENSITY SPINE HIP: ICD-10-PCS | Mod: 26,,, | Performed by: RADIOLOGY

## 2022-06-02 PROCEDURE — 77080 DXA BONE DENSITY AXIAL: CPT | Mod: TC

## 2022-06-02 PROCEDURE — 77080 DXA BONE DENSITY AXIAL: CPT | Mod: 26,,, | Performed by: RADIOLOGY

## 2022-06-03 ENCOUNTER — TELEPHONE (OUTPATIENT)
Dept: INTERNAL MEDICINE | Facility: CLINIC | Age: 67
End: 2022-06-03
Payer: MEDICARE

## 2022-06-03 NOTE — TELEPHONE ENCOUNTER
06/03 0825 The listed message was left for the patient on voice mail; the number to call back for any questions or concerns was also left on voice mail               MD YUKO [place] Staff  DEXA is normal.  No changes in medications.  Followup as scheduled.

## 2022-06-10 LAB
LEFT EYE DM RETINOPATHY: POSITIVE
RIGHT EYE DM RETINOPATHY: POSITIVE

## 2022-06-22 ENCOUNTER — PATIENT OUTREACH (OUTPATIENT)
Dept: FAMILY MEDICINE | Facility: CLINIC | Age: 67
End: 2022-06-22
Payer: MEDICARE

## 2022-07-13 ENCOUNTER — PATIENT OUTREACH (OUTPATIENT)
Dept: INTERNAL MEDICINE | Facility: CLINIC | Age: 67
End: 2022-07-13
Payer: MEDICARE

## 2022-07-13 ENCOUNTER — PATIENT MESSAGE (OUTPATIENT)
Dept: INTERNAL MEDICINE | Facility: CLINIC | Age: 67
End: 2022-07-13
Payer: MEDICARE

## 2022-08-01 ENCOUNTER — PES CALL (OUTPATIENT)
Dept: ADMINISTRATIVE | Facility: CLINIC | Age: 67
End: 2022-08-01
Payer: MEDICARE

## 2022-08-24 ENCOUNTER — PES CALL (OUTPATIENT)
Dept: ADMINISTRATIVE | Facility: CLINIC | Age: 67
End: 2022-08-24
Payer: MEDICARE

## 2022-08-31 DIAGNOSIS — E11.9 DIABETES MELLITUS, TYPE II: ICD-10-CM

## 2022-09-01 ENCOUNTER — DOCUMENT SCAN (OUTPATIENT)
Dept: HOME HEALTH SERVICES | Facility: HOSPITAL | Age: 67
End: 2022-09-01
Payer: MEDICARE

## 2022-09-07 ENCOUNTER — PATIENT OUTREACH (OUTPATIENT)
Dept: INTERNAL MEDICINE | Facility: CLINIC | Age: 67
End: 2022-09-07
Payer: MEDICARE

## 2022-09-07 ENCOUNTER — PATIENT MESSAGE (OUTPATIENT)
Dept: INTERNAL MEDICINE | Facility: CLINIC | Age: 67
End: 2022-09-07
Payer: MEDICARE

## 2022-09-14 ENCOUNTER — PATIENT OUTREACH (OUTPATIENT)
Dept: INTERNAL MEDICINE | Facility: CLINIC | Age: 67
End: 2022-09-14
Payer: MEDICARE

## 2022-10-03 ENCOUNTER — PES CALL (OUTPATIENT)
Dept: ADMINISTRATIVE | Facility: CLINIC | Age: 67
End: 2022-10-03
Payer: MEDICARE

## 2022-10-05 ENCOUNTER — PATIENT MESSAGE (OUTPATIENT)
Dept: ADMINISTRATIVE | Facility: HOSPITAL | Age: 67
End: 2022-10-05
Payer: MEDICARE

## 2022-10-05 ENCOUNTER — PATIENT OUTREACH (OUTPATIENT)
Dept: ADMINISTRATIVE | Facility: HOSPITAL | Age: 67
End: 2022-10-05
Payer: MEDICARE

## 2022-10-05 DIAGNOSIS — E11.9 DIABETES MELLITUS WITHOUT COMPLICATION: ICD-10-CM

## 2022-10-05 DIAGNOSIS — E78.01 HYPERLIPIDEMIA TYPE II: Primary | ICD-10-CM

## 2022-11-02 ENCOUNTER — HOSPITAL ENCOUNTER (OUTPATIENT)
Dept: RADIOLOGY | Facility: OTHER | Age: 67
Discharge: HOME | End: 2022-11-02
Attending: INTERNAL MEDICINE
Payer: MEDICARE

## 2022-11-02 DIAGNOSIS — Z12.31 BREAST CANCER SCREENING BY MAMMOGRAM: ICD-10-CM

## 2022-11-02 PROCEDURE — 77063 MAMMO DIGITAL SCREENING BILAT WITH TOMO: ICD-10-PCS | Mod: 26,,, | Performed by: RADIOLOGY

## 2022-11-02 PROCEDURE — 77067 SCR MAMMO BI INCL CAD: CPT | Mod: 26,,, | Performed by: RADIOLOGY

## 2022-11-02 PROCEDURE — 77063 BREAST TOMOSYNTHESIS BI: CPT | Mod: TC

## 2022-11-02 PROCEDURE — 77067 MAMMO DIGITAL SCREENING BILAT WITH TOMO: ICD-10-PCS | Mod: 26,,, | Performed by: RADIOLOGY

## 2022-11-02 PROCEDURE — 77063 BREAST TOMOSYNTHESIS BI: CPT | Mod: 26,,, | Performed by: RADIOLOGY

## 2022-11-22 ENCOUNTER — PATIENT MESSAGE (OUTPATIENT)
Dept: INTERNAL MEDICINE | Facility: CLINIC | Age: 67
End: 2022-11-22
Payer: MEDICARE

## 2022-12-06 ENCOUNTER — PATIENT MESSAGE (OUTPATIENT)
Dept: INTERNAL MEDICINE | Facility: CLINIC | Age: 67
End: 2022-12-06
Payer: MEDICARE

## 2022-12-14 ENCOUNTER — TELEPHONE (OUTPATIENT)
Dept: ADMINISTRATIVE | Facility: HOSPITAL | Age: 67
End: 2022-12-14
Payer: MEDICARE

## 2023-02-13 ENCOUNTER — PATIENT MESSAGE (OUTPATIENT)
Dept: ADMINISTRATIVE | Facility: HOSPITAL | Age: 68
End: 2023-02-13
Payer: MEDICARE

## 2023-02-13 ENCOUNTER — PATIENT OUTREACH (OUTPATIENT)
Dept: ADMINISTRATIVE | Facility: HOSPITAL | Age: 68
End: 2023-02-13
Payer: MEDICARE

## 2023-04-16 NOTE — PROGRESS NOTES
Subjective:       Patient ID: Wendy Viveros is a 61 y.o. female.    Chief Complaint:  Shortness of Breath (pt with SOB , dizziness x one week.)       History of Present Illness  Pt denies any n/v or gi bleed    Review of Systems  Cardiovascular: negative      Objective:     Vitals:    07/17/17 0600 07/17/17 0700 07/17/17 0851 07/17/17 0852   BP:   133/68    BP Location:       Patient Position:       BP Method:       Pulse: 86 96  93   Resp:       Temp:       TempSrc:       SpO2:       Weight:       Height:          Abdomen: soft, non-tender; bowel sounds normal; no masses,  no organomegaly    Data Review   Recent Results (from the past 336 hour(s))   CBC auto differential    Collection Time: 07/17/17  5:06 AM   Result Value Ref Range    WBC 9.46 3.90 - 12.70 K/uL    Hemoglobin 6.2 (L) 12.0 - 16.0 g/dL    Hematocrit 18.2 (LL) 37.0 - 48.5 %    Platelets 102 (L) 150 - 350 K/uL   CBC auto differential    Collection Time: 07/16/17  5:17 AM   Result Value Ref Range    WBC 10.06 3.90 - 12.70 K/uL    Hemoglobin 6.3 (L) 12.0 - 16.0 g/dL    Hematocrit 18.3 (LL) 37.0 - 48.5 %    Platelets 100 (L) 150 - 350 K/uL   CBC auto differential    Collection Time: 07/15/17  5:21 AM   Result Value Ref Range    WBC 11.02 3.90 - 12.70 K/uL    Hemoglobin 6.8 (L) 12.0 - 16.0 g/dL    Hematocrit 19.5 (LL) 37.0 - 48.5 %    Platelets 99 (L) 150 - 350 K/uL        Recent Labs  Lab 07/13/17  0940   INR 1.0       Recent Labs  Lab 07/15/17  0521 07/16/17  0517 07/17/17  0506    140 138   K 4.2 3.9 3.7    108 108   CO2 25 24 22*   BUN 29* 26* 23   CREATININE 1.3 1.2 1.2   CALCIUM 10.4 9.8 9.4   PROT 7.1 6.6 6.4   BILITOT 4.7* 3.9* 3.7*   ALKPHOS 96 88 87   ALT 25 30 36   AST 19 20 23    Lab Results   Component Value Date    HEPBIGM Negative 08/11/2015    HEPCAB Negative 08/11/2015    No results found for: AFP   No results found for: CEA     Recent Labs  Lab 07/13/17  0940   INR 1.0        Assessment:     1. Anemia, unspecified    2. SOB  (shortness of breath)    3. Jaundice    4. Scleral icterus    5. Renal insufficiency    6. Hyperglycemia    7. Hypercalcemia        Plan:     1) Serial h/h  2) Monitor lft's  3) ? Out Pt colonoscopy     31 year old F presenting to er with 2 weeks of rle pain. + sharp pain from rt lower back/buttocks with radiation down leg. + pins/needle sensation. Pain is worse with ambulation and better with rest. Pt notes mild swelling to rle. No trauma/injuries, bowel/bladder incontinence, saddle anesthesia, chest pain, sob, recent travel, hx of pe/dvt, tobacco use, hormone therapy.

## 2023-05-10 ENCOUNTER — TELEPHONE (OUTPATIENT)
Dept: NEUROLOGY | Facility: CLINIC | Age: 68
End: 2023-05-10
Payer: MEDICARE

## 2023-05-10 NOTE — TELEPHONE ENCOUNTER
Patient's appointment successfully r/s to 5/31 with Dr. Morel.   Patient inquired if records and imaging from [facility] have been received.

## 2023-05-16 NOTE — TELEPHONE ENCOUNTER
Letter for release of information/imaging disc faxed to [facility].

## 2023-05-30 ENCOUNTER — OFFICE VISIT (OUTPATIENT)
Dept: NEUROLOGY | Facility: CLINIC | Age: 68
End: 2023-05-30
Payer: MEDICARE

## 2023-05-30 ENCOUNTER — DOCUMENTATION ONLY (OUTPATIENT)
Dept: NEUROLOGY | Facility: CLINIC | Age: 68
End: 2023-05-30

## 2023-05-30 VITALS
WEIGHT: 205 LBS | HEIGHT: 65 IN | BODY MASS INDEX: 34.16 KG/M2 | HEART RATE: 62 BPM | DIASTOLIC BLOOD PRESSURE: 74 MMHG | SYSTOLIC BLOOD PRESSURE: 155 MMHG

## 2023-05-30 DIAGNOSIS — Z79.4 TYPE 2 DIABETES MELLITUS WITH OTHER SPECIFIED COMPLICATION, WITH LONG-TERM CURRENT USE OF INSULIN: ICD-10-CM

## 2023-05-30 DIAGNOSIS — E66.01 MORBID (SEVERE) OBESITY DUE TO EXCESS CALORIES: Primary | ICD-10-CM

## 2023-05-30 DIAGNOSIS — E11.69 TYPE 2 DIABETES MELLITUS WITH OTHER SPECIFIED COMPLICATION, WITH LONG-TERM CURRENT USE OF INSULIN: ICD-10-CM

## 2023-05-30 DIAGNOSIS — G56.03 BILATERAL CARPAL TUNNEL SYNDROME: ICD-10-CM

## 2023-05-30 PROCEDURE — 1101F PT FALLS ASSESS-DOCD LE1/YR: CPT | Mod: CPTII,S$GLB,, | Performed by: PSYCHIATRY & NEUROLOGY

## 2023-05-30 PROCEDURE — 3077F SYST BP >= 140 MM HG: CPT | Mod: CPTII,S$GLB,, | Performed by: PSYCHIATRY & NEUROLOGY

## 2023-05-30 PROCEDURE — 1125F PR PAIN SEVERITY QUANTIFIED, PAIN PRESENT: ICD-10-PCS | Mod: CPTII,S$GLB,, | Performed by: PSYCHIATRY & NEUROLOGY

## 2023-05-30 PROCEDURE — 99214 PR OFFICE/OUTPT VISIT, EST, LEVL IV, 30-39 MIN: ICD-10-PCS | Mod: S$GLB,,, | Performed by: PSYCHIATRY & NEUROLOGY

## 2023-05-30 PROCEDURE — 1101F PR PT FALLS ASSESS DOC 0-1 FALLS W/OUT INJ PAST YR: ICD-10-PCS | Mod: CPTII,S$GLB,, | Performed by: PSYCHIATRY & NEUROLOGY

## 2023-05-30 PROCEDURE — 1159F PR MEDICATION LIST DOCUMENTED IN MEDICAL RECORD: ICD-10-PCS | Mod: CPTII,S$GLB,, | Performed by: PSYCHIATRY & NEUROLOGY

## 2023-05-30 PROCEDURE — 1159F MED LIST DOCD IN RCRD: CPT | Mod: CPTII,S$GLB,, | Performed by: PSYCHIATRY & NEUROLOGY

## 2023-05-30 PROCEDURE — 99214 OFFICE O/P EST MOD 30 MIN: CPT | Mod: S$GLB,,, | Performed by: PSYCHIATRY & NEUROLOGY

## 2023-05-30 PROCEDURE — 99999 PR PBB SHADOW E&M-EST. PATIENT-LVL III: ICD-10-PCS | Mod: PBBFAC,,, | Performed by: PSYCHIATRY & NEUROLOGY

## 2023-05-30 PROCEDURE — 3288F PR FALLS RISK ASSESSMENT DOCUMENTED: ICD-10-PCS | Mod: CPTII,S$GLB,, | Performed by: PSYCHIATRY & NEUROLOGY

## 2023-05-30 PROCEDURE — 3008F PR BODY MASS INDEX (BMI) DOCUMENTED: ICD-10-PCS | Mod: CPTII,S$GLB,, | Performed by: PSYCHIATRY & NEUROLOGY

## 2023-05-30 PROCEDURE — 1125F AMNT PAIN NOTED PAIN PRSNT: CPT | Mod: CPTII,S$GLB,, | Performed by: PSYCHIATRY & NEUROLOGY

## 2023-05-30 PROCEDURE — 3078F PR MOST RECENT DIASTOLIC BLOOD PRESSURE < 80 MM HG: ICD-10-PCS | Mod: CPTII,S$GLB,, | Performed by: PSYCHIATRY & NEUROLOGY

## 2023-05-30 PROCEDURE — 3288F FALL RISK ASSESSMENT DOCD: CPT | Mod: CPTII,S$GLB,, | Performed by: PSYCHIATRY & NEUROLOGY

## 2023-05-30 PROCEDURE — 3077F PR MOST RECENT SYSTOLIC BLOOD PRESSURE >= 140 MM HG: ICD-10-PCS | Mod: CPTII,S$GLB,, | Performed by: PSYCHIATRY & NEUROLOGY

## 2023-05-30 PROCEDURE — 3078F DIAST BP <80 MM HG: CPT | Mod: CPTII,S$GLB,, | Performed by: PSYCHIATRY & NEUROLOGY

## 2023-05-30 PROCEDURE — 99999 PR PBB SHADOW E&M-EST. PATIENT-LVL III: CPT | Mod: PBBFAC,,, | Performed by: PSYCHIATRY & NEUROLOGY

## 2023-05-30 PROCEDURE — 3008F BODY MASS INDEX DOCD: CPT | Mod: CPTII,S$GLB,, | Performed by: PSYCHIATRY & NEUROLOGY

## 2023-05-30 RX ORDER — GABAPENTIN 300 MG/1
300 CAPSULE ORAL 3 TIMES DAILY
Qty: 90 CAPSULE | Refills: 11 | Status: SHIPPED | OUTPATIENT
Start: 2023-05-30 | End: 2023-11-18

## 2023-05-30 NOTE — PROGRESS NOTES
"Vascular Neurology  Clinic Note    ___________________  ASSESSMENT & PLAN    Problem List Items Addressed This Visit          Unprioritized    Diabetes mellitus, type II    Morbid (severe) obesity due to excess calories - Primary    Carpal tunnel syndrome    Current Assessment & Plan     Bilateral carpal tunnel syndrome w/ + Phalen's test.  Recommend b/l bracing in evening - showed patient options for purchase on [employer] and wrote script.  Gabapentin trial, starting 300 qHS and eventually titrate up to 300 TID if can tolerate and providing benefit.  Will reassess in 3 months.          Reason For Visit (Chief Complaint): b/l hand pain    Interval Hx: development over the past year of pain developing on palmar surface of both hands, R > L, in fingers and palm. Worse in middle of night waking her up and worse when waking.    Prior HPI: 67 y.o. right handed female with hx of stroke in November 2021 and seen at [facility] for hospitalization. There are no records or scans available.  Patient woke up with trying to get to crying grandson and had a fall, subsequently had right sided weakness and speech difficulties and eventually went to [facility]. Had had knee surgery on left knee (TKR) within the week prior. Hx of HTN, DM. Was at [facility] for 3 weeks and then was in [facility] for 3 weeks. She currently does outpatient physical therapy 3x/ week. Had been on aspirin and plavix before the stroke per the patient - for her "blood flow".      Other:     Relevant Labwork:  Recent Labs   Lab 03/02/21  1501 09/28/21  1600   Hemoglobin A1C 7.4 H 7.2 H   LDL Cholesterol 108.6  --    HDL 26 L  --    Triglycerides 152 H  --    Cholesterol 165  --          I reviewed the above labwork.    Review of Systems  Msk: negative for muscle pain  Skin: negative for pruritis  Neuro: negative for headache  All others negative    Past Medical History  Past Medical History:   Diagnosis Date    Abnormal EKG     Anemia 7/13/2017    Aortic valve " "regurgitation     Arthritis     CKD (chronic kidney disease) stage 2, GFR 60-89 ml/min 8/8/2014    CKD (chronic kidney disease) stage 2, GFR 60-89 ml/min 8/8/2014    Diabetes mellitus     Diabetes mellitus type II     GERD (gastroesophageal reflux disease)     Gout, unspecified     Heart murmur     Hyperlipidemia     Hypertension     Tendonitis      Family History  No relevant history   Social History  Social History     Socioeconomic History    Marital status: Single   Tobacco Use    Smoking status: Never    Smokeless tobacco: Never   Substance and Sexual Activity    Alcohol use: No     Alcohol/week: 0.0 standard drinks    Drug use: No    Sexual activity: Not Currently     Partners: Male     Birth control/protection: None   Social History Narrative    Retired from Dept of         Medication List with Changes/Refills   New Medications    ARM BRACE MISC    2 Units by Misc.(Non-Drug; Combo Route) route every evening.    GABAPENTIN (NEURONTIN) 300 MG CAPSULE    Take 1 capsule (300 mg total) by mouth 3 (three) times daily.   Current Medications    ALBUTEROL (PROVENTIL) 2.5 MG /3 ML (0.083 %) NEBULIZER SOLUTION    Take 3 mLs (2.5 mg total) by nebulization 4 (four) times daily as needed for Wheezing.    ALLOPURINOL (ZYLOPRIM) 300 MG TABLET    Take 1 tablet by mouth once daily    AMLODIPINE (NORVASC) 10 MG TABLET    Take 1 tablet by mouth once daily    ASPIRIN (ECOTRIN) 81 MG EC TABLET    Take by mouth.    ATORVASTATIN (LIPITOR) 80 MG TABLET    Take 80 mg by mouth once daily.    BD ULTRA-FINE SHORT PEN NEEDLE 31 GAUGE X 5/16" NDLE    Use as directed 4 times daily.    BLOOD SUGAR DIAGNOSTIC STRP    Pt tests tid    BLOOD-GLUCOSE METER MISC    One Touch Verio  Please provide strips to test qid    CHOLECALCIFEROL, VITAMIN D3, 2,000 UNIT TAB    Take 1 tablet by mouth once daily.     CLONIDINE (CATAPRES) 0.1 MG TABLET    Take 1 tablet (0.1 mg total) by mouth once daily.    CLOPIDOGREL (PLAVIX) 75 MG TABLET    Take " "75 mg by mouth once daily.    CYANOCOBALAMIN, VITAMIN B-12, 50 MCG TABLET    Take 50 mcg by mouth once daily.    CYCLOBENZAPRINE (FLEXERIL) 10 MG TABLET    Take 1 tablet (10 mg total) by mouth 3 (three) times daily as needed for Muscle spasms.    FLUOXETINE 20 MG CAPSULE    Take 20 mg by mouth once daily.    FUROSEMIDE (LASIX) 40 MG TABLET    Take 1 tablet (40 mg total) by mouth once daily.    INDOMETHACIN (INDOCIN) 50 MG CAPSULE    Take 1 capsule (50 mg total) by mouth 2 (two) times daily with meals.    INSULIN (LANTUS SOLOSTAR U-100 INSULIN) GLARGINE 100 UNITS/ML (3ML) SUBQ PEN    Inject into the skin.    INSULIN DEGLUDEC (TRESIBA FLEXTOUCH U-100) 100 UNIT/ML (3 ML) INSULIN PEN    Inject 45 Units into skin every evening.    INSULIN LISPRO (HUMALOG KWIKPEN INSULIN) 100 UNIT/ML PEN    Inject 15 Units into the skin 3 (three) times daily before meals.    LANCETS MISC    by Misc.(Non-Drug; Combo Route) route. Pt is testing 3 times daily    METOPROLOL TARTRATE (LOPRESSOR) 50 MG TABLET    Take 1 tablet (50 mg total) by mouth 2 (two) times daily.    MOMETASONE 0.1% (ELOCON) 0.1 % CREAM    Apply topically once daily.    PEN NEEDLE, DIABETIC 31 GAUGE X 3/16" NDLE    use four times daily as directed    SPIRONOLACTONE (ALDACTONE) 50 MG TABLET    Take 1 tablet by mouth once daily       EXAM  Vital Signs:  Vitals - 1 value per visit 4/15/2021 5/27/2021 5/27/2021 4/25/2022 4/25/2022 5/30/2023 5/30/2023   SYSTOLIC 156 - 138 - 154 - 155   DIASTOLIC 83 - 66 - 75 - 74   Pulse 90 - 72 - 65 - 62   Temp - - - - - - -   Resp - - - - - - -   SPO2 - - 96 - - - -   Weight (lb) 231 - 231.26 - 231 - 205   Weight (kg) 104.781 - 104.9 - 104.781 - 92.987   Height - - - - 65 - 65   BMI (Calculated) - - - - 38.4 - 34.1   VISIT REPORT - - - - - - -   Pain Score  - 8 - 0 - 10 -   Some recent data might be hidden       General: well appearing without discomfort   + Phalens test  Mental Status:alert, oriented to person - place - age - month "   Language: able to name, repeat, comprehend commands   Cranial Nerves: EOMI,mild R facial weakness, tongue to midline; mild dysarthria  Motor: 4+/5 R UE extensor, 4+/5 R LE flexor  Sensory: intact light touch bilaterally  Coordination: no ataxia on finger-to-nose or heel-to-shin testing; no truncal ataxia  Gait & Stance: wheelchair    Stroke Scales      MD Ambrose  Vascular Neurology  Office [phone]  Fax [phone]

## 2023-05-31 NOTE — PROGRESS NOTES
"2 Radiology Imaging Disc from [facility] sent to [facility] to be uploaded into patient's chart.  Viewable under chart review > media tab > "MRI/CT/XR Previous"  Covered dates: 11/15/2021 - 12/14/2021      "

## 2023-06-01 PROBLEM — E66.01 MORBID (SEVERE) OBESITY DUE TO EXCESS CALORIES: Status: ACTIVE | Noted: 2023-06-01

## 2023-06-01 PROBLEM — G56.00 CARPAL TUNNEL SYNDROME: Status: ACTIVE | Noted: 2023-06-01

## 2023-06-01 NOTE — ASSESSMENT & PLAN NOTE
Bilateral carpal tunnel syndrome w/ + Phalen's test.  Recommend b/l bracing in evening - showed patient options for purchase on [employer] and wrote script.  Gabapentin trial, starting 300 qHS and eventually titrate up to 300 TID if can tolerate and providing benefit.  Will reassess in 3 months.

## 2023-11-18 ENCOUNTER — HOSPITAL ENCOUNTER (INPATIENT)
Facility: OTHER | Age: 68
LOS: 12 days | Discharge: HOME | DRG: 813 | End: 2023-11-30
Attending: EMERGENCY MEDICINE | Admitting: HOSPITALIST
Payer: MEDICARE

## 2023-11-18 DIAGNOSIS — D69.3 IDIOPATHIC THROMBOCYTOPENIC PURPURA (ITP): ICD-10-CM

## 2023-11-18 DIAGNOSIS — D69.2 PURPURA: ICD-10-CM

## 2023-11-18 DIAGNOSIS — Z86.73 HISTORY OF CVA (CEREBROVASCULAR ACCIDENT): ICD-10-CM

## 2023-11-18 DIAGNOSIS — E66.01 MORBID (SEVERE) OBESITY DUE TO EXCESS CALORIES: ICD-10-CM

## 2023-11-18 DIAGNOSIS — R76.8 HEPATITIS B ANTIBODY POSITIVE IN BLOOD: ICD-10-CM

## 2023-11-18 DIAGNOSIS — R23.3 PETECHIAE: ICD-10-CM

## 2023-11-18 DIAGNOSIS — R07.9 CHEST PAIN: ICD-10-CM

## 2023-11-18 DIAGNOSIS — D69.6 THROMBOCYTOPENIA: Primary | ICD-10-CM

## 2023-11-18 LAB
ABO + RH BLD: NORMAL
ALBUMIN SERPL BCP-MCNC: 4 G/DL (ref 3.5–5.2)
ALP SERPL-CCNC: 63 U/L (ref 55–135)
ALT SERPL W/O P-5'-P-CCNC: 7 U/L (ref 10–44)
ANION GAP SERPL CALC-SCNC: 6 MMOL/L (ref 8–16)
AST SERPL-CCNC: 15 U/L (ref 10–40)
BASOPHILS # BLD AUTO: 0.03 K/UL (ref 0–0.2)
BASOPHILS NFR BLD: 0.4 % (ref 0–1.9)
BILIRUB SERPL-MCNC: 2.8 MG/DL (ref 0.1–1)
BLD GP AB SCN CELLS X3 SERPL QL: NORMAL
BLD PROD TYP BPU: NORMAL
BLOOD UNIT EXPIRATION DATE: NORMAL
BLOOD UNIT TYPE CODE: 6200
BLOOD UNIT TYPE: NORMAL
BUN SERPL-MCNC: 36 MG/DL (ref 8–23)
CALCIUM SERPL-MCNC: 10.2 MG/DL (ref 8.7–10.5)
CHLORIDE SERPL-SCNC: 109 MMOL/L (ref 95–110)
CO2 SERPL-SCNC: 24 MMOL/L (ref 23–29)
CODING SYSTEM: NORMAL
CREAT SERPL-MCNC: 1.3 MG/DL (ref 0.5–1.4)
CROSSMATCH INTERPRETATION: NORMAL
DIFFERENTIAL METHOD: ABNORMAL
DISPENSE STATUS: NORMAL
EOSINOPHIL # BLD AUTO: 0.2 K/UL (ref 0–0.5)
EOSINOPHIL NFR BLD: 2.1 % (ref 0–8)
ERYTHROCYTE [DISTWIDTH] IN BLOOD BY AUTOMATED COUNT: 17.4 % (ref 11.5–14.5)
EST. GFR  (NO RACE VARIABLE): 45 ML/MIN/1.73 M^2
GLUCOSE SERPL-MCNC: 97 MG/DL (ref 70–110)
HCT VFR BLD AUTO: 26.7 % (ref 37–48.5)
HCV AB SERPL QL IA: NEGATIVE
HGB BLD-MCNC: 9.6 G/DL (ref 12–16)
HIV 1+2 AB+HIV1 P24 AG SERPL QL IA: NEGATIVE
IMM GRANULOCYTES # BLD AUTO: 0.06 K/UL (ref 0–0.04)
IMM GRANULOCYTES NFR BLD AUTO: 0.8 % (ref 0–0.5)
INR PPP: 1.1 (ref 0.8–1.2)
LDH SERPL L TO P-CCNC: 259 U/L (ref 110–260)
LYMPHOCYTES # BLD AUTO: 2 K/UL (ref 1–4.8)
LYMPHOCYTES NFR BLD: 26.1 % (ref 18–48)
MCH RBC QN AUTO: 34.9 PG (ref 27–31)
MCHC RBC AUTO-ENTMCNC: 36 G/DL (ref 32–36)
MCV RBC AUTO: 97 FL (ref 82–98)
MONOCYTES # BLD AUTO: 0.6 K/UL (ref 0.3–1)
MONOCYTES NFR BLD: 8.1 % (ref 4–15)
NEUTROPHILS # BLD AUTO: 4.8 K/UL (ref 1.8–7.7)
NEUTROPHILS NFR BLD: 62.5 % (ref 38–73)
NRBC BLD-RTO: 0 /100 WBC
PLATELET # BLD AUTO: 3 K/UL (ref 150–450)
PLATELET BLD QL SMEAR: ABNORMAL
PMV BLD AUTO: ABNORMAL FL (ref 9.2–12.9)
POCT GLUCOSE: 115 MG/DL (ref 70–110)
POTASSIUM SERPL-SCNC: 4.3 MMOL/L (ref 3.5–5.1)
PROT SERPL-MCNC: 6.8 G/DL (ref 6–8.4)
PROTHROMBIN TIME: 11.9 SEC (ref 9–12.5)
RBC # BLD AUTO: 2.75 M/UL (ref 4–5.4)
SODIUM SERPL-SCNC: 139 MMOL/L (ref 136–145)
SPECIMEN OUTDATE: NORMAL
UNIT NUMBER: NORMAL
WBC # BLD AUTO: 7.67 K/UL (ref 3.9–12.7)

## 2023-11-18 PROCEDURE — 25000003 PHARM REV CODE 250: Performed by: PHYSICIAN ASSISTANT

## 2023-11-18 PROCEDURE — 86901 BLOOD TYPING SEROLOGIC RH(D): CPT | Performed by: PHYSICIAN ASSISTANT

## 2023-11-18 PROCEDURE — 11000001 HC ACUTE MED/SURG PRIVATE ROOM

## 2023-11-18 PROCEDURE — 83615 LACTATE (LD) (LDH) ENZYME: CPT | Performed by: PHYSICIAN ASSISTANT

## 2023-11-18 PROCEDURE — 87389 HIV-1 AG W/HIV-1&-2 AB AG IA: CPT | Performed by: EMERGENCY MEDICINE

## 2023-11-18 PROCEDURE — 63600175 PHARM REV CODE 636 W HCPCS: Performed by: PHYSICIAN ASSISTANT

## 2023-11-18 PROCEDURE — 80053 COMPREHEN METABOLIC PANEL: CPT | Performed by: PHYSICIAN ASSISTANT

## 2023-11-18 PROCEDURE — 99285 EMERGENCY DEPT VISIT HI MDM: CPT

## 2023-11-18 PROCEDURE — 85610 PROTHROMBIN TIME: CPT | Performed by: PHYSICIAN ASSISTANT

## 2023-11-18 PROCEDURE — P9035 PLATELET PHERES LEUKOREDUCED: HCPCS | Performed by: PHYSICIAN ASSISTANT

## 2023-11-18 PROCEDURE — 86803 HEPATITIS C AB TEST: CPT | Performed by: EMERGENCY MEDICINE

## 2023-11-18 PROCEDURE — 36430 TRANSFUSION BLD/BLD COMPNT: CPT

## 2023-11-18 PROCEDURE — 85025 COMPLETE CBC W/AUTO DIFF WBC: CPT | Performed by: PHYSICIAN ASSISTANT

## 2023-11-18 RX ORDER — TALC
6 POWDER (GRAM) TOPICAL NIGHTLY PRN
Status: DISCONTINUED | OUTPATIENT
Start: 2023-11-18 | End: 2023-11-30 | Stop reason: HOSPADM

## 2023-11-18 RX ORDER — AMLODIPINE BESYLATE 5 MG/1
10 TABLET ORAL DAILY
Status: DISCONTINUED | OUTPATIENT
Start: 2023-11-19 | End: 2023-11-21

## 2023-11-18 RX ORDER — HYDROCODONE BITARTRATE AND ACETAMINOPHEN 500; 5 MG/1; MG/1
TABLET ORAL
Status: DISCONTINUED | OUTPATIENT
Start: 2023-11-18 | End: 2023-11-20

## 2023-11-18 RX ORDER — DEXAMETHASONE SODIUM PHOSPHATE 4 MG/ML
40 INJECTION, SOLUTION INTRA-ARTICULAR; INTRALESIONAL; INTRAMUSCULAR; INTRAVENOUS; SOFT TISSUE
Status: DISCONTINUED | OUTPATIENT
Start: 2023-11-18 | End: 2023-11-18

## 2023-11-18 RX ORDER — SPIRONOLACTONE 25 MG/1
50 TABLET ORAL DAILY
Status: DISCONTINUED | OUTPATIENT
Start: 2023-11-19 | End: 2023-11-22

## 2023-11-18 RX ORDER — TRAZODONE HYDROCHLORIDE 50 MG/1
50 TABLET ORAL NIGHTLY PRN
COMMUNITY
Start: 2023-07-18

## 2023-11-18 RX ORDER — SODIUM CHLORIDE 0.9 % (FLUSH) 0.9 %
10 SYRINGE (ML) INJECTION
Status: DISCONTINUED | OUTPATIENT
Start: 2023-11-18 | End: 2023-11-30 | Stop reason: HOSPADM

## 2023-11-18 RX ORDER — POLYETHYLENE GLYCOL 3350 17 G/17G
17 POWDER, FOR SOLUTION ORAL DAILY PRN
Status: DISCONTINUED | OUTPATIENT
Start: 2023-11-18 | End: 2023-11-30 | Stop reason: HOSPADM

## 2023-11-18 RX ORDER — ACETAMINOPHEN 325 MG/1
650 TABLET ORAL EVERY 4 HOURS PRN
Status: DISCONTINUED | OUTPATIENT
Start: 2023-11-18 | End: 2023-11-30 | Stop reason: HOSPADM

## 2023-11-18 RX ORDER — CLOPIDOGREL BISULFATE 75 MG/1
75 TABLET ORAL DAILY
Status: ON HOLD | COMMUNITY
End: 2023-11-30 | Stop reason: HOSPADM

## 2023-11-18 RX ORDER — FLUOXETINE HYDROCHLORIDE 20 MG/1
20 CAPSULE ORAL DAILY
Status: DISCONTINUED | OUTPATIENT
Start: 2023-11-19 | End: 2023-11-30 | Stop reason: HOSPADM

## 2023-11-18 RX ORDER — MAG HYDROX/ALUMINUM HYD/SIMETH 200-200-20
30 SUSPENSION, ORAL (FINAL DOSE FORM) ORAL 4 TIMES DAILY PRN
Status: DISCONTINUED | OUTPATIENT
Start: 2023-11-18 | End: 2023-11-22

## 2023-11-18 RX ORDER — TRAZODONE HYDROCHLORIDE 50 MG/1
50 TABLET ORAL NIGHTLY PRN
Status: DISCONTINUED | OUTPATIENT
Start: 2023-11-18 | End: 2023-11-30 | Stop reason: HOSPADM

## 2023-11-18 RX ORDER — METOPROLOL SUCCINATE 50 MG/1
50 TABLET, EXTENDED RELEASE ORAL DAILY
COMMUNITY
Start: 2023-09-19

## 2023-11-18 RX ORDER — NAPROXEN SODIUM 220 MG/1
81 TABLET, FILM COATED ORAL DAILY
Status: ON HOLD | COMMUNITY
End: 2023-11-19

## 2023-11-18 RX ORDER — NALOXONE HCL 0.4 MG/ML
0.02 VIAL (ML) INJECTION
Status: DISCONTINUED | OUTPATIENT
Start: 2023-11-18 | End: 2023-11-30 | Stop reason: HOSPADM

## 2023-11-18 RX ORDER — METOPROLOL SUCCINATE 50 MG/1
50 TABLET, EXTENDED RELEASE ORAL DAILY
Status: DISCONTINUED | OUTPATIENT
Start: 2023-11-19 | End: 2023-11-24

## 2023-11-18 RX ADMIN — DEXAMETHASONE SODIUM PHOSPHATE: 4 INJECTION, SOLUTION INTRA-ARTICULAR; INTRALESIONAL; INTRAMUSCULAR; INTRAVENOUS; SOFT TISSUE at 08:11

## 2023-11-18 NOTE — FIRST PROVIDER EVALUATION
Emergency Department TeleTriage Encounter Note      CHIEF COMPLAINT    Chief Complaint   Patient presents with    Mouth Lesions     Reports noticing spots to tongue and and mouth x1 day, states she can taste blood, no resp distress, no tongue swelling.        VITAL SIGNS   Initial Vitals [11/18/23 1644]   BP Pulse Resp Temp SpO2   (!) 178/74 80 19 98.4 °F (36.9 °C) 95 %      MAP       --            ALLERGIES    Review of patient's allergies indicates:   Allergen Reactions    Colchicine analogues      diarrhea    Lisinopril      Other reaction(s): cough  Other reaction(s): cough    Losartan      Other reaction(s): blurry vision  Other reaction(s): blurry vision    Percocet [oxycodone-acetaminophen]      Pt gets pain from taking medicine.       PROVIDER TRIAGE NOTE  Verbal consent for the teletriage evaluation was given by the patient at the start of the evaluation.  All efforts will be made to maintain patient's privacy during the evaluation.      This is a teletriage evaluation of a 67 y.o. female presenting to the ED with c/o lesions to tongue and mouth for 2 days. Denies pain at present.  Compliant with HTN and DM medications.  Limited physical exam via telehealth: The patient is awake, alert, answering questions appropriately and is not in respiratory distress.  As the Teletriage provider, I performed an initial assessment and ordered appropriate labs and imaging studies, if any, to facilitate the patient's care once placed in the ED. Once a room is available, care and a full evaluation will be completed by an alternate ED provider.  Any additional orders and the final disposition will be determined by that provider.  All imaging and labs will not be followed-up by the Teletriage Team, including myself.          ORDERS  Labs Reviewed   HIV 1 / 2 ANTIBODY   HEPATITIS C ANTIBODY   POCT GLUCOSE MONITORING CONTINUOUS       ED Orders (720h ago, onward)      Start Ordered     Status Ordering Provider    11/18/23 1700  11/18/23 1659  POCT glucose  Once         Ordered PETER PALMA    11/18/23 1646 11/18/23 1645  HIV 1/2 Ag/Ab (4th Gen)  STAT         Ordered ASYA HERNANDES    11/18/23 1646 11/18/23 1645  Hepatitis C Antibody  STAT         Ordered ASYA HERNANDES              Virtual Visit Note: The provider triage portion of this emergency department evaluation and documentation was performed via [employer] ([employer] a HIPAA-compliant telemedicine application, in concert with a tele-presenter in the room. A face to face patient evaluation with one of my colleagues will occur once the patient is placed in an emergency department room.      DISCLAIMER: This note was prepared with [employer] voice recognition transcription software. Garbled syntax, mangled pronouns, and other bizarre constructions may be attributed to that software system.

## 2023-11-19 PROBLEM — D64.9 ANEMIA, UNSPECIFIED: Status: ACTIVE | Noted: 2017-07-18

## 2023-11-19 LAB
ALBUMIN SERPL BCP-MCNC: 3.7 G/DL (ref 3.5–5.2)
ALP SERPL-CCNC: 66 U/L (ref 55–135)
ALT SERPL W/O P-5'-P-CCNC: 10 U/L (ref 10–44)
ANION GAP SERPL CALC-SCNC: 10 MMOL/L (ref 8–16)
AST SERPL-CCNC: 15 U/L (ref 10–40)
BASOPHILS # BLD AUTO: 0.01 K/UL (ref 0–0.2)
BASOPHILS NFR BLD: 0.1 % (ref 0–1.9)
BILIRUB SERPL-MCNC: 2.2 MG/DL (ref 0.1–1)
BLD PROD TYP BPU: NORMAL
BLD PROD TYP BPU: NORMAL
BLOOD UNIT EXPIRATION DATE: NORMAL
BLOOD UNIT EXPIRATION DATE: NORMAL
BLOOD UNIT TYPE CODE: 5100
BLOOD UNIT TYPE CODE: 6200
BLOOD UNIT TYPE: NORMAL
BLOOD UNIT TYPE: NORMAL
BUN SERPL-MCNC: 39 MG/DL (ref 8–23)
CALCIUM SERPL-MCNC: 9.8 MG/DL (ref 8.7–10.5)
CHLORIDE SERPL-SCNC: 109 MMOL/L (ref 95–110)
CO2 SERPL-SCNC: 21 MMOL/L (ref 23–29)
CODING SYSTEM: NORMAL
CODING SYSTEM: NORMAL
CREAT SERPL-MCNC: 1.7 MG/DL (ref 0.5–1.4)
CROSSMATCH INTERPRETATION: NORMAL
CROSSMATCH INTERPRETATION: NORMAL
DAT IGG-SP REAG RBC-IMP: NORMAL
DIFFERENTIAL METHOD: ABNORMAL
DISPENSE STATUS: NORMAL
DISPENSE STATUS: NORMAL
EOSINOPHIL # BLD AUTO: 0 K/UL (ref 0–0.5)
EOSINOPHIL NFR BLD: 0.1 % (ref 0–8)
ERYTHROCYTE [DISTWIDTH] IN BLOOD BY AUTOMATED COUNT: 17.2 % (ref 11.5–14.5)
EST. GFR  (NO RACE VARIABLE): 32 ML/MIN/1.73 M^2
FERRITIN SERPL-MCNC: 221 NG/ML (ref 20–300)
FOLATE SERPL-MCNC: 14 NG/ML (ref 4–24)
GLUCOSE SERPL-MCNC: 269 MG/DL (ref 70–110)
HAPTOGLOB SERPL-MCNC: <10 MG/DL (ref 30–250)
HCT VFR BLD AUTO: 25.1 % (ref 37–48.5)
HGB BLD-MCNC: 9 G/DL (ref 12–16)
IMM GRANULOCYTES # BLD AUTO: 0.08 K/UL (ref 0–0.04)
IMM GRANULOCYTES NFR BLD AUTO: 1 % (ref 0–0.5)
IRON SERPL-MCNC: 100 UG/DL (ref 30–160)
LYMPHOCYTES # BLD AUTO: 0.7 K/UL (ref 1–4.8)
LYMPHOCYTES NFR BLD: 8.9 % (ref 18–48)
MCH RBC QN AUTO: 34.9 PG (ref 27–31)
MCHC RBC AUTO-ENTMCNC: 35.9 G/DL (ref 32–36)
MCV RBC AUTO: 97 FL (ref 82–98)
MONOCYTES # BLD AUTO: 0.1 K/UL (ref 0.3–1)
MONOCYTES NFR BLD: 1.2 % (ref 4–15)
NEUTROPHILS # BLD AUTO: 7.1 K/UL (ref 1.8–7.7)
NEUTROPHILS NFR BLD: 88.7 % (ref 38–73)
NRBC BLD-RTO: 0 /100 WBC
PLATELET # BLD AUTO: 1 K/UL (ref 150–450)
PLATELET BLD QL SMEAR: ABNORMAL
PMV BLD AUTO: ABNORMAL FL (ref 9.2–12.9)
POCT GLUCOSE: 267 MG/DL (ref 70–110)
POCT GLUCOSE: 298 MG/DL (ref 70–110)
POTASSIUM SERPL-SCNC: 4.4 MMOL/L (ref 3.5–5.1)
PROT SERPL-MCNC: 6.8 G/DL (ref 6–8.4)
RBC # BLD AUTO: 2.58 M/UL (ref 4–5.4)
SATURATED IRON: 33 % (ref 20–50)
SODIUM SERPL-SCNC: 140 MMOL/L (ref 136–145)
TOTAL IRON BINDING CAPACITY: 306 UG/DL (ref 250–450)
TRANSFERRIN SERPL-MCNC: 207 MG/DL (ref 200–375)
UNIT NUMBER: NORMAL
UNIT NUMBER: NORMAL
VIT B12 SERPL-MCNC: 531 PG/ML (ref 210–950)
WBC # BLD AUTO: 8.02 K/UL (ref 3.9–12.7)

## 2023-11-19 PROCEDURE — 36415 COLL VENOUS BLD VENIPUNCTURE: CPT | Performed by: HOSPITALIST

## 2023-11-19 PROCEDURE — 82607 VITAMIN B-12: CPT | Performed by: INTERNAL MEDICINE

## 2023-11-19 PROCEDURE — 83010 ASSAY OF HAPTOGLOBIN QUANT: CPT | Performed by: HOSPITALIST

## 2023-11-19 PROCEDURE — 25000003 PHARM REV CODE 250: Performed by: INTERNAL MEDICINE

## 2023-11-19 PROCEDURE — P9035 PLATELET PHERES LEUKOREDUCED: HCPCS | Performed by: PHYSICIAN ASSISTANT

## 2023-11-19 PROCEDURE — 25000003 PHARM REV CODE 250: Performed by: HOSPITALIST

## 2023-11-19 PROCEDURE — 63600175 PHARM REV CODE 636 W HCPCS: Performed by: HOSPITALIST

## 2023-11-19 PROCEDURE — 83540 ASSAY OF IRON: CPT | Performed by: INTERNAL MEDICINE

## 2023-11-19 PROCEDURE — 82746 ASSAY OF FOLIC ACID SERUM: CPT | Performed by: INTERNAL MEDICINE

## 2023-11-19 PROCEDURE — 86880 COOMBS TEST DIRECT: CPT | Performed by: INTERNAL MEDICINE

## 2023-11-19 PROCEDURE — 85025 COMPLETE CBC W/AUTO DIFF WBC: CPT | Performed by: HOSPITALIST

## 2023-11-19 PROCEDURE — 84466 ASSAY OF TRANSFERRIN: CPT | Performed by: INTERNAL MEDICINE

## 2023-11-19 PROCEDURE — 25000003 PHARM REV CODE 250: Performed by: PHYSICIAN ASSISTANT

## 2023-11-19 PROCEDURE — 85397 CLOTTING FUNCT ACTIVITY: CPT | Performed by: INTERNAL MEDICINE

## 2023-11-19 PROCEDURE — 80053 COMPREHEN METABOLIC PANEL: CPT | Performed by: HOSPITALIST

## 2023-11-19 PROCEDURE — 11000001 HC ACUTE MED/SURG PRIVATE ROOM

## 2023-11-19 PROCEDURE — 82728 ASSAY OF FERRITIN: CPT | Performed by: INTERNAL MEDICINE

## 2023-11-19 PROCEDURE — 63600175 PHARM REV CODE 636 W HCPCS: Performed by: INTERNAL MEDICINE

## 2023-11-19 PROCEDURE — P9035 PLATELET PHERES LEUKOREDUCED: HCPCS | Performed by: HOSPITALIST

## 2023-11-19 PROCEDURE — 36430 TRANSFUSION BLD/BLD COMPNT: CPT

## 2023-11-19 RX ORDER — HYDROCODONE BITARTRATE AND ACETAMINOPHEN 500; 5 MG/1; MG/1
TABLET ORAL
Status: DISCONTINUED | OUTPATIENT
Start: 2023-11-19 | End: 2023-11-20

## 2023-11-19 RX ORDER — ATORVASTATIN CALCIUM 80 MG/1
80 TABLET, FILM COATED ORAL DAILY
COMMUNITY

## 2023-11-19 RX ORDER — DEXAMETHASONE 4 MG/1
40 TABLET ORAL DAILY
Status: COMPLETED | OUTPATIENT
Start: 2023-11-19 | End: 2023-11-21

## 2023-11-19 RX ORDER — IBUPROFEN 200 MG
24 TABLET ORAL
Status: DISCONTINUED | OUTPATIENT
Start: 2023-11-19 | End: 2023-11-30 | Stop reason: HOSPADM

## 2023-11-19 RX ORDER — DEXAMETHASONE SODIUM PHOSPHATE 4 MG/ML
40 INJECTION, SOLUTION INTRA-ARTICULAR; INTRALESIONAL; INTRAMUSCULAR; INTRAVENOUS; SOFT TISSUE DAILY
Status: DISCONTINUED | OUTPATIENT
Start: 2023-11-19 | End: 2023-11-19

## 2023-11-19 RX ORDER — INSULIN ASPART 100 [IU]/ML
0-10 INJECTION, SOLUTION INTRAVENOUS; SUBCUTANEOUS
Status: DISCONTINUED | OUTPATIENT
Start: 2023-11-19 | End: 2023-11-30 | Stop reason: HOSPADM

## 2023-11-19 RX ORDER — FOLIC ACID 1 MG/1
1 TABLET ORAL DAILY
Status: DISCONTINUED | OUTPATIENT
Start: 2023-11-19 | End: 2023-11-30 | Stop reason: HOSPADM

## 2023-11-19 RX ORDER — GLUCAGON 1 MG
1 KIT INJECTION
Status: DISCONTINUED | OUTPATIENT
Start: 2023-11-19 | End: 2023-11-30 | Stop reason: HOSPADM

## 2023-11-19 RX ORDER — IBUPROFEN 200 MG
16 TABLET ORAL
Status: DISCONTINUED | OUTPATIENT
Start: 2023-11-19 | End: 2023-11-30 | Stop reason: HOSPADM

## 2023-11-19 RX ADMIN — AMLODIPINE BESYLATE 10 MG: 5 TABLET ORAL at 08:11

## 2023-11-19 RX ADMIN — INSULIN ASPART 6 UNITS: 100 INJECTION, SOLUTION INTRAVENOUS; SUBCUTANEOUS at 05:11

## 2023-11-19 RX ADMIN — METOPROLOL SUCCINATE 50 MG: 50 TABLET, EXTENDED RELEASE ORAL at 08:11

## 2023-11-19 RX ADMIN — SPIRONOLACTONE 50 MG: 25 TABLET ORAL at 08:11

## 2023-11-19 RX ADMIN — FOLIC ACID 1 MG: 1 TABLET ORAL at 12:11

## 2023-11-19 RX ADMIN — DEXAMETHASONE 40 MG: 4 TABLET ORAL at 10:11

## 2023-11-19 RX ADMIN — INSULIN ASPART 6 UNITS: 100 INJECTION, SOLUTION INTRAVENOUS; SUBCUTANEOUS at 12:11

## 2023-11-19 RX ADMIN — FLUOXETINE 20 MG: 20 CAPSULE ORAL at 08:11

## 2023-11-19 RX ADMIN — ACETAMINOPHEN 650 MG: 325 TABLET, FILM COATED ORAL at 12:11

## 2023-11-19 RX ADMIN — INSULIN DETEMIR 10 UNITS: 100 INJECTION, SOLUTION SUBCUTANEOUS at 12:11

## 2023-11-19 RX ADMIN — INSULIN ASPART 5 UNITS: 100 INJECTION, SOLUTION INTRAVENOUS; SUBCUTANEOUS at 08:11

## 2023-11-19 RX ADMIN — TRAZODONE HYDROCHLORIDE 50 MG: 50 TABLET ORAL at 11:11

## 2023-11-19 NOTE — HOSPITAL COURSE
Platelet count was not responding to steroids. Hematology-Oncology decided to start rituximab. Hepatitis B panel suggested past infection. Hepatitis B DNA was checked. Tenofovir was started for hepatitis B prophylaxis. Tranexamic acid was started for epistaxis. She required multiple platelet transfusions. She had no significant improvement in platelet count. Hematology-Oncology recommended starting eltrombopag. She was started on elmtrobag. Platelets improved to 8000. She and her daughter were concerned about side effects of rituximab and would like to hold off on it. Hematology-Oncology recommended tapering prednisone by 10 mg per week, with pantoprazole daily and trimethoprim-sulfamethoxazole while on prednisone. Amlodipine was changed to nifedipine and spironolactone was changed to hydrochlorothiazide.

## 2023-11-19 NOTE — ASSESSMENT & PLAN NOTE
- history of hematologic abnormalities dating back to hospitalization in 2017, appears she was lost to follow up  - current thrombocytopenia most consistent with ITP at this time  - receiving platelets now with goal of plt >10 per hematology  - 40 mg dexamethasone IV given in the ED  - monitor CBC and monitor closely for bleeding  - hold home aspirin and Plavix  - hematology consultation further recommendations

## 2023-11-19 NOTE — PLAN OF CARE
Initial Discharge Planning Assessment:  Patient admitted on: 11/18/23     Chart reviewed, Care plan discussed with treatment team,  attending Dr. Moss      PCP updated in Epic: Dr. Rice          Pharmacy, updated in Epic: [facility]      DME at home: Walker (2x) Cane  & wheelchair       Current dispo: Home with daughter       Transportation: Family      Power of  or Living Will: Family      Anticipated DC needs from  perspective:           11/19/23 1322   Discharge Assessment   Assessment Type Discharge Planning Assessment   Confirmed/corrected address, phone number and insurance No  ([id number]  [facility])   Source of Information patient;health record   People in Home child(lory), adult   Prior to hospitilization cognitive status: Alert/Oriented   Current cognitive status: Alert/Oriented   Walking or Climbing Stairs ambulation difficulty, requires equipment   Equipment Currently Used at Home walker, standard;walker, rolling;cane, straight;wheelchair   Readmission within 30 days? No   Patient currently being followed by outpatient case management? No   Do you currently have service(s) that help you manage your care at home? No   Do you take prescription medications? Yes   Do you have prescription coverage? Yes   Do you have any problems affording any of your prescribed medications? No   Is the patient taking medications as prescribed? yes   How do you get to doctors appointments? family or friend will provide   Are you on dialysis? No  (diabetic)   Do you take coumadin? No   DME Needed Upon Discharge  none   Discharge Plan discussed with: Patient   Transition of Care Barriers None   Discharge Plan A Home with family   Discharge Plan B Home Health

## 2023-11-19 NOTE — HPI
Wendy Viveros is a 67 year old Black woman with diabetes mellitus type 2, hypertension, hyperlipidemia, history of stroke on 11/15/2021 with residual right hemiparesis, chronic kidney disease stage 2, gout, hemolytic anemia, gastroesophageal reflux disease, chronic diastolic heart failure, aortic regurgitation, history of cholecystectomy, history of reduction mammoplasty, history of left total shoulder arthroplasty, history of left total knee arthroplasty on 11/1/2021, history of knee surgery in 2018. She lives in [facility]. Her primary care physician is Dr. Ashley Harrell. Her hematologist-oncologist is Dr. Joao Howard.    She was admitted to [facility] on 11/18/2023 for gum bleeding and mouth lesions. She was found to have thrombocytopenia with platelet count of 3000/uL. Immune thrombocytopenic purpura (ITP) was suspected. She was given intravenous dexamethasone and 1 unit of platelets. Hematology-Oncology was consulted. Dexamethasone was given by mouth.    She was transferred to [facility] the evening of 11/22/2023 for bone marrow biopsy. It was done the same day.

## 2023-11-19 NOTE — ED PROVIDER NOTES
Encounter Date: 11/18/2023       History     Chief Complaint   Patient presents with    Mouth Lesions     Reports noticing spots to tongue and and mouth x1 day, states she can taste blood, no resp distress, no tongue swelling.      Afebrile 59-year-old female with PMH of he ordered regurg, arthritis, CKD stage 2, DM, gout, HLD, hypertension, tendinitis and recent stroke presents the ED for evaluation of rash.  Patient reports that she noted lesions to the mouth approximately 1 day ago.  Denies any recent trauma.  States slight bleeding after brushing teeth however denies any nosebleeds or gum bleeding.  She does report easy bruising however denies any additional rash.  Initially she denies any anticoagulant use however upon review of patient's chart she is on Plavix and aspirin.     The history is provided by the patient.     Review of patient's allergies indicates:   Allergen Reactions    Colchicine analogues      diarrhea    Lisinopril      Other reaction(s): cough  Other reaction(s): cough    Losartan      Other reaction(s): blurry vision  Other reaction(s): blurry vision    Percocet [oxycodone-acetaminophen]      Pt gets pain from taking medicine.     Past Medical History:   Diagnosis Date    Abnormal EKG     Anemia 7/13/2017    Aortic valve regurgitation     Arthritis     CKD (chronic kidney disease) stage 2, GFR 60-89 ml/min 8/8/2014    CKD (chronic kidney disease) stage 2, GFR 60-89 ml/min 8/8/2014    Diabetes mellitus     Diabetes mellitus type II     GERD (gastroesophageal reflux disease)     Gout, unspecified     Heart murmur     Hyperlipidemia     Hypertension     Tendonitis      Past Surgical History:   Procedure Laterality Date    breast reduction      CHOLECYSTECTOMY      JOINT REPLACEMENT      KNEE SURGERY  2018    POLYPECTOMY  05/05/2016    TOTAL REDUCTION MAMMOPLASTY      TOTAL SHOULDER ARTHROPLASTY Left     TUBAL LIGATION       Family History   Problem Relation Age of Onset    Heart disease Mother          MI at 71    Hypertension Mother     Hypertension Brother     Diabetes Brother     Hypertension Brother     Breast cancer Maternal Cousin     Colon cancer Neg Hx     Ovarian cancer Neg Hx      Social History     Tobacco Use    Smoking status: Never    Smokeless tobacco: Never   Substance Use Topics    Alcohol use: No     Alcohol/week: 0.0 standard drinks of alcohol    Drug use: No     Review of Systems  As per HPI  Physical Exam     Initial Vitals [11/18/23 1644]   BP Pulse Resp Temp SpO2   (!) 178/74 80 19 98.4 °F (36.9 °C) 95 %      MAP       --         Physical Exam    Constitutional: Vital signs are normal. She appears well-developed and well-nourished. She is cooperative. No distress.   HENT:   Head: Normocephalic and atraumatic.   Petechiae noted to the sublingual region and appeared noted to the gingiva.   Eyes: Conjunctivae and lids are normal.   Neck: Trachea normal. No thyroid mass present.   Cardiovascular:  Normal rate and regular rhythm.           Abdominal: Abdomen is soft.     Neurological: She is alert and oriented to person, place, and time. GCS eye subscore is 4. GCS verbal subscore is 5. GCS motor subscore is 6.   Skin: Skin is warm, dry and intact. Petechiae, purpura and rash noted.   Your rash to bilateral lower extremities.  No large purpura   Psychiatric: She has a normal mood and affect. Her speech is normal and behavior is normal. Thought content normal.         ED Course   Procedures  Labs Reviewed   CBC W/ AUTO DIFFERENTIAL - Abnormal; Notable for the following components:       Result Value    RBC 2.75 (*)     Hemoglobin 9.6 (*)     Hematocrit 26.7 (*)     MCH 34.9 (*)     RDW 17.4 (*)     Platelets 3 (*)     Immature Granulocytes 0.8 (*)     Immature Grans (Abs) 0.06 (*)     Platelet Estimate Decreased (*)     All other components within normal limits    Narrative:     Platelet critical result(s) called and verbal readback obtained from   Alanna Saucedo RN by NICHELLE 11/18/2023 19:39    COMPREHENSIVE METABOLIC PANEL - Abnormal; Notable for the following components:    BUN 36 (*)     Total Bilirubin 2.8 (*)     ALT 7 (*)     eGFR 45 (*)     Anion Gap 6 (*)     All other components within normal limits   POCT GLUCOSE - Abnormal; Notable for the following components:    POCT Glucose 115 (*)     All other components within normal limits   HIV 1 / 2 ANTIBODY    Narrative:     Release to patient->Immediate   HEPATITIS C ANTIBODY    Narrative:     Release to patient->Immediate   PROTIME-INR   LACTATE DEHYDROGENASE   TYPE & SCREEN   PREPARE PLATELETS (RANDOM UNITS) SOFT          Imaging Results    None          Medications   0.9%  NaCl infusion (for blood administration) (has no administration in time range)   dexAMETHasone 40 mg in dextrose 5 % (D5W) 50 mL (has no administration in time range)     Medical Decision Making  Amount and/or Complexity of Data Reviewed  Labs: ordered.    Risk  Prescription drug management.                          Medical Decision Making:   History:   I obtained history from: someone other than patient.       <> Summary of History: Reviewed recent hematology note with reported thrombocytopenia and anemia.  Old Medical Records: I decided to obtain old medical records.  Initial Assessment:   Emergent evaluation of 59-year-old female presenting with acute rash to lower extremity and oral mucosa.  She appears nontoxic.  Mouth with notable the appear like lesions.  No active bleeding at this time.  TKR rash to bilateral lower extremities.  No large purpura  Differential Diagnosis:   Differential Diagnosis includes, but is not limited to:  Necrotizing fasciitis, erythema multiforme,  DIC, cellulitis, varicose vein, drug eruption, allergic reaction/urticatia, irritant/contact dermatitis, viral exanthem, local trauma/contusion, abrasion.    Clinical Tests:   Lab Tests: Reviewed and Ordered  ED Management:  Given appearance of rash will obtain labs to assess for acute process.   Suspect etiology of thrombocytopenia will continue to monitor.  Labs notable for microcytic anemia with H&H at 9 and 26.  Extreme thrombocytopenia at 3.  No schistocytes noted on diff. INR normal.  Chemistry with noted elevated T bili at 2.8.  No other transaminitis.  Discussed these findings with hematologist on-call (Dr. Osorio) recommends high-dose steroid at 40 and platelets at this time as goal is to increased platelet count above 10.  Discuss the findings with patient with plan for hospitalization.  She is agreeable to this plan and for steroids and transfusion of platelets.  Discuss with hospitalist and they will admit the patient          Clinical Impression:  Final diagnoses:  [D69.6] Thrombocytopenia (Primary)  [D69.2] Purpura  [R23.3] Petechiae          ED Disposition Condition    Admit Stable                Diane Tellez PA  11/18/23 2022

## 2023-11-19 NOTE — ED NOTES
Patient care resumed, patient denies any complaints at this time, O2 sat 94% on room air patient is asymptomatic, all other VSS, safety measures in place, will continue to monitor.

## 2023-11-19 NOTE — TELEMEDICINE CONSULT
"Hematology / Oncology   Telemedicine Consult Note    Consult Requested By: Kathy Longoria  Reason for Consult: anemia and thrombocytopenia    SUBJECTIVE:   Admit date: 11/18/2023  History of Present Illness: Ms. Viveros is a very pleasant 67 y.o. female w/ prior history of chronic anemia. She recently established care with hematologist/oncologist Dr. Joao Howard for this issue. Per his documentation he was concerned for hemolytic process based on presence of labs showing elevated bilirubin and low haptoglobin. At that time he ordered lab tests to be done but she has not yet followed back up with him.    She presented to the ED yesterday noting "weeks" worth of petechiae and 1 day history of palatal/mucosal petechiae in the mouth. She denies bleeding from elsewhere. She feels well otherwise.           PTA Medications   Medication Sig    allopurinoL (ZYLOPRIM) 300 MG tablet Take 1 tablet by mouth once daily    amLODIPine (NORVASC) 10 MG tablet Take 1 tablet by mouth once daily    clopidogreL (PLAVIX) 75 mg tablet Take 75 mg by mouth once daily.    FLUoxetine 20 MG capsule Take 20 mg by mouth once daily.    metoprolol succinate (TOPROL-XL) 50 MG 24 hr tablet Take 50 mg by mouth once daily.    spironolactone (ALDACTONE) 50 MG tablet Take 1 tablet by mouth once daily    traZODone (DESYREL) 50 MG tablet Take 50 mg by mouth nightly as needed for Insomnia.    atorvastatin (LIPITOR) 80 MG tablet Take 80 mg by mouth once daily.    cyanocobalamin, vitamin B-12, 50 mcg tablet Take 50 mcg by mouth once daily.      Review of patient's allergies indicates:   Allergen Reactions    Colchicine analogues      diarrhea    Lisinopril      Other reaction(s): cough  Other reaction(s): cough    Losartan      Other reaction(s): blurry vision  Other reaction(s): blurry vision    Percocet [oxycodone-acetaminophen]      Pt gets pain from taking medicine.       Past Medical History:   Diagnosis Date    Abnormal EKG     Anemia 07/13/2017    " "Aortic valve regurgitation     Arthritis     CKD (chronic kidney disease) stage 2, GFR 60-89 ml/min 08/08/2014    CKD (chronic kidney disease) stage 2, GFR 60-89 ml/min 08/08/2014    CVA (cerebral vascular accident)     2021 w/ residual R sided weakness    Diabetes mellitus     Diabetes mellitus type II     GERD (gastroesophageal reflux disease)     Gout, unspecified     Heart murmur     Hyperlipidemia     Hypertension     Tendonitis        Past Surgical History:   Procedure Laterality Date    breast reduction      CHOLECYSTECTOMY      JOINT REPLACEMENT      KNEE SURGERY  2018    POLYPECTOMY  05/05/2016    TOTAL REDUCTION MAMMOPLASTY      TOTAL SHOULDER ARTHROPLASTY Left     TUBAL LIGATION        Family History   Problem Relation Age of Onset    Heart disease Mother         MI at 71    Hypertension Mother     Hypertension Brother     Diabetes Brother     Hypertension Brother     Breast cancer Maternal Cousin     Colon cancer Neg Hx     Ovarian cancer Neg Hx        Social History     Tobacco Use    Smoking status: Never    Smokeless tobacco: Never   Substance Use Topics    Alcohol use: No     Alcohol/week: 0.0 standard drinks of alcohol    Drug use: No        OBJECTIVE:     Vital Signs (Most Recent)   Temp: 98.2 °F (36.8 °C) (11/19/23 1116)  Pulse: 77 (11/19/23 1116)  Resp: 18 (11/19/23 1116)  BP: (!) 157/72 (11/19/23 1116)  SpO2: (!) 94 % (11/19/23 1116)  Body mass index is 33 kg/m².      Physical Exam:  No physical exam due to remote nature of the visit.      Cardiac Enzymes: Ejection Fractions:    No results for input(s): "CPK", "CPKMB", "MB", "TROPONINI" in the last 72 hours. No results found for: "EF"     Chemistries:   Recent Labs   Lab 11/18/23 1847 11/19/23  0336    140   K 4.3 4.4    109   CO2 24 21*   BUN 36* 39*   CREATININE 1.3 1.7*   CALCIUM 10.2 9.8   PROT 6.8 6.8   BILITOT 2.8* 2.2*   ALKPHOS 63 66   ALT 7* 10   AST 15 15        CBC/Anemia Labs: Coags:    Recent Labs   Lab 11/18/23 1847 " 11/19/23  0336   WBC 7.67 8.02   HGB 9.6* 9.0*   HCT 26.7* 25.1*   PLT 3* 1*   MCV 97 97   RDW 17.4* 17.2*    Recent Labs   Lab 11/18/23  1847   INR 1.1        POCT Glucose: HbA1c:    Recent Labs   Lab 11/18/23  1737 11/19/23  1210   POCTGLUCOSE 115* 298*    Hemoglobin A1C   Date Value Ref Range Status   09/28/2021 7.2 (H) 4.0 - 6.0 % Final   03/02/2021 7.4 (H) 4.0 - 5.6 % Final     Comment:     ADA Screening Guidelines:  5.7-6.4%  Consistent with prediabetes  >or=6.5%  Consistent with diabetes    High levels of fetal hemoglobin interfere with the HbA1C  assay. Heterozygous hemoglobin variants (HbS, HgC, etc)do  not significantly interfere with this assay.   However, presence of multiple variants may affect accuracy.     07/30/2020 6.1 (H) 4.0 - 5.6 % Final     Comment:     ADA Screening Guidelines:  5.7-6.4%  Consistent with prediabetes  >or=6.5%  Consistent with diabetes  High levels of fetal hemoglobin interfere with the HbA1C  assay. Heterozygous hemoglobin variants (HbS, HgC, etc)do  not significantly interfere with this assay.   However, presence of multiple variants may affect accuracy.     01/28/2020 7.0 (H) 4.0 - 5.6 % Final     Comment:     ADA Screening Guidelines:  5.7-6.4%  Consistent with prediabetes  >or=6.5%  Consistent with diabetes  High levels of fetal hemoglobin interfere with the HbA1C  assay. Heterozygous hemoglobin variants (HbS, HgC, etc)do  not significantly interfere with this assay.   However, presence of multiple variants may affect accuracy.          Lines/Drains:       Peripheral IV - Single Lumen 11/18/23 1950 20 G Right Antecubital (Active)   Site Assessment Clean;Dry;Intact;No redness;No swelling 11/19/23 0822   Extremity Assessment Distal to IV No warmth;No swelling;No redness;No abnormal discoloration 11/19/23 0822   Line Status Saline locked 11/19/23 0822   Dressing Status Clean;Dry;Intact 11/19/23 0822   Dressing Intervention Integrity maintained 11/19/23 0822   Dressing Change  Due 11/22/23 11/19/23 0822   Site Change Due 11/22/23 11/19/23 0822   Reason Not Rotated Not due 11/19/23 0822   Number of days: 0         ASSESSMENT/PLAN:     Active Hospital Problems    Diagnosis  POA    *Thrombocytopenia [D69.6]  Yes    History of CVA (cerebrovascular accident) [Z86.73]  Not Applicable    CKD (chronic kidney disease) stage 3, GFR 30-59 ml/min [N18.30]  Yes    Anemia, unspecified [D64.9]  Yes    Type 2 diabetes mellitus with circulatory disorder, without long-term current use of insulin [E11.59]  Yes    Primary hypertension [I10]  Yes    Hyperlipidemia type II [E78.01]  Yes    Chronic gout [M1A.9XX0]  Yes      Resolved Hospital Problems   No resolved problems to display.       SUMMARY: 67 y.o. female here with Thrombocytopenia and Anemia.    -Chronic Anemia, with concern for hemolytic process. Haptoglobin low this admit. Will check MIKKI.    -Will order routine anemia workup as well.    -Transfuse to maintain hemoglobin >7    -Acute Severe Thrombocytopenia associated with petechiae, no major bleeding   -Ddx would include ITP vs TTP vs other. Favor ITP given lack of additional symptomatology and low LDH level which is usually quite elevated in TTP.   -Will Still need review of peripheral smear.   -Started dexamethasone 40mg x4 days to treat ITP.   -Transfuse platelets to goal of 10.    -No intramuscular injections and avoid blood thinning agents.    If MIKKI positive, however, this would be more like Kraig's Syndrome and likely would need treatment with prolonged steroids (I.e. prednisone 1 to 2 mg/kg/day) and consideration for rituximab.    Thank you for allowing me to participate in the care of Ms. Viveors. Hematology will continue to follow. Please do not hesitate to contact me if there are questions or concerns.  Will sign out to Dr. Armstrong to follow.           Eliot Osorio MD, [facility]  Hematology & Medical Oncology  [facility]                    Face to Face time with patient: 15 min  60  minutes of total time spent on the encounter, which includes face to face time and non-face to face time preparing to see the patient (eg, review of tests), Obtaining and/or reviewing separately obtained history, Documenting clinical information in the electronic or other health record, Independently interpreting results (not separately reported) and communicating results to the patient/family/caregiver, or Care coordination (not separately reported).         Each patient to whom he or she provides medical services by telemedicine is:  (1) informed of the relationship between the physician and patient and the respective role of any other health care provider with respect to management of the patient; and (2) notified that he or she may decline to receive medical services by telemedicine and may withdraw from such care at any time.

## 2023-11-19 NOTE — ASSESSMENT & PLAN NOTE
- Takes allopurinol 300 mg p.o. daily  - This medication will be held given can be associated with thrombocytopenia

## 2023-11-19 NOTE — PLAN OF CARE
Nursing Plan of Care Note  Dx: Thrombocytopenia     Shift Events:  1 unit of Platelets, Dexamethasone continued (Day 2/4), Accu checks and insulin initiated     Goals of Care: Platelets >10. Dexamethasone x 4 days, Repeat CBC in AM; Tylenol given for right knee pain     Neuro: AAOX4     Vital Signs: VSS, elevated BP, currently receives Metoprolol and Aldactone     Respiratory: Room Air     Diet: Cardiac/Diabetic     Is patient tolerating current diet? Yes        Urine Output/Bowel Movement: No BM, Voids without difficulty     Drains/Tubes/Tube Feeds (include total output/shift): N/A     Lines: 20g RAC     Accuchecks: ACHS, Levemir 10 units initiated, SSI adminstered as ordered     Skin: Generalized Bruising     Fall Risk Score: 13, Level 3. Patient educated on importance of calling for assistance before attempting to ambulate due to falls risk and low platelet count. Patient verbalized understanding.

## 2023-11-19 NOTE — PROGRESS NOTES
[address] Medicine  Progress Note    Patient Name: Wendy Viveros  MRN: [id number]  Patient Class: IP- Inpatient   Admission Date: 11/18/2023  Length of Stay: 1 days  Attending Physician: Mily Moss MD  Primary Care Provider: Roger Miller MD        Subjective:     Principal Problem:Thrombocytopenia        HPI:  Wendy Viveros is a 67-year-old female with a past medical history of hypertension, stroke with residual right-sided weakness, and remote history of possible hemolytic anemia who presented to the ED at [facility] with complaints of lesions in her mouth since yesterday.  She states she has also noticed some accompanying mild bleeding after brushing her teeth, but she denies any other bleeding.  She does report easy bruising however denies any additional rash.  Initially she denies any anticoagulant use however upon review of patient's chart she is on Plavix and aspirin.  Other than these lesions, she feels in her usual state of health.  She denies recent illness.  In the ED, labs notable for extreme thrombocytopenia with platelets of 3.  No schistocytes on the differential.  T bili elevated at 2.8.  This was discussed with hematologist on call Dr. Osorio, who suspects ITP, and recommended high-dose steroids and platelets be given with goal to increased platelet count above 10.  Of note, she was admitted at this same facility in 2017 for suspected hemolytic anemia, but it appears she was lost to follow-up with hematology.  She did just see Hematology at [facility] 2 weeks ago after her PCP referred her there for mild anemia and mild thrombocytopenia with elevated T bili and low haptoglobin.    Overview/Hospital Course:  Ms. Viveros presented with bleeding gums and mouth lesions. Admitted with severe thrombocytopenia with suspected ITP. Treated with IV dexamethasone in ER and transfused 1U platelets. Hem/Onc consulted.    Interval History: No acute events, have  "not noticed any more bleeding gums this morning. Reported feeling "flush" when getting first transfusion of platelets yesterday but not this morning.    Review of Systems   Constitutional:  Negative for chills and fever.   Respiratory:  Negative for shortness of breath.    Hematological:  Bruises/bleeds easily.     Objective:     Vital Signs (Most Recent):  Temp: 97.4 °F (36.3 °C) (11/19/23 0848)  Pulse: 83 (11/19/23 0848)  Resp: 16 (11/19/23 0848)  BP: (!) 176/73 (11/19/23 0848)  SpO2: 96 % (11/19/23 0848) Vital Signs (24h Range):  Temp:  [96.9 °F (36.1 °C)-99 °F (37.2 °C)] 97.4 °F (36.3 °C)  Pulse:  [68-99] 83  Resp:  [16-20] 16  SpO2:  [93 %-98 %] 96 %  BP: (142-179)/(66-79) 176/73     Weight: 90 kg (198 lb 5.2 oz)  Body mass index is 33 kg/m².    Intake/Output Summary (Last 24 hours) at 11/19/2023 0919  Last data filed at 11/19/2023 0859  Gross per 24 hour   Intake 855 ml   Output 750 ml   Net 105 ml         Physical Exam  Vitals and nursing note reviewed.   Constitutional:       General: She is not in acute distress.     Appearance: Normal appearance. She is not ill-appearing or toxic-appearing.   HENT:      Head: Normocephalic and atraumatic.      Nose: Nose normal.      Mouth/Throat:      Comments: Purpura on tongue and mucosal membranes in oral cavity  Eyes:      Extraocular Movements: Extraocular movements intact.      Conjunctiva/sclera: Conjunctivae normal.   Cardiovascular:      Rate and Rhythm: Normal rate and regular rhythm.      Pulses: Normal pulses.      Heart sounds: Murmur (systolic) heard.   Pulmonary:      Effort: Pulmonary effort is normal.      Breath sounds: Normal breath sounds. No wheezing or rales.   Abdominal:      General: Bowel sounds are normal. There is no distension.      Palpations: Abdomen is soft.      Tenderness: There is no abdominal tenderness. There is no guarding.   Musculoskeletal:         General: Normal range of motion.      Cervical back: Normal range of motion and neck " supple.      Right lower leg: No edema.      Left lower leg: No edema.   Skin:     General: Skin is warm and dry.      Comments: Petechiae to bilateral LE and some noted large bruising to hands/upper extremities   Neurological:      Mental Status: She is alert and oriented to person, place, and time. Mental status is at baseline.      Comments: Mild weakness to RUE/RLE at 4+/5   Psychiatric:         Mood and Affect: Mood normal.         Behavior: Behavior normal.         Thought Content: Thought content normal.             Significant Labs: All pertinent labs within the past 24 hours have been reviewed.    Significant Imaging: I have reviewed all pertinent imaging results/findings within the past 24 hours.    Assessment/Plan:      * Thrombocytopenia  Anemia of chronic disease  Hyperbilirubinemia  - History of hematologic abnormalities dating back to hospitalization in 2017.   - Correction to H&P, she was seen by Dr. Howard in Hem/Onc clinic on 11/1/2023 for mild anemia and thrombocytopenia noted for the past 7 months and new labs were ordered and with follow up arranged for return. (Unable to see most recent outside labs). It is reported in the note that her H/H was recently 10.2/28, chronically elevated bilirubin, and depressed haptoglobin to undetectable levels  - Additionally, patient was not taking ASA and not on lasix as well. Her home regimen was plavix only and atorvastatin for her h/o CVA.  - Severe thrombocytopenia highly suspicious for ITP  - Allopurinol will be held given association with thrombocytopenia and continue to hold Plavix  - s/p 40 mg dexamethasone IV given in the ED and transfused 1U platelets  - Case discussed with Hem/Onc on call, with recommendation to transfuse platelets with goal of platelets >10 yesterday by ER physician  - Hem/Onc consult pending  - Will plan for 4 day course of dexamethasone 40 mg IV daily x 4 days total for now, currently on day #2/4 and transfuse another unit of  platelets today while awaiting further recommendations  - Will change treatment plan after getting input from Hem/Onc accordingly  - Monitor with daily CBC and monitor closely for bleeding    History of CVA (cerebrovascular accident)  - Reported CVA with R sided weakness in 2021  - Holding plavix as discussed above  - Restart atorvastatin 80 mg p.o. daily which was missing. Updated her medication list    CKD (chronic kidney disease) stage 3, GFR 30-59 ml/min  - Upon review of her medical records, patient has CKD 3  - Creatinine likely within patient's baseline  - Continue to monitor I&Os and repeat labs in a.m.    Type 2 diabetes mellitus with circulatory disorder, without long-term current use of insulin  - Last hemoglobin A1c was 7.2 in 2021 in our system  - She is currently not on any medication and is diet controlled  - Glucose on admit was normal at 97, but noted increase to 269 range this morning which is likely due to IV steroids  - Change diet to 2000 ADA diet, add Accu-Cheks q.a.c. and q.h.s., add detemir 10 units subQ daily and have moderate dose sliding scale insulin  - Will adjust insulin regimen as needed to achieve glucose between 140-180  - Recheck hemoglobin A1c in a.m.    Chronic gout  - Takes allopurinol 300 mg p.o. daily  - This medication will be held given can be associated with thrombocytopenia    Hyperlipidemia type II  - Resume atorvastatin 80 mg p.o. daily    Primary hypertension  - Controlled, continue home medications amlodipine, metoprolol, spironolactone.   - Correction made to admit medication list, she is not taking lasix and it has been removed for her list      VTE Risk Mitigation (From admission, onward)           Ordered     IP VTE HIGH RISK PATIENT  Once         11/18/23 2128     Place sequential compression device  Until discontinued         11/18/23 2128     Reason for No Pharmacological VTE Prophylaxis  Once        Question:  Reasons:  Answer:  Thrombocytopenia    11/18/23 2128                         Mily Moss MD  Department of Hospital Medicine   [facility] ([address])

## 2023-11-19 NOTE — ED NOTES
"Presents to ED w/ c/o "black sores all in my mouth that started last night"  denies trauma/bleeding disorder/seizures.  +dark sores noted to roof, cheeks, tongue.  "

## 2023-11-19 NOTE — ASSESSMENT & PLAN NOTE
- adequately controlled  - continue home medications amlodipine, metoprolol, spironolactone. Hold home lasix for now as it can cause ITP

## 2023-11-19 NOTE — H&P
[address] Medicine  History & Physical    Patient Name: Wendy Viveros  MRN: [id number]  Patient Class: IP- Inpatient  Admission Date: 11/18/2023  Attending Physician: Mily Moss MD   Primary Care Provider: Roger Miller MD         Patient information was obtained from patient, past medical records, and ER records.     Subjective:     Principal Problem:Thrombocytopenia    Chief Complaint:   Chief Complaint   Patient presents with    Mouth Lesions     Reports noticing spots to tongue and and mouth x1 day, states she can taste blood, no resp distress, no tongue swelling.         HPI: Wendy Viveros is a 67-year-old female with a past medical history of hypertension, stroke with residual right-sided weakness, and remote history of possible hemolytic anemia who presented to the ED at [facility] with complaints of lesions in her mouth since yesterday.  She states she has also noticed some accompanying mild bleeding after brushing her teeth, but she denies any other bleeding.  She does report easy bruising however denies any additional rash.  Initially she denies any anticoagulant use however upon review of patient's chart she is on Plavix and aspirin.  Other than these lesions, she feels in her usual state of health.  She denies recent illness.  In the ED, labs notable for extreme thrombocytopenia with platelets of 3.  No schistocytes on the differential.  T bili elevated at 2.8.  This was discussed with hematologist on call Dr. Osorio, who suspects ITP, and recommended high-dose steroids and platelets be given with goal to increased platelet count above 10.  Of note, she was admitted at this same facility in 2017 for suspected hemolytic anemia, but it appears she was lost to follow-up with hematology.  She did just see H[place]ology [place] [facility] [place] ago after her PCP referred her there for mild anemia and mild thrombocytopenia with elevated T bili and low  haptoglobin.    Past Medical History:   Diagnosis Date    Abnormal EKG     Anemia 07/13/2017    Aortic valve regurgitation     Arthritis     CKD (chronic kidney disease) stage 2, GFR 60-89 ml/min 08/08/2014    CKD (chronic kidney disease) stage 2, GFR 60-89 ml/min 08/08/2014    CVA (cerebral vascular accident)     2021 w/ residual R sided weakness    Diabetes mellitus     Diabetes mellitus type II     GERD (gastroesophageal reflux disease)     Gout, unspecified     Heart murmur     Hyperlipidemia     Hypertension     Tendonitis        Past Surgical History:   Procedure Laterality Date    breast reduction      CHOLECYSTECTOMY      JOINT REPLACEMENT      KNEE SURGERY  2018    POLYPECTOMY  05/05/2016    TOTAL REDUCTION MAMMOPLASTY      TOTAL SHOULDER ARTHROPLASTY Left     TUBAL LIGATION         Review of patient's allergies indicates:   Allergen Reactions    Colchicine analogues      diarrhea    Lisinopril      Other reaction(s): cough  Other reaction(s): cough    Losartan      Other reaction(s): blurry vision  Other reaction(s): blurry vision    Percocet [oxycodone-acetaminophen]      Pt gets pain from taking medicine.       No current facility-administered medications on file prior to encounter.     Current Outpatient Medications on File Prior to Encounter   Medication Sig    allopurinoL (ZYLOPRIM) 300 MG tablet Take 1 tablet by mouth once daily    amLODIPine (NORVASC) 10 MG tablet Take 1 tablet by mouth once daily    aspirin 81 MG Chew Take 81 mg by mouth once daily.    clopidogreL (PLAVIX) 75 mg tablet Take 75 mg by mouth once daily.    FLUoxetine 20 MG capsule Take 20 mg by mouth once daily.    furosemide (LASIX) 40 MG tablet Take 1 tablet (40 mg total) by mouth once daily.    metoprolol succinate (TOPROL-XL) 50 MG 24 hr tablet Take 50 mg by mouth once daily.    spironolactone (ALDACTONE) 50 MG tablet Take 1 tablet by mouth once daily    traZODone (DESYREL) 50 MG tablet Take 50 mg by mouth nightly as needed for  "Insomnia.    [DISCONTINUED] insulin lispro (HUMALOG KWIKPEN INSULIN) 100 unit/mL pen Inject 15 Units into the skin 3 (three) times daily before meals.    cyanocobalamin, vitamin B-12, 50 mcg tablet Take 50 mcg by mouth once daily.    [DISCONTINUED] albuterol (PROVENTIL) 2.5 mg /3 mL (0.083 %) nebulizer solution Take 3 mLs (2.5 mg total) by nebulization 4 (four) times daily as needed for Wheezing.    [DISCONTINUED] arm brace Misc 2 Units by Misc.(Non-Drug; Combo Route) route every evening.    [DISCONTINUED] aspirin (ECOTRIN) 81 MG EC tablet Take by mouth.    [DISCONTINUED] atorvastatin (LIPITOR) 80 MG tablet Take 80 mg by mouth once daily.    [DISCONTINUED] BD ULTRA-FINE SHORT PEN NEEDLE 31 gauge x 5/16" Ndle Use as directed 4 times daily.    [DISCONTINUED] blood sugar diagnostic Strp Pt tests tid    [DISCONTINUED] blood-glucose meter Misc One Touch Verio  Please provide strips to test qid    [DISCONTINUED] cholecalciferol, vitamin D3, 2,000 unit Tab Take 1 tablet by mouth once daily.     [DISCONTINUED] cloNIDine (CATAPRES) 0.1 MG tablet Take 1 tablet (0.1 mg total) by mouth once daily.    [DISCONTINUED] clopidogreL (PLAVIX) 75 mg tablet Take 75 mg by mouth once daily.    [DISCONTINUED] cyclobenzaprine (FLEXERIL) 10 MG tablet Take 1 tablet (10 mg total) by mouth 3 (three) times daily as needed for Muscle spasms.    [DISCONTINUED] gabapentin (NEURONTIN) 300 MG capsule Take 1 capsule (300 mg total) by mouth 3 (three) times daily.    [DISCONTINUED] indomethacin (INDOCIN) 50 MG capsule Take 1 capsule (50 mg total) by mouth 2 (two) times daily with meals.    [DISCONTINUED] insulin (LANTUS SOLOSTAR U-100 INSULIN) glargine 100 units/mL (3mL) SubQ pen Inject into the skin.    [DISCONTINUED] insulin degludec (TRESIBA FLEXTOUCH U-100) 100 unit/mL (3 mL) insulin pen Inject 45 Units into skin every evening.    [DISCONTINUED] lancets Misc by Misc.(Non-Drug; Combo Route) route. Pt is testing 3 times daily    [DISCONTINUED] " "metoprolol tartrate (LOPRESSOR) 50 MG tablet Take 1 tablet (50 mg total) by mouth 2 (two) times daily.    [DISCONTINUED] mometasone 0.1% (ELOCON) 0.1 % cream Apply topically once daily.    [DISCONTINUED] pen needle, diabetic 31 gauge x 3/16" Ndle use four times daily as directed     Family History       Problem Relation (Age of Onset)    Breast cancer Maternal Cousin    Diabetes Brother    Heart disease Mother    Hypertension Mother, Brother, Brother          Tobacco Use    Smoking status: Never    Smokeless tobacco: Never   Substance and Sexual Activity    Alcohol use: No     Alcohol/week: 0.0 standard drinks of alcohol    Drug use: No    Sexual activity: Not Currently     Partners: Male     Birth control/protection: None     Review of Systems   Constitutional: Negative.  Negative for appetite change, chills and fever.   Respiratory:  Negative for cough, shortness of breath and wheezing.    Cardiovascular:  Negative for chest pain, palpitations and leg swelling.   Gastrointestinal:  Negative for abdominal pain, constipation, diarrhea, nausea and vomiting.   Genitourinary:  Negative for difficulty urinating.   Skin:  Positive for rash.   Hematological:  Bruises/bleeds easily.     Objective:     Vital Signs (Most Recent):  Temp: 97.9 °F (36.6 °C) (11/18/23 2134)  Pulse: 75 (11/18/23 2134)  Resp: 20 (11/18/23 2134)  BP: (!) 161/71 (11/18/23 2134)  SpO2: 96 % (11/18/23 2134) Vital Signs (24h Range):  Temp:  [97.9 °F (36.6 °C)-98.4 °F (36.9 °C)] 97.9 °F (36.6 °C)  Pulse:  [68-80] 75  Resp:  [19-20] 20  SpO2:  [94 %-98 %] 96 %  BP: (160-179)/(70-78) 161/71     Weight: 89.4 kg (197 lb)  Body mass index is 32.78 kg/m².     Physical Exam  Vitals and nursing note reviewed.   Constitutional:       General: She is not in acute distress.     Appearance: Normal appearance. She is not ill-appearing or toxic-appearing.   HENT:      Head: Normocephalic and atraumatic.      Mouth/Throat:      Comments: Purpura on tongue and " cheeks  Eyes:      Conjunctiva/sclera: Conjunctivae normal.   Cardiovascular:      Rate and Rhythm: Normal rate and regular rhythm.      Pulses: Normal pulses.      Heart sounds: Murmur (systolic) heard.   Pulmonary:      Effort: Pulmonary effort is normal.      Breath sounds: Normal breath sounds. No wheezing or rales.   Abdominal:      General: Bowel sounds are normal. There is no distension.      Palpations: Abdomen is soft.      Tenderness: There is no abdominal tenderness.   Musculoskeletal:      Cervical back: Normal range of motion and neck supple.      Right lower leg: No edema.      Left lower leg: No edema.   Skin:     General: Skin is warm and dry.      Comments: Petechiae to BLE   Neurological:      Mental Status: She is alert and oriented to person, place, and time. Mental status is at baseline.   Psychiatric:         Behavior: Behavior normal.         Thought Content: Thought content normal.                Significant Labs: All pertinent labs within the past 24 hours have been reviewed.  CBC:   Recent Labs   Lab 11/18/23  1847   WBC 7.67   HGB 9.6*   HCT 26.7*   PLT 3*     CMP:   Recent Labs   Lab 11/18/23  1847      K 4.3      CO2 24   GLU 97   BUN 36*   CREATININE 1.3   CALCIUM 10.2   PROT 6.8   ALBUMIN 4.0   BILITOT 2.8*   ALKPHOS 63   AST 15   ALT 7*   ANIONGAP 6*       Significant Imaging: I have reviewed all pertinent imaging results/findings within the past 24 hours.  Assessment/Plan:     * Thrombocytopenia  - history of hematologic abnormalities dating back to hospitalization in 2017, appears she was lost to follow up  - current thrombocytopenia most consistent with ITP at this time  - receiving platelets now with goal of plt >10 per hematology  - 40 mg dexamethasone IV given in the ED  - monitor CBC and monitor closely for bleeding  - hold home aspirin and Plavix  - hematology consultation further recommendations    HTN (hypertension)  - adequately controlled  - continue home  medications amlodipine, metoprolol, spironolactone. Hold home lasix for now as it can cause ITP      VTE Risk Mitigation (From admission, onward)           Ordered     IP VTE HIGH RISK PATIENT  Once         11/18/23 2128     Place sequential compression device  Until discontinued         11/18/23 2128     Reason for No Pharmacological VTE Prophylaxis  Once        Question:  Reasons:  Answer:  Thrombocytopenia    11/18/23 2128                                    Kathy Longoria PA-C  Department of Hospital Medicine

## 2023-11-19 NOTE — ASSESSMENT & PLAN NOTE
- Last hemoglobin A1c was 7.2 in 2021 in our system  - She is currently not on any medication and is diet controlled  - Glucose on admit was normal at 97, but noted increase to 269 range this morning which is likely due to IV steroids  - Change diet to 2000 ADA diet, add Accu-Cheks q.a.c. and q.h.s., add detemir 10 units subQ daily and have moderate dose sliding scale insulin  - Will adjust insulin regimen as needed to achieve glucose between 140-180  - Recheck hemoglobin A1c in a.m.

## 2023-11-19 NOTE — SUBJECTIVE & OBJECTIVE
"Interval History: No acute events, have not noticed any more bleeding gums this morning. Reported feeling "flush" when getting first transfusion of platelets yesterday but not this morning.    Review of Systems   Constitutional:  Negative for chills and fever.   Respiratory:  Negative for shortness of breath.    Hematological:  Bruises/bleeds easily.     Objective:     Vital Signs (Most Recent):  Temp: 97.4 °F (36.3 °C) (11/19/23 0848)  Pulse: 83 (11/19/23 0848)  Resp: 16 (11/19/23 0848)  BP: (!) 176/73 (11/19/23 0848)  SpO2: 96 % (11/19/23 0848) Vital Signs (24h Range):  Temp:  [96.9 °F (36.1 °C)-99 °F (37.2 °C)] 97.4 °F (36.3 °C)  Pulse:  [68-99] 83  Resp:  [16-20] 16  SpO2:  [93 %-98 %] 96 %  BP: (142-179)/(66-79) 176/73     Weight: 90 kg (198 lb 5.2 oz)  Body mass index is 33 kg/m².    Intake/Output Summary (Last 24 hours) at 11/19/2023 0919  Last data filed at 11/19/2023 0859  Gross per 24 hour   Intake 855 ml   Output 750 ml   Net 105 ml         Physical Exam  Vitals and nursing note reviewed.   Constitutional:       General: She is not in acute distress.     Appearance: Normal appearance. She is not ill-appearing or toxic-appearing.   HENT:      Head: Normocephalic and atraumatic.      Nose: Nose normal.      Mouth/Throat:      Comments: Purpura on tongue and mucosal membranes in oral cavity  Eyes:      Extraocular Movements: Extraocular movements intact.      Conjunctiva/sclera: Conjunctivae normal.   Cardiovascular:      Rate and Rhythm: Normal rate and regular rhythm.      Pulses: Normal pulses.      Heart sounds: Murmur (systolic) heard.   Pulmonary:      Effort: Pulmonary effort is normal.      Breath sounds: Normal breath sounds. No wheezing or rales.   Abdominal:      General: Bowel sounds are normal. There is no distension.      Palpations: Abdomen is soft.      Tenderness: There is no abdominal tenderness. There is no guarding.   Musculoskeletal:         General: Normal range of motion.      Cervical " back: Normal range of motion and neck supple.      Right lower leg: No edema.      Left lower leg: No edema.   Skin:     General: Skin is warm and dry.      Comments: Petechiae to bilateral LE and some noted large bruising to hands/upper extremities   Neurological:      Mental Status: She is alert and oriented to person, place, and time. Mental status is at baseline.      Comments: Mild weakness to RUE/RLE at 4+/5   Psychiatric:         Mood and Affect: Mood normal.         Behavior: Behavior normal.         Thought Content: Thought content normal.             Significant Labs: All pertinent labs within the past 24 hours have been reviewed.    Significant Imaging: I have reviewed all pertinent imaging results/findings within the past 24 hours.

## 2023-11-19 NOTE — ASSESSMENT & PLAN NOTE
- Reported CVA with R sided weakness in 2021  - Holding plavix as discussed above  - Restart atorvastatin 80 mg p.o. daily which was missing. Updated her medication list

## 2023-11-19 NOTE — SUBJECTIVE & OBJECTIVE
Past Medical History:   Diagnosis Date    Abnormal EKG     Anemia 07/13/2017    Aortic valve regurgitation     Arthritis     CKD (chronic kidney disease) stage 2, GFR 60-89 ml/min 08/08/2014    CKD (chronic kidney disease) stage 2, GFR 60-89 ml/min 08/08/2014    CVA (cerebral vascular accident)     2021 w/ residual R sided weakness    Diabetes mellitus     Diabetes mellitus type II     GERD (gastroesophageal reflux disease)     Gout, unspecified     Heart murmur     Hyperlipidemia     Hypertension     Tendonitis        Past Surgical History:   Procedure Laterality Date    breast reduction      CHOLECYSTECTOMY      JOINT REPLACEMENT      KNEE SURGERY  2018    POLYPECTOMY  05/05/2016    TOTAL REDUCTION MAMMOPLASTY      TOTAL SHOULDER ARTHROPLASTY Left     TUBAL LIGATION         Review of patient's allergies indicates:   Allergen Reactions    Colchicine analogues      diarrhea    Lisinopril      Other reaction(s): cough  Other reaction(s): cough    Losartan      Other reaction(s): blurry vision  Other reaction(s): blurry vision    Percocet [oxycodone-acetaminophen]      Pt gets pain from taking medicine.       No current facility-administered medications on file prior to encounter.     Current Outpatient Medications on File Prior to Encounter   Medication Sig    allopurinoL (ZYLOPRIM) 300 MG tablet Take 1 tablet by mouth once daily    amLODIPine (NORVASC) 10 MG tablet Take 1 tablet by mouth once daily    aspirin 81 MG Chew Take 81 mg by mouth once daily.    clopidogreL (PLAVIX) 75 mg tablet Take 75 mg by mouth once daily.    FLUoxetine 20 MG capsule Take 20 mg by mouth once daily.    furosemide (LASIX) 40 MG tablet Take 1 tablet (40 mg total) by mouth once daily.    metoprolol succinate (TOPROL-XL) 50 MG 24 hr tablet Take 50 mg by mouth once daily.    spironolactone (ALDACTONE) 50 MG tablet Take 1 tablet by mouth once daily    traZODone (DESYREL) 50 MG tablet Take 50 mg by mouth nightly as needed for Insomnia.     "[DISCONTINUED] insulin lispro (HUMALOG KWIKPEN INSULIN) 100 unit/mL pen Inject 15 Units into the skin 3 (three) times daily before meals.    cyanocobalamin, vitamin B-12, 50 mcg tablet Take 50 mcg by mouth once daily.    [DISCONTINUED] albuterol (PROVENTIL) 2.5 mg /3 mL (0.083 %) nebulizer solution Take 3 mLs (2.5 mg total) by nebulization 4 (four) times daily as needed for Wheezing.    [DISCONTINUED] arm brace Misc 2 Units by Misc.(Non-Drug; Combo Route) route every evening.    [DISCONTINUED] aspirin (ECOTRIN) 81 MG EC tablet Take by mouth.    [DISCONTINUED] atorvastatin (LIPITOR) 80 MG tablet Take 80 mg by mouth once daily.    [DISCONTINUED] BD ULTRA-FINE SHORT PEN NEEDLE 31 gauge x 5/16" Ndle Use as directed 4 times daily.    [DISCONTINUED] blood sugar diagnostic Strp Pt tests tid    [DISCONTINUED] blood-glucose meter Misc One Touch Verio  Please provide strips to test qid    [DISCONTINUED] cholecalciferol, vitamin D3, 2,000 unit Tab Take 1 tablet by mouth once daily.     [DISCONTINUED] cloNIDine (CATAPRES) 0.1 MG tablet Take 1 tablet (0.1 mg total) by mouth once daily.    [DISCONTINUED] clopidogreL (PLAVIX) 75 mg tablet Take 75 mg by mouth once daily.    [DISCONTINUED] cyclobenzaprine (FLEXERIL) 10 MG tablet Take 1 tablet (10 mg total) by mouth 3 (three) times daily as needed for Muscle spasms.    [DISCONTINUED] gabapentin (NEURONTIN) 300 MG capsule Take 1 capsule (300 mg total) by mouth 3 (three) times daily.    [DISCONTINUED] indomethacin (INDOCIN) 50 MG capsule Take 1 capsule (50 mg total) by mouth 2 (two) times daily with meals.    [DISCONTINUED] insulin (LANTUS SOLOSTAR U-100 INSULIN) glargine 100 units/mL (3mL) SubQ pen Inject into the skin.    [DISCONTINUED] insulin degludec (TRESIBA FLEXTOUCH U-100) 100 unit/mL (3 mL) insulin pen Inject 45 Units into skin every evening.    [DISCONTINUED] lancets Misc by Misc.(Non-Drug; Combo Route) route. Pt is testing 3 times daily    [DISCONTINUED] metoprolol tartrate " "(LOPRESSOR) 50 MG tablet Take 1 tablet (50 mg total) by mouth 2 (two) times daily.    [DISCONTINUED] mometasone 0.1% (ELOCON) 0.1 % cream Apply topically once daily.    [DISCONTINUED] pen needle, diabetic 31 gauge x 3/16" Ndle use four times daily as directed     Family History       Problem Relation (Age of Onset)    Breast cancer Maternal Cousin    Diabetes Brother    Heart disease Mother    Hypertension Mother, Brother, Brother          Tobacco Use    Smoking status: Never    Smokeless tobacco: Never   Substance and Sexual Activity    Alcohol use: No     Alcohol/week: 0.0 standard drinks of alcohol    Drug use: No    Sexual activity: Not Currently     Partners: Male     Birth control/protection: None     Review of Systems   Constitutional: Negative.  Negative for appetite change, chills and fever.   Respiratory:  Negative for cough, shortness of breath and wheezing.    Cardiovascular:  Negative for chest pain, palpitations and leg swelling.   Gastrointestinal:  Negative for abdominal pain, constipation, diarrhea, nausea and vomiting.   Genitourinary:  Negative for difficulty urinating.   Skin:  Positive for rash.   Hematological:  Bruises/bleeds easily.     Objective:     Vital Signs (Most Recent):  Temp: 97.9 °F (36.6 °C) (11/18/23 2134)  Pulse: 75 (11/18/23 2134)  Resp: 20 (11/18/23 2134)  BP: (!) 161/71 (11/18/23 2134)  SpO2: 96 % (11/18/23 2134) Vital Signs (24h Range):  Temp:  [97.9 °F (36.6 °C)-98.4 °F (36.9 °C)] 97.9 °F (36.6 °C)  Pulse:  [68-80] 75  Resp:  [19-20] 20  SpO2:  [94 %-98 %] 96 %  BP: (160-179)/(70-78) 161/71     Weight: 89.4 kg (197 lb)  Body mass index is 32.78 kg/m².     Physical Exam  Vitals and nursing note reviewed.   Constitutional:       General: She is not in acute distress.     Appearance: Normal appearance. She is not ill-appearing or toxic-appearing.   HENT:      Head: Normocephalic and atraumatic.      Mouth/Throat:      Comments: Purpura on tongue and cheeks  Eyes:      " Conjunctiva/sclera: Conjunctivae normal.   Cardiovascular:      Rate and Rhythm: Normal rate and regular rhythm.      Pulses: Normal pulses.      Heart sounds: Murmur (systolic) heard.   Pulmonary:      Effort: Pulmonary effort is normal.      Breath sounds: Normal breath sounds. No wheezing or rales.   Abdominal:      General: Bowel sounds are normal. There is no distension.      Palpations: Abdomen is soft.      Tenderness: There is no abdominal tenderness.   Musculoskeletal:      Cervical back: Normal range of motion and neck supple.      Right lower leg: No edema.      Left lower leg: No edema.   Skin:     General: Skin is warm and dry.      Comments: Petechiae to BLE   Neurological:      Mental Status: She is alert and oriented to person, place, and time. Mental status is at baseline.   Psychiatric:         Behavior: Behavior normal.         Thought Content: Thought content normal.                Significant Labs: All pertinent labs within the past 24 hours have been reviewed.  CBC:   Recent Labs   Lab 11/18/23  1847   WBC 7.67   HGB 9.6*   HCT 26.7*   PLT 3*     CMP:   Recent Labs   Lab 11/18/23  1847      K 4.3      CO2 24   GLU 97   BUN 36*   CREATININE 1.3   CALCIUM 10.2   PROT 6.8   ALBUMIN 4.0   BILITOT 2.8*   ALKPHOS 63   AST 15   ALT 7*   ANIONGAP 6*       Significant Imaging: I have reviewed all pertinent imaging results/findings within the past 24 hours.

## 2023-11-19 NOTE — ASSESSMENT & PLAN NOTE
- Controlled, continue home medications amlodipine, metoprolol, spironolactone.   - Correction made to admit medication list, she is not taking lasix and it has been removed for her list

## 2023-11-19 NOTE — ASSESSMENT & PLAN NOTE
- Upon review of her medical records, patient has CKD 3  - Creatinine likely within patient's baseline  - Continue to monitor I&Os and repeat labs in a.m.

## 2023-11-19 NOTE — ASSESSMENT & PLAN NOTE
Anemia of chronic disease  Hyperbilirubinemia  - History of hematologic abnormalities dating back to hospitalization in 2017.   - Correction to H&P, she was seen by Dr. Howard in Hem/Onc clinic on 11/1/2023 for mild anemia and thrombocytopenia noted for the past 7 months and new labs were ordered and with follow up arranged for return. (Unable to see most recent outside labs). It is reported in the note that her H/H was recently 10.2/28, chronically elevated bilirubin, and depressed haptoglobin to undetectable levels  - Additionally, patient was not taking ASA and not on lasix as well. Her home regimen was plavix only and atorvastatin for her h/o CVA.  - Severe thrombocytopenia highly suspicious for ITP  - Allopurinol will be held given association with thrombocytopenia and continue to hold Plavix  - s/p 40 mg dexamethasone IV given in the ED and transfused 1U platelets  - Case discussed with Hem/Onc on call, with recommendation to transfuse platelets with goal of platelets >10 yesterday by ER physician  - Hem/Onc consult pending  - Will plan for 4 day course of dexamethasone 40 mg IV daily x 4 days total for now, currently on day #2/4 and transfuse another unit of platelets today while awaiting further recommendations  - Will change treatment plan after getting input from Hem/Onc accordingly  - Monitor with daily CBC and monitor closely for bleeding

## 2023-11-20 LAB
ADAMTS13 ACTIVITY: NORMAL %
ALBUMIN SERPL BCP-MCNC: 3.8 G/DL (ref 3.5–5.2)
ALP SERPL-CCNC: 58 U/L (ref 55–135)
ALT SERPL W/O P-5'-P-CCNC: 13 U/L (ref 10–44)
ANION GAP SERPL CALC-SCNC: 10 MMOL/L (ref 8–16)
ANISOCYTOSIS BLD QL SMEAR: SLIGHT
AST SERPL-CCNC: 15 U/L (ref 10–40)
BASOPHILS # BLD AUTO: 0.01 K/UL (ref 0–0.2)
BASOPHILS # BLD AUTO: 0.01 K/UL (ref 0–0.2)
BASOPHILS NFR BLD: 0.1 % (ref 0–1.9)
BASOPHILS NFR BLD: 0.1 % (ref 0–1.9)
BILIRUB SERPL-MCNC: 2.4 MG/DL (ref 0.1–1)
BLD PROD TYP BPU: NORMAL
BLOOD UNIT EXPIRATION DATE: NORMAL
BLOOD UNIT TYPE CODE: 8400
BLOOD UNIT TYPE: NORMAL
BUN SERPL-MCNC: 52 MG/DL (ref 8–23)
CALCIUM SERPL-MCNC: 10.3 MG/DL (ref 8.7–10.5)
CHLORIDE SERPL-SCNC: 107 MMOL/L (ref 95–110)
CO2 SERPL-SCNC: 21 MMOL/L (ref 23–29)
CODING SYSTEM: NORMAL
CREAT SERPL-MCNC: 1.3 MG/DL (ref 0.5–1.4)
CROSSMATCH INTERPRETATION: NORMAL
DIFFERENTIAL METHOD: ABNORMAL
DIFFERENTIAL METHOD: ABNORMAL
DISPENSE STATUS: NORMAL
EOSINOPHIL # BLD AUTO: 0 K/UL (ref 0–0.5)
EOSINOPHIL # BLD AUTO: 0 K/UL (ref 0–0.5)
EOSINOPHIL NFR BLD: 0 % (ref 0–8)
EOSINOPHIL NFR BLD: 0 % (ref 0–8)
ERYTHROCYTE [DISTWIDTH] IN BLOOD BY AUTOMATED COUNT: 16.8 % (ref 11.5–14.5)
ERYTHROCYTE [DISTWIDTH] IN BLOOD BY AUTOMATED COUNT: 17.2 % (ref 11.5–14.5)
EST. GFR  (NO RACE VARIABLE): 45 ML/MIN/1.73 M^2
ESTIMATED AVG GLUCOSE: 85 MG/DL (ref 68–131)
GLUCOSE SERPL-MCNC: 225 MG/DL (ref 70–110)
HBA1C MFR BLD: 4.6 % (ref 4–5.6)
HCT VFR BLD AUTO: 21.9 % (ref 37–48.5)
HCT VFR BLD AUTO: 21.9 % (ref 37–48.5)
HGB BLD-MCNC: 7.7 G/DL (ref 12–16)
HGB BLD-MCNC: 8.2 G/DL (ref 12–16)
IMM GRANULOCYTES # BLD AUTO: 0.13 K/UL (ref 0–0.04)
IMM GRANULOCYTES # BLD AUTO: 0.15 K/UL (ref 0–0.04)
IMM GRANULOCYTES NFR BLD AUTO: 1.1 % (ref 0–0.5)
IMM GRANULOCYTES NFR BLD AUTO: 1.1 % (ref 0–0.5)
LYMPHOCYTES # BLD AUTO: 1 K/UL (ref 1–4.8)
LYMPHOCYTES # BLD AUTO: 1.2 K/UL (ref 1–4.8)
LYMPHOCYTES NFR BLD: 8.4 % (ref 18–48)
LYMPHOCYTES NFR BLD: 8.5 % (ref 18–48)
MCH RBC QN AUTO: 34.4 PG (ref 27–31)
MCH RBC QN AUTO: 36 PG (ref 27–31)
MCHC RBC AUTO-ENTMCNC: 35.2 G/DL (ref 32–36)
MCHC RBC AUTO-ENTMCNC: 37.4 G/DL (ref 32–36)
MCV RBC AUTO: 96 FL (ref 82–98)
MCV RBC AUTO: 98 FL (ref 82–98)
MONOCYTES # BLD AUTO: 0.4 K/UL (ref 0.3–1)
MONOCYTES # BLD AUTO: 0.5 K/UL (ref 0.3–1)
MONOCYTES NFR BLD: 3.5 % (ref 4–15)
MONOCYTES NFR BLD: 3.9 % (ref 4–15)
NEUTROPHILS # BLD AUTO: 10.6 K/UL (ref 1.8–7.7)
NEUTROPHILS # BLD AUTO: 12 K/UL (ref 1.8–7.7)
NEUTROPHILS NFR BLD: 86.5 % (ref 38–73)
NEUTROPHILS NFR BLD: 86.8 % (ref 38–73)
NRBC BLD-RTO: 0 /100 WBC
NRBC BLD-RTO: 0 /100 WBC
PATH REV BLD -IMP: NORMAL
PATH REV BLD -IMP: NORMAL
PLATELET # BLD AUTO: 1 K/UL (ref 150–450)
PLATELET # BLD AUTO: 2 K/UL (ref 150–450)
PLATELET BLD QL SMEAR: ABNORMAL
PMV BLD AUTO: ABNORMAL FL (ref 9.2–12.9)
PMV BLD AUTO: ABNORMAL FL (ref 9.2–12.9)
POCT GLUCOSE: 208 MG/DL (ref 70–110)
POCT GLUCOSE: 238 MG/DL (ref 70–110)
POCT GLUCOSE: 266 MG/DL (ref 70–110)
POCT GLUCOSE: 271 MG/DL (ref 70–110)
POCT GLUCOSE: 322 MG/DL (ref 70–110)
POCT GLUCOSE: 355 MG/DL (ref 70–110)
POLYCHROMASIA BLD QL SMEAR: ABNORMAL
POTASSIUM SERPL-SCNC: 4.4 MMOL/L (ref 3.5–5.1)
PROT SERPL-MCNC: 6.5 G/DL (ref 6–8.4)
RBC # BLD AUTO: 2.24 M/UL (ref 4–5.4)
RBC # BLD AUTO: 2.28 M/UL (ref 4–5.4)
SODIUM SERPL-SCNC: 138 MMOL/L (ref 136–145)
UNIT NUMBER: NORMAL
WBC # BLD AUTO: 12.21 K/UL (ref 3.9–12.7)
WBC # BLD AUTO: 13.86 K/UL (ref 3.9–12.7)

## 2023-11-20 PROCEDURE — 25000003 PHARM REV CODE 250: Performed by: INTERNAL MEDICINE

## 2023-11-20 PROCEDURE — 85025 COMPLETE CBC W/AUTO DIFF WBC: CPT | Mod: 91 | Performed by: STUDENT IN AN ORGANIZED HEALTH CARE EDUCATION/TRAINING PROGRAM

## 2023-11-20 PROCEDURE — 80053 COMPREHEN METABOLIC PANEL: CPT | Performed by: HOSPITALIST

## 2023-11-20 PROCEDURE — 83036 HEMOGLOBIN GLYCOSYLATED A1C: CPT | Performed by: HOSPITALIST

## 2023-11-20 PROCEDURE — 36415 COLL VENOUS BLD VENIPUNCTURE: CPT | Performed by: STUDENT IN AN ORGANIZED HEALTH CARE EDUCATION/TRAINING PROGRAM

## 2023-11-20 PROCEDURE — 25000003 PHARM REV CODE 250: Performed by: PHYSICIAN ASSISTANT

## 2023-11-20 PROCEDURE — 36410 VNPNXR 3YR/> PHY/QHP DX/THER: CPT

## 2023-11-20 PROCEDURE — 63600175 PHARM REV CODE 636 W HCPCS: Mod: JZ,JG | Performed by: INTERNAL MEDICINE

## 2023-11-20 PROCEDURE — 63600175 PHARM REV CODE 636 W HCPCS: Performed by: INTERNAL MEDICINE

## 2023-11-20 PROCEDURE — 99233 SBSQ HOSP IP/OBS HIGH 50: CPT | Mod: ,,, | Performed by: STUDENT IN AN ORGANIZED HEALTH CARE EDUCATION/TRAINING PROGRAM

## 2023-11-20 PROCEDURE — P9035 PLATELET PHERES LEUKOREDUCED: HCPCS | Performed by: INTERNAL MEDICINE

## 2023-11-20 PROCEDURE — 85060 BLOOD SMEAR INTERPRETATION: CPT | Mod: ,,, | Performed by: PATHOLOGY

## 2023-11-20 PROCEDURE — 85025 COMPLETE CBC W/AUTO DIFF WBC: CPT | Performed by: HOSPITALIST

## 2023-11-20 PROCEDURE — C1751 CATH, INF, PER/CENT/MIDLINE: HCPCS

## 2023-11-20 PROCEDURE — 99233 PR SUBSEQUENT HOSPITAL CARE,LEVL III: ICD-10-PCS | Mod: ,,, | Performed by: STUDENT IN AN ORGANIZED HEALTH CARE EDUCATION/TRAINING PROGRAM

## 2023-11-20 PROCEDURE — 11000001 HC ACUTE MED/SURG PRIVATE ROOM

## 2023-11-20 PROCEDURE — 85060 PATHOLOGIST REVIEW: ICD-10-PCS | Mod: ,,, | Performed by: PATHOLOGY

## 2023-11-20 PROCEDURE — 76937 US GUIDE VASCULAR ACCESS: CPT

## 2023-11-20 PROCEDURE — 36430 TRANSFUSION BLD/BLD COMPNT: CPT

## 2023-11-20 RX ORDER — ATORVASTATIN CALCIUM 20 MG/1
80 TABLET, FILM COATED ORAL DAILY
Status: DISCONTINUED | OUTPATIENT
Start: 2023-11-20 | End: 2023-11-21

## 2023-11-20 RX ORDER — INSULIN ASPART 100 [IU]/ML
4 INJECTION, SOLUTION INTRAVENOUS; SUBCUTANEOUS
Status: DISCONTINUED | OUTPATIENT
Start: 2023-11-20 | End: 2023-11-21

## 2023-11-20 RX ORDER — HYDROCODONE BITARTRATE AND ACETAMINOPHEN 500; 5 MG/1; MG/1
TABLET ORAL
Status: DISCONTINUED | OUTPATIENT
Start: 2023-11-20 | End: 2023-11-21

## 2023-11-20 RX ORDER — FAMOTIDINE 20 MG/1
20 TABLET, FILM COATED ORAL DAILY
Status: DISCONTINUED | OUTPATIENT
Start: 2023-11-20 | End: 2023-11-22

## 2023-11-20 RX ORDER — MUPIROCIN 20 MG/G
OINTMENT TOPICAL 2 TIMES DAILY
Status: DISPENSED | OUTPATIENT
Start: 2023-11-20 | End: 2023-11-25

## 2023-11-20 RX ADMIN — INSULIN ASPART 6 UNITS: 100 INJECTION, SOLUTION INTRAVENOUS; SUBCUTANEOUS at 04:11

## 2023-11-20 RX ADMIN — INSULIN ASPART 4 UNITS: 100 INJECTION, SOLUTION INTRAVENOUS; SUBCUTANEOUS at 04:11

## 2023-11-20 RX ADMIN — SPIRONOLACTONE 50 MG: 25 TABLET ORAL at 08:11

## 2023-11-20 RX ADMIN — INSULIN ASPART 4 UNITS: 100 INJECTION, SOLUTION INTRAVENOUS; SUBCUTANEOUS at 08:11

## 2023-11-20 RX ADMIN — FOLIC ACID 1 MG: 1 TABLET ORAL at 08:11

## 2023-11-20 RX ADMIN — METOPROLOL SUCCINATE 50 MG: 50 TABLET, EXTENDED RELEASE ORAL at 08:11

## 2023-11-20 RX ADMIN — FAMOTIDINE 20 MG: 20 TABLET ORAL at 05:11

## 2023-11-20 RX ADMIN — INSULIN ASPART 3 UNITS: 100 INJECTION, SOLUTION INTRAVENOUS; SUBCUTANEOUS at 09:11

## 2023-11-20 RX ADMIN — FLUOXETINE 20 MG: 20 CAPSULE ORAL at 08:11

## 2023-11-20 RX ADMIN — AMLODIPINE BESYLATE 10 MG: 5 TABLET ORAL at 08:11

## 2023-11-20 RX ADMIN — INSULIN ASPART 4 UNITS: 100 INJECTION, SOLUTION INTRAVENOUS; SUBCUTANEOUS at 11:11

## 2023-11-20 RX ADMIN — HUMAN IMMUNOGLOBULIN G 90 G: 20 LIQUID INTRAVENOUS at 03:11

## 2023-11-20 RX ADMIN — DEXAMETHASONE 40 MG: 4 TABLET ORAL at 08:11

## 2023-11-20 RX ADMIN — MUPIROCIN: 20 OINTMENT TOPICAL at 09:11

## 2023-11-20 RX ADMIN — ATORVASTATIN CALCIUM 80 MG: 20 TABLET, FILM COATED ORAL at 11:11

## 2023-11-20 NOTE — ASSESSMENT & PLAN NOTE
- Reported CVA with R sided weakness in 2021  - Holding plavix as discussed above; atorvastatin as under HLD.

## 2023-11-20 NOTE — PLAN OF CARE
Problem: Adult Inpatient Plan of Care  Goal: Plan of Care Review  Outcome: Ongoing, Progressing     Problem: Diabetes Comorbidity  Goal: Blood Glucose Level Within Targeted Range  Outcome: Ongoing, Not Progressing  Intervention: Monitor and Manage Glycemia  Flowsheets (Taken 11/20/2023 0310)  Glycemic Management: blood glucose monitored     Problem: Fall Injury Risk  Goal: Absence of Fall and Fall-Related Injury  Outcome: Ongoing, Progressing     Problem: Hypertension Acute  Goal: Blood Pressure Within Desired Range  Outcome: Ongoing, Not Progressing     POC reviewed with patient this shift.  A/O x4.  Respirations unlabored on RA.  Skin w/d.  CBG monitoring in progress with SS insulin administered as ordered.  Pt c/o inability to sleep this shift.  PRN Trazadone given with full relief noted.  Tolerated meds whole with water without difficulty.  VSS. See flowsheet for full assessment.  Able to verbalize wants/needs.  No c/o pain or discomfort at this time.  Fall/safety precautions maintained.

## 2023-11-20 NOTE — PROGRESS NOTES
Brief Transfusion Medicine Note Regarding Platelet Transfusions    Patient medical record reviewed. Agree that TTP should be very low on the differential given the long standing history of low haptoglobin (low value in Care Everywhere from March 2023 and thrombocytopenia, anemia and elevated bili since that time) but not significantly elevated LDH. The patient has been given a total of 4 doses of platelets since 11/18/23.  Post-transfusion counts of the first 3 doses demonstrated no response.  The last dose has not had a post-transfusion platelet count yet.  Given the tentative diagnosis of ITP and the lack of response to the first 3 platelets given, she is unlikely to achieve a response from repeated platelet transfusions to get to target of count of 10.  In the absence of significant active bleeding, recommend transfusion of no more than 1 dose total per day to limit transfusion associated risks such as circulatory overload.  Ideally only give 1/2 dose (1/2 apheresis unit) over 4 hours twice a day (approximately 12 hours apart) while platelet is <10 and has no significant active bleeding.

## 2023-11-20 NOTE — NURSING
Report called and given to KEIRA Mujica in ICU. Transport placed. All belongings to go with patient.

## 2023-11-20 NOTE — PROGRESS NOTES
[address] Medicine  Progress Note    Patient Name: Wendy Viveros  MRN: [id number]  Patient Class: IP- Inpatient   Admission Date: 11/18/2023  Length of Stay: 2 days  Attending Physician: RONI Plata MD  Primary Care Provider: Roger Miller MD        Subjective:     Principal Problem:Thrombocytopenia        HPI:  Wendy Viveros is a 67-year-old female with a past medical history of hypertension, stroke with residual right-sided weakness, and remote history of possible hemolytic anemia who presented to the ED at [facility] with complaints of lesions in her mouth since yesterday.  She states she has also noticed some accompanying mild bleeding after brushing her teeth, but she denies any other bleeding.  She does report easy bruising however denies any additional rash.  Initially she denies any anticoagulant use however upon review of patient's chart she is on Plavix and aspirin.  Other than these lesions, she feels in her usual state of health.  She denies recent illness.  In the ED, labs notable for extreme thrombocytopenia with platelets of 3.  No schistocytes on the differential.  T bili elevated at 2.8.  This was discussed with hematologist on call Dr. Osorio, who suspects ITP, and recommended high-dose steroids and platelets be given with goal to increased platelet count above 10.  Of note, she was admitted at this same facility in 2017 for suspected hemolytic anemia, but it appears she was lost to follow-up with hematology.  She did just see Hematology at [facility] 2 weeks ago after her PCP referred her there for mild anemia and mild thrombocytopenia with elevated T bili and low haptoglobin.    Overview/Hospital Course:  Ms. iVveros presented with bleeding gums and mouth lesions. Admitted with severe thrombocytopenia with suspected ITP. Treated with IV dexamethasone in ER and transfused 1U platelets. Hem/Onc consulted; converted to dexamethasone PO. No overt  evidence of bleeding but platelet count was slow to respond requiring additional platelet transfusions.    Interval History: No acute events overnight. Notes no overt bleeding but reports an iron / metallic taste in her mouth in the morning reminiscent of blood. Discussed persistent low platelet count and plan of care. No other concerns at this time.    Review of Systems   Constitutional:  Negative for chills and fever.   Respiratory:  Negative for cough and shortness of breath.    Cardiovascular:  Negative for chest pain and palpitations.   Gastrointestinal:  Negative for abdominal pain, nausea and vomiting.     Objective:     Vital Signs (Most Recent):  Temp: 98.2 °F (36.8 °C) (11/20/23 0740)  Pulse: 68 (11/20/23 0740)  Resp: 18 (11/20/23 0740)  BP: (!) 155/65 (11/20/23 0820)  SpO2: (!) 93 % (11/20/23 0740) Vital Signs (24h Range):  Temp:  [97.6 °F (36.4 °C)-98.4 °F (36.9 °C)] 98.2 °F (36.8 °C)  Pulse:  [67-77] 68  Resp:  [18] 18  SpO2:  [93 %-96 %] 93 %  BP: (148-184)/(65-90) 155/65     Weight: 90 kg (198 lb 5.2 oz)  Body mass index is 33 kg/m².    Intake/Output Summary (Last 24 hours) at 11/20/2023 1010  Last data filed at 11/20/2023 0622  Gross per 24 hour   Intake --   Output 250 ml   Net -250 ml         Physical Exam  Vitals and nursing note reviewed.   Constitutional:       General: She is not in acute distress.     Appearance: She is well-developed.   HENT:      Head: Normocephalic and atraumatic.      Mouth/Throat:      Comments: Purpura to tongue / oral mucosa.  Eyes:      General:         Right eye: No discharge.         Left eye: No discharge.      Conjunctiva/sclera: Conjunctivae normal.   Cardiovascular:      Rate and Rhythm: Normal rate.      Pulses: Normal pulses.   Pulmonary:      Effort: Pulmonary effort is normal. No respiratory distress.   Abdominal:      Palpations: Abdomen is soft.      Tenderness: There is no abdominal tenderness.   Musculoskeletal:         General: Normal range of motion.       Right lower leg: No edema.      Left lower leg: No edema.   Skin:     General: Skin is warm and dry.      Comments: Some petechiae / bruising noted.   Neurological:      Mental Status: She is alert and oriented to person, place, and time.           Significant Labs:   CBC:  Recent Labs   Lab 11/19/23  0336 11/20/23  0434 11/20/23  0922   WBC 8.02 12.21 13.86*   HGB 9.0* 7.7* 8.2*   HCT 25.1* 21.9* 21.9*   PLT 1* 2* 1*   GRAN 88.7*  7.1 86.8*  10.6* 86.5*  12.0*   LYMPH 8.9*  0.7* 8.5*  1.0 8.4*  1.2   MONO 1.2*  0.1* 3.5*  0.4 3.9*  0.5   EOS 0.0 0.0 0.0   BASO 0.01 0.01 0.01   CMP:  Recent Labs   Lab 11/18/23  1847 11/19/23  0336 11/20/23  0433    140 138   K 4.3 4.4 4.4    109 107   CO2 24 21* 21*   BUN 36* 39* 52*   CREATININE 1.3 1.7* 1.3   GLU 97 269* 225*   CALCIUM 10.2 9.8 10.3   ALKPHOS 63 66 58   AST 15 15 15   ALT 7* 10 13   BILITOT 2.8* 2.2* 2.4*   PROT 6.8 6.8 6.5   ALBUMIN 4.0 3.7 3.8   ANIONGAP 6* 10 10      Significant Imaging: I have reviewed and interpreted all pertinent imaging results/findings within the past 24 hours.      Assessment/Plan:      * Thrombocytopenia  Anemia of chronic disease  Hyperbilirubinemia  - History of hematologic abnormalities dating back to hospitalization in 2017.   - Seen by Dr. Howard in Hem/Onc clinic on 11/1/23 for mild anemia and thrombocytopenia noted for the past 7 months and new labs were ordered with follow up arranged for return; unable to see most recent outside labs. That note reports her H/H was recently 10.2/28, chronically elevated bilirubin, and depressed haptoglobin to undetectable levels.  - Severe thrombocytopenia highly suspicious for ITP. Holding allopurinol, clopidogrel.  - Direct antiglobulin test negative.  - Continue dexamethasone 40mg PO daily; transfuse additional 1U Plt today, goal >10 per hematology recommendations.  - Will follow-up with hematology today. Minimal response to dexamethasone so far.    History of CVA  (cerebrovascular accident)  - Reported CVA with R sided weakness in 2021  - Holding plavix as discussed above; atorvastatin as under HLD.    CKD (chronic kidney disease) stage 3, GFR 30-59 ml/min  - Stable; at baseline.  - Monitor, renally dose medications, avoid nephrotoxins.    Type 2 diabetes mellitus with circulatory disorder, without long-term current use of insulin  - Last HgbA1c 7.2 in 2021; repeat in process.  - Reports diet-controlled at home.  - Increasing hyperglycemia in setting of steroid use.  - Continue insulin detemir at 14U subQ daily, add insulin aspart 4U subQ TIDWM, continue moderate-dose sliding scale insulin aspart 1-10U subQ TIDWM PRN.    Chronic gout  - Hold allopurinol given can be associated with thrombocytopenia.    Hyperlipidemia type II  - Continue atorvastatin 80 mg PO daily.    Primary hypertension  - Controlled.  - Continue amlodipine 10mg PO daily, metoprolol 50mg PO daily, spironolactone 50mg PO daily.      VTE Risk Mitigation (From admission, onward)           Ordered     IP VTE HIGH RISK PATIENT  Once         11/18/23 2128     Place sequential compression device  Until discontinued         11/18/23 2128     Reason for No Pharmacological VTE Prophylaxis  Once        Question:  Reasons:  Answer:  Thrombocytopenia    11/18/23 2128                    Discharge Planning   EL:      Code Status: Full Code   Is the patient medically ready for discharge?:     Reason for patient still in hospital (select all that apply): Treatment  Discharge Plan A: Home with family                  D Joao Plata MD  Department of Hospital Medicine   [facility] ([address])

## 2023-11-20 NOTE — SUBJECTIVE & OBJECTIVE
Interval History: No acute events overnight. Notes no overt bleeding but reports an iron / metallic taste in her mouth in the morning reminiscent of blood. Discussed persistent low platelet count and plan of care. No other concerns at this time.    Review of Systems   Constitutional:  Negative for chills and fever.   Respiratory:  Negative for cough and shortness of breath.    Cardiovascular:  Negative for chest pain and palpitations.   Gastrointestinal:  Negative for abdominal pain, nausea and vomiting.     Objective:     Vital Signs (Most Recent):  Temp: 98.2 °F (36.8 °C) (11/20/23 0740)  Pulse: 68 (11/20/23 0740)  Resp: 18 (11/20/23 0740)  BP: (!) 155/65 (11/20/23 0820)  SpO2: (!) 93 % (11/20/23 0740) Vital Signs (24h Range):  Temp:  [97.6 °F (36.4 °C)-98.4 °F (36.9 °C)] 98.2 °F (36.8 °C)  Pulse:  [67-77] 68  Resp:  [18] 18  SpO2:  [93 %-96 %] 93 %  BP: (148-184)/(65-90) 155/65     Weight: 90 kg (198 lb 5.2 oz)  Body mass index is 33 kg/m².    Intake/Output Summary (Last 24 hours) at 11/20/2023 1010  Last data filed at 11/20/2023 0622  Gross per 24 hour   Intake --   Output 250 ml   Net -250 ml         Physical Exam  Vitals and nursing note reviewed.   Constitutional:       General: She is not in acute distress.     Appearance: She is well-developed.   HENT:      Head: Normocephalic and atraumatic.      Mouth/Throat:      Comments: Purpura to tongue / oral mucosa.  Eyes:      General:         Right eye: No discharge.         Left eye: No discharge.      Conjunctiva/sclera: Conjunctivae normal.   Cardiovascular:      Rate and Rhythm: Normal rate.      Pulses: Normal pulses.   Pulmonary:      Effort: Pulmonary effort is normal. No respiratory distress.   Abdominal:      Palpations: Abdomen is soft.      Tenderness: There is no abdominal tenderness.   Musculoskeletal:         General: Normal range of motion.      Right lower leg: No edema.      Left lower leg: No edema.   Skin:     General: Skin is warm and dry.       Comments: Some petechiae / bruising noted.   Neurological:      Mental Status: She is alert and oriented to person, place, and time.           Significant Labs:   CBC:  Recent Labs   Lab 11/19/23  0336 11/20/23  0434 11/20/23  0922   WBC 8.02 12.21 13.86*   HGB 9.0* 7.7* 8.2*   HCT 25.1* 21.9* 21.9*   PLT 1* 2* 1*   GRAN 88.7*  7.1 86.8*  10.6* 86.5*  12.0*   LYMPH 8.9*  0.7* 8.5*  1.0 8.4*  1.2   MONO 1.2*  0.1* 3.5*  0.4 3.9*  0.5   EOS 0.0 0.0 0.0   BASO 0.01 0.01 0.01   CMP:  Recent Labs   Lab 11/18/23  1847 11/19/23 0336 11/20/23  0433    140 138   K 4.3 4.4 4.4    109 107   CO2 24 21* 21*   BUN 36* 39* 52*   CREATININE 1.3 1.7* 1.3   GLU 97 269* 225*   CALCIUM 10.2 9.8 10.3   ALKPHOS 63 66 58   AST 15 15 15   ALT 7* 10 13   BILITOT 2.8* 2.2* 2.4*   PROT 6.8 6.8 6.5   ALBUMIN 4.0 3.7 3.8   ANIONGAP 6* 10 10      Significant Imaging: I have reviewed and interpreted all pertinent imaging results/findings within the past 24 hours.

## 2023-11-20 NOTE — PROGRESS NOTES
Heme/Onc consult Note      History of Present Illness:  Patient is a 67 y.o. female with hx of stroke on dual antiplatelets, HTN, DM, CKD, gout and bilateral knee replacements.     She is admitted with petechia and gum bleeding x 1 week or so. Taste blood in mouth, no hemoptysis.   Streak of blood in depends on sat   No other episodes of BRBPR or melena.     SUBJECTIVE:     Pt was seen and examined at bedside.     Past Medical History:   Diagnosis Date    Abnormal EKG     Anemia 07/13/2017    Aortic valve regurgitation     Arthritis     CKD (chronic kidney disease) stage 2, GFR 60-89 ml/min 08/08/2014    CKD (chronic kidney disease) stage 2, GFR 60-89 ml/min 08/08/2014    CVA (cerebral vascular accident)     2021 w/ residual R sided weakness    Diabetes mellitus     Diabetes mellitus type II     GERD (gastroesophageal reflux disease)     Gout, unspecified     Heart murmur     Hyperlipidemia     Hypertension     Tendonitis      Past Surgical History:   Procedure Laterality Date    breast reduction      CHOLECYSTECTOMY      JOINT REPLACEMENT      KNEE SURGERY  2018    POLYPECTOMY  05/05/2016    TOTAL REDUCTION MAMMOPLASTY      TOTAL SHOULDER ARTHROPLASTY Left     TUBAL LIGATION       Family History   Problem Relation Age of Onset    Heart disease Mother         MI at 71    Hypertension Mother     Hypertension Brother     Diabetes Brother     Hypertension Brother     Breast cancer Maternal Cousin     Colon cancer Neg Hx     Ovarian cancer Neg Hx      Social History     Tobacco Use    Smoking status: Never    Smokeless tobacco: Never   Substance Use Topics    Alcohol use: No     Alcohol/week: 0.0 standard drinks of alcohol    Drug use: No       Review of patient's allergies indicates:   Allergen Reactions    Colchicine analogues      diarrhea    Lisinopril      Other reaction(s): cough  Other reaction(s): cough    Losartan      Other reaction(s): blurry vision  Other reaction(s): blurry vision    Percocet  [oxycodone-acetaminophen]      Pt gets pain from taking medicine.        Review of Systems:  Review of Systems   HENT:  Negative for nosebleeds.    Respiratory:  Negative for shortness of breath.    Cardiovascular:  Negative for leg swelling.   Gastrointestinal:  Negative for abdominal pain.   Genitourinary:  Negative for hematuria.   Endo/Heme/Allergies:  Bruises/bleeds easily.   Psychiatric/Behavioral:  Positive for memory loss (chronic, since stroke in 2021).        OBJECTIVE:     Vital Signs (Most Recent)  Temp: 98.2 °F (36.8 °C) (11/20/23 1146)  Pulse: 66 (11/20/23 1146)  Resp: 16 (11/20/23 1146)  BP: (!) 170/74 (11/20/23 1146)  SpO2: 95 % (11/20/23 1146)    Vital Signs Range (Last 24H):  Temp:  [97.5 °F (36.4 °C)-98.4 °F (36.9 °C)]   Pulse:  [66-73]   Resp:  [16-18]   BP: (148-184)/(65-90)   SpO2:  [93 %-100 %]     Physical Exam:  Physical Exam  Vitals and nursing note reviewed.   Constitutional:       General: She is not in acute distress.     Appearance: She is well-developed.   HENT:      Head: Normocephalic and atraumatic.      Right Ear: External ear normal.      Left Ear: External ear normal.      Mouth/Throat:      Pharynx: No oropharyngeal exudate.   Eyes:      General: No scleral icterus.     Conjunctiva/sclera: Conjunctivae normal.   Neck:      Thyroid: No thyromegaly.      Trachea: No tracheal deviation.   Cardiovascular:      Rate and Rhythm: Normal rate and regular rhythm.      Heart sounds: Normal heart sounds. No murmur heard.  Pulmonary:      Effort: Pulmonary effort is normal. No respiratory distress.      Breath sounds: Normal breath sounds. No wheezing or rales.   Abdominal:      General: Bowel sounds are normal. There is no distension.      Palpations: Abdomen is soft. There is no mass.      Tenderness: There is no abdominal tenderness. There is no rebound.   Musculoskeletal:         General: No swelling or tenderness. Normal range of motion.      Cervical back: Normal range of motion and  neck supple.   Lymphadenopathy:      Cervical: No cervical adenopathy.   Skin:     General: Skin is warm.      Findings: Petechiae present. No erythema or rash.   Neurological:      Mental Status: She is alert and oriented to person, place, and time.      Cranial Nerves: No cranial nerve deficit.   Psychiatric:         Behavior: Behavior normal.         Scheduled Meds:   amLODIPine  10 mg Oral Daily    atorvastatin  80 mg Oral Daily    dexAMETHasone  40 mg Oral Daily    FLUoxetine  20 mg Oral Daily    folic acid  1 mg Oral Daily    Immune Globulin G (IGG)-PRO-IGA 10 % injection (Privigen)  1 g/kg Intravenous Q24H    insulin aspart U-100  4 Units Subcutaneous TIDWM    insulin detemir U-100  14 Units Subcutaneous Daily    metoprolol succinate  50 mg Oral Daily    spironolactone  50 mg Oral Daily     Continuous Infusions:  PRN Meds:.0.9%  NaCl infusion (for blood administration), acetaminophen, aluminum-magnesium hydroxide-simethicone, dextrose 10%, dextrose 10%, glucagon (human recombinant), glucose, glucose, insulin aspart U-100, melatonin, naloxone, polyethylene glycol, sodium chloride 0.9%, traZODone    Laboratory:        Diagnostic Results:  Labs: Reviewed      ASSESSMENT/PLAN:     Active Hospital Problems    Diagnosis  POA    *Thrombocytopenia [D69.6]  Yes    History of CVA (cerebrovascular accident) [Z86.73]  Not Applicable    CKD (chronic kidney disease) stage 3, GFR 30-59 ml/min [N18.30]  Yes    Anemia, unspecified [D64.9]  Yes    Type 2 diabetes mellitus with circulatory disorder, without long-term current use of insulin [E11.59]  Yes    Primary hypertension [I10]  Yes    Hyperlipidemia type II [E78.01]  Yes    Chronic gout [M1A.9XX0]  Yes      Resolved Hospital Problems   No resolved problems to display.       1. Thrombocytopenia    2. Purpura    3. Petechiae    4. Chest pain    5. Idiopathic thrombocytopenic purpura (ITP)        Pt with hemolytic anemia (?chronic) and severe thrombocytopenia possibly ITP.      Nutritional labs- iron, folate, ferritin, b12 wnl.   MIKKI negative . INR 1.1 not elevated.   No response to platelet transfusions 3 units since admission.   Smear- from 11/18, 11/19 and 11/20 reviewed.  no platelets seen. Rare schistocytes , <1 per HPF . No other abnormal cells noted on morphology. Official path review pending.   [id number] pending. Less likely TTP  No s/s of active bleeding. No s/s suggestive of solid malignancy. No s/s infection. Spoke with daughter over the phone. All Qs and concerns addressed.   PNH in 2017 was negative.       Check for retic, ALFREDO w/ reflex, ds DNA, spep, FLC, sed rate  Obtain US to eval liver and spleen.   Continue steroids. Add IVIG 1g/kg/day x 2 doses. May need rituximab if not response.   Transfuse to keep hb >7, platelet count >10 (>20K in case of fever, >50 in case of bleeding and >100K in case of severe bleeding/ brain bleed)       I will follow up with the patient. Please call us should questions arise.     Electronically signed by Corazon Armstrong    [facility]

## 2023-11-20 NOTE — NURSING
KAELYN Banegas responded to previous messages but states will review chart.  No new orders at this time.

## 2023-11-20 NOTE — ASSESSMENT & PLAN NOTE
- Controlled.  - Continue amlodipine 10mg PO daily, metoprolol 50mg PO daily, spironolactone 50mg PO daily.

## 2023-11-20 NOTE — ASSESSMENT & PLAN NOTE
Anemia of chronic disease  Hyperbilirubinemia  - History of hematologic abnormalities dating back to hospitalization in 2017.   - Seen by Dr. Howard in Hem/Onc clinic on 11/1/23 for mild anemia and thrombocytopenia noted for the past 7 months and new labs were ordered with follow up arranged for return; unable to see most recent outside labs. That note reports her H/H was recently 10.2/28, chronically elevated bilirubin, and depressed haptoglobin to undetectable levels.  - Severe thrombocytopenia highly suspicious for ITP. Holding allopurinol, clopidogrel.  - Direct antiglobulin test negative.  - Continue dexamethasone 40mg PO daily; transfuse additional 1U Plt today, goal >10 per hematology recommendations.  - Will follow-up with hematology today. Minimal response to dexamethasone so far.

## 2023-11-20 NOTE — ASSESSMENT & PLAN NOTE
- Last HgbA1c 7.2 in 2021; repeat in process.  - Reports diet-controlled at home.  - Increasing hyperglycemia in setting of steroid use.  - Continue insulin detemir at 14U subQ daily, add insulin aspart 4U subQ TIDWM, continue moderate-dose sliding scale insulin aspart 1-10U subQ TIDWM PRN.

## 2023-11-20 NOTE — PLAN OF CARE
Medicare Message     Important Message from Medicare regarding Discharge Appeal Rights Given to patient/caregiver; Explained to patient/caregiver; Signed/date by patient/caregiver   Date IMM was signed 11/20/2023   Time IMM was signed 1000

## 2023-11-21 LAB
ABO + RH BLD: NORMAL
ABO GROUP BLD: NORMAL
ALBUMIN SERPL BCP-MCNC: 3.3 G/DL (ref 3.5–5.2)
ALP SERPL-CCNC: 47 U/L (ref 55–135)
ALT SERPL W/O P-5'-P-CCNC: 14 U/L (ref 10–44)
ANION GAP SERPL CALC-SCNC: 4 MMOL/L (ref 8–16)
ANISOCYTOSIS BLD QL SMEAR: SLIGHT
APTT PPP: 22.7 SEC (ref 21–32)
AST SERPL-CCNC: 13 U/L (ref 10–40)
BASOPHILS # BLD AUTO: 0.01 K/UL (ref 0–0.2)
BASOPHILS NFR BLD: 0.1 % (ref 0–1.9)
BILIRUB SERPL-MCNC: 1.7 MG/DL (ref 0.1–1)
BLD GP AB SCN CELLS X3 SERPL QL: NORMAL
BLD PROD TYP BPU: NORMAL
BLOOD UNIT EXPIRATION DATE: NORMAL
BLOOD UNIT TYPE CODE: 7300
BLOOD UNIT TYPE: NORMAL
BUN SERPL-MCNC: 59 MG/DL (ref 8–23)
CALCIUM SERPL-MCNC: 10.1 MG/DL (ref 8.7–10.5)
CHLORIDE SERPL-SCNC: 106 MMOL/L (ref 95–110)
CO2 SERPL-SCNC: 23 MMOL/L (ref 23–29)
CODING SYSTEM: NORMAL
CREAT SERPL-MCNC: 1.4 MG/DL (ref 0.5–1.4)
CROSSMATCH INTERPRETATION: NORMAL
D DIMER PPP IA.FEU-MCNC: 0.95 MG/L FEU
DIFFERENTIAL METHOD: ABNORMAL
DISPENSE STATUS: NORMAL
EOSINOPHIL # BLD AUTO: 0 K/UL (ref 0–0.5)
EOSINOPHIL NFR BLD: 0 % (ref 0–8)
ERYTHROCYTE [DISTWIDTH] IN BLOOD BY AUTOMATED COUNT: 17.3 % (ref 11.5–14.5)
ERYTHROCYTE [SEDIMENTATION RATE] IN BLOOD: 43 MM/HR (ref 0–20)
EST. GFR  (NO RACE VARIABLE): 41 ML/MIN/1.73 M^2
FIBRINOGEN PPP-MCNC: 229 MG/DL (ref 182–400)
GLUCOSE SERPL-MCNC: 246 MG/DL (ref 70–110)
HCT VFR BLD AUTO: 20.6 % (ref 37–48.5)
HGB BLD-MCNC: 7.2 G/DL (ref 12–16)
IMM GRANULOCYTES # BLD AUTO: 0.24 K/UL (ref 0–0.04)
IMM GRANULOCYTES NFR BLD AUTO: 2.1 % (ref 0–0.5)
INR PPP: 1.1 (ref 0.8–1.2)
LYMPHOCYTES # BLD AUTO: 0.9 K/UL (ref 1–4.8)
LYMPHOCYTES NFR BLD: 7.8 % (ref 18–48)
MCH RBC QN AUTO: 34.8 PG (ref 27–31)
MCHC RBC AUTO-ENTMCNC: 35 G/DL (ref 32–36)
MCV RBC AUTO: 100 FL (ref 82–98)
MONOCYTES # BLD AUTO: 0.4 K/UL (ref 0.3–1)
MONOCYTES NFR BLD: 3.2 % (ref 4–15)
NEUTROPHILS # BLD AUTO: 9.9 K/UL (ref 1.8–7.7)
NEUTROPHILS NFR BLD: 86.8 % (ref 38–73)
NRBC BLD-RTO: 0 /100 WBC
PLATELET # BLD AUTO: 2 K/UL (ref 150–450)
PLATELET BLD QL SMEAR: ABNORMAL
PMV BLD AUTO: ABNORMAL FL (ref 9.2–12.9)
POCT GLUCOSE: 201 MG/DL (ref 70–110)
POCT GLUCOSE: 225 MG/DL (ref 70–110)
POCT GLUCOSE: 225 MG/DL (ref 70–110)
POCT GLUCOSE: 264 MG/DL (ref 70–110)
POLYCHROMASIA BLD QL SMEAR: ABNORMAL
POTASSIUM SERPL-SCNC: 4.4 MMOL/L (ref 3.5–5.1)
PROT SERPL-MCNC: 7.9 G/DL (ref 6–8.4)
PROTHROMBIN TIME: 11.9 SEC (ref 9–12.5)
RBC # BLD AUTO: 2.07 M/UL (ref 4–5.4)
RETICS/RBC NFR AUTO: 10.3 % (ref 0.5–2.5)
RH BLD: NORMAL
SODIUM SERPL-SCNC: 133 MMOL/L (ref 136–145)
SPECIMEN OUTDATE: NORMAL
UNIT NUMBER: NORMAL
WBC # BLD AUTO: 11.39 K/UL (ref 3.9–12.7)

## 2023-11-21 PROCEDURE — 86038 ANTINUCLEAR ANTIBODIES: CPT | Performed by: STUDENT IN AN ORGANIZED HEALTH CARE EDUCATION/TRAINING PROGRAM

## 2023-11-21 PROCEDURE — 83521 IG LIGHT CHAINS FREE EACH: CPT | Mod: 59 | Performed by: STUDENT IN AN ORGANIZED HEALTH CARE EDUCATION/TRAINING PROGRAM

## 2023-11-21 PROCEDURE — 86850 RBC ANTIBODY SCREEN: CPT | Performed by: NURSE PRACTITIONER

## 2023-11-21 PROCEDURE — 86901 BLOOD TYPING SEROLOGIC RH(D): CPT | Performed by: NURSE PRACTITIONER

## 2023-11-21 PROCEDURE — 63600175 PHARM REV CODE 636 W HCPCS: Performed by: INTERNAL MEDICINE

## 2023-11-21 PROCEDURE — 25000003 PHARM REV CODE 250: Performed by: INTERNAL MEDICINE

## 2023-11-21 PROCEDURE — 85384 FIBRINOGEN ACTIVITY: CPT | Performed by: STUDENT IN AN ORGANIZED HEALTH CARE EDUCATION/TRAINING PROGRAM

## 2023-11-21 PROCEDURE — 84165 PROTEIN E-PHORESIS SERUM: CPT | Performed by: STUDENT IN AN ORGANIZED HEALTH CARE EDUCATION/TRAINING PROGRAM

## 2023-11-21 PROCEDURE — 86039 ANTINUCLEAR ANTIBODIES (ANA): CPT | Performed by: STUDENT IN AN ORGANIZED HEALTH CARE EDUCATION/TRAINING PROGRAM

## 2023-11-21 PROCEDURE — 85730 THROMBOPLASTIN TIME PARTIAL: CPT | Performed by: STUDENT IN AN ORGANIZED HEALTH CARE EDUCATION/TRAINING PROGRAM

## 2023-11-21 PROCEDURE — 86334 PATHOLOGIST INTERPRETATION IFE: ICD-10-PCS | Mod: 26,,, | Performed by: PATHOLOGY

## 2023-11-21 PROCEDURE — 86334 IMMUNOFIX E-PHORESIS SERUM: CPT | Mod: 26,,, | Performed by: PATHOLOGY

## 2023-11-21 PROCEDURE — 80053 COMPREHEN METABOLIC PANEL: CPT | Performed by: HOSPITALIST

## 2023-11-21 PROCEDURE — 63600175 PHARM REV CODE 636 W HCPCS: Mod: JZ,JG | Performed by: INTERNAL MEDICINE

## 2023-11-21 PROCEDURE — P9035 PLATELET PHERES LEUKOREDUCED: HCPCS | Performed by: INTERNAL MEDICINE

## 2023-11-21 PROCEDURE — 85651 RBC SED RATE NONAUTOMATED: CPT | Performed by: STUDENT IN AN ORGANIZED HEALTH CARE EDUCATION/TRAINING PROGRAM

## 2023-11-21 PROCEDURE — 25000003 PHARM REV CODE 250: Performed by: PHYSICIAN ASSISTANT

## 2023-11-21 PROCEDURE — 86022 PLATELET ANTIBODIES: CPT | Performed by: STUDENT IN AN ORGANIZED HEALTH CARE EDUCATION/TRAINING PROGRAM

## 2023-11-21 PROCEDURE — 85610 PROTHROMBIN TIME: CPT | Performed by: STUDENT IN AN ORGANIZED HEALTH CARE EDUCATION/TRAINING PROGRAM

## 2023-11-21 PROCEDURE — 85379 FIBRIN DEGRADATION QUANT: CPT | Performed by: STUDENT IN AN ORGANIZED HEALTH CARE EDUCATION/TRAINING PROGRAM

## 2023-11-21 PROCEDURE — 85300 ANTITHROMBIN III ACTIVITY: CPT | Performed by: STUDENT IN AN ORGANIZED HEALTH CARE EDUCATION/TRAINING PROGRAM

## 2023-11-21 PROCEDURE — 11000001 HC ACUTE MED/SURG PRIVATE ROOM

## 2023-11-21 PROCEDURE — 84165 PROTEIN E-PHORESIS SERUM: CPT | Mod: 26,,, | Performed by: PATHOLOGY

## 2023-11-21 PROCEDURE — 86235 NUCLEAR ANTIGEN ANTIBODY: CPT | Mod: 59 | Performed by: STUDENT IN AN ORGANIZED HEALTH CARE EDUCATION/TRAINING PROGRAM

## 2023-11-21 PROCEDURE — 86334 IMMUNOFIX E-PHORESIS SERUM: CPT | Performed by: STUDENT IN AN ORGANIZED HEALTH CARE EDUCATION/TRAINING PROGRAM

## 2023-11-21 PROCEDURE — 84165 PATHOLOGIST INTERPRETATION SPE: ICD-10-PCS | Mod: 26,,, | Performed by: PATHOLOGY

## 2023-11-21 PROCEDURE — 85025 COMPLETE CBC W/AUTO DIFF WBC: CPT | Performed by: HOSPITALIST

## 2023-11-21 PROCEDURE — 85045 AUTOMATED RETICULOCYTE COUNT: CPT | Performed by: STUDENT IN AN ORGANIZED HEALTH CARE EDUCATION/TRAINING PROGRAM

## 2023-11-21 PROCEDURE — 36430 TRANSFUSION BLD/BLD COMPNT: CPT

## 2023-11-21 PROCEDURE — 63600175 PHARM REV CODE 636 W HCPCS: Performed by: PHYSICIAN ASSISTANT

## 2023-11-21 RX ORDER — NIFEDIPINE 30 MG/1
60 TABLET, EXTENDED RELEASE ORAL 2 TIMES DAILY
Status: DISCONTINUED | OUTPATIENT
Start: 2023-11-21 | End: 2023-11-26

## 2023-11-21 RX ORDER — HYDROCODONE BITARTRATE AND ACETAMINOPHEN 500; 5 MG/1; MG/1
TABLET ORAL
Status: DISCONTINUED | OUTPATIENT
Start: 2023-11-21 | End: 2023-11-21

## 2023-11-21 RX ORDER — HYDRALAZINE HYDROCHLORIDE 20 MG/ML
10 INJECTION INTRAMUSCULAR; INTRAVENOUS EVERY 6 HOURS PRN
Status: DISCONTINUED | OUTPATIENT
Start: 2023-11-21 | End: 2023-11-30 | Stop reason: HOSPADM

## 2023-11-21 RX ORDER — HYDRALAZINE HYDROCHLORIDE 20 MG/ML
5 INJECTION INTRAMUSCULAR; INTRAVENOUS ONCE
Status: COMPLETED | OUTPATIENT
Start: 2023-11-21 | End: 2023-11-21

## 2023-11-21 RX ORDER — INSULIN ASPART 100 [IU]/ML
6 INJECTION, SOLUTION INTRAVENOUS; SUBCUTANEOUS
Status: DISCONTINUED | OUTPATIENT
Start: 2023-11-21 | End: 2023-11-30 | Stop reason: HOSPADM

## 2023-11-21 RX ORDER — HYDROCODONE BITARTRATE AND ACETAMINOPHEN 500; 5 MG/1; MG/1
TABLET ORAL
Status: CANCELLED | OUTPATIENT
Start: 2023-11-21

## 2023-11-21 RX ORDER — HYDROCODONE BITARTRATE AND ACETAMINOPHEN 500; 5 MG/1; MG/1
TABLET ORAL
Status: DISCONTINUED | OUTPATIENT
Start: 2023-11-21 | End: 2023-11-28

## 2023-11-21 RX ORDER — NIFEDIPINE 30 MG/1
60 TABLET, EXTENDED RELEASE ORAL DAILY
Status: DISCONTINUED | OUTPATIENT
Start: 2023-11-21 | End: 2023-11-21

## 2023-11-21 RX ADMIN — NIFEDIPINE 60 MG: 30 TABLET, FILM COATED, EXTENDED RELEASE ORAL at 09:11

## 2023-11-21 RX ADMIN — INSULIN ASPART 6 UNITS: 100 INJECTION, SOLUTION INTRAVENOUS; SUBCUTANEOUS at 04:11

## 2023-11-21 RX ADMIN — INSULIN ASPART 4 UNITS: 100 INJECTION, SOLUTION INTRAVENOUS; SUBCUTANEOUS at 07:11

## 2023-11-21 RX ADMIN — FLUOXETINE 20 MG: 20 CAPSULE ORAL at 08:11

## 2023-11-21 RX ADMIN — DEXAMETHASONE 40 MG: 4 TABLET ORAL at 09:11

## 2023-11-21 RX ADMIN — INSULIN ASPART 2 UNITS: 100 INJECTION, SOLUTION INTRAVENOUS; SUBCUTANEOUS at 08:11

## 2023-11-21 RX ADMIN — INSULIN ASPART 6 UNITS: 100 INJECTION, SOLUTION INTRAVENOUS; SUBCUTANEOUS at 11:11

## 2023-11-21 RX ADMIN — FOLIC ACID 1 MG: 1 TABLET ORAL at 08:11

## 2023-11-21 RX ADMIN — NIFEDIPINE 60 MG: 30 TABLET, FILM COATED, EXTENDED RELEASE ORAL at 08:11

## 2023-11-21 RX ADMIN — HYDRALAZINE HYDROCHLORIDE 5 MG: 20 INJECTION INTRAMUSCULAR; INTRAVENOUS at 03:11

## 2023-11-21 RX ADMIN — MUPIROCIN: 20 OINTMENT TOPICAL at 08:11

## 2023-11-21 RX ADMIN — HUMAN IMMUNOGLOBULIN G 90 G: 20 LIQUID INTRAVENOUS at 03:11

## 2023-11-21 RX ADMIN — FAMOTIDINE 20 MG: 20 TABLET ORAL at 09:11

## 2023-11-21 RX ADMIN — METOPROLOL SUCCINATE 50 MG: 50 TABLET, EXTENDED RELEASE ORAL at 08:11

## 2023-11-21 RX ADMIN — SPIRONOLACTONE 50 MG: 25 TABLET ORAL at 08:11

## 2023-11-21 NOTE — ASSESSMENT & PLAN NOTE
- Reports having had muscle aches with atorvastatin 80 mg PO daily; does not wish to take at this time. Will hold.

## 2023-11-21 NOTE — PROGRESS NOTES
[facility] - Intensive Care [facility] Medicine  Progress Note    Patient Name: Wendy Viveros  MRN: [id number]  Patient Class: IP- Inpatient   Admission Date: 11/18/2023  Length of Stay: 3 days  Attending Physician: RONI Plata MD  Primary Care Provider: Ashely Harrell MD        Subjective:     Principal Problem:Thrombocytopenia        HPI:  Wendy Viveros is a 67-year-old female with a past medical history of hypertension, stroke with residual right-sided weakness, and remote history of possible hemolytic anemia who presented to the ED at [facility] with complaints of lesions in her mouth since yesterday.  She states she has also noticed some accompanying mild bleeding after brushing her teeth, but she denies any other bleeding.  She does report easy bruising however denies any additional rash.  Initially she denies any anticoagulant use however upon review of patient's chart she is on Plavix and aspirin.  Other than these lesions, she feels in her usual state of health.  She denies recent illness.  In the ED, labs notable for extreme thrombocytopenia with platelets of 3.  No schistocytes on the differential.  T bili elevated at 2.8.  This was discussed with hematologist on call Dr. Osorio, who suspects ITP, and recommended high-dose steroids and platelets be given with goal to increased platelet count above 10.  Of note, she was admitted at this same facility in 2017 for suspected hemolytic anemia, but it appears she was lost to follow-up with hematology.  She did just see Hematology at [facility] 2 weeks ago after her PCP referred her there for mild anemia and mild thrombocytopenia with elevated T bili and low haptoglobin.    Overview/Hospital Course:  Ms. Viveros presented with bleeding gums and mouth lesions. Admitted with severe thrombocytopenia with suspected ITP. Treated with IV dexamethasone in ER and transfused 1U platelets. Hem/Onc consulted; converted to dexamethasone PO. No overt  evidence of bleeding but platelet count was slow to respond requiring additional platelet transfusions.    Interval History: No acute events overnight. Notes no overt bleeding, still with metallic taste in the mornings. Discussed persistent low platelet count and plan of care. No other concerns at this time.    Review of Systems   Constitutional:  Negative for chills and fever.   Respiratory:  Negative for cough and shortness of breath.    Cardiovascular:  Negative for chest pain and palpitations.   Gastrointestinal:  Negative for abdominal pain, nausea and vomiting.     Objective:     Vital Signs (Most Recent):  Temp: 98.3 °F (36.8 °C) (11/21/23 1031)  Pulse: 61 (11/21/23 1031)  Resp: (!) 22 (11/21/23 1031)  BP: (!) 190/88 (11/21/23 1031)  SpO2: 96 % (11/21/23 1031) Vital Signs (24h Range):  Temp:  [97.5 °F (36.4 °C)-98.3 °F (36.8 °C)] 98.3 °F (36.8 °C)  Pulse:  [60-76] 61  Resp:  [15-40] 22  SpO2:  [93 %-100 %] 96 %  BP: (133-195)/() 190/88     Weight: 90 kg (198 lb 5.2 oz)  Body mass index is 33 kg/m².    Intake/Output Summary (Last 24 hours) at 11/21/2023 1045  Last data filed at 11/20/2023 2101  Gross per 24 hour   Intake 254.38 ml   Output 1000 ml   Net -745.62 ml           Physical Exam  Vitals and nursing note reviewed.   Constitutional:       General: She is not in acute distress.     Appearance: She is well-developed.   HENT:      Head: Normocephalic and atraumatic.      Mouth/Throat:      Comments: Purpura to tongue / oral mucosa.  Eyes:      General:         Right eye: No discharge.         Left eye: No discharge.      Conjunctiva/sclera: Conjunctivae normal.   Cardiovascular:      Rate and Rhythm: Normal rate.      Pulses: Normal pulses.   Pulmonary:      Effort: Pulmonary effort is normal. No respiratory distress.   Abdominal:      Palpations: Abdomen is soft.      Tenderness: There is no abdominal tenderness.   Musculoskeletal:         General: Normal range of motion.      Right lower leg: No  edema.      Left lower leg: No edema.   Skin:     General: Skin is warm and dry.      Comments: Some petechiae / bruising noted.   Neurological:      Mental Status: She is alert and oriented to person, place, and time.           Significant Labs:   CBC:  Recent Labs   Lab 11/20/23  0434 11/20/23  0922 11/21/23  0350   WBC 12.21 13.86* 11.39   HGB 7.7* 8.2* 7.2*   HCT 21.9* 21.9* 20.6*   PLT 2* 1* 2*   GRAN 86.8*  10.6* 86.5*  12.0* 86.8*  9.9*   LYMPH 8.5*  1.0 8.4*  1.2 7.8*  0.9*   MONO 3.5*  0.4 3.9*  0.5 3.2*  0.4   EOS 0.0 0.0 0.0   BASO 0.01 0.01 0.01     CMP:  Recent Labs   Lab 11/19/23  0336 11/20/23  0433 11/21/23  0350    138 133*   K 4.4 4.4 4.4    107 106   CO2 21* 21* 23   BUN 39* 52* 59*   CREATININE 1.7* 1.3 1.4   * 225* 246*   CALCIUM 9.8 10.3 10.1   ALKPHOS 66 58 47*   AST 15 15 13   ALT 10 13 14   BILITOT 2.2* 2.4* 1.7*   PROT 6.8 6.5 7.9   ALBUMIN 3.7 3.8 3.3*   ANIONGAP 10 10 4*        Significant Imaging: I have reviewed and interpreted all pertinent imaging results/findings within the past 24 hours.      Assessment/Plan:      * Thrombocytopenia  Anemia of chronic disease  Hyperbilirubinemia  - History of hematologic abnormalities dating back to hospitalization in 2017.   - Seen by Dr. Howard in Hem/Onc clinic on 11/1/23 for mild anemia and thrombocytopenia noted for the past 7 months and new labs were ordered with follow up arranged for return; unable to see most recent outside labs. That note reports her H/H was recently 10.2/28, chronically elevated bilirubin, and depressed haptoglobin to undetectable levels.  - Severe thrombocytopenia highly suspicious for ITP, though failing to respond to date. Holding allopurinol, clopidogrel.  - Direct antiglobulin test negative. LSHVCF82 activity elevated; not consistent with TTP. U/S Abd with hepatomegaly, no splenomegaly. Broadening workup - check fibrinogen, antithrombin III, D-dimer, SPEP, immunofixation, ALFREDO, anti-dsDNA,  ESR, reticulocytes. HIT added, though she denies any recent potential exposures to heparin.  - Additional 1U Plt today; 5th dose. Appreciate transfusion medicine recommendations, will give as 1/2 amounts q12hr today.  - Today will be day 4/4 of dexamethasone 40mg PO daily. Started IVIG yesterday; day 2/2 planned today. If fails to respond, may require bone marrow biopsy / rituximab initiation.  - Appreciate hematology assistance.    History of CVA (cerebrovascular accident)  - Reported CVA with R sided weakness in 2021  - Holding plavix as discussed above; atorvastatin as under HLD.    CKD (chronic kidney disease) stage 3, GFR 30-59 ml/min  - Stable; at baseline.  - Monitor, renally dose medications, avoid nephrotoxins.    Type 2 diabetes mellitus with circulatory disorder, without long-term current use of insulin  - Last HgbA1c 7.2 in 2021; repeat 4.6.  - Reports diet-controlled at home.  - Increasing hyperglycemia in setting of steroid use.  - Continue insulin detemir at 18U subQ daily, insulin aspart 6U subQ TIDWM, continue moderate-dose sliding scale insulin aspart 1-10U subQ TIDWM PRN.    Chronic gout  - Hold allopurinol given can be associated with thrombocytopenia.    Hyperlipidemia type II  - Reports having had muscle aches with atorvastatin 80 mg PO daily; does not wish to take at this time. Will hold.    Primary hypertension  - Worsened in setting of steroid administration.  - Continue metoprolol 50mg PO daily, spironolactone 50mg PO daily. Reports does not like how she feels with amlodipine; convert to nifedipine 60mg PO daily.  - Start hydralazine 10mg IV q6hr PRN for SBP > 180, DBP > 100.      VTE Risk Mitigation (From admission, onward)           Ordered     IP VTE HIGH RISK PATIENT  Once         11/18/23 2128     Place sequential compression device  Until discontinued         11/18/23 2128     Reason for No Pharmacological VTE Prophylaxis  Once        Question:  Reasons:  Answer:  Thrombocytopenia     11/18/23 2128                    Discharge Planning   EL:      Code Status: Full Code   Is the patient medically ready for discharge?:     Reason for patient still in hospital (select all that apply): Treatment  Discharge Plan A: Home with family        Critical due to persistent thrombocytopenia.    Critical care time spent on the evaluation and treatment of severe organ dysfunction, review of pertinent labs and imaging studies, discussions with consulting providers and discussions with patient/family: 45 minutes.      ANIBAL Plata MD  Department of Hospital Medicine   [facility] - Intensive Care [facility]

## 2023-11-21 NOTE — ASSESSMENT & PLAN NOTE
- Last HgbA1c 7.2 in 2021; repeat 4.6.  - Reports diet-controlled at home.  - Increasing hyperglycemia in setting of steroid use.  - Continue insulin detemir at 18U subQ daily, insulin aspart 6U subQ TIDWM, continue moderate-dose sliding scale insulin aspart 1-10U subQ TIDWM PRN.

## 2023-11-21 NOTE — NURSING
23:30  Heart rhythm appears abnormal with T wave abnormality. Pt not complaining of chest pain or associated symptoms. Contacted [facility] med. Wendy FUENTES ordered 12 lead ECG. 12 lead ECG performed.

## 2023-11-21 NOTE — PLAN OF CARE
Chart reviewed.   Platelet count still severely low. Day 4 of decadron. Day 2 of IVIG. No response to steroids.     Work up essentially non revealing so far.   Further work up ordered.     Case d/w Dr. Plata over the phone. 2nd opinioned seeked from hematologists at [facility]  If platelets do not improve tomorrow, pt will need to be transferred for Bmbx and possibly rituximab.

## 2023-11-21 NOTE — NURSING
"Platelet administration. Retrieved platelets from pharmacy at 0513. Called lab to discuss discrepancy. Pt blood type is A Neg. Platelets are B pos. Dion Gonzáles,  conferred with [facility] and stated that administration of blood was acceptable. States that "1 incompatible match within 24 hours will be safe for this patient."     [facility] was med aware of Transfusion MD DR. Doyle note. States he prefers 1U of blood to be administered by 1/2 dose over four hours with a duration of 12 hours apart.    Returned blood to blood bank at 0540.     [facility] per blood bank technician Dion Gonzáles.     [facility] blood bank contacted nurse to discuss order set discrepancy at 0610 and asked for clarification and new order set.      Referred to day shift nurse. Holding administration for now until further physician instructions.   "

## 2023-11-21 NOTE — ASSESSMENT & PLAN NOTE
- Worsened in setting of steroid administration.  - Continue metoprolol 50mg PO daily, spironolactone 50mg PO daily. Reports does not like how she feels with amlodipine; convert to nifedipine 60mg PO daily.  - Start hydralazine 10mg IV q6hr PRN for SBP > 180, DBP > 100.   no

## 2023-11-21 NOTE — SUBJECTIVE & OBJECTIVE
Interval History: No acute events overnight. Notes no overt bleeding, still with metallic taste in the mornings. Discussed persistent low platelet count and plan of care. No other concerns at this time.    Review of Systems   Constitutional:  Negative for chills and fever.   Respiratory:  Negative for cough and shortness of breath.    Cardiovascular:  Negative for chest pain and palpitations.   Gastrointestinal:  Negative for abdominal pain, nausea and vomiting.     Objective:     Vital Signs (Most Recent):  Temp: 98.3 °F (36.8 °C) (11/21/23 1031)  Pulse: 61 (11/21/23 1031)  Resp: (!) 22 (11/21/23 1031)  BP: (!) 190/88 (11/21/23 1031)  SpO2: 96 % (11/21/23 1031) Vital Signs (24h Range):  Temp:  [97.5 °F (36.4 °C)-98.3 °F (36.8 °C)] 98.3 °F (36.8 °C)  Pulse:  [60-76] 61  Resp:  [15-40] 22  SpO2:  [93 %-100 %] 96 %  BP: (133-195)/() 190/88     Weight: 90 kg (198 lb 5.2 oz)  Body mass index is 33 kg/m².    Intake/Output Summary (Last 24 hours) at 11/21/2023 1045  Last data filed at 11/20/2023 2101  Gross per 24 hour   Intake 254.38 ml   Output 1000 ml   Net -745.62 ml           Physical Exam  Vitals and nursing note reviewed.   Constitutional:       General: She is not in acute distress.     Appearance: She is well-developed.   HENT:      Head: Normocephalic and atraumatic.      Mouth/Throat:      Comments: Purpura to tongue / oral mucosa.  Eyes:      General:         Right eye: No discharge.         Left eye: No discharge.      Conjunctiva/sclera: Conjunctivae normal.   Cardiovascular:      Rate and Rhythm: Normal rate.      Pulses: Normal pulses.   Pulmonary:      Effort: Pulmonary effort is normal. No respiratory distress.   Abdominal:      Palpations: Abdomen is soft.      Tenderness: There is no abdominal tenderness.   Musculoskeletal:         General: Normal range of motion.      Right lower leg: No edema.      Left lower leg: No edema.   Skin:     General: Skin is warm and dry.      Comments: Some  petechiae / bruising noted.   Neurological:      Mental Status: She is alert and oriented to person, place, and time.           Significant Labs:   CBC:  Recent Labs   Lab 11/20/23  0434 11/20/23  0922 11/21/23  0350   WBC 12.21 13.86* 11.39   HGB 7.7* 8.2* 7.2*   HCT 21.9* 21.9* 20.6*   PLT 2* 1* 2*   GRAN 86.8*  10.6* 86.5*  12.0* 86.8*  9.9*   LYMPH 8.5*  1.0 8.4*  1.2 7.8*  0.9*   MONO 3.5*  0.4 3.9*  0.5 3.2*  0.4   EOS 0.0 0.0 0.0   BASO 0.01 0.01 0.01     CMP:  Recent Labs   Lab 11/19/23  0336 11/20/23  0433 11/21/23  0350    138 133*   K 4.4 4.4 4.4    107 106   CO2 21* 21* 23   BUN 39* 52* 59*   CREATININE 1.7* 1.3 1.4   * 225* 246*   CALCIUM 9.8 10.3 10.1   ALKPHOS 66 58 47*   AST 15 15 13   ALT 10 13 14   BILITOT 2.2* 2.4* 1.7*   PROT 6.8 6.5 7.9   ALBUMIN 3.7 3.8 3.3*   ANIONGAP 10 10 4*        Significant Imaging: I have reviewed and interpreted all pertinent imaging results/findings within the past 24 hours.

## 2023-11-22 LAB
ALBUMIN SERPL BCP-MCNC: 3.1 G/DL (ref 3.5–5.2)
ALBUMIN SERPL ELPH-MCNC: 3.73 G/DL (ref 3.35–5.55)
ALP SERPL-CCNC: 46 U/L (ref 55–135)
ALPHA1 GLOB SERPL ELPH-MCNC: 0.3 G/DL (ref 0.17–0.41)
ALPHA2 GLOB SERPL ELPH-MCNC: 0.47 G/DL (ref 0.43–0.99)
ALT SERPL W/O P-5'-P-CCNC: 11 U/L (ref 10–44)
ANA PATTERN 1: NORMAL
ANA SER QL IF: POSITIVE
ANA TITR SER IF: NORMAL {TITER}
ANION GAP SERPL CALC-SCNC: 8 MMOL/L (ref 8–16)
AST SERPL-CCNC: 13 U/L (ref 10–40)
B-GLOBULIN SERPL ELPH-MCNC: 0.65 G/DL (ref 0.5–1.1)
BASOPHILS # BLD AUTO: 0.01 K/UL (ref 0–0.2)
BASOPHILS NFR BLD: 0.1 % (ref 0–1.9)
BILIRUB SERPL-MCNC: 1.7 MG/DL (ref 0.1–1)
BLD PROD TYP BPU: NORMAL
BLOOD UNIT EXPIRATION DATE: NORMAL
BLOOD UNIT TYPE CODE: 6200
BLOOD UNIT TYPE CODE: 7300
BLOOD UNIT TYPE CODE: 7300
BLOOD UNIT TYPE: NORMAL
BUN SERPL-MCNC: 62 MG/DL (ref 8–23)
CALCIUM SERPL-MCNC: 10.1 MG/DL (ref 8.7–10.5)
CHLORIDE SERPL-SCNC: 105 MMOL/L (ref 95–110)
CO2 SERPL-SCNC: 22 MMOL/L (ref 23–29)
CODING SYSTEM: NORMAL
CREAT SERPL-MCNC: 1.5 MG/DL (ref 0.5–1.4)
CROSSMATCH INTERPRETATION: NORMAL
DIFFERENTIAL METHOD: ABNORMAL
DISPENSE STATUS: NORMAL
DSDNA AB SER-ACNC: NORMAL [IU]/ML
EOSINOPHIL # BLD AUTO: 0 K/UL (ref 0–0.5)
EOSINOPHIL NFR BLD: 0 % (ref 0–8)
ERYTHROCYTE [DISTWIDTH] IN BLOOD BY AUTOMATED COUNT: 17.2 % (ref 11.5–14.5)
EST. GFR  (NO RACE VARIABLE): 38 ML/MIN/1.73 M^2
GAMMA GLOB SERPL ELPH-MCNC: 2.55 G/DL (ref 0.67–1.58)
GLUCOSE SERPL-MCNC: 192 MG/DL (ref 70–110)
HCT VFR BLD AUTO: 22.1 % (ref 37–48.5)
HGB BLD-MCNC: 7.7 G/DL (ref 12–16)
IMM GRANULOCYTES # BLD AUTO: 0.47 K/UL (ref 0–0.04)
IMM GRANULOCYTES NFR BLD AUTO: 4.3 % (ref 0–0.5)
INTERPRETATION SERPL IFE-IMP: NORMAL
KAPPA LC SER QL IA: 4.4 MG/DL (ref 0.33–1.94)
KAPPA LC/LAMBDA SER IA: 1.99 (ref 0.26–1.65)
LAMBDA LC SER QL IA: 2.21 MG/DL (ref 0.57–2.63)
LYMPHOCYTES # BLD AUTO: 1.2 K/UL (ref 1–4.8)
LYMPHOCYTES NFR BLD: 11 % (ref 18–48)
MAGNESIUM SERPL-MCNC: 1.9 MG/DL (ref 1.6–2.6)
MCH RBC QN AUTO: 34.7 PG (ref 27–31)
MCHC RBC AUTO-ENTMCNC: 34.8 G/DL (ref 32–36)
MCV RBC AUTO: 100 FL (ref 82–98)
MONOCYTES # BLD AUTO: 0.8 K/UL (ref 0.3–1)
MONOCYTES NFR BLD: 7 % (ref 4–15)
NEUTROPHILS # BLD AUTO: 8.5 K/UL (ref 1.8–7.7)
NEUTROPHILS NFR BLD: 77.6 % (ref 38–73)
NRBC BLD-RTO: 1 /100 WBC
PF4 HEPARIN CMPLX AB SER QL: 0.12 OD (ref 0–0.4)
PHOSPHATE SERPL-MCNC: 3.8 MG/DL (ref 2.7–4.5)
PLATELET # BLD AUTO: 3 K/UL (ref 150–450)
PMV BLD AUTO: ABNORMAL FL (ref 9.2–12.9)
POCT GLUCOSE: 125 MG/DL (ref 70–110)
POCT GLUCOSE: 135 MG/DL (ref 70–110)
POCT GLUCOSE: 139 MG/DL (ref 70–110)
POCT GLUCOSE: 180 MG/DL (ref 70–110)
POTASSIUM SERPL-SCNC: 4.1 MMOL/L (ref 3.5–5.1)
PROT SERPL-MCNC: 7.7 G/DL (ref 6–8.4)
PROT SERPL-MCNC: 9.1 G/DL (ref 6–8.4)
RBC # BLD AUTO: 2.22 M/UL (ref 4–5.4)
SODIUM SERPL-SCNC: 135 MMOL/L (ref 136–145)
UNIT NUMBER: NORMAL
WBC # BLD AUTO: 10.91 K/UL (ref 3.9–12.7)

## 2023-11-22 PROCEDURE — 99233 SBSQ HOSP IP/OBS HIGH 50: CPT | Mod: ,,, | Performed by: INTERNAL MEDICINE

## 2023-11-22 PROCEDURE — 85097 BONE MARROW INTERPRETATION: CPT | Mod: ,,, | Performed by: PATHOLOGY

## 2023-11-22 PROCEDURE — 88311 PR  DECALCIFY TISSUE: ICD-10-PCS | Mod: 26,,, | Performed by: PATHOLOGY

## 2023-11-22 PROCEDURE — 63600175 PHARM REV CODE 636 W HCPCS: Performed by: INTERNAL MEDICINE

## 2023-11-22 PROCEDURE — 88342 CHG IMMUNOCYTOCHEMISTRY: ICD-10-PCS | Mod: 26,59,, | Performed by: PATHOLOGY

## 2023-11-22 PROCEDURE — 85097 PR  BONE MARROW,SMEAR INTERPRETATION: ICD-10-PCS | Mod: ,,, | Performed by: PATHOLOGY

## 2023-11-22 PROCEDURE — 80053 COMPREHEN METABOLIC PANEL: CPT | Performed by: NURSE PRACTITIONER

## 2023-11-22 PROCEDURE — 88184 FLOWCYTOMETRY/ TC 1 MARKER: CPT | Performed by: PATHOLOGY

## 2023-11-22 PROCEDURE — P9037 PLATE PHERES LEUKOREDU IRRAD: HCPCS | Performed by: INTERNAL MEDICINE

## 2023-11-22 PROCEDURE — 83735 ASSAY OF MAGNESIUM: CPT | Performed by: NURSE PRACTITIONER

## 2023-11-22 PROCEDURE — 63600175 PHARM REV CODE 636 W HCPCS

## 2023-11-22 PROCEDURE — 25000003 PHARM REV CODE 250: Performed by: INTERNAL MEDICINE

## 2023-11-22 PROCEDURE — 84100 ASSAY OF PHOSPHORUS: CPT | Performed by: NURSE PRACTITIONER

## 2023-11-22 PROCEDURE — 85025 COMPLETE CBC W/AUTO DIFF WBC: CPT | Performed by: NURSE PRACTITIONER

## 2023-11-22 PROCEDURE — 38222 DX BONE MARROW BX & ASPIR: CPT

## 2023-11-22 PROCEDURE — 88237 TISSUE CULTURE BONE MARROW: CPT | Performed by: INTERNAL MEDICINE

## 2023-11-22 PROCEDURE — 88311 DECALCIFY TISSUE: CPT | Performed by: PATHOLOGY

## 2023-11-22 PROCEDURE — 88299 UNLISTED CYTOGENETIC STUDY: CPT | Performed by: INTERNAL MEDICINE

## 2023-11-22 PROCEDURE — 88313 PR  SPECIAL STAINS,GROUP II: ICD-10-PCS | Mod: 26,,, | Performed by: PATHOLOGY

## 2023-11-22 PROCEDURE — 88305 TISSUE EXAM BY PATHOLOGIST: CPT | Performed by: PATHOLOGY

## 2023-11-22 PROCEDURE — 20600001 HC STEP DOWN PRIVATE ROOM

## 2023-11-22 PROCEDURE — 25000003 PHARM REV CODE 250: Performed by: PHYSICIAN ASSISTANT

## 2023-11-22 PROCEDURE — 88342 IMHCHEM/IMCYTCHM 1ST ANTB: CPT | Performed by: PATHOLOGY

## 2023-11-22 PROCEDURE — 88313 SPECIAL STAINS GROUP 2: CPT | Mod: 26,,, | Performed by: PATHOLOGY

## 2023-11-22 PROCEDURE — 88305 TISSUE EXAM BY PATHOLOGIST: ICD-10-PCS | Mod: 26,,, | Performed by: PATHOLOGY

## 2023-11-22 PROCEDURE — 88311 DECALCIFY TISSUE: CPT | Mod: 26,,, | Performed by: PATHOLOGY

## 2023-11-22 PROCEDURE — 63600175 PHARM REV CODE 636 W HCPCS: Performed by: HOSPITALIST

## 2023-11-22 PROCEDURE — 88313 SPECIAL STAINS GROUP 2: CPT | Mod: 59 | Performed by: PATHOLOGY

## 2023-11-22 PROCEDURE — P9035 PLATELET PHERES LEUKOREDUCED: HCPCS | Performed by: INTERNAL MEDICINE

## 2023-11-22 PROCEDURE — 88342 IMHCHEM/IMCYTCHM 1ST ANTB: CPT | Mod: 26,59,, | Performed by: PATHOLOGY

## 2023-11-22 PROCEDURE — 88264 CHROMOSOME ANALYSIS 20-25: CPT | Performed by: INTERNAL MEDICINE

## 2023-11-22 PROCEDURE — 99233 PR SUBSEQUENT HOSPITAL CARE,LEVL III: ICD-10-PCS | Mod: ,,, | Performed by: INTERNAL MEDICINE

## 2023-11-22 PROCEDURE — 25000003 PHARM REV CODE 250

## 2023-11-22 PROCEDURE — 88189 PR  FLOWCYTOMETRY/READ, 16 & > MARKERS: ICD-10-PCS | Mod: ,,, | Performed by: PATHOLOGY

## 2023-11-22 PROCEDURE — 88189 FLOWCYTOMETRY/READ 16 & >: CPT | Mod: ,,, | Performed by: PATHOLOGY

## 2023-11-22 PROCEDURE — 88305 TISSUE EXAM BY PATHOLOGIST: CPT | Mod: 26,,, | Performed by: PATHOLOGY

## 2023-11-22 PROCEDURE — 88185 FLOWCYTOMETRY/TC ADD-ON: CPT | Performed by: PATHOLOGY

## 2023-11-22 RX ORDER — PREDNISONE 20 MG/1
80 TABLET ORAL
Status: DISCONTINUED | OUTPATIENT
Start: 2023-11-22 | End: 2023-11-28

## 2023-11-22 RX ORDER — LIDOCAINE HYDROCHLORIDE 10 MG/ML
INJECTION INFILTRATION; PERINEURAL
Status: COMPLETED
Start: 2023-11-22 | End: 2023-11-22

## 2023-11-22 RX ORDER — PANTOPRAZOLE SODIUM 40 MG/1
40 TABLET, DELAYED RELEASE ORAL
Status: DISCONTINUED | OUTPATIENT
Start: 2023-11-23 | End: 2023-11-30 | Stop reason: HOSPADM

## 2023-11-22 RX ORDER — LIDOCAINE HYDROCHLORIDE 20 MG/ML
10 INJECTION, SOLUTION INFILTRATION; PERINEURAL ONCE
Status: DISCONTINUED | OUTPATIENT
Start: 2023-11-22 | End: 2023-11-30 | Stop reason: HOSPADM

## 2023-11-22 RX ORDER — LORAZEPAM 2 MG/ML
1 INJECTION INTRAMUSCULAR ONCE AS NEEDED
Status: COMPLETED | OUTPATIENT
Start: 2023-11-22 | End: 2023-11-22

## 2023-11-22 RX ORDER — SIMETHICONE 80 MG
1 TABLET,CHEWABLE ORAL 3 TIMES DAILY PRN
Status: DISCONTINUED | OUTPATIENT
Start: 2023-11-22 | End: 2023-11-30 | Stop reason: HOSPADM

## 2023-11-22 RX ORDER — MORPHINE SULFATE 2 MG/ML
INJECTION, SOLUTION INTRAMUSCULAR; INTRAVENOUS
Status: COMPLETED
Start: 2023-11-22 | End: 2023-11-22

## 2023-11-22 RX ORDER — MORPHINE SULFATE 2 MG/ML
1 INJECTION, SOLUTION INTRAMUSCULAR; INTRAVENOUS ONCE
Status: COMPLETED | OUTPATIENT
Start: 2023-11-22 | End: 2023-11-22

## 2023-11-22 RX ADMIN — LORAZEPAM 1 MG: 2 INJECTION INTRAMUSCULAR; INTRAVENOUS at 04:11

## 2023-11-22 RX ADMIN — MUPIROCIN: 20 OINTMENT TOPICAL at 09:11

## 2023-11-22 RX ADMIN — SPIRONOLACTONE 50 MG: 25 TABLET ORAL at 09:11

## 2023-11-22 RX ADMIN — INSULIN DETEMIR 18 UNITS: 100 INJECTION, SOLUTION SUBCUTANEOUS at 09:11

## 2023-11-22 RX ADMIN — MORPHINE SULFATE 1 MG: 2 INJECTION, SOLUTION INTRAMUSCULAR; INTRAVENOUS at 04:11

## 2023-11-22 RX ADMIN — METOPROLOL SUCCINATE 50 MG: 50 TABLET, EXTENDED RELEASE ORAL at 09:11

## 2023-11-22 RX ADMIN — HYDRALAZINE HYDROCHLORIDE 10 MG: 20 INJECTION, SOLUTION INTRAMUSCULAR; INTRAVENOUS at 01:11

## 2023-11-22 RX ADMIN — FLUOXETINE 20 MG: 20 CAPSULE ORAL at 09:11

## 2023-11-22 RX ADMIN — PREDNISONE 80 MG: 20 TABLET ORAL at 08:11

## 2023-11-22 RX ADMIN — LIDOCAINE HYDROCHLORIDE 200 MG: 10 INJECTION, SOLUTION INFILTRATION; PERINEURAL at 04:11

## 2023-11-22 RX ADMIN — FAMOTIDINE 20 MG: 20 TABLET ORAL at 09:11

## 2023-11-22 RX ADMIN — NIFEDIPINE 60 MG: 30 TABLET, FILM COATED, EXTENDED RELEASE ORAL at 08:11

## 2023-11-22 RX ADMIN — NIFEDIPINE 60 MG: 30 TABLET, FILM COATED, EXTENDED RELEASE ORAL at 09:11

## 2023-11-22 RX ADMIN — INSULIN ASPART 6 UNITS: 100 INJECTION, SOLUTION INTRAVENOUS; SUBCUTANEOUS at 09:11

## 2023-11-22 RX ADMIN — FOLIC ACID 1 MG: 1 TABLET ORAL at 09:11

## 2023-11-22 NOTE — NURSING
Pt AAO x 4. Pt [facility]. Pt is on RA with no SOB/ distress noted. Pt placed on bedside telemetry with HR at NSR. Pt is continent of bowel. Pt has a pure wick in place. Pt has generalized petechia to skin. Skin is clean, dry, and intact. Pt can stand with assist from 2 staff. Pt has some right sided weakness due to past CVA.  Pt has PIV to right arm clean, dry, and intact with no redness or swelling, flushed and saline locked. Midline to the right arm as well dated 11/27, clean, dry, and intact with no redness or swelling flushed and saline locked at this time as well. Pt resting in bed watching TV. Bed is low and locked, call light in reach. Monitoring.

## 2023-11-22 NOTE — ASSESSMENT & PLAN NOTE
- Worsened in setting of steroid administration.  - Continue metoprolol 50mg PO daily, nifedipine 60mg PO as BID. Hold further spironolactone for now with uptrending creatinine.  - Continue hydralazine 10mg IV q6hr PRN for SBP > 180, DBP > 100.

## 2023-11-22 NOTE — NURSING
2320: Patient arrived to unit via wheel chair per RN and PCT in stable condition. AAO x4. Respirations even and unlabored. No s/s of distress noted. Purewick in place. Oriented to room and call bell. Bed locked in lowest position. Call bell within reach.

## 2023-11-22 NOTE — NURSING
Latest Reference Range & Units 11/21/23 03:50 11/22/23 05:56   Platelet Count 150 - 450 K/uL 2 (LL) 3 (LL)           Joao Plata MD made aware. New order: Transfuse platelets 1 dose.

## 2023-11-22 NOTE — NURSING TRANSFER
Nursing Transfer Note      11/21/2023   10:58 PM    Nurse giving handoff: KEIRA Ruiz  Nurse receiving handoff: KEIRA Smith    Reason patient is being transferred: Transfer to [facility]    Transfer From: ICU 9    Transfer via wheelchair    Transfer with cardiac monitoring    Transported by RN    Transfer Vital Signs:  Blood Pressure:***  Heart Rate:***  O2:***  Temperature:***  Respirations:***    Telemetry: {TELEMETRY:85147}  Order for Tele Monitor? No    Additional Lines: {Additional Lines:70556}    4eyes on Skin: {YES/NO:20292}    Medicines sent: ***    Any special needs or follow-up needed: ***    Patient belongings transferred with patient: Yes    Chart send with patient: Yes    Notified: {NOTIFIED:33027}    Patient reassessed at: *** (date, time)  1  Upon arrival to floor: {IP NSG TRANSFER ARRIVAL OHS:67292}

## 2023-11-22 NOTE — NURSING TRANSFER
Nursing Transfer Note      11/22/2023   12:14 PM    Nurse giving handoff: Edie Art RN  Nurse receiving handoff: Kenny    Reason patient is being transferred: eval need for Bone Marrow Transplant    [id number]     Transfer via stretcherStretcher    Transfer with cardiac monitoring    Transported by [facility]    Transfer Vital Signs:  Blood Pressure:170/78  Heart Rate:71  O2:97 RA  Temperature:97.9  Respirations:18    Telemetry: Rhythm NSR  Order for Tele Monitor? Yes    Additional Lines: No      4eyes on Skin: yes  Yes  Medicines sent: none    Any special needs or follow-up needed: no    Patient belongings transferred with patient: Yes    Chart send with patient: Yes    Notified: pt notified    Patient reassessed at:  (date, time)  1  Upon arrival to floor:

## 2023-11-22 NOTE — CONSULTS
Classical Hematology Consult Note    Inpatient consult to Hematology/Oncology  Consult performed by: Krzysztof Berkowitz MD  Consult ordered by: Joseph Gross MD        SUBJECTIVE:     History of Present Illness:  Patient is a 67 y.o. female presents with ischemic stroke with residual right sided weakness who presented to [facility] with mucosal bleeding. She was found to have severe thrombocytopenia and has been hospitalized since 11/18/2023. She was transferred to [facility] today for higher level of care.     She has received 4 days of 40 mg of dexamethasone and 2 days of IVIG (last dose 11/21 for both) without improvement in her thrombocytopenia. She also received 6 units of platelet transfusion.     She has hx of non immune hemolytic anemia in 2017 treated with course of systemic steroids. PNH workup at that time was negative.    Workup so far:  INR/PTT/Fib normal  LDH normal  Haptoglobin low, retic elevated. MIKKI negative.   Peripheral smear: The red blood cells show minimal anisopoikilocytosis with anemia.   There are no definitive schistocyte forms identified.  The white   blood cell count is elevated with an absolute neutrophilia.  The   platelet count is decreased with no satellitosis or platelet clumps   identified.   Folate/b12/iron studies neg for deficiency  [id number] normal   US showed hepatomegaly/hepatic steatosis. Normal spleen.   HIT panel is negative.     Review of patient's allergies indicates:   Allergen Reactions    Colchicine analogues      diarrhea    Lisinopril      Other reaction(s): cough  Other reaction(s): cough    Losartan      Other reaction(s): blurry vision  Other reaction(s): blurry vision    Percocet [oxycodone-acetaminophen]      Pt gets pain from taking medicine.     Past Medical History:   Diagnosis Date    Abnormal EKG     Anemia 07/13/2017    Aortic valve regurgitation     Arthritis     CKD (chronic kidney disease) stage 2, GFR 60-89 ml/min 08/08/2014    CKD (chronic kidney  disease) stage 2, GFR 60-89 ml/min 08/08/2014    CVA (cerebral vascular accident)     2021 w/ residual R sided weakness    Diabetes mellitus     Diabetes mellitus type II     GERD (gastroesophageal reflux disease)     Gout, unspecified     Heart murmur     Hyperlipidemia     Hypertension     Tendonitis      Past Surgical History:   Procedure Laterality Date    breast reduction      CHOLECYSTECTOMY      JOINT REPLACEMENT      KNEE SURGERY  2018    POLYPECTOMY  05/05/2016    TOTAL REDUCTION MAMMOPLASTY      TOTAL SHOULDER ARTHROPLASTY Left     TUBAL LIGATION       Family History   Problem Relation Age of Onset    Heart disease Mother         MI at 71    Hypertension Mother     Hypertension Brother     Diabetes Brother     Hypertension Brother     Breast cancer Maternal Cousin     Colon cancer Neg Hx     Ovarian cancer Neg Hx      Social History     Tobacco Use    Smoking status: Never    Smokeless tobacco: Never   Substance Use Topics    Alcohol use: No     Alcohol/week: 0.0 standard drinks of alcohol    Drug use: No     Review of Systems   Constitutional:  Positive for malaise/fatigue. Negative for chills and fever.   HENT:  Negative for sore throat.    Eyes:  Negative for blurred vision and double vision.   Respiratory:  Negative for cough and shortness of breath.    Cardiovascular:  Negative for chest pain, palpitations and leg swelling.   Gastrointestinal:  Negative for abdominal pain, constipation, diarrhea, nausea and vomiting.   Genitourinary:  Negative for dysuria.   Skin:  Negative for rash.   Neurological:  Positive for weakness. Negative for dizziness and headaches.   Psychiatric/Behavioral:  The patient is not nervous/anxious.      OBJECTIVE:     Vital Signs:  Temp:  [97.7 °F (36.5 °C)-98.5 °F (36.9 °C)]   Pulse:  [71-72]   Resp:  [16-18]   BP: (170-184)/(77-81)   SpO2:  [92 %-97 %]     Physical Exam  Constitutional:       General: She is not in acute distress.     Appearance: Normal appearance. She is  obese. She is not toxic-appearing.   HENT:      Head: Normocephalic and atraumatic.      Nose: Nose normal.      Mouth/Throat:      Mouth: Mucous membranes are moist.      Pharynx: Oropharynx is clear.   Eyes:      General: No scleral icterus.     Conjunctiva/sclera: Conjunctivae normal.   Cardiovascular:      Rate and Rhythm: Normal rate.   Pulmonary:      Effort: Pulmonary effort is normal. No respiratory distress.      Breath sounds: No wheezing.   Abdominal:      General: There is no distension.      Palpations: Abdomen is soft.      Tenderness: There is no abdominal tenderness.   Musculoskeletal:         General: No tenderness.      Cervical back: Normal range of motion.      Right lower leg: No edema.      Left lower leg: No edema.   Skin:     General: Skin is warm and dry.   Neurological:      Mental Status: She is alert and oriented to person, place, and time.      Motor: Weakness (right arm) present.   Psychiatric:         Mood and Affect: Mood normal.         Behavior: Behavior normal.         Thought Content: Thought content normal.       Laboratory:  CBC:   Recent Labs   Lab 11/22/23  0556   WBC 10.91   RBC 2.22*   HGB 7.7*   HCT 22.1*   PLT 3*   *   MCH 34.7*   MCHC 34.8     CMP:   Recent Labs   Lab 11/22/23  0556   *   CALCIUM 10.1   ALBUMIN 3.1*   PROT 9.1*   *   K 4.1   CO2 22*      BUN 62*   CREATININE 1.5*   ALKPHOS 46*   ALT 11   AST 13   BILITOT 1.7*     Coagulation:   Recent Labs   Lab 11/21/23  1445   LABPROT 11.9   INR 1.1   APTT 22.7       Diagnostic Results:  US: Reviewed    ASSESSMENT/PLAN:     Severe thrombocytopenia, unclear etiology, concern for ITP s/p dex/IVIG without response. TTP/DIC/HIT were ruled out.   Hx of non-immune mediated hemolytic anemia responsive to steroids in the past  Hx of ischemic stroke on plavix    Further workup is recommend, bone marrow biopsy done today  FLC/SPEP/ALIX pending  Will trial course of prednisone 2 mg/kg (max 80 mg/day) daily  and Rituxan 375 mg/m2 once weekly for 4 doses. Please maintain GI ppx with PPI 40 mg daily  Hepatitis serologies pending, consent for Rituxan obtained today  Hold plavix and chemo DVT ppx, mechanical ppx is recommended   Continue cbc daily and transfuse for plt < 10k  Send for anti-platelet antibodies and G6PD  Hematology team will follow    Krzysztof Berkowitz MD  Hematology and Medical Oncology fellow

## 2023-11-22 NOTE — PROGRESS NOTES
[address] Medicine  Progress Note    Patient Name: Wendy Viveros  MRN: [id number]  Patient Class: IP- Inpatient   Admission Date: 11/18/2023  Length of Stay: 4 days  Attending Physician: RONI Plata MD  Primary Care Provider: Ashley Harrell MD        Subjective:     Principal Problem:Thrombocytopenia    HPI:  Wendy Viveros is a 67-year-old female with a past medical history of hypertension, stroke with residual right-sided weakness, and remote history of possible hemolytic anemia who presented to the ED at [facility] with complaints of lesions in her mouth since yesterday.  She states she has also noticed some accompanying mild bleeding after brushing her teeth, but she denies any other bleeding.  She does report easy bruising however denies any additional rash.  Initially she denies any anticoagulant use however upon review of patient's chart she is on Plavix and aspirin.  Other than these lesions, she feels in her usual state of health.  She denies recent illness.  In the ED, labs notable for extreme thrombocytopenia with platelets of 3.  No schistocytes on the differential.  T bili elevated at 2.8.  This was discussed with hematologist on call Dr. Osorio, who suspects ITP, and recommended high-dose steroids and platelets be given with goal to increased platelet count above 10.  Of note, she was admitted at this same facility in 2017 for suspected hemolytic anemia, but it appears she was lost to follow-up with hematology.  She did just see Hematology at [facility] 2 weeks ago after her PCP referred her there for mild anemia and mild thrombocytopenia with elevated T bili and low haptoglobin.    Overview/Hospital Course:  Ms. Viveros presented with bleeding gums and mouth lesions. Admitted with severe thrombocytopenia with suspected ITP. Treated with IV dexamethasone in ER and transfused 1U platelets. Hem/Onc consulted; converted to dexamethasone PO. No overt evidence  of bleeding but platelet count was slow to respond requiring additional platelet transfusions.    Interval History: No acute events overnight. Reports mucus in her nose - advised against blowing it to prevent bleeding. Notes some gas discomfort in her left abdomen. Discussed continued thrombocytopenia and plans for transfer.    Review of Systems   Constitutional:  Negative for chills and fever.   Respiratory:  Negative for cough and shortness of breath.    Cardiovascular:  Negative for chest pain and palpitations.   Gastrointestinal:  Negative for abdominal pain, nausea and vomiting.     Objective:     Vital Signs (Most Recent):  Temp: 97.8 °F (36.6 °C) (11/22/23 0958)  Pulse: 72 (11/22/23 0958)  Resp: 18 (11/22/23 0958)  BP: (!) 175/81 (11/22/23 0958)  SpO2: 97 % (11/22/23 0958) Vital Signs (24h Range):  Temp:  [97.7 °F (36.5 °C)-98.8 °F (37.1 °C)] 97.8 °F (36.6 °C)  Pulse:  [66-77] 72  Resp:  [16-39] 18  SpO2:  [92 %-97 %] 97 %  BP: (170-199)/(77-87) 175/81     Weight: 83.6 kg (184 lb 4.9 oz)  Body mass index is 30.67 kg/m².    Intake/Output Summary (Last 24 hours) at 11/22/2023 1137  Last data filed at 11/22/2023 0458  Gross per 24 hour   Intake 2576.89 ml   Output 2300 ml   Net 276.89 ml         Physical Exam  Vitals and nursing note reviewed.   Constitutional:       General: She is not in acute distress.     Appearance: She is well-developed.   HENT:      Head: Normocephalic and atraumatic.      Mouth/Throat:      Comments: Purpura to tongue / oral mucosa.  Eyes:      General:         Right eye: No discharge.         Left eye: No discharge.      Conjunctiva/sclera: Conjunctivae normal.   Cardiovascular:      Rate and Rhythm: Normal rate.      Pulses: Normal pulses.   Pulmonary:      Effort: Pulmonary effort is normal. No respiratory distress.   Abdominal:      Palpations: Abdomen is soft.      Tenderness: There is no abdominal tenderness.   Musculoskeletal:         General: Normal range of motion.      Right  lower leg: No edema.      Left lower leg: No edema.   Skin:     General: Skin is warm and dry.      Comments: Some petechiae / bruising noted.   Neurological:      Mental Status: She is alert and oriented to person, place, and time.       Significant Labs:   CBC:  Recent Labs   Lab 11/20/23  0922 11/21/23  0350 11/22/23  0556   WBC 13.86* 11.39 10.91   HGB 8.2* 7.2* 7.7*   HCT 21.9* 20.6* 22.1*   PLT 1* 2* 3*   GRAN 86.5*  12.0* 86.8*  9.9* 77.6*  8.5*   LYMPH 8.4*  1.2 7.8*  0.9* 11.0*  1.2   MONO 3.9*  0.5 3.2*  0.4 7.0  0.8   EOS 0.0 0.0 0.0   BASO 0.01 0.01 0.01    CMP:  Recent Labs   Lab 11/20/23  0433 11/21/23  0350 11/22/23  0556    133* 135*   K 4.4 4.4 4.1    106 105   CO2 21* 23 22*   BUN 52* 59* 62*   CREATININE 1.3 1.4 1.5*   * 246* 192*   CALCIUM 10.3 10.1 10.1   MG  --   --  1.9   PHOS  --   --  3.8   ALKPHOS 58 47* 46*   AST 15 13 13   ALT 13 14 11   BILITOT 2.4* 1.7* 1.7*   PROT 6.5 7.9 9.1*   ALBUMIN 3.8 3.3* 3.1*   ANIONGAP 10 4* 8      Significant Imaging: I have reviewed and interpreted all pertinent imaging results/findings within the past 24 hours.      Assessment/Plan:      * Thrombocytopenia  Anemia of chronic disease  Hyperbilirubinemia  - History of hematologic abnormalities dating back to hospitalization in 2017.   - Seen by Dr. Howard in Hem/Onc clinic on 11/1/23 for mild anemia and thrombocytopenia noted for the past 7 months and new labs were ordered with follow up arranged for return; unable to see most recent outside labs. That note reports her H/H was recently 10.2/28, chronically elevated bilirubin, and depressed haptoglobin to undetectable levels.  - Severe thrombocytopenia highly suspicious for ITP, though failing to respond to date. Holding allopurinol, clopidogrel.  - Direct antiglobulin test negative. [id number] activity elevated; not consistent with TTP. U/S Abd with hepatomegaly, no splenomegaly. Broadened workup in process with fibrinogen,  antithrombin III, D-dimer, SPEP, immunofixation, ALFREDO, anti-dsDNA, ESR, reticulocytes, HIT panel.  - Additional 1U Plt today; 6th dose. Appreciate transfusion medicine recommendations, will give as 1/2 amounts q12hr.  - Received 4 day course of dexamethasone 40mg PO daily and 2 day course of IVIG without improvement. Discussed with hematology and transfer center; will arrange transfer to [facility] for further evaluation including potential bone marrow biopsy / rituximab initiation.  - Appreciate hematology assistance.    History of CVA (cerebrovascular accident)  - Reported CVA with R sided weakness in 2021  - Holding plavix as discussed above; atorvastatin as under HLD.    CKD (chronic kidney disease) stage 3, GFR 30-59 ml/min  - Mild uptrend. Encourage PO fluid intake.  - Monitor, renally dose medications, avoid nephrotoxins.    Type 2 diabetes mellitus with circulatory disorder, without long-term current use of insulin  - Last HgbA1c 7.2 in 2021; repeat 4.6.  - Reports diet-controlled at home.  - Increasing hyperglycemia in setting of steroid use.  - Continue insulin detemir 18U subQ daily, insulin aspart 6U subQ TIDWM, continue moderate-dose sliding scale insulin aspart 1-10U subQ TIDWM PRN.  - Monitor with steroid cessation and decrease as appropriate.    Chronic gout  - Hold allopurinol given can be associated with thrombocytopenia.    Hyperlipidemia type II  - Reports having had muscle aches with atorvastatin 80 mg PO daily; does not wish to take at this time. Will continue to hold.    Primary hypertension  - Worsened in setting of steroid administration.  - Continue metoprolol 50mg PO daily, nifedipine 60mg PO as BID. Hold further spironolactone for now with uptrending creatinine.  - Continue hydralazine 10mg IV q6hr PRN for SBP > 180, DBP > 100.      VTE Risk Mitigation (From admission, onward)           Ordered     IP VTE HIGH RISK PATIENT  Once         11/18/23 2128     Place sequential compression  device  Until discontinued         11/18/23 2128     Reason for No Pharmacological VTE Prophylaxis  Once        Question:  Reasons:  Answer:  Thrombocytopenia    11/18/23 2128                    Discharge Planning   EL:      Code Status: Full Code   Is the patient medically ready for discharge?:     Reason for patient still in hospital (select all that apply): Treatment  Discharge Plan A: Home with family        D Joao Plata MD  Department of Hospital Medicine   [facility] [place], operated by [facility] ([address]

## 2023-11-22 NOTE — PROVIDER TRANSFER
Outside Transfer Acceptance Note / Regional Referral Center    Referring facility: [facility] LOCATION ([facility])   Referring provider: RONI BARTLETT  Accepting facility: [facility]  Accepting provider: ALYSSA KRUEGER  Admitting provider: [facility] provider  Reason for transfer:  heme/onc consultation   Transfer diagnosis: thrombocytopenia   Transfer specialty requested: Hematology and Oncology  Transfer specialty notified: Yes  Transfer level: NUMBER 1-5: 2  Bed type requested: med/tele  Isolation status: No active isolations   Admission class or status: IP- Inpatient      Narrative     Ms. Wendy Viveros is a 67-year-old woman with PMH of hypertension, stroke with residual right-sided weakness, and remote history of possible hemolytic anemia who presented to [facility] ED with complaints of lesions in her mouth since yesterday.  She states she has also noticed some accompanying mild bleeding after brushing her teeth, but she denies any other bleeding.  She does report easy bruising however denies any additional rash.  Initially she denies any anticoagulant use however upon review of patient's chart she is on Plavix and aspirin.  Other than these lesions, she feels in her usual state of health.  She denies recent illness.      In the ED, vial signs showed:    Temp: 98.3 °F (36.8 °C)  Pulse: 61  Resp: (!) 22  BP: (!) 190/88  SpO2: 96 %    Physical exam notable for Purpura to tongue / oral mucosa. Some petechiae / bruising noted.   Labs notable for extreme thrombocytopenia with platelets of 3. No schistocytes on the differential.  T bili elevated at 2.8. This was discussed with hematologist on call Dr. Osorio, who suspects ITP, and recommended high-dose steroids and platelets be given with goal to increased platelet count above 10. Of note, she was admitted at this same facility in 2017 for suspected hemolytic anemia, but it appears she was lost to follow-up with hematology.  She did just  see Hematology at [facility] 2 weeks ago after her PCP referred her there for mild anemia and mild thrombocytopenia with elevated T bili and low haptoglobin. She was admitted with severe thrombocytopenia with suspected ITP. She was treated with IV dexamethasone in ER and transfused 1U platelets. Hem/Onc consulted; converted to dexamethasone PO. No overt evidence of bleeding but platelet count was slow to respond requiring additional platelet transfusions.    As of 11/22/2023, patient completed 4 days of dexamethasone 40mg PO daily. IVIG was started on 11/21/23 and completes the recommended 2 doses on 11/22/2023. Since patient's thrombocytopenia was not improving with initial treatment, primary team request transfer to [facility] for Heme/onc evaluation of thrombocytopenia with possible need for bone marrow biopsy / rituximab initiation. Case discussed with Dr. Plata ([facility] Heme/onc) and agreed to consult on the patient with admission to Hospital Medicine.       Objective     Vitals: Temp: 98.5 °F (36.9 °C) (11/22/23 0742)  Pulse: 71 (11/22/23 0742)  Resp: 16 (11/22/23 0742)  BP: (!) 173/77 (11/22/23 0742)  SpO2: (!) 92 % (11/22/23 0742)  Recent Labs: A1C:   Recent Labs   Lab 11/20/23  0433   HGBA1C 4.6     Bilirubin:   Recent Labs   Lab 11/18/23  1847 11/19/23  0336 11/20/23  0433 11/21/23  0350 11/22/23  0556   BILITOT 2.8* 2.2* 2.4* 1.7* 1.7*     CBC:   Recent Labs   Lab 11/21/23  0350 11/22/23  0556   WBC 11.39 10.91   HGB 7.2* 7.7*   HCT 20.6* 22.1*   PLT 2* 3*     CMP:   Recent Labs   Lab 11/21/23  0350 11/22/23  0556   * 135*   K 4.4 4.1    105   CO2 23 22*   * 192*   BUN 59* 62*   CREATININE 1.4 1.5*   CALCIUM 10.1 10.1   PROT 7.9 9.1*   ALBUMIN 3.3* 3.1*   BILITOT 1.7* 1.7*   ALKPHOS 47* 46*   AST 13 13   ALT 14 11   ANIONGAP 4* 8     Coagulation:   Recent Labs   Lab 11/21/23  1445   INR 1.1   APTT 22.7     Magnesium:   Recent Labs   Lab 11/22/23  0556   MG 1.9     POCT Glucose:   Recent Labs   Lab  11/21/23  1645 11/21/23 2014 11/22/23  0802   POCTGLUCOSE 264* 225* 180*       Recent imaging: see above  Airway:   RA  Vent settings:  N/A       IV access:        Peripheral IV - Single Lumen 11/18/23 1950 20 G Right Antecubital (Active)   Site Assessment Clean;Dry;Intact;No redness;No swelling 11/22/23 0600   Extremity Assessment Distal to IV No abnormal discoloration 11/22/23 0600   Line Status Flushed;Saline locked 11/22/23 0600   Dressing Status Clean;Dry;Intact 11/22/23 0600   Dressing Intervention Integrity maintained 11/22/23 0600   Dressing Change Due 11/26/23 11/21/23 2301   Site Change Due 11/26/23 11/21/23 1915   Reason Not Rotated Not due 11/21/23 2301            Midline Catheter Insertion/Assessment  - Single Lumen 11/20/23 Right basilic vein (medial side of arm) 20g x 10cm (Active)   $ Midline Charges (Upon insertion) Bedside Insertion Performed Pt > 3 Years Old;Ultrasound Guide for Vascular Access;Midline Catheter (Supply) 11/20/23 2319   Site Assessment Clean;Dry;Intact;No redness;No swelling 11/22/23 0600   IV Device Securement catheter securement device 11/22/23 0600   Line Status Flushed;Saline locked 11/22/23 0600   Dressing Type CHG impregnated dressing/sponge;Central line dressing 11/22/23 0600   Dressing Status Clean;Dry;Intact 11/22/23 0600   Dressing Intervention Integrity maintained 11/22/23 0600   Dressing Change Due 11/27/23 11/21/23 2301   Site Change Due 12/20/23 11/21/23 1915   Reason Not Rotated Not due 11/21/23 2301     Infusions: None  Allergies:   Review of patient's allergies indicates:   Allergen Reactions    Colchicine analogues      diarrhea    Lisinopril      Other reaction(s): cough  Other reaction(s): cough    Losartan      Other reaction(s): blurry vision  Other reaction(s): blurry vision    Percocet [oxycodone-acetaminophen]      Pt gets pain from taking medicine.      NPO: No    Anticoagulation:   Anticoagulants       None             Instructions      Milo Mensah-  Admit  to Hospital Medicine  Upon patient arrival to floor, please send SecureChat to [facility] or call extension [phone] (if no answer, do NOT leave a callback number after the beep, rather please send a SecureChat to [facility]), for Hospital Medicine admit team assignment and for additional admit orders for the patient.  Do not page the attending physician associated with the patient on arrival (this physician may not be on duty at the time of arrival).  Rather, always send a SecureChat to [facility] or call [phone] to reach the triage physician for orders and team assignment.

## 2023-11-22 NOTE — ASSESSMENT & PLAN NOTE
- Reports having had muscle aches with atorvastatin 80 mg PO daily; does not wish to take at this time. Will continue to hold.

## 2023-11-22 NOTE — ASSESSMENT & PLAN NOTE
- Mild uptrend. Encourage PO fluid intake.  - Monitor, renally dose medications, avoid nephrotoxins.

## 2023-11-22 NOTE — ASSESSMENT & PLAN NOTE
Anemia of chronic disease  Hyperbilirubinemia  - History of hematologic abnormalities dating back to hospitalization in 2017.   - Seen by Dr. Howard in Hem/Onc clinic on 11/1/23 for mild anemia and thrombocytopenia noted for the past 7 months and new labs were ordered with follow up arranged for return; unable to see most recent outside labs. That note reports her H/H was recently 10.2/28, chronically elevated bilirubin, and depressed haptoglobin to undetectable levels.  - Severe thrombocytopenia highly suspicious for ITP, though failing to respond to date. Holding allopurinol, clopidogrel.  - Direct antiglobulin test negative. [id number] activity elevated; not consistent with TTP. U/S Abd with hepatomegaly, no splenomegaly. Broadened workup in process with fibrinogen, antithrombin III, D-dimer, SPEP, immunofixation, ALFREDO, anti-dsDNA, ESR, reticulocytes, HIT panel.  - Additional 1U Plt today; 6th dose. Appreciate transfusion medicine recommendations, will give as 1/2 amounts q12hr.  - Received 4 day course of dexamethasone 40mg PO daily and 2 day course of IVIG without improvement. Discussed with hematology and transfer center; will arrange transfer to [facility] for further evaluation including potential bone marrow biopsy / rituximab initiation.  - Appreciate hematology assistance.

## 2023-11-22 NOTE — SUBJECTIVE & OBJECTIVE
Interval History: No acute events overnight. Reports mucus in her nose - advised against blowing it to prevent bleeding. Notes some gas discomfort in her left abdomen. Discussed continued thrombocytopenia and plans for transfer.    Review of Systems   Constitutional:  Negative for chills and fever.   Respiratory:  Negative for cough and shortness of breath.    Cardiovascular:  Negative for chest pain and palpitations.   Gastrointestinal:  Negative for abdominal pain, nausea and vomiting.     Objective:     Vital Signs (Most Recent):  Temp: 97.8 °F (36.6 °C) (11/22/23 0958)  Pulse: 72 (11/22/23 0958)  Resp: 18 (11/22/23 0958)  BP: (!) 175/81 (11/22/23 0958)  SpO2: 97 % (11/22/23 0958) Vital Signs (24h Range):  Temp:  [97.7 °F (36.5 °C)-98.8 °F (37.1 °C)] 97.8 °F (36.6 °C)  Pulse:  [66-77] 72  Resp:  [16-39] 18  SpO2:  [92 %-97 %] 97 %  BP: (170-199)/(77-87) 175/81     Weight: 83.6 kg (184 lb 4.9 oz)  Body mass index is 30.67 kg/m².    Intake/Output Summary (Last 24 hours) at 11/22/2023 1137  Last data filed at 11/22/2023 0458  Gross per 24 hour   Intake 2576.89 ml   Output 2300 ml   Net 276.89 ml         Physical Exam  Vitals and nursing note reviewed.   Constitutional:       General: She is not in acute distress.     Appearance: She is well-developed.   HENT:      Head: Normocephalic and atraumatic.      Mouth/Throat:      Comments: Purpura to tongue / oral mucosa.  Eyes:      General:         Right eye: No discharge.         Left eye: No discharge.      Conjunctiva/sclera: Conjunctivae normal.   Cardiovascular:      Rate and Rhythm: Normal rate.      Pulses: Normal pulses.   Pulmonary:      Effort: Pulmonary effort is normal. No respiratory distress.   Abdominal:      Palpations: Abdomen is soft.      Tenderness: There is no abdominal tenderness.   Musculoskeletal:         General: Normal range of motion.      Right lower leg: No edema.      Left lower leg: No edema.   Skin:     General: Skin is warm and dry.       Comments: Some petechiae / bruising noted.   Neurological:      Mental Status: She is alert and oriented to person, place, and time.       Significant Labs:   CBC:  Recent Labs   Lab 11/20/23  0922 11/21/23  0350 11/22/23  0556   WBC 13.86* 11.39 10.91   HGB 8.2* 7.2* 7.7*   HCT 21.9* 20.6* 22.1*   PLT 1* 2* 3*   GRAN 86.5*  12.0* 86.8*  9.9* 77.6*  8.5*   LYMPH 8.4*  1.2 7.8*  0.9* 11.0*  1.2   MONO 3.9*  0.5 3.2*  0.4 7.0  0.8   EOS 0.0 0.0 0.0   BASO 0.01 0.01 0.01    CMP:  Recent Labs   Lab 11/20/23  0433 11/21/23  0350 11/22/23  0556    133* 135*   K 4.4 4.4 4.1    106 105   CO2 21* 23 22*   BUN 52* 59* 62*   CREATININE 1.3 1.4 1.5*   * 246* 192*   CALCIUM 10.3 10.1 10.1   MG  --   --  1.9   PHOS  --   --  3.8   ALKPHOS 58 47* 46*   AST 15 13 13   ALT 13 14 11   BILITOT 2.4* 1.7* 1.7*   PROT 6.5 7.9 9.1*   ALBUMIN 3.8 3.3* 3.1*   ANIONGAP 10 4* 8      Significant Imaging: I have reviewed and interpreted all pertinent imaging results/findings within the past 24 hours.

## 2023-11-22 NOTE — NURSING
Patient transferred to [facility] via wheelchair with all patient belongings including shoes and phone .

## 2023-11-22 NOTE — NURSING
Nurses Note -- 4 Eyes      11/22/2023   3:39 AM      Skin assessed during: Admit      [x] No Altered Skin Integrity Present    []Prevention Measures Documented      [] Yes- Altered Skin Integrity Present or Discovered   [] LDA Added if Not in Epic (Describe Wound)   [] New Altered Skin Integrity was Present on Admit and Documented in LDA   [] Wound Image Taken    Wound Care Consulted? No    Attending Nurse:  Sarah Bah RN    Second RN/Staff Member:  KEIRA Frost

## 2023-11-22 NOTE — PROCEDURES
PROCEDURE NOTE:  Date of Procedure: 11/22/2023  Bone Marrow Biopsy and Aspiration  Indication: severe thrombocytopenia  Consent: Informed consent was obtained from patient.  Timeout: Done and documented.  Position: prone  Site: left posterior illiac crest.  Prep: Betadine.  Needle used: 11 gauge Jamshidi needle.  Anesthetic: 2% lidocaine 5 cc.  Biopsy: The biopsy needle was introduced into the marrow cavity and an aspirate was obtained without complications and sent for flow cytometry, RNA/DNA hold and cytogenetics. Core biopsy obtained without difficulty and sent for routine histologic examination.  Complications: None.  Disposition: The patient was left in room with RN and instructed to remain on back for at least 2 hour  Blood loss: Minimal.       Krzysztof Berkowitz MD  Hematology and Medical Oncology fellow

## 2023-11-22 NOTE — NURSING
Pt had Bone Marrow Biopsy and Aspiration procedure at bedside done with MD, myself and RN. Pt tolerated procedure well. Meds given by MD and RN. Pt to remain with pressure on backside for 2 hours. Monitoring. Family at bedside now.

## 2023-11-22 NOTE — ASSESSMENT & PLAN NOTE
- Last HgbA1c 7.2 in 2021; repeat 4.6.  - Reports diet-controlled at home.  - Increasing hyperglycemia in setting of steroid use.  - Continue insulin detemir 18U subQ daily, insulin aspart 6U subQ TIDWM, continue moderate-dose sliding scale insulin aspart 1-10U subQ TIDWM PRN.  - Monitor with steroid cessation and decrease as appropriate.

## 2023-11-22 NOTE — PLAN OF CARE
Problem: Adult Inpatient Plan of Care  Goal: Plan of Care Review  Outcome: Ongoing, Progressing  Goal: Patient-Specific Goal (Individualized)  Outcome: Ongoing, Progressing  Goal: Absence of Hospital-Acquired Illness or Injury  Outcome: Ongoing, Progressing  Goal: Optimal Comfort and Wellbeing  Outcome: Ongoing, Progressing     Problem: Diabetes Comorbidity  Goal: Blood Glucose Level Within Targeted Range  Outcome: Ongoing, Progressing     Problem: Fall Injury Risk  Goal: Absence of Fall and Fall-Related Injury  Outcome: Ongoing, Progressing     Problem: Anemia  Goal: Anemia Symptom Improvement  Outcome: Ongoing, Progressing     Problem: Hypertension Acute  Goal: Blood Pressure Within Desired Range  Outcome: Ongoing, Progressing     Problem: Bleeding Risk or Actual (Thrombocytopenia)  Goal: Absence of Bleeding  Outcome: Ongoing, Progressing     POC reviewed with patient. All questions and concerns addressed. Fall/safety precautions implemented and maintained. Purewick care performed. Blood glucose monitored. 1/2 unit of platelets transfused for 4 hours per MD orders this shift. No acute events noted. Please see flowsheet for full assessment and vitals. Bed locked in lowest position. Side rails up x2. Call bell within reach.       

## 2023-11-23 LAB
ABO + RH BLD: NORMAL
ALBUMIN SERPL BCP-MCNC: 3.1 G/DL (ref 3.5–5.2)
ALP SERPL-CCNC: 53 U/L (ref 55–135)
ALT SERPL W/O P-5'-P-CCNC: 13 U/L (ref 10–44)
ANION GAP SERPL CALC-SCNC: 9 MMOL/L (ref 8–16)
ANISOCYTOSIS BLD QL SMEAR: SLIGHT
AST SERPL-CCNC: 16 U/L (ref 10–40)
BASO STIPL BLD QL SMEAR: ABNORMAL
BASOPHILS # BLD AUTO: 0.04 K/UL (ref 0–0.2)
BASOPHILS NFR BLD: 0.4 % (ref 0–1.9)
BILIRUB SERPL-MCNC: 2 MG/DL (ref 0.1–1)
BLD GP AB SCN CELLS X3 SERPL QL: NORMAL
BLD PROD TYP BPU: NORMAL
BLD PROD TYP BPU: NORMAL
BLOOD UNIT EXPIRATION DATE: NORMAL
BLOOD UNIT EXPIRATION DATE: NORMAL
BLOOD UNIT TYPE CODE: 6200
BLOOD UNIT TYPE CODE: 6200
BLOOD UNIT TYPE: NORMAL
BLOOD UNIT TYPE: NORMAL
BUN SERPL-MCNC: 66 MG/DL (ref 8–23)
CALCIUM SERPL-MCNC: 9.8 MG/DL (ref 8.7–10.5)
CHLORIDE SERPL-SCNC: 107 MMOL/L (ref 95–110)
CO2 SERPL-SCNC: 22 MMOL/L (ref 23–29)
CODING SYSTEM: NORMAL
CODING SYSTEM: NORMAL
CREAT SERPL-MCNC: 1.5 MG/DL (ref 0.5–1.4)
CROSSMATCH INTERPRETATION: NORMAL
CROSSMATCH INTERPRETATION: NORMAL
DIFFERENTIAL METHOD: ABNORMAL
DISPENSE STATUS: NORMAL
DISPENSE STATUS: NORMAL
EOSINOPHIL # BLD AUTO: 0 K/UL (ref 0–0.5)
EOSINOPHIL NFR BLD: 0 % (ref 0–8)
ERYTHROCYTE [DISTWIDTH] IN BLOOD BY AUTOMATED COUNT: 17.5 % (ref 11.5–14.5)
EST. GFR  (NO RACE VARIABLE): 37.7 ML/MIN/1.73 M^2
GLUCOSE SERPL-MCNC: 273 MG/DL (ref 70–110)
HBV CORE AB SERPL QL IA: REACTIVE
HBV SURFACE AB SER-ACNC: >1000 MIU/ML
HBV SURFACE AB SER-ACNC: REACTIVE M[IU]/ML
HBV SURFACE AG SERPL QL IA: NORMAL
HCT VFR BLD AUTO: 25.1 % (ref 37–48.5)
HGB BLD-MCNC: 8.9 G/DL (ref 12–16)
IMM GRANULOCYTES # BLD AUTO: 0.4 K/UL (ref 0–0.04)
IMM GRANULOCYTES NFR BLD AUTO: 4.5 % (ref 0–0.5)
LYMPHOCYTES # BLD AUTO: 0.8 K/UL (ref 1–4.8)
LYMPHOCYTES NFR BLD: 8.7 % (ref 18–48)
MCH RBC QN AUTO: 34.9 PG (ref 27–31)
MCHC RBC AUTO-ENTMCNC: 35.5 G/DL (ref 32–36)
MCV RBC AUTO: 98 FL (ref 82–98)
MONOCYTES # BLD AUTO: 0.3 K/UL (ref 0.3–1)
MONOCYTES NFR BLD: 2.9 % (ref 4–15)
NEUTROPHILS # BLD AUTO: 7.5 K/UL (ref 1.8–7.7)
NEUTROPHILS NFR BLD: 83.5 % (ref 38–73)
NRBC BLD-RTO: 2 /100 WBC
OVALOCYTES BLD QL SMEAR: ABNORMAL
PATHOLOGIST INTERPRETATION IFE: NORMAL
PATHOLOGIST INTERPRETATION SPE: NORMAL
PLATELET # BLD AUTO: 3 K/UL (ref 150–450)
PLATELET BLD QL SMEAR: ABNORMAL
PMV BLD AUTO: ABNORMAL FL (ref 9.2–12.9)
POCT GLUCOSE: 164 MG/DL (ref 70–110)
POCT GLUCOSE: 183 MG/DL (ref 70–110)
POCT GLUCOSE: 244 MG/DL (ref 70–110)
POCT GLUCOSE: 302 MG/DL (ref 70–110)
POCT GLUCOSE: 315 MG/DL (ref 70–110)
POIKILOCYTOSIS BLD QL SMEAR: SLIGHT
POLYCHROMASIA BLD QL SMEAR: ABNORMAL
POTASSIUM SERPL-SCNC: 4.2 MMOL/L (ref 3.5–5.1)
PROT SERPL-MCNC: 8.6 G/DL (ref 6–8.4)
RBC # BLD AUTO: 2.55 M/UL (ref 4–5.4)
SODIUM SERPL-SCNC: 138 MMOL/L (ref 136–145)
SPECIMEN OUTDATE: NORMAL
SPHEROCYTES BLD QL SMEAR: ABNORMAL
UNIT NUMBER: NORMAL
UNIT NUMBER: NORMAL
WBC # BLD AUTO: 8.98 K/UL (ref 3.9–12.7)

## 2023-11-23 PROCEDURE — 86706 HEP B SURFACE ANTIBODY: CPT | Performed by: INTERNAL MEDICINE

## 2023-11-23 PROCEDURE — 86850 RBC ANTIBODY SCREEN: CPT | Performed by: STUDENT IN AN ORGANIZED HEALTH CARE EDUCATION/TRAINING PROGRAM

## 2023-11-23 PROCEDURE — 80053 COMPREHEN METABOLIC PANEL: CPT | Performed by: HOSPITALIST

## 2023-11-23 PROCEDURE — 87516 HEPATITIS B DNA AMP PROBE: CPT | Performed by: INTERNAL MEDICINE

## 2023-11-23 PROCEDURE — 25000003 PHARM REV CODE 250: Performed by: INTERNAL MEDICINE

## 2023-11-23 PROCEDURE — 36430 TRANSFUSION BLD/BLD COMPNT: CPT

## 2023-11-23 PROCEDURE — 25000003 PHARM REV CODE 250: Performed by: HOSPITALIST

## 2023-11-23 PROCEDURE — 63600175 PHARM REV CODE 636 W HCPCS: Performed by: HOSPITALIST

## 2023-11-23 PROCEDURE — 85025 COMPLETE CBC W/AUTO DIFF WBC: CPT | Performed by: HOSPITALIST

## 2023-11-23 PROCEDURE — P9035 PLATELET PHERES LEUKOREDUCED: HCPCS | Performed by: STUDENT IN AN ORGANIZED HEALTH CARE EDUCATION/TRAINING PROGRAM

## 2023-11-23 PROCEDURE — 20600001 HC STEP DOWN PRIVATE ROOM

## 2023-11-23 PROCEDURE — 87340 HEPATITIS B SURFACE AG IA: CPT | Performed by: INTERNAL MEDICINE

## 2023-11-23 PROCEDURE — 63600175 PHARM REV CODE 636 W HCPCS: Performed by: INTERNAL MEDICINE

## 2023-11-23 PROCEDURE — 86704 HEP B CORE ANTIBODY TOTAL: CPT | Performed by: INTERNAL MEDICINE

## 2023-11-23 PROCEDURE — 25000003 PHARM REV CODE 250: Performed by: PHYSICIAN ASSISTANT

## 2023-11-23 RX ORDER — HYDROCODONE BITARTRATE AND ACETAMINOPHEN 500; 5 MG/1; MG/1
TABLET ORAL
Status: DISCONTINUED | OUTPATIENT
Start: 2023-11-23 | End: 2023-11-30 | Stop reason: HOSPADM

## 2023-11-23 RX ADMIN — INSULIN ASPART 6 UNITS: 100 INJECTION, SOLUTION INTRAVENOUS; SUBCUTANEOUS at 06:11

## 2023-11-23 RX ADMIN — INSULIN ASPART 4 UNITS: 100 INJECTION, SOLUTION INTRAVENOUS; SUBCUTANEOUS at 11:11

## 2023-11-23 RX ADMIN — INSULIN ASPART 2 UNITS: 100 INJECTION, SOLUTION INTRAVENOUS; SUBCUTANEOUS at 06:11

## 2023-11-23 RX ADMIN — INSULIN DETEMIR 18 UNITS: 100 INJECTION, SOLUTION SUBCUTANEOUS at 08:11

## 2023-11-23 RX ADMIN — MUPIROCIN: 20 OINTMENT TOPICAL at 08:11

## 2023-11-23 RX ADMIN — INSULIN ASPART 6 UNITS: 100 INJECTION, SOLUTION INTRAVENOUS; SUBCUTANEOUS at 11:11

## 2023-11-23 RX ADMIN — INSULIN ASPART 8 UNITS: 100 INJECTION, SOLUTION INTRAVENOUS; SUBCUTANEOUS at 08:11

## 2023-11-23 RX ADMIN — NIFEDIPINE 60 MG: 30 TABLET, FILM COATED, EXTENDED RELEASE ORAL at 08:11

## 2023-11-23 RX ADMIN — FOLIC ACID 1 MG: 1 TABLET ORAL at 08:11

## 2023-11-23 RX ADMIN — FLUOXETINE 20 MG: 20 CAPSULE ORAL at 08:11

## 2023-11-23 RX ADMIN — METOPROLOL SUCCINATE 50 MG: 50 TABLET, EXTENDED RELEASE ORAL at 08:11

## 2023-11-23 RX ADMIN — PANTOPRAZOLE SODIUM 40 MG: 40 TABLET, DELAYED RELEASE ORAL at 05:11

## 2023-11-23 RX ADMIN — PREDNISONE 80 MG: 20 TABLET ORAL at 06:11

## 2023-11-23 RX ADMIN — INSULIN ASPART 6 UNITS: 100 INJECTION, SOLUTION INTRAVENOUS; SUBCUTANEOUS at 08:11

## 2023-11-23 NOTE — ASSESSMENT & PLAN NOTE
Anemia of chronic disease  Hyperbilirubinemia  - History of hematologic abnormalities dating back to hospitalization in 2017.   - Seen by Dr. Howard in Hem/Onc clinic on 11/1/23 for mild anemia and thrombocytopenia noted for the past 7 months and new labs were ordered with follow up arranged for return; unable to see most recent outside labs. That note reports her H/H was recently 10.2/28, chronically elevated bilirubin, and depressed haptoglobin to undetectable levels.  - Severe thrombocytopenia highly suspicious for ITP, though failing to respond to date. Holding allopurinol, clopidogrel.  - Direct antiglobulin test negative. [id number] activity elevated; not consistent with TTP. U/S Abd with hepatomegaly, no splenomegaly. Broadened workup in process with fibrinogen, antithrombin III, D-dimer, SPEP, immunofixation, ALFREDO, anti-dsDNA, ESR, reticulocytes, HIT panel.  - Additional 1U Plt today; 6th dose. Appreciate transfusion medicine recommendations, will give as 1/2 amounts q12hr.  - Received 4 day course of dexamethasone 40mg PO daily and 2 day course of IVIG without improvement.  Underwent bone marrow biopsy on 11/22.  Repeat platelet count was 3 K, plan to transfuse 1 unit today  - Appreciate hematology assistance.

## 2023-11-23 NOTE — PLAN OF CARE
Hospital Medicine Acceptance Note    Date of Admit: 11/18/2023  Date of Transfer / Stepdown: 11/22/2023  Transferring Facility: [facility]  Accepting  team: [facility]    Brief History of Present Illness:      Ms. Wendy Viveros is a 67-year-old woman with PMH of hypertension, stroke with residual right-sided weakness, and remote history of possible hemolytic anemia who presented to [facility] ED with complaints of lesions in her mouth since yesterday.  She states she has also noticed some accompanying mild bleeding after brushing her teeth, but she denies any other bleeding.  She does report easy bruising however denies any additional rash.  Initially she denies any anticoagulant use however upon review of patient's chart she is on Plavix and aspirin.  Other than these lesions, she feels in her usual state of health.  She denies recent illness.       In the ED, vial signs showed:     Temp: 98.3 °F (36.8 °C)  Pulse: 61  Resp: (!) 22  BP: (!) 190/88  SpO2: 96 %     Physical exam notable for Purpura to tongue / oral mucosa. Some petechiae / bruising noted.   Labs notable for extreme thrombocytopenia with platelets of 3. No schistocytes on the differential.  T bili elevated at 2.8. This was discussed with hematologist on call Dr. Osorio, who suspects ITP, and recommended high-dose steroids and platelets be given with goal to increased platelet count above 10. Of note, she was admitted at this same facility in 2017 for suspected hemolytic anemia, but it appears she was lost to follow-up with hematology.  [place] [facility] her PCP referred her there for mild anemia and mild thrombocytopenia with elevated T bili and low haptoglobin. She was admitted with severe thrombocytopenia with suspected ITP. She was treated with IV dexamethasone in ER and transfused 1U platelets. Hem/Onc consulted; converted to dexamethasone PO. No overt evidence of bleeding but platelet count was slow  to respond requiring additional platelet transfusions.     As of 11/22/2023, patient completed 4 days of dexamethasone 40mg PO daily. IVIG was started on 11/21/23 and completes the recommended 2 doses on 11/22/2023. Since patient's thrombocytopenia was not improving with initial treatment, primary team request transfer to [facility] for Heme/onc evaluation of thrombocytopenia with possible need for bone marrow biopsy / rituximab initiation. [place]. Plata [place] [place] and agreed to consult on the patient with admission to Hospital Medicine.        Assessment on arrival     Pt. Resting comfortably without complaints, s/p bone marrow biopsy    Plan   -F/u bone marrow biopsy, plan for trial course of prednisone 2 mg/kg (max 80 mg/day) daily and Rituxan 375 mg/m2 once weekly for 4 doses per their recommendations.      Joseph Gross MD  Hospital Medicine Staff  [phone] pager

## 2023-11-23 NOTE — NURSING
Pt AAO x 4.  Pt is on RA with no SOB/ distress noted. Pt continues on bedside telemetry with HR at NSR. Pt is continent of bowel. Pt has a pure wick in place. Pt has generalized petechia to skin. Skin is clean, dry, and intact. Pt can stand with assist from 2 staff. Pt has some right sided weakness due to past CVA.  Pt has PIV to right arm clean, dry, and intact with no redness or swelling, flushed and saline locked. Midline to the right arm as well dated 11/27, clean, dry, and intact with no redness or swelling flushed and saline locked at this time as well. Pt resting in bed watching TV. Bed is low and locked, call light in reach. Monitoring.

## 2023-11-23 NOTE — PLAN OF CARE
Problem: Adult Inpatient Plan of Care  Goal: Plan of Care Review  Outcome: Ongoing, Progressing  Goal: Patient-Specific Goal (Individualized)  Outcome: Ongoing, Progressing  Goal: Absence of Hospital-Acquired Illness or Injury  Outcome: Ongoing, Progressing  Goal: Optimal Comfort and Wellbeing  Outcome: Ongoing, Progressing  Goal: Readiness for Transition of Care  Outcome: Ongoing, Progressing     Problem: Diabetes Comorbidity  Goal: Blood Glucose Level Within Targeted Range  Outcome: Ongoing, Progressing     Problem: Fall Injury Risk  Goal: Absence of Fall and Fall-Related Injury  Outcome: Ongoing, Progressing     Problem: Pain Acute  Goal: Acceptable Pain Control and Functional Ability  Outcome: Ongoing, Progressing     Problem: Anemia  Goal: Anemia Symptom Improvement  Outcome: Ongoing, Progressing     Problem: Hypertension Acute  Goal: Blood Pressure Within Desired Range  Outcome: Ongoing, Progressing     Problem: Bleeding Risk or Actual (Thrombocytopenia)  Goal: Absence of Bleeding  Outcome: Ongoing, Progressing     Problem: Infection  Goal: Absence of Infection Signs and Symptoms  Outcome: Ongoing, Progressing     Problem: Skin Injury Risk Increased  Goal: Skin Health and Integrity  Outcome: Ongoing, Progressing

## 2023-11-23 NOTE — SUBJECTIVE & OBJECTIVE
Interval History: No acute events overnight. Notes some gas discomfort in her left abdomen which has been on and off for past couple of days.  Does not complain of any pain, nausea or vomiting.        Review of Systems   Constitutional:  Negative for chills and fever.   Respiratory:  Negative for cough and shortness of breath.    Cardiovascular:  Negative for chest pain and palpitations.   Gastrointestinal:  Negative for abdominal pain, nausea and vomiting.     Objective:     Vital Signs (Most Recent):  Temp: 98.6 °F (37 °C) (11/23/23 1154)  Pulse: 74 (11/23/23 1154)  Resp: 20 (11/23/23 1154)  BP: (!) 177/81 (11/23/23 1154)  SpO2: 97 % (11/23/23 1154) Vital Signs (24h Range):  Temp:  [97.8 °F (36.6 °C)-98.6 °F (37 °C)] 98.6 °F (37 °C)  Pulse:  [] 74  Resp:  [17-36] 20  SpO2:  [93 %-100 %] 97 %  BP: (149-177)/(68-88) 177/81     Weight: 83.6 kg (184 lb 4.9 oz)  Body mass index is 30.67 kg/m².    Intake/Output Summary (Last 24 hours) at 11/23/2023 1608  Last data filed at 11/23/2023 1510  Gross per 24 hour   Intake 1100 ml   Output 1400 ml   Net -300 ml           Physical Exam  Vitals and nursing note reviewed.   Constitutional:       General: She is not in acute distress.     Appearance: She is well-developed.   HENT:      Head: Normocephalic and atraumatic.      Mouth/Throat:      Comments: Purpura to tongue / oral mucosa.  Eyes:      General:         Right eye: No discharge.         Left eye: No discharge.      Conjunctiva/sclera: Conjunctivae normal.   Cardiovascular:      Rate and Rhythm: Normal rate.      Pulses: Normal pulses.   Pulmonary:      Effort: Pulmonary effort is normal. No respiratory distress.   Abdominal:      Palpations: Abdomen is soft.      Tenderness: There is no abdominal tenderness.   Musculoskeletal:         General: Normal range of motion.      Right lower leg: No edema.      Left lower leg: No edema.   Skin:     General: Skin is warm and dry.      Comments: Some petechiae / bruising  noted.   Neurological:      Mental Status: She is alert and oriented to person, place, and time.       Significant Labs:   CBC:  Recent Labs   Lab 11/21/23  0350 11/22/23  0556 11/23/23  0500   WBC 11.39 10.91 8.98   HGB 7.2* 7.7* 8.9*   HCT 20.6* 22.1* 25.1*   PLT 2* 3* 3*   GRAN 86.8*  9.9* 77.6*  8.5* 83.5*  7.5   LYMPH 7.8*  0.9* 11.0*  1.2 8.7*  0.8*   MONO 3.2*  0.4 7.0  0.8 2.9*  0.3   EOS 0.0 0.0 0.0   BASO 0.01 0.01 0.04      CMP:  Recent Labs   Lab 11/21/23  0350 11/22/23  0556 11/23/23  0500   * 135* 138   K 4.4 4.1 4.2    105 107   CO2 23 22* 22*   BUN 59* 62* 66*   CREATININE 1.4 1.5* 1.5*   * 192* 273*   CALCIUM 10.1 10.1 9.8   MG  --  1.9  --    PHOS  --  3.8  --    ALKPHOS 47* 46* 53*   AST 13 13 16   ALT 14 11 13   BILITOT 1.7* 1.7* 2.0*   PROT 7.9 9.1* 8.6*   ALBUMIN 3.3* 3.1* 3.1*   ANIONGAP 4* 8 9        Significant Imaging: I have reviewed and interpreted all pertinent imaging results/findings within the past 24 hours.

## 2023-11-23 NOTE — ASSESSMENT & PLAN NOTE
Patient's anemia is currently controlled. Has not received any PRBCs to date. Etiology likely d/t chronic disease due to Chronic Kidney Disease/ESRD  Current CBC reviewed-   Lab Results   Component Value Date    HGB 8.9 (L) 11/23/2023    HCT 25.1 (L) 11/23/2023     Monitor serial CBC and transfuse if patient becomes hemodynamically unstable, symptomatic or H/H drops below 7/21.

## 2023-11-23 NOTE — PROGRESS NOTES
[facility] - Intensive Care ([address] Medicine  Progress Note    Patient Name: Wendy Viveros  MRN: [id number]  Patient Class: IP- Inpatient   Admission Date: 11/18/2023  Length of Stay: 5 days  Attending Physician: Pamela Camacho MD  Primary Care Provider: Ashley Harrell MD        Subjective:     Principal Problem:Thrombocytopenia        HPI:  Wendy Viveros is a 67-year-old female with a past medical history of hypertension, stroke with residual right-sided weakness, and remote history of possible hemolytic anemia who presented to the ED at [facility] with complaints of lesions in her mouth since yesterday.  She states she has also noticed some accompanying mild bleeding after brushing her teeth, but she denies any other bleeding.  She does report easy bruising however denies any additional rash.  Initially she denies any anticoagulant use however upon review of patient's chart she is on Plavix and aspirin.  Other than these lesions, she feels in her usual state of health.  She denies recent illness.  In the ED, labs notable for extreme thrombocytopenia with platelets of 3.  No schistocytes on the differential.  T bili elevated at 2.8.  This was discussed with hematologist on call Dr. Osorio, who suspects ITP, and recommended high-dose steroids and platelets be given with goal to increased platelet count above 10.  Of note, she was admitted at this same facility in 2017 for suspected hemolytic anemia, but it appears she was lost to follow-up with hematology.  She did just see Hematology at [facility] [place] 2 weeks ago after her PCP referred her there for mild anemia and mild thrombocytopenia with elevated T bili and low haptoglobin.    Overview/Hospital Course:  Ms. Viveros presented with bleeding gums and mouth lesions. Admitted with severe thrombocytopenia with suspected ITP. Treated with IV dexamethasone in ER and transfused 1U platelets. Hem/Onc consulted; converted to dexamethasone PO. No  overt evidence of bleeding but platelet count was slow to respond requiring additional platelet transfusions.  Patient was transferred to [facility] for bone marrow biopsy.  Performed on 11/22.    Interval History: No acute events overnight. Notes some gas discomfort in her left abdomen which has been on and off for past couple of days.  Does not complain of any pain, nausea or vomiting.        Review of Systems   Constitutional:  Negative for chills and fever.   Respiratory:  Negative for cough and shortness of breath.    Cardiovascular:  Negative for chest pain and palpitations.   Gastrointestinal:  Negative for abdominal pain, nausea and vomiting.     Objective:     Vital Signs (Most Recent):  Temp: 98.6 °F (37 °C) (11/23/23 1154)  Pulse: 74 (11/23/23 1154)  Resp: 20 (11/23/23 1154)  BP: (!) 177/81 (11/23/23 1154)  SpO2: 97 % (11/23/23 1154) Vital Signs (24h Range):  Temp:  [97.8 °F (36.6 °C)-98.6 °F (37 °C)] 98.6 °F (37 °C)  Pulse:  [] 74  Resp:  [17-36] 20  SpO2:  [93 %-100 %] 97 %  BP: (149-177)/(68-88) 177/81     Weight: 83.6 kg (184 lb 4.9 oz)  Body mass index is 30.67 kg/m².    Intake/Output Summary (Last 24 hours) at 11/23/2023 1608  Last data filed at 11/23/2023 1510  Gross per 24 hour   Intake 1100 ml   Output 1400 ml   Net -300 ml           Physical Exam  Vitals and nursing note reviewed.   Constitutional:       General: She is not in acute distress.     Appearance: She is well-developed.   HENT:      Head: Normocephalic and atraumatic.      Mouth/Throat:      Comments: Purpura to tongue / oral mucosa.  Eyes:      General:         Right eye: No discharge.         Left eye: No discharge.      Conjunctiva/sclera: Conjunctivae normal.   Cardiovascular:      Rate and Rhythm: Normal rate.      Pulses: Normal pulses.   Pulmonary:      Effort: Pulmonary effort is normal. No respiratory distress.   Abdominal:      Palpations: Abdomen is soft.      Tenderness: There is no abdominal tenderness.   Musculoskeletal:          General: Normal range of motion.      Right lower leg: No edema.      Left lower leg: No edema.   Skin:     General: Skin is warm and dry.      Comments: Some petechiae / bruising noted.   Neurological:      Mental Status: She is alert and oriented to person, place, and time.       Significant Labs:   CBC:  Recent Labs   Lab 11/21/23  0350 11/22/23  0556 11/23/23  0500   WBC 11.39 10.91 8.98   HGB 7.2* 7.7* 8.9*   HCT 20.6* 22.1* 25.1*   PLT 2* 3* 3*   GRAN 86.8*  9.9* 77.6*  8.5* 83.5*  7.5   LYMPH 7.8*  0.9* 11.0*  1.2 8.7*  0.8*   MONO 3.2*  0.4 7.0  0.8 2.9*  0.3   EOS 0.0 0.0 0.0   BASO 0.01 0.01 0.04      CMP:  Recent Labs   Lab 11/21/23  0350 11/22/23  0556 11/23/23  0500   * 135* 138   K 4.4 4.1 4.2    105 107   CO2 23 22* 22*   BUN 59* 62* 66*   CREATININE 1.4 1.5* 1.5*   * 192* 273*   CALCIUM 10.1 10.1 9.8   MG  --  1.9  --    PHOS  --  3.8  --    ALKPHOS 47* 46* 53*   AST 13 13 16   ALT 14 11 13   BILITOT 1.7* 1.7* 2.0*   PROT 7.9 9.1* 8.6*   ALBUMIN 3.3* 3.1* 3.1*   ANIONGAP 4* 8 9        Significant Imaging: I have reviewed and interpreted all pertinent imaging results/findings within the past 24 hours.    Assessment/Plan:      * Thrombocytopenia  Anemia of chronic disease  Hyperbilirubinemia  - History of hematologic abnormalities dating back to hospitalization in 2017.   - Seen by Dr. Howard in Hem/Onc clinic on 11/1/23 for mild anemia and thrombocytopenia noted for the past 7 months and new labs were ordered with follow up arranged for return; unable to see most recent outside labs. That note reports her H/H was recently 10.2/28, chronically elevated bilirubin, and depressed haptoglobin to undetectable levels.  - Severe thrombocytopenia highly suspicious for ITP, though failing to respond to date. Holding allopurinol, clopidogrel.  - Direct antiglobulin test negative. [id number] activity elevated; not consistent with TTP. U/S Abd with hepatomegaly, no splenomegaly.  Broadened workup in process with fibrinogen, antithrombin III, D-dimer, SPEP, immunofixation, ALFREDO, anti-dsDNA, ESR, reticulocytes, HIT panel.  - Additional 1U Plt today; 6th dose. Appreciate transfusion medicine recommendations, will give as 1/2 amounts q12hr.  - Received 4 day course of dexamethasone 40mg PO daily and 2 day course of IVIG without improvement.  Underwent bone marrow biopsy on 11/22.  Repeat platelet count was 3 K, plan to transfuse 1 unit today  - Appreciate hematology assistance.    History of CVA (cerebrovascular accident)  - Reported CVA with R sided weakness in 2021  - Holding plavix as discussed above; atorvastatin as under HLD.    CKD (chronic kidney disease) stage 3, GFR 30-59 ml/min  - Mild uptrend. Encourage PO fluid intake.  - Monitor, renally dose medications, avoid nephrotoxins.    Anemia, unspecified  Patient's anemia is currently controlled. Has not received any PRBCs to date. Etiology likely d/t chronic disease due to Chronic Kidney Disease/ESRD  Current CBC reviewed-   Lab Results   Component Value Date    HGB 8.9 (L) 11/23/2023    HCT 25.1 (L) 11/23/2023     Monitor serial CBC and transfuse if patient becomes hemodynamically unstable, symptomatic or H/H drops below 7/21.    Type 2 diabetes mellitus with circulatory disorder, without long-term current use of insulin  - Last HgbA1c 7.2 in 2021; repeat 4.6.  - Reports diet-controlled at home.  - Increasing hyperglycemia in setting of steroid use.  - Continue insulin detemir 18U subQ daily, insulin aspart 6U subQ TIDWM, continue moderate-dose sliding scale insulin aspart 1-10U subQ TIDWM PRN.  - Monitor with steroid cessation and decrease as appropriate.    Chronic gout  - Hold allopurinol given can be associated with thrombocytopenia.    Hyperlipidemia type II  - Reports having had muscle aches with atorvastatin 80 mg PO daily; does not wish to take at this time. Will continue to hold.    Primary hypertension  - Worsened in setting of  steroid administration.  - Continue metoprolol 50mg PO daily, nifedipine 60mg PO as BID. Hold further spironolactone for now with uptrending creatinine.  - Continue hydralazine 10mg IV q6hr PRN for SBP > 180, DBP > 100.      VTE Risk Mitigation (From admission, onward)           Ordered     IP VTE HIGH RISK PATIENT  Once         11/18/23 2128     Place sequential compression device  Until discontinued         11/18/23 2128     Reason for No Pharmacological VTE Prophylaxis  Once        Question:  Reasons:  Answer:  Thrombocytopenia    11/18/23 2128                    Discharge Planning   EL: 11/24/2023     Code Status: Full Code   Is the patient medically ready for discharge?: No    Reason for patient still in hospital (select all that apply): Patient trending condition, Laboratory test, Treatment, Consult recommendations, and Pending disposition  Discharge Plan A: Home with family                  Pamela Camacho MD  Department of Hospital Medicine   [facility] - Intensive Care ([facility]

## 2023-11-24 PROBLEM — D69.3 IDIOPATHIC THROMBOCYTOPENIC PURPURA (ITP): Status: ACTIVE | Noted: 2023-11-24

## 2023-11-24 LAB
ALBUMIN SERPL BCP-MCNC: 3.1 G/DL (ref 3.5–5.2)
ALP SERPL-CCNC: 51 U/L (ref 55–135)
ALT SERPL W/O P-5'-P-CCNC: 10 U/L (ref 10–44)
ANION GAP SERPL CALC-SCNC: 6 MMOL/L (ref 8–16)
ANISOCYTOSIS BLD QL SMEAR: SLIGHT
ANTI SM ANTIBODY: 0.17 RATIO (ref 0–0.99)
ANTI SM/RNP ANTIBODY: 0.41 RATIO (ref 0–0.99)
ANTI-SM INTERPRETATION: NEGATIVE
ANTI-SM/RNP INTERPRETATION: NEGATIVE
ANTI-SSA ANTIBODY: 0.52 RATIO (ref 0–0.99)
ANTI-SSA INTERPRETATION: NEGATIVE
ANTI-SSB ANTIBODY: 0.15 RATIO (ref 0–0.99)
ANTI-SSB INTERPRETATION: NEGATIVE
AST SERPL-CCNC: 15 U/L (ref 10–40)
AT III ACT/NOR PPP CHRO: 125 % (ref 83–118)
BASO STIPL BLD QL SMEAR: ABNORMAL
BASOPHILS # BLD AUTO: 0.02 K/UL (ref 0–0.2)
BASOPHILS NFR BLD: 0.2 % (ref 0–1.9)
BILIRUB DIRECT SERPL-MCNC: 0.7 MG/DL (ref 0.1–0.3)
BILIRUB SERPL-MCNC: 2.4 MG/DL (ref 0.1–1)
BUN SERPL-MCNC: 65 MG/DL (ref 8–23)
CALCIUM SERPL-MCNC: 10.2 MG/DL (ref 8.7–10.5)
CHLORIDE SERPL-SCNC: 108 MMOL/L (ref 95–110)
CO2 SERPL-SCNC: 23 MMOL/L (ref 23–29)
CREAT SERPL-MCNC: 1.4 MG/DL (ref 0.5–1.4)
DIFFERENTIAL METHOD: ABNORMAL
DSDNA AB SER-ACNC: NORMAL [IU]/ML
EOSINOPHIL # BLD AUTO: 0 K/UL (ref 0–0.5)
EOSINOPHIL NFR BLD: 0 % (ref 0–8)
ERYTHROCYTE [DISTWIDTH] IN BLOOD BY AUTOMATED COUNT: 17.2 % (ref 11.5–14.5)
EST. GFR  (NO RACE VARIABLE): 41 ML/MIN/1.73 M^2
GLUCOSE SERPL-MCNC: 202 MG/DL (ref 70–110)
HCT VFR BLD AUTO: 26.1 % (ref 37–48.5)
HGB BLD-MCNC: 9.2 G/DL (ref 12–16)
IMM GRANULOCYTES # BLD AUTO: 0.4 K/UL (ref 0–0.04)
IMM GRANULOCYTES NFR BLD AUTO: 4.1 % (ref 0–0.5)
LYMPHOCYTES # BLD AUTO: 0.8 K/UL (ref 1–4.8)
LYMPHOCYTES NFR BLD: 8 % (ref 18–48)
MCH RBC QN AUTO: 34.6 PG (ref 27–31)
MCHC RBC AUTO-ENTMCNC: 35.2 G/DL (ref 32–36)
MCV RBC AUTO: 98 FL (ref 82–98)
MONOCYTES # BLD AUTO: 0.3 K/UL (ref 0.3–1)
MONOCYTES NFR BLD: 3.3 % (ref 4–15)
NEUTROPHILS # BLD AUTO: 8.3 K/UL (ref 1.8–7.7)
NEUTROPHILS NFR BLD: 84.4 % (ref 38–73)
NRBC BLD-RTO: 2 /100 WBC
OVALOCYTES BLD QL SMEAR: ABNORMAL
PLATELET # BLD AUTO: 2 K/UL (ref 150–450)
PLATELET BLD QL SMEAR: ABNORMAL
PMV BLD AUTO: ABNORMAL FL (ref 9.2–12.9)
POCT GLUCOSE: 111 MG/DL (ref 70–110)
POCT GLUCOSE: 116 MG/DL (ref 70–110)
POCT GLUCOSE: 135 MG/DL (ref 70–110)
POCT GLUCOSE: 172 MG/DL (ref 70–110)
POCT GLUCOSE: 174 MG/DL (ref 70–110)
POIKILOCYTOSIS BLD QL SMEAR: SLIGHT
POLYCHROMASIA BLD QL SMEAR: ABNORMAL
POTASSIUM SERPL-SCNC: 4.2 MMOL/L (ref 3.5–5.1)
PROT SERPL-MCNC: 8.2 G/DL (ref 6–8.4)
RBC # BLD AUTO: 2.66 M/UL (ref 4–5.4)
SODIUM SERPL-SCNC: 137 MMOL/L (ref 136–145)
WBC # BLD AUTO: 9.77 K/UL (ref 3.9–12.7)
WBC TOXIC VACUOLES BLD QL SMEAR: PRESENT

## 2023-11-24 PROCEDURE — 63600175 PHARM REV CODE 636 W HCPCS: Performed by: HOSPITALIST

## 2023-11-24 PROCEDURE — 25000003 PHARM REV CODE 250: Performed by: STUDENT IN AN ORGANIZED HEALTH CARE EDUCATION/TRAINING PROGRAM

## 2023-11-24 PROCEDURE — 82955 ASSAY OF G6PD ENZYME: CPT | Performed by: INTERNAL MEDICINE

## 2023-11-24 PROCEDURE — 88185 FLOWCYTOMETRY/TC ADD-ON: CPT | Mod: 91 | Performed by: INTERNAL MEDICINE

## 2023-11-24 PROCEDURE — 25000003 PHARM REV CODE 250: Performed by: INTERNAL MEDICINE

## 2023-11-24 PROCEDURE — 25000003 PHARM REV CODE 250: Performed by: HOSPITALIST

## 2023-11-24 PROCEDURE — 80048 BASIC METABOLIC PNL TOTAL CA: CPT | Performed by: STUDENT IN AN ORGANIZED HEALTH CARE EDUCATION/TRAINING PROGRAM

## 2023-11-24 PROCEDURE — 36415 COLL VENOUS BLD VENIPUNCTURE: CPT | Performed by: STUDENT IN AN ORGANIZED HEALTH CARE EDUCATION/TRAINING PROGRAM

## 2023-11-24 PROCEDURE — 86022 PLATELET ANTIBODIES: CPT | Performed by: INTERNAL MEDICINE

## 2023-11-24 PROCEDURE — 20600001 HC STEP DOWN PRIVATE ROOM

## 2023-11-24 PROCEDURE — 36415 COLL VENOUS BLD VENIPUNCTURE: CPT | Performed by: INTERNAL MEDICINE

## 2023-11-24 PROCEDURE — 25000003 PHARM REV CODE 250: Performed by: PHYSICIAN ASSISTANT

## 2023-11-24 PROCEDURE — 80076 HEPATIC FUNCTION PANEL: CPT | Performed by: INTERNAL MEDICINE

## 2023-11-24 PROCEDURE — 85025 COMPLETE CBC W/AUTO DIFF WBC: CPT | Performed by: STUDENT IN AN ORGANIZED HEALTH CARE EDUCATION/TRAINING PROGRAM

## 2023-11-24 RX ORDER — TENOFOVIR DISOPROXIL FUMARATE 300 MG/1
300 TABLET, FILM COATED ORAL EVERY OTHER DAY
Status: DISCONTINUED | OUTPATIENT
Start: 2023-11-26 | End: 2023-11-28

## 2023-11-24 RX ORDER — TRANEXAMIC ACID 100 MG/ML
10 INJECTION, SOLUTION INTRAVENOUS 3 TIMES DAILY
Status: DISCONTINUED | OUTPATIENT
Start: 2023-11-24 | End: 2023-11-24

## 2023-11-24 RX ORDER — TENOFOVIR DISOPROXIL FUMARATE 300 MG/1
300 TABLET, FILM COATED ORAL DAILY
Status: DISCONTINUED | OUTPATIENT
Start: 2023-11-24 | End: 2023-11-24

## 2023-11-24 RX ORDER — METOPROLOL SUCCINATE 100 MG/1
100 TABLET, EXTENDED RELEASE ORAL DAILY
Status: DISCONTINUED | OUTPATIENT
Start: 2023-11-25 | End: 2023-11-30 | Stop reason: HOSPADM

## 2023-11-24 RX ORDER — METOPROLOL SUCCINATE 50 MG/1
50 TABLET, EXTENDED RELEASE ORAL ONCE
Status: COMPLETED | OUTPATIENT
Start: 2023-11-24 | End: 2023-11-24

## 2023-11-24 RX ADMIN — FLUOXETINE 20 MG: 20 CAPSULE ORAL at 09:11

## 2023-11-24 RX ADMIN — METOPROLOL SUCCINATE 50 MG: 50 TABLET, EXTENDED RELEASE ORAL at 04:11

## 2023-11-24 RX ADMIN — INSULIN ASPART 6 UNITS: 100 INJECTION, SOLUTION INTRAVENOUS; SUBCUTANEOUS at 05:11

## 2023-11-24 RX ADMIN — INSULIN DETEMIR 18 UNITS: 100 INJECTION, SOLUTION SUBCUTANEOUS at 09:11

## 2023-11-24 RX ADMIN — METOPROLOL SUCCINATE 50 MG: 50 TABLET, EXTENDED RELEASE ORAL at 09:11

## 2023-11-24 RX ADMIN — TRANEXAMIC ACID 836 MG: 100 INJECTION, SOLUTION INTRAVENOUS at 05:11

## 2023-11-24 RX ADMIN — INSULIN ASPART 2 UNITS: 100 INJECTION, SOLUTION INTRAVENOUS; SUBCUTANEOUS at 09:11

## 2023-11-24 RX ADMIN — PANTOPRAZOLE SODIUM 40 MG: 40 TABLET, DELAYED RELEASE ORAL at 04:11

## 2023-11-24 RX ADMIN — NIFEDIPINE 60 MG: 30 TABLET, FILM COATED, EXTENDED RELEASE ORAL at 09:11

## 2023-11-24 RX ADMIN — TENOFOVIR DISOPROXIL FUMARATE 300 MG: 300 TABLET ORAL at 03:11

## 2023-11-24 RX ADMIN — MUPIROCIN: 20 OINTMENT TOPICAL at 09:11

## 2023-11-24 RX ADMIN — PREDNISONE 80 MG: 20 TABLET ORAL at 06:11

## 2023-11-24 RX ADMIN — TRANEXAMIC ACID 836 MG: 100 INJECTION, SOLUTION INTRAVENOUS at 08:11

## 2023-11-24 RX ADMIN — NIFEDIPINE 60 MG: 30 TABLET, FILM COATED, EXTENDED RELEASE ORAL at 08:11

## 2023-11-24 RX ADMIN — INSULIN ASPART 6 UNITS: 100 INJECTION, SOLUTION INTRAVENOUS; SUBCUTANEOUS at 09:11

## 2023-11-24 RX ADMIN — INSULIN ASPART 6 UNITS: 100 INJECTION, SOLUTION INTRAVENOUS; SUBCUTANEOUS at 12:11

## 2023-11-24 RX ADMIN — FOLIC ACID 1 MG: 1 TABLET ORAL at 09:11

## 2023-11-24 RX ADMIN — PANTOPRAZOLE SODIUM 40 MG: 40 TABLET, DELAYED RELEASE ORAL at 06:11

## 2023-11-24 NOTE — SUBJECTIVE & OBJECTIVE
Interval History: No acute events overnight.  No hematuria or melena.  Patient received 1 dose of platelets yesterday.  Platelet count did not improve this a.m. platelet count 11/24:2k      Review of Systems   Constitutional:  Negative for chills and fever.   Respiratory:  Negative for cough and shortness of breath.    Cardiovascular:  Negative for chest pain and palpitations.   Gastrointestinal:  Negative for abdominal pain, nausea and vomiting.     Objective:     Vital Signs (Most Recent):  Temp: 98.3 °F (36.8 °C) (11/24/23 1205)  Pulse: 70 (11/24/23 1205)  Resp: (!) 22 (11/24/23 1205)  BP: (!) 187/75 (11/24/23 1205)  SpO2: 97 % (11/24/23 1205) Vital Signs (24h Range):  Temp:  [98 °F (36.7 °C)-99.3 °F (37.4 °C)] 98.3 °F (36.8 °C)  Pulse:  [63-78] 70  Resp:  [16-33] 22  SpO2:  [93 %-97 %] 97 %  BP: (133-187)/(62-81) 187/75     Weight: 83.6 kg (184 lb 4.9 oz)  Body mass index is 30.67 kg/m².    Intake/Output Summary (Last 24 hours) at 11/24/2023 1518  Last data filed at 11/24/2023 1231  Gross per 24 hour   Intake 306.33 ml   Output 1100 ml   Net -793.67 ml           Physical Exam  Vitals and nursing note reviewed.   Constitutional:       General: She is not in acute distress.     Appearance: She is well-developed.   HENT:      Head: Normocephalic and atraumatic.      Mouth/Throat:      Comments: Purpura to tongue / oral mucosa.  Eyes:      General:         Right eye: No discharge.         Left eye: No discharge.      Conjunctiva/sclera: Conjunctivae normal.   Cardiovascular:      Rate and Rhythm: Normal rate.      Pulses: Normal pulses.   Pulmonary:      Effort: Pulmonary effort is normal. No respiratory distress.   Abdominal:      Palpations: Abdomen is soft.      Tenderness: There is no abdominal tenderness.   Musculoskeletal:         General: Normal range of motion.      Right lower leg: No edema.      Left lower leg: No edema.   Skin:     General: Skin is warm and dry.      Comments: Some petechiae / bruising  noted.   Neurological:      Mental Status: She is alert and oriented to person, place, and time.       Significant Labs:   CBC:  Recent Labs   Lab 11/22/23  0556 11/23/23  0500 11/24/23  0449   WBC 10.91 8.98 9.77   HGB 7.7* 8.9* 9.2*   HCT 22.1* 25.1* 26.1*   PLT 3* 3* 2*   GRAN 77.6*  8.5* 83.5*  7.5 84.4*  8.3*   LYMPH 11.0*  1.2 8.7*  0.8* 8.0*  0.8*   MONO 7.0  0.8 2.9*  0.3 3.3*  0.3   EOS 0.0 0.0 0.0   BASO 0.01 0.04 0.02      CMP:  Recent Labs   Lab 11/22/23  0556 11/23/23  0500 11/24/23  0449 11/24/23  1002   * 138 137  --    K 4.1 4.2 4.2  --     107 108  --    CO2 22* 22* 23  --    BUN 62* 66* 65*  --    CREATININE 1.5* 1.5* 1.4  --    * 273* 202*  --    CALCIUM 10.1 9.8 10.2  --    MG 1.9  --   --   --    PHOS 3.8  --   --   --    ALKPHOS 46* 53*  --  51*   AST 13 16  --  15   ALT 11 13  --  10   BILITOT 1.7* 2.0*  --  2.4*   PROT 9.1* 8.6*  --  8.2   ALBUMIN 3.1* 3.1*  --  3.1*   ANIONGAP 8 9 6*  --         Significant Imaging: I have reviewed and interpreted all pertinent imaging results/findings within the past 24 hours.

## 2023-11-24 NOTE — ASSESSMENT & PLAN NOTE
Patient's anemia is currently controlled. Has not received any PRBCs to date. Etiology likely d/t chronic disease due to Chronic Kidney Disease/ESRD  Current CBC reviewed-   Lab Results   Component Value Date    HGB 9.2 (L) 11/24/2023    HCT 26.1 (L) 11/24/2023     Monitor serial CBC and transfuse if patient becomes hemodynamically unstable, symptomatic or H/H drops below 7/21.

## 2023-11-24 NOTE — ASSESSMENT & PLAN NOTE
Anemia of chronic disease  Hyperbilirubinemia  - History of hematologic abnormalities dating back to hospitalization in 2017.   - Seen by Dr. Howard in Hem/Onc clinic on 11/1/23 for mild anemia and thrombocytopenia noted for the past 7 months and new labs were ordered with follow up arranged for return; unable to see most recent outside labs. That note reports her H/H was recently 10.2/28, chronically elevated bilirubin, and depressed haptoglobin to undetectable levels.  - Severe thrombocytopenia highly suspicious for ITP, though failing to respond to date. Holding allopurinol, clopidogrel.  - Direct antiglobulin test negative. [id number] activity elevated; not consistent with TTP. U/S Abd with hepatomegaly, no splenomegaly. Broadened workup in process with fibrinogen, antithrombin III, D-dimer, SPEP, immunofixation, ALFREDO, anti-dsDNA, ESR, reticulocytes, HIT panel.  - Additional 1U Plt today; 6th dose. Appreciate transfusion medicine recommendations, will give as 1/2 amounts q12hr.  - Received 4 day course of dexamethasone 40mg PO daily and 2 day course of IVIG without improvement.  Underwent bone marrow biopsy on 11/22.  No significant improvement post transfusion.  Plan to administer rituximab.  As hep B workup was positive, patient was initiated on tenofovir.- Appreciate hematology assistance.

## 2023-11-24 NOTE — PLAN OF CARE
Plan of Care Note  Dx Thrombocytopenia    Shift Events 1 unit of platelets transfused    Goals of Care: monitor labs     Neuro: AAOx4, history of CVS with reight sided deficits     Vital Signs: borderline hypertensive systolic ranges from 130s to 169     Respiratory: room air     Diet: diabetic diet 2k betito     Is patient tolerating current diet? yes    GTTS: none at present    Urine Output/Bowel Movement: purewick, incontinence    Drains/Tubes/Tube Feeds (include total output/shift): purewick, see flowsheet    Lines: right arm midline, peripheral right ac      Accuchecks:ACHS    Skin: blanchable redness in the sacrum    Fall Risk Score: see flowsheet    Activity level? Up to side of bed with assistance, unable to stand 2+assist    Any scheduled procedures? None at present    Any safety concerns? Fall risk, reports dizziness when sitting up    Other: possible bone marrow transplant

## 2023-11-24 NOTE — PLAN OF CARE
Problem: Adult Inpatient Plan of Care  Goal: Plan of Care Review  11/24/2023 0318 by Leonid Rush RN  Outcome: Ongoing, Progressing  11/24/2023 0253 by Leonid Rush RN  Outcome: Ongoing, Progressing  Goal: Patient-Specific Goal (Individualized)  11/24/2023 0318 by Leonid Rush RN  Outcome: Ongoing, Progressing  11/24/2023 0253 by Leonid Rush RN  Outcome: Ongoing, Progressing  Goal: Absence of Hospital-Acquired Illness or Injury  11/24/2023 0318 by Leonid Rush RN  Outcome: Ongoing, Progressing  11/24/2023 0253 by Leonid Rush RN  Outcome: Ongoing, Progressing  Goal: Optimal Comfort and Wellbeing  11/24/2023 0318 by Leonid Rush RN  Outcome: Ongoing, Progressing  11/24/2023 0253 by Leonid Rush RN  Outcome: Ongoing, Progressing  Goal: Readiness for Transition of Care  11/24/2023 0318 by Leonid Rush RN  Outcome: Ongoing, Progressing  11/24/2023 0253 by Leonid Rush RN  Outcome: Ongoing, Progressing     Problem: Diabetes Comorbidity  Goal: Blood Glucose Level Within Targeted Range  11/24/2023 0318 by Leonid Rush RN  Outcome: Ongoing, Progressing  11/24/2023 0253 by Leonid Rush RN  Outcome: Ongoing, Progressing     Problem: Fall Injury Risk  Goal: Absence of Fall and Fall-Related Injury  11/24/2023 0318 by Leonid Rush RN  Outcome: Ongoing, Progressing  11/24/2023 0253 by Leonid Rush RN  Outcome: Ongoing, Progressing     Problem: Pain Acute  Goal: Acceptable Pain Control and Functional Ability  11/24/2023 0318 by Leonid Rush RN  Outcome: Ongoing, Progressing  11/24/2023 0253 by Leonid Rush RN  Outcome: Ongoing, Progressing     Problem: Anemia  Goal: Anemia Symptom Improvement  11/24/2023 0318 by Leonid Rush RN  Outcome: Ongoing, Progressing  11/24/2023 0253 by Leonid Rush RN  Outcome: Ongoing, Progressing     Problem: Hypertension Acute  Goal: Blood Pressure Within Desired Range  11/24/2023 0318 by Leonid Rush  RN  Outcome: Ongoing, Progressing  11/24/2023 0253 by Leonid Rush RN  Outcome: Ongoing, Progressing     Problem: Bleeding Risk or Actual (Thrombocytopenia)  Goal: Absence of Bleeding  11/24/2023 0318 by Leonid Rush RN  Outcome: Ongoing, Progressing  11/24/2023 0253 by Leonid Rush RN  Outcome: Ongoing, Progressing     Problem: Infection  Goal: Absence of Infection Signs and Symptoms  11/24/2023 0318 by Leonid Rush RN  Outcome: Ongoing, Progressing  11/24/2023 0253 by Leonid Rush RN  Outcome: Ongoing, Progressing     Problem: Skin Injury Risk Increased  Goal: Skin Health and Integrity  11/24/2023 0318 by Leonid Rush RN  Outcome: Ongoing, Progressing  11/24/2023 0253 by Leonid Rush RN  Outcome: Ongoing, Progressing

## 2023-11-24 NOTE — ASSESSMENT & PLAN NOTE
- Worsened in setting of steroid administration.  - Continue metoprolol 50mg PO daily, nifedipine 60mg PO as BID. Hold further spironolactone for now with uptrending creatinine.  Dose of metoprolol increased to 100 mg daily in the setting of hypertension  - Continue hydralazine 10mg IV q6hr PRN for SBP > 180, DBP > 100.

## 2023-11-24 NOTE — NURSING
Pt blood sugar monitored this shift. Pt has no complaints at this time. Pt to receive a unit of platelets.

## 2023-11-24 NOTE — PROGRESS NOTES
[facility] - Intensive Care ([address] Medicine  Progress Note    Patient Name: Wendy Viveros  MRN: [id number]  Patient Class: IP- Inpatient   Admission Date: 11/18/2023  Length of Stay: 6 days  Attending Physician: Pamela Camacho MD  Primary Care Provider: Ashley Harrell MD        Subjective:     Principal Problem:Thrombocytopenia        HPI:  Wendy Viveros is a 67-year-old female with a past medical history of hypertension, stroke with residual right-sided weakness, and remote history of possible hemolytic anemia who presented to the ED at [facility] with complaints of lesions in her mouth since yesterday.  She states she has also noticed some accompanying mild bleeding after brushing her teeth, but she denies any other bleeding.  She does report easy bruising however denies any additional rash.  Initially she denies any anticoagulant use however upon review of patient's chart she is on Plavix and aspirin.  Other than these lesions, she feels in her usual state of health.  She denies recent illness.  In the ED, labs notable for extreme thrombocytopenia with platelets of 3.  No schistocytes on the differential.  T bili elevated at 2.8.  This was discussed with hematologist on call Dr. Osorio, who suspects ITP, and recommended high-dose steroids and platelets be given with goal to increased platelet count above 10.  Of note, she was admitted at this same facility in 2017 for suspected hemolytic anemia, but it appears she was lost to follow-up with hematology.  [place] [facility] [place] after her PCP referred her there for mild anemia and mild thrombocytopenia with elevated T bili and low haptoglobin.    Overview/Hospital Course:  Ms. Viveros presented with bleeding gums and mouth lesions. Admitted with severe thrombocytopenia with suspected ITP. Treated with IV dexamethasone in ER and transfused 1U platelets. Hem/Onc consulted; converted to dexamethasone PO. No  overt evidence of bleeding but platelet count was slow to respond requiring additional platelet transfusions.  Patient was [place] [facility] [place].  Performed on 11/22.  As patient's platelet count was not responding to steroids, Heme-Onc decided to start the patient on rituximab.  Hepatitis-B panel was suggestive of past infection, hep B DNA pending.  Initiated patient on tenofovir for hepatitis-B prophylaxis.    Interval History: No acute events overnight.  No hematuria or melena.  Patient received 1 dose of platelets yesterday.  Platelet count did not improve this a.m. platelet count 11/24:2k      Review of Systems   Constitutional:  Negative for chills and fever.   Respiratory:  Negative for cough and shortness of breath.    Cardiovascular:  Negative for chest pain and palpitations.   Gastrointestinal:  Negative for abdominal pain, nausea and vomiting.     Objective:     Vital Signs (Most Recent):  Temp: 98.3 °F (36.8 °C) (11/24/23 1205)  Pulse: 70 (11/24/23 1205)  Resp: (!) 22 (11/24/23 1205)  BP: (!) 187/75 (11/24/23 1205)  SpO2: 97 % (11/24/23 1205) Vital Signs (24h Range):  Temp:  [98 °F (36.7 °C)-99.3 °F (37.4 °C)] 98.3 °F (36.8 °C)  Pulse:  [63-78] 70  Resp:  [16-33] 22  SpO2:  [93 %-97 %] 97 %  BP: (133-187)/(62-81) 187/75     Weight: 83.6 kg (184 lb 4.9 oz)  Body mass index is 30.67 kg/m².    Intake/Output Summary (Last 24 hours) at 11/24/2023 1518  Last data filed at 11/24/2023 1231  Gross per 24 hour   Intake 306.33 ml   Output 1100 ml   Net -793.67 ml           Physical Exam  Vitals and nursing note reviewed.   Constitutional:       General: She is not in acute distress.     Appearance: She is well-developed.   HENT:      Head: Normocephalic and atraumatic.      Mouth/Throat:      Comments: Purpura to tongue / oral mucosa.  Eyes:      General:         Right eye: No discharge.         Left eye: No discharge.      Conjunctiva/sclera: Conjunctivae normal.   Cardiovascular:      Rate and  Rhythm: Normal rate.      Pulses: Normal pulses.   Pulmonary:      Effort: Pulmonary effort is normal. No respiratory distress.   Abdominal:      Palpations: Abdomen is soft.      Tenderness: There is no abdominal tenderness.   Musculoskeletal:         General: Normal range of motion.      Right lower leg: No edema.      Left lower leg: No edema.   Skin:     General: Skin is warm and dry.      Comments: Some petechiae / bruising noted.   Neurological:      Mental Status: She is alert and oriented to person, place, and time.       Significant Labs:   CBC:  Recent Labs   Lab 11/22/23  0556 11/23/23  0500 11/24/23  0449   WBC 10.91 8.98 9.77   HGB 7.7* 8.9* 9.2*   HCT 22.1* 25.1* 26.1*   PLT 3* 3* 2*   GRAN 77.6*  8.5* 83.5*  7.5 84.4*  8.3*   LYMPH 11.0*  1.2 8.7*  0.8* 8.0*  0.8*   MONO 7.0  0.8 2.9*  0.3 3.3*  0.3   EOS 0.0 0.0 0.0   BASO 0.01 0.04 0.02      CMP:  Recent Labs   Lab 11/22/23  0556 11/23/23  0500 11/24/23  0449 11/24/23  1002   * 138 137  --    K 4.1 4.2 4.2  --     107 108  --    CO2 22* 22* 23  --    BUN 62* 66* 65*  --    CREATININE 1.5* 1.5* 1.4  --    * 273* 202*  --    CALCIUM 10.1 9.8 10.2  --    MG 1.9  --   --   --    PHOS 3.8  --   --   --    ALKPHOS 46* 53*  --  51*   AST 13 16  --  15   ALT 11 13  --  10   BILITOT 1.7* 2.0*  --  2.4*   PROT 9.1* 8.6*  --  8.2   ALBUMIN 3.1* 3.1*  --  3.1*   ANIONGAP 8 9 6*  --         Significant Imaging: I have reviewed and interpreted all pertinent imaging results/findings within the past 24 hours.    Assessment/Plan:      * Thrombocytopenia  Anemia of chronic disease  Hyperbilirubinemia  - History of hematologic abnormalities dating back to hospitalization in 2017.   - Seen by Dr. Howard in Hem/Onc clinic on 11/1/23 for mild anemia and thrombocytopenia noted for the past 7 months and new labs were ordered with follow up arranged for return; unable to see most recent outside labs. That note reports her H/H was recently  10.2/28, chronically elevated bilirubin, and depressed haptoglobin to undetectable levels.  - Severe thrombocytopenia highly suspicious for ITP, though failing to respond to date. Holding allopurinol, clopidogrel.  - Direct antiglobulin test negative. [id number] activity elevated; not consistent with TTP. U/S Abd with hepatomegaly, no splenomegaly. Broadened workup in process with fibrinogen, antithrombin III, D-dimer, SPEP, immunofixation, ALFREDO, anti-dsDNA, ESR, reticulocytes, HIT panel.  - Additional 1U Plt today; 6th dose. Appreciate transfusion medicine recommendations, will give as 1/2 amounts q12hr.  - Received 4 day course of dexamethasone 40mg PO daily and 2 day course of IVIG without improvement.  Underwent bone marrow biopsy on 11/22.  No significant improvement post transfusion.  Plan to administer rituximab.  As hep B workup was positive, patient was initiated on tenofovir.- Appreciate hematology assistance.    Idiopathic thrombocytopenic purpura (ITP)        History of CVA (cerebrovascular accident)  - Reported CVA with R sided weakness in 2021  - Holding plavix as discussed above; atorvastatin as under HLD.    CKD (chronic kidney disease) stage 3, GFR 30-59 ml/min  - Mild uptrend. Encourage PO fluid intake.  - Monitor, renally dose medications, avoid nephrotoxins.    Anemia, unspecified  Patient's anemia is currently controlled. Has not received any PRBCs to date. Etiology likely d/t chronic disease due to Chronic Kidney Disease/ESRD  Current CBC reviewed-   Lab Results   Component Value Date    HGB 9.2 (L) 11/24/2023    HCT 26.1 (L) 11/24/2023     Monitor serial CBC and transfuse if patient becomes hemodynamically unstable, symptomatic or H/H drops below 7/21.    Type 2 diabetes mellitus with circulatory disorder, without long-term current use of insulin  - Last HgbA1c 7.2 in 2021; repeat 4.6.  - Reports diet-controlled at home.  - Increasing hyperglycemia in setting of steroid use.  - Continue insulin  detemir 18U subQ daily, insulin aspart 6U subQ TIDWM, continue moderate-dose sliding scale insulin aspart 1-10U subQ TIDWM PRN.  - Monitor with steroid cessation and decrease as appropriate.    Chronic gout  - Hold allopurinol given can be associated with thrombocytopenia.    Hyperlipidemia type II  - Reports having had muscle aches with atorvastatin 80 mg PO daily; does not wish to take at this time. Will continue to hold.    Primary hypertension  - Worsened in setting of steroid administration.  - Continue metoprolol 50mg PO daily, nifedipine 60mg PO as BID. Hold further spironolactone for now with uptrending creatinine.  Dose of metoprolol increased to 100 mg daily in the setting of hypertension  - Continue hydralazine 10mg IV q6hr PRN for SBP > 180, DBP > 100.      VTE Risk Mitigation (From admission, onward)           Ordered     IP VTE HIGH RISK PATIENT  Once         11/18/23 2128     Place sequential compression device  Until discontinued         11/18/23 2128     Reason for No Pharmacological VTE Prophylaxis  Once        Question:  Reasons:  Answer:  Thrombocytopenia    11/18/23 2128                    Discharge Planning   EL: 11/27/2023     Code Status: Full Code   Is the patient medically ready for discharge?: No    Reason for patient still in hospital (select all that apply): Patient trending condition, Laboratory test, and Treatment  Discharge Plan A: Home with family                  Pamela Camacho MD  Department of Hospital Medicine   [facility] - Intensive Care ([place])

## 2023-11-24 NOTE — PLAN OF CARE
[facility] plan of care     67 year old female with hx of non immune hemolytic anemia in 2017 presents with severe thrombocytopenia, workup has been inconclusive so far.     She's s/p dex 40 mg x4 days, IVIG x2, and currently on prednisone. Hepatitis serologies showed hx of hepatitis B infection, HBV PCR pending, spoke with microbiology lab turn around time is 2-3 days. Plan for rituxan today.     US abdomen showed hepatomegaly/hepatic steatosis but spleen was normal.       Lab Results   Component Value Date    WBC 9.77 11/24/2023    HGB 9.2 (L) 11/24/2023    HCT 26.1 (L) 11/24/2023    MCV 98 11/24/2023    PLT 3 (LL) 11/23/2023       Severe thrombocytopenia, unclear etiology, concern for ITP s/p dex/IVIG without response. TTP/DIC/HIT were ruled out.   Hx of non-immune mediated hemolytic anemia responsive to steroids in the past  Hx of ischemic stroke on plavix     S/p bone marrow bx 11/22. Results pending.   SPEP/SIFE negative.   Currently on trial of prednisone 2 mg/kg (max 80 mg/day) daily. Please maintain GI ppx with PPI 40 mg daily  Hepatitis b serologies show evidence of previous infection. Hep B DNA is pending. Will need to start tenofovir for Hep B virus reactivation ppx. Plan for Rituxan 375 mg/m2 once weekly for 4 doses. Recheck Hep B DNA PCR in one week.   Hold plavix and chemo DVT ppx, mechanical ppx is recommended   Continue CBC and CMP daily. Utilize antifibrinolytic agents for mucosal bleeding TXA IV 10 mg/kg TID.   Antiplatelet antibodies, G6PD and PNH antigen testing ordered   Hematology team will follow     Krzysztof Berkowitz MD  Hematology and Medical Oncology fellow

## 2023-11-25 LAB
ANION GAP SERPL CALC-SCNC: 9 MMOL/L (ref 8–16)
ANISOCYTOSIS BLD QL SMEAR: SLIGHT
BASO STIPL BLD QL SMEAR: ABNORMAL
BASOPHILS # BLD AUTO: 0.03 K/UL (ref 0–0.2)
BASOPHILS NFR BLD: 0.2 % (ref 0–1.9)
BLD PROD TYP BPU: NORMAL
BLOOD UNIT EXPIRATION DATE: NORMAL
BLOOD UNIT TYPE CODE: 7300
BLOOD UNIT TYPE: NORMAL
BUN SERPL-MCNC: 64 MG/DL (ref 8–23)
BURR CELLS BLD QL SMEAR: ABNORMAL
CALCIUM SERPL-MCNC: 9.8 MG/DL (ref 8.7–10.5)
CHLORIDE SERPL-SCNC: 107 MMOL/L (ref 95–110)
CO2 SERPL-SCNC: 21 MMOL/L (ref 23–29)
CODING SYSTEM: NORMAL
CREAT SERPL-MCNC: 1.3 MG/DL (ref 0.5–1.4)
CROSSMATCH INTERPRETATION: NORMAL
DIFFERENTIAL METHOD: ABNORMAL
DISPENSE STATUS: NORMAL
EOSINOPHIL # BLD AUTO: 0 K/UL (ref 0–0.5)
EOSINOPHIL NFR BLD: 0.1 % (ref 0–8)
ERYTHROCYTE [DISTWIDTH] IN BLOOD BY AUTOMATED COUNT: 17.1 % (ref 11.5–14.5)
EST. GFR  (NO RACE VARIABLE): 44.8 ML/MIN/1.73 M^2
GLUCOSE SERPL-MCNC: 174 MG/DL (ref 70–110)
HCT VFR BLD AUTO: 26.8 % (ref 37–48.5)
HGB BLD-MCNC: 9.5 G/DL (ref 12–16)
IMM GRANULOCYTES # BLD AUTO: 0.39 K/UL (ref 0–0.04)
IMM GRANULOCYTES NFR BLD AUTO: 3.2 % (ref 0–0.5)
LYMPHOCYTES # BLD AUTO: 0.7 K/UL (ref 1–4.8)
LYMPHOCYTES NFR BLD: 5.9 % (ref 18–48)
MCH RBC QN AUTO: 34.9 PG (ref 27–31)
MCHC RBC AUTO-ENTMCNC: 35.4 G/DL (ref 32–36)
MCV RBC AUTO: 99 FL (ref 82–98)
MONOCYTES # BLD AUTO: 0.2 K/UL (ref 0.3–1)
MONOCYTES NFR BLD: 1.7 % (ref 4–15)
NEUTROPHILS # BLD AUTO: 10.9 K/UL (ref 1.8–7.7)
NEUTROPHILS NFR BLD: 88.9 % (ref 38–73)
NRBC BLD-RTO: 1 /100 WBC
OVALOCYTES BLD QL SMEAR: ABNORMAL
PLATELET # BLD AUTO: 2 K/UL (ref 150–450)
PLATELET BLD QL SMEAR: ABNORMAL
PMV BLD AUTO: ABNORMAL FL (ref 9.2–12.9)
POCT GLUCOSE: 122 MG/DL (ref 70–110)
POCT GLUCOSE: 166 MG/DL (ref 70–110)
POCT GLUCOSE: 182 MG/DL (ref 70–110)
POCT GLUCOSE: 202 MG/DL (ref 70–110)
POIKILOCYTOSIS BLD QL SMEAR: SLIGHT
POTASSIUM SERPL-SCNC: 4.2 MMOL/L (ref 3.5–5.1)
RBC # BLD AUTO: 2.72 M/UL (ref 4–5.4)
SODIUM SERPL-SCNC: 137 MMOL/L (ref 136–145)
SPHEROCYTES BLD QL SMEAR: ABNORMAL
UNIT NUMBER: NORMAL
WBC # BLD AUTO: 12.27 K/UL (ref 3.9–12.7)

## 2023-11-25 PROCEDURE — 25000003 PHARM REV CODE 250: Performed by: HOSPITALIST

## 2023-11-25 PROCEDURE — 80048 BASIC METABOLIC PNL TOTAL CA: CPT | Performed by: STUDENT IN AN ORGANIZED HEALTH CARE EDUCATION/TRAINING PROGRAM

## 2023-11-25 PROCEDURE — P9035 PLATELET PHERES LEUKOREDUCED: HCPCS | Performed by: STUDENT IN AN ORGANIZED HEALTH CARE EDUCATION/TRAINING PROGRAM

## 2023-11-25 PROCEDURE — 25000003 PHARM REV CODE 250: Performed by: INTERNAL MEDICINE

## 2023-11-25 PROCEDURE — 25000003 PHARM REV CODE 250: Performed by: STUDENT IN AN ORGANIZED HEALTH CARE EDUCATION/TRAINING PROGRAM

## 2023-11-25 PROCEDURE — 63600175 PHARM REV CODE 636 W HCPCS: Performed by: HOSPITALIST

## 2023-11-25 PROCEDURE — 25000003 PHARM REV CODE 250: Performed by: PHYSICIAN ASSISTANT

## 2023-11-25 PROCEDURE — 36415 COLL VENOUS BLD VENIPUNCTURE: CPT | Performed by: STUDENT IN AN ORGANIZED HEALTH CARE EDUCATION/TRAINING PROGRAM

## 2023-11-25 PROCEDURE — 85025 COMPLETE CBC W/AUTO DIFF WBC: CPT | Performed by: STUDENT IN AN ORGANIZED HEALTH CARE EDUCATION/TRAINING PROGRAM

## 2023-11-25 PROCEDURE — 36430 TRANSFUSION BLD/BLD COMPNT: CPT

## 2023-11-25 PROCEDURE — 20600001 HC STEP DOWN PRIVATE ROOM

## 2023-11-25 RX ORDER — HYDROCODONE BITARTRATE AND ACETAMINOPHEN 500; 5 MG/1; MG/1
TABLET ORAL
Status: DISCONTINUED | OUTPATIENT
Start: 2023-11-25 | End: 2023-11-28

## 2023-11-25 RX ADMIN — NIFEDIPINE 60 MG: 30 TABLET, FILM COATED, EXTENDED RELEASE ORAL at 08:11

## 2023-11-25 RX ADMIN — INSULIN DETEMIR 18 UNITS: 100 INJECTION, SOLUTION SUBCUTANEOUS at 08:11

## 2023-11-25 RX ADMIN — INSULIN ASPART 2 UNITS: 100 INJECTION, SOLUTION INTRAVENOUS; SUBCUTANEOUS at 09:11

## 2023-11-25 RX ADMIN — FOLIC ACID 1 MG: 1 TABLET ORAL at 08:11

## 2023-11-25 RX ADMIN — PANTOPRAZOLE SODIUM 40 MG: 40 TABLET, DELAYED RELEASE ORAL at 05:11

## 2023-11-25 RX ADMIN — INSULIN ASPART 2 UNITS: 100 INJECTION, SOLUTION INTRAVENOUS; SUBCUTANEOUS at 08:11

## 2023-11-25 RX ADMIN — TRANEXAMIC ACID 836 MG: 100 INJECTION, SOLUTION INTRAVENOUS at 02:11

## 2023-11-25 RX ADMIN — INSULIN ASPART 2 UNITS: 100 INJECTION, SOLUTION INTRAVENOUS; SUBCUTANEOUS at 05:11

## 2023-11-25 RX ADMIN — METOPROLOL SUCCINATE 100 MG: 100 TABLET, EXTENDED RELEASE ORAL at 08:11

## 2023-11-25 RX ADMIN — ACETAMINOPHEN 650 MG: 325 TABLET, FILM COATED ORAL at 11:11

## 2023-11-25 RX ADMIN — INSULIN ASPART 6 UNITS: 100 INJECTION, SOLUTION INTRAVENOUS; SUBCUTANEOUS at 05:11

## 2023-11-25 RX ADMIN — INSULIN ASPART 6 UNITS: 100 INJECTION, SOLUTION INTRAVENOUS; SUBCUTANEOUS at 08:11

## 2023-11-25 RX ADMIN — FLUOXETINE 20 MG: 20 CAPSULE ORAL at 08:11

## 2023-11-25 RX ADMIN — TRANEXAMIC ACID 836 MG: 100 INJECTION, SOLUTION INTRAVENOUS at 09:11

## 2023-11-25 RX ADMIN — PREDNISONE 80 MG: 20 TABLET ORAL at 07:11

## 2023-11-25 RX ADMIN — MUPIROCIN: 20 OINTMENT TOPICAL at 08:11

## 2023-11-25 RX ADMIN — INSULIN ASPART 6 UNITS: 100 INJECTION, SOLUTION INTRAVENOUS; SUBCUTANEOUS at 12:11

## 2023-11-25 NOTE — PLAN OF CARE
Patient rounded on through shift as per policy, Patient in stable condition with no complaints. Safety measures in place: bed in lowest position, three rails up, call light in reach, personal belonging in reach. No acute events during shift. Plan of care ongoing.       Problem: Adult Inpatient Plan of Care  Goal: Plan of Care Review  Outcome: Ongoing, Progressing  Goal: Patient-Specific Goal (Individualized)  Outcome: Ongoing, Progressing  Goal: Absence of Hospital-Acquired Illness or Injury  Outcome: Ongoing, Progressing  Goal: Optimal Comfort and Wellbeing  Outcome: Ongoing, Progressing  Goal: Readiness for Transition of Care  Outcome: Ongoing, Progressing     Problem: Diabetes Comorbidity  Goal: Blood Glucose Level Within Targeted Range  Outcome: Ongoing, Progressing     Problem: Fall Injury Risk  Goal: Absence of Fall and Fall-Related Injury  Outcome: Ongoing, Progressing     Problem: Pain Acute  Goal: Acceptable Pain Control and Functional Ability  Outcome: Ongoing, Progressing     Problem: Anemia  Goal: Anemia Symptom Improvement  Outcome: Ongoing, Progressing     Problem: Hypertension Acute  Goal: Blood Pressure Within Desired Range  Outcome: Ongoing, Progressing     Problem: Bleeding Risk or Actual (Thrombocytopenia)  Goal: Absence of Bleeding  Outcome: Ongoing, Progressing     Problem: Infection  Goal: Absence of Infection Signs and Symptoms  Outcome: Ongoing, Progressing     Problem: Skin Injury Risk Increased  Goal: Skin Health and Integrity  Outcome: Ongoing, Progressing

## 2023-11-25 NOTE — PLAN OF CARE
Patient rounded on through shift as per policy, Patient in stable condition with no complaints. Safety measures in place: bed in lowest position, three rails up, call light in reach, personal belonging in reach. No acute events during shift. Patient received one unit of platelets today. Tolerated well. Plan of care ongoing.     Problem: Adult Inpatient Plan of Care  Goal: Plan of Care Review  Outcome: Ongoing, Progressing  Goal: Patient-Specific Goal (Individualized)  Outcome: Ongoing, Progressing  Goal: Absence of Hospital-Acquired Illness or Injury  Outcome: Ongoing, Progressing  Goal: Optimal Comfort and Wellbeing  Outcome: Ongoing, Progressing  Goal: Readiness for Transition of Care  Outcome: Ongoing, Progressing     Problem: Diabetes Comorbidity  Goal: Blood Glucose Level Within Targeted Range  Outcome: Ongoing, Progressing     Problem: Fall Injury Risk  Goal: Absence of Fall and Fall-Related Injury  Outcome: Ongoing, Progressing     Problem: Pain Acute  Goal: Acceptable Pain Control and Functional Ability  Outcome: Ongoing, Progressing     Problem: Anemia  Goal: Anemia Symptom Improvement  Outcome: Ongoing, Progressing     Problem: Hypertension Acute  Goal: Blood Pressure Within Desired Range  Outcome: Ongoing, Progressing     Problem: Bleeding Risk or Actual (Thrombocytopenia)  Goal: Absence of Bleeding  Outcome: Ongoing, Progressing     Problem: Infection  Goal: Absence of Infection Signs and Symptoms  Outcome: Ongoing, Progressing     Problem: Skin Injury Risk Increased  Goal: Skin Health and Integrity  Outcome: Ongoing, Progressing

## 2023-11-25 NOTE — PLAN OF CARE
"Plan of Care Note  Dx Thrombocytopenia     Shift Events :  Started on tranexamic acid  Patient developed nose bleed, Dr.Nath Obrien notified. Critical platelet result of 2 reported to . No new orders received. Per progress notes patient will be started on another medication to treat thrombocytopenia.   Patient sitting up in bed, VSS as of 0503am   Questions encouraged and feelings acknowledged. Patient frustrated with progression of condition. Reassurance provided. Plan of care discussed.      Goals of Care: monitor labs      Neuro: AAOx4, history of CVS with reight sided deficits      Vital Signs: borderline hypertensive systolic ranges from 130s to 169      Respiratory: room air      Diet: diabetic diet 2k betito      Is patient tolerating current diet? yes     GTTS: none at present     Urine Output/Bowel Movement: purewick, incontinence. Bm 11/25/2023 used bedpan 0600am     Drains/Tubes/Tube Feeds (include total output/shift): purewick, see flowsheet     Lines: right arm midline, peripheral right ac        Accuchecks:ACHS     Skin: blanchable redness in the sacrum     Fall Risk Score: see flowsheet     Activity level? per most recent assessment pt unable to stand /2+assist     Any scheduled procedures? None at present     Any safety concerns? Bleeding risk assessed. Patient had nose bleed, reported "feeling drip in the back of her throat". small clots and fresh red blood visualized out of both nares.  Fall risk, reports dizziness when sitting up     Other: possible bone marrow transplant        Problem: Adult Inpatient Plan of Care  Goal: Plan of Care Review  Outcome: Ongoing, Progressing  Goal: Patient-Specific Goal (Individualized)  Outcome: Ongoing, Progressing  Goal: Absence of Hospital-Acquired Illness or Injury  Outcome: Ongoing, Progressing  Goal: Optimal Comfort and Wellbeing  Outcome: Ongoing, Progressing  Goal: Readiness for Transition of Care  Outcome: Ongoing, Progressing     Problem: Diabetes " Comorbidity  Goal: Blood Glucose Level Within Targeted Range  Outcome: Ongoing, Progressing     Problem: Fall Injury Risk  Goal: Absence of Fall and Fall-Related Injury  Outcome: Ongoing, Progressing     Problem: Pain Acute  Goal: Acceptable Pain Control and Functional Ability  Outcome: Ongoing, Progressing     Problem: Anemia  Goal: Anemia Symptom Improvement  Outcome: Ongoing, Progressing     Problem: Hypertension Acute  Goal: Blood Pressure Within Desired Range  Outcome: Ongoing, Progressing     Problem: Bleeding Risk or Actual (Thrombocytopenia)  Goal: Absence of Bleeding  Outcome: Ongoing, Progressing     Problem: Infection  Goal: Absence of Infection Signs and Symptoms  Outcome: Ongoing, Progressing     Problem: Skin Injury Risk Increased  Goal: Skin Health and Integrity  Outcome: Ongoing, Progressing

## 2023-11-26 LAB
ALBUMIN SERPL BCP-MCNC: 3.1 G/DL (ref 3.5–5.2)
ALP SERPL-CCNC: 53 U/L (ref 55–135)
ALT SERPL W/O P-5'-P-CCNC: 14 U/L (ref 10–44)
ANION GAP SERPL CALC-SCNC: 9 MMOL/L (ref 8–16)
ANISOCYTOSIS BLD QL SMEAR: SLIGHT
AST SERPL-CCNC: 15 U/L (ref 10–40)
BASO STIPL BLD QL SMEAR: ABNORMAL
BASOPHILS # BLD AUTO: 0.03 K/UL (ref 0–0.2)
BASOPHILS # BLD AUTO: ABNORMAL K/UL (ref 0–0.2)
BASOPHILS NFR BLD: 0 % (ref 0–1.9)
BASOPHILS NFR BLD: 0.2 % (ref 0–1.9)
BILIRUB SERPL-MCNC: 2 MG/DL (ref 0.1–1)
BLD PROD TYP BPU: NORMAL
BLOOD UNIT EXPIRATION DATE: NORMAL
BLOOD UNIT TYPE CODE: 6200
BLOOD UNIT TYPE: NORMAL
BUN SERPL-MCNC: 65 MG/DL (ref 8–23)
CALCIUM SERPL-MCNC: 9.9 MG/DL (ref 8.7–10.5)
CHLORIDE SERPL-SCNC: 107 MMOL/L (ref 95–110)
CO2 SERPL-SCNC: 20 MMOL/L (ref 23–29)
CODING SYSTEM: NORMAL
CREAT SERPL-MCNC: 1.7 MG/DL (ref 0.5–1.4)
CROSSMATCH INTERPRETATION: NORMAL
DIFFERENTIAL METHOD: ABNORMAL
DIFFERENTIAL METHOD: ABNORMAL
DISPENSE STATUS: NORMAL
EOSINOPHIL # BLD AUTO: 0 K/UL (ref 0–0.5)
EOSINOPHIL # BLD AUTO: ABNORMAL K/UL (ref 0–0.5)
EOSINOPHIL NFR BLD: 0.1 % (ref 0–8)
EOSINOPHIL NFR BLD: 1 % (ref 0–8)
ERYTHROCYTE [DISTWIDTH] IN BLOOD BY AUTOMATED COUNT: 17.3 % (ref 11.5–14.5)
ERYTHROCYTE [DISTWIDTH] IN BLOOD BY AUTOMATED COUNT: 17.7 % (ref 11.5–14.5)
EST. GFR  (NO RACE VARIABLE): 32.5 ML/MIN/1.73 M^2
FLOW CYTOMETRY SPECIALIST REVIEW: NORMAL
GLUCOSE SERPL-MCNC: 254 MG/DL (ref 70–110)
HCT VFR BLD AUTO: 26.4 % (ref 37–48.5)
HCT VFR BLD AUTO: 28.3 % (ref 37–48.5)
HGB BLD-MCNC: 10.1 G/DL (ref 12–16)
HGB BLD-MCNC: 9.4 G/DL (ref 12–16)
HYPOCHROMIA BLD QL SMEAR: ABNORMAL
IMM GRANULOCYTES # BLD AUTO: 0.49 K/UL (ref 0–0.04)
IMM GRANULOCYTES # BLD AUTO: ABNORMAL K/UL (ref 0–0.04)
IMM GRANULOCYTES NFR BLD AUTO: 3.7 % (ref 0–0.5)
IMM GRANULOCYTES NFR BLD AUTO: ABNORMAL % (ref 0–0.5)
LYMPHOCYTES # BLD AUTO: 0.5 K/UL (ref 1–4.8)
LYMPHOCYTES # BLD AUTO: ABNORMAL K/UL (ref 1–4.8)
LYMPHOCYTES NFR BLD: 4 % (ref 18–48)
LYMPHOCYTES NFR BLD: 7 % (ref 18–48)
MCH RBC QN AUTO: 34.7 PG (ref 27–31)
MCH RBC QN AUTO: 35.1 PG (ref 27–31)
MCHC RBC AUTO-ENTMCNC: 35.6 G/DL (ref 32–36)
MCHC RBC AUTO-ENTMCNC: 35.7 G/DL (ref 32–36)
MCV RBC AUTO: 97 FL (ref 82–98)
MCV RBC AUTO: 99 FL (ref 82–98)
METAMYELOCYTES NFR BLD MANUAL: 1 %
MONOCYTES # BLD AUTO: 0.2 K/UL (ref 0.3–1)
MONOCYTES # BLD AUTO: ABNORMAL K/UL (ref 0.3–1)
MONOCYTES NFR BLD: 1.7 % (ref 4–15)
MONOCYTES NFR BLD: 6 % (ref 4–15)
MYELOCYTES NFR BLD MANUAL: 2 %
NEUTROPHILS # BLD AUTO: 11.9 K/UL (ref 1.8–7.7)
NEUTROPHILS NFR BLD: 82 % (ref 38–73)
NEUTROPHILS NFR BLD: 90.3 % (ref 38–73)
NEUTS BAND NFR BLD MANUAL: 1 %
NRBC BLD-RTO: 1 /100 WBC
NRBC BLD-RTO: 1 /100 WBC
OVALOCYTES BLD QL SMEAR: ABNORMAL
PLATELET # BLD AUTO: 2 K/UL (ref 150–450)
PLATELET # BLD AUTO: 3 K/UL (ref 150–450)
PLATELET BLD QL SMEAR: ABNORMAL
PLATELET BLD QL SMEAR: ABNORMAL
PMV BLD AUTO: ABNORMAL FL (ref 9.2–12.9)
PMV BLD AUTO: ABNORMAL FL (ref 9.2–12.9)
PNH GRANULOCYTES: 0 % (ref 0–0.01)
PNH MONOCYTES: 0 % (ref 0–0.05)
PNH RBC-COMPLETE AG LOSS: 0 % (ref 0–0.01)
PNH RBC-PARTIAL AG LOSS: 0 % (ref 0–0.99)
POCT GLUCOSE: 166 MG/DL (ref 70–110)
POCT GLUCOSE: 187 MG/DL (ref 70–110)
POCT GLUCOSE: 257 MG/DL (ref 70–110)
POCT GLUCOSE: 99 MG/DL (ref 70–110)
POIKILOCYTOSIS BLD QL SMEAR: SLIGHT
POLYCHROMASIA BLD QL SMEAR: ABNORMAL
POTASSIUM SERPL-SCNC: 4.1 MMOL/L (ref 3.5–5.1)
PROT SERPL-MCNC: 7.8 G/DL (ref 6–8.4)
RBC # BLD AUTO: 2.68 M/UL (ref 4–5.4)
RBC # BLD AUTO: 2.91 M/UL (ref 4–5.4)
SODIUM SERPL-SCNC: 136 MMOL/L (ref 136–145)
SPHEROCYTES BLD QL SMEAR: ABNORMAL
TARGETS BLD QL SMEAR: ABNORMAL
UNIT NUMBER: NORMAL
WBC # BLD AUTO: 13.21 K/UL (ref 3.9–12.7)
WBC # BLD AUTO: 17.49 K/UL (ref 3.9–12.7)

## 2023-11-26 PROCEDURE — 25000003 PHARM REV CODE 250: Performed by: INTERNAL MEDICINE

## 2023-11-26 PROCEDURE — 25000003 PHARM REV CODE 250: Performed by: PHYSICIAN ASSISTANT

## 2023-11-26 PROCEDURE — 25000003 PHARM REV CODE 250: Performed by: HOSPITALIST

## 2023-11-26 PROCEDURE — 63600175 PHARM REV CODE 636 W HCPCS: Performed by: INTERNAL MEDICINE

## 2023-11-26 PROCEDURE — 85025 COMPLETE CBC W/AUTO DIFF WBC: CPT | Performed by: STUDENT IN AN ORGANIZED HEALTH CARE EDUCATION/TRAINING PROGRAM

## 2023-11-26 PROCEDURE — 25000003 PHARM REV CODE 250: Performed by: STUDENT IN AN ORGANIZED HEALTH CARE EDUCATION/TRAINING PROGRAM

## 2023-11-26 PROCEDURE — P9035 PLATELET PHERES LEUKOREDUCED: HCPCS | Performed by: STUDENT IN AN ORGANIZED HEALTH CARE EDUCATION/TRAINING PROGRAM

## 2023-11-26 PROCEDURE — 80053 COMPREHEN METABOLIC PANEL: CPT | Performed by: STUDENT IN AN ORGANIZED HEALTH CARE EDUCATION/TRAINING PROGRAM

## 2023-11-26 PROCEDURE — 20600001 HC STEP DOWN PRIVATE ROOM

## 2023-11-26 PROCEDURE — 63600175 PHARM REV CODE 636 W HCPCS: Performed by: HOSPITALIST

## 2023-11-26 PROCEDURE — 36415 COLL VENOUS BLD VENIPUNCTURE: CPT | Performed by: STUDENT IN AN ORGANIZED HEALTH CARE EDUCATION/TRAINING PROGRAM

## 2023-11-26 RX ORDER — NIFEDIPINE 30 MG/1
90 TABLET, EXTENDED RELEASE ORAL 2 TIMES DAILY
Status: DISCONTINUED | OUTPATIENT
Start: 2023-11-26 | End: 2023-11-30 | Stop reason: HOSPADM

## 2023-11-26 RX ORDER — HYDROCODONE BITARTRATE AND ACETAMINOPHEN 500; 5 MG/1; MG/1
TABLET ORAL
Status: DISCONTINUED | OUTPATIENT
Start: 2023-11-26 | End: 2023-11-28

## 2023-11-26 RX ADMIN — FOLIC ACID 1 MG: 1 TABLET ORAL at 08:11

## 2023-11-26 RX ADMIN — INSULIN ASPART 6 UNITS: 100 INJECTION, SOLUTION INTRAVENOUS; SUBCUTANEOUS at 12:11

## 2023-11-26 RX ADMIN — TRANEXAMIC ACID 836 MG: 100 INJECTION, SOLUTION INTRAVENOUS at 08:11

## 2023-11-26 RX ADMIN — METOPROLOL SUCCINATE 100 MG: 100 TABLET, EXTENDED RELEASE ORAL at 08:11

## 2023-11-26 RX ADMIN — NIFEDIPINE 60 MG: 30 TABLET, FILM COATED, EXTENDED RELEASE ORAL at 08:11

## 2023-11-26 RX ADMIN — INSULIN ASPART 6 UNITS: 100 INJECTION, SOLUTION INTRAVENOUS; SUBCUTANEOUS at 04:11

## 2023-11-26 RX ADMIN — TRANEXAMIC ACID 836 MG: 100 INJECTION, SOLUTION INTRAVENOUS at 09:11

## 2023-11-26 RX ADMIN — NIFEDIPINE 90 MG: 30 TABLET, FILM COATED, EXTENDED RELEASE ORAL at 09:11

## 2023-11-26 RX ADMIN — INSULIN ASPART 2 UNITS: 100 INJECTION, SOLUTION INTRAVENOUS; SUBCUTANEOUS at 12:11

## 2023-11-26 RX ADMIN — PANTOPRAZOLE SODIUM 40 MG: 40 TABLET, DELAYED RELEASE ORAL at 03:11

## 2023-11-26 RX ADMIN — TRANEXAMIC ACID 836 MG: 100 INJECTION, SOLUTION INTRAVENOUS at 03:11

## 2023-11-26 RX ADMIN — PANTOPRAZOLE SODIUM 40 MG: 40 TABLET, DELAYED RELEASE ORAL at 05:11

## 2023-11-26 RX ADMIN — FLUOXETINE 20 MG: 20 CAPSULE ORAL at 08:11

## 2023-11-26 RX ADMIN — INSULIN ASPART 6 UNITS: 100 INJECTION, SOLUTION INTRAVENOUS; SUBCUTANEOUS at 09:11

## 2023-11-26 RX ADMIN — TENOFOVIR DISOPROXIL FUMARATE 300 MG: 300 TABLET ORAL at 08:11

## 2023-11-26 RX ADMIN — PREDNISONE 80 MG: 20 TABLET ORAL at 06:11

## 2023-11-26 RX ADMIN — INSULIN DETEMIR 18 UNITS: 100 INJECTION, SOLUTION SUBCUTANEOUS at 09:11

## 2023-11-26 RX ADMIN — HYDRALAZINE HYDROCHLORIDE 10 MG: 20 INJECTION, SOLUTION INTRAMUSCULAR; INTRAVENOUS at 03:11

## 2023-11-26 NOTE — CARE UPDATE
"RAPID RESPONSE NURSE CHART REVIEW        Chart Reviewed: 11/26/2023, 7:06 AM    MRN: [id number]  Bed: 20698/31970 A    Dx: Thrombocytopenia    Wendy Viveros has a past medical history of Abnormal EKG, Anemia, Aortic valve regurgitation, Arthritis, CKD (chronic kidney disease) stage 2, GFR 60-89 ml/min, CKD (chronic kidney disease) stage 2, GFR 60-89 ml/min, CVA (cerebral vascular accident), Diabetes mellitus, Diabetes mellitus type II, GERD (gastroesophageal reflux disease), Gout, unspecified, Heart murmur, Hyperlipidemia, Hypertension, and Tendonitis.    Last VS: BP (!) 158/69 (BP Location: Left arm, Patient Position: Lying)   Pulse 60   Temp 98.2 °F (36.8 °C) (Oral)   Resp 16   Ht 5' 5" (1.651 m)   Wt 83.6 kg (184 lb 4.9 oz)   SpO2 97%   BMI 30.67 kg/m²     24H Vital Sign Range:  Temp:  [97.5 °F (36.4 °C)-98.2 °F (36.8 °C)]   Pulse:  [60-93]   Resp:  [16-22]   BP: (141-164)/(63-93)   SpO2:  [97 %-100 %]     Level of Consciousness (AVPU): alert    Recent Labs     11/24/23  0449 11/25/23  0305 11/25/23  2143   WBC 9.77 12.27 17.49*   HGB 9.2* 9.5* 10.1*   HCT 26.1* 26.8* 28.3*   PLT 2* 2* 2*       Recent Labs     11/24/23  0449 11/25/23  0305    137   K 4.2 4.2    107   CO2 23 21*   BUN 65* 64*   CREATININE 1.4 1.3   * 174*          MEWS score: 1    Charge [facility]  contacted for AI alert overnight and platelet count 2. Reports no overt siigns of bleeding, H&H stable, VSS. No additional concerns verbalized at this time. Instructed to call [phone] for further concerns or assistance.    Vianey Stewart RN        "

## 2023-11-26 NOTE — PROGRESS NOTES
[facility] - Intensive Care ([address] Medicine  Progress Note    Patient Name: Wendy Viveros  MRN: [id number]  Patient Class: IP- Inpatient   Admission Date: 11/18/2023  Length of Stay: 7 days  Attending Physician: Pamela Camacoh MD  Primary Care Provider: Ashley Harrell MD        Subjective:     Principal Problem:Thrombocytopenia        HPI:  Wendy Viveros is a 67-year-old female with a past medical history of hypertension, stroke with residual right-sided weakness, and remote history of possible hemolytic anemia who presented to the ED at [facility] with complaints of lesions in her mouth since yesterday.  She states she has also noticed some accompanying mild bleeding after brushing her teeth, but she denies any other bleeding.  She does report easy bruising however denies any additional rash.  Initially she denies any anticoagulant use however upon review of patient's chart she is on Plavix and aspirin.  Other than these lesions, she feels in her usual state of health.  She denies recent illness.  In the ED, labs notable for extreme thrombocytopenia with platelets of 3.  No schistocytes on the differential.  T bili elevated at 2.8.  This was discussed with hematologist on call Dr. Osorio, who suspects ITP, and recommended high-dose steroids and platelets be given with goal to increased platelet count above 10.  Of note, she was admitted at this same facility in 2017 for suspected hemolytic anemia, but it appears she was lost to follow-up with hematology.  She did just see Hematology at [facility] 2 weeks ago after her PCP referred her there for mild anemia and mild thrombocytopenia with elevated T bili and low haptoglobin.    Overview/Hospital Course:  Ms. Viveros presented with bleeding gums and mouth lesions. Admitted with severe thrombocytopenia with suspected ITP. Treated with IV dexamethasone in ER and transfused 1U platelets. Hem/Onc consulted; converted to dexamethasone PO. No  overt evidence of bleeding but platelet count was slow to respond requiring additional platelet transfusions.  Patient was transferred to [facility] for bone marrow biopsy.  Performed on 11/22.  As patient's platelet count was not responding to steroids, Heme-Onc decided to start the patient on rituximab.  Hepatitis-B panel was suggestive of past infection, hep B DNA pending.  Initiated patient on tenofovir for hepatitis-B prophylaxis.  Initiated on tranexamic acid for nosebleed.    Interval History: Started on tranexamic acid  Patient developed nose bleed,  platelet result of 2 , nosebleed resolved with TXA.      Review of Systems   Constitutional:  Negative for chills and fever.   Respiratory:  Negative for cough and shortness of breath.    Cardiovascular:  Negative for chest pain and palpitations.   Gastrointestinal:  Negative for abdominal pain, nausea and vomiting.     Objective:     Vital Signs (Most Recent):  Temp: 97.8 °F (36.6 °C) (11/25/23 1715)  Pulse: 61 (11/25/23 1715)  Resp: 20 (11/25/23 1715)  BP: (!) 141/65 (11/25/23 1715)  SpO2: 100 % (11/25/23 1715) Vital Signs (24h Range):  Temp:  [97.5 °F (36.4 °C)-98.5 °F (36.9 °C)] 97.8 °F (36.6 °C)  Pulse:  [60-93] 61  Resp:  [19-26] 20  SpO2:  [94 %-100 %] 100 %  BP: (137-164)/(63-93) 141/65     Weight: 83.6 kg (184 lb 4.9 oz)  Body mass index is 30.67 kg/m².    Intake/Output Summary (Last 24 hours) at 11/25/2023 1944  Last data filed at 11/25/2023 1250  Gross per 24 hour   Intake 221 ml   Output 800 ml   Net -579 ml           Physical Exam  Vitals and nursing note reviewed.   Constitutional:       General: She is not in acute distress.     Appearance: She is well-developed.   HENT:      Head: Normocephalic and atraumatic.      Mouth/Throat:      Comments: Purpura to tongue / oral mucosa.  Eyes:      General:         Right eye: No discharge.         Left eye: No discharge.      Conjunctiva/sclera: Conjunctivae normal.   Cardiovascular:      Rate and Rhythm: Normal  rate.      Pulses: Normal pulses.   Pulmonary:      Effort: Pulmonary effort is normal. No respiratory distress.   Abdominal:      Palpations: Abdomen is soft.      Tenderness: There is no abdominal tenderness.   Musculoskeletal:         General: Normal range of motion.      Right lower leg: No edema.      Left lower leg: No edema.   Skin:     General: Skin is warm and dry.      Comments: Some petechiae / bruising noted.   Neurological:      Mental Status: She is alert and oriented to person, place, and time.       Significant Labs:   CBC:  Recent Labs   Lab 11/23/23  0500 11/24/23  0449 11/25/23  0305   WBC 8.98 9.77 12.27   HGB 8.9* 9.2* 9.5*   HCT 25.1* 26.1* 26.8*   PLT 3* 2* 2*   GRAN 83.5*  7.5 84.4*  8.3* 88.9*  10.9*   LYMPH 8.7*  0.8* 8.0*  0.8* 5.9*  0.7*   MONO 2.9*  0.3 3.3*  0.3 1.7*  0.2*   EOS 0.0 0.0 0.0   BASO 0.04 0.02 0.03      CMP:  Recent Labs   Lab 11/23/23  0500 11/24/23  0449 11/24/23  1002 11/25/23  0305    137  --  137   K 4.2 4.2  --  4.2    108  --  107   CO2 22* 23  --  21*   BUN 66* 65*  --  64*   CREATININE 1.5* 1.4  --  1.3   * 202*  --  174*   CALCIUM 9.8 10.2  --  9.8   ALKPHOS 53*  --  51*  --    AST 16  --  15  --    ALT 13  --  10  --    BILITOT 2.0*  --  2.4*  --    PROT 8.6*  --  8.2  --    ALBUMIN 3.1*  --  3.1*  --    ANIONGAP 9 6*  --  9        Significant Imaging: I have reviewed and interpreted all pertinent imaging results/findings within the past 24 hours.    Assessment/Plan:      * Thrombocytopenia  Anemia of chronic disease  Hyperbilirubinemia  - History of hematologic abnormalities dating back to hospitalization in 2017.   - Seen by Dr. Howard in Hem/Onc clinic on 11/1/23 for mild anemia and thrombocytopenia noted for the past 7 months and new labs were ordered with follow up arranged for return; unable to see most recent outside labs. That note reports her H/H was recently 10.2/28, chronically elevated bilirubin, and depressed haptoglobin  to undetectable levels.  - Severe thrombocytopenia highly suspicious for ITP, though failing to respond to date. Holding allopurinol, clopidogrel.  - Direct antiglobulin test negative. [id number] activity elevated; not consistent with TTP. U/S Abd with hepatomegaly, no splenomegaly. Broadened workup in process with fibrinogen, antithrombin III, D-dimer, SPEP, immunofixation, ALFREDO, anti-dsDNA, ESR, reticulocytes, HIT panel.  - Additional 1U Plt today; 8th dose. Appreciate transfusion medicine recommendations,- Received 4 day course of dexamethasone 40mg PO daily and 2 day course of IVIG without improvement.  Underwent bone marrow biopsy on 11/22.  No significant improvement post transfusion.  Plan to administer rituximab.  As hep B workup was positive, patient was initiated on tenofovir.- Appreciate hematology assistance.    Idiopathic thrombocytopenic purpura (ITP)        History of CVA (cerebrovascular accident)  - Reported CVA with R sided weakness in 2021  - Holding plavix as discussed above; atorvastatin as under HLD.    CKD (chronic kidney disease) stage 3, GFR 30-59 ml/min  - Mild uptrend. Encourage PO fluid intake.  - Monitor, renally dose medications, avoid nephrotoxins.    Anemia, unspecified  Patient's anemia is currently controlled. Has not received any PRBCs to date. Etiology likely d/t chronic disease due to Chronic Kidney Disease/ESRD  Current CBC reviewed-   Lab Results   Component Value Date    HGB 9.2 (L) 11/24/2023    HCT 26.1 (L) 11/24/2023     Monitor serial CBC and transfuse if patient becomes hemodynamically unstable, symptomatic or H/H drops below 7/21.    Type 2 diabetes mellitus with circulatory disorder, without long-term current use of insulin  - Last HgbA1c 7.2 in 2021; repeat 4.6.  - Reports diet-controlled at home.  - Increasing hyperglycemia in setting of steroid use.  - Continue insulin detemir 18U subQ daily, insulin aspart 6U subQ TIDWM, continue moderate-dose sliding scale insulin  aspart 1-10U subQ TIDWM PRN.  - Monitor with steroid cessation and decrease as appropriate.    Chronic gout  - Hold allopurinol given can be associated with thrombocytopenia.    Hyperlipidemia type II  - Reports having had muscle aches with atorvastatin 80 mg PO daily; does not wish to take at this time. Will continue to hold.    Primary hypertension  - Worsened in setting of steroid administration.  - Continue metoprolol 50mg PO daily, nifedipine 60mg PO as BID. Hold further spironolactone for now with uptrending creatinine.  Dose of metoprolol increased to 100 mg daily in the setting of hypertension  - Continue hydralazine 10mg IV q6hr PRN for SBP > 180, DBP > 100.      VTE Risk Mitigation (From admission, onward)           Ordered     IP VTE HIGH RISK PATIENT  Once         11/18/23 2128     Place sequential compression device  Until discontinued         11/18/23 2128     Reason for No Pharmacological VTE Prophylaxis  Once        Question:  Reasons:  Answer:  Thrombocytopenia    11/18/23 2128                    Discharge Planning   EL: 11/27/2023     Code Status: Full Code   Is the patient medically ready for discharge?: No    Reason for patient still in hospital (select all that apply): Patient trending condition, Laboratory test, Treatment, Consult recommendations, and Pending disposition  Discharge Plan A: Home with family                  Pamela Camacho MD  Department of Hospital Medicine   [facility] - Intensive Care ([place]

## 2023-11-26 NOTE — SUBJECTIVE & OBJECTIVE
Interval History: Started on tranexamic acid  Patient developed nose bleed,  platelet result of 2 , nosebleed resolved with TXA.      Review of Systems   Constitutional:  Negative for chills and fever.   Respiratory:  Negative for cough and shortness of breath.    Cardiovascular:  Negative for chest pain and palpitations.   Gastrointestinal:  Negative for abdominal pain, nausea and vomiting.     Objective:     Vital Signs (Most Recent):  Temp: 97.8 °F (36.6 °C) (11/25/23 1715)  Pulse: 61 (11/25/23 1715)  Resp: 20 (11/25/23 1715)  BP: (!) 141/65 (11/25/23 1715)  SpO2: 100 % (11/25/23 1715) Vital Signs (24h Range):  Temp:  [97.5 °F (36.4 °C)-98.5 °F (36.9 °C)] 97.8 °F (36.6 °C)  Pulse:  [60-93] 61  Resp:  [19-26] 20  SpO2:  [94 %-100 %] 100 %  BP: (137-164)/(63-93) 141/65     Weight: 83.6 kg (184 lb 4.9 oz)  Body mass index is 30.67 kg/m².    Intake/Output Summary (Last 24 hours) at 11/25/2023 1944  Last data filed at 11/25/2023 1250  Gross per 24 hour   Intake 221 ml   Output 800 ml   Net -579 ml           Physical Exam  Vitals and nursing note reviewed.   Constitutional:       General: She is not in acute distress.     Appearance: She is well-developed.   HENT:      Head: Normocephalic and atraumatic.      Mouth/Throat:      Comments: Purpura to tongue / oral mucosa.  Eyes:      General:         Right eye: No discharge.         Left eye: No discharge.      Conjunctiva/sclera: Conjunctivae normal.   Cardiovascular:      Rate and Rhythm: Normal rate.      Pulses: Normal pulses.   Pulmonary:      Effort: Pulmonary effort is normal. No respiratory distress.   Abdominal:      Palpations: Abdomen is soft.      Tenderness: There is no abdominal tenderness.   Musculoskeletal:         General: Normal range of motion.      Right lower leg: No edema.      Left lower leg: No edema.   Skin:     General: Skin is warm and dry.      Comments: Some petechiae / bruising noted.   Neurological:      Mental Status: She is alert and  oriented to person, place, and time.       Significant Labs:   CBC:  Recent Labs   Lab 11/23/23  0500 11/24/23  0449 11/25/23  0305   WBC 8.98 9.77 12.27   HGB 8.9* 9.2* 9.5*   HCT 25.1* 26.1* 26.8*   PLT 3* 2* 2*   GRAN 83.5*  7.5 84.4*  8.3* 88.9*  10.9*   LYMPH 8.7*  0.8* 8.0*  0.8* 5.9*  0.7*   MONO 2.9*  0.3 3.3*  0.3 1.7*  0.2*   EOS 0.0 0.0 0.0   BASO 0.04 0.02 0.03      CMP:  Recent Labs   Lab 11/23/23  0500 11/24/23  0449 11/24/23  1002 11/25/23  0305    137  --  137   K 4.2 4.2  --  4.2    108  --  107   CO2 22* 23  --  21*   BUN 66* 65*  --  64*   CREATININE 1.5* 1.4  --  1.3   * 202*  --  174*   CALCIUM 9.8 10.2  --  9.8   ALKPHOS 53*  --  51*  --    AST 16  --  15  --    ALT 13  --  10  --    BILITOT 2.0*  --  2.4*  --    PROT 8.6*  --  8.2  --    ALBUMIN 3.1*  --  3.1*  --    ANIONGAP 9 6*  --  9        Significant Imaging: I have reviewed and interpreted all pertinent imaging results/findings within the past 24 hours.

## 2023-11-26 NOTE — NURSING
Notified on-call for medicine team D Minesh Cardenas MD of pt's critical platelet level 2,000. No new orders at this time.

## 2023-11-26 NOTE — PLAN OF CARE
Problem: Adult Inpatient Plan of Care  Goal: Plan of Care Review  Outcome: Ongoing, Progressing  Goal: Patient-Specific Goal (Individualized)  Outcome: Ongoing, Progressing  Goal: Absence of Hospital-Acquired Illness or Injury  Outcome: Ongoing, Progressing  Goal: Optimal Comfort and Wellbeing  Outcome: Ongoing, Progressing  Goal: Readiness for Transition of Care  Outcome: Ongoing, Progressing     Problem: Diabetes Comorbidity  Goal: Blood Glucose Level Within Targeted Range  Outcome: Ongoing, Progressing     Problem: Fall Injury Risk  Goal: Absence of Fall and Fall-Related Injury  Outcome: Ongoing, Progressing     Problem: Pain Acute  Goal: Acceptable Pain Control and Functional Ability  Outcome: Ongoing, Progressing     Problem: Anemia  Goal: Anemia Symptom Improvement  Outcome: Ongoing, Progressing     Problem: Hypertension Acute  Goal: Blood Pressure Within Desired Range  Outcome: Ongoing, Progressing     Problem: Bleeding Risk or Actual (Thrombocytopenia)  Goal: Absence of Bleeding  Outcome: Ongoing, Progressing     Problem: Infection  Goal: Absence of Infection Signs and Symptoms  Outcome: Ongoing, Progressing     Problem: Skin Injury Risk Increased  Goal: Skin Health and Integrity  Outcome: Ongoing, Progressing    ACHS BG checks with PRN Insulin sliding scale and diabetic diet for diabetic management. Intermittent platelet infusions and Tranexamic acid infusions for severe thrombocytopenia; monitoring labs. Fall precautions in place. D/C home when medically cleared.

## 2023-11-26 NOTE — AI DETERIORATION ALERT
"RAPID RESPONSE NURSE AI ALERT       AI alert received.    Chart Reviewed: 11/26/2023, 5:36 AM    MRN: [id number]  Bed: 79191/57366 A    Dx: Thrombocytopenia    Wendy Viveros has a past medical history of Abnormal EKG, Anemia, Aortic valve regurgitation, Arthritis, CKD (chronic kidney disease) stage 2, GFR 60-89 ml/min, CKD (chronic kidney disease) stage 2, GFR 60-89 ml/min, CVA (cerebral vascular accident), Diabetes mellitus, Diabetes mellitus type II, GERD (gastroesophageal reflux disease), Gout, unspecified, Heart murmur, Hyperlipidemia, Hypertension, and Tendonitis.    Last VS: BP (!) 158/69 (BP Location: Left arm, Patient Position: Lying)   Pulse 60   Temp 98.2 °F (36.8 °C) (Oral)   Resp 16   Ht 5' 5" (1.651 m)   Wt 83.6 kg (184 lb 4.9 oz)   SpO2 97%   BMI 30.67 kg/m²     24H Vital Sign Range:  Temp:  [97.5 °F (36.4 °C)-98.2 °F (36.8 °C)]   Pulse:  [60-93]   Resp:  [16-22]   BP: (141-164)/(63-93)   SpO2:  [97 %-100 %]     Level of Consciousness (AVPU): alert    Recent Labs     11/24/23  0449 11/25/23  0305 11/25/23  2143   WBC 9.77 12.27 17.49*   HGB 9.2* 9.5* 10.1*   HCT 26.1* 26.8* 28.3*   PLT 2* 2* 2*       Recent Labs     11/24/23  0449 11/25/23  0305    137   K 4.2 4.2    107   CO2 23 21*   BUN 65* 64*   CREATININE 1.4 1.3   * 174*        No results for input(s): "PH", "PCO2", "PO2", "HCO3", "POCSATURATED", "BE" in the last 72 hours.     OXYGEN:             MEWS score: 1    Bedside RNLulú  contacted. No concerns verbalized at this time. Instructed to call [phone] for further concerns or assistance.    Gayla Abdul RN        "

## 2023-11-26 NOTE — CARE UPDATE
"RAPID RESPONSE NURSE CHART REVIEW        Chart Reviewed: 11/25/2023, 11:50 PM    MRN: [id number]  Bed: 08744/29303 A    Dx: Thrombocytopenia    Wendy Viveros has a past medical history of Abnormal EKG, Anemia, Aortic valve regurgitation, Arthritis, CKD (chronic kidney disease) stage 2, GFR 60-89 ml/min, CKD (chronic kidney disease) stage 2, GFR 60-89 ml/min, CVA (cerebral vascular accident), Diabetes mellitus, Diabetes mellitus type II, GERD (gastroesophageal reflux disease), Gout, unspecified, Heart murmur, Hyperlipidemia, Hypertension, and Tendonitis.    Last VS: BP (!) 161/71 (BP Location: Left arm, Patient Position: Lying)   Pulse 65   Temp 97.5 °F (36.4 °C) (Oral)   Resp 18   Ht 5' 5" (1.651 m)   Wt 83.6 kg (184 lb 4.9 oz)   SpO2 98%   BMI 30.67 kg/m²     24H Vital Sign Range:  Temp:  [97.5 °F (36.4 °C)-98.5 °F (36.9 °C)]   Pulse:  [60-93]   Resp:  [18-26]   BP: (137-164)/(63-93)   SpO2:  [97 %-100 %]     Level of Consciousness (AVPU): alert    Recent Labs     11/23/23  0500 11/24/23  0449 11/25/23  0305 11/25/23  2143   WBC 8.98 9.77 12.27 17.49*   HGB 8.9* 9.2* 9.5* 10.1*   HCT 25.1* 26.1* 26.8* 28.3*   PLT 3* 2* 2*  --        Recent Labs     11/23/23  0500 11/24/23  0449 11/25/23  0305    137 137   K 4.2 4.2 4.2    108 107   CO2 22* 23 21*   BUN 66* 65* 64*   CREATININE 1.5* 1.4 1.3   * 202* 174*        MEWS score: 1    Rounding completed with charge KEIRA Fragoso. contacted for Plt = 2. Reports plt given, recheck ordered. No additional concerns verbalized at this time. Instructed to call [facility] for further concerns or assistance.    Aguila Cobian RN        "

## 2023-11-26 NOTE — ASSESSMENT & PLAN NOTE
Anemia of chronic disease  Hyperbilirubinemia  - History of hematologic abnormalities dating back to hospitalization in 2017.   - Seen by Dr. Howard in Hem/Onc clinic on 11/1/23 for mild anemia and thrombocytopenia noted for the past 7 months and new labs were ordered with follow up arranged for return; unable to see most recent outside labs. That note reports her H/H was recently 10.2/28, chronically elevated bilirubin, and depressed haptoglobin to undetectable levels.  - Severe thrombocytopenia highly suspicious for ITP, though failing to respond to date. Holding allopurinol, clopidogrel.  - Direct antiglobulin test negative. [id number] activity elevated; not consistent with TTP. U/S Abd with hepatomegaly, no splenomegaly. Broadened workup in process with fibrinogen, antithrombin III, D-dimer, SPEP, immunofixation, ALFREDO, anti-dsDNA, ESR, reticulocytes, HIT panel.  - Additional 1U Plt today; 8th dose. Appreciate transfusion medicine recommendations,- Received 4 day course of dexamethasone 40mg PO daily and 2 day course of IVIG without improvement.  Underwent bone marrow biopsy on 11/22.  No significant improvement post transfusion.  Plan to administer rituximab.  As hep B workup was positive, patient was initiated on tenofovir.- Appreciate hematology assistance.

## 2023-11-27 LAB
ALBUMIN SERPL BCP-MCNC: 3.2 G/DL (ref 3.5–5.2)
ALP SERPL-CCNC: 51 U/L (ref 55–135)
ALT SERPL W/O P-5'-P-CCNC: 14 U/L (ref 10–44)
ANION GAP SERPL CALC-SCNC: 7 MMOL/L (ref 8–16)
AST SERPL-CCNC: 15 U/L (ref 10–40)
BASOPHILS # BLD AUTO: 0.02 K/UL (ref 0–0.2)
BASOPHILS NFR BLD: 0.2 % (ref 0–1.9)
BILIRUB SERPL-MCNC: 2.3 MG/DL (ref 0.1–1)
BLD PROD TYP BPU: NORMAL
BLOOD UNIT EXPIRATION DATE: NORMAL
BLOOD UNIT TYPE CODE: 6200
BLOOD UNIT TYPE: NORMAL
BUN SERPL-MCNC: 51 MG/DL (ref 8–23)
CALCIUM SERPL-MCNC: 10 MG/DL (ref 8.7–10.5)
CHLORIDE SERPL-SCNC: 110 MMOL/L (ref 95–110)
CO2 SERPL-SCNC: 19 MMOL/L (ref 23–29)
CODING SYSTEM: NORMAL
CREAT SERPL-MCNC: 1.2 MG/DL (ref 0.5–1.4)
CROSSMATCH INTERPRETATION: NORMAL
DIFFERENTIAL METHOD: ABNORMAL
DISPENSE STATUS: NORMAL
EOSINOPHIL # BLD AUTO: 0 K/UL (ref 0–0.5)
EOSINOPHIL NFR BLD: 0.2 % (ref 0–8)
ERYTHROCYTE [DISTWIDTH] IN BLOOD BY AUTOMATED COUNT: 17 % (ref 11.5–14.5)
EST. GFR  (NO RACE VARIABLE): 49.3 ML/MIN/1.73 M^2
G6PD RBC-CCNT: 12.5 U/G HB (ref 8–11.9)
GLUCOSE SERPL-MCNC: 203 MG/DL (ref 70–110)
HBV DNA SERPL QL NAA+PROBE: NOT DETECTED
HCT VFR BLD AUTO: 25.7 % (ref 37–48.5)
HEMATOLOGIST REVIEW: NORMAL
HGB BLD-MCNC: 9.1 G/DL (ref 12–16)
HLA-A+B+C AB SERPL QL IA: NORMAL
IMM GRANULOCYTES # BLD AUTO: 0.47 K/UL (ref 0–0.04)
IMM GRANULOCYTES NFR BLD AUTO: 3.5 % (ref 0–0.5)
LYMPHOCYTES # BLD AUTO: 0.7 K/UL (ref 1–4.8)
LYMPHOCYTES NFR BLD: 5.4 % (ref 18–48)
MCH RBC QN AUTO: 34.7 PG (ref 27–31)
MCHC RBC AUTO-ENTMCNC: 35.4 G/DL (ref 32–36)
MCV RBC AUTO: 98 FL (ref 82–98)
MONOCYTES # BLD AUTO: 0.5 K/UL (ref 0.3–1)
MONOCYTES NFR BLD: 3.6 % (ref 4–15)
NEUTROPHILS # BLD AUTO: 11.5 K/UL (ref 1.8–7.7)
NEUTROPHILS NFR BLD: 87.1 % (ref 38–73)
NRBC BLD-RTO: 1 /100 WBC
PLAT GP IA/IIA AB SER QL IA: NORMAL
PLAT GP IA/IIA AB SER QL IA: NORMAL
PLAT GP IB/IX AB SER QL IA: NORMAL
PLAT GP IIB/IIIA AB SER QL IA: NORMAL
PLAT GP IIB/IIIA AB SER QL IA: NORMAL
PLAT GP IV AB SERPL QL IA: NORMAL
PLATELET # BLD AUTO: 2 K/UL (ref 150–450)
PLATELET AB SER QL: NORMAL
PMV BLD AUTO: ABNORMAL FL (ref 9.2–12.9)
POCT GLUCOSE: 215 MG/DL (ref 70–110)
POCT GLUCOSE: 241 MG/DL (ref 70–110)
POCT GLUCOSE: 244 MG/DL (ref 70–110)
POCT GLUCOSE: 29 MG/DL (ref 70–110)
POCT GLUCOSE: 385 MG/DL (ref 70–110)
POTASSIUM SERPL-SCNC: 4 MMOL/L (ref 3.5–5.1)
PROT SERPL-MCNC: 7.6 G/DL (ref 6–8.4)
RBC # BLD AUTO: 2.62 M/UL (ref 4–5.4)
SODIUM SERPL-SCNC: 136 MMOL/L (ref 136–145)
UNIT NUMBER: NORMAL
WBC # BLD AUTO: 13.24 K/UL (ref 3.9–12.7)

## 2023-11-27 PROCEDURE — 85025 COMPLETE CBC W/AUTO DIFF WBC: CPT | Performed by: STUDENT IN AN ORGANIZED HEALTH CARE EDUCATION/TRAINING PROGRAM

## 2023-11-27 PROCEDURE — 80053 COMPREHEN METABOLIC PANEL: CPT | Performed by: STUDENT IN AN ORGANIZED HEALTH CARE EDUCATION/TRAINING PROGRAM

## 2023-11-27 PROCEDURE — 25000003 PHARM REV CODE 250: Performed by: INTERNAL MEDICINE

## 2023-11-27 PROCEDURE — P9035 PLATELET PHERES LEUKOREDUCED: HCPCS | Performed by: STUDENT IN AN ORGANIZED HEALTH CARE EDUCATION/TRAINING PROGRAM

## 2023-11-27 PROCEDURE — 25000003 PHARM REV CODE 250: Performed by: STUDENT IN AN ORGANIZED HEALTH CARE EDUCATION/TRAINING PROGRAM

## 2023-11-27 PROCEDURE — 25000003 PHARM REV CODE 250: Performed by: HOSPITALIST

## 2023-11-27 PROCEDURE — 25000003 PHARM REV CODE 250: Performed by: PHYSICIAN ASSISTANT

## 2023-11-27 PROCEDURE — 36430 TRANSFUSION BLD/BLD COMPNT: CPT

## 2023-11-27 PROCEDURE — 63600175 PHARM REV CODE 636 W HCPCS: Performed by: HOSPITALIST

## 2023-11-27 PROCEDURE — 63600175 PHARM REV CODE 636 W HCPCS: Performed by: INTERNAL MEDICINE

## 2023-11-27 PROCEDURE — 36415 COLL VENOUS BLD VENIPUNCTURE: CPT | Performed by: STUDENT IN AN ORGANIZED HEALTH CARE EDUCATION/TRAINING PROGRAM

## 2023-11-27 PROCEDURE — 20600001 HC STEP DOWN PRIVATE ROOM

## 2023-11-27 RX ORDER — ACETAMINOPHEN 325 MG/1
650 TABLET ORAL
Status: COMPLETED | OUTPATIENT
Start: 2023-11-27 | End: 2023-11-27

## 2023-11-27 RX ORDER — MEPERIDINE HYDROCHLORIDE 50 MG/ML
25 INJECTION INTRAMUSCULAR; INTRAVENOUS; SUBCUTANEOUS
Status: DISPENSED | OUTPATIENT
Start: 2023-11-27 | End: 2023-11-28

## 2023-11-27 RX ORDER — HEPARIN 100 UNIT/ML
500 SYRINGE INTRAVENOUS
Status: DISCONTINUED | OUTPATIENT
Start: 2023-11-27 | End: 2023-11-30 | Stop reason: HOSPADM

## 2023-11-27 RX ORDER — SODIUM CHLORIDE 0.9 % (FLUSH) 0.9 %
10 SYRINGE (ML) INJECTION
Status: DISCONTINUED | OUTPATIENT
Start: 2023-11-27 | End: 2023-11-30 | Stop reason: HOSPADM

## 2023-11-27 RX ORDER — HYDROCODONE BITARTRATE AND ACETAMINOPHEN 500; 5 MG/1; MG/1
TABLET ORAL
Status: DISCONTINUED | OUTPATIENT
Start: 2023-11-27 | End: 2023-11-30 | Stop reason: HOSPADM

## 2023-11-27 RX ORDER — FAMOTIDINE 10 MG/ML
20 INJECTION INTRAVENOUS
Status: COMPLETED | OUTPATIENT
Start: 2023-11-27 | End: 2023-11-27

## 2023-11-27 RX ORDER — HYDROCHLOROTHIAZIDE 25 MG/1
25 TABLET ORAL DAILY
Status: DISCONTINUED | OUTPATIENT
Start: 2023-11-27 | End: 2023-11-30 | Stop reason: HOSPADM

## 2023-11-27 RX ADMIN — TRANEXAMIC ACID 836 MG: 100 INJECTION, SOLUTION INTRAVENOUS at 03:11

## 2023-11-27 RX ADMIN — PANTOPRAZOLE SODIUM 40 MG: 40 TABLET, DELAYED RELEASE ORAL at 03:11

## 2023-11-27 RX ADMIN — FLUOXETINE 20 MG: 20 CAPSULE ORAL at 09:11

## 2023-11-27 RX ADMIN — NIFEDIPINE 90 MG: 30 TABLET, FILM COATED, EXTENDED RELEASE ORAL at 08:11

## 2023-11-27 RX ADMIN — FOLIC ACID 1 MG: 1 TABLET ORAL at 09:11

## 2023-11-27 RX ADMIN — TRANEXAMIC ACID 836 MG: 100 INJECTION, SOLUTION INTRAVENOUS at 09:11

## 2023-11-27 RX ADMIN — METOPROLOL SUCCINATE 100 MG: 100 TABLET, EXTENDED RELEASE ORAL at 09:11

## 2023-11-27 RX ADMIN — INSULIN ASPART 4 UNITS: 100 INJECTION, SOLUTION INTRAVENOUS; SUBCUTANEOUS at 05:11

## 2023-11-27 RX ADMIN — ELTROMBOPAG OLAMINE 50 MG: 50 TABLET, FILM COATED ORAL at 03:11

## 2023-11-27 RX ADMIN — PANTOPRAZOLE SODIUM 40 MG: 40 TABLET, DELAYED RELEASE ORAL at 05:11

## 2023-11-27 RX ADMIN — FAMOTIDINE 20 MG: 10 INJECTION, SOLUTION INTRAVENOUS at 01:11

## 2023-11-27 RX ADMIN — INSULIN ASPART 6 UNITS: 100 INJECTION, SOLUTION INTRAVENOUS; SUBCUTANEOUS at 09:11

## 2023-11-27 RX ADMIN — INSULIN ASPART 4 UNITS: 100 INJECTION, SOLUTION INTRAVENOUS; SUBCUTANEOUS at 01:11

## 2023-11-27 RX ADMIN — DEXTROSE MONOHYDRATE 250 ML: 100 INJECTION, SOLUTION INTRAVENOUS at 08:11

## 2023-11-27 RX ADMIN — INSULIN ASPART 6 UNITS: 100 INJECTION, SOLUTION INTRAVENOUS; SUBCUTANEOUS at 01:11

## 2023-11-27 RX ADMIN — INSULIN ASPART 4 UNITS: 100 INJECTION, SOLUTION INTRAVENOUS; SUBCUTANEOUS at 09:11

## 2023-11-27 RX ADMIN — NIFEDIPINE 90 MG: 30 TABLET, FILM COATED, EXTENDED RELEASE ORAL at 09:11

## 2023-11-27 RX ADMIN — TRANEXAMIC ACID 836 MG: 100 INJECTION, SOLUTION INTRAVENOUS at 08:11

## 2023-11-27 RX ADMIN — PREDNISONE 80 MG: 20 TABLET ORAL at 05:11

## 2023-11-27 RX ADMIN — SODIUM CHLORIDE: 9 INJECTION, SOLUTION INTRAVENOUS at 01:11

## 2023-11-27 RX ADMIN — ACETAMINOPHEN 650 MG: 325 TABLET ORAL at 01:11

## 2023-11-27 RX ADMIN — DIPHENHYDRAMINE HYDROCHLORIDE 50 MG: 50 INJECTION INTRAMUSCULAR; INTRAVENOUS at 01:11

## 2023-11-27 RX ADMIN — INSULIN DETEMIR 18 UNITS: 100 INJECTION, SOLUTION SUBCUTANEOUS at 09:11

## 2023-11-27 RX ADMIN — INSULIN ASPART 6 UNITS: 100 INJECTION, SOLUTION INTRAVENOUS; SUBCUTANEOUS at 05:11

## 2023-11-27 RX ADMIN — HYDROCHLOROTHIAZIDE 25 MG: 25 TABLET ORAL at 01:11

## 2023-11-27 NOTE — ASSESSMENT & PLAN NOTE
Patient's anemia is currently controlled. Has not received any PRBCs to date. Etiology likely d/t chronic disease due to Chronic Kidney Disease/ESRD  Current CBC reviewed-   Lab Results   Component Value Date    HGB 9.1 (L) 11/27/2023    HCT 25.7 (L) 11/27/2023     Monitor serial CBC and transfuse if patient becomes hemodynamically unstable, symptomatic or H/H drops below 7/21.

## 2023-11-27 NOTE — SUBJECTIVE & OBJECTIVE
Interval History: Started on tranexamic acid  nosebleed resolved with TXA.      Review of Systems   Constitutional:  Negative for chills and fever.   Respiratory:  Negative for cough and shortness of breath.    Cardiovascular:  Negative for chest pain and palpitations.   Gastrointestinal:  Negative for abdominal pain, nausea and vomiting.     Objective:     Vital Signs (Most Recent):  Temp: 98.3 °F (36.8 °C) (11/26/23 1945)  Pulse: 65 (11/26/23 1945)  Resp: (!) 33 (11/26/23 1945)  BP: (!) 172/74 (11/26/23 1945)  SpO2: 100 % (11/26/23 1945) Vital Signs (24h Range):  Temp:  [97.5 °F (36.4 °C)-98.3 °F (36.8 °C)] 98.3 °F (36.8 °C)  Pulse:  [57-65] 65  Resp:  [16-33] 33  SpO2:  [97 %-100 %] 100 %  BP: (157-181)/(69-86) 172/74     Weight: 83.6 kg (184 lb 4.9 oz)  Body mass index is 30.67 kg/m².    Intake/Output Summary (Last 24 hours) at 11/26/2023 2101  Last data filed at 11/26/2023 1600  Gross per 24 hour   Intake --   Output 1650 ml   Net -1650 ml           Physical Exam  Vitals and nursing note reviewed.   Constitutional:       General: She is not in acute distress.     Appearance: She is well-developed.   HENT:      Head: Normocephalic and atraumatic.      Mouth/Throat:      Comments: Purpura to tongue / oral mucosa.  Eyes:      General:         Right eye: No discharge.         Left eye: No discharge.      Conjunctiva/sclera: Conjunctivae normal.   Cardiovascular:      Rate and Rhythm: Normal rate.      Pulses: Normal pulses.   Pulmonary:      Effort: Pulmonary effort is normal. No respiratory distress.   Abdominal:      Palpations: Abdomen is soft.      Tenderness: There is no abdominal tenderness.   Musculoskeletal:         General: Normal range of motion.      Right lower leg: No edema.      Left lower leg: No edema.   Skin:     General: Skin is warm and dry.      Comments: Some petechiae / bruising noted.   Neurological:      Mental Status: She is alert and oriented to person, place, and time.       Significant  Labs:   CBC:  Recent Labs   Lab 11/25/23 0305 11/25/23  2143 11/26/23 0359   WBC 12.27 17.49* 13.21*   HGB 9.5* 10.1* 9.4*   HCT 26.8* 28.3* 26.4*   PLT 2* 2* 3*   GRAN 88.9*  10.9* 82.0* 90.3*  11.9*   LYMPH 5.9*  0.7* 7.0*  CANCELED 4.0*  0.5*   MONO 1.7*  0.2* 6.0  CANCELED 1.7*  0.2*   EOS 0.0 CANCELED 0.0   BASO 0.03 CANCELED 0.03      CMP:  Recent Labs   Lab 11/24/23 0449 11/24/23  1002 11/25/23 0305 11/26/23 0359     --  137 136   K 4.2  --  4.2 4.1     --  107 107   CO2 23  --  21* 20*   BUN 65*  --  64* 65*   CREATININE 1.4  --  1.3 1.7*   *  --  174* 254*   CALCIUM 10.2  --  9.8 9.9   ALKPHOS  --  51*  --  53*   AST  --  15  --  15   ALT  --  10  --  14   BILITOT  --  2.4*  --  2.0*   PROT  --  8.2  --  7.8   ALBUMIN  --  3.1*  --  3.1*   ANIONGAP 6*  --  9 9        Significant Imaging: I have reviewed and interpreted all pertinent imaging results/findings within the past 24 hours.

## 2023-11-27 NOTE — PLAN OF CARE
Patient rounded on through shift as per policy, Patient in stable condition with no complaints. Safety measures in place: bed in lowest position, three rails up, call light in reach, personal belonging in reach. Patient received one unit of platelet today, tolerated well. Patient blood pressure was elevated and required PRN hydralazine , medicine was effective in controlling her blood pressure. No acute event during shift. Plan of care ongoing.     Problem: Adult Inpatient Plan of Care  Goal: Plan of Care Review  Outcome: Ongoing, Progressing  Goal: Patient-Specific Goal (Individualized)  Outcome: Ongoing, Progressing  Goal: Absence of Hospital-Acquired Illness or Injury  Outcome: Ongoing, Progressing  Goal: Optimal Comfort and Wellbeing  Outcome: Ongoing, Progressing  Goal: Readiness for Transition of Care  Outcome: Ongoing, Progressing     Problem: Diabetes Comorbidity  Goal: Blood Glucose Level Within Targeted Range  Outcome: Ongoing, Progressing     Problem: Fall Injury Risk  Goal: Absence of Fall and Fall-Related Injury  Outcome: Ongoing, Progressing     Problem: Pain Acute  Goal: Acceptable Pain Control and Functional Ability  Outcome: Ongoing, Progressing     Problem: Anemia  Goal: Anemia Symptom Improvement  Outcome: Ongoing, Progressing     Problem: Hypertension Acute  Goal: Blood Pressure Within Desired Range  Outcome: Ongoing, Progressing     Problem: Bleeding Risk or Actual (Thrombocytopenia)  Goal: Absence of Bleeding  Outcome: Ongoing, Progressing     Problem: Infection  Goal: Absence of Infection Signs and Symptoms  Outcome: Ongoing, Progressing     Problem: Skin Injury Risk Increased  Goal: Skin Health and Integrity  Outcome: Ongoing, Progressing

## 2023-11-27 NOTE — SUBJECTIVE & OBJECTIVE
Interval History:   No overnight events.  No signs of active bleeding.  Patient hemodynamically stable.  Patient is optimistic  and hopeful that platelet count would improve post rituximab therapy.  Plan to transfuse 1 unit today.     Review of Systems   Constitutional:  Negative for chills and fever.   Respiratory:  Negative for cough and shortness of breath.    Cardiovascular:  Negative for chest pain and palpitations.   Gastrointestinal:  Negative for abdominal pain, nausea and vomiting.     Objective:     Vital Signs (Most Recent):  Temp: 97.7 °F (36.5 °C) (11/27/23 1155)  Pulse: 60 (11/27/23 1155)  Resp: 18 (11/27/23 1155)  BP: (!) 175/79 (11/27/23 1155)  SpO2: 100 % (11/27/23 1155) Vital Signs (24h Range):  Temp:  [97.6 °F (36.4 °C)-98.3 °F (36.8 °C)] 97.7 °F (36.5 °C)  Pulse:  [60-66] 60  Resp:  [14-37] 18  SpO2:  [98 %-100 %] 100 %  BP: (154-181)/(69-86) 175/79     Weight: 83.6 kg (184 lb 4.9 oz)  Body mass index is 30.67 kg/m².    Intake/Output Summary (Last 24 hours) at 11/27/2023 1213  Last data filed at 11/27/2023 0600  Gross per 24 hour   Intake 100 ml   Output 2050 ml   Net -1950 ml           Physical Exam  Vitals and nursing note reviewed.   Constitutional:       General: She is not in acute distress.     Appearance: She is well-developed.   HENT:      Head: Normocephalic and atraumatic.      Mouth/Throat:      Comments: Purpura to tongue / oral mucosa.  Eyes:      General:         Right eye: No discharge.         Left eye: No discharge.      Conjunctiva/sclera: Conjunctivae normal.   Cardiovascular:      Rate and Rhythm: Normal rate.      Pulses: Normal pulses.   Pulmonary:      Effort: Pulmonary effort is normal. No respiratory distress.   Abdominal:      Palpations: Abdomen is soft.      Tenderness: There is no abdominal tenderness.   Musculoskeletal:         General: Normal range of motion.      Right lower leg: No edema.      Left lower leg: No edema.   Skin:     General: Skin is warm and dry.       Comments: Some petechiae / bruising noted.   Neurological:      Mental Status: She is alert and oriented to person, place, and time.       Significant Labs:   CBC:  Recent Labs   Lab 11/25/23  2143 11/26/23  0359 11/27/23  0658   WBC 17.49* 13.21* 13.24*   HGB 10.1* 9.4* 9.1*   HCT 28.3* 26.4* 25.7*   PLT 2* 3* 2*   GRAN 82.0* 90.3*  11.9* 87.1*  11.5*   LYMPH 7.0*  CANCELED 4.0*  0.5* 5.4*  0.7*   MONO 6.0  CANCELED 1.7*  0.2* 3.6*  0.5   EOS CANCELED 0.0 0.0   BASO CANCELED 0.03 0.02      CMP:  Recent Labs   Lab 11/25/23  0305 11/26/23  0359 11/27/23  0658    136 136   K 4.2 4.1 4.0    107 110   CO2 21* 20* 19*   BUN 64* 65* 51*   CREATININE 1.3 1.7* 1.2   * 254* 203*   CALCIUM 9.8 9.9 10.0   ALKPHOS  --  53* 51*   AST  --  15 15   ALT  --  14 14   BILITOT  --  2.0* 2.3*   PROT  --  7.8 7.6   ALBUMIN  --  3.1* 3.2*   ANIONGAP 9 9 7*        Significant Imaging: I have reviewed and interpreted all pertinent imaging results/findings within the past 24 hours.

## 2023-11-27 NOTE — PLAN OF CARE
[facility] plan of care     67 year old female with hx of non immune hemolytic anemia in 2017 presents with severe thrombocytopenia, workup has been inconclusive so far.     She's s/p dex 40 mg x4 days, IVIG x2, and currently on prednisone 80 mg daily. Hepatitis serologies showed hx of hepatitis B infection, HBV PCR pending. Bone marrow bx done 11/22 is still pending.     Patient continues to have epistaxis.     US abdomen showed hepatomegaly/hepatic steatosis but spleen was normal.       Lab Results   Component Value Date    WBC 13.24 (H) 11/27/2023    HGB 9.1 (L) 11/27/2023    HCT 25.7 (L) 11/27/2023    MCV 98 11/27/2023    PLT 2 (LL) 11/27/2023     G6PD testing negative  SPEP/ALIX negative  PNH testing negative    Severe thrombocytopenia, unclear etiology, concern for ITP s/p dex/IVIG without response. TTP/DIC/HIT were ruled out.   Hx of non-immune mediated hemolytic anemia responsive to steroids in the past  Hx of ischemic stroke on plavix     S/p bone marrow bx 11/22. Results pending.   Currently on trial of prednisone 2 mg/kg (max 80 mg/day) daily. Please maintain GI ppx with PPI 40 mg daily  Hepatitis b serologies show evidence of previous infection. Hep B DNA is pending. Will need to start tenofovir for Hep B virus reactivation ppx. Plan for Rituxan 375 mg/m2 once weekly for 4 doses, there has been a delay in tx. Patient will receive first dose of rituxan toady. Recheck Hep B DNA PCR in one week.   Hold plavix and chemo DVT ppx, mechanical ppx is recommended   Given persistent thrombocytopenia refractory to steroids, IVIG. Rh- patient won't benefit from rhogam. Recommend trial of eltrombopag starting at 50 mg once daily. Rituxan will take 2-3 weeks to show any benefit. Continue steroids in the interim. Eltrombopag carries thrombotic/thromboembolic around 6% in adults. Complications including venous or arterial events were observed at low and normal platelet counts.   Continue CBC and CMP daily.  Continue TXA. ENT evaluation regarding epistaxis.   Antiplatelet antibodies pending  Hematology team will follow     Krzysztof Berkowitz MD  Hematology and Medical Oncology fellow

## 2023-11-27 NOTE — ASSESSMENT & PLAN NOTE
Anemia of chronic disease  Hyperbilirubinemia  - History of hematologic abnormalities dating back to hospitalization in 2017.   - Seen by Dr. Howard in Hem/Onc clinic on 11/1/23 for mild anemia and thrombocytopenia noted for the past 7 months and new labs were ordered with follow up arranged for return; unable to see most recent outside labs. That note reports her H/H was recently 10.2/28, chronically elevated bilirubin, and depressed haptoglobin to undetectable levels.  - Severe thrombocytopenia highly suspicious for ITP, though failing to respond to date. Holding allopurinol, clopidogrel.  - Direct antiglobulin test negative. [id number] activity elevated; not consistent with TTP. U/S Abd with hepatomegaly, no splenomegaly. Broadened workup in process with fibrinogen, antithrombin III, D-dimer, SPEP, immunofixation, ALFREDO, anti-dsDNA, ESR, reticulocytes, HIT panel.  - - Received 4 day course of dexamethasone 40mg PO daily and 2 day course of IVIG without improvement.  Underwent bone marrow biopsy on 11/22.  Patient has received 1 unit platelet transfusion per day since 11/22.  No significant improvement post transfusion.   As hep B workup was positive, patient was initiated on tenofovir.  Plan to administer rituximab and elmtrobag  - Appreciate hematology assistance.

## 2023-11-27 NOTE — NURSING
End Of Shift    Diagnosis:  thrombocytopenia s/t suspected ITP     Plan:  monitor platelet count + s/sx of bleeding     Consults:  heme/onc     Mentation:  A/Ox4. Denies pain.      Labs:        Latest Reference Range & Units 11/26/23 03:59   WBC 3.90 - 12.70 K/uL 13.21 (H)   (H): Data is abnormally high   Latest Reference Range & Units 11/26/23 03:59   Platelet Count 150 - 450 K/uL 3 (LL)   (LL): Data is critically low    Respiratory:  RA     Cardiac:  NSR on monitor. SBP 170s.      GI:  LBM 11/25.      :  purewick in place 850 ml out.      Mobility:  x1 assist OOB to BSC.      Skin: intact     Lines, Drains, & Tubes:  YVES midline

## 2023-11-27 NOTE — NURSING
Notified Dr. Stack of pt platelet count of 2 and order for Rituximab saying hold for platelets <50,000.  Order received to continue with infusion.

## 2023-11-27 NOTE — ASSESSMENT & PLAN NOTE
- Reported CVA with R sided weakness in 2021  - Holding plavix as discussed above; atorvastatin as under HLD.   Statement Selected

## 2023-11-27 NOTE — PROGRESS NOTES
[facility] - Int[facility] Care ([address] Medicine  Progress Note    Patient Name: Wendy Viveros  MRN: [id number]  Patient Class: IP- Inpatient   Admission Date: 11/18/2023  Length of Stay: 9 days  Attending Physician: Pamela Camacho MD  Primary Care Provider: Ashley Harrell MD        Subjective:     Principal Problem:Thrombocytopenia        HPI:  Wendy Viveros is a 67-year-old female with a past medical history of hypertension, stroke with residual right-sided weakness, and remote history of possible hemolytic anemia who presented to the ED at [facility] with complaints of lesions in her mouth since yesterday.  She states she has also noticed some accompanying mild bleeding after brushing her teeth, but she denies any other bleeding.  She does report easy bruising however denies any additional rash.  Initially she denies any anticoagulant use however upon review of patient's chart she is on Plavix and aspirin.  Other than these lesions, she feels in her usual state of health.  She denies recent illness.  In the ED, labs notable for extreme thrombocytopenia with platelets of 3.  No schistocytes on the differential.  T bili elevated at 2.8.  This was discussed with hematologist on call Dr. Osorio, who suspects ITP, and recommended high-dose steroids and platelets be given with goal to increased platelet count above 10.  Of note, she was admitted at this same facility in 2017 for suspected hemolytic anemia, but it appears she was lost to follow-up with hematology.  She did just [place] [facility] [place] after her PCP referred her there for mild anemia and mild thrombocytopenia with elevated T bili and low haptoglobin.    Overview/Hospital Course:  Ms. Viveros presented with bleeding gums and mouth lesions. Admitted with severe thrombocytopenia with suspected ITP. Treated with IV dexamethasone in ER and transfused 1U platelets. Hem/Onc consulted; converted to dexamethasone PO. No  overt evidence of bleeding but platelet count was slow to respond requiring additional platelet transfusions.  Patient was transferred to [facility] for bone marrow biopsy.  Performed on 11/22.  As patient's platelet count was not responding to steroids, Heme-Onc decided to start the patient on rituximab.  Not sure why it was not administered over the weekend.  Hepatitis-B panel was suggestive of past infection, hep B DNA pending.  Initiated patient on tenofovir for hepatitis-B prophylaxis.  Initiated on tranexamic acid for nosebleed.  Patient has received multiple platelet transfusion since 11/22.  No significant improvement in platelet count.  Plan to start patient on elmtrobag.    Interval History:   No overnight events.  No signs of active bleeding.  Patient hemodynamically stable.  Patient is optimistic  and hopeful that platelet count would improve post rituximab therapy.  Plan to transfuse 1 unit today.     Review of Systems   Constitutional:  Negative for chills and fever.   Respiratory:  Negative for cough and shortness of breath.    Cardiovascular:  Negative for chest pain and palpitations.   Gastrointestinal:  Negative for abdominal pain, nausea and vomiting.     Objective:     Vital Signs (Most Recent):  Temp: 97.7 °F (36.5 °C) (11/27/23 1155)  Pulse: 60 (11/27/23 1155)  Resp: 18 (11/27/23 1155)  BP: (!) 175/79 (11/27/23 1155)  SpO2: 100 % (11/27/23 1155) Vital Signs (24h Range):  Temp:  [97.6 °F (36.4 °C)-98.3 °F (36.8 °C)] 97.7 °F (36.5 °C)  Pulse:  [60-66] 60  Resp:  [14-37] 18  SpO2:  [98 %-100 %] 100 %  BP: (154-181)/(69-86) 175/79     Weight: 83.6 kg (184 lb 4.9 oz)  Body mass index is 30.67 kg/m².    Intake/Output Summary (Last 24 hours) at 11/27/2023 1213  Last data filed at 11/27/2023 0600  Gross per 24 hour   Intake 100 ml   Output 2050 ml   Net -1950 ml           Physical Exam  Vitals and nursing note reviewed.   Constitutional:       General: She is not in acute distress.     Appearance: She is  well-developed.   HENT:      Head: Normocephalic and atraumatic.      Mouth/Throat:      Comments: Purpura to tongue / oral mucosa.  Eyes:      General:         Right eye: No discharge.         Left eye: No discharge.      Conjunctiva/sclera: Conjunctivae normal.   Cardiovascular:      Rate and Rhythm: Normal rate.      Pulses: Normal pulses.   Pulmonary:      Effort: Pulmonary effort is normal. No respiratory distress.   Abdominal:      Palpations: Abdomen is soft.      Tenderness: There is no abdominal tenderness.   Musculoskeletal:         General: Normal range of motion.      Right lower leg: No edema.      Left lower leg: No edema.   Skin:     General: Skin is warm and dry.      Comments: Some petechiae / bruising noted.   Neurological:      Mental Status: She is alert and oriented to person, place, and time.       Significant Labs:   CBC:  Recent Labs   Lab 11/25/23  2143 11/26/23  0359 11/27/23  0658   WBC 17.49* 13.21* 13.24*   HGB 10.1* 9.4* 9.1*   HCT 28.3* 26.4* 25.7*   PLT 2* 3* 2*   GRAN 82.0* 90.3*  11.9* 87.1*  11.5*   LYMPH 7.0*  CANCELED 4.0*  0.5* 5.4*  0.7*   MONO 6.0  CANCELED 1.7*  0.2* 3.6*  0.5   EOS CANCELED 0.0 0.0   BASO CANCELED 0.03 0.02      CMP:  Recent Labs   Lab 11/25/23  0305 11/26/23  0359 11/27/23  0658    136 136   K 4.2 4.1 4.0    107 110   CO2 21* 20* 19*   BUN 64* 65* 51*   CREATININE 1.3 1.7* 1.2   * 254* 203*   CALCIUM 9.8 9.9 10.0   ALKPHOS  --  53* 51*   AST  --  15 15   ALT  --  14 14   BILITOT  --  2.0* 2.3*   PROT  --  7.8 7.6   ALBUMIN  --  3.1* 3.2*   ANIONGAP 9 9 7*        Significant Imaging: I have reviewed and interpreted all pertinent imaging results/findings within the past 24 hours.    Assessment/Plan:      * Thrombocytopenia  Anemia of chronic disease  Hyperbilirubinemia  - History of hematologic abnormalities dating back to hospitalization in 2017.   - Seen by Dr. Howard in Hem/Onc clinic on 11/1/23 for mild anemia and  thrombocytopenia noted for the past 7 months and new labs were ordered with follow up arranged for return; unable to see most recent outside labs. That note reports her H/H was recently 10.2/28, chronically elevated bilirubin, and depressed haptoglobin to undetectable levels.  - Severe thrombocytopenia highly suspicious for ITP, though failing to respond to date. Holding allopurinol, clopidogrel.  - Direct antiglobulin test negative. [id number] activity elevated; not consistent with TTP. U/S Abd with hepatomegaly, no splenomegaly. Broadened workup in process with fibrinogen, antithrombin III, D-dimer, SPEP, immunofixation, ALFREDO, anti-dsDNA, ESR, reticulocytes, HIT panel.  - - Received 4 day course of dexamethasone 40mg PO daily and 2 day course of IVIG without improvement.  Underwent bone marrow biopsy on 11/22.  Patient has received 1 unit platelet transfusion per day since 11/22.  No significant improvement post transfusion.   As hep B workup was positive, patient was initiated on tenofovir.  Plan to administer rituximab and elmtrobag  - Appreciate hematology assistance.    Idiopathic thrombocytopenic purpura (ITP)        History of CVA (cerebrovascular accident)  - Reported CVA with R sided weakness in 2021  - Holding plavix as discussed above; atorvastatin as under HLD.    CKD (chronic kidney disease) stage 3, GFR 30-59 ml/min  - Mild uptrend. Encourage PO fluid intake.  - Monitor, renally dose medications, avoid nephrotoxins.    Anemia, unspecified  Patient's anemia is currently controlled. Has not received any PRBCs to date. Etiology likely d/t chronic disease due to Chronic Kidney Disease/ESRD  Current CBC reviewed-   Lab Results   Component Value Date    HGB 9.1 (L) 11/27/2023    HCT 25.7 (L) 11/27/2023     Monitor serial CBC and transfuse if patient becomes hemodynamically unstable, symptomatic or H/H drops below 7/21.    Type 2 diabetes mellitus with circulatory disorder, without long-term current use of  insulin  - Last HgbA1c 7.2 in 2021; repeat 4.6.  - Reports diet-controlled at home.  - Increasing hyperglycemia in setting of steroid use.  - Continue insulin detemir 18U subQ daily, insulin aspart 6U subQ TIDWM, continue moderate-dose sliding scale insulin aspart 1-10U subQ TIDWM PRN.  - Monitor with steroid cessation and decrease as appropriate.    Chronic gout  - Hold allopurinol given can be associated with thrombocytopenia.    Hyperlipidemia type II  - Reports having had muscle aches with atorvastatin 80 mg PO daily; does not wish to take at this time. Will continue to hold.    Primary hypertension  - Worsened in setting of steroid administration.  -  nifedipine 60mg PO as BID. Hold further spironolactone for now with uptrending creatinine.  Dose of metoprolol increased to 100 mg daily in the setting of hypertension.    11/27:  Plan to start patient on hydrochlorothiazide 25 mg daily.  - Continue hydralazine 10mg IV q6hr PRN for SBP > 180, DBP > 100.      VTE Risk Mitigation (From admission, onward)           Ordered     heparin, porcine (PF) 100 unit/mL injection flush 500 Units  As needed (PRN)         11/27/23 1123     IP VTE HIGH RISK PATIENT  Once         11/18/23 2128     Place sequential compression device  Until discontinued         11/18/23 2128     Reason for No Pharmacological VTE Prophylaxis  Once        Question:  Reasons:  Answer:  Thrombocytopenia    11/18/23 2128                    Discharge Planning   EL: 11/29/2023     Code Status: Full Code   Is the patient medically ready for discharge?: No    Reason for patient still in hospital (select all that apply): Patient trending condition, Treatment, and Consult recommendations  Discharge Plan A: Home with family   Discharge Delays: None known at this time              Pamela Camacho MD  Department of Hospital Medicine   [facility] - Intensive Care ([facility]

## 2023-11-27 NOTE — PROGRESS NOTES
[facility] - Intensive [facility] ([address] Medicine  Progress Note    Patient Name: Wendy Viveros  MRN: [id number]  Patient Class: IP- Inpatient   Admission Date: 11/18/2023  Length of Stay: 8 days  Attending Physician: Pamela Camacho MD  Primary Care Provider: Aslhey Harrell MD        Subjective:     Principal Problem:Thrombocytopenia        HPI:  Wendy Viveros is a 67-year-old female with a past medical history of hypertension, stroke with residual right-sided weakness, and remote history of possible hemolytic anemia who presented to the ED at [facility] with complaints of lesions in her mouth since yesterday.  She states she has also noticed some accompanying mild bleeding after brushing her teeth, but she denies any other bleeding.  She does report easy bruising however denies any additional rash.  Initially she denies any anticoagulant use however upon review of patient's chart she is on Plavix and aspirin.  Other than these lesions, she feels in her usual state of health.  She denies recent illness.  In the ED, labs notable for extreme thrombocytopenia with platelets of 3.  No schistocytes on the differential.  T bili elevated at 2.8.  This was discussed with hematologist on call Dr. Osorio, who suspects ITP, and recommended high-dose steroids and platelets be given with goal to increased platelet count above 10.  Of note, she was admitted at this same facility in 2017 for suspected hemolytic anemia, but it appears she was lost to follow-up with hematology.  She did just see Hematology at [facility] 2 weeks ago after her PCP referred her there for mild anemia and mild thrombocytopenia with elevated T bili and low haptoglobin.    Overview/Hospital Course:  Ms. Viveros presented with bleeding gums and mouth lesions. Admitted with severe thrombocytopenia with suspected ITP. Treated with IV dexamethasone in ER and transfused 1U platelets. Hem/Onc consulted; converted to dexamethasone PO. No  overt evidence of bleeding but platelet count was slow to respond requiring additional platelet transfusions.  Patient was transferred to [facility] [place] for bone marrow biopsy.  Performed on 11/22.  As patient's platelet count was not responding to steroids, Heme-Onc decided to start the patient on rituximab.  Hepatitis-B panel was suggestive of past infection, hep B DNA pending.  Initiated patient on tenofovir for hepatitis-B prophylaxis.  Initiated on tranexamic acid for nosebleed.    No new subjective & objective note has been filed under this hospital service since the last note was generated.      Assessment/Plan:      * Thrombocytopenia  Anemia of chronic disease  Hyperbilirubinemia  - History of hematologic abnormalities dating back to hospitalization in 2017.   - Seen by Dr. Howard in Hem/Onc clinic on 11/1/23 for mild anemia and thrombocytopenia noted for the past 7 months and new labs were ordered with follow up arranged for return; unable to see most recent outside labs. That note reports her H/H was recently 10.2/28, chronically elevated bilirubin, and depressed haptoglobin to undetectable levels.  - Severe thrombocytopenia highly suspicious for ITP, though failing to respond to date. Holding allopurinol, clopidogrel.  - Direct antiglobulin test negative. [id number] activity elevated; not consistent with TTP. U/S Abd with hepatomegaly, no splenomegaly. Broadened workup in process with fibrinogen, antithrombin III, D-dimer, SPEP, immunofixation, ALFREDO, anti-dsDNA, ESR, reticulocytes, HIT panel.  - Additional 1U Plt today; 8th dose. Appreciate transfusion medicine recommendations,- Received 4 day course of dexamethasone 40mg PO daily and 2 day course of IVIG without improvement.  Underwent bone marrow biopsy on 11/22.  No significant improvement post transfusion.  Plan to administer rituximab.  As hep B workup was positive, patient was initiated on tenofovir.- Appreciate hematology assistance.    Idiopathic  thrombocytopenic purpura (ITP)        History of CVA (cerebrovascular accident)  - Reported CVA with R sided weakness in 2021  - Holding plavix as discussed above; atorvastatin as under HLD.    CKD (chronic kidney disease) stage 3, GFR 30-59 ml/min  - Mild uptrend. Encourage PO fluid intake.  - Monitor, renally dose medications, avoid nephrotoxins.    Anemia, unspecified  Patient's anemia is currently controlled. Has not received any PRBCs to date. Etiology likely d/t chronic disease due to Chronic Kidney Disease/ESRD  Current CBC reviewed-   Lab Results   Component Value Date    HGB 9.2 (L) 11/24/2023    HCT 26.1 (L) 11/24/2023     Monitor serial CBC and transfuse if patient becomes hemodynamically unstable, symptomatic or H/H drops below 7/21.    Type 2 diabetes mellitus with circulatory disorder, without long-term current use of insulin  - Last HgbA1c 7.2 in 2021; repeat 4.6.  - Reports diet-controlled at home.  - Increasing hyperglycemia in setting of steroid use.  - Continue insulin detemir 18U subQ daily, insulin aspart 6U subQ TIDWM, continue moderate-dose sliding scale insulin aspart 1-10U subQ TIDWM PRN.  - Monitor with steroid cessation and decrease as appropriate.    Chronic gout  - Hold allopurinol given can be associated with thrombocytopenia.    Hyperlipidemia type II  - Reports having had muscle aches with atorvastatin 80 mg PO daily; does not wish to take at this time. Will continue to hold.    Primary hypertension  - Worsened in setting of steroid administration.  - Continue metoprolol 50mg PO daily, nifedipine 60mg PO as BID. Hold further spironolactone for now with uptrending creatinine.  Dose of metoprolol increased to 100 mg daily in the setting of hypertension  - Continue hydralazine 10mg IV q6hr PRN for SBP > 180, DBP > 100.      VTE Risk Mitigation (From admission, onward)           Ordered     IP VTE HIGH RISK PATIENT  Once         11/18/23 2128     Place sequential compression device  Until  discontinued         11/18/23 2128     Reason for No Pharmacological VTE Prophylaxis  Once        Question:  Reasons:  Answer:  Thrombocytopenia    11/18/23 2128                    Discharge Planning   EL: 11/27/2023     Code Status: Full Code   Is the patient medically ready for discharge?: No    Reason for patient still in hospital (select all that apply): Laboratory test, Treatment, and Consult recommendations  Discharge Plan A: Home with family                  Pamela Camacho MD  Department of Hospital Medicine   [facility] - Intensive Care ([place])

## 2023-11-27 NOTE — PLAN OF CARE
[facility] - Intensive Care ([facility])  Discharge Reassessment    Primary Care Provider: Ashley Harrell MD    Expected Discharge Date: 11/29/2023    Pt is not medically ready for dc.    SW will continue to follow for dc needs.    Discharge Plan A and Plan B have been determined by review of patient's clinical status, future medical and therapeutic needs, and coverage/benefits for post-acute care in coordination with multidisciplinary team members.      Reassessment (most recent)       Discharge Reassessment - 11/27/23 1148          Discharge Reassessment    Assessment Type Discharge Planning Reassessment     Did the patient's condition or plan change since previous assessment? No     Discharge Plan A Home with family     Discharge Plan B Home Health     DME Needed Upon Discharge  other (see comments)   TBD    Transition of Care Barriers None     Why the patient remains in the hospital Requires continued medical care        Post-Acute Status    Post-Acute Authorization Other     Other Status --   TBD    Discharge Delays None known at this time                   Deyanira Harden LCSW  PRN

## 2023-11-27 NOTE — ASSESSMENT & PLAN NOTE
Anemia of chronic disease  Hyperbilirubinemia  - History of hematologic abnormalities dating back to hospitalization in 2017.   - Seen by Dr. Howard in Hem/Onc clinic on 11/1/23 for mild anemia and thrombocytopenia noted for the past 7 months and new labs were ordered with follow up arranged for return; unable to see most recent outside labs. That note reports her H/H was recently 10.2/28, chronically elevated bilirubin, and depressed haptoglobin to undetectable levels.  - Severe thrombocytopenia highly suspicious for ITP, though failing to respond to date. Holding allopurinol, clopidogrel.  - Direct antiglobulin test negative. [id number] activity elevated; not consistent with TTP. U/S Abd with hepatomegaly, no splenomegaly. Broadened workup in process with fibrinogen, antithrombin III, D-dimer, SPEP, immunofixation, ALFREDO, anti-dsDNA, ESR, reticulocytes, HIT panel.  - Additional 1U Plt today; 9th dose. Appreciate transfusion medicine recommendations,- Received 4 day course of dexamethasone 40mg PO daily and 2 day course of IVIG without improvement.  Underwent bone marrow biopsy on 11/22.  No significant improvement post transfusion.  Plan to administer rituximab.  As hep B workup was positive, patient was initiated on tenofovir.- Appreciate hematology assistance.

## 2023-11-27 NOTE — ASSESSMENT & PLAN NOTE
- Worsened in setting of steroid administration.  -  nifedipine 60mg PO as BID. Hold further spironolactone for now with uptrending creatinine.  Dose of metoprolol increased to 100 mg daily in the setting of hypertension.    11/27:  Plan to start patient on hydrochlorothiazide 25 mg daily.  - Continue hydralazine 10mg IV q6hr PRN for SBP > 180, DBP > 100.

## 2023-11-28 LAB
ALBUMIN SERPL BCP-MCNC: 3.4 G/DL (ref 3.5–5.2)
ALP SERPL-CCNC: 57 U/L (ref 55–135)
ALT SERPL W/O P-5'-P-CCNC: 14 U/L (ref 10–44)
ANION GAP SERPL CALC-SCNC: 8 MMOL/L (ref 8–16)
ANISOCYTOSIS BLD QL SMEAR: SLIGHT
AST SERPL-CCNC: 13 U/L (ref 10–40)
BASOPHILS # BLD AUTO: 0.03 K/UL (ref 0–0.2)
BASOPHILS NFR BLD: 0.2 % (ref 0–1.9)
BILIRUB SERPL-MCNC: 2.2 MG/DL (ref 0.1–1)
BODY SITE - BONE MARROW: NORMAL
BUN SERPL-MCNC: 47 MG/DL (ref 8–23)
CALCIUM SERPL-MCNC: 10.4 MG/DL (ref 8.7–10.5)
CHLORIDE SERPL-SCNC: 107 MMOL/L (ref 95–110)
CLINICAL DIAGNOSIS - BONE MARROW: NORMAL
CO2 SERPL-SCNC: 21 MMOL/L (ref 23–29)
CREAT SERPL-MCNC: 1.3 MG/DL (ref 0.5–1.4)
DIFFERENTIAL METHOD: ABNORMAL
EOSINOPHIL # BLD AUTO: 0 K/UL (ref 0–0.5)
EOSINOPHIL NFR BLD: 0 % (ref 0–8)
ERYTHROCYTE [DISTWIDTH] IN BLOOD BY AUTOMATED COUNT: 16.9 % (ref 11.5–14.5)
EST. GFR  (NO RACE VARIABLE): 44.8 ML/MIN/1.73 M^2
FLOW CYTOMETRY ANTIBODIES ANALYZED - BONE MARROW: NORMAL
FLOW CYTOMETRY COMMENT - BONE MARROW: NORMAL
FLOW CYTOMETRY INTERPRETATION - BONE MARROW: NORMAL
GLUCOSE SERPL-MCNC: 281 MG/DL (ref 70–110)
HCT VFR BLD AUTO: 25 % (ref 37–48.5)
HGB BLD-MCNC: 8.7 G/DL (ref 12–16)
HYPOCHROMIA BLD QL SMEAR: ABNORMAL
IMM GRANULOCYTES # BLD AUTO: 0.73 K/UL (ref 0–0.04)
IMM GRANULOCYTES NFR BLD AUTO: 4.7 % (ref 0–0.5)
LYMPHOCYTES # BLD AUTO: 0.5 K/UL (ref 1–4.8)
LYMPHOCYTES NFR BLD: 3.4 % (ref 18–48)
MCH RBC QN AUTO: 34.3 PG (ref 27–31)
MCHC RBC AUTO-ENTMCNC: 34.8 G/DL (ref 32–36)
MCV RBC AUTO: 98 FL (ref 82–98)
MONOCYTES # BLD AUTO: 0.5 K/UL (ref 0.3–1)
MONOCYTES NFR BLD: 3.3 % (ref 4–15)
NEUTROPHILS # BLD AUTO: 13.8 K/UL (ref 1.8–7.7)
NEUTROPHILS NFR BLD: 88.4 % (ref 38–73)
NRBC BLD-RTO: 0 /100 WBC
OVALOCYTES BLD QL SMEAR: ABNORMAL
PLATELET # BLD AUTO: 8 K/UL (ref 150–450)
PLATELET BLD QL SMEAR: ABNORMAL
PMV BLD AUTO: ABNORMAL FL (ref 9.2–12.9)
POCT GLUCOSE: 199 MG/DL (ref 70–110)
POCT GLUCOSE: 209 MG/DL (ref 70–110)
POCT GLUCOSE: 244 MG/DL (ref 70–110)
POCT GLUCOSE: 247 MG/DL (ref 70–110)
POCT GLUCOSE: 317 MG/DL (ref 70–110)
POIKILOCYTOSIS BLD QL SMEAR: SLIGHT
POLYCHROMASIA BLD QL SMEAR: ABNORMAL
POTASSIUM SERPL-SCNC: 4.1 MMOL/L (ref 3.5–5.1)
PROT SERPL-MCNC: 8 G/DL (ref 6–8.4)
RBC # BLD AUTO: 2.54 M/UL (ref 4–5.4)
SODIUM SERPL-SCNC: 136 MMOL/L (ref 136–145)
WBC # BLD AUTO: 15.57 K/UL (ref 3.9–12.7)

## 2023-11-28 PROCEDURE — 25000003 PHARM REV CODE 250: Performed by: PHYSICIAN ASSISTANT

## 2023-11-28 PROCEDURE — 25000003 PHARM REV CODE 250: Performed by: HOSPITALIST

## 2023-11-28 PROCEDURE — 25000003 PHARM REV CODE 250: Performed by: INTERNAL MEDICINE

## 2023-11-28 PROCEDURE — 63600175 PHARM REV CODE 636 W HCPCS: Performed by: STUDENT IN AN ORGANIZED HEALTH CARE EDUCATION/TRAINING PROGRAM

## 2023-11-28 PROCEDURE — 63600175 PHARM REV CODE 636 W HCPCS: Performed by: INTERNAL MEDICINE

## 2023-11-28 PROCEDURE — 85025 COMPLETE CBC W/AUTO DIFF WBC: CPT | Performed by: STUDENT IN AN ORGANIZED HEALTH CARE EDUCATION/TRAINING PROGRAM

## 2023-11-28 PROCEDURE — 20600001 HC STEP DOWN PRIVATE ROOM

## 2023-11-28 PROCEDURE — 25000003 PHARM REV CODE 250: Performed by: STUDENT IN AN ORGANIZED HEALTH CARE EDUCATION/TRAINING PROGRAM

## 2023-11-28 PROCEDURE — 36415 COLL VENOUS BLD VENIPUNCTURE: CPT | Performed by: STUDENT IN AN ORGANIZED HEALTH CARE EDUCATION/TRAINING PROGRAM

## 2023-11-28 PROCEDURE — 80053 COMPREHEN METABOLIC PANEL: CPT | Performed by: STUDENT IN AN ORGANIZED HEALTH CARE EDUCATION/TRAINING PROGRAM

## 2023-11-28 RX ORDER — SULFAMETHOXAZOLE AND TRIMETHOPRIM 800; 160 MG/1; MG/1
1 TABLET ORAL 2 TIMES DAILY
Status: DISCONTINUED | OUTPATIENT
Start: 2023-11-28 | End: 2023-11-29

## 2023-11-28 RX ORDER — ACETAMINOPHEN 325 MG/1
650 TABLET ORAL
Status: DISCONTINUED | OUTPATIENT
Start: 2023-11-28 | End: 2023-11-29

## 2023-11-28 RX ORDER — PREDNISONE 20 MG/1
80 TABLET ORAL DAILY
Status: DISCONTINUED | OUTPATIENT
Start: 2023-11-28 | End: 2023-11-29

## 2023-11-28 RX ORDER — FAMOTIDINE 10 MG/ML
20 INJECTION INTRAVENOUS
Status: DISCONTINUED | OUTPATIENT
Start: 2023-11-28 | End: 2023-11-29

## 2023-11-28 RX ADMIN — INSULIN ASPART 4 UNITS: 100 INJECTION, SOLUTION INTRAVENOUS; SUBCUTANEOUS at 01:11

## 2023-11-28 RX ADMIN — TRANEXAMIC ACID 836 MG: 100 INJECTION, SOLUTION INTRAVENOUS at 08:11

## 2023-11-28 RX ADMIN — INSULIN DETEMIR 18 UNITS: 100 INJECTION, SOLUTION SUBCUTANEOUS at 08:11

## 2023-11-28 RX ADMIN — PREDNISONE 80 MG: 20 TABLET ORAL at 08:11

## 2023-11-28 RX ADMIN — INSULIN ASPART 4 UNITS: 100 INJECTION, SOLUTION INTRAVENOUS; SUBCUTANEOUS at 08:11

## 2023-11-28 RX ADMIN — PANTOPRAZOLE SODIUM 40 MG: 40 TABLET, DELAYED RELEASE ORAL at 03:11

## 2023-11-28 RX ADMIN — NIFEDIPINE 90 MG: 30 TABLET, FILM COATED, EXTENDED RELEASE ORAL at 08:11

## 2023-11-28 RX ADMIN — INSULIN ASPART 2 UNITS: 100 INJECTION, SOLUTION INTRAVENOUS; SUBCUTANEOUS at 06:11

## 2023-11-28 RX ADMIN — TRANEXAMIC ACID 836 MG: 100 INJECTION, SOLUTION INTRAVENOUS at 03:11

## 2023-11-28 RX ADMIN — METOPROLOL SUCCINATE 100 MG: 100 TABLET, EXTENDED RELEASE ORAL at 08:11

## 2023-11-28 RX ADMIN — FLUOXETINE 20 MG: 20 CAPSULE ORAL at 08:11

## 2023-11-28 RX ADMIN — SULFAMETHOXAZOLE AND TRIMETHOPRIM 1 TABLET: 800; 160 TABLET ORAL at 10:11

## 2023-11-28 RX ADMIN — FOLIC ACID 1 MG: 1 TABLET ORAL at 08:11

## 2023-11-28 RX ADMIN — HYDROCHLOROTHIAZIDE 25 MG: 25 TABLET ORAL at 08:11

## 2023-11-28 RX ADMIN — ELTROMBOPAG OLAMINE 50 MG: 50 TABLET, FILM COATED ORAL at 08:11

## 2023-11-28 RX ADMIN — PANTOPRAZOLE SODIUM 40 MG: 40 TABLET, DELAYED RELEASE ORAL at 05:11

## 2023-11-28 RX ADMIN — INSULIN ASPART 6 UNITS: 100 INJECTION, SOLUTION INTRAVENOUS; SUBCUTANEOUS at 08:11

## 2023-11-28 RX ADMIN — TRANEXAMIC ACID 836 MG: 100 INJECTION, SOLUTION INTRAVENOUS at 10:11

## 2023-11-28 RX ADMIN — INSULIN ASPART 6 UNITS: 100 INJECTION, SOLUTION INTRAVENOUS; SUBCUTANEOUS at 01:11

## 2023-11-28 RX ADMIN — INSULIN ASPART 2 UNITS: 100 INJECTION, SOLUTION INTRAVENOUS; SUBCUTANEOUS at 08:11

## 2023-11-28 RX ADMIN — HYDRALAZINE HYDROCHLORIDE 10 MG: 20 INJECTION, SOLUTION INTRAMUSCULAR; INTRAVENOUS at 12:11

## 2023-11-28 RX ADMIN — TENOFOVIR DISOPROXIL FUMARATE 300 MG: 300 TABLET ORAL at 08:11

## 2023-11-28 RX ADMIN — INSULIN ASPART 6 UNITS: 100 INJECTION, SOLUTION INTRAVENOUS; SUBCUTANEOUS at 04:11

## 2023-11-28 NOTE — NURSING
Pt AAOx4, sitting up in chair for entire day, VSS, no complaints of pain or distress.   Pt was ordered Rotuximab which was rescheduled for tomorrow per request. 1unit Platelets administered for platelet count of 2 this AM.      No acute events this shift, call light in reach, bed low and locked, will continue to monitor.

## 2023-11-28 NOTE — NURSING
Pt AAOx4, VSS, sitting up in chair for most of day, no complaints of pain or distress, O2 99% on RA.  Pt refused Rotuximab today, informed care team.      No acute events this shift, call light in reach, bed low and locked, will continue to monitor.

## 2023-11-28 NOTE — PLAN OF CARE
Hematology Plan of Care      67 year old female with hx of non immune hemolytic anemia in 2017 presents with severe thrombocytopenia, workup has been inconclusive so far.      She's s/p dex 40 mg x4 days, IVIG x2, and currently on prednisone 80 mg daily. Hepatitis serologies showed hx of hepatitis B infection, HBV PCR pending. Bone marrow bx done 11/22, hypercellular marrow but otherwise normal. Findings of platelet consumption, consistent with ITP.      US abdomen showed hepatomegaly/hepatic steatosis but spleen was normal.      Recent Labs   Lab 11/28/23  0431   WBC 15.57*   RBC 2.54*   HGB 8.7*   HCT 25.0*   PLT 8*   MCV 98   MCH 34.3*   MCHC 34.8         G6PD testing negative  SPEP/ALIX negative  PNH testing negative     Severe thrombocytopenia, unclear etiology, concern for ITP s/p dex/IVIG without response. TTP/DIC/HIT were ruled out.   Hx of non-immune mediated hemolytic anemia responsive to steroids in the past  Hx of ischemic stroke on plavix     S/p bone marrow bx 11/22. Hypercellular marrow but otherwise normal. Findings indicative of platelet consumption which is consistent with ITP.   After a long discussion between the patient and her daughter a decision was made to not be treated with Rituxan due to concerns of the side effects   Continue Eltrombopag 50mg daily. Eltrombopag  carries thrombotic/thromboembolic around 6% in adults. Complications including venous or arterial events were observed at low and normal platelet counts.  Currently on trial of prednisone 2 mg/kg (max 80 mg/day) daily. Please maintain GI ppx with PPI 40 mg daily  Taper steroids once platelets stabilize   Bactrim for PJP prophylaxis while on steroids   Continue CBC and CMP daily. Continue TXA. ENT evaluation regarding epistaxis.   Antiplatelet antibodies pending    Melchor Edmond MD  Hematology/Oncology Fellow PGY-IV

## 2023-11-28 NOTE — PROGRESS NOTES
[facility] - Intensive Care ([address] Medicine  Progress Note    Patient Name: Wendy Viveros  MRN: [id number]  Patient Class: IP- Inpatient   Admission Date: 11/18/2023  Length of Stay: 10 days  Attending Physician: Pamela Camacho MD  Primary Care Provider: Ashley Harrell MD        Subjective:     Principal Problem:Thrombocytopenia        HPI:  Wendy Viveros is a 67-year-old female with a past medical history of hypertension, stroke with residual right-sided weakness, and remote history of possible hemolytic anemia who presented to the ED at [facility] with complaints of lesions in her mouth since yesterday.  She states she has also noticed some accompanying mild bleeding after brushing her teeth, but she denies any other bleeding.  She does report easy bruising however denies any additional rash.  Initially she denies any anticoagulant use however upon review of patient's chart she is on Plavix and aspirin.  Other than these lesions, she feels in her usual state of health.  She denies recent illness.  In the ED, labs notable for extreme thrombocytopenia with platelets of 3.  No schistocytes on the differential.  T bili elevated at 2.8.  This was discussed with hematologist on call Dr. Osorio, who suspects ITP, and recommended high-dose steroids and platelets be given with goal to increased platelet count above 10.  Of note, she was admitted at this same facility in 2017 for suspected hemolytic anemia, but it appears she was lost to follow-up with hematology.  She did just see Hematology at [facility] 2 weeks ago after her PCP referred her there for mild anemia and mild thrombocytopenia with elevated T bili and low haptoglobin.    Overview/Hospital Course:  Ms. Viveros presented with bleeding gums and mouth lesions. Admitted with severe thrombocytopenia with suspected ITP. Treated with IV dexamethasone in ER and transfused 1U platelets. Hem/Onc consulted; converted to dexamethasone PO.  No overt evidence of bleeding but platelet count was slow to respond requiring additional platelet transfusions.  Patient was transferred to [facility] for bone marrow biopsy.  Performed on 11/22.  As patient's platelet count was not responding to steroids, Heme-Onc decided to start the patient on rituximab.  Not sure why it was not administered over the weekend.  Hepatitis-B panel was suggestive of past infection, hep B DNA pending.  Initiated patient on tenofovir for hepatitis-B prophylaxis.  Initiated on tranexamic acid for nosebleed.  Patient has received multiple platelet transfusion since 11/22.   Patient was started on elmtrobag. Platelets improved to 8. Patient and daughter concerned about side effects of rituximab and would like to hold off on it. Rituximab on hold.     Interval History:   No overnight events.  No signs of active bleeding.  Patient hemodynamically stable.  Had a detailed discussion regarding patient and daughter over phone regarding treatment plan.     Review of Systems   Constitutional:  Negative for chills and fever.   Respiratory:  Negative for cough and shortness of breath.    Cardiovascular:  Negative for chest pain and palpitations.   Gastrointestinal:  Negative for abdominal pain, nausea and vomiting.     Objective:     Vital Signs (Most Recent):  Temp: 98 °F (36.7 °C) (11/28/23 2012)  Pulse: 60 (11/28/23 1543)  Resp: 17 (11/28/23 2012)  BP: (!) 150/69 (11/28/23 2012)  SpO2: 100 % (11/28/23 1543) Vital Signs (24h Range):  Temp:  [97.9 °F (36.6 °C)-98.3 °F (36.8 °C)] 98 °F (36.7 °C)  Pulse:  [60-62] 60  Resp:  [16-20] 17  SpO2:  [98 %-100 %] 100 %  BP: (150-180)/(69-79) 150/69     Weight: 83.6 kg (184 lb 4.9 oz)  Body mass index is 30.67 kg/m².    Intake/Output Summary (Last 24 hours) at 11/28/2023 2043  Last data filed at 11/27/2023 2059  Gross per 24 hour   Intake 100 ml   Output --   Net 100 ml           Physical Exam  Vitals and nursing note reviewed.   Constitutional:       General: She  is not in acute distress.     Appearance: She is well-developed.   HENT:      Head: Normocephalic and atraumatic.      Mouth/Throat:      Comments: Purpura to tongue / oral mucosa.  Eyes:      General:         Right eye: No discharge.         Left eye: No discharge.      Conjunctiva/sclera: Conjunctivae normal.   Cardiovascular:      Rate and Rhythm: Normal rate.      Pulses: Normal pulses.   Pulmonary:      Effort: Pulmonary effort is normal. No respiratory distress.   Abdominal:      Palpations: Abdomen is soft.      Tenderness: There is no abdominal tenderness.   Musculoskeletal:         General: Normal range of motion.      Right lower leg: No edema.      Left lower leg: No edema.   Skin:     General: Skin is warm and dry.      Comments: Some petechiae / bruising noted.   Neurological:      Mental Status: She is alert and oriented to person, place, and time.       Significant Labs:   CBC:  Recent Labs   Lab 11/26/23 0359 11/27/23  0658 11/28/23  0431   WBC 13.21* 13.24* 15.57*   HGB 9.4* 9.1* 8.7*   HCT 26.4* 25.7* 25.0*   PLT 3* 2* 8*   GRAN 90.3*  11.9* 87.1*  11.5* 88.4*  13.8*   LYMPH 4.0*  0.5* 5.4*  0.7* 3.4*  0.5*   MONO 1.7*  0.2* 3.6*  0.5 3.3*  0.5   EOS 0.0 0.0 0.0   BASO 0.03 0.02 0.03      CMP:  Recent Labs   Lab 11/26/23 0359 11/27/23  0658 11/28/23  0432    136 136   K 4.1 4.0 4.1    110 107   CO2 20* 19* 21*   BUN 65* 51* 47*   CREATININE 1.7* 1.2 1.3   * 203* 281*   CALCIUM 9.9 10.0 10.4   ALKPHOS 53* 51* 57   AST 15 15 13   ALT 14 14 14   BILITOT 2.0* 2.3* 2.2*   PROT 7.8 7.6 8.0   ALBUMIN 3.1* 3.2* 3.4*   ANIONGAP 9 7* 8        Significant Imaging: I have reviewed and interpreted all pertinent imaging results/findings within the past 24 hours.    Assessment/Plan:      * Thrombocytopenia  Anemia of chronic disease  Hyperbilirubinemia  - History of hematologic abnormalities dating back to hospitalization in 2017.   - Seen by Dr. Howard in Hem/Onc clinic on 11/1/23  for mild anemia and thrombocytopenia noted for the past 7 months and new labs were ordered with follow up arranged for return; unable to see most recent outside labs. That note reports her H/H was recently 10.2/28, chronically elevated bilirubin, and depressed haptoglobin to undetectable levels.  - Severe thrombocytopenia highly suspicious for ITP, though failing to respond to date. Holding allopurinol, clopidogrel.  - Direct antiglobulin test negative. [id number] activity elevated; not consistent with TTP. U/S Abd with hepatomegaly, no splenomegaly. Broadened workup in process with fibrinogen, antithrombin III, D-dimer, SPEP, immunofixation, ALFREDO, anti-dsDNA, ESR, reticulocytes, HIT panel.  - - Received 4 day course of dexamethasone 40mg PO daily and 2 day course of IVIG without improvement.  Underwent bone marrow biopsy on 11/22.  Patient has received 1 unit platelet transfusion per day since 11/22.  No significant improvement post transfusion.   Patient is started on elmtrobag  - Appreciate hematology assistance.    Idiopathic thrombocytopenic purpura (ITP)        History of CVA (cerebrovascular accident)  - Reported CVA with R sided weakness in 2021  - Holding plavix as discussed above; atorvastatin as under HLD.    CKD (chronic kidney disease) stage 3, GFR 30-59 ml/min  - Mild uptrend. Encourage PO fluid intake.  - Monitor, renally dose medications, avoid nephrotoxins.    Anemia, unspecified  Patient's anemia is currently controlled. Has not received any PRBCs to date. Etiology likely d/t chronic disease due to Chronic Kidney Disease/ESRD  Current CBC reviewed-   Lab Results   Component Value Date    HGB 9.1 (L) 11/27/2023    HCT 25.7 (L) 11/27/2023     Monitor serial CBC and transfuse if patient becomes hemodynamically unstable, symptomatic or H/H drops below 7/21.    Type 2 diabetes mellitus with circulatory disorder, without long-term current use of insulin  - Last HgbA1c 7.2 in 2021; repeat 4.6.  - Reports  diet-controlled at home.  - Increasing hyperglycemia in setting of steroid use.  - Continue insulin detemir 18U subQ daily, insulin aspart 6U subQ TIDWM, continue moderate-dose sliding scale insulin aspart 1-10U subQ TIDWM PRN.  - Monitor with steroid cessation and decrease as appropriate.    Chronic gout  - Hold allopurinol given can be associated with thrombocytopenia.    Hyperlipidemia type II  - Reports having had muscle aches with atorvastatin 80 mg PO daily; does not wish to take at this time. Will continue to hold.    Primary hypertension  - Worsened in setting of steroid administration.  -  nifedipine 60mg PO as BID. Hold further spironolactone for now with uptrending creatinine.  Dose of metoprolol increased to 100 mg daily in the setting of hypertension.    11/27:  Plan to start patient on hydrochlorothiazide 25 mg daily.  - Continue hydralazine 10mg IV q6hr PRN for SBP > 180, DBP > 100.      VTE Risk Mitigation (From admission, onward)           Ordered     heparin, porcine (PF) 100 unit/mL injection flush 500 Units  As needed (PRN)         11/27/23 1123     IP VTE HIGH RISK PATIENT  Once         11/18/23 2128     Place sequential compression device  Until discontinued         11/18/23 2128     Reason for No Pharmacological VTE Prophylaxis  Once        Question:  Reasons:  Answer:  Thrombocytopenia    11/18/23 2128                    Discharge Planning   EL: 12/1/2023     Code Status: Full Code   Is the patient medically ready for discharge?: No    Reason for patient still in hospital (select all that apply): Patient trending condition, Laboratory test, Treatment, and Consult recommendations  Discharge Plan A: Home with family   Discharge Delays: None known at this time              Pamela Camacho MD  Department of Hospital Medicine   [facility] - Intensive Care ([facility]

## 2023-11-28 NOTE — PLAN OF CARE
Problem: Adult Inpatient Plan of Care  Goal: Plan of Care Review  11/27/2023 2242 by Nuris Chow, KEIRA  Outcome: Ongoing, Progressing  11/27/2023 2241 by Nuris Chow RN  Outcome: Ongoing, Progressing  Goal: Patient-Specific Goal (Individualized)  11/27/2023 2242 by Nuris Chow, KEIRA  Outcome: Ongoing, Progressing  11/27/2023 2241 by Nuris Chow RN  Outcome: Ongoing, Progressing  Goal: Absence of Hospital-Acquired Illness or Injury  11/27/2023 2242 by Nuris Chow RN  Outcome: Ongoing, Progressing  11/27/2023 2241 by Nuris Chow RN  Outcome: Ongoing, Progressing  Goal: Optimal Comfort and Wellbeing  11/27/2023 2242 by Nuris Chow, KEIRA  Outcome: Ongoing, Progressing  11/27/2023 2241 by Nuris Chow, KEIRA  Outcome: Ongoing, Progressing  Goal: Readiness for Transition of Care  11/27/2023 2242 by Nuris Chow, KEIRA  Outcome: Ongoing, Progressing  11/27/2023 2241 by Nuris Chow, KEIRA  Outcome: Ongoing, Progressing

## 2023-11-28 NOTE — NURSING
End Of Shift    Diagnosis:  thrombocytopenia s/t suspected ITP     Plan:  monitor platelet count + s/sx of bleeding     Consults:  heme/onc     Mentation:  A/Ox4. Denies pain.      Labs:        Latest Reference Range & Units 11/27/23 06:58   Platelet Count 150 - 450 K/uL 2 (LL)   (LL): Data is critically low      Respiratory:  RA      Cardiac:  NSR 60-70s on monitor. -180s. Hydralazine given for hypertension.       GI:  LBM 11/25. Evening blood glucose 29. Received 250 bolus of D10. Rechecked glucose -- 385. MD notified. Inquired about recheck. Now ACHS + at 0200. 317 at 0200. MD notified. No new orders but to check glucose with serum labs.      :  purewick exchanged overnight. Incontinent of urine.      Mobility:  x1 assist OOB to BSC.      Skin: intact     Lines, Drains, & Tubes:  YVES midline

## 2023-11-29 PROBLEM — D72.829 LEUCOCYTOSIS: Status: ACTIVE | Noted: 2023-11-29

## 2023-11-29 LAB
ALBUMIN SERPL BCP-MCNC: 3.2 G/DL (ref 3.5–5.2)
ALP SERPL-CCNC: 49 U/L (ref 55–135)
ALT SERPL W/O P-5'-P-CCNC: 11 U/L (ref 10–44)
ANION GAP SERPL CALC-SCNC: 7 MMOL/L (ref 8–16)
AST SERPL-CCNC: 14 U/L (ref 10–40)
BASOPHILS # BLD AUTO: 0.03 K/UL (ref 0–0.2)
BASOPHILS NFR BLD: 0.2 % (ref 0–1.9)
BILIRUB SERPL-MCNC: 2 MG/DL (ref 0.1–1)
BUN SERPL-MCNC: 46 MG/DL (ref 8–23)
CALCIUM SERPL-MCNC: 10.6 MG/DL (ref 8.7–10.5)
CHLORIDE SERPL-SCNC: 107 MMOL/L (ref 95–110)
CO2 SERPL-SCNC: 22 MMOL/L (ref 23–29)
CREAT SERPL-MCNC: 1.4 MG/DL (ref 0.5–1.4)
DIFFERENTIAL METHOD: ABNORMAL
DNA/RNA EXTRACT AND HOLD RESULT: NORMAL
DNA/RNA EXTRACTION: NORMAL
EOSINOPHIL # BLD AUTO: 0 K/UL (ref 0–0.5)
EOSINOPHIL NFR BLD: 0.1 % (ref 0–8)
ERYTHROCYTE [DISTWIDTH] IN BLOOD BY AUTOMATED COUNT: 16.6 % (ref 11.5–14.5)
EST. GFR  (NO RACE VARIABLE): 41 ML/MIN/1.73 M^2
EXHR SPECIMEN TYPE: NORMAL
GLUCOSE SERPL-MCNC: 164 MG/DL (ref 70–110)
HCT VFR BLD AUTO: 24.6 % (ref 37–48.5)
HGB BLD-MCNC: 8.8 G/DL (ref 12–16)
IMM GRANULOCYTES # BLD AUTO: 0.59 K/UL (ref 0–0.04)
IMM GRANULOCYTES NFR BLD AUTO: 3.1 % (ref 0–0.5)
LYMPHOCYTES # BLD AUTO: 1.5 K/UL (ref 1–4.8)
LYMPHOCYTES NFR BLD: 7.7 % (ref 18–48)
MCH RBC QN AUTO: 35.2 PG (ref 27–31)
MCHC RBC AUTO-ENTMCNC: 35.8 G/DL (ref 32–36)
MCV RBC AUTO: 98 FL (ref 82–98)
MONOCYTES # BLD AUTO: 1.3 K/UL (ref 0.3–1)
MONOCYTES NFR BLD: 6.6 % (ref 4–15)
NEUTROPHILS # BLD AUTO: 15.7 K/UL (ref 1.8–7.7)
NEUTROPHILS NFR BLD: 82.3 % (ref 38–73)
NRBC BLD-RTO: 0 /100 WBC
PLATELET # BLD AUTO: 16 K/UL (ref 150–450)
PLATELET BLD QL SMEAR: ABNORMAL
PMV BLD AUTO: 13.8 FL (ref 9.2–12.9)
POCT GLUCOSE: 107 MG/DL (ref 70–110)
POCT GLUCOSE: 117 MG/DL (ref 70–110)
POCT GLUCOSE: 187 MG/DL (ref 70–110)
POCT GLUCOSE: 191 MG/DL (ref 70–110)
POCT GLUCOSE: 209 MG/DL (ref 70–110)
POTASSIUM SERPL-SCNC: 3.9 MMOL/L (ref 3.5–5.1)
PROT SERPL-MCNC: 7.2 G/DL (ref 6–8.4)
RBC # BLD AUTO: 2.5 M/UL (ref 4–5.4)
SODIUM SERPL-SCNC: 136 MMOL/L (ref 136–145)
WBC # BLD AUTO: 19.08 K/UL (ref 3.9–12.7)

## 2023-11-29 PROCEDURE — 25000003 PHARM REV CODE 250: Performed by: HOSPITALIST

## 2023-11-29 PROCEDURE — 85025 COMPLETE CBC W/AUTO DIFF WBC: CPT | Performed by: STUDENT IN AN ORGANIZED HEALTH CARE EDUCATION/TRAINING PROGRAM

## 2023-11-29 PROCEDURE — 20600001 HC STEP DOWN PRIVATE ROOM

## 2023-11-29 PROCEDURE — 36415 COLL VENOUS BLD VENIPUNCTURE: CPT | Performed by: STUDENT IN AN ORGANIZED HEALTH CARE EDUCATION/TRAINING PROGRAM

## 2023-11-29 PROCEDURE — 63600175 PHARM REV CODE 636 W HCPCS: Performed by: STUDENT IN AN ORGANIZED HEALTH CARE EDUCATION/TRAINING PROGRAM

## 2023-11-29 PROCEDURE — 25000003 PHARM REV CODE 250: Performed by: STUDENT IN AN ORGANIZED HEALTH CARE EDUCATION/TRAINING PROGRAM

## 2023-11-29 PROCEDURE — 25000003 PHARM REV CODE 250: Performed by: PHYSICIAN ASSISTANT

## 2023-11-29 PROCEDURE — 25000003 PHARM REV CODE 250: Performed by: INTERNAL MEDICINE

## 2023-11-29 PROCEDURE — 63600175 PHARM REV CODE 636 W HCPCS: Performed by: INTERNAL MEDICINE

## 2023-11-29 PROCEDURE — 80053 COMPREHEN METABOLIC PANEL: CPT | Performed by: STUDENT IN AN ORGANIZED HEALTH CARE EDUCATION/TRAINING PROGRAM

## 2023-11-29 RX ORDER — SULFAMETHOXAZOLE AND TRIMETHOPRIM 800; 160 MG/1; MG/1
1 TABLET ORAL DAILY
Status: DISCONTINUED | OUTPATIENT
Start: 2023-11-29 | End: 2023-11-30 | Stop reason: HOSPADM

## 2023-11-29 RX ADMIN — SULFAMETHOXAZOLE AND TRIMETHOPRIM 1 TABLET: 800; 160 TABLET ORAL at 10:11

## 2023-11-29 RX ADMIN — METOPROLOL SUCCINATE 100 MG: 100 TABLET, EXTENDED RELEASE ORAL at 10:11

## 2023-11-29 RX ADMIN — PREDNISONE 70 MG: 50 TABLET ORAL at 05:11

## 2023-11-29 RX ADMIN — INSULIN ASPART 2 UNITS: 100 INJECTION, SOLUTION INTRAVENOUS; SUBCUTANEOUS at 10:11

## 2023-11-29 RX ADMIN — TRANEXAMIC ACID 836 MG: 100 INJECTION, SOLUTION INTRAVENOUS at 10:11

## 2023-11-29 RX ADMIN — TRANEXAMIC ACID 836 MG: 100 INJECTION, SOLUTION INTRAVENOUS at 03:11

## 2023-11-29 RX ADMIN — FOLIC ACID 1 MG: 1 TABLET ORAL at 10:11

## 2023-11-29 RX ADMIN — ELTROMBOPAG OLAMINE 50 MG: 50 TABLET, FILM COATED ORAL at 10:11

## 2023-11-29 RX ADMIN — NIFEDIPINE 90 MG: 30 TABLET, FILM COATED, EXTENDED RELEASE ORAL at 10:11

## 2023-11-29 RX ADMIN — PANTOPRAZOLE SODIUM 40 MG: 40 TABLET, DELAYED RELEASE ORAL at 06:11

## 2023-11-29 RX ADMIN — NIFEDIPINE 90 MG: 30 TABLET, FILM COATED, EXTENDED RELEASE ORAL at 09:11

## 2023-11-29 RX ADMIN — HYDROCHLOROTHIAZIDE 25 MG: 25 TABLET ORAL at 10:11

## 2023-11-29 RX ADMIN — INSULIN ASPART 6 UNITS: 100 INJECTION, SOLUTION INTRAVENOUS; SUBCUTANEOUS at 10:11

## 2023-11-29 RX ADMIN — INSULIN DETEMIR 18 UNITS: 100 INJECTION, SOLUTION SUBCUTANEOUS at 10:11

## 2023-11-29 RX ADMIN — INSULIN ASPART 1 UNITS: 100 INJECTION, SOLUTION INTRAVENOUS; SUBCUTANEOUS at 09:11

## 2023-11-29 RX ADMIN — FLUOXETINE 20 MG: 20 CAPSULE ORAL at 10:11

## 2023-11-29 RX ADMIN — TRANEXAMIC ACID 836 MG: 100 INJECTION, SOLUTION INTRAVENOUS at 09:11

## 2023-11-29 RX ADMIN — PANTOPRAZOLE SODIUM 40 MG: 40 TABLET, DELAYED RELEASE ORAL at 03:11

## 2023-11-29 RX ADMIN — HYDRALAZINE HYDROCHLORIDE 10 MG: 20 INJECTION, SOLUTION INTRAMUSCULAR; INTRAVENOUS at 04:11

## 2023-11-29 NOTE — CARE UPDATE
RAPID RESPONSE NURSE ROUND       Rounding completed with charge RN, Lemuel for Epic reports HTN. No additional concerns verbalized at this time. Instructed to call [phone] for further concerns or assistance.

## 2023-11-29 NOTE — PROGRESS NOTES
[facility] - Intensive Care ([address] Medicine  Progress Note    Patient Name: Wendy Viveros  MRN: [id number]  Patient Class: IP- Inpatient   Admission Date: 11/18/2023  Length of Stay: 11 days  Attending Physician: Pamela Camacho MD  Primary Care Provider: Ashley Harrell MD        Subjective:     Principal Problem:Thrombocytopenia        HPI:  Wendy Viveros is a 67-year-old female with a past medical history of hypertension, stroke with residual right-sided weakness, and remote history of possible hemolytic anemia who presented to the ED at [facility] with complaints of lesions in her mouth since yesterday.  She states she has also noticed some accompanying mild bleeding after brushing her teeth, but she denies any other bleeding.  She does report easy bruising however denies any additional rash.  Initially she denies any anticoagulant use however upon review of patient's chart she is on Plavix and aspirin.  Other than these lesions, she feels in her usual state of health.  She denies recent illness.  In the ED, labs notable for extreme thrombocytopenia with platelets of 3.  No schistocytes on the differential.  T bili elevated at 2.8.  This was discussed with hematologist on call Dr. Osorio, who suspects ITP, and recommended high-dose steroids and platelets be given with goal to increased platelet count above 10.  Of note, she was admitted at this same facility in 2017 for suspected hemolytic anemia, but it appears she was lost to follow-up with hematology.  She did just see Hematology at [facility] 2 weeks ago after her PCP referred her there for mild anemia and mild thrombocytopenia with elevated T bili and low haptoglobin.    Overview/Hospital Course:  Ms. Viveros presented with bleeding gums and mouth lesions. Admitted with severe thrombocytopenia with suspected ITP. Treated with IV dexamethasone in ER and transfused 1U platelets. Hem/Onc consulted; converted to dexamethasone PO.  No overt evidence of bleeding but platelet count was slow to respond requiring additional platelet transfusions.  Patient was transferred to [facility] for bone marrow biopsy.  Performed on 11/22.  As patient's platelet count was not responding to steroids, Heme-Onc decided to start the patient on rituximab.  Not sure why it was not administered over the weekend.  Hepatitis-B panel was suggestive of past infection, hep B DNA pending.  Initiated patient on tenofovir for hepatitis-B prophylaxis.  Initiated on tranexamic acid for nosebleed.  Patient has received multiple platelet transfusion since 11/22.   Patient was started on elmtrobag. Platelets improved to 8. Patient and daughter concerned about side effects of rituximab and would like to hold off on it. Rituximab on hold.     Interval History:   Steroid dose cut down to 70mg daily.  in the setting of elevated blood pressure and leukocytosis.  No signs of active bleeding.  Patient hemodynamically stable.  Had a detailed discussion with patient regarding treatment plan.     Review of Systems   Constitutional:  Negative for chills and fever.   Respiratory:  Negative for cough and shortness of breath.    Cardiovascular:  Negative for chest pain and palpitations.   Gastrointestinal:  Negative for abdominal pain, nausea and vomiting.     Objective:     Vital Signs (Most Recent):  Temp: 98.3 °F (36.8 °C) (11/29/23 1626)  Pulse: (!) 59 (11/29/23 1626)  Resp: 19 (11/29/23 1626)  BP: (!) 144/67 (11/29/23 1626)  SpO2: 98 % (11/29/23 1626) Vital Signs (24h Range):  Temp:  [97.6 °F (36.4 °C)-98.3 °F (36.8 °C)] 98.3 °F (36.8 °C)  Pulse:  [59-67] 59  Resp:  [17-20] 19  SpO2:  [97 %-100 %] 98 %  BP: (144-183)/(66-79) 144/67     Weight: 83.6 kg (184 lb 4.9 oz)  Body mass index is 30.67 kg/m².    Intake/Output Summary (Last 24 hours) at 11/29/2023 1649  Last data filed at 11/29/2023 0438  Gross per 24 hour   Intake --   Output 1100 ml   Net -1100 ml           Physical Exam  Vitals and  nursing note reviewed.   Constitutional:       General: She is not in acute distress.     Appearance: She is well-developed.   HENT:      Head: Normocephalic and atraumatic.      Mouth/Throat:      Comments: Purpura to tongue / oral mucosa.  Eyes:      General:         Right eye: No discharge.         Left eye: No discharge.      Conjunctiva/sclera: Conjunctivae normal.   Cardiovascular:      Rate and Rhythm: Normal rate.      Pulses: Normal pulses.   Pulmonary:      Effort: Pulmonary effort is normal. No respiratory distress.   Abdominal:      Palpations: Abdomen is soft.      Tenderness: There is no abdominal tenderness.   Musculoskeletal:         General: Normal range of motion.      Right lower leg: No edema.      Left lower leg: No edema.   Skin:     General: Skin is warm and dry.      Comments: Some petechiae / bruising noted.   Neurological:      Mental Status: She is alert and oriented to person, place, and time.       Significant Labs:   CBC:  Recent Labs   Lab 11/27/23  0658 11/28/23  0431 11/29/23  0456   WBC 13.24* 15.57* 19.08*   HGB 9.1* 8.7* 8.8*   HCT 25.7* 25.0* 24.6*   PLT 2* 8* 16*   GRAN 87.1*  11.5* 88.4*  13.8* 82.3*  15.7*   LYMPH 5.4*  0.7* 3.4*  0.5* 7.7*  1.5   MONO 3.6*  0.5 3.3*  0.5 6.6  1.3*   EOS 0.0 0.0 0.0   BASO 0.02 0.03 0.03      CMP:  Recent Labs   Lab 11/27/23  0658 11/28/23  0432 11/29/23  0456    136 136   K 4.0 4.1 3.9    107 107   CO2 19* 21* 22*   BUN 51* 47* 46*   CREATININE 1.2 1.3 1.4   * 281* 164*   CALCIUM 10.0 10.4 10.6*   ALKPHOS 51* 57 49*   AST 15 13 14   ALT 14 14 11   BILITOT 2.3* 2.2* 2.0*   PROT 7.6 8.0 7.2   ALBUMIN 3.2* 3.4* 3.2*   ANIONGAP 7* 8 7*        Significant Imaging: I have reviewed and interpreted all pertinent imaging results/findings within the past 24 hours.    Assessment/Plan:      * Thrombocytopenia  Anemia of chronic disease  Hyperbilirubinemia  - History of hematologic abnormalities dating back to hospitalization  in 2017.   - Seen by Dr. Howard in Hem/Onc clinic on 11/1/23 for mild anemia and thrombocytopenia noted for the past 7 months and new labs were ordered with follow up arranged for return; unable to see most recent outside labs. That note reports her H/H was recently 10.2/28, chronically elevated bilirubin, and depressed haptoglobin to undetectable levels.  - Severe thrombocytopenia highly suspicious for ITP, though failing to respond to date. Holding allopurinol, clopidogrel.  - Direct antiglobulin test negative. [id number] activity elevated; not consistent with TTP. U/S Abd with hepatomegaly, no splenomegaly. Broadened workup in process with fibrinogen, antithrombin III, D-dimer, SPEP, immunofixation, ALFREDO, anti-dsDNA, ESR, reticulocytes, HIT panel.  - - received prednisone 80 mg daily for 1 week, taper down to 70 mg today.  and 2 day course of IVIG without improvement.  Underwent bone marrow biopsy on 11/22.  Patient has received 1 unit platelet transfusion per day since 11/22.  No significant improvement post transfusion.   Patient is started on elmtrobag, platelet count improving.  - Appreciate hematology assistance.    Leucocytosis  2.2 steroids   Denies fever, chills, shortness of breath, dysuria or hematuria.    No signs and symptoms of ongoing infection.  We will hold off on antibiotics      Idiopathic thrombocytopenic purpura (ITP)        History of CVA (cerebrovascular accident)  - Reported CVA with R sided weakness in 2021  - Holding plavix as discussed above; atorvastatin as under HLD.    CKD (chronic kidney disease) stage 3, GFR 30-59 ml/min  - Mild uptrend. Encourage PO fluid intake.  - Monitor, renally dose medications, avoid nephrotoxins.    Anemia, unspecified  Patient's anemia is currently controlled. Has not received any PRBCs to date. Etiology likely d/t chronic disease due to Chronic Kidney Disease/ESRD  Current CBC reviewed-   Lab Results   Component Value Date    HGB 9.1 (L) 11/27/2023    HCT  25.7 (L) 11/27/2023     Monitor serial CBC and transfuse if patient becomes hemodynamically unstable, symptomatic or H/H drops below 7/21.    Type 2 diabetes mellitus with circulatory disorder, without long-term current use of insulin  - Last HgbA1c 7.2 in 2021; repeat 4.6.  - Reports diet-controlled at home.  - Increasing hyperglycemia in setting of steroid use.  - Continue insulin detemir 18U subQ daily, insulin aspart 6U subQ TIDWM, continue moderate-dose sliding scale insulin aspart 1-10U subQ TIDWM PRN.  - Monitor with steroid cessation and decrease as appropriate.    Chronic gout  - Hold allopurinol given can be associated with thrombocytopenia.    Hyperlipidemia type II  - Reports having had muscle aches with atorvastatin 80 mg PO daily; does not wish to take at this time. Will continue to hold.    Primary hypertension  - Worsened in setting of steroid administration.  -  nifedipine 60mg PO as BID. Hold further spironolactone for now with uptrending creatinine.  Dose of metoprolol increased to 100 mg daily in the setting of hypertension.    11/27:  Plan to start patient on hydrochlorothiazide 25 mg daily.  - Continue hydralazine 10mg IV q6hr PRN for SBP > 180, DBP > 100.      VTE Risk Mitigation (From admission, onward)           Ordered     heparin, porcine (PF) 100 unit/mL injection flush 500 Units  As needed (PRN)         11/27/23 1123     IP VTE HIGH RISK PATIENT  Once         11/18/23 2128     Place sequential compression device  Until discontinued         11/18/23 2128     Reason for No Pharmacological VTE Prophylaxis  Once        Question:  Reasons:  Answer:  Thrombocytopenia    11/18/23 2128                    Discharge Planning   EL: 12/1/2023     Code Status: Full Code   Is the patient medically ready for discharge?: No    Reason for patient still in hospital (select all that apply): Treatment, Imaging, and Consult recommendations  Discharge Plan A: Home with family   Discharge Delays: None known  at this time              Pamela Camacho MD  Department of Hospital Medicine   [facility] - Intensive Care ([facility]-14)

## 2023-11-29 NOTE — ASSESSMENT & PLAN NOTE
Anemia of chronic disease  Hyperbilirubinemia  - History of hematologic abnormalities dating back to hospitalization in 2017.   - Seen by Dr. Howard in Hem/Onc clinic on 11/1/23 for mild anemia and thrombocytopenia noted for the past 7 months and new labs were ordered with follow up arranged for return; unable to see most recent outside labs. That note reports her H/H was recently 10.2/28, chronically elevated bilirubin, and depressed haptoglobin to undetectable levels.  - Severe thrombocytopenia highly suspicious for ITP, though failing to respond to date. Holding allopurinol, clopidogrel.  - Direct antiglobulin test negative. [id number] activity elevated; not consistent with TTP. U/S Abd with hepatomegaly, no splenomegaly. Broadened workup in process with fibrinogen, antithrombin III, D-dimer, SPEP, immunofixation, ALFREDO, anti-dsDNA, ESR, reticulocytes, HIT panel.  - - Received 4 day course of dexamethasone 40mg PO daily and 2 day course of IVIG without improvement.  Underwent bone marrow biopsy on 11/22.  Patient has received 1 unit platelet transfusion per day since 11/22.  No significant improvement post transfusion.   Patient is started on elmtrobag  - Appreciate hematology assistance.

## 2023-11-29 NOTE — ASSESSMENT & PLAN NOTE
Anemia of chronic disease  Hyperbilirubinemia  - History of hematologic abnormalities dating back to hospitalization in 2017.   - Seen by Dr. Howard in Hem/Onc clinic on 11/1/23 for mild anemia and thrombocytopenia noted for the past 7 months and new labs were ordered with follow up arranged for return; unable to see most recent outside labs. That note reports her H/H was recently 10.2/28, chronically elevated bilirubin, and depressed haptoglobin to undetectable levels.  - Severe thrombocytopenia highly suspicious for ITP, though failing to respond to date. Holding allopurinol, clopidogrel.  - Direct antiglobulin test negative. [id number] activity elevated; not consistent with TTP. U/S Abd with hepatomegaly, no splenomegaly. Broadened workup in process with fibrinogen, antithrombin III, D-dimer, SPEP, immunofixation, ALFREDO, anti-dsDNA, ESR, reticulocytes, HIT panel.  - - received prednisone 80 mg daily for 1 week, taper down to 70 mg today.  and 2 day course of IVIG without improvement.  Underwent bone marrow biopsy on 11/22.  Patient has received 1 unit platelet transfusion per day since 11/22.  No significant improvement post transfusion.   Patient is started on elmtrobag, platelet count improving.  - Appreciate hematology assistance.

## 2023-11-29 NOTE — PLAN OF CARE
Recommendations     1. Liberalize diet to Cardiac (A1C 4.6).  2. RD to monitor & follow-up.     Goals: Meet % EEN, EPN by RD f/u date  Nutrition Goal Status: new  Communication of RD Recs: reviewed with RN

## 2023-11-29 NOTE — PROGRESS NOTES
"[facility] - Intensive Care ([facility]  Adult Nutrition  Progress Note    SUMMARY       Recommendations    1. Liberalize diet to Cardiac (A1C 4.6).  2. RD to monitor & follow-up.    Goals: Meet % EEN, EPN by RD f/u date  Nutrition Goal Status: new  Communication of RD Recs: reviewed with RN    Assessment and Plan    Nutrition Problem:  Inadequate energy intake    Related to (etiology):   Doesn't like hospital food    Signs and Symptoms (as evidenced by):   PO intake 25%    Interventions(treatment strategy):  Collaboration of nutrition care w/ other providers    Nutrition Diagnosis Status:   New    Reason for Assessment    Reason For Assessment: length of stay  Diagnosis: other (see comments) (Thrombocytopenia)   Relevant Medical History: HTN  Interdisciplinary Rounds: did not attend    General Information Comments: Pt tolerating diet, reports decreased appetite since admit 2/2 dislike of hospital food - doesnt want ONS. Good appetite PTA & UBW of 185#. Appears nourished w/ no indicators of malnutrition.  Nutrition Discharge Planning: Adequate PO intake    Nutrition/Diet History    Factors Affecting Nutritional Intake: other (see comments) (Doesnt like food)    Anthropometrics    Temp: 98.1 °F (36.7 °C)  Height Method: Stated  Height: 5' 5" (165.1 cm)  Height (inches): 65 in  Weight Method: Bed Scale  Weight: 83.6 kg (184 lb 4.9 oz)  Weight (lb): 184.31 lb  Ideal Body Weight (IBW), Female: 125 lb  % Ideal Body Weight, Female (lb): 147.45 %  BMI (Calculated): 30.7  BMI Grade: 30 - 34.9- obesity - grade I    Lab/Procedures/Meds    Pertinent Labs Reviewed: reviewed  Pertinent Labs Comments: GFR 41, A1C 4.6  Pertinent Medications Reviewed: reviewed    Estimated/Assessed Needs    Weight Used For Calorie Calculations: 83.6 kg (184 lb 4.9 oz)    Energy Calorie Requirements (kcal): 1504 kcal/d  Energy Need Method: Nahum White (1.1 PAL)    Protein Requirements: 84 g/d (1 g/kg)  Weight Used For Protein " Calculations: 83.6 kg (184 lb 4.9 oz)    Estimated Fluid Requirement Method: other (see comments) (Per MD or 1 mL/kcal)  RDA Method (mL): 1504    Nutrition Prescription Ordered    Current Diet Order: 2000 kcal ADA, Cardiac    Evaluation of Received Nutrient/Fluid Intake    I/O: -6L since admit    Comments: LBM: 11/25    Tolerance: tolerating    Nutrition Risk    Level of Risk/Frequency of Follow-up:  (1x/week)     Monitor and Evaluation    Food and Nutrient Intake: food and beverage intake, energy intake  Food and Nutrient Adminstration: diet order  Physical Activity and Function: nutrition-related ADLs and IADLs  Anthropometric Measurements: weight, weight change  Biochemical Data, Medical Tests and Procedures: inflammatory profile, lipid profile, electrolyte and renal panel, gastrointestinal profile, glucose/endocrine profile  Nutrition-Focused Physical Findings: overall appearance     Nutrition Follow-Up    RD Follow-up?: Yes

## 2023-11-29 NOTE — ASSESSMENT & PLAN NOTE
2.2 steroids   Denies fever, chills, shortness of breath, dysuria or hematuria.    No signs and symptoms of ongoing infection.  We will hold off on antibiotics

## 2023-11-29 NOTE — SUBJECTIVE & OBJECTIVE
Interval History:   Held this morning steroid dose, in the setting of elevated blood pressure and leukocytosis.  No signs of active bleeding.  Patient hemodynamically stable.  Had a detailed discussion with patient regarding treatment plan.     Review of Systems   Constitutional:  Negative for chills and fever.   Respiratory:  Negative for cough and shortness of breath.    Cardiovascular:  Negative for chest pain and palpitations.   Gastrointestinal:  Negative for abdominal pain, nausea and vomiting.     Objective:     Vital Signs (Most Recent):  Temp: 98.3 °F (36.8 °C) (11/29/23 1626)  Pulse: (!) 59 (11/29/23 1626)  Resp: 19 (11/29/23 1626)  BP: (!) 144/67 (11/29/23 1626)  SpO2: 98 % (11/29/23 1626) Vital Signs (24h Range):  Temp:  [97.6 °F (36.4 °C)-98.3 °F (36.8 °C)] 98.3 °F (36.8 °C)  Pulse:  [59-67] 59  Resp:  [17-20] 19  SpO2:  [97 %-100 %] 98 %  BP: (144-183)/(66-79) 144/67     Weight: 83.6 kg (184 lb 4.9 oz)  Body mass index is 30.67 kg/m².    Intake/Output Summary (Last 24 hours) at 11/29/2023 1649  Last data filed at 11/29/2023 0438  Gross per 24 hour   Intake --   Output 1100 ml   Net -1100 ml           Physical Exam  Vitals and nursing note reviewed.   Constitutional:       General: She is not in acute distress.     Appearance: She is well-developed.   HENT:      Head: Normocephalic and atraumatic.      Mouth/Throat:      Comments: Purpura to tongue / oral mucosa.  Eyes:      General:         Right eye: No discharge.         Left eye: No discharge.      Conjunctiva/sclera: Conjunctivae normal.   Cardiovascular:      Rate and Rhythm: Normal rate.      Pulses: Normal pulses.   Pulmonary:      Effort: Pulmonary effort is normal. No respiratory distress.   Abdominal:      Palpations: Abdomen is soft.      Tenderness: There is no abdominal tenderness.   Musculoskeletal:         General: Normal range of motion.      Right lower leg: No edema.      Left lower leg: No edema.   Skin:     General: Skin is warm and  dry.      Comments: Some petechiae / bruising noted.   Neurological:      Mental Status: She is alert and oriented to person, place, and time.       Significant Labs:   CBC:  Recent Labs   Lab 11/27/23  0658 11/28/23  0431 11/29/23  0456   WBC 13.24* 15.57* 19.08*   HGB 9.1* 8.7* 8.8*   HCT 25.7* 25.0* 24.6*   PLT 2* 8* 16*   GRAN 87.1*  11.5* 88.4*  13.8* 82.3*  15.7*   LYMPH 5.4*  0.7* 3.4*  0.5* 7.7*  1.5   MONO 3.6*  0.5 3.3*  0.5 6.6  1.3*   EOS 0.0 0.0 0.0   BASO 0.02 0.03 0.03      CMP:  Recent Labs   Lab 11/27/23 0658 11/28/23 0432 11/29/23  0456    136 136   K 4.0 4.1 3.9    107 107   CO2 19* 21* 22*   BUN 51* 47* 46*   CREATININE 1.2 1.3 1.4   * 281* 164*   CALCIUM 10.0 10.4 10.6*   ALKPHOS 51* 57 49*   AST 15 13 14   ALT 14 14 11   BILITOT 2.3* 2.2* 2.0*   PROT 7.6 8.0 7.2   ALBUMIN 3.2* 3.4* 3.2*   ANIONGAP 7* 8 7*        Significant Imaging: I have reviewed and interpreted all pertinent imaging results/findings within the past 24 hours.

## 2023-11-29 NOTE — NURSING
End Of Shift    Diagnosis:  thrombocytopenia s/t suspected ITP     Plan:  monitor platelet count + s/sx of bleeding     Consults:  heme/onc     Mentation:  A/Ox4. Denies pain.      Labs:        Latest Reference Range & Units 11/28/23 04:31   Platelet Count 150 - 450 K/uL 8 (LL)   (LL): Data is critically low      Respiratory:  RA      Cardiac:  NSR 60-70s on monitor. -180s. Hydralazine given for hypertension with good effect.      GI:  LBM 11/25. Evening blood glucose 247. Received 1 unit of insulin.      :  purewick exchanged overnight. Incontinent of urine.      Mobility:  x1 assist OOB to BSC.      Skin: intact     Lines, Drains, & Tubes:  YVES midline

## 2023-11-29 NOTE — SUBJECTIVE & OBJECTIVE
Interval History:   No overnight events.  No signs of active bleeding.  Patient hemodynamically stable.  Had a detailed discussion regarding patient and daughter over phone regarding treatment plan.     Review of Systems   Constitutional:  Negative for chills and fever.   Respiratory:  Negative for cough and shortness of breath.    Cardiovascular:  Negative for chest pain and palpitations.   Gastrointestinal:  Negative for abdominal pain, nausea and vomiting.     Objective:     Vital Signs (Most Recent):  Temp: 98 °F (36.7 °C) (11/28/23 2012)  Pulse: 60 (11/28/23 1543)  Resp: 17 (11/28/23 2012)  BP: (!) 150/69 (11/28/23 2012)  SpO2: 100 % (11/28/23 1543) Vital Signs (24h Range):  Temp:  [97.9 °F (36.6 °C)-98.3 °F (36.8 °C)] 98 °F (36.7 °C)  Pulse:  [60-62] 60  Resp:  [16-20] 17  SpO2:  [98 %-100 %] 100 %  BP: (150-180)/(69-79) 150/69     Weight: 83.6 kg (184 lb 4.9 oz)  Body mass index is 30.67 kg/m².    Intake/Output Summary (Last 24 hours) at 11/28/2023 2043  Last data filed at 11/27/2023 2059  Gross per 24 hour   Intake 100 ml   Output --   Net 100 ml           Physical Exam  Vitals and nursing note reviewed.   Constitutional:       General: She is not in acute distress.     Appearance: She is well-developed.   HENT:      Head: Normocephalic and atraumatic.      Mouth/Throat:      Comments: Purpura to tongue / oral mucosa.  Eyes:      General:         Right eye: No discharge.         Left eye: No discharge.      Conjunctiva/sclera: Conjunctivae normal.   Cardiovascular:      Rate and Rhythm: Normal rate.      Pulses: Normal pulses.   Pulmonary:      Effort: Pulmonary effort is normal. No respiratory distress.   Abdominal:      Palpations: Abdomen is soft.      Tenderness: There is no abdominal tenderness.   Musculoskeletal:         General: Normal range of motion.      Right lower leg: No edema.      Left lower leg: No edema.   Skin:     General: Skin is warm and dry.      Comments: Some petechiae / bruising  noted.   Neurological:      Mental Status: She is alert and oriented to person, place, and time.       Significant Labs:   CBC:  Recent Labs   Lab 11/26/23 0359 11/27/23  0658 11/28/23  0431   WBC 13.21* 13.24* 15.57*   HGB 9.4* 9.1* 8.7*   HCT 26.4* 25.7* 25.0*   PLT 3* 2* 8*   GRAN 90.3*  11.9* 87.1*  11.5* 88.4*  13.8*   LYMPH 4.0*  0.5* 5.4*  0.7* 3.4*  0.5*   MONO 1.7*  0.2* 3.6*  0.5 3.3*  0.5   EOS 0.0 0.0 0.0   BASO 0.03 0.02 0.03      CMP:  Recent Labs   Lab 11/26/23 0359 11/27/23 0658 11/28/23  0432    136 136   K 4.1 4.0 4.1    110 107   CO2 20* 19* 21*   BUN 65* 51* 47*   CREATININE 1.7* 1.2 1.3   * 203* 281*   CALCIUM 9.9 10.0 10.4   ALKPHOS 53* 51* 57   AST 15 15 13   ALT 14 14 14   BILITOT 2.0* 2.3* 2.2*   PROT 7.8 7.6 8.0   ALBUMIN 3.1* 3.2* 3.4*   ANIONGAP 9 7* 8        Significant Imaging: I have reviewed and interpreted all pertinent imaging results/findings within the past 24 hours.

## 2023-11-30 VITALS
DIASTOLIC BLOOD PRESSURE: 82 MMHG | BODY MASS INDEX: 30.71 KG/M2 | HEART RATE: 76 BPM | WEIGHT: 184.31 LBS | HEIGHT: 65 IN | OXYGEN SATURATION: 98 % | SYSTOLIC BLOOD PRESSURE: 145 MMHG | RESPIRATION RATE: 19 BRPM | TEMPERATURE: 98 F

## 2023-11-30 PROBLEM — D64.9 ANEMIA, UNSPECIFIED: Chronic | Status: ACTIVE | Noted: 2017-07-18

## 2023-11-30 PROBLEM — N18.30 CKD (CHRONIC KIDNEY DISEASE) STAGE 3, GFR 30-59 ML/MIN: Chronic | Status: ACTIVE | Noted: 2018-02-20

## 2023-11-30 PROBLEM — Z86.73 HISTORY OF CVA (CEREBROVASCULAR ACCIDENT): Chronic | Status: ACTIVE | Noted: 2022-04-28

## 2023-11-30 PROBLEM — D69.6 THROMBOCYTOPENIA: Chronic | Status: ACTIVE | Noted: 2017-07-13

## 2023-11-30 LAB
ALBUMIN SERPL BCP-MCNC: 3 G/DL (ref 3.5–5.2)
ALP SERPL-CCNC: 49 U/L (ref 55–135)
ALT SERPL W/O P-5'-P-CCNC: 11 U/L (ref 10–44)
ANION GAP SERPL CALC-SCNC: 9 MMOL/L (ref 8–16)
ANISOCYTOSIS BLD QL SMEAR: SLIGHT
AST SERPL-CCNC: 12 U/L (ref 10–40)
BASOPHILS # BLD AUTO: 0.02 K/UL (ref 0–0.2)
BASOPHILS NFR BLD: 0.1 % (ref 0–1.9)
BILIRUB SERPL-MCNC: 1.7 MG/DL (ref 0.1–1)
BUN SERPL-MCNC: 52 MG/DL (ref 8–23)
CALCIUM SERPL-MCNC: 10.1 MG/DL (ref 8.7–10.5)
CHLORIDE SERPL-SCNC: 106 MMOL/L (ref 95–110)
CO2 SERPL-SCNC: 21 MMOL/L (ref 23–29)
CREAT SERPL-MCNC: 1.8 MG/DL (ref 0.5–1.4)
DIFFERENTIAL METHOD: ABNORMAL
EOSINOPHIL # BLD AUTO: 0 K/UL (ref 0–0.5)
EOSINOPHIL NFR BLD: 0 % (ref 0–8)
ERYTHROCYTE [DISTWIDTH] IN BLOOD BY AUTOMATED COUNT: 16.8 % (ref 11.5–14.5)
EST. GFR  (NO RACE VARIABLE): 30.3 ML/MIN/1.73 M^2
GLUCOSE SERPL-MCNC: 247 MG/DL (ref 70–110)
HCT VFR BLD AUTO: 23.7 % (ref 37–48.5)
HGB BLD-MCNC: 8.5 G/DL (ref 12–16)
HYPOCHROMIA BLD QL SMEAR: ABNORMAL
IMM GRANULOCYTES # BLD AUTO: 0.42 K/UL (ref 0–0.04)
IMM GRANULOCYTES NFR BLD AUTO: 2.6 % (ref 0–0.5)
LYMPHOCYTES # BLD AUTO: 0.8 K/UL (ref 1–4.8)
LYMPHOCYTES NFR BLD: 5 % (ref 18–48)
MCH RBC QN AUTO: 35 PG (ref 27–31)
MCHC RBC AUTO-ENTMCNC: 35.9 G/DL (ref 32–36)
MCV RBC AUTO: 98 FL (ref 82–98)
MONOCYTES # BLD AUTO: 0.5 K/UL (ref 0.3–1)
MONOCYTES NFR BLD: 2.9 % (ref 4–15)
NEUTROPHILS # BLD AUTO: 14.2 K/UL (ref 1.8–7.7)
NEUTROPHILS NFR BLD: 89.4 % (ref 38–73)
NRBC BLD-RTO: 0 /100 WBC
OVALOCYTES BLD QL SMEAR: ABNORMAL
PLATELET # BLD AUTO: 13 K/UL (ref 150–450)
PMV BLD AUTO: ABNORMAL FL (ref 9.2–12.9)
POCT GLUCOSE: 209 MG/DL (ref 70–110)
POCT GLUCOSE: 214 MG/DL (ref 70–110)
POIKILOCYTOSIS BLD QL SMEAR: SLIGHT
POLYCHROMASIA BLD QL SMEAR: ABNORMAL
POTASSIUM SERPL-SCNC: 4.2 MMOL/L (ref 3.5–5.1)
PROT SERPL-MCNC: 6.6 G/DL (ref 6–8.4)
RBC # BLD AUTO: 2.43 M/UL (ref 4–5.4)
SODIUM SERPL-SCNC: 136 MMOL/L (ref 136–145)
SPHEROCYTES BLD QL SMEAR: ABNORMAL
WBC # BLD AUTO: 15.85 K/UL (ref 3.9–12.7)

## 2023-11-30 PROCEDURE — 80053 COMPREHEN METABOLIC PANEL: CPT | Performed by: STUDENT IN AN ORGANIZED HEALTH CARE EDUCATION/TRAINING PROGRAM

## 2023-11-30 PROCEDURE — 63600175 PHARM REV CODE 636 W HCPCS: Performed by: STUDENT IN AN ORGANIZED HEALTH CARE EDUCATION/TRAINING PROGRAM

## 2023-11-30 PROCEDURE — 63600175 PHARM REV CODE 636 W HCPCS: Performed by: INTERNAL MEDICINE

## 2023-11-30 PROCEDURE — 25000003 PHARM REV CODE 250: Performed by: PHYSICIAN ASSISTANT

## 2023-11-30 PROCEDURE — 25000003 PHARM REV CODE 250: Performed by: STUDENT IN AN ORGANIZED HEALTH CARE EDUCATION/TRAINING PROGRAM

## 2023-11-30 PROCEDURE — 25000003 PHARM REV CODE 250: Performed by: HOSPITALIST

## 2023-11-30 PROCEDURE — 25000003 PHARM REV CODE 250: Performed by: INTERNAL MEDICINE

## 2023-11-30 PROCEDURE — 85025 COMPLETE CBC W/AUTO DIFF WBC: CPT | Performed by: STUDENT IN AN ORGANIZED HEALTH CARE EDUCATION/TRAINING PROGRAM

## 2023-11-30 RX ORDER — FOLIC ACID 1 MG/1
1 TABLET ORAL DAILY
Qty: 30 TABLET | Refills: 11 | Status: SHIPPED | OUTPATIENT
Start: 2023-12-01 | End: 2024-11-30

## 2023-11-30 RX ORDER — HYDROCHLOROTHIAZIDE 25 MG/1
25 TABLET ORAL DAILY
Qty: 30 TABLET | Refills: 1 | Status: SHIPPED | OUTPATIENT
Start: 2023-12-01

## 2023-11-30 RX ORDER — NIFEDIPINE 90 MG/1
90 TABLET, EXTENDED RELEASE ORAL 2 TIMES DAILY
Qty: 60 TABLET | Refills: 1 | Status: SHIPPED | OUTPATIENT
Start: 2023-11-30

## 2023-11-30 RX ORDER — PANTOPRAZOLE SODIUM 40 MG/1
40 TABLET, DELAYED RELEASE ORAL DAILY
Qty: 48 TABLET | Refills: 0 | Status: SHIPPED | OUTPATIENT
Start: 2023-11-30 | End: 2024-01-17

## 2023-11-30 RX ORDER — PREDNISONE 10 MG/1
TABLET ORAL
Qty: 189 TABLET | Refills: 0 | Status: SHIPPED | OUTPATIENT
Start: 2023-12-01 | End: 2024-01-18

## 2023-11-30 RX ORDER — SULFAMETHOXAZOLE AND TRIMETHOPRIM 800; 160 MG/1; MG/1
1 TABLET ORAL DAILY
Qty: 41 TABLET | Refills: 0 | Status: SHIPPED | OUTPATIENT
Start: 2023-12-01 | End: 2024-01-11

## 2023-11-30 RX ADMIN — ELTROMBOPAG OLAMINE 50 MG: 50 TABLET, FILM COATED ORAL at 08:11

## 2023-11-30 RX ADMIN — HYDROCHLOROTHIAZIDE 25 MG: 25 TABLET ORAL at 08:11

## 2023-11-30 RX ADMIN — FOLIC ACID 1 MG: 1 TABLET ORAL at 08:11

## 2023-11-30 RX ADMIN — INSULIN ASPART 4 UNITS: 100 INJECTION, SOLUTION INTRAVENOUS; SUBCUTANEOUS at 08:11

## 2023-11-30 RX ADMIN — INSULIN DETEMIR 18 UNITS: 100 INJECTION, SOLUTION SUBCUTANEOUS at 08:11

## 2023-11-30 RX ADMIN — INSULIN ASPART 4 UNITS: 100 INJECTION, SOLUTION INTRAVENOUS; SUBCUTANEOUS at 12:11

## 2023-11-30 RX ADMIN — INSULIN ASPART 6 UNITS: 100 INJECTION, SOLUTION INTRAVENOUS; SUBCUTANEOUS at 08:11

## 2023-11-30 RX ADMIN — INSULIN ASPART 6 UNITS: 100 INJECTION, SOLUTION INTRAVENOUS; SUBCUTANEOUS at 12:11

## 2023-11-30 RX ADMIN — SULFAMETHOXAZOLE AND TRIMETHOPRIM 1 TABLET: 800; 160 TABLET ORAL at 08:11

## 2023-11-30 RX ADMIN — TRANEXAMIC ACID 836 MG: 100 INJECTION, SOLUTION INTRAVENOUS at 09:11

## 2023-11-30 RX ADMIN — METOPROLOL SUCCINATE 100 MG: 100 TABLET, EXTENDED RELEASE ORAL at 08:11

## 2023-11-30 RX ADMIN — PANTOPRAZOLE SODIUM 40 MG: 40 TABLET, DELAYED RELEASE ORAL at 05:11

## 2023-11-30 RX ADMIN — FLUOXETINE 20 MG: 20 CAPSULE ORAL at 08:11

## 2023-11-30 RX ADMIN — PREDNISONE 70 MG: 50 TABLET ORAL at 08:11

## 2023-11-30 RX ADMIN — HYDRALAZINE HYDROCHLORIDE 10 MG: 20 INJECTION, SOLUTION INTRAMUSCULAR; INTRAVENOUS at 12:11

## 2023-11-30 RX ADMIN — NIFEDIPINE 90 MG: 30 TABLET, FILM COATED, EXTENDED RELEASE ORAL at 08:11

## 2023-11-30 NOTE — PLAN OF CARE
Patient AAOx4. VSS on RA. Afebrile. NSR on Tele. Accucheck orders maintained, coverage given per sliding scale. Pt denied pain this shift. Skin intact. Purewick in place, no BM reported this shift- see I&O for details. Up w/ assist. Bed locked and lowered, call bell in reach, nonskid socks on when OOB. Reviewed plan of care and fall precautions w/ pt- pt verbalized understanding. Reminded to call for assistance. Standard precautions maintained.

## 2023-11-30 NOTE — DISCHARGE SUMMARY
[facility] - Intensive Care ([address] Medicine  Discharge Summary      Patient Name: Wendy Viveros  MRN: [id number]  TRAE: [id number]  Patient Class: IP- Inpatient  Admission Date: 11/18/2023  Hospital Length of Stay: 12 days  Discharge Date and Time: 11/30/2023  3:30 AM  Attending Physician: Kg Olivas MD   Discharging Provider: Kg Olivas MD  Primary Care Provider: Ashley Harrell MD  [facility] Medicine Team: [facility] HOSP MED D Kg Olivas MD  Primary Care Team: [facility] MED D    HPI:   Wendy Viveros is a 67 year old Black woman with diabetes mellitus type 2, hypertension, hyperlipidemia, history of stroke on 11/15/2021 with residual right hemiparesis, chronic kidney disease stage 2, gout, hemolytic anemia, gastroesophageal reflux disease, chronic diastolic heart failure, aortic regurgitation, history of cholecystectomy, history of reduction mammoplasty, history of left total shoulder arthroplasty, history of left total knee arthroplasty on 11/1/2021, history of knee surgery in 2018. She lives in [facility]. Her primary care physician is Dr. Ashley Harrell. Her hematologist-oncologist is Dr. Joao Howard.    She was admitted to [facility] on 11/18/2023 for gum bleeding and mouth lesions. She was found to have thrombocytopenia with platelet count of 3000/uL. Immune thrombocytopenic purpura (ITP) was suspected. She was given intravenous dexamethasone and 1 unit of platelets. Hematology-Oncology was consulted. Dexamethasone was given by mouth.    She was transferred to [facility] the evening of 11/22/2023 for bone marrow biopsy. It was done the same day.         Hospital Course:   Platelet count was not responding to steroids. Hematology-Oncology decided to start rituximab. Hepatitis B panel suggested past infection. Hepatitis B DNA was checked. Tenofovir was started for hepatitis B prophylaxis. Tranexamic acid was started for  epistaxis. She required multiple platelet transfusions. She had no significant improvement in platelet count. Hematology-Oncology recommended starting eltrombopag. She was started on elmtrobag. Platelets improved to 8000. She and her daughter were concerned about side effects of rituximab and would like to hold off on it. Hematology-Oncology recommended tapering prednisone by 10 mg per week, with pantoprazole daily and trimethoprim-sulfamethoxazole while on prednisone. Amlodipine was changed to nifedipine and spironolactone was changed to hydrochlorothiazide.      Goals of Care Treatment Preferences:  Code Status: Full Code      Consults:   Consults (From admission, onward)          Status Ordering Provider     Inpatient consult to Hematology/Oncology  Once        Provider:  (Not yet assigned)    Completed BABS SANDERS     Inpatient consult to Midline team  Once        Provider:  (Not yet assigned)    Completed RONI BARTLETT          Final Active Diagnoses:    Diagnosis Date Noted POA    PRINCIPAL PROBLEM:  Thrombocytopenia [D69.6] 07/13/2017 Yes    Leucocytosis [D72.829] 11/29/2023 Unknown    Idiopathic thrombocytopenic purpura (ITP) [D69.3] 11/24/2023 Yes    History of CVA (cerebrovascular accident) [Z86.73] 04/28/2022 Not Applicable    CKD (chronic kidney disease) stage 3, GFR 30-59 ml/min [N18.30] 02/20/2018 Yes    Anemia, unspecified [D64.9] 07/18/2017 Yes    Type 2 diabetes mellitus with circulatory disorder, without long-term current use of insulin [E11.59] 03/01/2016 Yes    Primary hypertension [I10] 07/31/2012 Yes    Hyperlipidemia type II [E78.01] 07/31/2012 Yes    Chronic gout [M1A.9XX0] 07/31/2012 Yes      Problems Resolved During this Admission:       Discharged Condition: good    Disposition: Home or Self Care    Follow Up:   Follow-up Information       Ashley Harrell MD Follow up on 12/1/2023.    Specialty: Internal Medicine  Why: 10:30 Dec. 1st 10:30  Contact information:  [address]  [facility] [address]  [phone]                           Patient Instructions:      Diet diabetic     Notify your health care provider if you experience any of the following:  persistent nausea and vomiting or diarrhea     Notify your health care provider if you experience any of the following:  difficulty breathing or increased cough     Notify your health care provider if you experience any of the following:  increased confusion or weakness     Activity as tolerated       Significant Diagnostic Studies:   Recent Labs   Lab 11/28/23  0431 11/29/23  0456 11/30/23  0548   WBC 15.57* 19.08* 15.85*   HGB 8.7* 8.8* 8.5*   HCT 25.0* 24.6* 23.7*   PLT 8* 16* 13*       Medications:  Reconciled Home Medications:      Medication List        START taking these medications      eltrombopag 50 MG Tab  Commonly known as: PROMACTA  Take 1 tablet (50 mg total) by mouth once daily.  Start taking on: December 1, 2023     folic acid 1 MG tablet  Commonly known as: FOLVITE  Take 1 tablet (1 mg total) by mouth once daily.  Start taking on: December 1, 2023     hydroCHLOROthiazide 25 MG tablet  Commonly known as: HYDRODIURIL  Take 1 tablet (25 mg total) by mouth once daily.  Start taking on: December 1, 2023     NIFEdipine 90 MG (OSM) 24 hr tablet  Commonly known as: PROCARDIA-XL  Take 1 tablet (90 mg total) by mouth 2 (two) times a day.     pantoprazole 40 MG tablet  Commonly known as: PROTONIX  Take 1 tablet (40 mg total) by mouth once daily.     predniSONE 10 MG tablet  Commonly known as: DELTASONE  Take 7 tablets (70 mg total) by mouth once daily for 6 days, THEN 6 tablets (60 mg total) once daily for 7 days, THEN 5 tablets (50 mg total) once daily for 7 days, THEN 4 tablets (40 mg total) once daily for 7 days, THEN 3 tablets (30 mg total) once daily for 7 days, THEN 2 tablets (20 mg total) once daily for 7 days, THEN 1 tablet (10 mg total) once daily for 7 days.  Start taking on: December 1, 2023      sulfamethoxazole-trimethoprim 800-160mg 800-160 mg Tab  Commonly known as: BACTRIM DS  Take 1 tablet by mouth once daily.  Start taking on: December 1, 2023            CONTINUE taking these medications      allopurinoL 300 MG tablet  Commonly known as: ZYLOPRIM  Take 1 tablet by mouth once daily     atorvastatin 80 MG tablet  Commonly known as: LIPITOR  Take 80 mg by mouth once daily.     cyanocobalamin (vitamin B-12) 50 mcg tablet  Take 50 mcg by mouth once daily.     FLUoxetine 20 MG capsule  Take 20 mg by mouth once daily.     metoprolol succinate 50 MG 24 hr tablet  Commonly known as: TOPROL-XL  Take 50 mg by mouth once daily.     traZODone 50 MG tablet  Commonly known as: DESYREL  Take 50 mg by mouth nightly as needed for Insomnia.            STOP taking these medications      amLODIPine 10 MG tablet  Commonly known as: NORVASC     clopidogreL 75 mg tablet  Commonly known as: PLAVIX     spironolactone 50 MG tablet  Commonly known as: ALDACTONE              Indwelling Lines/Drains at time of discharge:   Lines/Drains/Airways       Drain  Duration             Female External Urinary Catheter 11/26/23 1200 3 days                    Time spent on the discharge of patient: 35 minutes         Kg Olivas MD  Department of Hospital Medicine  [facility] - Intensive Care ([place])

## 2023-11-30 NOTE — PLAN OF CARE
[facility] - Intensive Care ([facility])  Discharge Final Note    Primary Care Provider: Ashley Harrell MD    Expected Discharge Date: 11/30/2023    Final Discharge Note (most recent)       Final Note - 11/30/23 1528          Final Note    Assessment Type Final Discharge Note     Anticipated Discharge Disposition Home or Self Care     Hospital Resources/Appts/Education Provided Provided patient/caregiver with written discharge plan information   per bedside nurse       Post-Acute Status    Discharge Delays None known at this time                     Important Message from Medicare  Important Message from Medicare regarding Discharge Appeal Rights: Explained to patient/caregiver, Signed/date by patient/caregiver     Date IMM was signed: 11/30/23  Time IMM was signed: 1105    Contact Info       Ashley Harrell MD   Specialty: Internal Medicine   Relationship: PCP - General    [address] [place] [address]   Phone: [phone]       Next Steps: Follow up    Carlos Crespo Jr., MD   Specialty: Family Medicine    [address] [place] [address]   Phone: [phone]       Next Steps: Follow up on 12/4/2023    Instructions: Dec. 4th @ 3:00          Pt is expected to dc home today and her brother will pick her up after his appmt here at [facility].    Jennifer Jaramillo, KEIRA    [facility]  [phone]

## 2023-11-30 NOTE — NURSING
Pt AAOx4, VSS, ambulated to chair with min assist, O2 100% on RA.    No acute events this shift, call light in reach, bed low and locked, will continue to monitor.

## 2023-12-06 LAB
CHROM BANDING METHOD: NORMAL
CHROMOSOME ANALYSIS BM ADDITIONAL INFORMATION: NORMAL
CHROMOSOME ANALYSIS BM RELEASED BY: NORMAL
CHROMOSOME ANALYSIS BM RESULT SUMMARY: NORMAL
CLINICAL CYTOGENETICIST REVIEW: NORMAL
COMMENT: NORMAL
FINAL PATHOLOGIC DIAGNOSIS: NORMAL
GROSS: NORMAL
KARYOTYP MAR: NORMAL
Lab: NORMAL
MICROSCOPIC EXAM: NORMAL
REASON FOR REFERRAL (NARRATIVE): NORMAL
REF LAB TEST METHOD: NORMAL
SPECIMEN SOURCE: NORMAL
SPECIMEN: NORMAL
SUPPLEMENTAL DIAGNOSIS: NORMAL

## 2024-03-08 ENCOUNTER — TELEPHONE (OUTPATIENT)
Dept: RADIOLOGY | Facility: OTHER | Age: 69
End: 2024-03-08
Payer: MEDICARE

## 2024-03-24 ENCOUNTER — HOSPITAL ENCOUNTER (EMERGENCY)
Facility: OTHER | Age: 69
Discharge: HOME | End: 2024-03-24
Attending: EMERGENCY MEDICINE
Payer: MEDICARE

## 2024-03-24 VITALS
SYSTOLIC BLOOD PRESSURE: 159 MMHG | OXYGEN SATURATION: 98 % | TEMPERATURE: 99 F | RESPIRATION RATE: 21 BRPM | DIASTOLIC BLOOD PRESSURE: 89 MMHG | HEART RATE: 81 BPM

## 2024-03-24 DIAGNOSIS — R04.0 EPISTAXIS: Primary | ICD-10-CM

## 2024-03-24 DIAGNOSIS — D69.6 THROMBOCYTOPENIA: ICD-10-CM

## 2024-03-24 LAB
ALBUMIN SERPL BCP-MCNC: 3.8 G/DL (ref 3.5–5.2)
ALP SERPL-CCNC: 54 U/L (ref 55–135)
ALT SERPL W/O P-5'-P-CCNC: 15 U/L (ref 10–44)
ANION GAP SERPL CALC-SCNC: 13 MMOL/L (ref 8–16)
APTT PPP: 27.6 SEC (ref 21–32)
AST SERPL-CCNC: 21 U/L (ref 10–40)
BASOPHILS # BLD AUTO: 0.03 K/UL (ref 0–0.2)
BASOPHILS NFR BLD: 0.6 % (ref 0–1.9)
BILIRUB SERPL-MCNC: 5 MG/DL (ref 0.1–1)
BUN SERPL-MCNC: 16 MG/DL (ref 8–23)
CALCIUM SERPL-MCNC: 9.9 MG/DL (ref 8.7–10.5)
CHLORIDE SERPL-SCNC: 111 MMOL/L (ref 95–110)
CO2 SERPL-SCNC: 23 MMOL/L (ref 23–29)
CREAT SERPL-MCNC: 1.3 MG/DL (ref 0.5–1.4)
DIFFERENTIAL METHOD BLD: ABNORMAL
EOSINOPHIL # BLD AUTO: 0.1 K/UL (ref 0–0.5)
EOSINOPHIL NFR BLD: 1.9 % (ref 0–8)
ERYTHROCYTE [DISTWIDTH] IN BLOOD BY AUTOMATED COUNT: 20.1 % (ref 11.5–14.5)
EST. GFR  (NO RACE VARIABLE): 45 ML/MIN/1.73 M^2
GLUCOSE SERPL-MCNC: 116 MG/DL (ref 70–110)
HCT VFR BLD AUTO: 30 % (ref 37–48.5)
HGB BLD-MCNC: 10.3 G/DL (ref 12–16)
IMM GRANULOCYTES # BLD AUTO: 0.02 K/UL (ref 0–0.04)
IMM GRANULOCYTES NFR BLD AUTO: 0.4 % (ref 0–0.5)
INR PPP: 1.2 (ref 0.8–1.2)
LYMPHOCYTES # BLD AUTO: 1.3 K/UL (ref 1–4.8)
LYMPHOCYTES NFR BLD: 24.7 % (ref 18–48)
MCH RBC QN AUTO: 32.4 PG (ref 27–31)
MCHC RBC AUTO-ENTMCNC: 34.3 G/DL (ref 32–36)
MCV RBC AUTO: 94 FL (ref 82–98)
MONOCYTES # BLD AUTO: 0.5 K/UL (ref 0.3–1)
MONOCYTES NFR BLD: 8.4 % (ref 4–15)
NEUTROPHILS # BLD AUTO: 3.4 K/UL (ref 1.8–7.7)
NEUTROPHILS NFR BLD: 64 % (ref 38–73)
NRBC BLD-RTO: 0 /100 WBC
PLATELET # BLD AUTO: 138 K/UL (ref 150–450)
PMV BLD AUTO: 10 FL (ref 9.2–12.9)
POTASSIUM SERPL-SCNC: 3.5 MMOL/L (ref 3.5–5.1)
PROT SERPL-MCNC: 6.2 G/DL (ref 6–8.4)
PROTHROMBIN TIME: 13.4 SEC (ref 9–12.5)
RBC # BLD AUTO: 3.18 M/UL (ref 4–5.4)
SODIUM SERPL-SCNC: 147 MMOL/L (ref 136–145)
WBC # BLD AUTO: 5.35 K/UL (ref 3.9–12.7)

## 2024-03-24 PROCEDURE — 85730 THROMBOPLASTIN TIME PARTIAL: CPT | Performed by: EMERGENCY MEDICINE

## 2024-03-24 PROCEDURE — 85025 COMPLETE CBC W/AUTO DIFF WBC: CPT | Performed by: EMERGENCY MEDICINE

## 2024-03-24 PROCEDURE — 80053 COMPREHEN METABOLIC PANEL: CPT | Performed by: EMERGENCY MEDICINE

## 2024-03-24 PROCEDURE — 30901 CONTROL OF NOSEBLEED: CPT | Mod: LT

## 2024-03-24 PROCEDURE — 85610 PROTHROMBIN TIME: CPT | Performed by: EMERGENCY MEDICINE

## 2024-03-24 PROCEDURE — 99284 EMERGENCY DEPT VISIT MOD MDM: CPT | Mod: 25

## 2024-03-24 PROCEDURE — 25000003 PHARM REV CODE 250: Performed by: EMERGENCY MEDICINE

## 2024-03-24 RX ORDER — NIFEDIPINE 30 MG/1
90 TABLET, EXTENDED RELEASE ORAL
Status: COMPLETED | OUTPATIENT
Start: 2024-03-24 | End: 2024-03-24

## 2024-03-24 RX ORDER — OXYMETAZOLINE HCL 0.05 %
1 SPRAY, NON-AEROSOL (ML) NASAL
Status: COMPLETED | OUTPATIENT
Start: 2024-03-24 | End: 2024-03-24

## 2024-03-24 RX ORDER — AZELASTINE 1 MG/ML
1 SPRAY, METERED NASAL 2 TIMES DAILY
Qty: 30 ML | Refills: 0 | Status: ON HOLD | OUTPATIENT
Start: 2024-03-24 | End: 2024-05-04

## 2024-03-24 RX ORDER — METOPROLOL SUCCINATE 50 MG/1
50 TABLET, EXTENDED RELEASE ORAL
Status: COMPLETED | OUTPATIENT
Start: 2024-03-24 | End: 2024-03-24

## 2024-03-24 RX ORDER — HYDROCHLOROTHIAZIDE 25 MG/1
25 TABLET ORAL
Status: COMPLETED | OUTPATIENT
Start: 2024-03-24 | End: 2024-03-24

## 2024-03-24 RX ORDER — VITAMIN A 3000 MCG
1 CAPSULE ORAL
Qty: 88 ML | Refills: 12 | Status: ON HOLD | OUTPATIENT
Start: 2024-03-24 | End: 2024-05-04 | Stop reason: CLARIF

## 2024-03-24 RX ADMIN — NIFEDIPINE 90 MG: 30 TABLET, FILM COATED, EXTENDED RELEASE ORAL at 08:03

## 2024-03-24 RX ADMIN — Medication 1 SPRAY: at 09:03

## 2024-03-24 RX ADMIN — HYDROCHLOROTHIAZIDE 25 MG: 25 TABLET ORAL at 08:03

## 2024-03-24 RX ADMIN — METOPROLOL SUCCINATE 50 MG: 50 TABLET, EXTENDED RELEASE ORAL at 08:03

## 2024-03-24 NOTE — ED PROVIDER NOTES
"Encounter Date: 3/24/2024       History     Chief Complaint   Patient presents with    Epistaxis     Pt woke up by nose bleeding. The bleed lasted for about a minute & stopped on its own. No blood thinners. Pt states that she is being followed for "blood disorder". Pt also reports left sided abdominal pain.      68-year-old female with history of thrombocytopenia and hemolytic anemia presents with complaint of epistaxis.  She states she awakened this morning having a nosebleed.  She denies any preceding sinus symptoms or congestion.  She does admit that with current weather patterns there has been frequent switching of heating and air conditioning in the home.  She states that bleeding stopped with pressure, but due to her history she is here for full evaluation.    The history is provided by the patient.     Review of patient's allergies indicates:   Allergen Reactions    Colchicine analogues      diarrhea    Lisinopril      Other reaction(s): cough  Other reaction(s): cough    Losartan      Other reaction(s): blurry vision  Other reaction(s): blurry vision    Percocet [oxycodone-acetaminophen]      Pt gets pain from taking medicine.     Past Medical History:   Diagnosis Date    Abnormal EKG     Anemia 07/13/2017    Aortic valve regurgitation     Arthritis     CKD (chronic kidney disease) stage 2, GFR 60-89 ml/min 08/08/2014    CKD (chronic kidney disease) stage 2, GFR 60-89 ml/min 08/08/2014    CVA (cerebral vascular accident)     2021 w/ residual R sided weakness    Diabetes mellitus     Diabetes mellitus type II     GERD (gastroesophageal reflux disease)     Gout, unspecified     Heart murmur     Hyperlipidemia     Hypertension     Tendonitis      Past Surgical History:   Procedure Laterality Date    breast reduction      CHOLECYSTECTOMY      JOINT REPLACEMENT      KNEE SURGERY  2018    POLYPECTOMY  05/05/2016    TOTAL REDUCTION MAMMOPLASTY      TOTAL SHOULDER ARTHROPLASTY Left     TUBAL LIGATION       Family " History   Problem Relation Age of Onset    Heart disease Mother         MI at 71    Hypertension Mother     Hypertension Brother     Diabetes Brother     Hypertension Brother     Breast cancer Maternal Cousin     Colon cancer Neg Hx     Ovarian cancer Neg Hx      Social History     Tobacco Use    Smoking status: Never    Smokeless tobacco: Never   Substance Use Topics    Alcohol use: No     Alcohol/week: 0.0 standard drinks of alcohol    Drug use: No     Review of Systems   Constitutional:  Negative for chills and fever.   HENT:  Positive for nosebleeds. Negative for congestion and sore throat.    Eyes:  Negative for visual disturbance.   Respiratory:  Negative for cough and shortness of breath.    Cardiovascular:  Negative for chest pain and palpitations.   Gastrointestinal:  Negative for abdominal pain, diarrhea and vomiting.   Genitourinary:  Negative for decreased urine volume, dysuria and vaginal discharge.   Musculoskeletal:  Negative for joint swelling, neck pain and neck stiffness.   Skin:  Negative for rash and wound.   Neurological:  Negative for weakness, numbness and headaches.   Hematological:  Bruises/bleeds easily.   Psychiatric/Behavioral:  Negative for confusion.        Physical Exam     Initial Vitals [03/24/24 0648]   BP Pulse Resp Temp SpO2   (!) 174/109 102 (!) 21 98.7 °F (37.1 °C) 100 %      MAP       --         Physical Exam    Nursing note and vitals reviewed.  Constitutional: She appears well-developed and well-nourished. No distress.   HENT:   Head: Normocephalic and atraumatic.   Nose: Mucosal edema present. Epistaxis (Dried blood to left naris, no active bleeding) is observed.   Mouth/Throat: Oropharynx is clear and moist. No oropharyngeal exudate.   Eyes: Conjunctivae and EOM are normal. Pupils are equal, round, and reactive to light.   Neck: Neck supple.   Cardiovascular:  Normal rate and normal heart sounds.           No murmur heard.  Pulmonary/Chest: Breath sounds normal. No  respiratory distress. She has no wheezes. She has no rhonchi. She has no rales.   Abdominal: Abdomen is soft. There is no abdominal tenderness. There is no rebound and no guarding.   Musculoskeletal:         General: No tenderness or edema.      Cervical back: Neck supple.     Neurological: She is alert and oriented to person, place, and time. She has normal strength. GCS score is 15. GCS eye subscore is 4. GCS verbal subscore is 5. GCS motor subscore is 6.   Skin: Skin is warm and dry. No rash noted.   Pale.  Faint scleral icterus.   Psychiatric: She has a normal mood and affect. Thought content normal.         ED Course   Epistaxis Mgmt    Date/Time: 4/9/2024 12:32 AM    Performed by: Yamile Miller MD  Authorized by: Yamile Miller MD  Treatment site: left anterior  Repair method: anterior pack and oxymetazoline  Post-procedure assessment: bleeding stopped  Treatment complexity: simple  Recurrence: recurrence of recent bleed  Patient tolerance: Patient tolerated the procedure well with no immediate complications        Labs Reviewed   CBC W/ AUTO DIFFERENTIAL - Abnormal; Notable for the following components:       Result Value    RBC 3.18 (*)     Hemoglobin 10.3 (*)     Hematocrit 30.0 (*)     MCH 32.4 (*)     RDW 20.1 (*)     Platelets 138 (*)     All other components within normal limits   COMPREHENSIVE METABOLIC PANEL - Abnormal; Notable for the following components:    Sodium 147 (*)     Chloride 111 (*)     Glucose 116 (*)     Total Bilirubin 5.0 (*)     Alkaline Phosphatase 54 (*)     eGFR 45 (*)     All other components within normal limits   PROTIME-INR - Abnormal; Notable for the following components:    Prothrombin Time 13.4 (*)     All other components within normal limits   APTT          Imaging Results    None          Medications   NIFEdipine 24 hr tablet 90 mg (90 mg Oral Given 3/24/24 0810)   metoprolol succinate (TOPROL-XL) 24 hr tablet 50 mg (50 mg Oral Given 3/24/24 0809)    hydroCHLOROthiazide tablet 25 mg (25 mg Oral Given 3/24/24 0809)   oxymetazoline 0.05 % nasal spray 1 spray (1 spray Each Nostril Given 3/24/24 0936)     Medical Decision Making  68-year-old female presents with complaint of a nosebleed.  She has history of thrombocytopenia and hemolytic anemia.  Labs are similar to baseline.  There was no active bleeding but she did experience a brief rebleed while in the ER which was readily stopped with pressure and light packing with a 4 x 4 soaked in Afrin.  This was then successfully removed.  She was discharged in good condition, advised close follow-up with PCP and strict return precautions.    Amount and/or Complexity of Data Reviewed  Labs: ordered. Decision-making details documented in ED Course.    Risk  OTC drugs.  Prescription drug management.               ED Course as of 04/08/24 2018   Sun Mar 24, 2024   0926 I was called to bedside by nursing staff for recurrent epistaxis.  Patient is not applying pressure, merely holding a gauze underneath her nostril.  Pressure is applied and she is educated about epistaxis management.  Afrin is ordered. [AK]      ED Course User Index  [AK] Yamile Miller MD                           Clinical Impression:  Final diagnoses:  [R04.0] Epistaxis (Primary)  [D69.6] Thrombocytopenia          ED Disposition Condition    Discharge Stable          ED Prescriptions       Medication Sig Dispense Start Date End Date Auth. Provider    sodium chloride (SALINE NASAL) 0.65 % nasal spray 1 spray by Nasal route as needed for Congestion. 88 mL 3/24/2024 -- Yamile Miller MD    azelastine (ASTELIN) 137 mcg (0.1 %) nasal spray 1 spray (137 mcg total) by Nasal route 2 (two) times daily. 30 mL 3/24/2024 3/24/2025 Yamile Miller MD          Follow-up Information       Follow up With Specialties Details Why Contact Info    Your regular primary care doctor  Schedule an appointment as soon as possible for a visit  For symptom recheck and close  follow-up     [facility] Emergency Medicine  As needed, If symptoms worsen [address]  [facility] [id number] [phone]             Yamile Miller MD  04/09/24 0048

## 2024-03-24 NOTE — DISCHARGE INSTRUCTIONS
If nosebleed recurs, leaned forward and apply pressure to both sides of the nose.  See handout.  Use a clean Q-tip to gently apply a thin layer of Vaseline to the inside of both nostrils twice a day.  This will provide moisture to the nose.  Use saline nasal spray at least twice a day to the inside of both nostrils.  This will provide moisture to the nose.  Return to the ER at any point for new or worsening symptoms.

## 2024-03-24 NOTE — ED TRIAGE NOTES
"Pt arrived to ED with c/o a nose bleed that woke her up. She reports it lasting about a minute and stopping, denies being on blood thinner. Also c/o left sided abdominal pain, "thinks its gas."  "

## 2024-04-13 ENCOUNTER — NURSE TRIAGE (OUTPATIENT)
Dept: ADMINISTRATIVE | Facility: CLINIC | Age: 69
End: 2024-04-13
Payer: MEDICARE

## 2024-04-14 NOTE — TELEPHONE ENCOUNTER
Reason for Disposition   Shock suspected (e.g., cold/pale/clammy skin, too weak to stand)    Additional Information   Negative: Major injury from dangerous force (e.g., fall > 10 feet or 3 meters)   Negative: [1] Major bleeding (e.g., actively dripping or spurting) AND [2] can't be stopped    Protocols used: Falls and Ktwkewh-T-OA  Daughter called re pt suffered setback - today fell on floor. Hx cva 2021. Caller admits pt is extremely weak on L side. weak & completely lethargic on R. Pt felt R knee was broken during fall. EMS got pt up. Pt was walking on knee with help. EMS didn't think anything was broken. Caller asking to speak with ortho surgery and hemo. Caller wants to let dr know about pts extremities weak and what is going with knee. Caller states dr not sure if pt has blood ca. Pt was in the hosp for three weeks in nov. Caller states pt c/o having one breast larger than other. Pt having decline in mental status and goes off on tangent and out of it. pt states breast is sore and has a weird dimpling. caller states pts breast has visible deep pitting and nipple looks misshapen and is sore. No discharge form nipple . Caller states pts knee is not strong. Pt having bleeding sessions from nose - rebekah if caller do not keep pts nose lubricated. pt in bed now( fallen 5 times this year. ) caller now living with mom. Caller states she heard thud - woke 3 yo grand son and he found pt on the floor. Pt states legs have gotten caught in blanket. Pt gets confused. Scratch down back of leg on L side. sl open. hx DM. rec EMS. pt doesn't want to do that.  Caller states pt can't stand on her own. Reiterate EMS.

## 2024-04-23 ENCOUNTER — HOSPITAL ENCOUNTER (INPATIENT)
Facility: OTHER | Age: 69
LOS: 12 days | Discharge: HOME HEALTH | DRG: 291 | End: 2024-05-05
Attending: STUDENT IN AN ORGANIZED HEALTH CARE EDUCATION/TRAINING PROGRAM | Admitting: EMERGENCY MEDICINE
Payer: MEDICARE

## 2024-04-23 DIAGNOSIS — I50.9 CONGESTIVE HEART FAILURE, UNSPECIFIED HF CHRONICITY, UNSPECIFIED HEART FAILURE TYPE: Primary | ICD-10-CM

## 2024-04-23 DIAGNOSIS — I50.43 ACUTE ON CHRONIC COMBINED SYSTOLIC AND DIASTOLIC HEART FAILURE: ICD-10-CM

## 2024-04-23 DIAGNOSIS — R06.02 SOB (SHORTNESS OF BREATH): ICD-10-CM

## 2024-04-23 DIAGNOSIS — I50.9 CHF (CONGESTIVE HEART FAILURE): ICD-10-CM

## 2024-04-23 DIAGNOSIS — I69.351 HEMIPLEGIA OF RIGHT DOMINANT SIDE AS LATE EFFECT OF CEREBRAL INFARCTION, UNSPECIFIED HEMIPLEGIA TYPE: ICD-10-CM

## 2024-04-23 LAB
ALBUMIN SERPL BCP-MCNC: 3.6 G/DL (ref 3.5–5.2)
ALP SERPL-CCNC: 57 U/L (ref 55–135)
ALT SERPL W/O P-5'-P-CCNC: 9 U/L (ref 10–44)
ANION GAP SERPL CALC-SCNC: 11 MMOL/L (ref 8–16)
AST SERPL-CCNC: 13 U/L (ref 10–40)
BASOPHILS # BLD AUTO: 0.03 K/UL (ref 0–0.2)
BASOPHILS NFR BLD: 0.5 % (ref 0–1.9)
BILIRUB SERPL-MCNC: 3.9 MG/DL (ref 0.1–1)
BNP SERPL-MCNC: 2536 PG/ML (ref 0–99)
BUN SERPL-MCNC: 16 MG/DL (ref 8–23)
CALCIUM SERPL-MCNC: 9.5 MG/DL (ref 8.7–10.5)
CHLORIDE SERPL-SCNC: 111 MMOL/L (ref 95–110)
CO2 SERPL-SCNC: 23 MMOL/L (ref 23–29)
CREAT SERPL-MCNC: 1.4 MG/DL (ref 0.5–1.4)
DIFFERENTIAL METHOD BLD: ABNORMAL
EOSINOPHIL # BLD AUTO: 0 K/UL (ref 0–0.5)
EOSINOPHIL NFR BLD: 0.5 % (ref 0–8)
ERYTHROCYTE [DISTWIDTH] IN BLOOD BY AUTOMATED COUNT: 19.9 % (ref 11.5–14.5)
EST. GFR  (NO RACE VARIABLE): 41 ML/MIN/1.73 M^2
GLUCOSE SERPL-MCNC: 132 MG/DL (ref 70–110)
HCT VFR BLD AUTO: 27.6 % (ref 37–48.5)
HGB BLD-MCNC: 9.4 G/DL (ref 12–16)
IMM GRANULOCYTES # BLD AUTO: 0.03 K/UL (ref 0–0.04)
IMM GRANULOCYTES NFR BLD AUTO: 0.5 % (ref 0–0.5)
LYMPHOCYTES # BLD AUTO: 1.3 K/UL (ref 1–4.8)
LYMPHOCYTES NFR BLD: 22.8 % (ref 18–48)
MCH RBC QN AUTO: 32.8 PG (ref 27–31)
MCHC RBC AUTO-ENTMCNC: 34.1 G/DL (ref 32–36)
MCV RBC AUTO: 96 FL (ref 82–98)
MONOCYTES # BLD AUTO: 0.4 K/UL (ref 0.3–1)
MONOCYTES NFR BLD: 7 % (ref 4–15)
NEUTROPHILS # BLD AUTO: 3.9 K/UL (ref 1.8–7.7)
NEUTROPHILS NFR BLD: 68.7 % (ref 38–73)
NRBC BLD-RTO: 0 /100 WBC
PLATELET # BLD AUTO: 140 K/UL (ref 150–450)
PMV BLD AUTO: 9.8 FL (ref 9.2–12.9)
POTASSIUM SERPL-SCNC: 3.6 MMOL/L (ref 3.5–5.1)
PROT SERPL-MCNC: 6.2 G/DL (ref 6–8.4)
RBC # BLD AUTO: 2.87 M/UL (ref 4–5.4)
SODIUM SERPL-SCNC: 145 MMOL/L (ref 136–145)
TROPONIN I SERPL DL<=0.01 NG/ML-MCNC: 0.05 NG/ML (ref 0–0.03)
WBC # BLD AUTO: 5.7 K/UL (ref 3.9–12.7)

## 2024-04-23 PROCEDURE — 12000002 HC ACUTE/MED SURGE SEMI-PRIVATE ROOM

## 2024-04-23 PROCEDURE — 63600175 PHARM REV CODE 636 W HCPCS

## 2024-04-23 PROCEDURE — 83880 ASSAY OF NATRIURETIC PEPTIDE: CPT | Performed by: EMERGENCY MEDICINE

## 2024-04-23 PROCEDURE — 84484 ASSAY OF TROPONIN QUANT: CPT | Performed by: EMERGENCY MEDICINE

## 2024-04-23 PROCEDURE — 80053 COMPREHEN METABOLIC PANEL: CPT | Performed by: EMERGENCY MEDICINE

## 2024-04-23 PROCEDURE — 96374 THER/PROPH/DIAG INJ IV PUSH: CPT

## 2024-04-23 PROCEDURE — 85025 COMPLETE CBC W/AUTO DIFF WBC: CPT | Performed by: EMERGENCY MEDICINE

## 2024-04-23 PROCEDURE — 93010 ELECTROCARDIOGRAM REPORT: CPT | Mod: ,,, | Performed by: INTERNAL MEDICINE

## 2024-04-23 PROCEDURE — 93005 ELECTROCARDIOGRAM TRACING: CPT

## 2024-04-23 RX ORDER — FUROSEMIDE 10 MG/ML
40 INJECTION INTRAMUSCULAR; INTRAVENOUS
Status: COMPLETED | OUTPATIENT
Start: 2024-04-23 | End: 2024-04-23

## 2024-04-23 RX ADMIN — FUROSEMIDE 40 MG: 10 INJECTION, SOLUTION INTRAMUSCULAR; INTRAVENOUS at 07:04

## 2024-04-23 NOTE — TELEPHONE ENCOUNTER
Spoke with pts daughter and she says the the pt has had many falls since January and she seems to have some cognitive issues with memory or things she has done or said. The daughter would like to get a cognitive test done to see what could be going on and aslo what could be causing the frequent falling.

## 2024-04-24 PROBLEM — F33.41 MDD (MAJOR DEPRESSIVE DISORDER), RECURRENT, IN PARTIAL REMISSION: Status: ACTIVE | Noted: 2024-04-24

## 2024-04-24 PROBLEM — I50.32 CHRONIC DIASTOLIC HEART FAILURE: Status: ACTIVE | Noted: 2020-10-19

## 2024-04-24 PROBLEM — I50.33 ACUTE ON CHRONIC DIASTOLIC HEART FAILURE: Status: ACTIVE | Noted: 2020-10-19

## 2024-04-24 PROBLEM — G81.91 HEMIPLEGIA AFFECTING RIGHT DOMINANT SIDE: Status: ACTIVE | Noted: 2024-04-24

## 2024-04-24 LAB
ASCENDING AORTA: 3.28 CM
AV INDEX (PROSTH): 0.43
AV MEAN GRADIENT: 5 MMHG
AV PEAK GRADIENT: 8 MMHG
AV VALVE AREA BY VELOCITY RATIO: 1.35 CM²
AV VALVE AREA: 1.29 CM²
AV VELOCITY RATIO: 0.45
CV ECHO LV RWT: 0.39 CM
DOP CALC AO PEAK VEL: 1.44 M/S
DOP CALC AO VTI: 25.9 CM
DOP CALC LVOT AREA: 3 CM2
DOP CALC LVOT DIAMETER: 1.95 CM
DOP CALC LVOT PEAK VEL: 0.65 M/S
DOP CALC LVOT STROKE VOLUME: 33.43 CM3
DOP CALC RVOT PEAK VEL: 0.66 M/S
DOP CALCLVOT PEAK VEL VTI: 11.2 CM
E WAVE DECELERATION TIME: 119.03 MSEC
E/A RATIO: 1.18
E/E' RATIO: 22 M/S
ECHO LV POSTERIOR WALL: 1.16 CM (ref 0.6–1.1)
EJECTION FRACTION: 25 %
ESTIMATED AVG GLUCOSE: 77 MG/DL (ref 68–131)
FRACTIONAL SHORTENING: 11 % (ref 28–44)
HBA1C MFR BLD: 4.3 % (ref 4–5.6)
INTERVENTRICULAR SEPTUM: 1.43 CM (ref 0.6–1.1)
IVC DIAMETER: 2.17 CM
LA MAJOR: 6.96 CM
LA MINOR: 6.26 CM
LA WIDTH: 4.7 CM
LEFT ATRIUM SIZE: 4.33 CM
LEFT ATRIUM VOLUME MOD: 109.25 CM3
LEFT ATRIUM VOLUME: 114.02 CM3
LEFT INTERNAL DIMENSION IN SYSTOLE: 5.29 CM (ref 2.1–4)
LEFT VENTRICLE DIASTOLIC VOLUME: 174.53 ML
LEFT VENTRICLE SYSTOLIC VOLUME: 134.8 ML
LEFT VENTRICULAR INTERNAL DIMENSION IN DIASTOLE: 5.92 CM (ref 3.5–6)
LEFT VENTRICULAR MASS: 340.74 G
LV LATERAL E/E' RATIO: 19.8 M/S
LV SEPTAL E/E' RATIO: 24.75 M/S
LVOT MG: 0.87 MMHG
LVOT MV: 0.44 CM/S
MAGNESIUM SERPL-MCNC: 1.6 MG/DL (ref 1.6–2.6)
MAGNESIUM SERPL-MCNC: 1.7 MG/DL (ref 1.6–2.6)
MV PEAK A VEL: 0.84 M/S
MV PEAK E VEL: 0.99 M/S
MV STENOSIS PRESSURE HALF TIME: 34.52 MS
MV VALVE AREA P 1/2 METHOD: 6.37 CM2
OHS QRS DURATION: 90 MS
OHS QTC CALCULATION: 443 MS
PISA MRMAX VEL: 5.87 M/S
PISA TR MAX VEL: 4.12 M/S
POCT GLUCOSE: 142 MG/DL (ref 70–110)
POCT GLUCOSE: 149 MG/DL (ref 70–110)
POCT GLUCOSE: 78 MG/DL (ref 70–110)
PULM VEIN S/D RATIO: 0.97
PV PEAK D VEL: 0.64 M/S
PV PEAK GRADIENT: 3 MMHG
PV PEAK S VEL: 0.62 M/S
PV PEAK VELOCITY: 0.87 M/S
RA MAJOR: 6.74 CM
RA PRESSURE ESTIMATED: 15 MMHG
RA WIDTH: 4.1 CM
RV TB RVSP: 19 MMHG
SINUS: 2.6 CM
STJ: 2.36 CM
TDI LATERAL: 0.05 M/S
TDI SEPTAL: 0.04 M/S
TDI: 0.05 M/S
TR MAX PG: 68 MMHG
TR MEAN GRADIENT: 35 MMHG
TRICUSPID ANNULAR PLANE SYSTOLIC EXCURSION: 1.8 CM
TROPONIN I SERPL DL<=0.01 NG/ML-MCNC: 0.04 NG/ML (ref 0–0.03)
TROPONIN I SERPL DL<=0.01 NG/ML-MCNC: 0.07 NG/ML (ref 0–0.03)
TV REST PULMONARY ARTERY PRESSURE: 83 MMHG

## 2024-04-24 PROCEDURE — 25000003 PHARM REV CODE 250: Performed by: PHYSICIAN ASSISTANT

## 2024-04-24 PROCEDURE — 63600175 PHARM REV CODE 636 W HCPCS: Performed by: PHYSICIAN ASSISTANT

## 2024-04-24 PROCEDURE — 83735 ASSAY OF MAGNESIUM: CPT | Performed by: PHYSICIAN ASSISTANT

## 2024-04-24 PROCEDURE — 21400001 HC TELEMETRY ROOM

## 2024-04-24 PROCEDURE — 99285 EMERGENCY DEPT VISIT HI MDM: CPT | Mod: 25

## 2024-04-24 PROCEDURE — A4216 STERILE WATER/SALINE, 10 ML: HCPCS | Performed by: PHYSICIAN ASSISTANT

## 2024-04-24 PROCEDURE — 36415 COLL VENOUS BLD VENIPUNCTURE: CPT | Performed by: PHYSICIAN ASSISTANT

## 2024-04-24 PROCEDURE — 83036 HEMOGLOBIN GLYCOSYLATED A1C: CPT | Performed by: PHYSICIAN ASSISTANT

## 2024-04-24 PROCEDURE — 94761 N-INVAS EAR/PLS OXIMETRY MLT: CPT

## 2024-04-24 PROCEDURE — 83735 ASSAY OF MAGNESIUM: CPT | Mod: 91 | Performed by: PHYSICIAN ASSISTANT

## 2024-04-24 PROCEDURE — 84484 ASSAY OF TROPONIN QUANT: CPT | Performed by: PHYSICIAN ASSISTANT

## 2024-04-24 RX ORDER — LIDOCAINE 50 MG/G
1 PATCH TOPICAL
Status: DISCONTINUED | OUTPATIENT
Start: 2024-04-24 | End: 2024-05-05 | Stop reason: HOSPADM

## 2024-04-24 RX ORDER — IBUPROFEN 200 MG
16 TABLET ORAL
Status: DISCONTINUED | OUTPATIENT
Start: 2024-04-24 | End: 2024-04-30

## 2024-04-24 RX ORDER — ACETAMINOPHEN 500 MG
1000 TABLET ORAL ONCE
Status: COMPLETED | OUTPATIENT
Start: 2024-04-24 | End: 2024-04-24

## 2024-04-24 RX ORDER — TRAZODONE HYDROCHLORIDE 50 MG/1
50 TABLET ORAL NIGHTLY PRN
Status: DISCONTINUED | OUTPATIENT
Start: 2024-04-24 | End: 2024-05-05 | Stop reason: HOSPADM

## 2024-04-24 RX ORDER — HEPARIN SODIUM 5000 [USP'U]/ML
5000 INJECTION, SOLUTION INTRAVENOUS; SUBCUTANEOUS EVERY 8 HOURS
Status: DISCONTINUED | OUTPATIENT
Start: 2024-04-24 | End: 2024-05-05 | Stop reason: HOSPADM

## 2024-04-24 RX ORDER — FOLIC ACID 1 MG/1
1 TABLET ORAL DAILY
Status: DISCONTINUED | OUTPATIENT
Start: 2024-04-24 | End: 2024-05-05 | Stop reason: HOSPADM

## 2024-04-24 RX ORDER — TALC
6 POWDER (GRAM) TOPICAL NIGHTLY PRN
Status: DISCONTINUED | OUTPATIENT
Start: 2024-04-24 | End: 2024-05-05 | Stop reason: HOSPADM

## 2024-04-24 RX ORDER — ACETAMINOPHEN 325 MG/1
650 TABLET ORAL EVERY 6 HOURS PRN
Status: DISCONTINUED | OUTPATIENT
Start: 2024-04-24 | End: 2024-05-05 | Stop reason: HOSPADM

## 2024-04-24 RX ORDER — SODIUM CHLORIDE 0.9 % (FLUSH) 0.9 %
10 SYRINGE (ML) INJECTION
Status: DISCONTINUED | OUTPATIENT
Start: 2024-04-24 | End: 2024-05-05 | Stop reason: HOSPADM

## 2024-04-24 RX ORDER — GLUCAGON 1 MG
1 KIT INJECTION
Status: DISCONTINUED | OUTPATIENT
Start: 2024-04-24 | End: 2024-04-30

## 2024-04-24 RX ORDER — NALOXONE HCL 0.4 MG/ML
0.02 VIAL (ML) INJECTION
Status: DISCONTINUED | OUTPATIENT
Start: 2024-04-24 | End: 2024-05-05 | Stop reason: HOSPADM

## 2024-04-24 RX ORDER — METOPROLOL SUCCINATE 50 MG/1
50 TABLET, EXTENDED RELEASE ORAL DAILY
Status: DISCONTINUED | OUTPATIENT
Start: 2024-04-24 | End: 2024-05-05 | Stop reason: HOSPADM

## 2024-04-24 RX ORDER — SODIUM CHLORIDE 0.9 % (FLUSH) 0.9 %
10 SYRINGE (ML) INJECTION EVERY 8 HOURS
Status: DISCONTINUED | OUTPATIENT
Start: 2024-04-24 | End: 2024-05-05 | Stop reason: HOSPADM

## 2024-04-24 RX ORDER — ATORVASTATIN CALCIUM 20 MG/1
80 TABLET, FILM COATED ORAL DAILY
Status: DISCONTINUED | OUTPATIENT
Start: 2024-04-24 | End: 2024-05-05 | Stop reason: HOSPADM

## 2024-04-24 RX ORDER — POTASSIUM CHLORIDE 20 MEQ/1
40 TABLET, EXTENDED RELEASE ORAL ONCE
Status: COMPLETED | OUTPATIENT
Start: 2024-04-24 | End: 2024-04-24

## 2024-04-24 RX ORDER — AMOXICILLIN 250 MG
1 CAPSULE ORAL 2 TIMES DAILY PRN
Status: DISCONTINUED | OUTPATIENT
Start: 2024-04-24 | End: 2024-05-05 | Stop reason: HOSPADM

## 2024-04-24 RX ORDER — IBUPROFEN 200 MG
24 TABLET ORAL
Status: DISCONTINUED | OUTPATIENT
Start: 2024-04-24 | End: 2024-04-30

## 2024-04-24 RX ORDER — INSULIN ASPART 100 [IU]/ML
0-5 INJECTION, SOLUTION INTRAVENOUS; SUBCUTANEOUS
Status: DISCONTINUED | OUTPATIENT
Start: 2024-04-24 | End: 2024-04-30

## 2024-04-24 RX ORDER — FUROSEMIDE 10 MG/ML
20 INJECTION INTRAMUSCULAR; INTRAVENOUS EVERY 12 HOURS
Status: COMPLETED | OUTPATIENT
Start: 2024-04-24 | End: 2024-05-02

## 2024-04-24 RX ADMIN — HEPARIN SODIUM 5000 UNITS: 5000 INJECTION INTRAVENOUS; SUBCUTANEOUS at 06:04

## 2024-04-24 RX ADMIN — FOLIC ACID 1 MG: 1 TABLET ORAL at 08:04

## 2024-04-24 RX ADMIN — Medication 10 ML: at 09:04

## 2024-04-24 RX ADMIN — Medication 10 ML: at 01:04

## 2024-04-24 RX ADMIN — ATORVASTATIN CALCIUM 80 MG: 20 TABLET, FILM COATED ORAL at 08:04

## 2024-04-24 RX ADMIN — Medication 10 ML: at 06:04

## 2024-04-24 RX ADMIN — ACETAMINOPHEN 1000 MG: 500 TABLET ORAL at 01:04

## 2024-04-24 RX ADMIN — HEPARIN SODIUM 5000 UNITS: 5000 INJECTION INTRAVENOUS; SUBCUTANEOUS at 01:04

## 2024-04-24 RX ADMIN — METOPROLOL SUCCINATE 50 MG: 50 TABLET, EXTENDED RELEASE ORAL at 08:04

## 2024-04-24 RX ADMIN — FUROSEMIDE 20 MG: 10 INJECTION, SOLUTION INTRAMUSCULAR; INTRAVENOUS at 08:04

## 2024-04-24 RX ADMIN — POTASSIUM CHLORIDE 40 MEQ: 1500 TABLET, EXTENDED RELEASE ORAL at 10:04

## 2024-04-24 RX ADMIN — LIDOCAINE 5% 1 PATCH: 700 PATCH TOPICAL at 03:04

## 2024-04-24 RX ADMIN — HEPARIN SODIUM 5000 UNITS: 5000 INJECTION INTRAVENOUS; SUBCUTANEOUS at 09:04

## 2024-04-24 NOTE — ASSESSMENT & PLAN NOTE
Creatine stable for now. BMP reviewed- noted CrCl cannot be calculated (Unknown ideal weight.). according to latest data. Based on current GFR, CKD stage is stage 3 - GFR 30-59.  Monitor UOP and serial BMP and adjust therapy as needed. Renally dose meds. Avoid nephrotoxic medications and procedures.

## 2024-04-24 NOTE — H&P
[facility] Emergency [facility] Medicine  History & Physical    Patient Name: Wendy Viveros  MRN: [id number]  Patient Class: IP- Inpatient  Admission Date: 4/23/2024  Attending Physician: Isaac Ruiz MD   Primary Care Provider: Ashley Harrell MD         Patient information was obtained from patient, past medical records, and ER records.     Subjective:     Principal Problem:Acute on chronic diastolic heart failure    Chief Complaint:   Chief Complaint   Patient presents with    Shortness of Breath     SOB x 2 weeks, worsening with exertion; denies CP. BLE swelling x 2-3 weeks.         HPI: Ms. Wnedy Viveros is a 68 y.o. female, with PMH of HTN, T2DM, CKD-3, Anemia, chronic gout, prior CVA w/ right-sided weakness in 2021, who presented to [facility] ED on 4/23/24 due to progressive shortness of breath x 2 weeks. She notes associated b/l lower extremity edema and it worsens with exertion, she notes difficulty walking 2/2 leg swelling, she spends most of her time in bed, but her shortness of breath is worse when she is laying flat. She uses 4 pillows to prop herself up while sleeping. She states she used to take lasix, but does not currently. She states she has not followed with a doctor in recent years. She denied chest pain. She was evaluated in the ED with labs showing anemia with H&H of 9.4/27.6, and PLT of 140K. A metabolic panel showed elevated total bilirubin of 3.9. A BNP was elevated at 2536, with troponin of 0.051. A CXR showed increaed cardiac silhouette, but did not should pulmonary vascular congestion. She was treated in the ED with 40 mg IV lasix. She was admitted to inpatient status.         Past Medical History:   Diagnosis Date    Abnormal EKG     Anemia 07/13/2017    Aortic valve regurgitation     Arthritis     CKD (chronic kidney disease) stage 2, GFR 60-89 ml/min 08/08/2014    CKD (chronic kidney disease) stage 2, GFR 60-89 ml/min 08/08/2014    CVA (cerebral vascular accident)     2021  w/ residual R sided weakness    Diabetes mellitus     Diabetes mellitus type II     GERD (gastroesophageal reflux disease)     Gout, unspecified     Heart murmur     Hyperlipidemia     Hypertension     Tendonitis        Past Surgical History:   Procedure Laterality Date    breast reduction      CHOLECYSTECTOMY      JOINT REPLACEMENT      KNEE SURGERY  2018    POLYPECTOMY  05/05/2016    TOTAL REDUCTION MAMMOPLASTY      TOTAL SHOULDER ARTHROPLASTY Left     TUBAL LIGATION         Review of patient's allergies indicates:   Allergen Reactions    Colchicine analogues      diarrhea    Lisinopril      Other reaction(s): cough  Other reaction(s): cough    Losartan      Other reaction(s): blurry vision  Other reaction(s): blurry vision    Percocet [oxycodone-acetaminophen]      Pt gets pain from taking medicine.       Current Facility-Administered Medications   Medication Dose Route Frequency Provider Last Rate Last Admin    acetaminophen tablet 650 mg  650 mg Oral Q6H PRN Wendy Banegas, PA-SY        dextrose 10% bolus 125 mL 125 mL  12.5 g Intravenous PRN Wendy Banegas, PA-C        dextrose 10% bolus 125 mL 125 mL  12.5 g Intravenous PRN Wendy Banegas, PA-C        dextrose 10% bolus 250 mL 250 mL  25 g Intravenous PRN Wendy Banegas, PA-C        dextrose 10% bolus 250 mL 250 mL  25 g Intravenous PRN Wendy Banegas, PA-SY        furosemide injection 20 mg  20 mg Intravenous Q12H Wendy Banegas, PA-SY        glucagon (human recombinant) injection 1 mg  1 mg Intramuscular PRN Wendy Banegas, PA-C        glucagon (human recombinant) injection 1 mg  1 mg Intramuscular PRN Wendy Banegas, PA-C        glucose chewable tablet 16 g  16 g Oral PRN Wendy Banegas, PA-C        glucose chewable tablet 16 g  16 g Oral PRN Wendy Banegas, PA-C        glucose chewable tablet 24 g  24 g Oral PRN Wendy Banegas, PA-C        glucose chewable tablet 24 g  24 g Oral PRN Bassam,  Wendy STAFFORD PA-C        heparin (porcine) injection 5,000 Units  5,000 Units Subcutaneous Q8H Wendy Banegas PA-C        insulin aspart U-100 pen 0-5 Units  0-5 Units Subcutaneous QID (AC + HS) PRN Wendy Banegas PA-C        LIDOcaine 5 % patch 1 patch  1 patch Transdermal Q24H Wendy Banegas PA-C   1 patch at 04/24/24 0353    melatonin tablet 6 mg  6 mg Oral Nightly PRN Wendy Banegas PA-C        naloxone 0.4 mg/mL injection 0.02 mg  0.02 mg Intravenous PRN Wendy Banegas PA-C        senna-docusate 8.6-50 mg per tablet 1 tablet  1 tablet Oral BID PRN Wendy Banegas PA-C        sodium chloride 0.9% flush 10 mL  10 mL Intravenous PRN Wendy Banegas PA-C        sodium chloride 0.9% flush 10 mL  10 mL Intravenous Q8H Wendy Banegas PA-C         Current Outpatient Medications   Medication Sig Dispense Refill    allopurinoL (ZYLOPRIM) 300 MG tablet Take 1 tablet by mouth once daily 90 tablet 0    atorvastatin (LIPITOR) 80 MG tablet Take 80 mg by mouth once daily.      azelastine (ASTELIN) 137 mcg (0.1 %) nasal spray 1 spray (137 mcg total) by Nasal route 2 (two) times daily. 30 mL 0    cyanocobalamin, vitamin B-12, 50 mcg tablet Take 50 mcg by mouth once daily.      eltrombopag (PROMACTA) 50 MG Tab Take 1 tablet (50 mg total) by mouth once daily. 30 tablet 1    FLUoxetine 20 MG capsule Take 20 mg by mouth once daily.      folic acid (FOLVITE) 1 MG tablet Take 1 tablet (1 mg total) by mouth once daily. 30 tablet 11    hydroCHLOROthiazide (HYDRODIURIL) 25 MG tablet Take 1 tablet (25 mg total) by mouth once daily. 30 tablet 1    metoprolol succinate (TOPROL-XL) 50 MG 24 hr tablet Take 50 mg by mouth once daily.      NIFEdipine (PROCARDIA-XL) 90 MG (OSM) 24 hr tablet Take 1 tablet (90 mg total) by mouth 2 (two) times a day. 60 tablet 1    pantoprazole (PROTONIX) 40 MG tablet Take 1 tablet (40 mg total) by mouth once daily. 48 tablet 0    sodium chloride (SALINE NASAL)  0.65 % nasal spray 1 spray by Nasal route as needed for Congestion. 88 mL 12    traZODone (DESYREL) 50 MG tablet Take 50 mg by mouth nightly as needed for Insomnia.       Family History       Problem Relation (Age of Onset)    Breast cancer Maternal Cousin    Diabetes Brother    Heart disease Mother    Hypertension Mother, Brother, Brother          Tobacco Use    Smoking status: Never    Smokeless tobacco: Never   Substance and Sexual Activity    Alcohol use: No     Alcohol/week: 0.0 standard drinks of alcohol    Drug use: No    Sexual activity: Not Currently     Partners: Male     Birth control/protection: None     Review of Systems   Constitutional:  Positive for activity change. Negative for chills, diaphoresis and fever.   HENT:  Negative for congestion, ear pain, postnasal drip, rhinorrhea, sinus pressure, sore throat and trouble swallowing.    Respiratory:  Negative for cough, shortness of breath and wheezing.    Cardiovascular:  Positive for leg swelling. Negative for chest pain and palpitations.   Gastrointestinal:  Negative for abdominal distention, abdominal pain, constipation, diarrhea, nausea and vomiting.   Genitourinary:  Negative for decreased urine volume, dysuria, flank pain, frequency, hematuria and urgency.   Musculoskeletal:  Negative for back pain, neck pain and neck stiffness.   Skin:  Negative for color change and rash.   Neurological:  Negative for dizziness, syncope, weakness, light-headedness and headaches.   Psychiatric/Behavioral:  Negative for agitation and confusion.      Objective:     Vital Signs (Most Recent):  Temp: 98.1 °F (36.7 °C) (04/24/24 0434)  Pulse: 92 (04/24/24 0434)  Resp: 16 (04/24/24 0443)  BP: (!) 170/80 (04/24/24 0338)  SpO2: 100 % (04/24/24 0434) Vital Signs (24h Range):  Temp:  [97.9 °F (36.6 °C)-98.2 °F (36.8 °C)] 98.1 °F (36.7 °C)  Pulse:  [] 92  Resp:  [14-25] 16  SpO2:  [94 %-100 %] 100 %  BP: (157-179)/(79-98) 170/80        There is no height or weight on  file to calculate BMI.     Physical Exam  Vitals and nursing note reviewed.   Constitutional:       General: She is not in acute distress.     Appearance: She is well-developed. She is obese. She is not ill-appearing, toxic-appearing or diaphoretic.   HENT:      Head: Normocephalic and atraumatic.   Eyes:      General: No scleral icterus.        Right eye: No discharge.         Left eye: No discharge.      Conjunctiva/sclera: Conjunctivae normal.   Neck:      Trachea: No tracheal deviation.   Cardiovascular:      Rate and Rhythm: Normal rate and regular rhythm.      Heart sounds: Normal heart sounds. No murmur heard.     No gallop.      Comments: 3+ pitting edema b/l   Pulmonary:      Effort: Pulmonary effort is normal. No respiratory distress.      Breath sounds: Normal breath sounds. No stridor. No wheezing or rales.   Abdominal:      General: Bowel sounds are normal. There is no distension.      Palpations: Abdomen is soft. There is no mass.      Tenderness: There is no abdominal tenderness. There is no guarding.   Musculoskeletal:         General: No deformity. Normal range of motion.      Cervical back: Normal range of motion and neck supple.   Skin:     General: Skin is warm and dry.      Coloration: Skin is not pale.      Findings: No erythema or rash.   Neurological:      General: No focal deficit present.      Mental Status: She is alert and oriented to person, place, and time.      Cranial Nerves: No cranial nerve deficit.      Motor: No abnormal muscle tone.   Psychiatric:         Mood and Affect: Mood normal.         Behavior: Behavior normal.         Thought Content: Thought content normal.         Judgment: Judgment normal.                Significant Labs: All pertinent labs within the past 24 hours have been reviewed.  BMP:   Recent Labs   Lab 04/23/24  1805 04/24/24  0225   *  --      --    K 3.6  --    *  --    CO2 23  --    BUN 16  --    CREATININE 1.4  --    CALCIUM 9.5  --   "  MG  --  1.7     CBC:   Recent Labs   Lab 04/23/24  1805   WBC 5.70   HGB 9.4*   HCT 27.6*   *     CMP:   Recent Labs   Lab 04/23/24  1805      K 3.6   *   CO2 23   *   BUN 16   CREATININE 1.4   CALCIUM 9.5   PROT 6.2   ALBUMIN 3.6   BILITOT 3.9*   ALKPHOS 57   AST 13   ALT 9*   ANIONGAP 11     Urine Culture: No results for input(s): "LABURIN" in the last 48 hours.  Urine Studies: No results for input(s): "COLORU", "APPEARANCEUA", "PHUR", "SPECGRAV", "PROTEINUA", "GLUCUA", "KETONESU", "BILIRUBINUA", "OCCULTUA", "NITRITE", "UROBILINOGEN", "LEUKOCYTESUR", "RBCUA", "WBCUA", "BACTERIA", "SQUAMEPITHEL", "HYALINECASTS" in the last 48 hours.    Invalid input(s): "WRIGHTSUR"    Significant Imaging: I have reviewed all pertinent imaging results/findings within the past 24 hours.  Imaging Results               X-Ray Chest AP (Final result)  Result time 04/23/24 18:48:53      Final result by Dulce Maria Rodriguez MD (04/23/24 18:48:53)                   Impression:      Increase in size of the cardiac silhouette.    This report was flagged in Epic as abnormal.      Electronically signed by: Dulce Maria Rodriguez  Date:    04/23/2024  Time:    18:48               Narrative:    EXAMINATION:  AP PORTABLE CHEST    CLINICAL HISTORY:  shortness of breath;    TECHNIQUE:  AP portable chest radiograph was submitted.    COMPARISON:  09/28/2021    FINDINGS:  AP portable chest radiograph demonstrates marked enlargement of the cardiac silhouette.  There is tortuosity of the descending thoracic aorta.  Vascular calcifications seen at the aortic knob.  There is no focal consolidation, pneumothorax, or pleural effusion. There is a left shoulder arthroplasty.  The bones are diffusely osteopenic.                                       Assessment/Plan:     * Acute on chronic diastolic heart failure  - Ms. Wendy Viveros presents with shortness of breath  - she has b/l LE swelling, orthopnea, HENRY   - BNP is elevated w/ " "elevated troponin   - CXR without pulmonary edema - s/p 20 mg IV lasix   - CHF pathways initiated   - trend troponin   - last EF was 3/2/16 with ED 52%       Thrombocytopenia  - improved compared to baseline   - no active bleeding   - monitor daily   - Will transfuse if platelet count is <50k (if undergoing surgical procedure or have active bleeding). Hold DVT prophylaxis if platelets are <50k. The patient's platelet results have been reviewed and are listed below.  Recent Labs   Lab 04/23/24  1805   *         Primary hypertension  Chronic, controlled. Latest blood pressure and vitals reviewed-     Temp:  [97.9 °F (36.6 °C)-98.2 °F (36.8 °C)]   Pulse:  []   Resp:  [17-23]   BP: (157-179)/(79-98)   SpO2:  [94 %-100 %] .   Home meds for hypertension were reviewed and noted below.   Hypertension Medications               hydroCHLOROthiazide (HYDRODIURIL) 25 MG tablet Take 1 tablet (25 mg total) by mouth once daily.    metoprolol succinate (TOPROL-XL) 50 MG 24 hr tablet Take 50 mg by mouth once daily.    NIFEdipine (PROCARDIA-XL) 90 MG (OSM) 24 hr tablet Take 1 tablet (90 mg total) by mouth 2 (two) times a day.            While in the hospital, will manage blood pressure as follows; Continue home antihypertensive regimen    Will utilize p.r.n. blood pressure medication only if patient's blood pressure greater than 180/110 and she develops symptoms such as worsening chest pain or shortness of breath.    Type 2 diabetes mellitus with circulatory disorder, without long-term current use of insulin  Patient's FSGs are controlled on current medication regimen.  Last A1c reviewed-   Lab Results   Component Value Date    HGBA1C 4.6 11/20/2023     Most recent fingerstick glucose reviewed-   No results for input(s): "POCTGLUCOSE" in the last 24 hours.  Current correctional scale  Low  Maintain anti-hyperglycemic dose as follows-   Antihyperglycemics (From admission, onward)      Start     Stop Route Frequency Ordered "    04/24/24 0420  insulin aspart U-100 pen 0-5 Units         -- SubQ Before meals & nightly PRN 04/24/24 0320          Hold Oral hypoglycemics while patient is in the hospital.        Hyperlipidemia type II  - continue statin     MDD (major depressive disorder), recurrent, in partial remission  - continue home meds      Hemiplegia affecting right dominant side   This patient has Chronic right hemiplegia due to stroke. Continue all standard measures for pressure injury prevention and consult wound care for any wounds (chronic or acute).    CKD (chronic kidney disease) stage 3, GFR 30-59 ml/min  Creatine stable for now. BMP reviewed- noted CrCl cannot be calculated (Unknown ideal weight.). according to latest data. Based on current GFR, CKD stage is stage 3 - GFR 30-59.  Monitor UOP and serial BMP and adjust therapy as needed. Renally dose meds. Avoid nephrotoxic medications and procedures.      VTE Risk Mitigation (From admission, onward)           Ordered     heparin (porcine) injection 5,000 Units  Every 8 hours         04/24/24 0206     IP VTE HIGH RISK PATIENT  Once         04/24/24 0206     Place sequential compression device  Until discontinued         04/24/24 0206     Place sequential compression device  Until discontinued         04/24/24 0148                                    Wendy Banegas PA-C  Department of Hospital Medicine  [facility] - Emergency Dept

## 2024-04-24 NOTE — PROGRESS NOTES
[facility] Emergency Dept  [facility] Medicine  Progress Note    Patient Name: Wendy Viveros  MRN: [id number]  Patient Class: IP- Inpatient   Admission Date: 4/23/2024  Length of Stay: 1 days  Attending Physician: Isaac Ruiz MD  Primary Care Provider: Ashley Harrell MD        Subjective:     Principal Problem:Acute on chronic diastolic heart failure        HPI:  Ms. Wendy Viveros is a 68 y.o. female, with PMH of HTN, T2DM, CKD-3, Anemia, chronic gout, prior CVA w/ right-sided weakness in 2021, who presented to [facility] ED on 4/23/24 due to progressive shortness of breath x 2 weeks. She notes associated b/l lower extremity edema and it worsens with exertion, she notes difficulty walking 2/2 leg swelling, she spends most of her time in bed, but her shortness of breath is worse when she is laying flat. She uses 4 pillows to prop herself up while sleeping. She states she used to take lasix, but does not currently. She states she has not followed with a doctor in recent years. She denied chest pain. She was evaluated in the ED with labs showing anemia with H&H of 9.4/27.6, and PLT of 140K. A metabolic panel showed elevated total bilirubin of 3.9. A BNP was elevated at 2536, with troponin of 0.051. A CXR showed increaed cardiac silhouette, but did not should pulmonary vascular congestion. She was treated in the ED with 40 mg IV lasix. She was admitted to inpatient status.         Overview/Hospital Course:  Wendy Viveros was admitted for acute CHF, CXR with cardiomegaly and BNP 2536. Started on lasix 20mg IVP with good urine output. Continue to wean supplemental O2. TTE pending    Cardiology f/u at dc    Interval History: Seen at bedside resting in NAD, no new complaints. Reports frequent urination with IV lasix. TTE ordered for today    Review of Systems   Constitutional:  Positive for activity change.   HENT:  Negative for congestion and trouble swallowing.    Respiratory:  Positive for shortness of breath.  Negative for cough.    Cardiovascular:  Positive for leg swelling. Negative for chest pain.   Gastrointestinal:  Negative for abdominal pain, diarrhea, nausea and vomiting.   Genitourinary:  Positive for frequency (since lasix). Negative for decreased urine volume, difficulty urinating, dysuria and hematuria.   Musculoskeletal:  Negative for back pain.   Neurological:  Negative for dizziness and headaches.     Objective:     Vital Signs (Most Recent):  Temp: 98.1 °F (36.7 °C) (04/24/24 0434)  Pulse: 81 (04/24/24 1100)  Resp: 18 (04/24/24 1100)  BP: (!) 140/91 (04/24/24 1100)  SpO2: 100 % (04/24/24 1100) Vital Signs (24h Range):  Temp:  [97.9 °F (36.6 °C)-98.2 °F (36.8 °C)] 98.1 °F (36.7 °C)  Pulse:  [] 81  Resp:  [14-25] 18  SpO2:  [94 %-100 %] 100 %  BP: (140-179)/(79-98) 140/91     Weight: 99 kg (218 lb 4.1 oz)  Body mass index is 36.32 kg/m².    Intake/Output Summary (Last 24 hours) at 4/24/2024 1314  Last data filed at 4/24/2024 0341  Gross per 24 hour   Intake 400 ml   Output 1700 ml   Net -1300 ml         Physical Exam  Vitals and nursing note reviewed.   Constitutional:       General: She is not in acute distress.     Appearance: She is well-developed.   HENT:      Head: Normocephalic.   Neck:      Trachea: No tracheal deviation.   Cardiovascular:      Rate and Rhythm: Normal rate.      Heart sounds: Normal heart sounds.   Pulmonary:      Effort: Pulmonary effort is normal. No respiratory distress.      Breath sounds: Rales (mid to lower lobes) present. No wheezing.   Abdominal:      General: Bowel sounds are normal. There is no distension.      Palpations: Abdomen is soft.   Musculoskeletal:         General: Normal range of motion.      Cervical back: Normal range of motion and neck supple.      Right lower leg: Edema present.      Left lower leg: Edema present.   Skin:     General: Skin is warm and dry.   Neurological:      General: No focal deficit present.      Mental Status: She is alert.       Motor: No abnormal muscle tone.   Psychiatric:         Behavior: Behavior normal.             Significant Labs: All pertinent labs within the past 24 hours have been reviewed.    Significant Imaging: I have reviewed all pertinent imaging results/findings within the past 24 hours.    Assessment/Plan:      * Acute on chronic diastolic heart failure  - Ms. Wendy Viveros presents with shortness of breath  - she has b/l LE swelling, orthopnea, HENRY   - BNP is elevated w/ elevated troponin   - CXR without pulmonary edema but does show increased cardiac silhouette    - s/p 20 mg IV lasix   - continue lasix 20mg IVP   - CHF pathways initiated   - trend troponin : flat  - last EF was 3/2/16 with ED 52%   - repeat TTE  - net - 1.3L  - cardiology referral at [place]        MDD (major depressive disorder), recurrent, in partial remission  - continue home meds      Hemiplegia affecting right dominant side   This patient has Chronic right hemiplegia due to stroke. Continue all standard measures for pressure injury prevention and consult wound care for any wounds (chronic or acute).    CKD (chronic kidney disease) stage 3, GFR 30-59 ml/min  Creatine stable for now. BMP reviewed- noted CrCl cannot be calculated (Unknown ideal weight.). according to latest data. Based on current GFR, CKD stage is stage 3 - GFR 30-59.  Monitor UOP and serial BMP and adjust therapy as needed. Renally dose meds. Avoid nephrotoxic medications and procedures.    Thrombocytopenia  - improved compared to baseline   - no active bleeding   - monitor daily   - Will transfuse if platelet count is <50k (if undergoing surgical procedure or have active bleeding). Hold DVT prophylaxis if platelets are <50k. The patient's platelet results have been reviewed and are listed below.  Recent Labs   Lab 04/23/24  1805   *         Type 2 diabetes mellitus with circulatory disorder, without long-term current use of insulin  Patient's FSGs are controlled on current  "medication regimen.  Last A1c reviewed-   Lab Results   Component Value Date    HGBA1C 4.6 11/20/2023     Most recent fingerstick glucose reviewed-   No results for input(s): "POCTGLUCOSE" in the last 24 hours.  Current correctional scale  Low  Maintain anti-hyperglycemic dose as follows-   Antihyperglycemics (From admission, onward)      Start     Stop Route Frequency Ordered    04/24/24 0420  insulin aspart U-100 pen 0-5 Units         -- SubQ Before meals & nightly PRN 04/24/24 0320          Hold Oral hypoglycemics while patient is in the hospital.        Hyperlipidemia type II  - continue statin     Primary hypertension  Chronic, controlled. Latest blood pressure and vitals reviewed-     Temp:  [97.9 °F (36.6 °C)-98.2 °F (36.8 °C)]   Pulse:  []   Resp:  [17-23]   BP: (157-179)/(79-98)   SpO2:  [94 %-100 %] .   Home meds for hypertension were reviewed and noted below.   Hypertension Medications               hydroCHLOROthiazide (HYDRODIURIL) 25 MG tablet Take 1 tablet (25 mg total) by mouth once daily.    metoprolol succinate (TOPROL-XL) 50 MG 24 hr tablet Take 50 mg by mouth once daily.    NIFEdipine (PROCARDIA-XL) 90 MG (OSM) 24 hr tablet Take 1 tablet (90 mg total) by mouth 2 (two) times a day.            While in the hospital, will manage blood pressure as follows; Continue home antihypertensive regimen    Will utilize p.r.n. blood pressure medication only if patient's blood pressure greater than 180/110 and she develops symptoms such as worsening chest pain or shortness of breath.      VTE Risk Mitigation (From admission, onward)           Ordered     heparin (porcine) injection 5,000 Units  Every 8 hours         04/24/24 0206     IP VTE HIGH RISK PATIENT  Once         04/24/24 0206     Place sequential compression device  Until discontinued         04/24/24 0206     Place sequential compression device  Until discontinued         04/24/24 0148                    Discharge Planning   EL:      Code " Status: Full Code   Is the patient medically ready for discharge?:     Reason for patient still in hospital (select all that apply): Patient trending condition  Discharge Plan A: Home with family, Home                  Corrine Delgadillo PA-C  Department of Hospital Medicine   [facility] - Emergency Dept

## 2024-04-24 NOTE — ASSESSMENT & PLAN NOTE
- Ms. Wendy Viveros presents with shortness of breath  - she has b/l LE swelling, orthopnea, HENRY   - BNP is elevated w/ elevated troponin   - CXR without pulmonary edema but does show increased cardiac silhouette    - s/p 20 mg IV lasix   - continue lasix 20mg IVP   - CHF pathways initiated   - trend troponin : flat  - last EF was 3/2/16 with ED 52%   - repeat TTE  - net - 1.3L  - cardiology referral at [place]

## 2024-04-24 NOTE — ASSESSMENT & PLAN NOTE
This patient has Chronic right hemiplegia due to stroke. Continue all standard measures for pressure injury prevention and consult wound care for any wounds (chronic or acute).

## 2024-04-24 NOTE — SUBJECTIVE & OBJECTIVE
Past Medical History:   Diagnosis Date    Abnormal EKG     Anemia 07/13/2017    Aortic valve regurgitation     Arthritis     CKD (chronic kidney disease) stage 2, GFR 60-89 ml/min 08/08/2014    CKD (chronic kidney disease) stage 2, GFR 60-89 ml/min 08/08/2014    CVA (cerebral vascular accident)     2021 w/ residual R sided weakness    Diabetes mellitus     Diabetes mellitus type II     GERD (gastroesophageal reflux disease)     Gout, unspecified     Heart murmur     Hyperlipidemia     Hypertension     Tendonitis        Past Surgical History:   Procedure Laterality Date    breast reduction      CHOLECYSTECTOMY      JOINT REPLACEMENT      KNEE SURGERY  2018    POLYPECTOMY  05/05/2016    TOTAL REDUCTION MAMMOPLASTY      TOTAL SHOULDER ARTHROPLASTY Left     TUBAL LIGATION         Review of patient's allergies indicates:   Allergen Reactions    Colchicine analogues      diarrhea    Lisinopril      Other reaction(s): cough  Other reaction(s): cough    Losartan      Other reaction(s): blurry vision  Other reaction(s): blurry vision    Percocet [oxycodone-acetaminophen]      Pt gets pain from taking medicine.       Current Facility-Administered Medications   Medication Dose Route Frequency Provider Last Rate Last Admin    acetaminophen tablet 650 mg  650 mg Oral Q6H PRN Wendy Banegas PA-C        dextrose 10% bolus 125 mL 125 mL  12.5 g Intravenous PRN Wendy Banegas PA-C        dextrose 10% bolus 125 mL 125 mL  12.5 g Intravenous PRN Wendy Banegas PA-C        dextrose 10% bolus 250 mL 250 mL  25 g Intravenous PRN Wendy Banegas, YORDAN        dextrose 10% bolus 250 mL 250 mL  25 g Intravenous PRN Wendy Banegas PA-C        furosemide injection 20 mg  20 mg Intravenous Q12H Wendy Banegas PA-C        glucagon (human recombinant) injection 1 mg  1 mg Intramuscular PRN Wendy Banegas PA-C        glucagon (human recombinant) injection 1 mg  1 mg Intramuscular PRN Wendy Banegas  RIVERA, YORDAN        glucose chewable tablet 16 g  16 g Oral PRN Wendy Banegas PA-C        glucose chewable tablet 16 g  16 g Oral PRN Wendy Banegas PA-C        glucose chewable tablet 24 g  24 g Oral PRN Wendy Banegas, YORDAN        glucose chewable tablet 24 g  24 g Oral PRN Wendy Banegas PA-C        heparin (porcine) injection 5,000 Units  5,000 Units Subcutaneous Q8H Wendy Banegas PA-C        insulin aspart U-100 pen 0-5 Units  0-5 Units Subcutaneous QID (AC + HS) PRN Wendy Banegas PA-C        LIDOcaine 5 % patch 1 patch  1 patch Transdermal Q24H Wendy Banegas PA-C   1 patch at 04/24/24 0353    melatonin tablet 6 mg  6 mg Oral Nightly PRN Wendy Banegas PA-C        naloxone 0.4 mg/mL injection 0.02 mg  0.02 mg Intravenous PRWendy Gonzalez PA-C        senna-docusate 8.6-50 mg per tablet 1 tablet  1 tablet Oral BID PRN Wendy Banegas PA-C        sodium chloride 0.9% flush 10 mL  10 mL Intravenous PRN Wendy Banegas PA-C        sodium chloride 0.9% flush 10 mL  10 mL Intravenous Q8H Wendy Banegas PA-C         Current Outpatient Medications   Medication Sig Dispense Refill    allopurinoL (ZYLOPRIM) 300 MG tablet Take 1 tablet by mouth once daily 90 tablet 0    atorvastatin (LIPITOR) 80 MG tablet Take 80 mg by mouth once daily.      azelastine (ASTELIN) 137 mcg (0.1 %) nasal spray 1 spray (137 mcg total) by Nasal route 2 (two) times daily. 30 mL 0    cyanocobalamin, vitamin B-12, 50 mcg tablet Take 50 mcg by mouth once daily.      eltrombopag (PROMACTA) 50 MG Tab Take 1 tablet (50 mg total) by mouth once daily. 30 tablet 1    FLUoxetine 20 MG capsule Take 20 mg by mouth once daily.      folic acid (FOLVITE) 1 MG tablet Take 1 tablet (1 mg total) by mouth once daily. 30 tablet 11    hydroCHLOROthiazide (HYDRODIURIL) 25 MG tablet Take 1 tablet (25 mg total) by mouth once daily. 30 tablet 1    metoprolol succinate (TOPROL-XL) 50 MG 24  hr tablet Take 50 mg by mouth once daily.      NIFEdipine (PROCARDIA-XL) 90 MG (OSM) 24 hr tablet Take 1 tablet (90 mg total) by mouth 2 (two) times a day. 60 tablet 1    pantoprazole (PROTONIX) 40 MG tablet Take 1 tablet (40 mg total) by mouth once daily. 48 tablet 0    sodium chloride (SALINE NASAL) 0.65 % nasal spray 1 spray by Nasal route as needed for Congestion. 88 mL 12    traZODone (DESYREL) 50 MG tablet Take 50 mg by mouth nightly as needed for Insomnia.       Family History       Problem Relation (Age of Onset)    Breast cancer Maternal Cousin    Diabetes Brother    Heart disease Mother    Hypertension Mother, Brother, Brother          Tobacco Use    Smoking status: Never    Smokeless tobacco: Never   Substance and Sexual Activity    Alcohol use: No     Alcohol/week: 0.0 standard drinks of alcohol    Drug use: No    Sexual activity: Not Currently     Partners: Male     Birth control/protection: None     Review of Systems   Constitutional:  Positive for activity change. Negative for chills, diaphoresis and fever.   HENT:  Negative for congestion, ear pain, postnasal drip, rhinorrhea, sinus pressure, sore throat and trouble swallowing.    Respiratory:  Negative for cough, shortness of breath and wheezing.    Cardiovascular:  Positive for leg swelling. Negative for chest pain and palpitations.   Gastrointestinal:  Negative for abdominal distention, abdominal pain, constipation, diarrhea, nausea and vomiting.   Genitourinary:  Negative for decreased urine volume, dysuria, flank pain, frequency, hematuria and urgency.   Musculoskeletal:  Negative for back pain, neck pain and neck stiffness.   Skin:  Negative for color change and rash.   Neurological:  Negative for dizziness, syncope, weakness, light-headedness and headaches.   Psychiatric/Behavioral:  Negative for agitation and confusion.      Objective:     Vital Signs (Most Recent):  Temp: 98.1 °F (36.7 °C) (04/24/24 0434)  Pulse: 92 (04/24/24 0434)  Resp: 16  (04/24/24 0193)  BP: (!) 170/80 (04/24/24 0338)  SpO2: 100 % (04/24/24 0434) Vital Signs (24h Range):  Temp:  [97.9 °F (36.6 °C)-98.2 °F (36.8 °C)] 98.1 °F (36.7 °C)  Pulse:  [] 92  Resp:  [14-25] 16  SpO2:  [94 %-100 %] 100 %  BP: (157-179)/(79-98) 170/80        There is no height or weight on file to calculate BMI.     Physical Exam  Vitals and nursing note reviewed.   Constitutional:       General: She is not in acute distress.     Appearance: She is well-developed. She is obese. She is not ill-appearing, toxic-appearing or diaphoretic.   HENT:      Head: Normocephalic and atraumatic.   Eyes:      General: No scleral icterus.        Right eye: No discharge.         Left eye: No discharge.      Conjunctiva/sclera: Conjunctivae normal.   Neck:      Trachea: No tracheal deviation.   Cardiovascular:      Rate and Rhythm: Normal rate and regular rhythm.      Heart sounds: Normal heart sounds. No murmur heard.     No gallop.      Comments: 3+ pitting edema b/l   Pulmonary:      Effort: Pulmonary effort is normal. No respiratory distress.      Breath sounds: Normal breath sounds. No stridor. No wheezing or rales.   Abdominal:      General: Bowel sounds are normal. There is no distension.      Palpations: Abdomen is soft. There is no mass.      Tenderness: There is no abdominal tenderness. There is no guarding.   Musculoskeletal:         General: No deformity. Normal range of motion.      Cervical back: Normal range of motion and neck supple.   Skin:     General: Skin is warm and dry.      Coloration: Skin is not pale.      Findings: No erythema or rash.   Neurological:      General: No focal deficit present.      Mental Status: She is alert and oriented to person, place, and time.      Cranial Nerves: No cranial nerve deficit.      Motor: No abnormal muscle tone.   Psychiatric:         Mood and Affect: Mood normal.         Behavior: Behavior normal.         Thought Content: Thought content normal.          "Judgment: Judgment normal.                Significant Labs: All pertinent labs within the past 24 hours have been reviewed.  BMP:   Recent Labs   Lab 04/23/24  1805 04/24/24  0225   *  --      --    K 3.6  --    *  --    CO2 23  --    BUN 16  --    CREATININE 1.4  --    CALCIUM 9.5  --    MG  --  1.7     CBC:   Recent Labs   Lab 04/23/24  1805   WBC 5.70   HGB 9.4*   HCT 27.6*   *     CMP:   Recent Labs   Lab 04/23/24  1805      K 3.6   *   CO2 23   *   BUN 16   CREATININE 1.4   CALCIUM 9.5   PROT 6.2   ALBUMIN 3.6   BILITOT 3.9*   ALKPHOS 57   AST 13   ALT 9*   ANIONGAP 11     Urine Culture: No results for input(s): "LABURIN" in the last 48 hours.  Urine Studies: No results for input(s): "COLORU", "APPEARANCEUA", "PHUR", "SPECGRAV", "PROTEINUA", "GLUCUA", "KETONESU", "BILIRUBINUA", "OCCULTUA", "NITRITE", "UROBILINOGEN", "LEUKOCYTESUR", "RBCUA", "WBCUA", "BACTERIA", "SQUAMEPITHEL", "HYALINECASTS" in the last 48 hours.    Invalid input(s): "WRIGHTSUR"    Significant Imaging: I have reviewed all pertinent imaging results/findings within the past 24 hours.  Imaging Results               X-Ray Chest AP (Final result)  Result time 04/23/24 18:48:53      Final result by Dulce Maria Rodriguez MD (04/23/24 18:48:53)                   Impression:      Increase in size of the cardiac silhouette.    This report was flagged in Epic as abnormal.      Electronically signed by: Dulce Maria Rodriguez  Date:    04/23/2024  Time:    18:48               Narrative:    EXAMINATION:  AP PORTABLE CHEST    CLINICAL HISTORY:  shortness of breath;    TECHNIQUE:  AP portable chest radiograph was submitted.    COMPARISON:  09/28/2021    FINDINGS:  AP portable chest radiograph demonstrates marked enlargement of the cardiac silhouette.  There is tortuosity of the descending thoracic aorta.  Vascular calcifications seen at the aortic knob.  There is no focal consolidation, pneumothorax, or pleural effusion. " There is a left shoulder arthroplasty.  The bones are diffusely osteopenic.

## 2024-04-24 NOTE — SUBJECTIVE & OBJECTIVE
Interval History: Seen at bedside resting in NAD, no new complaints. Reports frequent urination with IV lasix. TTE ordered for today    Review of Systems   Constitutional:  Positive for activity change.   HENT:  Negative for congestion and trouble swallowing.    Respiratory:  Positive for shortness of breath. Negative for cough.    Cardiovascular:  Positive for leg swelling. Negative for chest pain.   Gastrointestinal:  Negative for abdominal pain, diarrhea, nausea and vomiting.   Genitourinary:  Positive for frequency (since lasix). Negative for decreased urine volume, difficulty urinating, dysuria and hematuria.   Musculoskeletal:  Negative for back pain.   Neurological:  Negative for dizziness and headaches.     Objective:     Vital Signs (Most Recent):  Temp: 98.1 °F (36.7 °C) (04/24/24 0434)  Pulse: 81 (04/24/24 1100)  Resp: 18 (04/24/24 1100)  BP: (!) 140/91 (04/24/24 1100)  SpO2: 100 % (04/24/24 1100) Vital Signs (24h Range):  Temp:  [97.9 °F (36.6 °C)-98.2 °F (36.8 °C)] 98.1 °F (36.7 °C)  Pulse:  [] 81  Resp:  [14-25] 18  SpO2:  [94 %-100 %] 100 %  BP: (140-179)/(79-98) 140/91     Weight: 99 kg (218 lb 4.1 oz)  Body mass index is 36.32 kg/m².    Intake/Output Summary (Last 24 hours) at 4/24/2024 1314  Last data filed at 4/24/2024 0341  Gross per 24 hour   Intake 400 ml   Output 1700 ml   Net -1300 ml         Physical Exam  Vitals and nursing note reviewed.   Constitutional:       General: She is not in acute distress.     Appearance: She is well-developed.   HENT:      Head: Normocephalic.   Neck:      Trachea: No tracheal deviation.   Cardiovascular:      Rate and Rhythm: Normal rate.      Heart sounds: Normal heart sounds.   Pulmonary:      Effort: Pulmonary effort is normal. No respiratory distress.      Breath sounds: Rales (mid to lower lobes) present. No wheezing.   Abdominal:      General: Bowel sounds are normal. There is no distension.      Palpations: Abdomen is soft.   Musculoskeletal:          General: Normal range of motion.      Cervical back: Normal range of motion and neck supple.      Right lower leg: Edema present.      Left lower leg: Edema present.   Skin:     General: Skin is warm and dry.   Neurological:      General: No focal deficit present.      Mental Status: She is alert.      Motor: No abnormal muscle tone.   Psychiatric:         Behavior: Behavior normal.             Significant Labs: All pertinent labs within the past 24 hours have been reviewed.    Significant Imaging: I have reviewed all pertinent imaging results/findings within the past 24 hours.

## 2024-04-24 NOTE — HOSPITAL COURSE
Wendy Viveros was admitted for acute CHF, CXR with cardiomegaly and BNP 2536. Started on lasix 20mg IVP with good urine output initially. Supplemental oxygen was weaned down and discontinued. TTE done and was notable for CHFrEF 25%, grade II diastolic dysfunction, multiple valvular abnormalities and severe pulmonary hypertension.  Patient's office cardiologist followed her while she was here and added valsartan and Jardiance to her medication regimen.  Diuretic dose was increased periodically to improve fluid balance.    Cardiology follow up at discharge was planned as well as a nephrology referral for CKD III, home health and [facility].  PT/OT consulted while patient was here given chronic right sided weakness since her stroke and her right leg giving out on her.  Home health with therapy recommended along with assistance from family.  Referral was also made for outpatient PT in case home health was inadequate to meet her needs.  Patient was seen and examined on the day of discharge and felt comfortable going home.

## 2024-04-24 NOTE — ED PROVIDER NOTES
"Encounter Date: 4/23/2024       History     Chief Complaint   Patient presents with    Shortness of Breath     SOB x 2 weeks, worsening with exertion; denies CP. BLE swelling x 2-3 weeks.      This is a 68-year-old female with PMHx  hypertension, chronic gout, type 2 diabetes, CKD stage 3, anemia, history CVA with right-sided weakness in 2021 who presents to the ED with complaints of worsening shortness of breath over the past 2 weeks.   Associated symptoms include bilateral lower extremity edema.  States that the edema has made ambulating difficult and she spends most of her time in bed.  Reports worsening of shortness of breath with lying flat and uses 4 pillows to prop herself up when she sleeps at night. States that she " has a lot of fluid on her."  Has taken Lasix in the past, but is not on it currently.   Has not followed with primary care or Cardiology in recent years.  This is the extent of the patient's complaints at this time.    The history is provided by the patient.     Review of patient's allergies indicates:   Allergen Reactions    Colchicine analogues      diarrhea    Lisinopril      Other reaction(s): cough  Other reaction(s): cough    Losartan      Other reaction(s): blurry vision  Other reaction(s): blurry vision    Percocet [oxycodone-acetaminophen]      Pt gets pain from taking medicine.     Past Medical History:   Diagnosis Date    Abnormal EKG     Anemia 07/13/2017    Aortic valve regurgitation     Arthritis     CKD (chronic kidney disease) stage 2, GFR 60-89 ml/min 08/08/2014    CKD (chronic kidney disease) stage 2, GFR 60-89 ml/min 08/08/2014    CVA (cerebral vascular accident)     2021 w/ residual R sided weakness    Diabetes mellitus     Diabetes mellitus type II     GERD (gastroesophageal reflux disease)     Gout, unspecified     Heart murmur     Hyperlipidemia     Hypertension     Tendonitis      Past Surgical History:   Procedure Laterality Date    breast reduction      CHOLECYSTECTOMY "      JOINT REPLACEMENT      KNEE SURGERY  2018    POLYPECTOMY  05/05/2016    TOTAL REDUCTION MAMMOPLASTY      TOTAL SHOULDER ARTHROPLASTY Left     TUBAL LIGATION       Family History   Problem Relation Name Age of Onset    Heart disease Mother          MI at 71    Hypertension Mother      Hypertension Brother      Diabetes Brother      Hypertension Brother      Breast cancer Maternal Cousin      Colon cancer Neg Hx      Ovarian cancer Neg Hx       Social History     Tobacco Use    Smoking status: Never    Smokeless tobacco: Never   Substance Use Topics    Alcohol use: No     Alcohol/week: 0.0 standard drinks of alcohol    Drug use: No     Review of Systems  10 point ROS performed and negative except as stated in HPI   Physical Exam     Initial Vitals [04/23/24 1742]   BP Pulse Resp Temp SpO2   (!) 179/93 100 18 98.1 °F (36.7 °C) 100 %      MAP       --         Physical Exam    Constitutional: She appears well-developed and well-nourished.  Non-toxic appearance. She does not appear ill. No distress.   HENT:   Head: Normocephalic and atraumatic.   Eyes: Conjunctivae are normal.   Neck: Neck supple. JVD present.   Cardiovascular:  Regular rhythm.   Tachycardia present.         Pulmonary/Chest: Effort normal. Tachypnea noted. No respiratory distress. She has rales.   Tenderness with multiple sub centimeter lobules to left breast inferior to areola. No overlying erythema, warmth, or induration.   Abdominal: Abdomen is soft and protuberant. She exhibits no distension. There is no abdominal tenderness.   No abdominal bruit There is no rebound and no guarding.   Musculoskeletal:      Cervical back: Neck supple.      Right lower leg: 3+ Pitting Edema present.      Left lower leg: 3+ Pitting Edema present.     Neurological: She is alert and oriented to person, place, and time.   Skin: Skin is warm, dry and intact.   Psychiatric: She has a normal mood and affect. Her speech is normal and behavior is normal.         ED Course    Critical Care    Date/Time: 4/23/2024 9:10 PM    Performed by: Karen Du PA-C  Authorized by: Eliot Wiseman MD  Direct patient critical care time: 15 minutes  Additional history critical care time: 10 minutes  Ordering / reviewing critical care time: 10 minutes  Documentation critical care time: 5 minutes  Total critical care time (exclusive of procedural time) : 40 minutes  Critical care was necessary to treat or prevent imminent or life-threatening deterioration of the following conditions: cardiac failure.  Critical care was time spent personally by me on the following activities: interpretation of cardiac output measurements, examination of patient, evaluation of patient's response to treatment, obtaining history from patient or surrogate, ordering and review of laboratory studies, ordering and review of radiographic studies, pulse oximetry, re-evaluation of patient's condition and review of old charts.        Labs Reviewed   CBC W/ AUTO DIFFERENTIAL - Abnormal; Notable for the following components:       Result Value    RBC 2.87 (*)     Hemoglobin 9.4 (*)     Hematocrit 27.6 (*)     MCH 32.8 (*)     RDW 19.9 (*)     Platelets 140 (*)     All other components within normal limits   COMPREHENSIVE METABOLIC PANEL - Abnormal; Notable for the following components:    Chloride 111 (*)     Glucose 132 (*)     Total Bilirubin 3.9 (*)     ALT 9 (*)     eGFR 41 (*)     All other components within normal limits   TROPONIN I - Abnormal; Notable for the following components:    Troponin I 0.051 (*)     All other components within normal limits   B-TYPE NATRIURETIC PEPTIDE - Abnormal; Notable for the following components:    BNP 2,536 (*)     All other components within normal limits        ECG Results              EKG 12-lead (Preliminary result)  Result time 04/23/24 18:57:37      Wet Read by Eliot Wiseman MD (04/23/24 18:57:37, [facility], Emergency Medicine)    Sinus tachycardia  101 beats per minute, left axis deviation, T-wave inversion 1, aVL, poor R-wave progression, Q-waves in the inferior fields, when compared to previous EKG 11/20/2023 relatively unchanged                                  Imaging Results               X-Ray Chest AP (Final result)  Result time 04/23/24 18:48:53      Final result by Dulce Maria Rodriguez MD (04/23/24 18:48:53)                   Impression:      Increase in size of the cardiac silhouette.    This report was flagged in Epic as abnormal.      Electronically signed by: Dulce Maria Rodriguez  Date:    04/23/2024  Time:    18:48               Narrative:    EXAMINATION:  AP PORTABLE CHEST    CLINICAL HISTORY:  shortness of breath;    TECHNIQUE:  AP portable chest radiograph was submitted.    COMPARISON:  09/28/2021    FINDINGS:  AP portable chest radiograph demonstrates marked enlargement of the cardiac silhouette.  There is tortuosity of the descending thoracic aorta.  Vascular calcifications seen at the aortic knob.  There is no focal consolidation, pneumothorax, or pleural effusion. There is a left shoulder arthroplasty.  The bones are diffusely osteopenic.                                       Medications   furosemide injection 40 mg (40 mg Intravenous Given 4/23/24 1918)     Medical Decision Making  Urgent evaluation of an afebrile 68-year-old female with shortness of breath, orthopnea, and bilateral lower extremity edema that has worsened over the past 2 weeks.  Physical exam reveals a nontoxic appearing female.  She has 3+ edema bilateral lower extremities, extending up to the knees. She is 94-96% on room air,  but tachypneic without accessory muscle use or evidence of acute respiratory distress. Rales auscultated to bilateral lower lobes as well as JVD present.  Do have suspicion of fluid overload after physical exam. Plan for cardiac workup. Will place on cardiac monitoring continue to monitor closely.    Differential diagnosis includes but is not limited  to:  CHF, pericardial effusion, pleural effusion, COPD/asthma, MI/ACS    Risk  Prescription drug management.  Decision regarding hospitalization.               ED Course as of 04/23/24 2111 Tue Apr 23, 2024 2103 BNP(!): 2,536 [JEANE]   2103 Troponin I(!): 0.051 [JEANE]   2103 Hemoglobin(!): 9.4  Stable anemia [JEANE]   2104 X-Ray Chest AP(!)  Per radiologist:   Marked enlargement of the cardiac silhouette.  There is tortuosity of the descending thoracic aorta.  Vascular calcifications seen at the aortic knob.  There is no focal consolidation, pneumothorax, or pleural effusion. There is a left shoulder arthroplasty.  The bones are diffusely osteopenic. [JEANE]   2104 Patient diuresed with some improvement in symptoms. Was placed on O2 for comfort as she remains tachypneic.  [JEANE]   2105 EKG without ischemia or significant changes from prior. No electrical alternans or low voltage QRS to suggest pericardial effusion. [JEANE]   2108 Patient to be admitted to hospital medicine for suspected CHF. Discussed with hospitalist, Jennifer Ackerman, who has accepted the patient to Dr. Ruiz's service. Patient and her sister-in-law at bedside were updated and are agreeable with the plan. [JEANE]      ED Course User Index  [JEANE] Karen Du PA-C                     Clinical Impression:  Final diagnoses:  [R06.02] SOB (shortness of breath)  [I50.9] Congestive heart failure, unspecified HF chronicity, unspecified heart failure type (Primary)          ED Disposition Condition    Admit Stable                Karen Du PA-C  04/23/24 2112

## 2024-04-24 NOTE — ASSESSMENT & PLAN NOTE
- Ms. Wendy Viveros presents with shortness of breath  - she has b/l LE swelling, orthopnea, HENRY   - BNP is elevated w/ elevated troponin   - CXR without pulmonary edema - s/p 20 mg IV lasix   - CHF pathways initiated   - trend troponin   - last EF was 3/2/16 with ED 52%

## 2024-04-24 NOTE — ASSESSMENT & PLAN NOTE
- improved compared to baseline   - no active bleeding   - monitor daily   - Will transfuse if platelet count is <50k (if undergoing surgical procedure or have active bleeding). Hold DVT prophylaxis if platelets are <50k. The patient's platelet results have been reviewed and are listed below.  Recent Labs   Lab 04/23/24  1805   *

## 2024-04-24 NOTE — PLAN OF CARE
LMSW met with the patient at the bedside. Patient is alert and oriented with no communication barriers. Prior to admission, the patient is independent. Patient denies the use of HH or DME. Patients PCP is correct on the face sheet. Patient choice pharmacy is bedside. Patient's family will transport the patient home at discharge.     [facility] - Emergency Dept  Initial Discharge Assessment       Primary Care Provider: Ashley Harrell MD    Admission Diagnosis: SOB (shortness of breath) [R06.02]    Admission Date: 4/23/2024  Expected Discharge Date:     Transition of Care Barriers: (P) None    Payor: [employer] [facility] / Plan: [employer] / Product Type: Medicare Advantage /     Extended Emergency Contact Information  Primary Emergency Contact: Neftali Collier   [place]  Mobile Phone: [phone]  Relation: Brother    Discharge Plan A: (P) Home with family, Home  Discharge Plan B: (P) Home      [facility] [address] [place] [address] [address] [place] [address]  [address] [facility] [address]  Phone: [phone] Fax: [phone]      Initial Assessment (most recent)       Adult Discharge Assessment - 04/24/24 1209          Discharge Assessment    Assessment Type Discharge Planning Assessment (P)      Confirmed/corrected address, phone number and insurance Yes (P)      Confirmed Demographics Correct on Facesheet (P)      Source of Information patient;health record (P)      People in Home child(lory), adult (P)      Do you expect to return to your current living situation? Yes (P)      Do you have help at home or someone to help you manage your care at home? Yes (P)      Prior to hospitilization cognitive status: Alert/Oriented;No Deficits (P)      Current cognitive status: Alert/Oriented;No Deficits (P)      Equipment Currently Used at Home none (P)      Readmission within 30 days? Yes (P)      Patient currently being followed by outpatient case management? No  (P)      Do you currently have service(s) that help you manage your care at home? No (P)      Do you take prescription medications? Yes (P)      Do you have prescription coverage? Yes (P)      Do you have any problems affording any of your prescribed medications? No (P)      Is the patient taking medications as prescribed? yes (P)      How do you get to doctors appointments? family or friend will provide (P)      Are you on dialysis? No (P)      Do you take coumadin? No (P)      Discharge Plan A Home with family;Home (P)      Discharge Plan B Home (P)      Discharge Plan discussed with: Patient (P)      Transition of Care Barriers None (P)

## 2024-04-24 NOTE — ASSESSMENT & PLAN NOTE
Chronic, controlled. Latest blood pressure and vitals reviewed-     Temp:  [97.9 °F (36.6 °C)-98.2 °F (36.8 °C)]   Pulse:  []   Resp:  [17-23]   BP: (157-179)/(79-98)   SpO2:  [94 %-100 %] .   Home meds for hypertension were reviewed and noted below.   Hypertension Medications               hydroCHLOROthiazide (HYDRODIURIL) 25 MG tablet Take 1 tablet (25 mg total) by mouth once daily.    metoprolol succinate (TOPROL-XL) 50 MG 24 hr tablet Take 50 mg by mouth once daily.    NIFEdipine (PROCARDIA-XL) 90 MG (OSM) 24 hr tablet Take 1 tablet (90 mg total) by mouth 2 (two) times a day.            While in the hospital, will manage blood pressure as follows; Continue home antihypertensive regimen    Will utilize p.r.n. blood pressure medication only if patient's blood pressure greater than 180/110 and she develops symptoms such as worsening chest pain or shortness of breath.

## 2024-04-24 NOTE — ASSESSMENT & PLAN NOTE
"Patient's FSGs are controlled on current medication regimen.  Last A1c reviewed-   Lab Results   Component Value Date    HGBA1C 4.6 11/20/2023     Most recent fingerstick glucose reviewed-   No results for input(s): "POCTGLUCOSE" in the last 24 hours.  Current correctional scale  Low  Maintain anti-hyperglycemic dose as follows-   Antihyperglycemics (From admission, onward)      Start     Stop Route Frequency Ordered    04/24/24 0420  insulin aspart U-100 pen 0-5 Units         -- SubQ Before meals & nightly PRN 04/24/24 0320          Hold Oral hypoglycemics while patient is in the hospital.      "

## 2024-04-24 NOTE — NURSING
Nurses Note -- 4 Eyes      4/24/2024   2:37 PM      Skin assessed during: Transfer      [] No Altered Skin Integrity Present    []Prevention Measures Documented      [x] Yes- Altered Skin Integrity Present or Discovered   [] LDA Added if Not in Epic (Describe Wound)   [] New Altered Skin Integrity was Present on Admit and Documented in LDA   [] Wound Image Taken    Wound Care Consulted? Yes    Attending Nurse:  Aarti Hall RN/Staff Member:   SEGUNDO Romero    Small healed area to back of right ankle, not open but patient complains of pain

## 2024-04-25 LAB
ANION GAP SERPL CALC-SCNC: 10 MMOL/L (ref 8–16)
BUN SERPL-MCNC: 19 MG/DL (ref 8–23)
CALCIUM SERPL-MCNC: 9.3 MG/DL (ref 8.7–10.5)
CHLORIDE SERPL-SCNC: 111 MMOL/L (ref 95–110)
CO2 SERPL-SCNC: 25 MMOL/L (ref 23–29)
CREAT SERPL-MCNC: 1.4 MG/DL (ref 0.5–1.4)
EST. GFR  (NO RACE VARIABLE): 41 ML/MIN/1.73 M^2
GLUCOSE SERPL-MCNC: 134 MG/DL (ref 70–110)
POCT GLUCOSE: 127 MG/DL (ref 70–110)
POCT GLUCOSE: 145 MG/DL (ref 70–110)
POCT GLUCOSE: 276 MG/DL (ref 70–110)
POTASSIUM SERPL-SCNC: 3.7 MMOL/L (ref 3.5–5.1)
SODIUM SERPL-SCNC: 146 MMOL/L (ref 136–145)

## 2024-04-25 PROCEDURE — 99223 1ST HOSP IP/OBS HIGH 75: CPT | Mod: ,,, | Performed by: INTERNAL MEDICINE

## 2024-04-25 PROCEDURE — 80048 BASIC METABOLIC PNL TOTAL CA: CPT | Performed by: PHYSICIAN ASSISTANT

## 2024-04-25 PROCEDURE — 25000003 PHARM REV CODE 250: Performed by: PHYSICIAN ASSISTANT

## 2024-04-25 PROCEDURE — 25000003 PHARM REV CODE 250: Performed by: INTERNAL MEDICINE

## 2024-04-25 PROCEDURE — 36415 COLL VENOUS BLD VENIPUNCTURE: CPT | Performed by: PHYSICIAN ASSISTANT

## 2024-04-25 PROCEDURE — A4216 STERILE WATER/SALINE, 10 ML: HCPCS | Performed by: PHYSICIAN ASSISTANT

## 2024-04-25 PROCEDURE — 63600175 PHARM REV CODE 636 W HCPCS: Performed by: PHYSICIAN ASSISTANT

## 2024-04-25 PROCEDURE — 21400001 HC TELEMETRY ROOM

## 2024-04-25 PROCEDURE — 94761 N-INVAS EAR/PLS OXIMETRY MLT: CPT

## 2024-04-25 RX ORDER — POTASSIUM CHLORIDE 20 MEQ/1
40 TABLET, EXTENDED RELEASE ORAL ONCE
Status: COMPLETED | OUTPATIENT
Start: 2024-04-25 | End: 2024-04-25

## 2024-04-25 RX ORDER — LANOLIN ALCOHOL/MO/W.PET/CERES
400 CREAM (GRAM) TOPICAL ONCE
Status: COMPLETED | OUTPATIENT
Start: 2024-04-25 | End: 2024-04-25

## 2024-04-25 RX ORDER — SPIRONOLACTONE 25 MG/1
25 TABLET ORAL DAILY
Status: DISCONTINUED | OUTPATIENT
Start: 2024-04-25 | End: 2024-05-05 | Stop reason: HOSPADM

## 2024-04-25 RX ADMIN — INSULIN ASPART 3 UNITS: 100 INJECTION, SOLUTION INTRAVENOUS; SUBCUTANEOUS at 06:04

## 2024-04-25 RX ADMIN — Medication 10 ML: at 09:04

## 2024-04-25 RX ADMIN — Medication 10 ML: at 05:04

## 2024-04-25 RX ADMIN — HEPARIN SODIUM 5000 UNITS: 5000 INJECTION INTRAVENOUS; SUBCUTANEOUS at 05:04

## 2024-04-25 RX ADMIN — LIDOCAINE 5% 1 PATCH: 700 PATCH TOPICAL at 05:04

## 2024-04-25 RX ADMIN — POTASSIUM CHLORIDE 40 MEQ: 1500 TABLET, EXTENDED RELEASE ORAL at 09:04

## 2024-04-25 RX ADMIN — Medication 10 ML: at 03:04

## 2024-04-25 RX ADMIN — FOLIC ACID 1 MG: 1 TABLET ORAL at 09:04

## 2024-04-25 RX ADMIN — Medication 400 MG: at 09:04

## 2024-04-25 RX ADMIN — SPIRONOLACTONE 25 MG: 25 TABLET, FILM COATED ORAL at 05:04

## 2024-04-25 RX ADMIN — FUROSEMIDE 20 MG: 10 INJECTION, SOLUTION INTRAMUSCULAR; INTRAVENOUS at 08:04

## 2024-04-25 RX ADMIN — METOPROLOL SUCCINATE 50 MG: 50 TABLET, EXTENDED RELEASE ORAL at 09:04

## 2024-04-25 RX ADMIN — ATORVASTATIN CALCIUM 80 MG: 20 TABLET, FILM COATED ORAL at 09:04

## 2024-04-25 RX ADMIN — HEPARIN SODIUM 5000 UNITS: 5000 INJECTION INTRAVENOUS; SUBCUTANEOUS at 08:04

## 2024-04-25 RX ADMIN — HEPARIN SODIUM 5000 UNITS: 5000 INJECTION INTRAVENOUS; SUBCUTANEOUS at 03:04

## 2024-04-25 RX ADMIN — FUROSEMIDE 20 MG: 10 INJECTION, SOLUTION INTRAMUSCULAR; INTRAVENOUS at 09:04

## 2024-04-25 NOTE — PROGRESS NOTES
[facility] Medicine  Progress Note    Patient Name: Wendy Viveros  MRN: [id number]  Patient Class: IP- Inpatient   Admission Date: 4/23/2024  Length of Stay: 2 days  Attending Physician: Isaac Ruiz MD  Primary Care Provider: Ashley Harrell MD        Subjective:     Principal Problem:Acute on chronic diastolic heart failure        HPI:  Ms. Wendy Viveros is a 68 y.o. female, with PMH of HTN, T2DM, CKD-3, Anemia, chronic gout, prior CVA w/ right-sided weakness in 2021, who presented to [facility] ED on 4/23/24 due to progressive shortness of breath x 2 weeks. She notes associated b/l lower extremity edema and it worsens with exertion, she notes difficulty walking 2/2 leg swelling, she spends most of her time in bed, but her shortness of breath is worse when she is laying flat. She uses 4 pillows to prop herself up while sleeping. She states she used to take lasix, but does not currently. She states she has not followed with a doctor in recent years. She denied chest pain. She was evaluated in the ED with labs showing anemia with H&H of 9.4/27.6, and PLT of 140K. A metabolic panel showed elevated total bilirubin of 3.9. A BNP was elevated at 2536, with troponin of 0.051. A CXR showed increaed cardiac silhouette, but did not should pulmonary vascular congestion. She was treated in the ED with 40 mg IV lasix. She was admitted to inpatient status.         Overview/Hospital Course:  Wendy Viveros was admitted for acute CHF, CXR with cardiomegaly and BNP 2536. Started on lasix 20mg IVP with good urine output. Continue to wean supplemental O2. TTE with cCHFrEF 25%. Cardiology consulted    Cardiology f/u at [place] as well as nephro referral for CKD, HHC and och care at home    Interval History: Seen at bedside, reports breathing more easily this AM, weaned off O2. Still appears volume overloaded. Cardiology consulted, discussed echocardiogram findings with daughter at bedside as well via  phone  Continue diuresis, appears volume overloaded    Review of Systems   Constitutional:  Positive for activity change.   HENT:  Negative for congestion and trouble swallowing.    Respiratory:  Positive for shortness of breath. Negative for cough.    Cardiovascular:  Positive for leg swelling. Negative for chest pain.   Gastrointestinal:  Negative for abdominal pain, diarrhea, nausea and vomiting.   Genitourinary:  Positive for frequency (since lasix). Negative for decreased urine volume, difficulty urinating, dysuria and hematuria.   Musculoskeletal:  Negative for back pain.   Neurological:  Negative for dizziness and headaches.     Objective:     Vital Signs (Most Recent):  Temp: 98.8 °F (37.1 °C) (04/25/24 1239)  Pulse: 80 (04/25/24 1239)  Resp: 18 (04/25/24 1239)  BP: (!) 142/75 (04/25/24 1239)  SpO2: 99 % (04/25/24 1239) Vital Signs (24h Range):  Temp:  [97.6 °F (36.4 °C)-98.8 °F (37.1 °C)] 98.8 °F (37.1 °C)  Pulse:  [71-89] 80  Resp:  [16-20] 18  SpO2:  [92 %-100 %] 99 %  BP: (117-174)/(70-82) 142/75     Weight: 98 kg (216 lb 0.8 oz)  Body mass index is 35.95 kg/m².    Intake/Output Summary (Last 24 hours) at 4/25/2024 1344  Last data filed at 4/25/2024 0547  Gross per 24 hour   Intake 160 ml   Output 600 ml   Net -440 ml         Physical Exam  Vitals and nursing note reviewed.   Constitutional:       General: She is not in acute distress.     Appearance: She is well-developed.   HENT:      Head: Normocephalic.   Neck:      Trachea: No tracheal deviation.   Cardiovascular:      Rate and Rhythm: Normal rate.   Pulmonary:      Effort: Pulmonary effort is normal. No respiratory distress.      Breath sounds: Rales (mid to lower lobes) present. No wheezing.   Abdominal:      General: Bowel sounds are normal.      Palpations: Abdomen is soft.   Musculoskeletal:         General: Normal range of motion.      Cervical back: Normal range of motion and neck supple.      Right lower leg: Edema present.      Left lower  leg: Edema present.   Skin:     General: Skin is warm and dry.   Neurological:      General: No focal deficit present.      Mental Status: She is alert.      Motor: No abnormal muscle tone.   Psychiatric:         Behavior: Behavior normal.             Significant Labs: All pertinent labs within the past 24 hours have been reviewed.    Significant Imaging: I have reviewed all pertinent imaging results/findings within the past 24 hours.    Assessment/Plan:      * Acute on chronic diastolic heart failure  - Ms. Wendy Viveros presents with shortness of breath  - she has b/l LE swelling, orthopnea, HENRY   - BNP is elevated w/ elevated troponin   - CXR without pulmonary edema but does show increased cardiac silhouette    - s/p 20 mg IV lasix   - continue lasix 20mg IVP   - CHF pathways initiated   - trend troponin : flat  - last EF was 3/2/16 with ED 52%   - repeat TTE:  Grade II ddfx, rEF 25%, pulm HTN 83mmHg, CVP 15mmgHg  - net - 1.7L  - cardiology consulted         MDD (major depressive disorder), recurrent, in partial remission  - continue home meds      Hemiplegia affecting right dominant side   This patient has Chronic right hemiplegia due to stroke. Continue all standard measures for pressure injury prevention and consult wound care for any wounds (chronic or acute).    CKD (chronic kidney disease) stage 3, GFR 30-59 ml/min  Creatine stable for now. BMP reviewed- noted CrCl cannot be calculated (Unknown ideal weight.). according to latest data. Based on current GFR, CKD stage is stage 3 - GFR 30-59.  Monitor UOP and serial BMP and adjust therapy as needed. Renally dose meds. Avoid nephrotoxic medications and procedures.    Thrombocytopenia  - improved compared to baseline   - no active bleeding   - monitor daily   - Will transfuse if platelet count is <50k (if undergoing surgical procedure or have active bleeding). Hold DVT prophylaxis if platelets are <50k. The patient's platelet results have been reviewed and  "are listed below.  Recent Labs   Lab 04/23/24  1805   *         Type 2 diabetes mellitus with circulatory disorder, without long-term current use of insulin  Patient's FSGs are controlled on current medication regimen.  Last A1c reviewed-   Lab Results   Component Value Date    HGBA1C 4.6 11/20/2023     Most recent fingerstick glucose reviewed-   No results for input(s): "POCTGLUCOSE" in the last 24 hours.  Current correctional scale  Low  Maintain anti-hyperglycemic dose as follows-   Antihyperglycemics (From admission, onward)      Start     Stop Route Frequency Ordered    04/24/24 0420  insulin aspart U-100 pen 0-5 Units         -- SubQ Before meals & nightly PRN 04/24/24 0320          Hold Oral hypoglycemics while patient is in the hospital.        Hyperlipidemia type II  - continue statin     Primary hypertension  Chronic, controlled. Latest blood pressure and vitals reviewed-     Temp:  [97.9 °F (36.6 °C)-98.2 °F (36.8 °C)]   Pulse:  []   Resp:  [17-23]   BP: (157-179)/(79-98)   SpO2:  [94 %-100 %] .   Home meds for hypertension were reviewed and noted below.   Hypertension Medications               hydroCHLOROthiazide (HYDRODIURIL) 25 MG tablet Take 1 tablet (25 mg total) by mouth once daily.    metoprolol succinate (TOPROL-XL) 50 MG 24 hr tablet Take 50 mg by mouth once daily.    NIFEdipine (PROCARDIA-XL) 90 MG (OSM) 24 hr tablet Take 1 tablet (90 mg total) by mouth 2 (two) times a day.            While in the hospital, will manage blood pressure as follows; Continue home antihypertensive regimen    Will utilize p.r.n. blood pressure medication only if patient's blood pressure greater than 180/110 and she develops symptoms such as worsening chest pain or shortness of breath.      VTE Risk Mitigation (From admission, onward)           Ordered     heparin (porcine) injection 5,000 Units  Every 8 hours         04/24/24 0206     IP VTE HIGH RISK PATIENT  Once         04/24/24 0206     Place " sequential compression device  Until discontinued         04/24/24 0206     Place sequential compression device  Until discontinued         04/24/24 0148                    Discharge Planning   EL:      Code Status: Full Code   Is the patient medically ready for discharge?:     Reason for patient still in hospital (select all that apply): Patient trending condition  Discharge Plan A: Home with family, Home                  Corrine Delgadillo PA-C  Department of Hospital Medicine   [facility] ([place])

## 2024-04-25 NOTE — CONSULTS
Food & Nutrition  Education    Diet Education: Fluid and Sodium Restriction Diet Education  Time Spent: 15 min  Learners: patient (daughter on phone)      Nutrition Education provided with handouts:   Heart Failure Nutrition Therapy    Comments:  Pt with good appetite and intake this morning of breakfast. Reports no N/V or abdominal pain. Receiving a low sodium diet, tolerating and encouraged patient to continue low sodium diet post discharge. Discouraged the use of salt and excessively salting foods. Pt and daughter report using fresh and frozen ingredients to cook with and limits salt use. Encouraged an increase in fruits, vegetables, whole grains. Pt does not eat whole grains and will rarely eat fruit per daughter on phone. Discussed limiting foods high in salt content such as deli meats (sausage, loredo), processed/canned goods and fast food. Encouraged heart healthy fats. Pt currently on 1500 mL fluid restriction, both patient and daughter very concerned about fluid restrictions. Discussed the need to monitor fluid intake and not excessively drinking fluids r/t CHF. Daughter aware its all fluids counted towards fluid intake. Provided handout and patient voiced understanding.    All questions and concerns answered. Dietitian's contact information provided.       Follow-Up: yes    Please Re-consult as needed    Thanks!    Deidre Carpenter, FLAVIAN, LDN

## 2024-04-25 NOTE — ASSESSMENT & PLAN NOTE
- Ms. Wendy Viveros presents with shortness of breath  - she has b/l LE swelling, orthopnea, HENRY   - BNP is elevated w/ elevated troponin   - CXR without pulmonary edema but does show increased cardiac silhouette    - s/p 20 mg IV lasix   - continue lasix 20mg IVP   - CHF pathways initiated   - trend troponin : flat  - last EF was 3/2/16 with ED 52%   - repeat TTE:  Grade II ddfx, rEF 25%, pulm HTN 83mmHg, CVP 15mmgHg  - net - 1.7L  - cardiology consulted

## 2024-04-25 NOTE — CONSULTS
[facility] ([place])  Cardiology  Consult Note    Patient Name: Wendy Viveros  MRN: [id number]  Admission Date: 4/23/2024  Hospital Length of Stay: 2 days  Code Status: Full Code   Attending Provider: Isaac Ruiz MD   Consulting Provider: Ismael Covarrubias MD  Primary Care Physician: Ashley Harrell MD  Principal Problem:Acute on chronic diastolic heart failure    Patient information was obtained from patient, past medical records, ER records, and primary team.     Inpatient consult to Cardiology  Consult performed by: Ismael Covarrubias MD  Consult ordered by: Corrine Delgadillo PA-C  Reason for consult: Heart failure        Subjective:     Chief Complaint: Shortness of breath and edema.    HPI:     Wendy Viveros is a 68 y.o.female with hypertension and diabetes mellitus type 2. She is severely obese. She suffered a cerebrovascular accident in 2021 causing right sided weakness. She has thrombocytopenia. Beginning early 4/2024 she has accumulated fluid - she has developed edema of her legs, the abdominal girth has increased and she has become short of breath at rest. She presented to the emergency room on 4/24/2024 and was admitted.         Past Medical History:   Diagnosis Date    Abnormal EKG     Anemia 07/13/2017    Aortic valve regurgitation     Arthritis     CKD (chronic kidney disease) stage 2, GFR 60-89 ml/min 08/08/2014    CKD (chronic kidney disease) stage 2, GFR 60-89 ml/min 08/08/2014    CVA (cerebral vascular accident)     2021 w/ residual R sided weakness    Diabetes mellitus     Diabetes mellitus type II     GERD (gastroesophageal reflux disease)     Gout, unspecified     Heart murmur     Hyperlipidemia     Hypertension     Tendonitis        Past Surgical History:   Procedure Laterality Date    breast reduction      CHOLECYSTECTOMY      JOINT REPLACEMENT      KNEE SURGERY  2018    POLYPECTOMY  05/05/2016    TOTAL REDUCTION MAMMOPLASTY      TOTAL SHOULDER ARTHROPLASTY Left     TUBAL  LIGATION         Review of patient's allergies indicates:   Allergen Reactions    Colchicine analogues      diarrhea    Lisinopril      Other reaction(s): cough  Other reaction(s): cough    Losartan      Other reaction(s): blurry vision  Other reaction(s): blurry vision    Percocet [oxycodone-acetaminophen]      Pt gets pain from taking medicine.       Current Facility-Administered Medications   Medication Dose Route Frequency Provider Last Rate Last Admin    acetaminophen tablet 650 mg  650 mg Oral Q6H PRN Wendy Banegas PA-SY        atorvastatin tablet 80 mg  80 mg Oral Daily Wendy Banegas, PA-C   80 mg at 04/25/24 0917    dextrose 10% bolus 125 mL 125 mL  12.5 g Intravenous PRN Wendy Banegas, PA-C        dextrose 10% bolus 125 mL 125 mL  12.5 g Intravenous PRN Wendy Banegas, PA-C        dextrose 10% bolus 250 mL 250 mL  25 g Intravenous PRN Wendy Banegas, PA-C        dextrose 10% bolus 250 mL 250 mL  25 g Intravenous PRN Wendy Banegas PA-C        folic acid tablet 1 mg  1 mg Oral Daily Wendy Banegas PA-C   1 mg at 04/25/24 0917    furosemide injection 20 mg  20 mg Intravenous Q12H Wendy Banegas PA-C   20 mg at 04/25/24 0917    glucagon (human recombinant) injection 1 mg  1 mg Intramuscular PRN Wendy Banegas PA-C        glucagon (human recombinant) injection 1 mg  1 mg Intramuscular PRN Wendy Banegas PA-C        glucose chewable tablet 16 g  16 g Oral PRN Wendy Banegas, PA-C        glucose chewable tablet 16 g  16 g Oral PRN Wendy Banegas, PA-C        glucose chewable tablet 24 g  24 g Oral PRN Wendy Banegas, PA-C        glucose chewable tablet 24 g  24 g Oral PRN Wendy Banegas PA-SY        heparin (porcine) injection 5,000 Units  5,000 Units Subcutaneous Q8H Wendy Banegas PA-C   5,000 Units at 04/25/24 1501    insulin aspart U-100 pen 0-5 Units  0-5 Units Subcutaneous QID (AC + HS) PRN Wendy Banegas  YORDAN STAFFORD        LIDOcaine 5 % patch 1 patch  1 patch Transdermal Q24H Wendy Banegas PA-C   1 patch at 04/25/24 0541    melatonin tablet 6 mg  6 mg Oral Nightly PRN Wendy Banegas PA-C        metoprolol succinate (TOPROL-XL) 24 hr tablet 50 mg  50 mg Oral Daily Wendy Banegas PA-C   50 mg at 04/25/24 0917    naloxone 0.4 mg/mL injection 0.02 mg  0.02 mg Intravenous PRN Wendy Banegas PA-C        senna-docusate 8.6-50 mg per tablet 1 tablet  1 tablet Oral BID PRN Wendy Banegas PA-C        sodium chloride 0.9% flush 10 mL  10 mL Intravenous PRN Wendy Banegas PA-C   10 mL at 04/24/24 0600    sodium chloride 0.9% flush 10 mL  10 mL Intravenous Q8H Wendy Banegas PA-C   10 mL at 04/25/24 1502    traZODone tablet 50 mg  50 mg Oral Nightly PRN Wendy Banegas PA-C         Family History       Problem Relation (Age of Onset)    Breast cancer Maternal Cousin    Diabetes Brother    Heart disease Mother    Hypertension Mother, Brother, Brother          Tobacco Use    Smoking status: Never    Smokeless tobacco: Never   Substance and Sexual Activity    Alcohol use: No     Alcohol/week: 0.0 standard drinks of alcohol    Drug use: No    Sexual activity: Not Currently     Partners: Male     Birth control/protection: None       Review of Systems   Constitutional: Positive for malaise/fatigue. Negative for chills and fever.   HENT:  Negative for nosebleeds and tinnitus.    Eyes:  Negative for double vision, vision loss in left eye and vision loss in right eye.   Cardiovascular:  Positive for dyspnea on exertion, leg swelling, orthopnea and paroxysmal nocturnal dyspnea. Negative for chest pain, claudication, irregular heartbeat, near-syncope, palpitations and syncope.   Respiratory:  Negative for cough, hemoptysis, shortness of breath and wheezing.    Endocrine: Negative for cold intolerance and heat intolerance.   Hematologic/Lymphatic: Negative for bleeding problem. Does not  bruise/bleed easily.   Skin:  Negative for color change and rash.   Musculoskeletal:  Negative for back pain, falls, muscle weakness and myalgias.   Gastrointestinal:  Positive for bloating. Negative for abdominal pain, diarrhea, dysphagia, heartburn, hematemesis, hematochezia, hemorrhoids, jaundice, melena, nausea and vomiting.   Genitourinary:  Negative for dysuria and hematuria.   Neurological:  Positive for weakness. Negative for dizziness, focal weakness, headaches, light-headedness, loss of balance, numbness, tremors and vertigo.   Psychiatric/Behavioral:  Negative for altered mental status, depression and memory loss. The patient is not nervous/anxious.    Allergic/Immunologic: Negative for hives and persistent infections.       Objective:     Vital Signs (Most Recent):  Temp: 98.8 °F (37.1 °C) (04/25/24 1239)  Pulse: 77 (04/25/24 1512)  Resp: 18 (04/25/24 1239)  BP: (!) 142/75 (04/25/24 1239)  SpO2: 99 % (04/25/24 1239) Vital Signs (24h Range):  Temp:  [97.6 °F (36.4 °C)-98.8 °F (37.1 °C)] 98.8 °F (37.1 °C)  Pulse:  [71-89] 77  Resp:  [16-18] 18  SpO2:  [92 %-99 %] 99 %  BP: (117-174)/(70-82) 142/75     Weight: 98 kg (216 lb 0.8 oz)  Body mass index is 35.95 kg/m².    SpO2: 99 %         Intake/Output Summary (Last 24 hours) at 4/25/2024 1555  Last data filed at 4/25/2024 0547  Gross per 24 hour   Intake 150 ml   Output 300 ml   Net -150 ml       Lines/Drains/Airways       Peripheral Intravenous Line  Duration                  Peripheral IV - Single Lumen 04/24/24 0757 20 G Right Antecubital 1 day                    Physical Exam  Constitutional:       General: She is not in acute distress.     Appearance: Normal appearance. She is well-developed. She is not toxic-appearing or diaphoretic.   HENT:      Head: Normocephalic and atraumatic.      Nose: Nose normal.   Eyes:      General:         Right eye: No discharge.         Left eye: No discharge.      Conjunctiva/sclera:      Right eye: Right conjunctiva is  not injected.      Left eye: Left conjunctiva is not injected.      Pupils: Pupils are equal.      Right eye: Pupil is round.      Left eye: Pupil is round.   Neck:      Thyroid: No thyromegaly.      Vascular: No carotid bruit or JVD.   Cardiovascular:      Rate and Rhythm: Normal rate and regular rhythm. No extrasystoles are present.     Chest Wall: PMI is not displaced.      Pulses:           Radial pulses are 2+ on the right side and 2+ on the left side.        Femoral pulses are 2+ on the right side and 2+ on the left side.       Dorsalis pedis pulses are 1+ on the right side and 1+ on the left side.        Posterior tibial pulses are 1+ on the right side and 1+ on the left side.      Heart sounds: S1 normal and S2 normal.      Gallop present. S3 and S4 sounds present.   Pulmonary:      Effort: Pulmonary effort is normal.      Breath sounds: Examination of the right-lower field reveals rales. Examination of the left-lower field reveals rales. Rales present.   Abdominal:      General: There is distension.      Palpations: Abdomen is soft. There is shifting dullness.      Tenderness: There is no abdominal tenderness.   Musculoskeletal:      Cervical back: Neck supple.      Right lower leg: Swelling present. 2+ Edema present.      Left lower leg: Swelling present. 2+ Edema present.   Lymphadenopathy:      Head:      Right side of head: No submandibular adenopathy.      Left side of head: No submandibular adenopathy.      Cervical: No cervical adenopathy.   Skin:     General: Skin is warm and dry.      Findings: No rash.      Nails: There is no clubbing.   Neurological:      General: No focal deficit present.      Mental Status: She is alert and oriented to person, place, and time. She is not disoriented.      Cranial Nerves: No cranial nerve deficit.   Psychiatric:         Attention and Perception: Attention and perception normal.         Mood and Affect: Mood and affect normal.         Speech: Speech normal.          Behavior: Behavior normal.         Thought Content: Thought content normal.         Cognition and Memory: Cognition and memory normal.         Judgment: Judgment normal.         Current Medications:    Current Facility-Administered Medications   Medication Dose Route Frequency    atorvastatin  80 mg Oral Daily    folic acid  1 mg Oral Daily    furosemide (LASIX) injection  20 mg Intravenous Q12H    heparin (porcine)  5,000 Units Subcutaneous Q8H    LIDOcaine  1 patch Transdermal Q24H    metoprolol succinate  50 mg Oral Daily    sodium chloride 0.9%  10 mL Intravenous Q8H     Current Laboratory Results:    Recent Results (from the past 24 hour(s))   POCT glucose    Collection Time: 04/24/24  4:01 PM   Result Value Ref Range    POCT Glucose 142 (H) 70 - 110 mg/dL   POCT glucose    Collection Time: 04/24/24  7:48 PM   Result Value Ref Range    POCT Glucose 149 (H) 70 - 110 mg/dL   Basic metabolic panel    Collection Time: 04/25/24  5:26 AM   Result Value Ref Range    Sodium 146 (H) 136 - 145 mmol/L    Potassium 3.7 3.5 - 5.1 mmol/L    Chloride 111 (H) 95 - 110 mmol/L    CO2 25 23 - 29 mmol/L    Glucose 134 (H) 70 - 110 mg/dL    BUN 19 8 - 23 mg/dL    Creatinine 1.4 0.5 - 1.4 mg/dL    Calcium 9.3 8.7 - 10.5 mg/dL    Anion Gap 10 8 - 16 mmol/L    eGFR 41 (A) >60 mL/min/1.73 m^2   POCT glucose    Collection Time: 04/25/24  7:58 AM   Result Value Ref Range    POCT Glucose 145 (H) 70 - 110 mg/dL   POCT glucose    Collection Time: 04/25/24  1:00 PM   Result Value Ref Range    POCT Glucose 127 (H) 70 - 110 mg/dL     Current Imaging Results:    X-Ray Chest AP   Final Result   Abnormal      Increase in size of the cardiac silhouette.      This report was flagged in Epic as abnormal.         Electronically signed by: Dulce Maria Rodriguez   Date:    04/23/2024   Time:    18:48          4/24/2024: Echo:  Left Ventricle: The left ventricle is moderately dilated. Moderately increased wall thickness. There is mild concentric  hypertrophy. Severe global hypokinesis present. There is reduced systolic function. Ejection fraction by visual approximation is 25%. Grade II diastolic dysfunction. Elevated left ventricular filling pressure. Tissue Doppler velocity is reduced.  Right Ventricle: Mild right ventricular enlargement. Wall thickness is normal. Right ventricle wall motion has global hypokinesis. Systolic function is severely reduced.  Left Atrium: Left atrium is severely dilated.  Right Atrium: Right atrium is severely dilated.  Aortic Valve: The aortic valve is a trileaflet valve. There is mild aortic valve sclerosis. Mildly restricted motion.  Mitral Valve: There is moderate regurgitation with a centrally directed jet.  Tricuspid Valve: There is moderate to severe regurgitation with a centrally directed jet.  Pulmonary Artery: There is severe pulmonary hypertension. The estimated pulmonary artery systolic pressure is 83 mmHg.  IVC/SVC: Elevated venous pressure at 15 mmHg.  Pericardium: There is a small circumferential effusion.      ECG: SR.      Assessment and Plan:     Active Diagnoses:    Diagnosis Date Noted POA    PRINCIPAL PROBLEM:  Acute on chronic diastolic heart failure [I50.33] 10/19/2020 Yes    MDD (major depressive disorder), recurrent, in partial remission [F33.41] 04/24/2024 Yes    Hemiplegia affecting right dominant side [G81.91] 04/24/2024 Yes    CKD (chronic kidney disease) stage 3, GFR 30-59 ml/min [N18.30] 02/20/2018 Yes     Chronic    Thrombocytopenia [D69.6] 07/13/2017 Yes     Chronic    Type 2 diabetes mellitus with circulatory disorder, without long-term current use of insulin [E11.59] 03/01/2016 Yes     Chronic    Primary hypertension [I10] 07/31/2012 Yes     Chronic    Hyperlipidemia type II [E78.01] 07/31/2012 Yes     Chronic      Problems Resolved During this Admission:       Assessment and Plan:    Heart Failure, Systolic, Acute   4/24/2024: Presents fluid overloaded in HF.   4/24/2024: Echo: Moderately  dilated left ventricle with severe systolic dysfunction. EF 25%. Mild LVH. Moderate diastolic dysfunction. LVEDP elevated. Mildly enlarged RV with sever systolic dysfunction. Severely enlarged LA. Moderate MR. Moderate to severe TR. SPAP 83 mmHg. RA 15 mmHg. Small pericardial effusion.    Betablocker, CHANELL blockade, MCA & SGLT2i.   Diuresis.    2. Pulmonary Hypertension  4/24/2024: Echo: SPAP 83 mmHg.   Most certainly due to left heart disease.    3. History of Cerebrovascular Accident   2021: CVA: Left hemiplegia.    4. Hypertension   2005: Diagnosed.   At home been on metoprolol 50 mg Q24, nifedipine 90 mg Q24 and hctz 25 mg Q24.    5. Hypercholesterolemia   On atorvastatin 80 mg Q24.    6. Diabetes Mellitus, Type 2  2005: Diagnosed. Complications: CVA. Medications: Diet.    7. Severe Obesity   4/25/2024: Weight 98 kg. BMI 36.    8. Thrombocytopenia   4/23/2024: Plts 140.   Dr. Joao Howard.    9. Primary Care   Dr. Ashley Harrell.        VTE Risk Mitigation (From admission, onward)           Ordered     heparin (porcine) injection 5,000 Units  Every 8 hours         04/24/24 0206     IP VTE HIGH RISK PATIENT  Once         04/24/24 0206     Place sequential compression device  Until discontinued         04/24/24 0206     Place sequential compression device  Until discontinued         04/24/24 0148                    Thank you for your consult.     I will follow-up with patient. Please contact us if you have any additional questions.    Ismael Covarrubias MD  Cardiology   [facility] ([place])

## 2024-04-25 NOTE — SUBJECTIVE & OBJECTIVE
Interval History: Seen at bedside, reports breathing more easily this AM, weaned off O2. Still appears volume overloaded. Cardiology consulted, discussed echocardiogram findings with daughter at bedside as well via phone  Continue diuresis, appears volume overloaded    Review of Systems   Constitutional:  Positive for activity change.   HENT:  Negative for congestion and trouble swallowing.    Respiratory:  Positive for shortness of breath. Negative for cough.    Cardiovascular:  Positive for leg swelling. Negative for chest pain.   Gastrointestinal:  Negative for abdominal pain, diarrhea, nausea and vomiting.   Genitourinary:  Positive for frequency (since lasix). Negative for decreased urine volume, difficulty urinating, dysuria and hematuria.   Musculoskeletal:  Negative for back pain.   Neurological:  Negative for dizziness and headaches.     Objective:     Vital Signs (Most Recent):  Temp: 98.8 °F (37.1 °C) (04/25/24 1239)  Pulse: 80 (04/25/24 1239)  Resp: 18 (04/25/24 1239)  BP: (!) 142/75 (04/25/24 1239)  SpO2: 99 % (04/25/24 1239) Vital Signs (24h Range):  Temp:  [97.6 °F (36.4 °C)-98.8 °F (37.1 °C)] 98.8 °F (37.1 °C)  Pulse:  [71-89] 80  Resp:  [16-20] 18  SpO2:  [92 %-100 %] 99 %  BP: (117-174)/(70-82) 142/75     Weight: 98 kg (216 lb 0.8 oz)  Body mass index is 35.95 kg/m².    Intake/Output Summary (Last 24 hours) at 4/25/2024 1344  Last data filed at 4/25/2024 0547  Gross per 24 hour   Intake 160 ml   Output 600 ml   Net -440 ml         Physical Exam  Vitals and nursing note reviewed.   Constitutional:       General: She is not in acute distress.     Appearance: She is well-developed.   HENT:      Head: Normocephalic.   Neck:      Trachea: No tracheal deviation.   Cardiovascular:      Rate and Rhythm: Normal rate.   Pulmonary:      Effort: Pulmonary effort is normal. No respiratory distress.      Breath sounds: Rales (mid to lower lobes) present. No wheezing.   Abdominal:      General: Bowel sounds are  normal.      Palpations: Abdomen is soft.   Musculoskeletal:         General: Normal range of motion.      Cervical back: Normal range of motion and neck supple.      Right lower leg: Edema present.      Left lower leg: Edema present.   Skin:     General: Skin is warm and dry.   Neurological:      General: No focal deficit present.      Mental Status: She is alert.      Motor: No abnormal muscle tone.   Psychiatric:         Behavior: Behavior normal.             Significant Labs: All pertinent labs within the past 24 hours have been reviewed.    Significant Imaging: I have reviewed all pertinent imaging results/findings within the past 24 hours.

## 2024-04-26 LAB
ANION GAP SERPL CALC-SCNC: 10 MMOL/L (ref 8–16)
BUN SERPL-MCNC: 22 MG/DL (ref 8–23)
CALCIUM SERPL-MCNC: 9.5 MG/DL (ref 8.7–10.5)
CHLORIDE SERPL-SCNC: 109 MMOL/L (ref 95–110)
CO2 SERPL-SCNC: 27 MMOL/L (ref 23–29)
CREAT SERPL-MCNC: 1.3 MG/DL (ref 0.5–1.4)
EST. GFR  (NO RACE VARIABLE): 45 ML/MIN/1.73 M^2
GLUCOSE SERPL-MCNC: 127 MG/DL (ref 70–110)
POCT GLUCOSE: 149 MG/DL (ref 70–110)
POCT GLUCOSE: 173 MG/DL (ref 70–110)
POCT GLUCOSE: 175 MG/DL (ref 70–110)
POCT GLUCOSE: 181 MG/DL (ref 70–110)
POCT GLUCOSE: 182 MG/DL (ref 70–110)
POTASSIUM SERPL-SCNC: 3.6 MMOL/L (ref 3.5–5.1)
SODIUM SERPL-SCNC: 146 MMOL/L (ref 136–145)

## 2024-04-26 PROCEDURE — 25000242 PHARM REV CODE 250 ALT 637 W/ HCPCS: Performed by: INTERNAL MEDICINE

## 2024-04-26 PROCEDURE — A4216 STERILE WATER/SALINE, 10 ML: HCPCS | Performed by: PHYSICIAN ASSISTANT

## 2024-04-26 PROCEDURE — 25000003 PHARM REV CODE 250: Performed by: PHYSICIAN ASSISTANT

## 2024-04-26 PROCEDURE — 36415 COLL VENOUS BLD VENIPUNCTURE: CPT | Performed by: PHYSICIAN ASSISTANT

## 2024-04-26 PROCEDURE — 21400001 HC TELEMETRY ROOM

## 2024-04-26 PROCEDURE — 63600175 PHARM REV CODE 636 W HCPCS: Performed by: PHYSICIAN ASSISTANT

## 2024-04-26 PROCEDURE — 25000003 PHARM REV CODE 250: Performed by: INTERNAL MEDICINE

## 2024-04-26 PROCEDURE — 80048 BASIC METABOLIC PNL TOTAL CA: CPT | Performed by: PHYSICIAN ASSISTANT

## 2024-04-26 PROCEDURE — 99233 SBSQ HOSP IP/OBS HIGH 50: CPT | Mod: ,,, | Performed by: INTERNAL MEDICINE

## 2024-04-26 RX ORDER — POTASSIUM CHLORIDE 20 MEQ/1
40 TABLET, EXTENDED RELEASE ORAL EVERY 4 HOURS
Status: COMPLETED | OUTPATIENT
Start: 2024-04-26 | End: 2024-04-26

## 2024-04-26 RX ORDER — VALSARTAN 40 MG/1
40 TABLET ORAL DAILY
Status: DISCONTINUED | OUTPATIENT
Start: 2024-04-26 | End: 2024-04-29

## 2024-04-26 RX ORDER — ACETAMINOPHEN 500 MG
1000 TABLET ORAL ONCE
Status: COMPLETED | OUTPATIENT
Start: 2024-04-26 | End: 2024-04-26

## 2024-04-26 RX ADMIN — FUROSEMIDE 20 MG: 10 INJECTION, SOLUTION INTRAMUSCULAR; INTRAVENOUS at 09:04

## 2024-04-26 RX ADMIN — Medication 10 ML: at 09:04

## 2024-04-26 RX ADMIN — VALSARTAN 40 MG: 40 TABLET ORAL at 02:04

## 2024-04-26 RX ADMIN — SPIRONOLACTONE 25 MG: 25 TABLET, FILM COATED ORAL at 08:04

## 2024-04-26 RX ADMIN — FOLIC ACID 1 MG: 1 TABLET ORAL at 08:04

## 2024-04-26 RX ADMIN — TRAZODONE HYDROCHLORIDE 50 MG: 50 TABLET ORAL at 09:04

## 2024-04-26 RX ADMIN — Medication 10 ML: at 02:04

## 2024-04-26 RX ADMIN — ACETAMINOPHEN 1000 MG: 500 TABLET ORAL at 11:04

## 2024-04-26 RX ADMIN — LIDOCAINE 5% 1 PATCH: 700 PATCH TOPICAL at 05:04

## 2024-04-26 RX ADMIN — EMPAGLIFLOZIN 10 MG: 10 TABLET, FILM COATED ORAL at 08:04

## 2024-04-26 RX ADMIN — POTASSIUM CHLORIDE 40 MEQ: 1500 TABLET, EXTENDED RELEASE ORAL at 02:04

## 2024-04-26 RX ADMIN — Medication 10 ML: at 06:04

## 2024-04-26 RX ADMIN — HEPARIN SODIUM 5000 UNITS: 5000 INJECTION INTRAVENOUS; SUBCUTANEOUS at 05:04

## 2024-04-26 RX ADMIN — ACETAMINOPHEN 650 MG: 325 TABLET, FILM COATED ORAL at 09:04

## 2024-04-26 RX ADMIN — METOPROLOL SUCCINATE 50 MG: 50 TABLET, EXTENDED RELEASE ORAL at 08:04

## 2024-04-26 RX ADMIN — ATORVASTATIN CALCIUM 80 MG: 20 TABLET, FILM COATED ORAL at 08:04

## 2024-04-26 RX ADMIN — HEPARIN SODIUM 5000 UNITS: 5000 INJECTION INTRAVENOUS; SUBCUTANEOUS at 02:04

## 2024-04-26 RX ADMIN — HEPARIN SODIUM 5000 UNITS: 5000 INJECTION INTRAVENOUS; SUBCUTANEOUS at 09:04

## 2024-04-26 RX ADMIN — POTASSIUM CHLORIDE 40 MEQ: 1500 TABLET, EXTENDED RELEASE ORAL at 09:04

## 2024-04-26 NOTE — SUBJECTIVE & OBJECTIVE
Interval History: Seen at bedside, still with good urine output. Still appears overloaded    Review of Systems   Constitutional:  Positive for activity change.   Respiratory:  Positive for shortness of breath. Negative for cough.    Cardiovascular:  Positive for leg swelling. Negative for chest pain.   Gastrointestinal:  Negative for diarrhea, nausea and vomiting.   Genitourinary:  Positive for frequency (since lasix). Negative for difficulty urinating and dysuria.   Neurological:  Negative for dizziness and headaches.   Psychiatric/Behavioral:  Negative for agitation and confusion.      Objective:     Vital Signs (Most Recent):  Temp: 98 °F (36.7 °C) (04/26/24 1134)  Pulse: 76 (04/26/24 1134)  Resp: 18 (04/26/24 1134)  BP: (!) 153/75 (04/26/24 1134)  SpO2: 97 % (04/26/24 1134) Vital Signs (24h Range):  Temp:  [98 °F (36.7 °C)-99.2 °F (37.3 °C)] 98 °F (36.7 °C)  Pulse:  [75-92] 76  Resp:  [15-20] 18  SpO2:  [91 %-98 %] 97 %  BP: (136-169)/(73-78) 153/75     Weight: 98 kg (216 lb 0.8 oz)  Body mass index is 35.95 kg/m².    Intake/Output Summary (Last 24 hours) at 4/26/2024 1247  Last data filed at 4/26/2024 1222  Gross per 24 hour   Intake 150 ml   Output 1800 ml   Net -1650 ml         Physical Exam  Vitals and nursing note reviewed.   Constitutional:       General: She is not in acute distress.     Appearance: She is well-developed.   HENT:      Head: Normocephalic.   Neck:      Trachea: No tracheal deviation.   Cardiovascular:      Rate and Rhythm: Normal rate.   Pulmonary:      Effort: Pulmonary effort is normal. No respiratory distress.      Breath sounds: Rales (mid to lower lobes) present. No wheezing.   Musculoskeletal:         General: Normal range of motion.      Cervical back: Normal range of motion and neck supple.      Right lower leg: Edema present.      Left lower leg: Edema present.   Skin:     General: Skin is warm and dry.   Neurological:      General: No focal deficit present.      Mental Status:  She is alert.      Motor: No abnormal muscle tone.   Psychiatric:         Behavior: Behavior normal.             Significant Labs: All pertinent labs within the past 24 hours have been reviewed.    Significant Imaging: I have reviewed all pertinent imaging results/findings within the past 24 hours.

## 2024-04-26 NOTE — PROGRESS NOTES
[facility]  Cardiology  Progress Note    Patient Name: Wendy Viveros  MRN: [id number]  Admission Date: 4/23/2024  Hospital Length of Stay: 3 days  Code Status: Full Code   Attending Physician: Isaac Ruiz MD   Primary Care Physician: Ashley Harrell MD  Expected Discharge Date:   Principal Problem:Acute on chronic diastolic heart failure    Subjective:     Brief HPI:    Wendy Viveros is a 68 y.o.female with hypertension and diabetes mellitus type 2. She is severely obese. She suffered a cerebrovascular accident in 2021 causing right sided weakness. She has thrombocytopenia. Beginning early 4/2024 she has accumulated fluid - she has developed edema of her legs, the abdominal girth has increased and she has become short of breath at rest. She presented to the emergency room on 4/24/2024 and was admitted.    Hospital Course:     4/24/2024: Echo: Moderately dilated left ventricle with severe systolic dysfunction. EF 25%. Mild LVH. Moderate diastolic dysfunction. LVEDP elevated. Mildly enlarged RV with severe systolic dysfunction. Severely enlarged LA. Moderate MR. Moderate to severe TR. SPAP 83 mmHg. RA 15 mmHg. Small pericardial effusion.     Diuresis.    Initiation of HF regimen.    Interval History:     Breathing easier. Less edema.    Review of Systems   Constitutional: Positive for malaise/fatigue. Negative for chills and fever.   HENT:  Negative for nosebleeds.    Eyes:  Negative for vision loss in left eye and vision loss in right eye.   Cardiovascular:  Positive for dyspnea on exertion, leg swelling, orthopnea and paroxysmal nocturnal dyspnea. Negative for chest pain and palpitations.   Respiratory:  Positive for shortness of breath. Negative for cough, hemoptysis, sputum production and wheezing.    Hematologic/Lymphatic: Negative for bleeding problem. Does not bruise/bleed easily.   Skin:  Negative for color change and rash.   Musculoskeletal:  Negative for muscle weakness and  myalgias.   Gastrointestinal:  Positive for bloating. Negative for abdominal pain, heartburn, hematemesis, hematochezia, melena, nausea and vomiting.   Genitourinary:  Negative for hematuria.   Neurological:  Positive for weakness. Negative for dizziness, focal weakness, headaches, light-headedness and vertigo.   Psychiatric/Behavioral:  Negative for altered mental status. The patient is not nervous/anxious.    Allergic/Immunologic: Negative for persistent infections.     Objective:     Vital Signs (Most Recent):  Temp: 98 °F (36.7 °C) (04/26/24 1134)  Pulse: 76 (04/26/24 1134)  Resp: 18 (04/26/24 1134)  BP: (!) 153/75 (04/26/24 1134)  SpO2: 97 % (04/26/24 1134) Vital Signs (24h Range):  Temp:  [98 °F (36.7 °C)-99.2 °F (37.3 °C)] 98 °F (36.7 °C)  Pulse:  [75-92] 76  Resp:  [15-20] 18  SpO2:  [91 %-98 %] 97 %  BP: (136-169)/(73-78) 153/75     Weight: 98 kg (216 lb 0.8 oz)  Body mass index is 35.95 kg/m².    SpO2: 97 %         Intake/Output Summary (Last 24 hours) at 4/26/2024 1356  Last data filed at 4/26/2024 1222  Gross per 24 hour   Intake 150 ml   Output 1800 ml   Net -1650 ml       Lines/Drains/Airways       Peripheral Intravenous Line  Duration                  Peripheral IV - Single Lumen 04/24/24 0757 20 G Right Antecubital 2 days                    Physical Exam  Constitutional:       General: She is not in acute distress.     Appearance: Normal appearance. She is well-developed. She is not ill-appearing or toxic-appearing.   Eyes:      Conjunctiva/sclera:      Right eye: Right conjunctiva is not injected. No hemorrhage.     Left eye: Left conjunctiva is not injected. No hemorrhage.  Neck:      Vascular: No JVD.   Cardiovascular:      Rate and Rhythm: Normal rate and regular rhythm.      Heart sounds: S1 normal and S2 normal. No murmur heard.     Gallop present. S3 and S4 sounds present.   Pulmonary:      Effort: Pulmonary effort is normal.      Breath sounds: Normal breath sounds.   Chest:      Chest wall:  No swelling or tenderness.   Abdominal:      Tenderness: There is no abdominal tenderness.   Musculoskeletal:      Right lower leg: Swelling present. 2+ Pitting Edema present.      Left lower leg: Swelling present. 2+ Pitting Edema present.   Skin:     General: Skin is warm and dry.      Findings: No rash.   Neurological:      General: No focal deficit present.      Mental Status: She is alert and oriented to person, place, and time. She is not disoriented.   Psychiatric:         Attention and Perception: Attention normal.         Mood and Affect: Mood normal.         Speech: Speech normal.         Behavior: Behavior normal.         Thought Content: Thought content normal.         Cognition and Memory: Cognition and memory normal.         Judgment: Judgment normal.         Current Medications:    Current Facility-Administered Medications   Medication Dose Route Frequency    atorvastatin  80 mg Oral Daily    empagliflozin  10 mg Oral Daily    folic acid  1 mg Oral Daily    furosemide (LASIX) injection  20 mg Intravenous Q12H    heparin (porcine)  5,000 Units Subcutaneous Q8H    LIDOcaine  1 patch Transdermal Q24H    metoprolol succinate  50 mg Oral Daily    potassium chloride  40 mEq Oral Q4H    sodium chloride 0.9%  10 mL Intravenous Q8H    spironolactone  25 mg Oral Daily     Current Laboratory Results:    Recent Results (from the past 24 hour(s))   POCT glucose    Collection Time: 04/25/24  5:27 PM   Result Value Ref Range    POCT Glucose 276 (H) 70 - 110 mg/dL   POCT glucose    Collection Time: 04/26/24 12:51 AM   Result Value Ref Range    POCT Glucose 149 (H) 70 - 110 mg/dL   Basic metabolic panel    Collection Time: 04/26/24  5:57 AM   Result Value Ref Range    Sodium 146 (H) 136 - 145 mmol/L    Potassium 3.6 3.5 - 5.1 mmol/L    Chloride 109 95 - 110 mmol/L    CO2 27 23 - 29 mmol/L    Glucose 127 (H) 70 - 110 mg/dL    BUN 22 8 - 23 mg/dL    Creatinine 1.3 0.5 - 1.4 mg/dL    Calcium 9.5 8.7 - 10.5 mg/dL    Anion  Gap 10 8 - 16 mmol/L    eGFR 45 (A) >60 mL/min/1.73 m^2   POCT glucose    Collection Time: 04/26/24  7:51 AM   Result Value Ref Range    POCT Glucose 175 (H) 70 - 110 mg/dL   POCT glucose    Collection Time: 04/26/24 11:35 AM   Result Value Ref Range    POCT Glucose 173 (H) 70 - 110 mg/dL     Current Imaging Results:    X-Ray Chest AP   Final Result   Abnormal      Increase in size of the cardiac silhouette.      This report was flagged in Epic as abnormal.         Electronically signed by: Dulce Maria Rodriguez   Date:    04/23/2024   Time:    18:48          4/24/2024: Echo:  Left Ventricle: The left ventricle is moderately dilated. Moderately increased wall thickness. There is mild concentric hypertrophy. Severe global hypokinesis present. There is reduced systolic function. Ejection fraction by visual approximation is 25%. Grade II diastolic dysfunction. Elevated left ventricular filling pressure. Tissue Doppler velocity is reduced.  Right Ventricle: Mild right ventricular enlargement. Wall thickness is normal. Right ventricle wall motion has global hypokinesis. Systolic function is severely reduced.  Left Atrium: Left atrium is severely dilated.  Right Atrium: Right atrium is severely dilated.  Aortic Valve: The aortic valve is a trileaflet valve. There is mild aortic valve sclerosis. Mildly restricted motion.  Mitral Valve: There is moderate regurgitation with a centrally directed jet.  Tricuspid Valve: There is moderate to severe regurgitation with a centrally directed jet.  Pulmonary Artery: There is severe pulmonary hypertension. The estimated pulmonary artery systolic pressure is 83 mmHg.  IVC/SVC: Elevated venous pressure at 15 mmHg.  Pericardium: There is a small circumferential effusion.        ECG: SR.    Assessment and Plan:     Problem List:    Active Diagnoses:    Diagnosis Date Noted POA    PRINCIPAL PROBLEM:  Acute on chronic diastolic heart failure [I50.33] 10/19/2020 Yes    MDD (major depressive  "disorder), recurrent, in partial remission [F33.41] 04/24/2024 Yes    Hemiplegia affecting right dominant side [G81.91] 04/24/2024 Yes    CKD (chronic kidney disease) stage 3, GFR 30-59 ml/min [N18.30] 02/20/2018 Yes     Chronic    Thrombocytopenia [D69.6] 07/13/2017 Yes     Chronic    Type 2 diabetes mellitus with circulatory disorder, without long-term current use of insulin [E11.59] 03/01/2016 Yes     Chronic    Primary hypertension [I10] 07/31/2012 Yes     Chronic    Hyperlipidemia type II [E78.01] 07/31/2012 Yes     Chronic      Problems Resolved During this Admission:     Assessment and Plan:     Heart Failure, Systolic, Acute              4/24/2024: Presents fluid overloaded in HF.              4/24/2024: Echo: Moderately dilated left ventricle with severe systolic dysfunction. EF 25%. Mild LVH. Moderate diastolic dysfunction. LVEDP elevated. Mildly enlarged RV with sever systolic dysfunction. Severely enlarged LA. Moderate MR. Moderate to severe TR. SPAP 83 mmHg. RA 15 mmHg. Small pericardial effusion.    Betablocker, CHANELL blockade, MCA & SGLT2i.   On metoprolol 50 mg Q24, empagliflozin 10 mg Q24 and spironolactone 25 mg Q24.   On furosemide 20 mg iv "Q12"              Continue diuresis.   4/26/2024: Add valsartan 40 mg Q24. She appears to have had an reaction to ACEI in the past. Accordingly no ACEI or ARNI.      2. Pulmonary Hypertension  4/24/2024: Echo: SPAP 83 mmHg.   Most certainly due to left heart disease.     3. 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     4. Hypertension              2005: Diagnosed.              At home been on metoprolol 50 mg Q24, nifedipine 90 mg Q24 and hctz 25 mg Q24.     5. Hypercholesterolemia              On atorvastatin 80 mg Q24.     6. Diabetes Mellitus, Type 2  2005: Diagnosed. Complications: CVA. Medications: Diet.     7. Severe Obesity              4/25/2024: Weight 98 kg. BMI 36.     8. Thrombocytopenia              4/23/2024: Plts " 140.              Dr. oJao Howard.     9. Primary Care              Dr. Ashley Harrell.     VTE Risk Mitigation (From admission, onward)           Ordered     heparin (porcine) injection 5,000 Units  Every 8 hours         04/24/24 0206     IP VTE HIGH RISK PATIENT  Once         04/24/24 0206     Place sequential compression device  Until discontinued         04/24/24 0206     Place sequential compression device  Until discontinued         04/24/24 0148                    Ismael Covarrubias MD  Cardiology  [facility] ([place])

## 2024-04-26 NOTE — PLAN OF CARE
Problem: Adult Inpatient Plan of Care  Goal: Plan of Care Review  Outcome: Progressing  Goal: Patient-Specific Goal (Individualized)  Outcome: Progressing  Goal: Absence of Hospital-Acquired Illness or Injury  Outcome: Progressing  Goal: Optimal Comfort and Wellbeing  Outcome: Progressing  Goal: Readiness for Transition of Care  Outcome: Progressing     Problem: Diabetes Comorbidity  Goal: Blood Glucose Level Within Targeted Range  Outcome: Progressing     Problem: Wound  Goal: Optimal Coping  Outcome: Progressing  Goal: Optimal Functional Ability  Outcome: Progressing  Goal: Absence of Infection Signs and Symptoms  Outcome: Progressing  Goal: Improved Oral Intake  Outcome: Progressing  Goal: Optimal Pain Control and Function  Outcome: Progressing  Goal: Skin Health and Integrity  Outcome: Progressing  Goal: Optimal Wound Healing  Outcome: Progressing     Problem: Skin Injury Risk Increased  Goal: Skin Health and Integrity  Outcome: Progressing

## 2024-04-26 NOTE — ASSESSMENT & PLAN NOTE
Creatine stable for now. BMP reviewed- noted Estimated Creatinine Clearance: 47.3 mL/min (based on SCr of 1.3 mg/dL). according to latest data. Based on current GFR, CKD stage is stage 3 - GFR 30-59.  Monitor UOP and serial BMP and adjust therapy as needed. Renally dose meds. Avoid nephrotoxic medications and procedures.  Nephro referral to establish care at [place]

## 2024-04-26 NOTE — PLAN OF CARE
Plan of care reviewed with pt. Pt AAOx4, VS as charted. Patient on RA. Purposeful rounding for pt care and safety. No reports of NV, pain treated with scheduled medications. No falls or injuries reported during this shift. Skin integrity as charted. External catheter in place draining well. PIV intact, free of irritation. Safety precautions maintained during shift- bed in low position, call light in reach, SR up x2, personal belongings in reach.         Problem: Adult Inpatient Plan of Care  Goal: Plan of Care Review  Outcome: Progressing  Goal: Patient-Specific Goal (Individualized)  Outcome: Progressing  Goal: Absence of Hospital-Acquired Illness or Injury  Outcome: Progressing  Goal: Optimal Comfort and Wellbeing  Outcome: Progressing  Goal: Readiness for Transition of Care  Outcome: Progressing     Problem: Diabetes Comorbidity  Goal: Blood Glucose Level Within Targeted Range  Outcome: Progressing     Problem: Wound  Goal: Optimal Coping  Outcome: Progressing  Goal: Optimal Functional Ability  Outcome: Progressing  Goal: Absence of Infection Signs and Symptoms  Outcome: Progressing  Goal: Improved Oral Intake  Outcome: Progressing  Goal: Optimal Pain Control and Function  Outcome: Progressing  Goal: Skin Health and Integrity  Outcome: Progressing  Goal: Optimal Wound Healing  Outcome: Progressing     Problem: Skin Injury Risk Increased  Goal: Skin Health and Integrity  Outcome: Progressing

## 2024-04-26 NOTE — PROGRESS NOTES
[facility], [place] Medicine  Progress Note    Patient Name: Wendy Viveros  MRN: [id number]  Patient Class: IP- Inpatient   Admission Date: 4/23/2024  Length of Stay: 3 days  Attending Physician: Isaac Ruiz MD  Primary Care Provider: Ashley Harrell MD        Subjective:     Principal Problem:Acute on chronic diastolic heart failure        HPI:  Ms. Wendy Viveros is a 68 y.o. female, with PMH of HTN, T2DM, CKD-3, Anemia, chronic gout, prior CVA w/ right-sided weakness in 2021, who presented to [facility] [place] [place] ED on 4/23/24 due to progressive shortness of breath x 2 weeks. She notes associated b/l lower extremity edema and it worsens with exertion, she notes difficulty walking 2/2 leg swelling, she spends most of her time in bed, but her shortness of breath is worse when she is laying flat. She uses 4 pillows to prop herself up while sleeping. She states she used to take lasix, but does not currently. She states she has not followed with a doctor in recent years. She denied chest pain. She was evaluated in the ED with labs showing anemia with H&H of 9.4/27.6, and PLT of 140K. A metabolic panel showed elevated total bilirubin of 3.9. A BNP was elevated at 2536, with troponin of 0.051. A CXR showed increaed cardiac silhouette, but did not should pulmonary vascular congestion. She was treated in the ED with 40 mg IV lasix. She was admitted to inpatient status.         Overview/Hospital Course:  Wendy Viveros was admitted for acute CHF, CXR with cardiomegaly and BNP 2536. Started on lasix 20mg IVP with good urine output. Continue to wean supplemental O2. TTE with cCHFrEF 25%. Cardiology consulted, appreciate following along. Currently net -4L    Cardiology f/u at [place] as well as nephro referral for CKD, HHC and [facility] care at home    Interval History: Seen at bedside, still with good urine output. Still appears overloaded    Review of Systems   Constitutional:  Positive for activity change.    Respiratory:  Positive for shortness of breath. Negative for cough.    Cardiovascular:  Positive for leg swelling. Negative for chest pain.   Gastrointestinal:  Negative for diarrhea, nausea and vomiting.   Genitourinary:  Positive for frequency (since lasix). Negative for difficulty urinating and dysuria.   Neurological:  Negative for dizziness and headaches.   Psychiatric/Behavioral:  Negative for agitation and confusion.      Objective:     Vital Signs (Most Recent):  Temp: 98 °F (36.7 °C) (04/26/24 1134)  Pulse: 76 (04/26/24 1134)  Resp: 18 (04/26/24 1134)  BP: (!) 153/75 (04/26/24 1134)  SpO2: 97 % (04/26/24 1134) Vital Signs (24h Range):  Temp:  [98 °F (36.7 °C)-99.2 °F (37.3 °C)] 98 °F (36.7 °C)  Pulse:  [75-92] 76  Resp:  [15-20] 18  SpO2:  [91 %-98 %] 97 %  BP: (136-169)/(73-78) 153/75     Weight: 98 kg (216 lb 0.8 oz)  Body mass index is 35.95 kg/m².    Intake/Output Summary (Last 24 hours) at 4/26/2024 1247  Last data filed at 4/26/2024 1222  Gross per 24 hour   Intake 150 ml   Output 1800 ml   Net -1650 ml         Physical Exam  Vitals and nursing note reviewed.   Constitutional:       General: She is not in acute distress.     Appearance: She is well-developed.   HENT:      Head: Normocephalic.   Neck:      Trachea: No tracheal deviation.   Cardiovascular:      Rate and Rhythm: Normal rate.   Pulmonary:      Effort: Pulmonary effort is normal. No respiratory distress.      Breath sounds: Rales (mid to lower lobes) present. No wheezing.   Musculoskeletal:         General: Normal range of motion.      Cervical back: Normal range of motion and neck supple.      Right lower leg: Edema present.      Left lower leg: Edema present.   Skin:     General: Skin is warm and dry.   Neurological:      General: No focal deficit present.      Mental Status: She is alert.      Motor: No abnormal muscle tone.   Psychiatric:         Behavior: Behavior normal.             Significant Labs: All pertinent labs within the  past 24 hours have been reviewed.    Significant Imaging: I have reviewed all pertinent imaging results/findings within the past 24 hours.    Assessment/Plan:      * Acute on chronic diastolic heart failure  - Ms. Wendy Viveros presents with shortness of breath  - she has b/l LE swelling, orthopnea, HENRY   - BNP is elevated w/ elevated troponin   - CXR without pulmonary edema but does show increased cardiac silhouette    - s/p 20 mg IV lasix   - continue lasix 20mg IVP   - CHF pathways initiated   - trend troponin : flat  - last EF was 3/2/16 with ED 52%   - repeat TTE:  Grade II ddfx, rEF 25%, pulm HTN 83mmHg, CVP 15mmgHg  - net - 4L  - cardiology consulted, following  - will need fu at [place]        MDD (major depressive disorder), recurrent, in partial remission  - continue home meds      Hemiplegia affecting right dominant side   This patient has Chronic right hemiplegia due to stroke. Continue all standard measures for pressure injury prevention and consult wound care for any wounds (chronic or acute).    CKD (chronic kidney disease) stage 3, GFR 30-59 ml/min  Creatine stable for now. BMP reviewed- noted Estimated Creatinine Clearance: 47.3 mL/min (based on SCr of 1.3 mg/dL). according to latest data. Based on current GFR, CKD stage is stage 3 - GFR 30-59.  Monitor UOP and serial BMP and adjust therapy as needed. Renally dose meds. Avoid nephrotoxic medications and procedures.  Nephro referral to establish care at [place]    Thrombocytopenia  - improved compared to baseline   - no active bleeding   - monitor daily   - Will transfuse if platelet count is <50k (if undergoing surgical procedure or have active bleeding). Hold DVT prophylaxis if platelets are <50k. The patient's platelet results have been reviewed and are listed below.  Recent Labs   Lab 04/23/24  1805   *         Type 2 diabetes mellitus with circulatory disorder, without long-term current use of insulin  Patient's FSGs are controlled on current  "medication regimen.  Last A1c reviewed-   Lab Results   Component Value Date    HGBA1C 4.6 11/20/2023     Most recent fingerstick glucose reviewed-   No results for input(s): "POCTGLUCOSE" in the last 24 hours.  Current correctional scale  Low  Maintain anti-hyperglycemic dose as follows-   Antihyperglycemics (From admission, onward)      Start     Stop Route Frequency Ordered    04/24/24 0420  insulin aspart U-100 pen 0-5 Units         -- SubQ Before meals & nightly PRN 04/24/24 0320          Hold Oral hypoglycemics while patient is in the hospital.        Hyperlipidemia type II  - continue statin     Primary hypertension  Chronic, controlled. Latest blood pressure and vitals reviewed-     Temp:  [97.9 °F (36.6 °C)-98.2 °F (36.8 °C)]   Pulse:  []   Resp:  [17-23]   BP: (157-179)/(79-98)   SpO2:  [94 %-100 %] .   Home meds for hypertension were reviewed and noted below.   Hypertension Medications               hydroCHLOROthiazide (HYDRODIURIL) 25 MG tablet Take 1 tablet (25 mg total) by mouth once daily.    metoprolol succinate (TOPROL-XL) 50 MG 24 hr tablet Take 50 mg by mouth once daily.    NIFEdipine (PROCARDIA-XL) 90 MG (OSM) 24 hr tablet Take 1 tablet (90 mg total) by mouth 2 (two) times a day.            While in the hospital, will manage blood pressure as follows; Continue home antihypertensive regimen    Will utilize p.r.n. blood pressure medication only if patient's blood pressure greater than 180/110 and she develops symptoms such as worsening chest pain or shortness of breath.      VTE Risk Mitigation (From admission, onward)           Ordered     heparin (porcine) injection 5,000 Units  Every 8 hours         04/24/24 0206     IP VTE HIGH RISK PATIENT  Once         04/24/24 0206     Place sequential compression device  Until discontinued         04/24/24 0206     Place sequential compression device  Until discontinued         04/24/24 0148                    Discharge Planning   EL:      Code " Status: Full Code   Is the patient medically ready for discharge?:     Reason for patient still in hospital (select all that apply): Patient trending condition  Discharge Plan A: Home with family, Home                  Corrine Delgadillo PA-C  Department of Hospital Medicine   [facility], [place], operated by [facility] ([place])

## 2024-04-26 NOTE — ASSESSMENT & PLAN NOTE
- Ms. Wendy Viveros presents with shortness of breath  - she has b/l LE swelling, orthopnea, HENRY   - BNP is elevated w/ elevated troponin   - CXR without pulmonary edema but does show increased cardiac silhouette    - s/p 20 mg IV lasix   - continue lasix 20mg IVP   - CHF pathways initiated   - trend troponin : flat  - last EF was 3/2/16 with ED 52%   - repeat TTE:  Grade II ddfx, rEF 25%, pulm HTN 83mmHg, CVP 15mmgHg  - net - 4L  - cardiology consulted, following  - will need fu at [place]

## 2024-04-26 NOTE — PLAN OF CARE
I certify I provided patient choice and a list to the patient/family of [facility].  Patient/Family signed Patient's Choice Disclosure Form choosing the following: first available   Sw sent out referral via careport.

## 2024-04-27 LAB
ANION GAP SERPL CALC-SCNC: 8 MMOL/L (ref 8–16)
BUN SERPL-MCNC: 24 MG/DL (ref 8–23)
CALCIUM SERPL-MCNC: 9.1 MG/DL (ref 8.7–10.5)
CHLORIDE SERPL-SCNC: 107 MMOL/L (ref 95–110)
CO2 SERPL-SCNC: 27 MMOL/L (ref 23–29)
CREAT SERPL-MCNC: 1.5 MG/DL (ref 0.5–1.4)
EST. GFR  (NO RACE VARIABLE): 37 ML/MIN/1.73 M^2
GLUCOSE SERPL-MCNC: 148 MG/DL (ref 70–110)
POCT GLUCOSE: 114 MG/DL (ref 70–110)
POCT GLUCOSE: 126 MG/DL (ref 70–110)
POCT GLUCOSE: 162 MG/DL (ref 70–110)
POTASSIUM SERPL-SCNC: 4.1 MMOL/L (ref 3.5–5.1)
SODIUM SERPL-SCNC: 142 MMOL/L (ref 136–145)

## 2024-04-27 PROCEDURE — 25000003 PHARM REV CODE 250: Performed by: INTERNAL MEDICINE

## 2024-04-27 PROCEDURE — 63600175 PHARM REV CODE 636 W HCPCS: Performed by: PHYSICIAN ASSISTANT

## 2024-04-27 PROCEDURE — 25000242 PHARM REV CODE 250 ALT 637 W/ HCPCS: Performed by: INTERNAL MEDICINE

## 2024-04-27 PROCEDURE — A4216 STERILE WATER/SALINE, 10 ML: HCPCS | Performed by: PHYSICIAN ASSISTANT

## 2024-04-27 PROCEDURE — 94761 N-INVAS EAR/PLS OXIMETRY MLT: CPT

## 2024-04-27 PROCEDURE — 36415 COLL VENOUS BLD VENIPUNCTURE: CPT | Performed by: PHYSICIAN ASSISTANT

## 2024-04-27 PROCEDURE — 21400001 HC TELEMETRY ROOM

## 2024-04-27 PROCEDURE — 80048 BASIC METABOLIC PNL TOTAL CA: CPT | Performed by: PHYSICIAN ASSISTANT

## 2024-04-27 PROCEDURE — 25000003 PHARM REV CODE 250: Performed by: PHYSICIAN ASSISTANT

## 2024-04-27 PROCEDURE — 99233 SBSQ HOSP IP/OBS HIGH 50: CPT | Mod: ,,, | Performed by: INTERNAL MEDICINE

## 2024-04-27 RX ADMIN — SPIRONOLACTONE 25 MG: 25 TABLET, FILM COATED ORAL at 08:04

## 2024-04-27 RX ADMIN — VALSARTAN 40 MG: 40 TABLET ORAL at 08:04

## 2024-04-27 RX ADMIN — FUROSEMIDE 20 MG: 10 INJECTION, SOLUTION INTRAMUSCULAR; INTRAVENOUS at 08:04

## 2024-04-27 RX ADMIN — ATORVASTATIN CALCIUM 80 MG: 20 TABLET, FILM COATED ORAL at 08:04

## 2024-04-27 RX ADMIN — FUROSEMIDE 20 MG: 10 INJECTION, SOLUTION INTRAMUSCULAR; INTRAVENOUS at 09:04

## 2024-04-27 RX ADMIN — EMPAGLIFLOZIN 10 MG: 10 TABLET, FILM COATED ORAL at 08:04

## 2024-04-27 RX ADMIN — METOPROLOL SUCCINATE 50 MG: 50 TABLET, EXTENDED RELEASE ORAL at 08:04

## 2024-04-27 RX ADMIN — LIDOCAINE 5% 1 PATCH: 700 PATCH TOPICAL at 05:04

## 2024-04-27 RX ADMIN — HEPARIN SODIUM 5000 UNITS: 5000 INJECTION INTRAVENOUS; SUBCUTANEOUS at 02:04

## 2024-04-27 RX ADMIN — FOLIC ACID 1 MG: 1 TABLET ORAL at 08:04

## 2024-04-27 RX ADMIN — Medication 10 ML: at 05:04

## 2024-04-27 RX ADMIN — HEPARIN SODIUM 5000 UNITS: 5000 INJECTION INTRAVENOUS; SUBCUTANEOUS at 05:04

## 2024-04-27 RX ADMIN — Medication 10 ML: at 09:04

## 2024-04-27 RX ADMIN — HEPARIN SODIUM 5000 UNITS: 5000 INJECTION INTRAVENOUS; SUBCUTANEOUS at 09:04

## 2024-04-27 NOTE — PROGRESS NOTES
Patient with bottle of root beer at bedside along with pitcher of water, a cup of tea and cup of water. Educated on importance of maintaining 1.5 L fluid restriction during hospital stay and informing nurse when going to restroom so we can record proper output. Patient verbalized understanding and this RN removed pitcher of water from room.

## 2024-04-27 NOTE — PROGRESS NOTES
[facility] ([place])  Cardiology  Progress Note    Patient Name: Wendy Viveros  MRN: [id number]  Admission Date: 4/23/2024  Hospital Length of Stay: 4 days  Code Status: Full Code   Attending Physician: Isaac Ruiz MD   Primary Care Physician: Ashley Harrell MD  Expected Discharge Date:   Principal Problem:Acute on chronic diastolic heart failure    Subjective:     Brief HPI:    Wendy Viveros is a 68 y.o.female with hypertension and diabetes mellitus type 2. She is severely obese. She suffered a cerebrovascular accident in 2021 causing right sided weakness. She has thrombocytopenia. Beginning early 4/2024 she has accumulated fluid - she has developed edema of her legs, the abdominal girth has increased and she has become short of breath at rest. She presented to the emergency room on 4/24/2024 and was admitted.    Hospital Course:     4/24/2024: Echo: Moderately dilated left ventricle with severe systolic dysfunction. EF 25%. Mild LVH. Moderate diastolic dysfunction. LVEDP elevated. Mildly enlarged RV with severe systolic dysfunction. Severely enlarged LA. Moderate MR. Moderate to severe TR. SPAP 83 mmHg. RA 15 mmHg. Small pericardial effusion.     Diuresis.    Initiation of HF regimen.    Interval History:     Breathing easier.     Less but still severe edema.    Review of Systems   Constitutional: Positive for malaise/fatigue. Negative for chills and fever.   HENT:  Negative for nosebleeds.    Eyes:  Negative for vision loss in left eye and vision loss in right eye.   Cardiovascular:  Positive for dyspnea on exertion, leg swelling, orthopnea and paroxysmal nocturnal dyspnea. Negative for chest pain and palpitations.   Respiratory:  Positive for shortness of breath. Negative for cough, hemoptysis, sputum production and wheezing.    Hematologic/Lymphatic: Negative for bleeding problem. Does not bruise/bleed easily.   Skin:  Negative for color change and rash.   Musculoskeletal:  Negative for  muscle weakness and myalgias.   Gastrointestinal:  Positive for bloating. Negative for abdominal pain, heartburn, hematemesis, hematochezia, melena, nausea and vomiting.   Genitourinary:  Negative for hematuria.   Neurological:  Positive for weakness. Negative for dizziness, focal weakness, headaches, light-headedness and vertigo.   Psychiatric/Behavioral:  Negative for altered mental status. The patient is not nervous/anxious.    Allergic/Immunologic: Negative for persistent infections.     Objective:     Vital Signs (Most Recent):  Temp: 98 °F (36.7 °C) (04/27/24 1133)  Pulse: 68 (04/27/24 1133)  Resp: 18 (04/27/24 1133)  BP: 135/88 (04/27/24 1133)  SpO2: 97 % (04/27/24 1133) Vital Signs (24h Range):  Temp:  [97.8 °F (36.6 °C)-98.7 °F (37.1 °C)] 98 °F (36.7 °C)  Pulse:  [68-90] 68  Resp:  [16-18] 18  SpO2:  [93 %-99 %] 97 %  BP: (131-171)/(70-88) 135/88     Weight: 98 kg (216 lb 0.8 oz)  Body mass index is 35.95 kg/m².    SpO2: 97 %         Intake/Output Summary (Last 24 hours) at 4/27/2024 1313  Last data filed at 4/27/2024 1058  Gross per 24 hour   Intake 320 ml   Output 900 ml   Net -580 ml       Lines/Drains/Airways       Drain  Duration             Female External Urinary Catheter w/ Suction 04/26/24 1845 <1 day              Peripheral Intravenous Line  Duration                  Peripheral IV - Single Lumen 04/24/24 0757 20 G Right Antecubital 3 days                    Physical Exam  Constitutional:       General: She is not in acute distress.     Appearance: Normal appearance. She is well-developed. She is not ill-appearing or toxic-appearing.   Eyes:      Conjunctiva/sclera:      Right eye: Right conjunctiva is not injected. No hemorrhage.     Left eye: Left conjunctiva is not injected. No hemorrhage.  Neck:      Vascular: No JVD.   Cardiovascular:      Rate and Rhythm: Normal rate and regular rhythm.      Heart sounds: S1 normal and S2 normal. No murmur heard.     Gallop present. S3 and S4 sounds present.    Pulmonary:      Effort: Pulmonary effort is normal.      Breath sounds: Normal breath sounds.   Chest:      Chest wall: No swelling or tenderness.   Abdominal:      Tenderness: There is no abdominal tenderness.   Musculoskeletal:      Right lower leg: Swelling present. 2+ Pitting Edema present.      Left lower leg: Swelling present. 2+ Pitting Edema present.   Skin:     General: Skin is warm and dry.      Findings: No rash.   Neurological:      General: No focal deficit present.      Mental Status: She is alert and oriented to person, place, and time. She is not disoriented.   Psychiatric:         Attention and Perception: Attention normal.         Mood and Affect: Mood normal.         Speech: Speech normal.         Behavior: Behavior normal.         Thought Content: Thought content normal.         Cognition and Memory: Cognition and memory normal.         Judgment: Judgment normal.         Current Medications:    Current Facility-Administered Medications   Medication Dose Route Frequency    atorvastatin  80 mg Oral Daily    empagliflozin  10 mg Oral Daily    folic acid  1 mg Oral Daily    furosemide (LASIX) injection  20 mg Intravenous Q12H    heparin (porcine)  5,000 Units Subcutaneous Q8H    LIDOcaine  1 patch Transdermal Q24H    metoprolol succinate  50 mg Oral Daily    sodium chloride 0.9%  10 mL Intravenous Q8H    spironolactone  25 mg Oral Daily    valsartan  40 mg Oral Daily     Current Laboratory Results:    Recent Results (from the past 24 hour(s))   POCT glucose    Collection Time: 04/26/24  4:45 PM   Result Value Ref Range    POCT Glucose 182 (H) 70 - 110 mg/dL   POCT glucose    Collection Time: 04/26/24  8:39 PM   Result Value Ref Range    POCT Glucose 181 (H) 70 - 110 mg/dL   Basic metabolic panel    Collection Time: 04/27/24  5:16 AM   Result Value Ref Range    Sodium 142 136 - 145 mmol/L    Potassium 4.1 3.5 - 5.1 mmol/L    Chloride 107 95 - 110 mmol/L    CO2 27 23 - 29 mmol/L    Glucose 148 (H) 70  - 110 mg/dL    BUN 24 (H) 8 - 23 mg/dL    Creatinine 1.5 (H) 0.5 - 1.4 mg/dL    Calcium 9.1 8.7 - 10.5 mg/dL    Anion Gap 8 8 - 16 mmol/L    eGFR 37 (A) >60 mL/min/1.73 m^2   POCT glucose    Collection Time: 04/27/24  7:56 AM   Result Value Ref Range    POCT Glucose 126 (H) 70 - 110 mg/dL   POCT glucose    Collection Time: 04/27/24 11:34 AM   Result Value Ref Range    POCT Glucose 162 (H) 70 - 110 mg/dL     Current Imaging Results:    X-Ray Chest AP   Final Result   Abnormal      Increase in size of the cardiac silhouette.      This report was flagged in Epic as abnormal.         Electronically signed by: Dulce Maria Rodriguez   Date:    04/23/2024   Time:    18:48          4/24/2024: Echo:  Left Ventricle: The left ventricle is moderately dilated. Moderately increased wall thickness. There is mild concentric hypertrophy. Severe global hypokinesis present. There is reduced systolic function. Ejection fraction by visual approximation is 25%. Grade II diastolic dysfunction. Elevated left ventricular filling pressure. Tissue Doppler velocity is reduced.  Right Ventricle: Mild right ventricular enlargement. Wall thickness is normal. Right ventricle wall motion has global hypokinesis. Systolic function is severely reduced.  Left Atrium: Left atrium is severely dilated.  Right Atrium: Right atrium is severely dilated.  Aortic Valve: The aortic valve is a trileaflet valve. There is mild aortic valve sclerosis. Mildly restricted motion.  Mitral Valve: There is moderate regurgitation with a centrally directed jet.  Tricuspid Valve: There is moderate to severe regurgitation with a centrally directed jet.  Pulmonary Artery: There is severe pulmonary hypertension. The estimated pulmonary artery systolic pressure is 83 mmHg.  IVC/SVC: Elevated venous pressure at 15 mmHg.  Pericardium: There is a small circumferential effusion.        ECG: SR.    Assessment and Plan:     Problem List:    Active Diagnoses:    Diagnosis Date Noted POA     "PRINCIPAL PROBLEM:  Acute on chronic diastolic heart failure [I50.33] 10/19/2020 Yes    MDD (major depressive disorder), recurrent, in partial remission [F33.41] 04/24/2024 Yes    Hemiplegia affecting right dominant side [G81.91] 04/24/2024 Yes    CKD (chronic kidney disease) stage 3, GFR 30-59 ml/min [N18.30] 02/20/2018 Yes     Chronic    Thrombocytopenia [D69.6] 07/13/2017 Yes     Chronic    Type 2 diabetes mellitus with circulatory disorder, without long-term current use of insulin [E11.59] 03/01/2016 Yes     Chronic    Primary hypertension [I10] 07/31/2012 Yes     Chronic    Hyperlipidemia type II [E78.01] 07/31/2012 Yes     Chronic      Problems Resolved During this Admission:       Assessment and Plan:     Heart Failure, Systolic, Acute              4/24/2024: Presents fluid overloaded in HF.              4/24/2024: Echo: Moderately dilated left ventricle with severe systolic dysfunction. EF 25%. Mild LVH. Moderate diastolic dysfunction. LVEDP elevated. Mildly enlarged RV with sever systolic dysfunction. Severely enlarged LA. Moderate MR. Moderate to severe TR. SPAP 83 mmHg. RA 15 mmHg. Small pericardial effusion.    Betablocker, CHANELL blockade, MCA & SGLT2i.   4/26/2024: Valsartan 40 mg Q24 was begun. She appears to have had an reaction to ACEI in the past. Accordingly no ACEI or ARNI.    On metoprolol 50 mg Q24, empagliflozin 10 mg Q24, valsartan 40 mg Q24 and spironolactone 25 mg Q24.   On furosemide 20 mg iv "Q12"              Continue diuresis.      2. Pulmonary Hypertension  4/24/2024: Echo: SPAP 83 mmHg.   Most certainly due to left heart disease.     3. 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     4. Hypertension              2005: Diagnosed.              At home been on metoprolol 50 mg Q24, nifedipine 90 mg Q24 and hctz 25 mg Q24.     5. Hypercholesterolemia              On atorvastatin 80 mg Q24.     6. Diabetes Mellitus, Type 2  2005: Diagnosed. Complications: CVA. " Medications: Diet.     7. Severe Obesity              4/25/2024: Weight 98 kg. BMI 36.    8. Chronic Kidney Disease, Stage 3   4/27/2024: BUN/crea 24/1.5. eGFR 37. K 4.1.     9. Thrombocytopenia              4/23/2024: Plts 140.              Dr. Joao Howard.     10. Primary Care              Dr. Ashley Harrell.     VTE Risk Mitigation (From admission, onward)           Ordered     heparin (porcine) injection 5,000 Units  Every 8 hours         04/24/24 0206     IP VTE HIGH RISK PATIENT  Once         04/24/24 0206     Place sequential compression device  Until discontinued         04/24/24 0206     Place sequential compression device  Until discontinued         04/24/24 0148                    Ismael Covarrubias MD  Cardiology  [facility] - Med Surg ([place])

## 2024-04-27 NOTE — SUBJECTIVE & OBJECTIVE
Interval History:  Continues to have good urine output, seen at bedside this morning sitting in chair. She continues to have edematous bilateral lower extremities, right greater than left.  Faint rhonchi heard on exam but improved since admission    Review of Systems   Constitutional:  Positive for activity change.   Respiratory:  Positive for shortness of breath. Negative for cough.    Cardiovascular:  Positive for leg swelling. Negative for chest pain.   Gastrointestinal:  Negative for diarrhea, nausea and vomiting.   Genitourinary:  Positive for frequency (since lasix). Negative for dysuria.   Neurological:  Negative for dizziness and headaches.   Psychiatric/Behavioral:  Negative for agitation and confusion.      Objective:     Vital Signs (Most Recent):  Temp: 97.9 °F (36.6 °C) (04/27/24 0800)  Pulse: 71 (04/27/24 1100)  Resp: 16 (04/27/24 0800)  BP: 131/78 (04/27/24 0800)  SpO2: (!) 93 % (04/27/24 0800) Vital Signs (24h Range):  Temp:  [97.8 °F (36.6 °C)-98.7 °F (37.1 °C)] 97.9 °F (36.6 °C)  Pulse:  [71-90] 71  Resp:  [16-18] 16  SpO2:  [93 %-99 %] 93 %  BP: (131-171)/(70-84) 131/78     Weight: 98 kg (216 lb 0.8 oz)  Body mass index is 35.95 kg/m².    Intake/Output Summary (Last 24 hours) at 4/27/2024 1123  Last data filed at 4/27/2024 1058  Gross per 24 hour   Intake 320 ml   Output 1900 ml   Net -1580 ml         Physical Exam  Vitals and nursing note reviewed.   Constitutional:       General: She is not in acute distress.     Appearance: She is well-developed.   Neck:      Trachea: No tracheal deviation.   Cardiovascular:      Rate and Rhythm: Normal rate and regular rhythm.   Pulmonary:      Effort: Pulmonary effort is normal. No respiratory distress.      Breath sounds: Rales (mid to lower lobes) present. No wheezing.      Comments: Speaking full sentences on room air  Musculoskeletal:         General: Normal range of motion.      Cervical back: Normal range of motion and neck supple.      Right lower leg:  Edema present.      Left lower leg: Edema present.   Skin:     General: Skin is warm and dry.   Neurological:      General: No focal deficit present.      Mental Status: She is alert.      Motor: No abnormal muscle tone.   Psychiatric:         Behavior: Behavior normal.             Significant Labs: All pertinent labs within the past 24 hours have been reviewed.    Significant Imaging: I have reviewed all pertinent imaging results/findings within the past 24 hours.

## 2024-04-27 NOTE — PROGRESS NOTES
[facility] Medicine  Progress Note    Patient Name: Wendy Viveros  MRN: [id number]  Patient Class: IP- Inpatient   Admission Date: 4/23/2024  Length of Stay: 4 days  Attending Physician: Isaac Ruiz MD  Primary Care Provider: Ashley Harrell MD        Subjective:     Principal Problem:Acute on chronic diastolic heart failure        HPI:  Ms. eWndy Viveros is a 68 y.o. female, with PMH of HTN, T2DM, CKD-3, Anemia, chronic gout, prior CVA w/ right-sided weakness in 2021, who [place] [facility] [place]24 due to progressive shortness of breath x 2 weeks. She notes associated b/l lower extremity edema and it worsens with exertion, she notes difficulty walking 2/2 leg swelling, she spends most of her time in bed, but her shortness of breath is worse when she is laying flat. She uses 4 pillows to prop herself up while sleeping. She states she used to take lasix, but does not currently. She states she has not followed with a doctor in recent years. She denied chest pain. She was evaluated in the ED with labs showing anemia with H&H of 9.4/27.6, and PLT of 140K. A metabolic panel showed elevated total bilirubin of 3.9. A BNP was elevated at 2536, with troponin of 0.051. A CXR showed increaed cardiac silhouette, but did not should pulmonary vascular congestion. She was treated in the ED with 40 mg IV lasix. She was admitted to inpatient status.         Overview/Hospital Course:  Wendy Viveros was admitted for acute CHF, CXR with cardiomegaly and BNP 2536. Started on lasix 20mg IVP with good urine output. Continue to wean supplemental O2. TTE with cCHFrEF 25%. Cardiology consulted, appreciate following along.  Valsartan and Jardiance added to regimen. Currently net -5L    Cardiology f/u at [place] as well as nephro referral for CKD, HHC and och care at home    Interval History:  Continues to have good urine output, seen at bedside this morning sitting in chair. She continues to have  edematous bilateral lower extremities, right greater than left.  Faint rhonchi heard on exam but improved since admission    Review of Systems   Constitutional:  Positive for activity change.   Respiratory:  Positive for shortness of breath. Negative for cough.    Cardiovascular:  Positive for leg swelling. Negative for chest pain.   Gastrointestinal:  Negative for diarrhea, nausea and vomiting.   Genitourinary:  Positive for frequency (since lasix). Negative for dysuria.   Neurological:  Negative for dizziness and headaches.   Psychiatric/Behavioral:  Negative for agitation and confusion.      Objective:     Vital Signs (Most Recent):  Temp: 97.9 °F (36.6 °C) (04/27/24 0800)  Pulse: 71 (04/27/24 1100)  Resp: 16 (04/27/24 0800)  BP: 131/78 (04/27/24 0800)  SpO2: (!) 93 % (04/27/24 0800) Vital Signs (24h Range):  Temp:  [97.8 °F (36.6 °C)-98.7 °F (37.1 °C)] 97.9 °F (36.6 °C)  Pulse:  [71-90] 71  Resp:  [16-18] 16  SpO2:  [93 %-99 %] 93 %  BP: (131-171)/(70-84) 131/78     Weight: 98 kg (216 lb 0.8 oz)  Body mass index is 35.95 kg/m².    Intake/Output Summary (Last 24 hours) at 4/27/2024 1123  Last data filed at 4/27/2024 1058  Gross per 24 hour   Intake 320 ml   Output 1900 ml   Net -1580 ml         Physical Exam  Vitals and nursing note reviewed.   Constitutional:       General: She is not in acute distress.     Appearance: She is well-developed.   Neck:      Trachea: No tracheal deviation.   Cardiovascular:      Rate and Rhythm: Normal rate and regular rhythm.   Pulmonary:      Effort: Pulmonary effort is normal. No respiratory distress.      Breath sounds: Rales (mid to lower lobes) present. No wheezing.      Comments: Speaking full sentences on room air  Musculoskeletal:         General: Normal range of motion.      Cervical back: Normal range of motion and neck supple.      Right lower leg: Edema present.      Left lower leg: Edema present.   Skin:     General: Skin is warm and dry.   Neurological:      General:  No focal deficit present.      Mental Status: She is alert.      Motor: No abnormal muscle tone.   Psychiatric:         Behavior: Behavior normal.             Significant Labs: All pertinent labs within the past 24 hours have been reviewed.    Significant Imaging: I have reviewed all pertinent imaging results/findings within the past 24 hours.    Assessment/Plan:      * Acute on chronic diastolic heart failure  - Ms. Wendy Viveros presents with shortness of breath  - she has b/l LE swelling, orthopnea, HENRY   - BNP is elevated w/ elevated troponin   - CXR without pulmonary edema but does show increased cardiac silhouette    - s/p 20 mg IV lasix   - continue lasix 20mg IVP  BID  - CHF pathways initiated   - trend troponin : flat  - last EF was 3/2/16 with ED 52%   - repeat TTE:  Grade II ddfx, rEF 25%, pulm HTN 83mmHg, CVP 15mmgHg  - net - 5L  - cardiology consulted, following; valsartan Jardiance added to regimen  - will need fu at [place]        MDD (major depressive disorder), recurrent, in partial remission  - continue home meds      Hemiplegia affecting right dominant side   This patient has Chronic right hemiplegia due to stroke. Continue all standard measures for pressure injury prevention and consult wound care for any wounds (chronic or acute).    CKD (chronic kidney disease) stage 3, GFR 30-59 ml/min  Creatine stable for now. BMP reviewed- noted Estimated Creatinine Clearance: 47.3 mL/min (based on SCr of 1.3 mg/dL). according to latest data. Based on current GFR, CKD stage is stage 3 - GFR 30-59.  Monitor UOP and serial BMP and adjust therapy as needed. Renally dose meds. Avoid nephrotoxic medications and procedures.  Nephro referral to establish care at [place]    Thrombocytopenia  - improved compared to baseline   - no active bleeding   - monitor daily   - Will transfuse if platelet count is <50k (if undergoing surgical procedure or have active bleeding). Hold DVT prophylaxis if platelets are <50k. The patient's  "platelet results have been reviewed and are listed below.  Recent Labs   Lab 04/23/24  1805   *         Type 2 diabetes mellitus with circulatory disorder, without long-term current use of insulin  Patient's FSGs are controlled on current medication regimen.  Last A1c reviewed-   Lab Results   Component Value Date    HGBA1C 4.6 11/20/2023     Most recent fingerstick glucose reviewed-   No results for input(s): "POCTGLUCOSE" in the last 24 hours.  Current correctional scale  Low  Maintain anti-hyperglycemic dose as follows-   Antihyperglycemics (From admission, onward)      Start     Stop Route Frequency Ordered    04/24/24 0420  insulin aspart U-100 pen 0-5 Units         -- SubQ Before meals & nightly PRN 04/24/24 0320          Hold Oral hypoglycemics while patient is in the hospital.        Hyperlipidemia type II  - continue statin     Primary hypertension  Chronic, controlled. Latest blood pressure and vitals reviewed-     Temp:  [97.9 °F (36.6 °C)-98.2 °F (36.8 °C)]   Pulse:  []   Resp:  [17-23]   BP: (157-179)/(79-98)   SpO2:  [94 %-100 %] .   Home meds for hypertension were reviewed and noted below.   Hypertension Medications               hydroCHLOROthiazide (HYDRODIURIL) 25 MG tablet Take 1 tablet (25 mg total) by mouth once daily.    metoprolol succinate (TOPROL-XL) 50 MG 24 hr tablet Take 50 mg by mouth once daily.    NIFEdipine (PROCARDIA-XL) 90 MG (OSM) 24 hr tablet Take 1 tablet (90 mg total) by mouth 2 (two) times a day.            While in the hospital, will manage blood pressure as follows; Continue home antihypertensive regimen    Will utilize p.r.n. blood pressure medication only if patient's blood pressure greater than 180/110 and she develops symptoms such as worsening chest pain or shortness of breath.      VTE Risk Mitigation (From admission, onward)           Ordered     heparin (porcine) injection 5,000 Units  Every 8 hours         04/24/24 0206     IP VTE HIGH RISK PATIENT  Once  "        04/24/24 0206     Place sequential compression device  Until discontinued         04/24/24 0206     Place sequential compression device  Until discontinued         04/24/24 0148                    Discharge Planning   EL:      Code Status: Full Code   Is the patient medically ready for discharge?:     Reason for patient still in hospital (select all that apply): Patient trending condition  Discharge Plan A: Home with family, Home                  Corrine Delgadillo PA-C  Department of Hospital Medicine   [facility] ([place])

## 2024-04-27 NOTE — ASSESSMENT & PLAN NOTE
- Ms. Wendy Viveros presents with shortness of breath  - she has b/l LE swelling, orthopnea, HENRY   - BNP is elevated w/ elevated troponin   - CXR without pulmonary edema but does show increased cardiac silhouette    - s/p 20 mg IV lasix   - continue lasix 20mg IVP  BID  - CHF pathways initiated   - trend troponin : flat  - last EF was 3/2/16 with ED 52%   - repeat TTE:  Grade II ddfx, rEF 25%, pulm HTN 83mmHg, CVP 15mmgHg  - net - 5L  - cardiology consulted, following; valsartan Jardiance added to regimen  - will need fu at [place]

## 2024-04-28 LAB
ANION GAP SERPL CALC-SCNC: 9 MMOL/L (ref 8–16)
BUN SERPL-MCNC: 28 MG/DL (ref 8–23)
CALCIUM SERPL-MCNC: 9.7 MG/DL (ref 8.7–10.5)
CHLORIDE SERPL-SCNC: 106 MMOL/L (ref 95–110)
CO2 SERPL-SCNC: 28 MMOL/L (ref 23–29)
CREAT SERPL-MCNC: 1.6 MG/DL (ref 0.5–1.4)
EST. GFR  (NO RACE VARIABLE): 35 ML/MIN/1.73 M^2
GLUCOSE SERPL-MCNC: 107 MG/DL (ref 70–110)
POCT GLUCOSE: 104 MG/DL (ref 70–110)
POCT GLUCOSE: 116 MG/DL (ref 70–110)
POCT GLUCOSE: 130 MG/DL (ref 70–110)
POCT GLUCOSE: 154 MG/DL (ref 70–110)
POTASSIUM SERPL-SCNC: 3.8 MMOL/L (ref 3.5–5.1)
SODIUM SERPL-SCNC: 143 MMOL/L (ref 136–145)

## 2024-04-28 PROCEDURE — 36415 COLL VENOUS BLD VENIPUNCTURE: CPT | Performed by: PHYSICIAN ASSISTANT

## 2024-04-28 PROCEDURE — 25000003 PHARM REV CODE 250: Performed by: PHYSICIAN ASSISTANT

## 2024-04-28 PROCEDURE — A4216 STERILE WATER/SALINE, 10 ML: HCPCS | Performed by: PHYSICIAN ASSISTANT

## 2024-04-28 PROCEDURE — 99233 SBSQ HOSP IP/OBS HIGH 50: CPT | Mod: ,,, | Performed by: INTERNAL MEDICINE

## 2024-04-28 PROCEDURE — 21400001 HC TELEMETRY ROOM

## 2024-04-28 PROCEDURE — 94761 N-INVAS EAR/PLS OXIMETRY MLT: CPT

## 2024-04-28 PROCEDURE — 63600175 PHARM REV CODE 636 W HCPCS: Performed by: PHYSICIAN ASSISTANT

## 2024-04-28 PROCEDURE — 25000003 PHARM REV CODE 250: Performed by: NURSE PRACTITIONER

## 2024-04-28 PROCEDURE — 25000003 PHARM REV CODE 250: Performed by: INTERNAL MEDICINE

## 2024-04-28 PROCEDURE — 25000242 PHARM REV CODE 250 ALT 637 W/ HCPCS: Performed by: INTERNAL MEDICINE

## 2024-04-28 PROCEDURE — 80048 BASIC METABOLIC PNL TOTAL CA: CPT | Performed by: PHYSICIAN ASSISTANT

## 2024-04-28 RX ORDER — MICONAZOLE NITRATE 2 %
POWDER (GRAM) TOPICAL 2 TIMES DAILY
Status: DISCONTINUED | OUTPATIENT
Start: 2024-04-28 | End: 2024-05-05 | Stop reason: HOSPADM

## 2024-04-28 RX ORDER — METHOCARBAMOL 500 MG/1
1000 TABLET, FILM COATED ORAL 4 TIMES DAILY PRN
Status: DISCONTINUED | OUTPATIENT
Start: 2024-04-28 | End: 2024-05-05 | Stop reason: HOSPADM

## 2024-04-28 RX ORDER — HYDROCODONE BITARTRATE AND ACETAMINOPHEN 5; 325 MG/1; MG/1
1 TABLET ORAL EVERY 6 HOURS PRN
Status: DISCONTINUED | OUTPATIENT
Start: 2024-04-28 | End: 2024-05-05 | Stop reason: HOSPADM

## 2024-04-28 RX ORDER — POTASSIUM CHLORIDE 20 MEQ/1
40 TABLET, EXTENDED RELEASE ORAL ONCE
Status: COMPLETED | OUTPATIENT
Start: 2024-04-28 | End: 2024-04-28

## 2024-04-28 RX ADMIN — HYDROCODONE BITARTRATE AND ACETAMINOPHEN 1 TABLET: 5; 325 TABLET ORAL at 09:04

## 2024-04-28 RX ADMIN — FOLIC ACID 1 MG: 1 TABLET ORAL at 08:04

## 2024-04-28 RX ADMIN — HEPARIN SODIUM 5000 UNITS: 5000 INJECTION INTRAVENOUS; SUBCUTANEOUS at 02:04

## 2024-04-28 RX ADMIN — HEPARIN SODIUM 5000 UNITS: 5000 INJECTION INTRAVENOUS; SUBCUTANEOUS at 05:04

## 2024-04-28 RX ADMIN — ACETAMINOPHEN 650 MG: 325 TABLET, FILM COATED ORAL at 05:04

## 2024-04-28 RX ADMIN — LIDOCAINE 5% 1 PATCH: 700 PATCH TOPICAL at 05:04

## 2024-04-28 RX ADMIN — Medication 10 ML: at 02:04

## 2024-04-28 RX ADMIN — SPIRONOLACTONE 25 MG: 25 TABLET, FILM COATED ORAL at 08:04

## 2024-04-28 RX ADMIN — EMPAGLIFLOZIN 10 MG: 10 TABLET, FILM COATED ORAL at 08:04

## 2024-04-28 RX ADMIN — Medication 10 ML: at 10:04

## 2024-04-28 RX ADMIN — FUROSEMIDE 20 MG: 10 INJECTION, SOLUTION INTRAMUSCULAR; INTRAVENOUS at 08:04

## 2024-04-28 RX ADMIN — POTASSIUM CHLORIDE 40 MEQ: 1500 TABLET, EXTENDED RELEASE ORAL at 10:04

## 2024-04-28 RX ADMIN — ATORVASTATIN CALCIUM 80 MG: 20 TABLET, FILM COATED ORAL at 08:04

## 2024-04-28 RX ADMIN — VALSARTAN 40 MG: 40 TABLET ORAL at 08:04

## 2024-04-28 RX ADMIN — HEPARIN SODIUM 5000 UNITS: 5000 INJECTION INTRAVENOUS; SUBCUTANEOUS at 09:04

## 2024-04-28 RX ADMIN — Medication 10 ML: at 05:04

## 2024-04-28 RX ADMIN — METOPROLOL SUCCINATE 50 MG: 50 TABLET, EXTENDED RELEASE ORAL at 08:04

## 2024-04-28 NOTE — ASSESSMENT & PLAN NOTE
- Ms. Wendy Viveros presents with shortness of breath  - she has b/l LE swelling, orthopnea, HENRY   - BNP is elevated w/ elevated troponin   - CXR without pulmonary edema but does show increased cardiac silhouette    - s/p 20 mg IV lasix   - continue lasix 20mg IVP  BID  - CHF pathways initiated   - trend troponin : flat  - last EF was 3/2/16 with ED 52%   - repeat TTE:  Grade II ddfx, rEF 25%, pulm HTN 83mmHg, CVP 15mmgHg  - net - 6.6L  - cardiology consulted, following; valsartan Jardiance added to regimen  - will need fu at [place]

## 2024-04-28 NOTE — PROGRESS NOTES
[facility] ([place])  Cardiology  Progress Note    Patient Name: Wendy Viveros  MRN: [id number]  Admission Date: 4/23/2024  Hospital Length of Stay: 5 days  Code Status: Full Code   Attending Physician: Isaac Ruiz MD   Primary Care Physician: Ashley Harrell MD  Expected Discharge Date:   Principal Problem:Acute on chronic diastolic heart failure    Subjective:     Brief HPI:    Wendy Viveros is a 68 y.o.female with hypertension and diabetes mellitus type 2. She is severely obese. She suffered a cerebrovascular accident in 2021 causing right sided weakness. She has thrombocytopenia. Beginning early 4/2024 she has accumulated fluid - she has developed edema of her legs, the abdominal girth has increased and she has become short of breath at rest. She presented to the emergency room on 4/24/2024 and was admitted.    Hospital Course:     4/24/2024: Echo: Moderately dilated left ventricle with severe systolic dysfunction. EF 25%. Mild LVH. Moderate diastolic dysfunction. LVEDP elevated. Mildly enlarged RV with severe systolic dysfunction. Severely enlarged LA. Moderate MR. Moderate to severe TR. SPAP 83 mmHg. RA 15 mmHg. Small pericardial effusion.     Diuresis.    Initiation of HF regimen.    Interval History:     Slowly breathing easier.     Less but still severe edema of legs.    Review of Systems   Constitutional: Positive for malaise/fatigue. Negative for chills and fever.   HENT:  Negative for nosebleeds.    Eyes:  Negative for vision loss in left eye and vision loss in right eye.   Cardiovascular:  Positive for dyspnea on exertion, leg swelling, orthopnea and paroxysmal nocturnal dyspnea. Negative for chest pain and palpitations.   Respiratory:  Positive for shortness of breath. Negative for cough, hemoptysis, sputum production and wheezing.    Hematologic/Lymphatic: Negative for bleeding problem. Does not bruise/bleed easily.   Skin:  Negative for color change and rash.   Musculoskeletal:   Negative for muscle weakness and myalgias.   Gastrointestinal:  Positive for bloating. Negative for abdominal pain, heartburn, hematemesis, hematochezia, melena, nausea and vomiting.   Genitourinary:  Negative for hematuria.   Neurological:  Positive for weakness. Negative for dizziness, focal weakness, headaches, light-headedness and vertigo.   Psychiatric/Behavioral:  Negative for altered mental status. The patient is not nervous/anxious.    Allergic/Immunologic: Negative for persistent infections.     Objective:     Vital Signs (Most Recent):  Temp: 97.7 °F (36.5 °C) (04/28/24 1108)  Pulse: 86 (04/28/24 1108)  Resp: 16 (04/28/24 1108)  BP: 139/70 (04/28/24 1108)  SpO2: (!) 92 % (04/28/24 1108) Vital Signs (24h Range):  Temp:  [97.7 °F (36.5 °C)-98.4 °F (36.9 °C)] 97.7 °F (36.5 °C)  Pulse:  [64-86] 86  Resp:  [16-18] 16  SpO2:  [92 %-99 %] 92 %  BP: (121-146)/(56-74) 139/70     Weight: 98 kg (216 lb 0.8 oz)  Body mass index is 35.95 kg/m².    SpO2: (!) 92 %         Intake/Output Summary (Last 24 hours) at 4/28/2024 1146  Last data filed at 4/28/2024 0551  Gross per 24 hour   Intake 330 ml   Output 2420 ml   Net -2090 ml       Lines/Drains/Airways       Drain  Duration             Female External Urinary Catheter w/ Suction 04/26/24 1845 1 day              Peripheral Intravenous Line  Duration                  Peripheral IV - Single Lumen 04/24/24 0757 20 G Right Antecubital 4 days                    Physical Exam  Constitutional:       General: She is not in acute distress.     Appearance: Normal appearance. She is well-developed. She is not ill-appearing or toxic-appearing.   Eyes:      Conjunctiva/sclera:      Right eye: Right conjunctiva is not injected. No hemorrhage.     Left eye: Left conjunctiva is not injected. No hemorrhage.  Neck:      Vascular: No JVD.   Cardiovascular:      Rate and Rhythm: Normal rate and regular rhythm.      Heart sounds: S1 normal and S2 normal. No murmur heard.     Gallop present.  S3 and S4 sounds present.   Pulmonary:      Effort: Pulmonary effort is normal.      Breath sounds: Normal breath sounds.   Chest:      Chest wall: No swelling or tenderness.   Abdominal:      Tenderness: There is no abdominal tenderness.   Musculoskeletal:      Right lower leg: Swelling present. 2+ Pitting Edema present.      Left lower leg: Swelling present. 2+ Pitting Edema present.   Skin:     General: Skin is warm and dry.      Findings: No rash.   Neurological:      General: No focal deficit present.      Mental Status: She is alert and oriented to person, place, and time. She is not disoriented.   Psychiatric:         Attention and Perception: Attention normal.         Mood and Affect: Mood normal.         Speech: Speech normal.         Behavior: Behavior normal.         Thought Content: Thought content normal.         Cognition and Memory: Cognition and memory normal.         Judgment: Judgment normal.         Current Medications:    Current Facility-Administered Medications   Medication Dose Route Frequency    atorvastatin  80 mg Oral Daily    empagliflozin  10 mg Oral Daily    folic acid  1 mg Oral Daily    furosemide (LASIX) injection  20 mg Intravenous Q12H    heparin (porcine)  5,000 Units Subcutaneous Q8H    LIDOcaine  1 patch Transdermal Q24H    metoprolol succinate  50 mg Oral Daily    sodium chloride 0.9%  10 mL Intravenous Q8H    spironolactone  25 mg Oral Daily    valsartan  40 mg Oral Daily     Current Laboratory Results:    Recent Results (from the past 24 hour(s))   POCT glucose    Collection Time: 04/27/24  8:36 PM   Result Value Ref Range    POCT Glucose 114 (H) 70 - 110 mg/dL   Basic metabolic panel    Collection Time: 04/28/24  5:21 AM   Result Value Ref Range    Sodium 143 136 - 145 mmol/L    Potassium 3.8 3.5 - 5.1 mmol/L    Chloride 106 95 - 110 mmol/L    CO2 28 23 - 29 mmol/L    Glucose 107 70 - 110 mg/dL    BUN 28 (H) 8 - 23 mg/dL    Creatinine 1.6 (H) 0.5 - 1.4 mg/dL    Calcium 9.7  8.7 - 10.5 mg/dL    Anion Gap 9 8 - 16 mmol/L    eGFR 35 (A) >60 mL/min/1.73 m^2   POCT glucose    Collection Time: 04/28/24  8:52 AM   Result Value Ref Range    POCT Glucose 104 70 - 110 mg/dL     Current Imaging Results:    X-Ray Chest AP   Final Result   Abnormal      Increase in size of the cardiac silhouette.      This report was flagged in Epic as abnormal.         Electronically signed by: Dulce Maria Rodriguez   Date:    04/23/2024   Time:    18:48          4/24/2024: Echo:  Left Ventricle: The left ventricle is moderately dilated. Moderately increased wall thickness. There is mild concentric hypertrophy. Severe global hypokinesis present. There is reduced systolic function. Ejection fraction by visual approximation is 25%. Grade II diastolic dysfunction. Elevated left ventricular filling pressure. Tissue Doppler velocity is reduced.  Right Ventricle: Mild right ventricular enlargement. Wall thickness is normal. Right ventricle wall motion has global hypokinesis. Systolic function is severely reduced.  Left Atrium: Left atrium is severely dilated.  Right Atrium: Right atrium is severely dilated.  Aortic Valve: The aortic valve is a trileaflet valve. There is mild aortic valve sclerosis. Mildly restricted motion.  Mitral Valve: There is moderate regurgitation with a centrally directed jet.  Tricuspid Valve: There is moderate to severe regurgitation with a centrally directed jet.  Pulmonary Artery: There is severe pulmonary hypertension. The estimated pulmonary artery systolic pressure is 83 mmHg.  IVC/SVC: Elevated venous pressure at 15 mmHg.  Pericardium: There is a small circumferential effusion.        ECG: SR.    Assessment and Plan:     Problem List:    Active Diagnoses:    Diagnosis Date Noted POA    PRINCIPAL PROBLEM:  Acute on chronic diastolic heart failure [I50.33] 10/19/2020 Yes    MDD (major depressive disorder), recurrent, in partial remission [F33.41] 04/24/2024 Yes    Hemiplegia affecting right  "dominant side [G81.91] 04/24/2024 Yes    CKD (chronic kidney disease) stage 3, GFR 30-59 ml/min [N18.30] 02/20/2018 Yes     Chronic    Thrombocytopenia [D69.6] 07/13/2017 Yes     Chronic    Type 2 diabetes mellitus with circulatory disorder, without long-term current use of insulin [E11.59] 03/01/2016 Yes     Chronic    Primary hypertension [I10] 07/31/2012 Yes     Chronic    Hyperlipidemia type II [E78.01] 07/31/2012 Yes     Chronic      Problems Resolved During this Admission:       Assessment and Plan:     Heart Failure, Systolic, Acute              4/24/2024: Presents fluid overloaded in HF.              4/24/2024: Echo: Moderately dilated left ventricle with severe systolic dysfunction. EF 25%. Mild LVH. Moderate diastolic dysfunction. LVEDP elevated. Mildly enlarged RV with sever systolic dysfunction. Severely enlarged LA. Moderate MR. Moderate to severe TR. SPAP 83 mmHg. RA 15 mmHg. Small pericardial effusion.    Betablocker, CHANELL blockade, MCA & SGLT2i.   4/26/2024: Valsartan 40 mg Q24 was begun. She appears to have had an reaction to ACEI in the past. Accordingly no ACEI or ARNI.    On metoprolol 50 mg Q24, empagliflozin 10 mg Q24, valsartan 40 mg Q24 and spironolactone 25 mg Q24.   On furosemide 20 mg iv "Q12".   Good diuresis. Still fluid overloaded.              Continue diuresis.      2. Pulmonary Hypertension  4/24/2024: Echo: SPAP 83 mmHg.   Most certainly due to left heart disease.     3. 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     4. Hypertension              2005: Diagnosed.              At home been on metoprolol 50 mg Q24, nifedipine 90 mg Q24 and hctz 25 mg Q24.    On metoprolol 50 mg Q24, empagliflozin 10 mg Q24, valsartan 40 mg Q24 and spironolactone 25 mg Q24.    5. Hypercholesterolemia              On atorvastatin 80 mg Q24.     6. Diabetes Mellitus, Type 2  2005: Diagnosed. Complications: CVA. Medications: Diet.     7. Severe Obesity              " 4/25/2024: Weight 98 kg. BMI 36.    8. Chronic Kidney Disease, Stage 3   4/27/2024: BUN/crea 24/1.5. eGFR 37. K 4.1.     9. 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              4/23/2024: Plts 140.              Dr. Joao Howard.     10. Primary Care              Dr. Ashley Harrell.     VTE Risk Mitigation (From admission, onward)           Ordered     heparin (porcine) injection 5,000 Units  Every 8 hours         04/24/24 0206     IP VTE HIGH RISK PATIENT  Once         04/24/24 0206     Place sequential compression device  Until discontinued         04/24/24 0206     Place sequential compression device  Until discontinued         04/24/24 0148                    Ismael Covarrubias MD  Cardiology  [facility] - [facility] S[facility] ([place])

## 2024-04-28 NOTE — CARE UPDATE
04/27/24 2323   PRE-TX-O2   Device (Oxygen Therapy) room air   SpO2 99 %   Pulse Oximetry Type Intermittent   $ Pulse Oximetry - Multiple Charge Pulse Oximetry - Multiple   Pulse 67

## 2024-04-28 NOTE — PROGRESS NOTES
[facility] Medicine  Progress Note    Patient Name: Wendy Viveros  MRN: [id number]  Patient Class: IP- Inpatient   Admission Date: 4/23/2024  Length of Stay: 5 days  Attending Physician: Isaac Ruiz MD  Primary Care Provider: Ashley Harrell MD        Subjective:     Principal Problem:Acute on chronic diastolic heart failure        HPI:  Ms. Wendy Viveros is a 68 y.o. female, with PMH of HTN, T2DM, CKD-3, Anemia, chronic gout, prior CVA w/ right-sided weakness in 2021, who presented to [facility] [place] [facility] ED on 4/23/24 due to progressive shortness of breath x 2 weeks. She notes associated b/l lower extremity edema and it worsens with exertion, she notes difficulty walking 2/2 leg swelling, she spends most of her time in bed, but her shortness of breath is worse when she is laying flat. She uses 4 pillows to prop herself up while sleeping. She states she used to take lasix, but does not currently. She states she has not followed with a doctor in recent years. She denied chest pain. She was evaluated in the ED with labs showing anemia with H&H of 9.4/27.6, and PLT of 140K. A metabolic panel showed elevated total bilirubin of 3.9. A BNP was elevated at 2536, with troponin of 0.051. A CXR showed increaed cardiac silhouette, but did not should pulmonary vascular congestion. She was treated in the ED with 40 mg IV lasix. She was admitted to inpatient status.         Overview/Hospital Course:  Wendy Viveros was admitted for acute CHF, CXR with cardiomegaly and BNP 2536. Started on lasix 20mg IVP with good urine output. Continue to wean supplemental O2. TTE with cCHFrEF 25%. Cardiology consulted, appreciate following along.  Valsartan and Jardiance added to regimen. Currently net -6.6L    Cardiology f/u at [place] as well as nephro referral for CKD, HHC and [facility] care at home    Interval History: Seen at bedside, sitting in recliner. Encouraged elevation of feet. Continues to diurese well with current  lasix regimen    Review of Systems   Constitutional:  Positive for activity change.   Respiratory:  Positive for shortness of breath (resolved). Negative for cough.    Cardiovascular:  Positive for leg swelling. Negative for chest pain.   Gastrointestinal:  Negative for diarrhea, nausea and vomiting.   Genitourinary:  Positive for frequency (since lasix). Negative for dysuria.   Neurological:  Negative for dizziness and headaches.   Psychiatric/Behavioral:  Negative for agitation and confusion.      Objective:     Vital Signs (Most Recent):  Temp: 97.7 °F (36.5 °C) (04/28/24 1108)  Pulse: 86 (04/28/24 1108)  Resp: 16 (04/28/24 1108)  BP: 139/70 (04/28/24 1108)  SpO2: (!) 92 % (04/28/24 1108) Vital Signs (24h Range):  Temp:  [97.7 °F (36.5 °C)-98.4 °F (36.9 °C)] 97.7 °F (36.5 °C)  Pulse:  [64-86] 86  Resp:  [16-18] 16  SpO2:  [92 %-99 %] 92 %  BP: (121-146)/(56-74) 139/70     Weight: 98 kg (216 lb 0.8 oz)  Body mass index is 35.95 kg/m².    Intake/Output Summary (Last 24 hours) at 4/28/2024 1311  Last data filed at 4/28/2024 0551  Gross per 24 hour   Intake 330 ml   Output 2420 ml   Net -2090 ml         Physical Exam  Vitals and nursing note reviewed.   Constitutional:       General: She is not in acute distress.     Appearance: She is well-developed.   Eyes:      Extraocular Movements: Extraocular movements intact.   Neck:      Trachea: No tracheal deviation.   Cardiovascular:      Rate and Rhythm: Normal rate.   Pulmonary:      Effort: Pulmonary effort is normal. No respiratory distress.      Breath sounds: No wheezing or rales.      Comments: Speaking full sentences on room air  Musculoskeletal:         General: Normal range of motion.      Right lower leg: Edema present.      Left lower leg: Edema present.   Skin:     General: Skin is warm and dry.   Neurological:      Mental Status: She is alert.      Motor: No abnormal muscle tone.   Psychiatric:         Mood and Affect: Mood normal.         Behavior: Behavior  normal.             Significant Labs: All pertinent labs within the past 24 hours have been reviewed.    Significant Imaging: I have reviewed all pertinent imaging results/findings within the past 24 hours.    Assessment/Plan:      * Acute on chronic diastolic heart failure  - Ms. Wendy Viveros presents with shortness of breath  - she has b/l LE swelling, orthopnea, HENRY   - BNP is elevated w/ elevated troponin   - CXR without pulmonary edema but does show increased cardiac silhouette    - s/p 20 mg IV lasix   - continue lasix 20mg IVP  BID  - CHF pathways initiated   - trend troponin : flat  - last EF was 3/2/16 with ED 52%   - repeat TTE:  Grade II ddfx, rEF 25%, pulm HTN 83mmHg, CVP 15mmgHg  - net - 6.6L  - cardiology consulted, following; valsartan Jardiance added to regimen  - will need fu at [place]        MDD (major depressive disorder), recurrent, in partial remission  - continue home meds      Hemiplegia affecting right dominant side   This patient has Chronic right hemiplegia due to stroke. Continue all standard measures for pressure injury prevention and consult wound care for any wounds (chronic or acute).    CKD (chronic kidney disease) stage 3, GFR 30-59 ml/min  Creatine stable for now. BMP reviewed- noted Estimated Creatinine Clearance: 47.3 mL/min (based on SCr of 1.3 mg/dL). according to latest data. Based on current GFR, CKD stage is stage 3 - GFR 30-59.  Monitor UOP and serial BMP and adjust therapy as needed. Renally dose meds. Avoid nephrotoxic medications and procedures.  Nephro referral to establish care at [place]    Thrombocytopenia  - improved compared to baseline   - no active bleeding   - monitor daily   - Will transfuse if platelet count is <50k (if undergoing surgical procedure or have active bleeding). Hold DVT prophylaxis if platelets are <50k. The patient's platelet results have been reviewed and are listed below.  Recent Labs   Lab 04/23/24  1805   *         Type 2 diabetes mellitus  "with circulatory disorder, without long-term current use of insulin  Patient's FSGs are controlled on current medication regimen.  Last A1c reviewed-   Lab Results   Component Value Date    HGBA1C 4.6 11/20/2023     Most recent fingerstick glucose reviewed-   No results for input(s): "POCTGLUCOSE" in the last 24 hours.  Current correctional scale  Low  Maintain anti-hyperglycemic dose as follows-   Antihyperglycemics (From admission, onward)      Start     Stop Route Frequency Ordered    04/24/24 0420  insulin aspart U-100 pen 0-5 Units         -- SubQ Before meals & nightly PRN 04/24/24 0320          Hold Oral hypoglycemics while patient is in the hospital.        Hyperlipidemia type II  - continue statin     Primary hypertension  Chronic, controlled. Latest blood pressure and vitals reviewed-     Temp:  [97.9 °F (36.6 °C)-98.2 °F (36.8 °C)]   Pulse:  []   Resp:  [17-23]   BP: (157-179)/(79-98)   SpO2:  [94 %-100 %] .   Home meds for hypertension were reviewed and noted below.   Hypertension Medications               hydroCHLOROthiazide (HYDRODIURIL) 25 MG tablet Take 1 tablet (25 mg total) by mouth once daily.    metoprolol succinate (TOPROL-XL) 50 MG 24 hr tablet Take 50 mg by mouth once daily.    NIFEdipine (PROCARDIA-XL) 90 MG (OSM) 24 hr tablet Take 1 tablet (90 mg total) by mouth 2 (two) times a day.            While in the hospital, will manage blood pressure as follows; Continue home antihypertensive regimen    Will utilize p.r.n. blood pressure medication only if patient's blood pressure greater than 180/110 and she develops symptoms such as worsening chest pain or shortness of breath.      VTE Risk Mitigation (From admission, onward)           Ordered     heparin (porcine) injection 5,000 Units  Every 8 hours         04/24/24 0206     IP VTE HIGH RISK PATIENT  Once         04/24/24 0206     Place sequential compression device  Until discontinued         04/24/24 0206     Place sequential compression " device  Until discontinued         04/24/24 0148                    Discharge Planning   EL:      Code Status: Full Code   Is the patient medically ready for discharge?:     Reason for patient still in hospital (select all that apply): Patient trending condition  Discharge Plan A: Home with family, Home                  Corrine Delgadillo PA-C  Department of Hospital Medicine   [facility] ([place])

## 2024-04-28 NOTE — SUBJECTIVE & OBJECTIVE
Interval History: Seen at bedside, sitting in recliner. Encouraged elevation of feet. Continues to diurese well with current lasix regimen    Review of Systems   Constitutional:  Positive for activity change.   Respiratory:  Positive for shortness of breath (resolved). Negative for cough.    Cardiovascular:  Positive for leg swelling. Negative for chest pain.   Gastrointestinal:  Negative for diarrhea, nausea and vomiting.   Genitourinary:  Positive for frequency (since lasix). Negative for dysuria.   Neurological:  Negative for dizziness and headaches.   Psychiatric/Behavioral:  Negative for agitation and confusion.      Objective:     Vital Signs (Most Recent):  Temp: 97.7 °F (36.5 °C) (04/28/24 1108)  Pulse: 86 (04/28/24 1108)  Resp: 16 (04/28/24 1108)  BP: 139/70 (04/28/24 1108)  SpO2: (!) 92 % (04/28/24 1108) Vital Signs (24h Range):  Temp:  [97.7 °F (36.5 °C)-98.4 °F (36.9 °C)] 97.7 °F (36.5 °C)  Pulse:  [64-86] 86  Resp:  [16-18] 16  SpO2:  [92 %-99 %] 92 %  BP: (121-146)/(56-74) 139/70     Weight: 98 kg (216 lb 0.8 oz)  Body mass index is 35.95 kg/m².    Intake/Output Summary (Last 24 hours) at 4/28/2024 1311  Last data filed at 4/28/2024 0551  Gross per 24 hour   Intake 330 ml   Output 2420 ml   Net -2090 ml         Physical Exam  Vitals and nursing note reviewed.   Constitutional:       General: She is not in acute distress.     Appearance: She is well-developed.   Eyes:      Extraocular Movements: Extraocular movements intact.   Neck:      Trachea: No tracheal deviation.   Cardiovascular:      Rate and Rhythm: Normal rate.   Pulmonary:      Effort: Pulmonary effort is normal. No respiratory distress.      Breath sounds: No wheezing or rales.      Comments: Speaking full sentences on room air  Musculoskeletal:         General: Normal range of motion.      Right lower leg: Edema present.      Left lower leg: Edema present.   Skin:     General: Skin is warm and dry.   Neurological:      Mental Status: She is  alert.      Motor: No abnormal muscle tone.   Psychiatric:         Mood and Affect: Mood normal.         Behavior: Behavior normal.             Significant Labs: All pertinent labs within the past 24 hours have been reviewed.    Significant Imaging: I have reviewed all pertinent imaging results/findings within the past 24 hours.

## 2024-04-28 NOTE — PLAN OF CARE
Pt remained free of falls and injuries throughout shift. AAOx4. Pt calm and cooperative. Purposeful hourly rounding performed. Pt swallows without difficulty. Pt received IV Lasix as ordered, IV flushed and saline locked. Pure wick malfunctioning at times throughout shift, occurrences marked in this case, otherwise good output with pure wick in place to suction, see flowsheet. Patient ambulates with X1 assist using walker. Moderate-severe edema to BLE, educated pt on importance of elevating on pillows. Managed c/o R ankle pain with scheduled lidocaine patch. VSS on room air. Pt sleeping in bed, even rise and fall of chest. Bed low and locked, bed alarm on, call light in reach. Side rails up x3. Will continue to monitor.

## 2024-04-29 LAB
ANION GAP SERPL CALC-SCNC: 8 MMOL/L (ref 8–16)
BUN SERPL-MCNC: 28 MG/DL (ref 8–23)
CALCIUM SERPL-MCNC: 9.7 MG/DL (ref 8.7–10.5)
CHLORIDE SERPL-SCNC: 103 MMOL/L (ref 95–110)
CO2 SERPL-SCNC: 32 MMOL/L (ref 23–29)
CREAT SERPL-MCNC: 1.5 MG/DL (ref 0.5–1.4)
EST. GFR  (NO RACE VARIABLE): 37 ML/MIN/1.73 M^2
GLUCOSE SERPL-MCNC: 99 MG/DL (ref 70–110)
POCT GLUCOSE: 111 MG/DL (ref 70–110)
POCT GLUCOSE: 128 MG/DL (ref 70–110)
POCT GLUCOSE: 140 MG/DL (ref 70–110)
POCT GLUCOSE: 156 MG/DL (ref 70–110)
POTASSIUM SERPL-SCNC: 4 MMOL/L (ref 3.5–5.1)
SODIUM SERPL-SCNC: 143 MMOL/L (ref 136–145)

## 2024-04-29 PROCEDURE — 99233 SBSQ HOSP IP/OBS HIGH 50: CPT | Mod: ,,, | Performed by: INTERNAL MEDICINE

## 2024-04-29 PROCEDURE — 63600175 PHARM REV CODE 636 W HCPCS: Performed by: PHYSICIAN ASSISTANT

## 2024-04-29 PROCEDURE — 21400001 HC TELEMETRY ROOM

## 2024-04-29 PROCEDURE — A4216 STERILE WATER/SALINE, 10 ML: HCPCS | Performed by: PHYSICIAN ASSISTANT

## 2024-04-29 PROCEDURE — 25000003 PHARM REV CODE 250: Performed by: INTERNAL MEDICINE

## 2024-04-29 PROCEDURE — 80048 BASIC METABOLIC PNL TOTAL CA: CPT | Performed by: PHYSICIAN ASSISTANT

## 2024-04-29 PROCEDURE — 25000003 PHARM REV CODE 250: Performed by: NURSE PRACTITIONER

## 2024-04-29 PROCEDURE — 36415 COLL VENOUS BLD VENIPUNCTURE: CPT | Performed by: PHYSICIAN ASSISTANT

## 2024-04-29 PROCEDURE — 25000003 PHARM REV CODE 250: Performed by: PHYSICIAN ASSISTANT

## 2024-04-29 PROCEDURE — 25000242 PHARM REV CODE 250 ALT 637 W/ HCPCS: Performed by: INTERNAL MEDICINE

## 2024-04-29 RX ORDER — VALSARTAN 80 MG/1
80 TABLET ORAL DAILY
Status: DISCONTINUED | OUTPATIENT
Start: 2024-04-30 | End: 2024-05-02

## 2024-04-29 RX ORDER — SIMETHICONE 80 MG
1 TABLET,CHEWABLE ORAL 3 TIMES DAILY PRN
Status: DISCONTINUED | OUTPATIENT
Start: 2024-04-29 | End: 2024-05-05 | Stop reason: HOSPADM

## 2024-04-29 RX ADMIN — ATORVASTATIN CALCIUM 80 MG: 20 TABLET, FILM COATED ORAL at 08:04

## 2024-04-29 RX ADMIN — MICONAZOLE NITRATE: 20 POWDER TOPICAL at 12:04

## 2024-04-29 RX ADMIN — Medication 10 ML: at 05:04

## 2024-04-29 RX ADMIN — SPIRONOLACTONE 25 MG: 25 TABLET, FILM COATED ORAL at 08:04

## 2024-04-29 RX ADMIN — FOLIC ACID 1 MG: 1 TABLET ORAL at 08:04

## 2024-04-29 RX ADMIN — HEPARIN SODIUM 5000 UNITS: 5000 INJECTION INTRAVENOUS; SUBCUTANEOUS at 05:04

## 2024-04-29 RX ADMIN — SIMETHICONE 80 MG: 80 TABLET, CHEWABLE ORAL at 11:04

## 2024-04-29 RX ADMIN — HEPARIN SODIUM 5000 UNITS: 5000 INJECTION INTRAVENOUS; SUBCUTANEOUS at 09:04

## 2024-04-29 RX ADMIN — MICONAZOLE NITRATE: 20 POWDER TOPICAL at 09:04

## 2024-04-29 RX ADMIN — FUROSEMIDE 20 MG: 10 INJECTION, SOLUTION INTRAMUSCULAR; INTRAVENOUS at 09:04

## 2024-04-29 RX ADMIN — HEPARIN SODIUM 5000 UNITS: 5000 INJECTION INTRAVENOUS; SUBCUTANEOUS at 03:04

## 2024-04-29 RX ADMIN — METOPROLOL SUCCINATE 50 MG: 50 TABLET, EXTENDED RELEASE ORAL at 08:04

## 2024-04-29 RX ADMIN — FUROSEMIDE 20 MG: 10 INJECTION, SOLUTION INTRAMUSCULAR; INTRAVENOUS at 08:04

## 2024-04-29 RX ADMIN — VALSARTAN 40 MG: 40 TABLET ORAL at 08:04

## 2024-04-29 RX ADMIN — Medication 10 ML: at 04:04

## 2024-04-29 RX ADMIN — LIDOCAINE 5% 1 PATCH: 700 PATCH TOPICAL at 05:04

## 2024-04-29 RX ADMIN — EMPAGLIFLOZIN 10 MG: 10 TABLET, FILM COATED ORAL at 08:04

## 2024-04-29 RX ADMIN — MICONAZOLE NITRATE: 20 POWDER TOPICAL at 08:04

## 2024-04-29 RX ADMIN — Medication 10 ML: at 09:04

## 2024-04-29 NOTE — PLAN OF CARE
Pt remained free of falls and injuries throughout shift. AAOx4. Pt calm and cooperative. Purposeful hourly rounding performed. Pt swallows without difficulty. Pt received IV Lasix as ordered, IV flushed and saline locked. Pure wick still with some malfunctioning at times, occurrences marked, otherwise good UOP- 900mL with pure wick in place to suction, see flowsheet. Patient ambulates with X1 assist using walker. BLE edema improving, pt keeping legs well elevated on pillow. Managed c/o R Knee pain with PRN Norco and R ankle pain with scheduled lidocaine patch. VSS on RA. Pt resting comfortably in bed, denies pain, denies needs at this time. Bed low and locked, bed alarm on, call light in reach. Side rails up x3. Will continue to monitor.

## 2024-04-29 NOTE — ASSESSMENT & PLAN NOTE
- Ms. Wendy Viveros presents with shortness of breath  - she has b/l LE swelling, orthopnea, HENRY   - BNP is elevated w/ elevated troponin   - CXR without pulmonary edema but does show increased cardiac silhouette    - s/p 20 mg IV lasix   - continue lasix 20mg IVP  BID  - CHF pathways initiated   - trend troponin : flat  - last EF was 3/2/16 with ED 52%   - repeat TTE:  Grade II ddfx, rEF 25%, pulm HTN 83mmHg, CVP 15mmgHg  - net - 7L  - cardiology consulted, following; valsartan Jardiance added to regimen, titration  - will need fu at [place]

## 2024-04-29 NOTE — PROGRESS NOTES
[facility] Medicine  Progress Note    Patient Name: Wendy Viveros  MRN: [id number]  Patient Class: IP- Inpatient   Admission Date: 4/23/2024  Length of Stay: 6 days  Attending Physician: Isaac Ruiz MD  Primary Care Provider: Ashley Harrell MD        Subjective:     Principal Problem:Acute on chronic diastolic heart failure        HPI:  Ms. Wendy Viveros is a 68 y.o. female, with PMH of HTN, T2DM, CKD-3, Anemia, chronic gout, prior CVA w/ right-sided weakness in 2021, who [place] [facility] [place] due to progressive shortness of breath x 2 weeks. She notes associated b/l lower extremity edema and it worsens with exertion, she notes difficulty walking 2/2 leg swelling, she spends most of her time in bed, but her shortness of breath is worse when she is laying flat. She uses 4 pillows to prop herself up while sleeping. She states she used to take lasix, but does not currently. She states she has not followed with a doctor in recent years. She denied chest pain. She was evaluated in the ED with labs showing anemia with H&H of 9.4/27.6, and PLT of 140K. A metabolic panel showed elevated total bilirubin of 3.9. A BNP was elevated at 2536, with troponin of 0.051. A CXR showed increaed cardiac silhouette, but did not should pulmonary vascular congestion. She was treated in the ED with 40 mg IV lasix. She was admitted to inpatient status.         Overview/Hospital Course:  Wendy Viveros was admitted for acute CHF, CXR with cardiomegaly and BNP 2536. Started on lasix 20mg IVP with good urine output. Continue to wean supplemental O2. TTE with cCHFrEF 25%. Cardiology consulted, appreciate following along.  Valsartan and Jardiance added to regimen. Currently net -7L    Cardiology f/u at [facility] as well as nephro referral for CKD, HHC and och care at home    Interval History: Edema improving since elevation of legs in recliner and bed, she is agreeable to compression stockings at  night. 1L urine in bed    Review of Systems   Constitutional:  Positive for activity change.   Respiratory:  Positive for shortness of breath (resolved). Negative for cough.    Cardiovascular:  Positive for leg swelling. Negative for chest pain.   Gastrointestinal:  Negative for diarrhea, nausea and vomiting.   Genitourinary:  Positive for frequency (since lasix). Negative for dysuria.   Neurological:  Negative for dizziness and headaches.   Psychiatric/Behavioral:  Negative for agitation and confusion.      Objective:     Vital Signs (Most Recent):  Temp: 99.2 °F (37.3 °C) (04/29/24 0814)  Pulse: 81 (04/29/24 0814)  Resp: 18 (04/29/24 0814)  BP: (!) 169/77 (04/29/24 0814)  SpO2: (!) 92 % (04/29/24 0814) Vital Signs (24h Range):  Temp:  [97.7 °F (36.5 °C)-99.2 °F (37.3 °C)] 99.2 °F (37.3 °C)  Pulse:  [74-88] 81  Resp:  [16-18] 18  SpO2:  [92 %-100 %] 92 %  BP: (131-169)/(58-77) 169/77     Weight: 98 kg (216 lb 0.8 oz)  Body mass index is 35.95 kg/m².    Intake/Output Summary (Last 24 hours) at 4/29/2024 1008  Last data filed at 4/29/2024 0300  Gross per 24 hour   Intake 720 ml   Output 1400 ml   Net -680 ml         Physical Exam  Vitals and nursing note reviewed.   Constitutional:       General: She is not in acute distress.     Appearance: She is well-developed.   Eyes:      Extraocular Movements: Extraocular movements intact.   Neck:      Trachea: No tracheal deviation.   Cardiovascular:      Rate and Rhythm: Normal rate.   Pulmonary:      Effort: Pulmonary effort is normal. No respiratory distress.      Breath sounds: No wheezing or rales.      Comments: Speaking full sentences on room air  Musculoskeletal:         General: Normal range of motion.      Right lower leg: Edema present.      Left lower leg: Edema present.   Skin:     General: Skin is warm and dry.   Neurological:      Mental Status: She is alert.      Motor: No abnormal muscle tone.   Psychiatric:         Mood and Affect: Mood normal.          Behavior: Behavior normal.             Significant Labs: All pertinent labs within the past 24 hours have been reviewed.    Significant Imaging: I have reviewed all pertinent imaging results/findings within the past 24 hours.    Assessment/Plan:      * Acute on chronic diastolic heart failure  - Ms. Wendy Viveros presents with shortness of breath  - she has b/l LE swelling, orthopnea, HENRY   - BNP is elevated w/ elevated troponin   - CXR without pulmonary edema but does show increased cardiac silhouette    - s/p 20 mg IV lasix   - continue lasix 20mg IVP  BID  - CHF pathways initiated   - trend troponin : flat  - last EF was 3/2/16 with ED 52%   - repeat TTE:  Grade II ddfx, rEF 25%, pulm HTN 83mmHg, CVP 15mmgHg  - net - 7L  - cardiology consulted, following; valsartan Jardiance added to regimen, titration  - will need fu at OR        MDD (major depressive disorder), recurrent, in partial remission  - continue home meds      Hemiplegia affecting right dominant side   This patient has Chronic right hemiplegia due to stroke. Continue all standard measures for pressure injury prevention and consult wound care for any wounds (chronic or acute).    CKD (chronic kidney disease) stage 3, GFR 30-59 ml/min  Creatine stable for now. BMP reviewed- noted Estimated Creatinine Clearance: 47.3 mL/min (based on SCr of 1.3 mg/dL). according to latest data. Based on current GFR, CKD stage is stage 3 - GFR 30-59.  Monitor UOP and serial BMP and adjust therapy as needed. Renally dose meds. Avoid nephrotoxic medications and procedures.  Nephro referral to establish care at OR    Thrombocytopenia  - improved compared to baseline   - no active bleeding   - monitor daily   - Will transfuse if platelet count is <50k (if undergoing surgical procedure or have active bleeding). Hold DVT prophylaxis if platelets are <50k. The patient's platelet results have been reviewed and are listed below.  Recent Labs   Lab 04/23/24  1805   *  "        Type 2 diabetes mellitus with circulatory disorder, without long-term current use of insulin  Patient's FSGs are controlled on current medication regimen.  Last A1c reviewed-   Lab Results   Component Value Date    HGBA1C 4.6 11/20/2023     Most recent fingerstick glucose reviewed-   No results for input(s): "POCTGLUCOSE" in the last 24 hours.  Current correctional scale  Low  Maintain anti-hyperglycemic dose as follows-   Antihyperglycemics (From admission, onward)      Start     Stop Route Frequency Ordered    04/24/24 0420  insulin aspart U-100 pen 0-5 Units         -- SubQ Before meals & nightly PRN 04/24/24 0320          Hold Oral hypoglycemics while patient is in the hospital.        Hyperlipidemia type II  - continue statin     Primary hypertension  Chronic, controlled. Latest blood pressure and vitals reviewed-     Temp:  [97.9 °F (36.6 °C)-98.2 °F (36.8 °C)]   Pulse:  []   Resp:  [17-23]   BP: (157-179)/(79-98)   SpO2:  [94 %-100 %] .   Home meds for hypertension were reviewed and noted below.   Hypertension Medications               hydroCHLOROthiazide (HYDRODIURIL) 25 MG tablet Take 1 tablet (25 mg total) by mouth once daily.    metoprolol succinate (TOPROL-XL) 50 MG 24 hr tablet Take 50 mg by mouth once daily.    NIFEdipine (PROCARDIA-XL) 90 MG (OSM) 24 hr tablet Take 1 tablet (90 mg total) by mouth 2 (two) times a day.            While in the hospital, will manage blood pressure as follows; Continue home antihypertensive regimen    Will utilize p.r.n. blood pressure medication only if patient's blood pressure greater than 180/110 and she develops symptoms such as worsening chest pain or shortness of breath.      VTE Risk Mitigation (From admission, onward)           Ordered     heparin (porcine) injection 5,000 Units  Every 8 hours         04/24/24 0206     IP VTE HIGH RISK PATIENT  Once         04/24/24 0206     Place sequential compression device  Until discontinued         04/24/24 0206 "     Place sequential compression device  Until discontinued         04/24/24 0148                    Discharge Planning   EL:      Code Status: Full Code   Is the patient medically ready for discharge?:     Reason for patient still in hospital (select all that apply): Patient trending condition  Discharge Plan A: Home with family, Home                  Corrine Delgadillo PA-C  Department of Hospital Medicine   [facility] Surg ([place])

## 2024-04-29 NOTE — CONSULTS
[facility] ([place])  Wound Care    Patient Name:  Wendy Viveros   MRN:  [id number]  Date: 4/29/2024  Diagnosis: Acute on chronic diastolic heart failure    History:     Past Medical History:   Diagnosis Date    Abnormal EKG     Anemia 07/13/2017    Aortic valve regurgitation     Arthritis     CKD (chronic kidney disease) stage 2, GFR 60-89 ml/min 08/08/2014    CKD (chronic kidney disease) stage 2, GFR 60-89 ml/min 08/08/2014    CVA (cerebral vascular accident)     2021 w/ residual R sided weakness    Diabetes mellitus     Diabetes mellitus type II     GERD (gastroesophageal reflux disease)     Gout, unspecified     Heart murmur     Hyperlipidemia     Hypertension     Tendonitis        Social History     Socioeconomic History    Marital status: Single   Tobacco Use    Smoking status: Never    Smokeless tobacco: Never   Substance and Sexual Activity    Alcohol use: No     Alcohol/week: 0.0 standard drinks of alcohol    Drug use: No    Sexual activity: Not Currently     Partners: Male     Birth control/protection: None   Social History Narrative    Retired from Dept of      Social Determinants of Health     Financial Resource Strain: Low Risk  (4/25/2024)    Overall Financial Resource Strain (CARDIA)     Difficulty of Paying Living Expenses: Not very hard   Food Insecurity: No Food Insecurity (4/25/2024)    Hunger Vital Sign     Worried About Running Out of Food in the Last Year: Never true     Ran Out of Food in the Last Year: Never true   Transportation Needs: Unmet Transportation Needs (4/25/2024)    PRAPARE - Transportation     Lack of Transportation (Medical): Yes     Lack of Transportation (Non-Medical): No   Physical Activity: Unknown (2/28/2021)    Exercise Vital Sign     Days of Exercise per Week: 3 days     Minutes of Exercise per Session: Patient declined   Stress: No Stress Concern Present (4/25/2024)    [employer] [place] [employer] - Occupational Stress Questionnaire      Feeling of Stress : Only a little   Social Connections: Unknown (2/28/2021)    Social Connection and Isolation Panel [NHANES]     Frequency of Communication with Friends and Family: Once a week     Frequency of Social Gatherings with Friends and Family: Once a week     Active Member of Clubs or Organizations: No     Attends Club or Organization Meetings: Never     Marital Status:    Housing Stability: Low Risk  (4/25/2024)    Housing Stability Vital Sign     Unable to Pay for Housing in the Last Year: No     Number of Times Moved in the Last Year: 1     Homeless in the Last Year: No       Precautions:     Allergies as of 04/23/2024 - Reviewed 04/23/2024   Allergen Reaction Noted    Colchicine analogues  05/02/2017    Lisinopril  11/05/2010    Losartan  11/05/2010    Percocet [oxycodone-acetaminophen]  02/20/2018       [facility] Assessment Details/Treatment     Wound care consult received for assessment of groin and skin folds. Patient is a 68 year old female with PMH of HTN, T2DM, CKD-3, Anemia, chronic gout, prior CVA w/ right-sided weakness in 2021, who [place] [facility] [place] due to progressive shortness of breath x 2 weeks.     Skin folds and groin with moisture associated dermatitis. Orders in place for miconazole powder. Suggested using a miconazole ointment. Patient would like to keep using powder. Nursing notified. Wound care available prn.         04/29/2024

## 2024-04-29 NOTE — PROGRESS NOTES
[facility] Surg ([place])  Cardiology  Progress Note    Patient Name: Wendy Viveros  MRN: [id number]  Admission Date: 4/23/2024  Hospital Length of Stay: 6 days  Code Status: Full Code   Attending Physician: Isaac Ruiz MD   Primary Care Physician: Ashley Harrell MD  Expected Discharge Date:   Principal Problem:Acute on chronic diastolic heart failure    Subjective:     Brief HPI:    Wendy Viveros is a 68 y.o.female with hypertension and diabetes mellitus type 2. She is severely obese. She suffered a cerebrovascular accident in 2021 causing right sided weakness. She has thrombocytopenia. Beginning early 4/2024 she has accumulated fluid - she has developed edema of her legs, the abdominal girth has increased and she has become short of breath at rest. She presented to the emergency room on 4/24/2024 and was admitted.    Hospital Course:     4/24/2024: Echo: Moderately dilated left ventricle with severe systolic dysfunction. EF 25%. Mild LVH. Moderate diastolic dysfunction. LVEDP elevated. Mildly enlarged RV with severe systolic dysfunction. Severely enlarged LA. Moderate MR. Moderate to severe TR. SPAP 83 mmHg. RA 15 mmHg. Small pericardial effusion.     Diuresis.    Initiation of HF regimen.    Interval History:     Slowly breathing easier.     Less edema of legs.    Review of Systems   Constitutional: Positive for malaise/fatigue. Negative for chills and fever.   HENT:  Negative for nosebleeds.    Eyes:  Negative for vision loss in left eye and vision loss in right eye.   Cardiovascular:  Positive for dyspnea on exertion, leg swelling, orthopnea and paroxysmal nocturnal dyspnea. Negative for chest pain and palpitations.   Respiratory:  Positive for shortness of breath. Negative for cough, hemoptysis, sputum production and wheezing.    Hematologic/Lymphatic: Negative for bleeding problem. Does not bruise/bleed easily.   Skin:  Negative for color change and rash.   Musculoskeletal:  Positive for joint  pain (right ankle). Negative for muscle weakness and myalgias.   Gastrointestinal:  Positive for bloating. Negative for abdominal pain, heartburn, hematemesis, hematochezia, melena, nausea and vomiting.   Genitourinary:  Negative for hematuria.   Neurological:  Negative for dizziness, focal weakness, headaches, light-headedness, vertigo and weakness.   Psychiatric/Behavioral:  Negative for altered mental status. The patient is not nervous/anxious.    Allergic/Immunologic: Negative for persistent infections.     Objective:     Vital Signs (Most Recent):  Temp: 99.2 °F (37.3 °C) (04/29/24 0814)  Pulse: 81 (04/29/24 0814)  Resp: 18 (04/29/24 0814)  BP: (!) 169/77 (04/29/24 0814)  SpO2: (!) 92 % (04/29/24 0814) Vital Signs (24h Range):  Temp:  [97.7 °F (36.5 °C)-99.2 °F (37.3 °C)] 99.2 °F (37.3 °C)  Pulse:  [74-88] 81  Resp:  [16-18] 18  SpO2:  [92 %-100 %] 92 %  BP: (131-169)/(58-77) 169/77     Weight: 98 kg (216 lb 0.8 oz)  Body mass index is 35.95 kg/m².    SpO2: (!) 92 %         Intake/Output Summary (Last 24 hours) at 4/29/2024 0835  Last data filed at 4/29/2024 0300  Gross per 24 hour   Intake 720 ml   Output 1400 ml   Net -680 ml       Lines/Drains/Airways       Drain  Duration             Female External Urinary Catheter w/ Suction 04/26/24 1845 2 days              Peripheral Intravenous Line  Duration                  Peripheral IV - Single Lumen 04/24/24 0757 20 G Right Antecubital 5 days                    Physical Exam  Constitutional:       General: She is not in acute distress.     Appearance: Normal appearance. She is well-developed. She is not ill-appearing or toxic-appearing.   Eyes:      Conjunctiva/sclera:      Right eye: Right conjunctiva is not injected. No hemorrhage.     Left eye: Left conjunctiva is not injected. No hemorrhage.  Neck:      Vascular: No JVD.   Cardiovascular:      Rate and Rhythm: Normal rate and regular rhythm.      Heart sounds: S1 normal and S2 normal. No murmur heard.      Gallop present. S3 and S4 sounds present.   Pulmonary:      Effort: Pulmonary effort is normal.      Breath sounds: Normal breath sounds.   Chest:      Chest wall: No swelling or tenderness.   Abdominal:      Tenderness: There is no abdominal tenderness.   Musculoskeletal:      Right lower leg: Swelling present. 2+ Pitting Edema present.      Left lower leg: Swelling present. 2+ Pitting Edema present.   Skin:     General: Skin is warm and dry.      Findings: No rash.   Neurological:      General: No focal deficit present.      Mental Status: She is alert and oriented to person, place, and time. She is not disoriented.   Psychiatric:         Attention and Perception: Attention normal.         Mood and Affect: Mood normal.         Speech: Speech normal.         Behavior: Behavior normal.         Thought Content: Thought content normal.         Cognition and Memory: Cognition and memory normal.         Judgment: Judgment normal.         Current Medications:    Current Facility-Administered Medications   Medication Dose Route Frequency    atorvastatin  80 mg Oral Daily    empagliflozin  10 mg Oral Daily    folic acid  1 mg Oral Daily    furosemide (LASIX) injection  20 mg Intravenous Q12H    heparin (porcine)  5,000 Units Subcutaneous Q8H    LIDOcaine  1 patch Transdermal Q24H    metoprolol succinate  50 mg Oral Daily    miconazole NITRATE 2 %   Topical (Top) BID    sodium chloride 0.9%  10 mL Intravenous Q8H    spironolactone  25 mg Oral Daily    valsartan  40 mg Oral Daily     Current Laboratory Results:    Recent Results (from the past 24 hour(s))   POCT glucose    Collection Time: 04/28/24  8:52 AM   Result Value Ref Range    POCT Glucose 104 70 - 110 mg/dL   POCT glucose    Collection Time: 04/28/24 12:28 PM   Result Value Ref Range    POCT Glucose 116 (H) 70 - 110 mg/dL   POCT glucose    Collection Time: 04/28/24  5:14 PM   Result Value Ref Range    POCT Glucose 130 (H) 70 - 110 mg/dL   POCT glucose    Collection  Time: 04/28/24  8:32 PM   Result Value Ref Range    POCT Glucose 154 (H) 70 - 110 mg/dL   Basic metabolic panel    Collection Time: 04/29/24  6:23 AM   Result Value Ref Range    Sodium 143 136 - 145 mmol/L    Potassium 4.0 3.5 - 5.1 mmol/L    Chloride 103 95 - 110 mmol/L    CO2 32 (H) 23 - 29 mmol/L    Glucose 99 70 - 110 mg/dL    BUN 28 (H) 8 - 23 mg/dL    Creatinine 1.5 (H) 0.5 - 1.4 mg/dL    Calcium 9.7 8.7 - 10.5 mg/dL    Anion Gap 8 8 - 16 mmol/L    eGFR 37 (A) >60 mL/min/1.73 m^2   POCT glucose    Collection Time: 04/29/24  8:16 AM   Result Value Ref Range    POCT Glucose 111 (H) 70 - 110 mg/dL     Current Imaging Results:    X-Ray Chest AP   Final Result   Abnormal      Increase in size of the cardiac silhouette.      This report was flagged in Epic as abnormal.         Electronically signed by: Dulce Maria Rodriguez   Date:    04/23/2024   Time:    18:48          4/24/2024: Echo:  Left Ventricle: The left ventricle is moderately dilated. Moderately increased wall thickness. There is mild concentric hypertrophy. Severe global hypokinesis present. There is reduced systolic function. Ejection fraction by visual approximation is 25%. Grade II diastolic dysfunction. Elevated left ventricular filling pressure. Tissue Doppler velocity is reduced.  Right Ventricle: Mild right ventricular enlargement. Wall thickness is normal. Right ventricle wall motion has global hypokinesis. Systolic function is severely reduced.  Left Atrium: Left atrium is severely dilated.  Right Atrium: Right atrium is severely dilated.  Aortic Valve: The aortic valve is a trileaflet valve. There is mild aortic valve sclerosis. Mildly restricted motion.  Mitral Valve: There is moderate regurgitation with a centrally directed jet.  Tricuspid Valve: There is moderate to severe regurgitation with a centrally directed jet.  Pulmonary Artery: There is severe pulmonary hypertension. The estimated pulmonary artery systolic pressure is 83 mmHg.  IVC/SVC:  "Elevated venous pressure at 15 mmHg.  Pericardium: There is a small circumferential effusion.        ECG: SR.    Assessment and Plan:     Problem List:    Active Diagnoses:    Diagnosis Date Noted POA    PRINCIPAL PROBLEM:  Acute on chronic diastolic heart failure [I50.33] 10/19/2020 Yes    MDD (major depressive disorder), recurrent, in partial remission [F33.41] 04/24/2024 Yes    Hemiplegia affecting right dominant side [G81.91] 04/24/2024 Yes    CKD (chronic kidney disease) stage 3, GFR 30-59 ml/min [N18.30] 02/20/2018 Yes     Chronic    Thrombocytopenia [D69.6] 07/13/2017 Yes     Chronic    Type 2 diabetes mellitus with circulatory disorder, without long-term current use of insulin [E11.59] 03/01/2016 Yes     Chronic    Primary hypertension [I10] 07/31/2012 Yes     Chronic    Hyperlipidemia type II [E78.01] 07/31/2012 Yes     Chronic      Problems Resolved During this Admission:       Assessment and Plan:     Heart Failure, Systolic, Acute              4/24/2024: Presents fluid overloaded in HF.              4/24/2024: Echo: Moderately dilated left ventricle with severe systolic dysfunction. EF 25%. Mild LVH. Moderate diastolic dysfunction. LVEDP elevated. Mildly enlarged RV with sever systolic dysfunction. Severely enlarged LA. Moderate MR. Moderate to severe TR. SPAP 83 mmHg. RA 15 mmHg. Small pericardial effusion.    Betablocker, CHANELL blockade, MCA & SGLT2i.   4/26/2024: Valsartan 40 mg Q24 was begun. She appears to have had an reaction to ACEI in the past. Accordingly no ACEI or ARNI.    On metoprolol 50 mg Q24, empagliflozin 10 mg Q24, valsartan 40 mg Q24 and spironolactone 25 mg Q24.   On furosemide 20 mg iv "Q12".   Good diuresis. Still fluid overloaded.              Continue diuresis.   Increase valsartan to 80 mg Q24.      2. Pulmonary Hypertension  4/24/2024: Echo: SPAP 83 mmHg.   Most certainly due to left heart disease.     3. History of Cerebrovascular Accident              2021: CVA: Left " hemiplegia.     4. Hypertension              2005: Diagnosed.              At home been on metoprolol 50 mg Q24, nifedipine 90 mg Q24 and hctz 25 mg Q24.    On metoprolol 50 mg Q24, empagliflozin 10 mg Q24, valsartan 40 mg Q24 and spironolactone 25 mg Q24.    5. Hypercholesterolemia              On atorvastatin 80 mg Q24.     6. Diabetes Mellitus, Type 2  2005: Diagnosed. Complications: CVA. Medications: Diet.     7. Severe Obesity              4/25/2024: Weight 98 kg. BMI 36.    8. Chronic Kidney Disease, Stage 3   4/27/2024: BUN/crea 24/1.5. eGFR 37. K 4.1.     9. 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              4/23/2024: Plts 140.              Dr. Joao Howard.     10. Primary Care              Dr. Ashley Harrell.     VTE Risk Mitigation (From admission, onward)           Ordered     heparin (porcine) injection 5,000 Units  Every 8 hours         04/24/24 0206     IP VTE HIGH RISK PATIENT  Once         04/24/24 0206     Place sequential compression device  Until discontinued         04/24/24 0206     Place sequential compression device  Until discontinued         04/24/24 0148                    Ismael Covarrubias MD  Cardiology  [facility] ([place])

## 2024-04-29 NOTE — SUBJECTIVE & OBJECTIVE
Interval History: Edema improving since elevation of legs in recliner and bed, she is agreeable to compression stockings at night. 1L urine in bed    Review of Systems   Constitutional:  Positive for activity change.   Respiratory:  Positive for shortness of breath (resolved). Negative for cough.    Cardiovascular:  Positive for leg swelling. Negative for chest pain.   Gastrointestinal:  Negative for diarrhea, nausea and vomiting.   Genitourinary:  Positive for frequency (since lasix). Negative for dysuria.   Neurological:  Negative for dizziness and headaches.   Psychiatric/Behavioral:  Negative for agitation and confusion.      Objective:     Vital Signs (Most Recent):  Temp: 99.2 °F (37.3 °C) (04/29/24 0814)  Pulse: 81 (04/29/24 0814)  Resp: 18 (04/29/24 0814)  BP: (!) 169/77 (04/29/24 0814)  SpO2: (!) 92 % (04/29/24 0814) Vital Signs (24h Range):  Temp:  [97.7 °F (36.5 °C)-99.2 °F (37.3 °C)] 99.2 °F (37.3 °C)  Pulse:  [74-88] 81  Resp:  [16-18] 18  SpO2:  [92 %-100 %] 92 %  BP: (131-169)/(58-77) 169/77     Weight: 98 kg (216 lb 0.8 oz)  Body mass index is 35.95 kg/m².    Intake/Output Summary (Last 24 hours) at 4/29/2024 1008  Last data filed at 4/29/2024 0300  Gross per 24 hour   Intake 720 ml   Output 1400 ml   Net -680 ml         Physical Exam  Vitals and nursing note reviewed.   Constitutional:       General: She is not in acute distress.     Appearance: She is well-developed.   Eyes:      Extraocular Movements: Extraocular movements intact.   Neck:      Trachea: No tracheal deviation.   Cardiovascular:      Rate and Rhythm: Normal rate.   Pulmonary:      Effort: Pulmonary effort is normal. No respiratory distress.      Breath sounds: No wheezing or rales.      Comments: Speaking full sentences on room air  Musculoskeletal:         General: Normal range of motion.      Right lower leg: Edema present.      Left lower leg: Edema present.   Skin:     General: Skin is warm and dry.   Neurological:      Mental  Status: She is alert.      Motor: No abnormal muscle tone.   Psychiatric:         Mood and Affect: Mood normal.         Behavior: Behavior normal.             Significant Labs: All pertinent labs within the past 24 hours have been reviewed.    Significant Imaging: I have reviewed all pertinent imaging results/findings within the past 24 hours.

## 2024-04-30 LAB
ANION GAP SERPL CALC-SCNC: 10 MMOL/L (ref 8–16)
BASOPHILS # BLD AUTO: 0.02 K/UL (ref 0–0.2)
BASOPHILS NFR BLD: 0.3 % (ref 0–1.9)
BUN SERPL-MCNC: 32 MG/DL (ref 8–23)
CALCIUM SERPL-MCNC: 9.5 MG/DL (ref 8.7–10.5)
CHLORIDE SERPL-SCNC: 101 MMOL/L (ref 95–110)
CO2 SERPL-SCNC: 32 MMOL/L (ref 23–29)
CREAT SERPL-MCNC: 1.6 MG/DL (ref 0.5–1.4)
DIFFERENTIAL METHOD BLD: ABNORMAL
EOSINOPHIL # BLD AUTO: 0.1 K/UL (ref 0–0.5)
EOSINOPHIL NFR BLD: 1.6 % (ref 0–8)
ERYTHROCYTE [DISTWIDTH] IN BLOOD BY AUTOMATED COUNT: 19.7 % (ref 11.5–14.5)
EST. GFR  (NO RACE VARIABLE): 35 ML/MIN/1.73 M^2
GLUCOSE SERPL-MCNC: 101 MG/DL (ref 70–110)
HCT VFR BLD AUTO: 22.6 % (ref 37–48.5)
HGB BLD-MCNC: 7.7 G/DL (ref 12–16)
IMM GRANULOCYTES # BLD AUTO: 0.03 K/UL (ref 0–0.04)
IMM GRANULOCYTES NFR BLD AUTO: 0.5 % (ref 0–0.5)
LYMPHOCYTES # BLD AUTO: 2.2 K/UL (ref 1–4.8)
LYMPHOCYTES NFR BLD: 35 % (ref 18–48)
MAGNESIUM SERPL-MCNC: 1.7 MG/DL (ref 1.6–2.6)
MCH RBC QN AUTO: 31.8 PG (ref 27–31)
MCHC RBC AUTO-ENTMCNC: 34.1 G/DL (ref 32–36)
MCV RBC AUTO: 93 FL (ref 82–98)
MONOCYTES # BLD AUTO: 0.7 K/UL (ref 0.3–1)
MONOCYTES NFR BLD: 11.1 % (ref 4–15)
NEUTROPHILS # BLD AUTO: 3.3 K/UL (ref 1.8–7.7)
NEUTROPHILS NFR BLD: 51.5 % (ref 38–73)
NRBC BLD-RTO: 0 /100 WBC
PLATELET # BLD AUTO: 143 K/UL (ref 150–450)
PMV BLD AUTO: 10 FL (ref 9.2–12.9)
POCT GLUCOSE: 122 MG/DL (ref 70–110)
POCT GLUCOSE: 91 MG/DL (ref 70–110)
POCT GLUCOSE: 93 MG/DL (ref 70–110)
POTASSIUM SERPL-SCNC: 3.8 MMOL/L (ref 3.5–5.1)
RBC # BLD AUTO: 2.42 M/UL (ref 4–5.4)
SODIUM SERPL-SCNC: 143 MMOL/L (ref 136–145)
T4 SERPL-MCNC: 6.3 UG/DL (ref 4.5–11.5)
TSH SERPL DL<=0.005 MIU/L-ACNC: 2.17 UIU/ML (ref 0.4–4)
WBC # BLD AUTO: 6.4 K/UL (ref 3.9–12.7)

## 2024-04-30 PROCEDURE — 25000242 PHARM REV CODE 250 ALT 637 W/ HCPCS: Performed by: INTERNAL MEDICINE

## 2024-04-30 PROCEDURE — 63600175 PHARM REV CODE 636 W HCPCS: Performed by: PHYSICIAN ASSISTANT

## 2024-04-30 PROCEDURE — 80048 BASIC METABOLIC PNL TOTAL CA: CPT | Performed by: PHYSICIAN ASSISTANT

## 2024-04-30 PROCEDURE — A4216 STERILE WATER/SALINE, 10 ML: HCPCS | Performed by: PHYSICIAN ASSISTANT

## 2024-04-30 PROCEDURE — 84436 ASSAY OF TOTAL THYROXINE: CPT | Performed by: INTERNAL MEDICINE

## 2024-04-30 PROCEDURE — 83735 ASSAY OF MAGNESIUM: CPT | Performed by: PHYSICIAN ASSISTANT

## 2024-04-30 PROCEDURE — 36415 COLL VENOUS BLD VENIPUNCTURE: CPT | Performed by: PHYSICIAN ASSISTANT

## 2024-04-30 PROCEDURE — 84443 ASSAY THYROID STIM HORMONE: CPT | Performed by: INTERNAL MEDICINE

## 2024-04-30 PROCEDURE — 94761 N-INVAS EAR/PLS OXIMETRY MLT: CPT

## 2024-04-30 PROCEDURE — 25000003 PHARM REV CODE 250: Performed by: INTERNAL MEDICINE

## 2024-04-30 PROCEDURE — 21400001 HC TELEMETRY ROOM

## 2024-04-30 PROCEDURE — 85025 COMPLETE CBC W/AUTO DIFF WBC: CPT | Performed by: PHYSICIAN ASSISTANT

## 2024-04-30 PROCEDURE — 25000003 PHARM REV CODE 250: Performed by: NURSE PRACTITIONER

## 2024-04-30 PROCEDURE — 99233 SBSQ HOSP IP/OBS HIGH 50: CPT | Mod: ,,, | Performed by: INTERNAL MEDICINE

## 2024-04-30 PROCEDURE — 25000003 PHARM REV CODE 250: Performed by: PHYSICIAN ASSISTANT

## 2024-04-30 RX ADMIN — MICONAZOLE NITRATE: 20 POWDER TOPICAL at 09:04

## 2024-04-30 RX ADMIN — HEPARIN SODIUM 5000 UNITS: 5000 INJECTION INTRAVENOUS; SUBCUTANEOUS at 06:04

## 2024-04-30 RX ADMIN — FUROSEMIDE 20 MG: 10 INJECTION, SOLUTION INTRAMUSCULAR; INTRAVENOUS at 09:04

## 2024-04-30 RX ADMIN — MICONAZOLE NITRATE: 20 POWDER TOPICAL at 03:04

## 2024-04-30 RX ADMIN — Medication 10 ML: at 09:04

## 2024-04-30 RX ADMIN — FOLIC ACID 1 MG: 1 TABLET ORAL at 09:04

## 2024-04-30 RX ADMIN — LIDOCAINE 5% 1 PATCH: 700 PATCH TOPICAL at 03:04

## 2024-04-30 RX ADMIN — Medication 10 ML: at 03:04

## 2024-04-30 RX ADMIN — EMPAGLIFLOZIN 10 MG: 10 TABLET, FILM COATED ORAL at 09:04

## 2024-04-30 RX ADMIN — METOPROLOL SUCCINATE 50 MG: 50 TABLET, EXTENDED RELEASE ORAL at 09:04

## 2024-04-30 RX ADMIN — HEPARIN SODIUM 5000 UNITS: 5000 INJECTION INTRAVENOUS; SUBCUTANEOUS at 09:04

## 2024-04-30 RX ADMIN — HEPARIN SODIUM 5000 UNITS: 5000 INJECTION INTRAVENOUS; SUBCUTANEOUS at 03:04

## 2024-04-30 RX ADMIN — ATORVASTATIN CALCIUM 80 MG: 20 TABLET, FILM COATED ORAL at 09:04

## 2024-04-30 RX ADMIN — SPIRONOLACTONE 25 MG: 25 TABLET, FILM COATED ORAL at 09:04

## 2024-04-30 RX ADMIN — SIMETHICONE 80 MG: 80 TABLET, CHEWABLE ORAL at 09:04

## 2024-04-30 RX ADMIN — VALSARTAN 80 MG: 80 TABLET, FILM COATED ORAL at 09:04

## 2024-04-30 NOTE — PROGRESS NOTES
[facility] ([place])  Cardiology  Progress Note    Patient Name: Wendy Viveros  MRN: [id number]  Admission Date: 4/23/2024  Hospital Length of Stay: 7 days  Code Status: Full Code   Attending Physician: Ban Richey MD   Primary Care Physician: Ashley Harrell MD  Expected Discharge Date:   Principal Problem:Acute on chronic diastolic heart failure    Subjective:     Brief HPI:    Wendy Viveros is a 68 y.o.female with hypertension and diabetes mellitus type 2. She is severely obese. She suffered a cerebrovascular accident in 2021 causing right sided weakness. She has thrombocytopenia. Beginning early 4/2024 she has accumulated fluid - she has developed edema of her legs, the abdominal girth has increased and she has become short of breath at rest. She presented to the emergency room on 4/24/2024 and was admitted.    Hospital Course:     4/24/2024: Echo: Moderately dilated left ventricle with severe systolic dysfunction. EF 25%. Mild LVH. Moderate diastolic dysfunction. LVEDP elevated. Mildly enlarged RV with severe systolic dysfunction. Severely enlarged LA. Moderate MR. Moderate to severe TR. SPAP 83 mmHg. RA 15 mmHg. Small pericardial effusion.     Diuresis.    Initiation of HF regimen.    Interval History:     Slowly breathing easier.     Edema of legs continues to improve.    Review of Systems   Constitutional: Positive for malaise/fatigue. Negative for chills and fever.   HENT:  Negative for nosebleeds.    Eyes:  Negative for vision loss in left eye and vision loss in right eye.   Cardiovascular:  Positive for dyspnea on exertion, leg swelling, orthopnea and paroxysmal nocturnal dyspnea. Negative for chest pain and palpitations.   Respiratory:  Positive for shortness of breath. Negative for cough, hemoptysis, sputum production and wheezing.    Hematologic/Lymphatic: Negative for bleeding problem. Does not bruise/bleed easily.   Skin:  Negative for color change and rash.   Musculoskeletal:   Positive for joint pain (right ankle). Negative for muscle weakness and myalgias.   Gastrointestinal:  Positive for bloating. Negative for abdominal pain, heartburn, hematemesis, hematochezia, melena, nausea and vomiting.   Genitourinary:  Negative for hematuria.   Neurological:  Positive for weakness. Negative for dizziness, focal weakness, headaches, light-headedness and vertigo.   Psychiatric/Behavioral:  Negative for altered mental status. The patient is not nervous/anxious.    Allergic/Immunologic: Negative for persistent infections.     Objective:     Vital Signs (Most Recent):  Temp: 98.5 °F (36.9 °C) (04/30/24 0805)  Pulse: 95 (04/30/24 0809)  Resp: 16 (04/30/24 0805)  BP: (!) 140/64 (04/30/24 0805)  SpO2: (!) 91 % (04/30/24 0805) Vital Signs (24h Range):  Temp:  [98.1 °F (36.7 °C)-98.7 °F (37.1 °C)] 98.5 °F (36.9 °C)  Pulse:  [70-95] 95  Resp:  [16-20] 16  SpO2:  [91 %-95 %] 91 %  BP: (129-148)/(60-75) 140/64     Weight: 98 kg (216 lb 0.8 oz)  Body mass index is 35.95 kg/m².    SpO2: (!) 91 %         Intake/Output Summary (Last 24 hours) at 4/30/2024 0852  Last data filed at 4/30/2024 0634  Gross per 24 hour   Intake 700 ml   Output 2500 ml   Net -1800 ml       Lines/Drains/Airways       Drain  Duration             Female External Urinary Catheter w/ Suction 04/26/24 1845 3 days              Peripheral Intravenous Line  Duration                  Peripheral IV - Single Lumen 04/24/24 0757 20 G Right Antecubital 6 days                    Physical Exam  Constitutional:       General: She is not in acute distress.     Appearance: Normal appearance. She is well-developed. She is not ill-appearing or toxic-appearing.   Eyes:      Conjunctiva/sclera:      Right eye: Right conjunctiva is not injected. No hemorrhage.     Left eye: Left conjunctiva is not injected. No hemorrhage.  Neck:      Vascular: No JVD.   Cardiovascular:      Rate and Rhythm: Normal rate and regular rhythm.      Heart sounds: S1 normal and S2  normal. No murmur heard.     Gallop present. S3 and S4 sounds present.   Pulmonary:      Effort: Pulmonary effort is normal.      Breath sounds: Normal breath sounds.   Chest:      Chest wall: No swelling or tenderness.   Abdominal:      Tenderness: There is no abdominal tenderness.   Musculoskeletal:      Right lower leg: Swelling present. 2+ Pitting Edema present.      Left lower leg: Swelling present. 2+ Pitting Edema present.   Skin:     General: Skin is warm and dry.      Findings: No rash.   Neurological:      General: No focal deficit present.      Mental Status: She is alert and oriented to person, place, and time. She is not disoriented.   Psychiatric:         Attention and Perception: Attention normal.         Mood and Affect: Mood normal.         Speech: Speech normal.         Behavior: Behavior normal.         Thought Content: Thought content normal.         Cognition and Memory: Cognition and memory normal.         Judgment: Judgment normal.         Current Medications:    Current Facility-Administered Medications   Medication Dose Route Frequency    atorvastatin  80 mg Oral Daily    empagliflozin  10 mg Oral Daily    folic acid  1 mg Oral Daily    furosemide (LASIX) injection  20 mg Intravenous Q12H    heparin (porcine)  5,000 Units Subcutaneous Q8H    LIDOcaine  1 patch Transdermal Q24H    metoprolol succinate  50 mg Oral Daily    miconazole NITRATE 2 %   Topical (Top) BID    sodium chloride 0.9%  10 mL Intravenous Q8H    spironolactone  25 mg Oral Daily    valsartan  80 mg Oral Daily     Current Laboratory Results:    Recent Results (from the past 24 hour(s))   POCT glucose    Collection Time: 04/29/24 11:42 AM   Result Value Ref Range    POCT Glucose 128 (H) 70 - 110 mg/dL   POCT glucose    Collection Time: 04/29/24  4:44 PM   Result Value Ref Range    POCT Glucose 140 (H) 70 - 110 mg/dL   POCT glucose    Collection Time: 04/29/24  8:23 PM   Result Value Ref Range    POCT Glucose 156 (H) 70 - 110  mg/dL   Basic metabolic panel    Collection Time: 04/30/24  4:43 AM   Result Value Ref Range    Sodium 143 136 - 145 mmol/L    Potassium 3.8 3.5 - 5.1 mmol/L    Chloride 101 95 - 110 mmol/L    CO2 32 (H) 23 - 29 mmol/L    Glucose 101 70 - 110 mg/dL    BUN 32 (H) 8 - 23 mg/dL    Creatinine 1.6 (H) 0.5 - 1.4 mg/dL    Calcium 9.5 8.7 - 10.5 mg/dL    Anion Gap 10 8 - 16 mmol/L    eGFR 35 (A) >60 mL/min/1.73 m^2   Magnesium    Collection Time: 04/30/24  4:43 AM   Result Value Ref Range    Magnesium 1.7 1.6 - 2.6 mg/dL   CBC Auto Differential    Collection Time: 04/30/24  4:43 AM   Result Value Ref Range    WBC 6.40 3.90 - 12.70 K/uL    RBC 2.42 (L) 4.00 - 5.40 M/uL    Hemoglobin 7.7 (L) 12.0 - 16.0 g/dL    Hematocrit 22.6 (L) 37.0 - 48.5 %    MCV 93 82 - 98 fL    MCH 31.8 (H) 27.0 - 31.0 pg    MCHC 34.1 32.0 - 36.0 g/dL    RDW 19.7 (H) 11.5 - 14.5 %    Platelets 143 (L) 150 - 450 K/uL    MPV 10.0 9.2 - 12.9 fL    Immature Granulocytes 0.5 0.0 - 0.5 %    Gran # (ANC) 3.3 1.8 - 7.7 K/uL    Immature Grans (Abs) 0.03 0.00 - 0.04 K/uL    Lymph # 2.2 1.0 - 4.8 K/uL    Mono # 0.7 0.3 - 1.0 K/uL    Eos # 0.1 0.0 - 0.5 K/uL    Baso # 0.02 0.00 - 0.20 K/uL    nRBC 0 0 /100 WBC    Gran % 51.5 38.0 - 73.0 %    Lymph % 35.0 18.0 - 48.0 %    Mono % 11.1 4.0 - 15.0 %    Eosinophil % 1.6 0.0 - 8.0 %    Basophil % 0.3 0.0 - 1.9 %    Differential Method Automated    TSH    Collection Time: 04/30/24  4:43 AM   Result Value Ref Range    TSH 2.168 0.400 - 4.000 uIU/mL   POCT glucose    Collection Time: 04/30/24  8:02 AM   Result Value Ref Range    POCT Glucose 122 (H) 70 - 110 mg/dL     Current Imaging Results:    X-Ray Chest AP   Final Result   Abnormal      Increase in size of the cardiac silhouette.      This report was flagged in Epic as abnormal.         Electronically signed by: Dulce Maria Rodriguez   Date:    04/23/2024   Time:    18:48          4/24/2024: Echo:  Left Ventricle: The left ventricle is moderately dilated. Moderately  increased wall thickness. There is mild concentric hypertrophy. Severe global hypokinesis present. There is reduced systolic function. Ejection fraction by visual approximation is 25%. Grade II diastolic dysfunction. Elevated left ventricular filling pressure. Tissue Doppler velocity is reduced.  Right Ventricle: Mild right ventricular enlargement. Wall thickness is normal. Right ventricle wall motion has global hypokinesis. Systolic function is severely reduced.  Left Atrium: Left atrium is severely dilated.  Right Atrium: Right atrium is severely dilated.  Aortic Valve: The aortic valve is a trileaflet valve. There is mild aortic valve sclerosis. Mildly restricted motion.  Mitral Valve: There is moderate regurgitation with a centrally directed jet.  Tricuspid Valve: There is moderate to severe regurgitation with a centrally directed jet.  Pulmonary Artery: There is severe pulmonary hypertension. The estimated pulmonary artery systolic pressure is 83 mmHg.  IVC/SVC: Elevated venous pressure at 15 mmHg.  Pericardium: There is a small circumferential effusion.        ECG: SR.    Assessment and Plan:     Problem List:    Active Diagnoses:    Diagnosis Date Noted POA    PRINCIPAL PROBLEM:  Acute on chronic diastolic heart failure [I50.33] 10/19/2020 Yes    MDD (major depressive disorder), recurrent, in partial remission [F33.41] 04/24/2024 Yes    Hemiplegia affecting right dominant side [G81.91] 04/24/2024 Yes    CKD (chronic kidney disease) stage 3, GFR 30-59 ml/min [N18.30] 02/20/2018 Yes     Chronic    Thrombocytopenia [D69.6] 07/13/2017 Yes     Chronic    Primary hypertension [I10] 07/31/2012 Yes     Chronic    Hyperlipidemia type II [E78.01] 07/31/2012 Yes     Chronic      Problems Resolved During this Admission:       Assessment and Plan:     Heart Failure, Systolic, Acute              4/24/2024: Presents fluid overloaded in HF.              4/24/2024: Echo: Moderately dilated left ventricle with severe systolic  "dysfunction. EF 25%. Mild LVH. Moderate diastolic dysfunction. LVEDP elevated. Mildly enlarged RV with sever systolic dysfunction. Severely enlarged LA. Moderate MR. Moderate to severe TR. SPAP 83 mmHg. RA 15 mmHg. Small pericardial effusion.    Betablocker, CHANELL blockade, MCA & SGLT2i.   4/26/2024: Valsartan 40 mg Q24 was begun. She appears to have had an reaction to ACEI in the past. Accordingly no ACEI or ARNI.  4/30/2024: Valsartan 40 mg Q24 was increased to valsartan to 80 mg Q24.   On metoprolol 50 mg Q24, empagliflozin 10 mg Q24, valsartan 80 mg Q24 and spironolactone 25 mg Q24.   On furosemide 20 mg iv "Q12".   Good diuresis. Still fluid overloaded.              Continue diuresis.       2. Pulmonary Hypertension  4/24/2024: Echo: SPAP 83 mmHg.   Most certainly due to left heart disease.     3. 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     4. Hypertension              2005: Diagnosed.              At home been on metoprolol 50 mg Q24, nifedipine 90 mg Q24 and hctz 25 mg Q24.    On metoprolol 50 mg Q24, empagliflozin 10 mg Q24, valsartan 40 mg Q24 and spironolactone 25 mg Q24.    5. Hypercholesterolemia              On atorvastatin 80 mg Q24.     6. Diabetes Mellitus, Type 2  2005: Diagnosed. Complications: CVA. Medications: Diet.     7. Severe Obesity              4/25/2024: Weight 98 kg. BMI 36.    8. Chronic Kidney Disease, Stage 3   4/27/2024: BUN/crea 24/1.5. eGFR 37. K 4.1.     9. 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              4/23/2024: Plts 140.              Dr. Joao Howard.    10. Anemia   4/30/2024: H/H 7.7/22.6%. MCV 93.   Significant decline.   Consider further evaluation.     11. Primary Care              Dr. Ashley Harrell.     VTE Risk Mitigation (From admission, onward)           Ordered     Place ADRIÁN hose  Until discontinued         04/29/24 1440     heparin (porcine) injection 5,000 Units  Every 8 hours         " 04/24/24 0206     IP VTE HIGH RISK PATIENT  Once         04/24/24 0206     Place sequential compression device  Until discontinued         04/24/24 0206     Place sequential compression device  Until discontinued         04/24/24 0148                    Ismael Covarrubias MD  Cardiology  [facility] ([place])

## 2024-04-30 NOTE — PLAN OF CARE
Medicare Message     Important Message from Medicare regarding Discharge Appeal Rights Given to patient/caregiver; Explained to patient/caregiver; Other (comments)Important Message from Medicare regarding Discharge Appeal Rights. Given to patient/caregiver; Explained to patient/caregiver; Other (comments). The comment is Verbal Consent. Taken on 4/25/24 1016 Explained to patient/caregiver; Signed/date by patient/caregiver   Date IMM was signed 4/25/2024 4/30/2024   Time IMM was signed 0830 0845

## 2024-04-30 NOTE — ASSESSMENT & PLAN NOTE
- improved compared to baseline   - no active bleeding   - monitor daily   - Will transfuse if platelet count is <50k (if undergoing surgical procedure or have active bleeding). Hold DVT prophylaxis if platelets are <50k. The patient's platelet results have been reviewed and are listed below.  Recent Labs   Lab 04/30/24  0443   *

## 2024-04-30 NOTE — ASSESSMENT & PLAN NOTE
Chronic, controlled. Latest blood pressure and vitals reviewed-     Temp:  [98.1 °F (36.7 °C)-99.2 °F (37.3 °C)]   Pulse:  [70-94]   Resp:  [18-20]   BP: (129-169)/(60-77)   SpO2:  [92 %-95 %] .   Home meds for hypertension were reviewed and noted below.   Hypertension Medications               hydroCHLOROthiazide (HYDRODIURIL) 25 MG tablet Take 1 tablet (25 mg total) by mouth once daily.    metoprolol succinate (TOPROL-XL) 50 MG 24 hr tablet Take 50 mg by mouth once daily.    NIFEdipine (PROCARDIA-XL) 90 MG (OSM) 24 hr tablet Take 1 tablet (90 mg total) by mouth 2 (two) times a day.            While in the hospital, will manage blood pressure as follows; Continue home antihypertensive regimen

## 2024-04-30 NOTE — SUBJECTIVE & OBJECTIVE
Interval History: Assumed care of patient today.  She is sitting on the edge of the bed to eat lunch with her legs down.  Reports she usually sits this way.  Overall she feels much better than when she was admitted, is breathing comfortably and able to walk a little.  Still has significant BLE edema.    Review of Systems   Constitutional:  Negative for chills and fever.   Respiratory:  Positive for shortness of breath. Negative for cough.    Cardiovascular:  Positive for leg swelling. Negative for chest pain and palpitations.     Objective:     Vital Signs (Most Recent):  Temp: 98.3 °F (36.8 °C) (04/30/24 1108)  Pulse: 69 (04/30/24 1120)  Resp: 16 (04/30/24 1108)  BP: 131/60 (04/30/24 1108)  SpO2: (!) 94 % (04/30/24 1108) Vital Signs (24h Range):  Temp:  [98.1 °F (36.7 °C)-98.5 °F (36.9 °C)] 98.3 °F (36.8 °C)  Pulse:  [69-95] 69  Resp:  [16-20] 16  SpO2:  [91 %-95 %] 94 %  BP: (129-148)/(60-67) 131/60     Weight: 98 kg (216 lb 0.8 oz)  Body mass index is 35.95 kg/m².    Intake/Output Summary (Last 24 hours) at 4/30/2024 1530  Last data filed at 4/30/2024 0634  Gross per 24 hour   Intake 350 ml   Output 2500 ml   Net -2150 ml         Physical Exam  Cardiovascular:      Rate and Rhythm: Normal rate and regular rhythm.      Heart sounds: Normal heart sounds. No murmur heard.     No gallop.   Pulmonary:      Effort: Pulmonary effort is normal.      Breath sounds: Normal breath sounds.   Abdominal:      General: Bowel sounds are normal.      Palpations: Abdomen is soft.   Musculoskeletal:      Right lower leg: Edema present.      Left lower leg: Edema present.      Comments: 2+ pitting edema BLE   Skin:     General: Skin is warm and dry.   Neurological:      General: No focal deficit present.      Mental Status: She is alert.   Psychiatric:         Mood and Affect: Mood normal.         Behavior: Behavior normal.             Significant Labs: All pertinent labs within the past 24 hours have been reviewed.    Significant  Imaging: I have reviewed all pertinent imaging results/findings within the past 24 hours.

## 2024-04-30 NOTE — ASSESSMENT & PLAN NOTE
Patient's FSGs are controlled on current medication regimen.  Last A1c reviewed-   Lab Results   Component Value Date    HGBA1C 4.3 04/24/2024     Most recent fingerstick glucose reviewed-   Recent Labs   Lab 04/29/24  0816 04/29/24  1142 04/29/24  1644 04/29/24 2023   POCTGLUCOSE 111* 128* 140* 156*     Current correctional scale  Low  Maintain anti-hyperglycemic dose as follows-   Antihyperglycemics (From admission, onward)    Start     Stop Route Frequency Ordered    04/26/24 0900  empagliflozin (Jardiance) tablet 10 mg        Question Answer Comment   Does this patient have a diagnosis of heart failure? Yes    Does this patient have type 1 diabetes or diabetic ketoacidosis? No    Does this patient have symptomatic hypotension? No    Is the patient NPO or pending major surgery in next 3 days or less? No        -- Oral Daily 04/25/24 1628    04/24/24 0420  insulin aspart U-100 pen 0-5 Units         -- SubQ Before meals & nightly PRN 04/24/24 0320        Hold Oral hypoglycemics while patient is in the hospital.

## 2024-04-30 NOTE — PLAN OF CARE
Pt remained free of falls and injuries throughout shift. AAOx4. Pt calm and cooperative. Purposeful hourly rounding performed. Pt swallows without difficulty. Pt received IV Lasix as ordered, IV flushed and saline locked. Pure wick continues to malfunction with pt repositioning, pt instructed if she wants to sit on the side of bed or to reposition, to call for assist with repositioning pure wick, occurrences marked, see flowsheet for I's & O's. Patient ambulates with X1 assist using walker. BLE edema improving, pt keeping legs elevated on pillow. Managed R ankle pain with scheduled lidocaine patch. VSS on RA. Pt resting comfortably in bed, denies pain, denies needs at this time. Bed low and locked, bed alarm on, call light in reach. Side rails up x3. Will continue to monitor.

## 2024-04-30 NOTE — PROGRESS NOTES
[facility] Medicine  Progress Note    Patient Name: Wendy Viveros  MRN: [id number]  Patient Class: IP- Inpatient   Admission Date: 4/23/2024  Length of Stay: 7 days  Attending Physician: Ban Richey MD  Primary Care Provider: Ashley Harrell MD        Subjective:     Principal Problem:Acute on chronic diastolic heart failure        HPI:  HPI by Wendy Banegas PA:  Ms. Wendy Viveros is a 68 y.o. female, with PMH of HTN, T2DM, CKD-3, Anemia, chronic gout, prior CVA w/ right-sided weakness in 2021, who presented to [facility] ED on 4/23/24 due to progressive shortness of breath x 2 weeks. She notes associated b/l lower extremity edema and it worsens with exertion, she notes difficulty walking 2/2 leg swelling, she spends most of her time in bed, but her shortness of breath is worse when she is laying flat. She uses 4 pillows to prop herself up while sleeping. She states she used to take lasix, but does not currently. She states she has not followed with a doctor in recent years. She denied chest pain. She was evaluated in the ED with labs showing anemia with H&H of 9.4/27.6, and PLT of 140K. A metabolic panel showed elevated total bilirubin of 3.9. A BNP was elevated at 2536, with troponin of 0.051. A CXR showed increaed cardiac silhouette, but did not should pulmonary vascular congestion. She was treated in the ED with 40 mg IV lasix. She was admitted to inpatient status.         Overview/Hospital Course:  Wendy Viveros was admitted for acute CHF, CXR with cardiomegaly and BNP 2536. Started on lasix 20mg IVP with good urine output. Continue to wean supplemental O2. TTE with cCHFrEF 25%. Cardiology consulted, appreciate following along.  Valsartan and Jardiance added to regimen. Currently net -8.5L    Cardiology f/u at [place] as well as nephrology referral for CKD, home health and [facility].    Interval History: Assumed care of patient today.  She is sitting on the edge of  the bed to eat lunch with her legs down.  Reports she usually sits this way.  Overall she feels much better than when she was admitted, is breathing comfortably and able to walk a little.  Still has significant BLE edema.    Review of Systems   Constitutional:  Negative for chills and fever.   Respiratory:  Positive for shortness of breath. Negative for cough.    Cardiovascular:  Positive for leg swelling. Negative for chest pain and palpitations.     Objective:     Vital Signs (Most Recent):  Temp: 98.3 °F (36.8 °C) (04/30/24 1108)  Pulse: 69 (04/30/24 1120)  Resp: 16 (04/30/24 1108)  BP: 131/60 (04/30/24 1108)  SpO2: (!) 94 % (04/30/24 1108) Vital Signs (24h Range):  Temp:  [98.1 °F (36.7 °C)-98.5 °F (36.9 °C)] 98.3 °F (36.8 °C)  Pulse:  [69-95] 69  Resp:  [16-20] 16  SpO2:  [91 %-95 %] 94 %  BP: (129-148)/(60-67) 131/60     Weight: 98 kg (216 lb 0.8 oz)  Body mass index is 35.95 kg/m².    Intake/Output Summary (Last 24 hours) at 4/30/2024 1530  Last data filed at 4/30/2024 0634  Gross per 24 hour   Intake 350 ml   Output 2500 ml   Net -2150 ml         Physical Exam  Cardiovascular:      Rate and Rhythm: Normal rate and regular rhythm.      Heart sounds: Normal heart sounds. No murmur heard.     No gallop.   Pulmonary:      Effort: Pulmonary effort is normal.      Breath sounds: Normal breath sounds.   Abdominal:      General: Bowel sounds are normal.      Palpations: Abdomen is soft.   Musculoskeletal:      Right lower leg: Edema present.      Left lower leg: Edema present.      Comments: 2+ pitting edema BLE   Skin:     General: Skin is warm and dry.   Neurological:      General: No focal deficit present.      Mental Status: She is alert.   Psychiatric:         Mood and Affect: Mood normal.         Behavior: Behavior normal.             Significant Labs: All pertinent labs within the past 24 hours have been reviewed.    Significant Imaging: I have reviewed all pertinent imaging results/findings within the past  24 hours.    Assessment/Plan:      * Acute on chronic diastolic heart failure  - MsLeobardo Vivreos presents with shortness of breath  - she has b/l LE swelling, orthopnea, HENRY   - BNP is elevated w/ elevated troponin   - CXR without pulmonary edema but does show increased cardiac silhouette    - s/p 20 mg IV lasix   - continue lasix 20mg IVP  BID  - CHF pathways initiated   - trend troponin : flat  - last EF was 3/2/16 with ED 52%   - repeat TTE:  Grade II ddfx, rEF 25%, pulm HTN 83mmHg, CVP 15mmgHg  - net - 8.5L  - cardiology consulted, following; valsartan Jardiance added to regimen, titration  - will need fu at [place]        MDD (major depressive disorder), recurrent, in partial remission  - continue home meds      Hemiplegia affecting right dominant side   This patient has Chronic right hemiplegia due to stroke. Continue all standard measures for pressure injury prevention and consult wound care for any wounds (chronic or acute).    CKD (chronic kidney disease) stage 3, GFR 30-59 ml/min  Creatine stable for now. BMP reviewed- noted Estimated Creatinine Clearance: 38.5 mL/min (A) (based on SCr of 1.6 mg/dL (H)). according to latest data. Based on current GFR, CKD stage is stage 3 - GFR 30-59.  Monitor UOP and serial BMP and adjust therapy as needed. Renally dose meds. Avoid nephrotoxic medications and procedures.  Nephro referral to establish care at [place]    Thrombocytopenia  - improved compared to baseline   - no active bleeding   - monitor daily   - Will transfuse if platelet count is <50k (if undergoing surgical procedure or have active bleeding). Hold DVT prophylaxis if platelets are <50k. The patient's platelet results have been reviewed and are listed below.  Recent Labs   Lab 04/30/24  0443   *           Hyperlipidemia type II  - continue statin     Primary hypertension  Chronic, controlled. Latest blood pressure and vitals reviewed-     Temp:  [98.1 °F (36.7 °C)-99.2 °F (37.3 °C)]   Pulse:  [70-94]    Resp:  [18-20]   BP: (129-169)/(60-77)   SpO2:  [92 %-95 %] .   Home meds for hypertension were reviewed and noted below.   Hypertension Medications               hydroCHLOROthiazide (HYDRODIURIL) 25 MG tablet Take 1 tablet (25 mg total) by mouth once daily.    metoprolol succinate (TOPROL-XL) 50 MG 24 hr tablet Take 50 mg by mouth once daily.    NIFEdipine (PROCARDIA-XL) 90 MG (OSM) 24 hr tablet Take 1 tablet (90 mg total) by mouth 2 (two) times a day.            While in the hospital, will manage blood pressure as follows; Continue home antihypertensive regimen      VTE Risk Mitigation (From admission, onward)           Ordered     Place ADRIÁN hose  Until discontinued         04/29/24 1440     heparin (porcine) injection 5,000 Units  Every 8 hours         04/24/24 0206     IP VTE HIGH RISK PATIENT  Once         04/24/24 0206     Place sequential compression device  Until discontinued         04/24/24 0206     Place sequential compression device  Until discontinued         04/24/24 0148                    Discharge Planning   EL:      Code Status: Full Code   Is the patient medically ready for discharge?:     Reason for patient still in hospital (select all that apply): Patient trending condition and Consult recommendations  Discharge Plan A: Home with family, Home                  Ban Cope MD  Department of Hospital Medicine   [facility] ([place])

## 2024-04-30 NOTE — ASSESSMENT & PLAN NOTE
- Ms. Wendy Viveros presents with shortness of breath  - she has b/l LE swelling, orthopnea, HENRY   - BNP is elevated w/ elevated troponin   - CXR without pulmonary edema but does show increased cardiac silhouette    - s/p 20 mg IV lasix   - continue lasix 20mg IVP  BID  - CHF pathways initiated   - trend troponin : flat  - last EF was 3/2/16 with ED 52%   - repeat TTE:  Grade II ddfx, rEF 25%, pulm HTN 83mmHg, CVP 15mmgHg  - net - 8.5L  - cardiology consulted, following; valsartan Jardiance added to regimen, titration  - will need fu at [place]

## 2024-04-30 NOTE — ASSESSMENT & PLAN NOTE
Creatine stable for now. BMP reviewed- noted Estimated Creatinine Clearance: 38.5 mL/min (A) (based on SCr of 1.6 mg/dL (H)). according to latest data. Based on current GFR, CKD stage is stage 3 - GFR 30-59.  Monitor UOP and serial BMP and adjust therapy as needed. Renally dose meds. Avoid nephrotoxic medications and procedures.  Nephro referral to establish care at [place]

## 2024-05-01 LAB
ANION GAP SERPL CALC-SCNC: 11 MMOL/L (ref 8–16)
BUN SERPL-MCNC: 32 MG/DL (ref 8–23)
CALCIUM SERPL-MCNC: 9.6 MG/DL (ref 8.7–10.5)
CHLORIDE SERPL-SCNC: 100 MMOL/L (ref 95–110)
CO2 SERPL-SCNC: 31 MMOL/L (ref 23–29)
CREAT SERPL-MCNC: 1.6 MG/DL (ref 0.5–1.4)
EST. GFR  (NO RACE VARIABLE): 35 ML/MIN/1.73 M^2
GLUCOSE SERPL-MCNC: 102 MG/DL (ref 70–110)
POCT GLUCOSE: 99 MG/DL (ref 70–110)
POTASSIUM SERPL-SCNC: 3.7 MMOL/L (ref 3.5–5.1)
SODIUM SERPL-SCNC: 142 MMOL/L (ref 136–145)

## 2024-05-01 PROCEDURE — 99233 SBSQ HOSP IP/OBS HIGH 50: CPT | Mod: ,,, | Performed by: INTERNAL MEDICINE

## 2024-05-01 PROCEDURE — 25000003 PHARM REV CODE 250: Performed by: PHYSICIAN ASSISTANT

## 2024-05-01 PROCEDURE — 25000003 PHARM REV CODE 250: Performed by: NURSE PRACTITIONER

## 2024-05-01 PROCEDURE — 63600175 PHARM REV CODE 636 W HCPCS: Performed by: PHYSICIAN ASSISTANT

## 2024-05-01 PROCEDURE — 80048 BASIC METABOLIC PNL TOTAL CA: CPT | Performed by: PHYSICIAN ASSISTANT

## 2024-05-01 PROCEDURE — 94761 N-INVAS EAR/PLS OXIMETRY MLT: CPT

## 2024-05-01 PROCEDURE — 21400001 HC TELEMETRY ROOM

## 2024-05-01 PROCEDURE — 25000242 PHARM REV CODE 250 ALT 637 W/ HCPCS: Performed by: INTERNAL MEDICINE

## 2024-05-01 PROCEDURE — A4216 STERILE WATER/SALINE, 10 ML: HCPCS | Performed by: PHYSICIAN ASSISTANT

## 2024-05-01 PROCEDURE — 25000003 PHARM REV CODE 250: Performed by: INTERNAL MEDICINE

## 2024-05-01 PROCEDURE — 36415 COLL VENOUS BLD VENIPUNCTURE: CPT | Performed by: PHYSICIAN ASSISTANT

## 2024-05-01 RX ADMIN — FUROSEMIDE 20 MG: 10 INJECTION, SOLUTION INTRAMUSCULAR; INTRAVENOUS at 09:05

## 2024-05-01 RX ADMIN — HEPARIN SODIUM 5000 UNITS: 5000 INJECTION INTRAVENOUS; SUBCUTANEOUS at 06:05

## 2024-05-01 RX ADMIN — EMPAGLIFLOZIN 10 MG: 10 TABLET, FILM COATED ORAL at 10:05

## 2024-05-01 RX ADMIN — VALSARTAN 80 MG: 80 TABLET, FILM COATED ORAL at 09:05

## 2024-05-01 RX ADMIN — Medication 10 ML: at 03:05

## 2024-05-01 RX ADMIN — FOLIC ACID 1 MG: 1 TABLET ORAL at 09:05

## 2024-05-01 RX ADMIN — Medication 10 ML: at 06:05

## 2024-05-01 RX ADMIN — MICONAZOLE NITRATE: 20 POWDER TOPICAL at 09:05

## 2024-05-01 RX ADMIN — LIDOCAINE 5% 1 PATCH: 700 PATCH TOPICAL at 06:05

## 2024-05-01 RX ADMIN — Medication 10 ML: at 09:05

## 2024-05-01 RX ADMIN — SPIRONOLACTONE 25 MG: 25 TABLET, FILM COATED ORAL at 09:05

## 2024-05-01 RX ADMIN — METOPROLOL SUCCINATE 50 MG: 50 TABLET, EXTENDED RELEASE ORAL at 09:05

## 2024-05-01 RX ADMIN — HEPARIN SODIUM 5000 UNITS: 5000 INJECTION INTRAVENOUS; SUBCUTANEOUS at 03:05

## 2024-05-01 RX ADMIN — HEPARIN SODIUM 5000 UNITS: 5000 INJECTION INTRAVENOUS; SUBCUTANEOUS at 09:05

## 2024-05-01 RX ADMIN — ATORVASTATIN CALCIUM 80 MG: 20 TABLET, FILM COATED ORAL at 09:05

## 2024-05-01 NOTE — ASSESSMENT & PLAN NOTE
- Ms. Wendy Viveros presents with shortness of breath  - she has b/l LE swelling, orthopnea, HENRY   - BNP is elevated w/ elevated troponin   - CXR without pulmonary edema but does show increased cardiac silhouette    - s/p 20 mg IV lasix   - continue lasix 20mg IVP  BID  - CHF pathways initiated   - trend troponin : flat  - last EF was 3/2/16 with ED 52%   - repeat TTE:  Grade II ddfx, rEF 25%, pulm HTN 83mmHg, CVP 15mmHg  - net - 8.2L  - cardiology consulted, following; valsartan, Jardiance added to regimen, titration  - will need f/u at [place]

## 2024-05-01 NOTE — ASSESSMENT & PLAN NOTE
Creatine stable for now. BMP reviewed- noted Estimated Creatinine Clearance: 36.4 mL/min (A) (based on SCr of 1.6 mg/dL (H)). according to latest data. Based on current GFR, CKD stage is stage 3 - GFR 30-59.  Monitor UOP and serial BMP and adjust therapy as needed. Renally dose meds. Avoid nephrotoxic medications and procedures.  Nephro referral to establish care at [place]

## 2024-05-01 NOTE — PROGRESS NOTES
[facility] Medicine  Progress Note    Patient Name: Wendy Viveros  MRN: [id number]  Patient Class: IP- Inpatient   Admission Date: 4/23/2024  Length of Stay: 8 days  Attending Physician: Ban Richey MD  Primary Care Provider: Ashley Harrell MD        Subjective:     Principal Problem:Acute on chronic diastolic heart failure        HPI:  HPI by Wendy Banegas PA:  Ms. Wendy Viveros is a 68 y.o. female, with PMH of HTN, T2DM, CKD-3, Anemia, chronic gout, prior CVA w/ right-sided weakness in 2021, who presented to [facility] ED on 4/23/24 due to progressive shortness of breath x 2 weeks. She notes associated b/l lower extremity edema and it worsens with exertion, she notes difficulty walking 2/2 leg swelling, she spends most of her time in bed, but her shortness of breath is worse when she is laying flat. She uses 4 pillows to prop herself up while sleeping. She states she used to take lasix, but does not currently. She states she has not followed with a doctor in recent years. She denied chest pain. She was evaluated in the ED with labs showing anemia with H&H of 9.4/27.6, and PLT of 140K. A metabolic panel showed elevated total bilirubin of 3.9. A BNP was elevated at 2536, with troponin of 0.051. A CXR showed increaed cardiac silhouette, but did not should pulmonary vascular congestion. She was treated in the ED with 40 mg IV lasix. She was admitted to inpatient status.         Overview/Hospital Course:  Wendy Viveros was admitted for acute CHF, CXR with cardiomegaly and BNP 2536. Started on lasix 20mg IVP with good urine output. Continue to wean supplemental O2. TTE with cCHFrEF 25%. Cardiology consulted, appreciate following along.  Valsartan and Jardiance added to regimen. Currently net -8.2L    Cardiology f/u at [place] as well as nephrology referral for CKD, home health and [facility].    Interval History: Patient sitting on the edge of the bed with her legs down  again.  Reports right leg more swollen than the left.  Daughter on the phone - says this has been the case since patient's stroke which left her with right sided weakness.  Asking about PT/OT consults.    Review of Systems   Constitutional:  Negative for chills and fever.   Respiratory:  Positive for shortness of breath. Negative for cough.    Cardiovascular:  Positive for leg swelling. Negative for chest pain and palpitations.   Neurological:  Positive for weakness.        Chronic right sided weakness     Objective:     Vital Signs (Most Recent):  Temp: 98.2 °F (36.8 °C) (05/01/24 1517)  Pulse: 72 (05/01/24 1517)  Resp: 16 (05/01/24 1517)  BP: (!) 130/59 (05/01/24 1517)  SpO2: (!) 93 % (05/01/24 1517) Vital Signs (24h Range):  Temp:  [97.8 °F (36.6 °C)-98.6 °F (37 °C)] 98.2 °F (36.8 °C)  Pulse:  [67-85] 72  Resp:  [16-20] 16  SpO2:  [90 %-96 %] 93 %  BP: (114-143)/(57-75) 130/59     Weight: 88.2 kg (194 lb 7.1 oz)  Body mass index is 32.36 kg/m².    Intake/Output Summary (Last 24 hours) at 5/1/2024 1658  Last data filed at 5/1/2024 1206  Gross per 24 hour   Intake 840 ml   Output 1100 ml   Net -260 ml         Physical Exam  Cardiovascular:      Rate and Rhythm: Normal rate and regular rhythm.      Heart sounds: Normal heart sounds. No murmur heard.     No gallop.   Pulmonary:      Effort: Pulmonary effort is normal.      Breath sounds: Normal breath sounds.   Abdominal:      General: Bowel sounds are normal.      Palpations: Abdomen is soft.   Musculoskeletal:      Right lower leg: Edema present.      Left lower leg: Edema present.      Comments: 2+ pitting edema BLE, slightly worse on the right.   Skin:     General: Skin is warm and dry.   Neurological:      General: No focal deficit present.      Mental Status: She is alert.   Psychiatric:         Mood and Affect: Mood normal.         Behavior: Behavior normal.             Significant Labs: All pertinent labs within the past 24 hours have been  reviewed.    Significant Imaging: I have reviewed all pertinent imaging results/findings within the past 24 hours.    Assessment/Plan:      * Acute on chronic diastolic heart failure  - Ms. Wendy Viveros presents with shortness of breath  - she has b/l LE swelling, orthopnea, HENRY   - BNP is elevated w/ elevated troponin   - CXR without pulmonary edema but does show increased cardiac silhouette    - s/p 20 mg IV lasix   - continue lasix 20mg IVP  BID  - CHF pathways initiated   - trend troponin : flat  - last EF was 3/2/16 with ED 52%   - repeat TTE:  Grade II ddfx, rEF 25%, pulm HTN 83mmHg, CVP 15mmHg  - net - 8.2L  - cardiology consulted, following; valsartan, Jardiance added to regimen, titration  - will need f/u at [place]        MDD (major depressive disorder), recurrent, in partial remission  - continue home meds      Hemiplegia affecting right dominant side   This patient has Chronic right hemiplegia due to stroke. Continue all standard measures for pressure injury prevention and consult wound care for any wounds (chronic or acute).  Consult PT/OT    CKD (chronic kidney disease) stage 3, GFR 30-59 ml/min  Creatine stable for now. BMP reviewed- noted Estimated Creatinine Clearance: 36.4 mL/min (A) (based on SCr of 1.6 mg/dL (H)). according to latest data. Based on current GFR, CKD stage is stage 3 - GFR 30-59.  Monitor UOP and serial BMP and adjust therapy as needed. Renally dose meds. Avoid nephrotoxic medications and procedures.  Nephro referral to establish care at [place]    Thrombocytopenia  - improved compared to baseline   - History of ITP with hospitalization in November 2023 requiring prolonged steroid taper and treatment with eltrombopag.  - no active bleeding   - monitor daily   - Will transfuse if platelet count is <50k (if undergoing surgical procedure or have active bleeding). Hold DVT prophylaxis if platelets are <50k.     Hyperlipidemia type II  - continue statin     Primary hypertension  Chronic,  controlled. Latest blood pressure and vitals reviewed-     Temp:  [98.3 °F (36.8 °C)-98.6 °F (37 °C)]   Pulse:  [69-95]   Resp:  [16-20]   BP: (128-143)/(60-75)   SpO2:  [90 %-96 %] .   Home meds for hypertension were reviewed and noted below.   Hypertension Medications               hydroCHLOROthiazide (HYDRODIURIL) 25 MG tablet Take 1 tablet (25 mg total) by mouth once daily.    metoprolol succinate (TOPROL-XL) 50 MG 24 hr tablet Take 50 mg by mouth once daily.    NIFEdipine (PROCARDIA-XL) 90 MG (OSM) 24 hr tablet Take 1 tablet (90 mg total) by mouth 2 (two) times a day.            While in the hospital, will manage blood pressure as follows; Continue home antihypertensive regimen      VTE Risk Mitigation (From admission, onward)           Ordered     Place ADRIÁN hose  Until discontinued         04/29/24 1440     heparin (porcine) injection 5,000 Units  Every 8 hours         04/24/24 0206     IP VTE HIGH RISK PATIENT  Once         04/24/24 0206     Place sequential compression device  Until discontinued         04/24/24 0206     Place sequential compression device  Until discontinued         04/24/24 0148                    Discharge Planning   EL:      Code Status: Full Code   Is the patient medically ready for discharge?:     Reason for patient still in hospital (select all that apply): Patient trending condition and Consult recommendations  Discharge Plan A: Home with family   Discharge Delays: None known at this time              Ban Cope MD  Department of Hospital Medicine   [facility] S[facility] ([place])

## 2024-05-01 NOTE — SUBJECTIVE & OBJECTIVE
Interval History: Patient sitting on the edge of the bed with her legs down again.  Reports right leg more swollen than the left.  Daughter on the phone - says this has been the case since patient's stroke which left her with right sided weakness.  Asking about PT/OT consults.    Review of Systems   Constitutional:  Negative for chills and fever.   Respiratory:  Positive for shortness of breath. Negative for cough.    Cardiovascular:  Positive for leg swelling. Negative for chest pain and palpitations.   Neurological:  Positive for weakness.        Chronic right sided weakness     Objective:     Vital Signs (Most Recent):  Temp: 98.2 °F (36.8 °C) (05/01/24 1517)  Pulse: 72 (05/01/24 1517)  Resp: 16 (05/01/24 1517)  BP: (!) 130/59 (05/01/24 1517)  SpO2: (!) 93 % (05/01/24 1517) Vital Signs (24h Range):  Temp:  [97.8 °F (36.6 °C)-98.6 °F (37 °C)] 98.2 °F (36.8 °C)  Pulse:  [67-85] 72  Resp:  [16-20] 16  SpO2:  [90 %-96 %] 93 %  BP: (114-143)/(57-75) 130/59     Weight: 88.2 kg (194 lb 7.1 oz)  Body mass index is 32.36 kg/m².    Intake/Output Summary (Last 24 hours) at 5/1/2024 1658  Last data filed at 5/1/2024 1206  Gross per 24 hour   Intake 840 ml   Output 1100 ml   Net -260 ml         Physical Exam  Cardiovascular:      Rate and Rhythm: Normal rate and regular rhythm.      Heart sounds: Normal heart sounds. No murmur heard.     No gallop.   Pulmonary:      Effort: Pulmonary effort is normal.      Breath sounds: Normal breath sounds.   Abdominal:      General: Bowel sounds are normal.      Palpations: Abdomen is soft.   Musculoskeletal:      Right lower leg: Edema present.      Left lower leg: Edema present.      Comments: 2+ pitting edema BLE, slightly worse on the right.   Skin:     General: Skin is warm and dry.   Neurological:      General: No focal deficit present.      Mental Status: She is alert.   Psychiatric:         Mood and Affect: Mood normal.         Behavior: Behavior normal.             Significant  Labs: All pertinent labs within the past 24 hours have been reviewed.    Significant Imaging: I have reviewed all pertinent imaging results/findings within the past 24 hours.

## 2024-05-01 NOTE — ASSESSMENT & PLAN NOTE
Chronic, controlled. Latest blood pressure and vitals reviewed-     Temp:  [98.3 °F (36.8 °C)-98.6 °F (37 °C)]   Pulse:  [69-95]   Resp:  [16-20]   BP: (128-143)/(60-75)   SpO2:  [90 %-96 %] .   Home meds for hypertension were reviewed and noted below.   Hypertension Medications               hydroCHLOROthiazide (HYDRODIURIL) 25 MG tablet Take 1 tablet (25 mg total) by mouth once daily.    metoprolol succinate (TOPROL-XL) 50 MG 24 hr tablet Take 50 mg by mouth once daily.    NIFEdipine (PROCARDIA-XL) 90 MG (OSM) 24 hr tablet Take 1 tablet (90 mg total) by mouth 2 (two) times a day.            While in the hospital, will manage blood pressure as follows; Continue home antihypertensive regimen

## 2024-05-01 NOTE — ASSESSMENT & PLAN NOTE
- improved compared to baseline   - History of ITP with hospitalization in November 2023 requiring prolonged steroid taper and treatment with eltrombopag.  - no active bleeding   - monitor daily   - Will transfuse if platelet count is <50k (if undergoing surgical procedure or have active bleeding). Hold DVT prophylaxis if platelets are <50k.

## 2024-05-01 NOTE — ASSESSMENT & PLAN NOTE
This patient has Chronic right hemiplegia due to stroke. Continue all standard measures for pressure injury prevention and consult wound care for any wounds (chronic or acute).  Consult PT/OT

## 2024-05-01 NOTE — PROGRESS NOTES
[facility] ([place])  Cardiology  Progress Note    Patient Name: Wendy Viveros  MRN: [id number]  Admission Date: 4/23/2024  Hospital Length of Stay: 8 days  Code Status: Full Code   Attending Physician: Ban Richey MD   Primary Care Physician: Ashley Harrell MD  Expected Discharge Date:   Principal Problem:Acute on chronic diastolic heart failure    Subjective:     Brief HPI:    Wendy Viveros is a 68 y.o.female with hypertension and diabetes mellitus type 2. She is severely obese. She suffered a cerebrovascular accident in 2021 causing right sided weakness. She has thrombocytopenia. Beginning early 4/2024 she has accumulated fluid - she has developed edema of her legs, the abdominal girth has increased and she has become short of breath at rest. She presented to the emergency room on 4/24/2024 and was admitted.    Hospital Course:     4/24/2024: Echo: Moderately dilated left ventricle with severe systolic dysfunction. EF 25%. Mild LVH. Moderate diastolic dysfunction. LVEDP elevated. Mildly enlarged RV with severe systolic dysfunction. Severely enlarged LA. Moderate MR. Moderate to severe TR. SPAP 83 mmHg. RA 15 mmHg. Small pericardial effusion.     Diuresis.    Initiation of HF regimen.    Interval History:     Breathing well.     Edema of legs continues to improve.    Review of Systems   Constitutional: Positive for malaise/fatigue. Negative for chills and fever.   HENT:  Negative for nosebleeds.    Eyes:  Negative for vision loss in left eye and vision loss in right eye.   Cardiovascular:  Positive for dyspnea on exertion, leg swelling and paroxysmal nocturnal dyspnea. Negative for chest pain, orthopnea and palpitations.   Respiratory:  Negative for cough, hemoptysis, shortness of breath, sputum production and wheezing.    Hematologic/Lymphatic: Negative for bleeding problem. Does not bruise/bleed easily.   Skin:  Negative for color change and rash.   Musculoskeletal:  Positive for joint pain  (right ankle). Negative for muscle weakness and myalgias.   Gastrointestinal:  Positive for bloating. Negative for abdominal pain, heartburn, hematemesis, hematochezia, melena, nausea and vomiting.   Genitourinary:  Negative for hematuria.   Neurological:  Positive for weakness. Negative for dizziness, focal weakness, headaches, light-headedness and vertigo.   Psychiatric/Behavioral:  Positive for depression. Negative for altered mental status. The patient is not nervous/anxious.    Allergic/Immunologic: Negative for persistent infections.     Objective:     Vital Signs (Most Recent):  Temp: 98.6 °F (37 °C) (05/01/24 0729)  Pulse: 72 (05/01/24 0802)  Resp: 16 (05/01/24 0729)  BP: 124/60 (05/01/24 0729)  SpO2: (!) 92 % (05/01/24 0729) Vital Signs (24h Range):  Temp:  [98.3 °F (36.8 °C)-98.6 °F (37 °C)] 98.6 °F (37 °C)  Pulse:  [69-85] 72  Resp:  [16-20] 16  SpO2:  [90 %-96 %] 92 %  BP: (124-143)/(60-75) 124/60     Weight: 88.2 kg (194 lb 7.1 oz)  Body mass index is 32.36 kg/m².    SpO2: (!) 92 %         Intake/Output Summary (Last 24 hours) at 5/1/2024 0857  Last data filed at 4/30/2024 1808  Gross per 24 hour   Intake 540 ml   Output 400 ml   Net 140 ml       Lines/Drains/Airways       Drain  Duration             Female External Urinary Catheter w/ Suction 04/26/24 1845 4 days              Peripheral Intravenous Line  Duration                  Peripheral IV - Single Lumen 04/24/24 0757 20 G Right Antecubital 7 days                    Physical Exam  Constitutional:       General: She is not in acute distress.     Appearance: Normal appearance. She is well-developed. She is not ill-appearing or toxic-appearing.   Eyes:      Conjunctiva/sclera:      Right eye: Right conjunctiva is not injected. No hemorrhage.     Left eye: Left conjunctiva is not injected. No hemorrhage.  Neck:      Vascular: No JVD.   Cardiovascular:      Rate and Rhythm: Normal rate and regular rhythm.      Heart sounds: S1 normal and S2 normal. No  murmur heard.     Gallop present. S3 and S4 sounds present.   Pulmonary:      Effort: Pulmonary effort is normal.      Breath sounds: Normal breath sounds.   Chest:      Chest wall: No swelling or tenderness.   Abdominal:      Tenderness: There is no abdominal tenderness.   Musculoskeletal:      Right lower leg: Swelling present. 2+ Pitting Edema present.      Left lower leg: Swelling present. 2+ Pitting Edema present.   Skin:     General: Skin is warm and dry.      Findings: No rash.   Neurological:      General: No focal deficit present.      Mental Status: She is alert and oriented to person, place, and time. She is not disoriented.   Psychiatric:         Attention and Perception: Attention normal.         Mood and Affect: Mood normal.         Speech: Speech normal.         Behavior: Behavior normal.         Thought Content: Thought content normal.         Cognition and Memory: Cognition and memory normal.         Judgment: Judgment normal.         Current Medications:    Current Facility-Administered Medications   Medication Dose Route Frequency    atorvastatin  80 mg Oral Daily    empagliflozin  10 mg Oral Daily    folic acid  1 mg Oral Daily    furosemide (LASIX) injection  20 mg Intravenous Q12H    heparin (porcine)  5,000 Units Subcutaneous Q8H    LIDOcaine  1 patch Transdermal Q24H    metoprolol succinate  50 mg Oral Daily    miconazole NITRATE 2 %   Topical (Top) BID    sodium chloride 0.9%  10 mL Intravenous Q8H    spironolactone  25 mg Oral Daily    valsartan  80 mg Oral Daily     Current Laboratory Results:    Recent Results (from the past 24 hour(s))   POCT glucose    Collection Time: 04/30/24 11:06 AM   Result Value Ref Range    POCT Glucose 91 70 - 110 mg/dL   POCT glucose    Collection Time: 04/30/24  3:38 PM   Result Value Ref Range    POCT Glucose 93 70 - 110 mg/dL   Basic metabolic panel    Collection Time: 05/01/24  4:55 AM   Result Value Ref Range    Sodium 142 136 - 145 mmol/L    Potassium 3.7  3.5 - 5.1 mmol/L    Chloride 100 95 - 110 mmol/L    CO2 31 (H) 23 - 29 mmol/L    Glucose 102 70 - 110 mg/dL    BUN 32 (H) 8 - 23 mg/dL    Creatinine 1.6 (H) 0.5 - 1.4 mg/dL    Calcium 9.6 8.7 - 10.5 mg/dL    Anion Gap 11 8 - 16 mmol/L    eGFR 35 (A) >60 mL/min/1.73 m^2     Current Imaging Results:    X-Ray Chest AP   Final Result   Abnormal      Increase in size of the cardiac silhouette.      This report was flagged in Epic as abnormal.         Electronically signed by: Dulce Maria Rodriguez   Date:    04/23/2024   Time:    18:48          4/24/2024: Echo:  Left Ventricle: The left ventricle is moderately dilated. Moderately increased wall thickness. There is mild concentric hypertrophy. Severe global hypokinesis present. There is reduced systolic function. Ejection fraction by visual approximation is 25%. Grade II diastolic dysfunction. Elevated left ventricular filling pressure. Tissue Doppler velocity is reduced.  Right Ventricle: Mild right ventricular enlargement. Wall thickness is normal. Right ventricle wall motion has global hypokinesis. Systolic function is severely reduced.  Left Atrium: Left atrium is severely dilated.  Right Atrium: Right atrium is severely dilated.  Aortic Valve: The aortic valve is a trileaflet valve. There is mild aortic valve sclerosis. Mildly restricted motion.  Mitral Valve: There is moderate regurgitation with a centrally directed jet.  Tricuspid Valve: There is moderate to severe regurgitation with a centrally directed jet.  Pulmonary Artery: There is severe pulmonary hypertension. The estimated pulmonary artery systolic pressure is 83 mmHg.  IVC/SVC: Elevated venous pressure at 15 mmHg.  Pericardium: There is a small circumferential effusion.        ECG: SR.    Assessment and Plan:     Problem List:    Active Diagnoses:    Diagnosis Date Noted POA    PRINCIPAL PROBLEM:  Acute on chronic diastolic heart failure [I50.33] 10/19/2020 Yes    MDD (major depressive disorder), recurrent, in  "partial remission [F33.41] 04/24/2024 Yes    Hemiplegia affecting right dominant side [G81.91] 04/24/2024 Yes    CKD (chronic kidney disease) stage 3, GFR 30-59 ml/min [N18.30] 02/20/2018 Yes     Chronic    Thrombocytopenia [D69.6] 07/13/2017 Yes     Chronic    Primary hypertension [I10] 07/31/2012 Yes     Chronic    Hyperlipidemia type II [E78.01] 07/31/2012 Yes     Chronic      Problems Resolved During this Admission:       Assessment and Plan:     Heart Failure, Systolic, Acute              4/24/2024: Presents fluid overloaded in HF.              4/24/2024: Echo: Moderately dilated left ventricle with severe systolic dysfunction. EF 25%. Mild LVH. Moderate diastolic dysfunction. LVEDP elevated. Mildly enlarged RV with sever systolic dysfunction. Severely enlarged LA. Moderate MR. Moderate to severe TR. SPAP 83 mmHg. RA 15 mmHg. Small pericardial effusion.    Betablocker, CHNAELL blockade, MCA & SGLT2i.   4/26/2024: Valsartan 40 mg Q24 was begun. She appears to have had an reaction to ACEI in the past. Accordingly no ACEI or ARNI.  4/30/2024: Valsartan 40 mg Q24 was increased to valsartan to 80 mg Q24.   On metoprolol 50 mg Q24, empagliflozin 10 mg Q24, valsartan 80 mg Q24 and spironolactone 25 mg Q24.   On furosemide 20 mg iv "Q12".   Good diuresis. Still fluid overloaded.              Continue diuresis.  F/u BNP.       2. Pulmonary Hypertension  4/24/2024: Echo: SPAP 83 mmHg.   Most certainly due to left heart disease.     3. 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     4. Hypertension              2005: Diagnosed.              At home been on metoprolol 50 mg Q24, nifedipine 90 mg Q24 and hctz 25 mg Q24.    On metoprolol 50 mg Q24, empagliflozin 10 mg Q24, valsartan 40 mg Q24 and spironolactone 25 mg Q24.    5. Hypercholesterolemia              On atorvastatin 80 mg Q24.     6. Diabetes Mellitus, Type 2  2005: Diagnosed. Complications: CVA. Medications: Diet.     7. Severe " Obesity              4/25/2024: Weight 98 kg. BMI 36.    8. Chronic Kidney Disease, Stage 3   4/27/2024: BUN/crea 24/1.5. eGFR 37. K 4.1.     9. 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              4/23/2024: Plts 140.              Dr. Joao Howard.    10. Anemia   4/30/2024: H/H 7.7/22.6%. MCV 93.   Significant decline.   Consider further evaluation.     11. Primary Care              Dr. Ashley Harrell.     VTE Risk Mitigation (From admission, onward)           Ordered     Place ADRIÁN hose  Until discontinued         04/29/24 1440     heparin (porcine) injection 5,000 Units  Every 8 hours         04/24/24 0206     IP VTE HIGH RISK PATIENT  Once         04/24/24 0206     Place sequential compression device  Until discontinued         04/24/24 0206     Place sequential compression device  Until discontinued         04/24/24 0148                    Ismael Covarrubias MD  Cardiology  [facility] ([place])

## 2024-05-01 NOTE — PLAN OF CARE
05/01/24 1010   Discharge Reassessment   Assessment Type Discharge Planning Reassessment   Did the patient's condition or plan change since previous assessment? No   Discharge Plan discussed with: Patient   Discharge Plan A Home with family   Discharge Plan B Home with family   DME Needed Upon Discharge  none   Transition of Care Barriers None   Why the patient remains in the hospital Requires continued medical care   Post-Acute Status   Discharge Delays None known at this time

## 2024-05-02 LAB
ANION GAP SERPL CALC-SCNC: 10 MMOL/L (ref 8–16)
BASOPHILS # BLD AUTO: 0.03 K/UL (ref 0–0.2)
BASOPHILS NFR BLD: 0.4 % (ref 0–1.9)
BNP SERPL-MCNC: 944 PG/ML (ref 0–99)
BUN SERPL-MCNC: 29 MG/DL (ref 8–23)
CALCIUM SERPL-MCNC: 9.5 MG/DL (ref 8.7–10.5)
CHLORIDE SERPL-SCNC: 100 MMOL/L (ref 95–110)
CO2 SERPL-SCNC: 30 MMOL/L (ref 23–29)
CREAT SERPL-MCNC: 1.5 MG/DL (ref 0.5–1.4)
DIFFERENTIAL METHOD BLD: ABNORMAL
EOSINOPHIL # BLD AUTO: 0.2 K/UL (ref 0–0.5)
EOSINOPHIL NFR BLD: 1.8 % (ref 0–8)
ERYTHROCYTE [DISTWIDTH] IN BLOOD BY AUTOMATED COUNT: 19.6 % (ref 11.5–14.5)
EST. GFR  (NO RACE VARIABLE): 37 ML/MIN/1.73 M^2
GLUCOSE SERPL-MCNC: 106 MG/DL (ref 70–110)
HCT VFR BLD AUTO: 24.5 % (ref 37–48.5)
HGB BLD-MCNC: 8.5 G/DL (ref 12–16)
IMM GRANULOCYTES # BLD AUTO: 0.03 K/UL (ref 0–0.04)
IMM GRANULOCYTES NFR BLD AUTO: 0.4 % (ref 0–0.5)
LYMPHOCYTES # BLD AUTO: 2.4 K/UL (ref 1–4.8)
LYMPHOCYTES NFR BLD: 29.2 % (ref 18–48)
MCH RBC QN AUTO: 32.2 PG (ref 27–31)
MCHC RBC AUTO-ENTMCNC: 34.7 G/DL (ref 32–36)
MCV RBC AUTO: 93 FL (ref 82–98)
MONOCYTES # BLD AUTO: 0.9 K/UL (ref 0.3–1)
MONOCYTES NFR BLD: 11.5 % (ref 4–15)
NEUTROPHILS # BLD AUTO: 4.6 K/UL (ref 1.8–7.7)
NEUTROPHILS NFR BLD: 56.7 % (ref 38–73)
NRBC BLD-RTO: 0 /100 WBC
PLATELET # BLD AUTO: 174 K/UL (ref 150–450)
PMV BLD AUTO: 10.4 FL (ref 9.2–12.9)
POTASSIUM SERPL-SCNC: 3.6 MMOL/L (ref 3.5–5.1)
RBC # BLD AUTO: 2.64 M/UL (ref 4–5.4)
SODIUM SERPL-SCNC: 140 MMOL/L (ref 136–145)
WBC # BLD AUTO: 8.15 K/UL (ref 3.9–12.7)

## 2024-05-02 PROCEDURE — 97530 THERAPEUTIC ACTIVITIES: CPT

## 2024-05-02 PROCEDURE — 25000003 PHARM REV CODE 250: Performed by: INTERNAL MEDICINE

## 2024-05-02 PROCEDURE — 97162 PT EVAL MOD COMPLEX 30 MIN: CPT

## 2024-05-02 PROCEDURE — 63600175 PHARM REV CODE 636 W HCPCS: Performed by: INTERNAL MEDICINE

## 2024-05-02 PROCEDURE — 85025 COMPLETE CBC W/AUTO DIFF WBC: CPT | Performed by: INTERNAL MEDICINE

## 2024-05-02 PROCEDURE — 63600175 PHARM REV CODE 636 W HCPCS: Performed by: PHYSICIAN ASSISTANT

## 2024-05-02 PROCEDURE — 99233 SBSQ HOSP IP/OBS HIGH 50: CPT | Mod: ,,, | Performed by: INTERNAL MEDICINE

## 2024-05-02 PROCEDURE — 25000003 PHARM REV CODE 250: Performed by: NURSE PRACTITIONER

## 2024-05-02 PROCEDURE — 11000001 HC ACUTE MED/SURG PRIVATE ROOM

## 2024-05-02 PROCEDURE — 36415 COLL VENOUS BLD VENIPUNCTURE: CPT | Performed by: PHYSICIAN ASSISTANT

## 2024-05-02 PROCEDURE — 25000003 PHARM REV CODE 250: Performed by: PHYSICIAN ASSISTANT

## 2024-05-02 PROCEDURE — 25000242 PHARM REV CODE 250 ALT 637 W/ HCPCS: Performed by: INTERNAL MEDICINE

## 2024-05-02 PROCEDURE — 94761 N-INVAS EAR/PLS OXIMETRY MLT: CPT

## 2024-05-02 PROCEDURE — 97165 OT EVAL LOW COMPLEX 30 MIN: CPT

## 2024-05-02 PROCEDURE — 83880 ASSAY OF NATRIURETIC PEPTIDE: CPT | Performed by: INTERNAL MEDICINE

## 2024-05-02 PROCEDURE — 97535 SELF CARE MNGMENT TRAINING: CPT

## 2024-05-02 PROCEDURE — A4216 STERILE WATER/SALINE, 10 ML: HCPCS | Performed by: PHYSICIAN ASSISTANT

## 2024-05-02 PROCEDURE — 80048 BASIC METABOLIC PNL TOTAL CA: CPT | Performed by: PHYSICIAN ASSISTANT

## 2024-05-02 RX ORDER — VALSARTAN 80 MG/1
160 TABLET ORAL DAILY
Status: DISCONTINUED | OUTPATIENT
Start: 2024-05-02 | End: 2024-05-05 | Stop reason: HOSPADM

## 2024-05-02 RX ORDER — FUROSEMIDE 40 MG/1
40 TABLET ORAL DAILY
Status: DISCONTINUED | OUTPATIENT
Start: 2024-05-03 | End: 2024-05-05 | Stop reason: HOSPADM

## 2024-05-02 RX ADMIN — HEPARIN SODIUM 5000 UNITS: 5000 INJECTION INTRAVENOUS; SUBCUTANEOUS at 05:05

## 2024-05-02 RX ADMIN — LIDOCAINE 5% 1 PATCH: 700 PATCH TOPICAL at 05:05

## 2024-05-02 RX ADMIN — HEPARIN SODIUM 5000 UNITS: 5000 INJECTION INTRAVENOUS; SUBCUTANEOUS at 08:05

## 2024-05-02 RX ADMIN — SPIRONOLACTONE 25 MG: 25 TABLET, FILM COATED ORAL at 09:05

## 2024-05-02 RX ADMIN — Medication 10 ML: at 05:05

## 2024-05-02 RX ADMIN — ATORVASTATIN CALCIUM 80 MG: 20 TABLET, FILM COATED ORAL at 09:05

## 2024-05-02 RX ADMIN — FUROSEMIDE 20 MG: 10 INJECTION, SOLUTION INTRAMUSCULAR; INTRAVENOUS at 08:05

## 2024-05-02 RX ADMIN — Medication 10 ML: at 08:05

## 2024-05-02 RX ADMIN — FUROSEMIDE 20 MG: 10 INJECTION, SOLUTION INTRAMUSCULAR; INTRAVENOUS at 09:05

## 2024-05-02 RX ADMIN — FOLIC ACID 1 MG: 1 TABLET ORAL at 09:05

## 2024-05-02 RX ADMIN — MICONAZOLE NITRATE: 20 POWDER TOPICAL at 09:05

## 2024-05-02 RX ADMIN — METOPROLOL SUCCINATE 50 MG: 50 TABLET, EXTENDED RELEASE ORAL at 09:05

## 2024-05-02 RX ADMIN — EMPAGLIFLOZIN 10 MG: 10 TABLET, FILM COATED ORAL at 09:05

## 2024-05-02 RX ADMIN — MICONAZOLE NITRATE: 20 POWDER TOPICAL at 08:05

## 2024-05-02 RX ADMIN — VALSARTAN 160 MG: 80 TABLET, FILM COATED ORAL at 09:05

## 2024-05-02 RX ADMIN — HEPARIN SODIUM 5000 UNITS: 5000 INJECTION INTRAVENOUS; SUBCUTANEOUS at 01:05

## 2024-05-02 NOTE — PLAN OF CARE
Problem: Physical Therapy  Goal: Physical Therapy Goal  Description: Goals to be met by: 24    Patient will increase functional independence with mobility by performin. Supine<>sit with supervision without use of HB features.   2. Sit<>stand with supervision with RW.  3. Gait x 100 feet with supervision with RW.  4. Ascend/descend 10 step(s) with least restrictive assistive device and uni HR with cane.   Outcome: Progressing     Patient evaluated by PT and goals established. Patient reports increased RLE weakness causing several falls in recent weeks. Demo's transfers with RW and CGA and short ambulation bouts with RW and SBA without LOB. PT will continue to follow and progress as tolerated. Rec for dc to Low Intensity Therapy - HH progressing to OP PT. Please see progress note for detailed plan of care and recommendations.

## 2024-05-02 NOTE — PT/OT/SLP EVAL
Physical Therapy Evaluation and Treatment    Patient Name:  Wendy Viveros   MRN:  [id number]    Recommendations:     Discharge Recommendations: Low Intensity Therapy (HH progressing to OP neuro PT)   Discharge Equipment Recommendations: bedside commode   Barriers to discharge: Inaccessible home    Assessment:     Wendy Viveros is a 68 y.o. female admitted with a medical diagnosis of Acute on chronic diastolic heart failure - EF 25%. Pmhx pertinent for arthritis, gout, DM-T2, CVA (2021) with residual R weakness.  She presents with the following impairments/functional limitations: weakness, impaired endurance, impaired self care skills, impaired functional mobility, gait instability, impaired balance, decreased coordination, decreased upper extremity function, decreased lower extremity function, decreased safety awareness, decreased ROM, abnormal tone, impaired cardiopulmonary response to activity.    Patient evaluated by PT and goals established. Patient reports increased RLE weakness causing several falls in recent weeks. Demo's transfers with RW and CGA and short ambulation bouts with RW and SBA without LOB. PT will continue to follow and progress as tolerated. Rec for dc to Low Intensity Therapy - HH progressing to OP PT.      Rehab Prognosis: Fair; patient would benefit from acute skilled PT services to address these deficits and reach maximum level of function.    Recent Surgery: * No surgery found *      Plan:     During this hospitalization, patient to be seen 4 x/week to address the identified rehab impairments via gait training, therapeutic activities, therapeutic exercises, neuromuscular re-education and progress toward the following goals:    Plan of Care Expires:  05/16/24    Subjective     Chief Complaint: RLE weaker than usual  Patient/Family Comments/goals: Goal to return home and stop falling; Patient agreeable to evaluation.  Pain/Comfort:  Pain Rating 1: 0/10  Pain Rating Post-Intervention 1:  0/10    Patients cultural, spiritual, Holiness conflicts given the current situation: no    Living Environment:  Pt lives with her dtr and young g.son in a raised apartment with 15 steps to enter and handrail(s).   Pt has a tub shower with a regular toilet.   Upon discharge, patient will have assistance from her dtr PRN.  Prior level of function:  Ambulation: Mod(I) with RW for household ambulation, uses cane for stairs  ADL's: Mod(I)  IADLs: Mod(I)  Falls: reports 2 falls in past 3 months with RLE giving way  Equipment used at home: bath bench, walker, rolling, cane, straight.      Objective:     Communicated with KEIRA Levine prior to session.  Patient found up in chair with peripheral IVVirgen  upon PT entry to room.    General Precautions: Standard, fall  Orthopedic Precautions:N/A   Braces: N/A  Respiratory Status: Room air    Patient donned non slip socks and gait belt for OOB mobility.    Exams:  Cognitive Exam:  Patient is oriented to Person, Place, Time, and Situation  Gross Motor Coordination:  WFL  Postural Exam:  Patient presented with the following abnormalities:    -       Rounded shoulders  -       Forward head  -       Affected scapula depressed  RLE ROM: WFL except limited knee flexion  RLE Strength: Hip flexion 2/5, knee extension 3-/5  LLE ROM: WFL  LLE Strength: WFL  Spo2: Pre ambulation 95% post ambulation 90%    Functional Mobility:  Transfers:     Sit to Stand:  contact guard assistance with rolling walker  Gait: 2x30 ft with RW and CGA progressing to SBA. Step to gait with decreased stance time of RLE, decreased foot clearance of RLE during swing, and hyperextension of R knee during stance. No overt knee buckling suspect 2/2 hyperextension. Pt denies pain with hyperextension.  Balance: Static standing x30 sec with UE support and without sway.      AM-PAC 6 CLICK MOBILITY  Total Score:18       Treatment & Education:  TA:  `Cueing for transfers and use of pillow to elevate surface  2nd gait  bout for strengthening and increased endruance    Patient left up in chair with all lines intact, call button in reach, and RN notified.    GOALS:   Multidisciplinary Problems       Physical Therapy Goals          Problem: Physical Therapy    Goal Priority Disciplines Outcome Goal Variances Interventions   Physical Therapy Goal     PT, PT/OT Progressing     Description: Goals to be met by: 24    Patient will increase functional independence with mobility by performin. Supine<>sit with supervision without use of HB features.   2. Sit<>stand with supervision with RW.  3. Gait x 100 feet with supervision with RW.  4. Ascend/descend 10 step(s) with least restrictive assistive device and uni HR with cane.                        History:     Past Medical History:   Diagnosis Date    Abnormal EKG     Anemia 2017    Aortic valve regurgitation     Arthritis     CKD (chronic kidney disease) stage 2, GFR 60-89 ml/min 2014    CKD (chronic kidney disease) stage 2, GFR 60-89 ml/min 2014    CVA (cerebral vascular accident)      w/ residual R sided weakness    Diabetes mellitus     Diabetes mellitus type II     GERD (gastroesophageal reflux disease)     Gout, unspecified     Heart murmur     Hyperlipidemia     Hypertension     Tendonitis        Past Surgical History:   Procedure Laterality Date    breast reduction      CHOLECYSTECTOMY      JOINT REPLACEMENT      KNEE SURGERY  2018    POLYPECTOMY  2016    TOTAL REDUCTION MAMMOPLASTY      TOTAL SHOULDER ARTHROPLASTY Left     TUBAL LIGATION         Time Tracking:     PT Received On: 24  PT Start Time: 1038     PT Stop Time: 1058  PT Total Time (min): 20 min     Billable Minutes: Evaluation 12 and Therapeutic Activity 8      2024

## 2024-05-02 NOTE — ASSESSMENT & PLAN NOTE
- Ms. Wendy Viveros presents with shortness of breath  - she has b/l LE swelling, orthopnea, HENRY   - BNP is elevated w/ elevated troponin   - CXR without pulmonary edema but does show increased cardiac silhouette   - continue lasix 20mg IVP  BID  - CHF pathways initiated   - trend troponin : flat  - last EF was 3/2/16 with ED 52%   - repeat TTE: Grade II ddfx, rEF 25%, pulm HTN 83mmHg, CVP 15mmHg  - net - 8.4L  - cardiology consulted, following; valsartan, Jardiance added to regimen, titration  - will need close f/u at dc

## 2024-05-02 NOTE — ASSESSMENT & PLAN NOTE
Chronic, controlled. Latest blood pressure and vitals reviewed-     Temp:  [97.8 °F (36.6 °C)-98.6 °F (37 °C)]   Pulse:  [50-87]   Resp:  [15-18]   BP: (114-135)/(56-63)   SpO2:  [92 %-97 %] .   Home meds for hypertension were reviewed and noted below.   Hypertension Medications               hydroCHLOROthiazide (HYDRODIURIL) 25 MG tablet Take 1 tablet (25 mg total) by mouth once daily.    metoprolol succinate (TOPROL-XL) 50 MG 24 hr tablet Take 50 mg by mouth once daily.    NIFEdipine (PROCARDIA-XL) 90 MG (OSM) 24 hr tablet Take 1 tablet (90 mg total) by mouth 2 (two) times a day.            While in the hospital, will manage blood pressure as follows; Continue home antihypertensive regimen

## 2024-05-02 NOTE — CARE UPDATE
05/02/24 0832   PRE-TX-O2   Device (Oxygen Therapy) room air   SpO2 (!) 92 %   Pulse Oximetry Type Intermittent   $ Pulse Oximetry - Multiple Charge Pulse Oximetry - Multiple

## 2024-05-02 NOTE — PROGRESS NOTES
[facility] Medicine  Progress Note    Patient Name: Wendy Viveros  MRN: [id number]  Patient Class: IP- Inpatient   Admission Date: 4/23/2024  Length of Stay: 9 days  Attending Physician: Ban Richey MD  Primary Care Provider: Ashley Harrell MD        Subjective:     Principal Problem:Acute on chronic diastolic heart failure        HPI:  HPI by Wendy Banegas PA:  Ms. Wendy Viveros is a 68 y.o. female, with PMH of HTN, T2DM, CKD-3, Anemia, chronic gout, prior CVA w/ right-sided weakness in 2021, who [place] [facility] [place]/23/24 due to progressive shortness of breath x 2 weeks. She notes associated b/l lower extremity edema and it worsens with exertion, she notes difficulty walking 2/2 leg swelling, she spends most of her time in bed, but her shortness of breath is worse when she is laying flat. She uses 4 pillows to prop herself up while sleeping. She states she used to take lasix, but does not currently. She states she has not followed with a doctor in recent years. She denied chest pain. She was evaluated in the ED with labs showing anemia with H&H of 9.4/27.6, and PLT of 140K. A metabolic panel showed elevated total bilirubin of 3.9. A BNP was elevated at 2536, with troponin of 0.051. A CXR showed increaed cardiac silhouette, but did not should pulmonary vascular congestion. She was treated in the ED with 40 mg IV lasix. She was admitted to inpatient status.         Overview/Hospital Course:  Wendy Viveros was admitted for acute CHF, CXR with cardiomegaly and BNP 2536. Started on lasix 20mg IVP with good urine output. Continue to wean supplemental O2. TTE with cCHFrEF 25%. Cardiology consulted, appreciate following along.  Valsartan and Jardiance added to regimen. Currently net -8.1L    Cardiology f/u at [place] as well as nephrology referral for CKD, home health and [facility].    Interval History: Patient in recliner with her legs up.  Swelling improved with  legs elevated (usually has them down).  Fluid balance decreasing, diuretic dose increased.  She has started therapy.    Review of Systems   Constitutional:  Negative for chills and fever.   Respiratory:  Positive for shortness of breath. Negative for cough.    Cardiovascular:  Positive for leg swelling. Negative for chest pain and palpitations.   Neurological:  Positive for weakness.        Chronic right sided weakness     Objective:     Vital Signs (Most Recent):  Temp: 97.9 °F (36.6 °C) (05/02/24 1136)  Pulse: 64 (05/02/24 1136)  Resp: 16 (05/02/24 1136)  BP: (!) 119/56 (05/02/24 1136)  SpO2: (!) 94 % (05/02/24 1136) Vital Signs (24h Range):  Temp:  [97.9 °F (36.6 °C)-98.5 °F (36.9 °C)] 97.9 °F (36.6 °C)  Pulse:  [50-87] 64  Resp:  [15-18] 16  SpO2:  [92 %-97 %] 94 %  BP: (119-135)/(56-63) 119/56     Weight: 88.2 kg (194 lb 7.1 oz)  Body mass index is 32.36 kg/m².    Intake/Output Summary (Last 24 hours) at 5/2/2024 1509  Last data filed at 5/2/2024 0805  Gross per 24 hour   Intake 900 ml   Output 950 ml   Net -50 ml         Physical Exam  Cardiovascular:      Rate and Rhythm: Normal rate and regular rhythm.      Heart sounds: Normal heart sounds. No murmur heard.     No gallop.   Pulmonary:      Effort: Pulmonary effort is normal.      Breath sounds: Normal breath sounds.   Abdominal:      General: Bowel sounds are normal.      Palpations: Abdomen is soft.   Musculoskeletal:      Right lower leg: Edema present.      Left lower leg: Edema present.      Comments: 2+ pitting edema BLE, slightly worse on the right.   Skin:     General: Skin is warm and dry.   Neurological:      General: No focal deficit present.      Mental Status: She is alert.   Psychiatric:         Mood and Affect: Mood normal.         Behavior: Behavior normal.             Significant Labs: All pertinent labs within the past 24 hours have been reviewed.    Significant Imaging: I have reviewed all pertinent imaging results/findings within the past  24 hours.    Assessment/Plan:      * Acute on chronic diastolic heart failure  - Ms. Wendy Viveros presents with shortness of breath  - she has b/l LE swelling, orthopnea, HENRY   - BNP is elevated w/ elevated troponin   - CXR without pulmonary edema but does show increased cardiac silhouette   - continue lasix 20mg IVP  BID  - CHF pathways initiated   - trend troponin : flat  - last EF was 3/2/16 with ED 52%   - repeat TTE: Grade II ddfx, rEF 25%, pulm HTN 83mmHg, CVP 15mmHg  - net - 8.4L  - cardiology consulted, following; valsartan, Jardiance added to regimen, titration  - will need close f/u at [place]        MDD (major depressive disorder), recurrent, in partial remission  - continue home meds      Hemiplegia affecting right dominant side   This patient has Chronic right hemiplegia due to stroke. Continue all standard measures for pressure injury prevention and consult wound care for any wounds (chronic or acute).  Consult PT/OT    CKD (chronic kidney disease) stage 3, GFR 30-59 ml/min  Creatine stable for now. BMP reviewed- noted Estimated Creatinine Clearance: 38.8 mL/min (A) (based on SCr of 1.5 mg/dL (H)). according to latest data. Based on current GFR, CKD stage is stage 3 - GFR 30-59.  Monitor UOP and serial BMP and adjust therapy as needed. Renally dose meds. Avoid nephrotoxic medications and procedures.  Nephro referral to establish care at [place]    Thrombocytopenia  - improved compared to baseline   - History of ITP with hospitalization in November 2023 requiring prolonged steroid taper and treatment with eltrombopag.  - no active bleeding   - monitor daily   - Will transfuse if platelet count is <50k (if undergoing surgical procedure or have active bleeding). Hold DVT prophylaxis if platelets are <50k.     Hyperlipidemia type II  - continue statin     Primary hypertension  Chronic, controlled. Latest blood pressure and vitals reviewed-     Temp:  [97.8 °F (36.6 °C)-98.6 °F (37 °C)]   Pulse:  [50-87]   Resp:   [15-18]   BP: (114-135)/(56-63)   SpO2:  [92 %-97 %] .   Home meds for hypertension were reviewed and noted below.   Hypertension Medications               hydroCHLOROthiazide (HYDRODIURIL) 25 MG tablet Take 1 tablet (25 mg total) by mouth once daily.    metoprolol succinate (TOPROL-XL) 50 MG 24 hr tablet Take 50 mg by mouth once daily.    NIFEdipine (PROCARDIA-XL) 90 MG (OSM) 24 hr tablet Take 1 tablet (90 mg total) by mouth 2 (two) times a day.            While in the hospital, will manage blood pressure as follows; Continue home antihypertensive regimen      VTE Risk Mitigation (From admission, onward)           Ordered     Place ADRIÁN hose  Until discontinued         04/29/24 1440     heparin (porcine) injection 5,000 Units  Every 8 hours         04/24/24 0206     IP VTE HIGH RISK PATIENT  Once         04/24/24 0206     Place sequential compression device  Until discontinued         04/24/24 0206     Place sequential compression device  Until discontinued         04/24/24 0148                    Discharge Planning   EL:      Code Status: Full Code   Is the patient medically ready for discharge?:     Reason for patient still in hospital (select all that apply): Patient trending condition and Consult recommendations  Discharge Plan A: Home with family   Discharge Delays: None known at this time              Ban Cope MD  Department of Hospital Medicine   [facility] ([place])

## 2024-05-02 NOTE — PLAN OF CARE
Problem: Occupational Therapy  Goal: Occupational Therapy Goal  Description: Goals to be met by: 5/18/24     Patient will increase functional independence with ADLs by performing:    UB dressing at Set up/SBA.  LB dressing at Set up/SBA.  Toileting at SBA.  Toilet transfers at SBA.   Grooming at SBA standing at sink.  Sponge bathing at Set up/SBA.    Outcome: Progressing  Anticipate pt discharging to home with assist of daughter and Low Intensity Therapy.

## 2024-05-02 NOTE — ASSESSMENT & PLAN NOTE
Creatine stable for now. BMP reviewed- noted Estimated Creatinine Clearance: 38.8 mL/min (A) (based on SCr of 1.5 mg/dL (H)). according to latest data. Based on current GFR, CKD stage is stage 3 - GFR 30-59.  Monitor UOP and serial BMP and adjust therapy as needed. Renally dose meds. Avoid nephrotoxic medications and procedures.  Nephro referral to establish care at [place]

## 2024-05-02 NOTE — PLAN OF CARE
Medicare Message     Important Message from Medicare regarding Discharge Appeal Rights Given to patient/caregiver; Explained to patient/caregiver; Other (comments)Important Message from Medicare regarding Discharge Appeal Rights. Given to patient/caregiver; Explained to patient/caregiver; Other (comments). The comment is Verbal Consent. Taken on 4/25/24 1016 Explained to patient/caregiver; Signed/date by patient/caregiver Explained to patient/caregiver; Signed/date by patient/caregiver   Date IMM was signed 4/25/2024 4/30/2024 5/2/2024   Time IMM was signed [id number]

## 2024-05-02 NOTE — PROGRESS NOTES
[facility] ([place])  Cardiology  Progress Note    Patient Name: Wendy Viveros  MRN: [id number]  Admission Date: 4/23/2024  Hospital Length of Stay: 9 days  Code Status: Full Code   Attending Physician: Ban Richey MD   Primary Care Physician: Ashley Harrell MD  Expected Discharge Date:   Principal Problem:Acute on chronic diastolic heart failure    Subjective:     Brief HPI:    Wendy Viveros is a 68 y.o.female with hypertension and diabetes mellitus type 2. She is severely obese. She suffered a cerebrovascular accident in 2021 causing right sided weakness. She has thrombocytopenia. Beginning early 4/2024 she has accumulated fluid - she has developed edema of her legs, the abdominal girth has increased and she has become short of breath at rest. She presented to the emergency room on 4/24/2024 and was admitted.    Hospital Course:     4/24/2024: Echo: Moderately dilated left ventricle with severe systolic dysfunction. EF 25%. Mild LVH. Moderate diastolic dysfunction. LVEDP elevated. Mildly enlarged RV with severe systolic dysfunction. Severely enlarged LA. Moderate MR. Moderate to severe TR. SPAP 83 mmHg. RA 15 mmHg. Small pericardial effusion.     Diuresis.    Initiation of HF regimen.    Interval History:     Breathing well.     Edema of legs continues to improve.    Review of Systems   Constitutional: Positive for malaise/fatigue. Negative for chills and fever.   HENT:  Negative for nosebleeds.    Eyes:  Negative for vision loss in left eye and vision loss in right eye.   Cardiovascular:  Positive for dyspnea on exertion and leg swelling. Negative for chest pain, orthopnea, palpitations and paroxysmal nocturnal dyspnea.   Respiratory:  Negative for cough, hemoptysis, shortness of breath, sputum production and wheezing.    Hematologic/Lymphatic: Negative for bleeding problem. Does not bruise/bleed easily.   Skin:  Negative for color change and rash.   Musculoskeletal:  Negative for joint  pain, muscle weakness and myalgias.   Gastrointestinal:  Positive for bloating. Negative for abdominal pain, heartburn, hematemesis, hematochezia, melena, nausea and vomiting.   Genitourinary:  Negative for hematuria.   Neurological:  Positive for weakness. Negative for dizziness, focal weakness, headaches, light-headedness and vertigo.   Psychiatric/Behavioral:  Positive for depression. Negative for altered mental status. The patient is not nervous/anxious.    Allergic/Immunologic: Negative for persistent infections.     Objective:     Vital Signs (Most Recent):  Temp: 98.4 °F (36.9 °C) (05/02/24 0506)  Pulse: 83 (05/02/24 0506)  Resp: 18 (05/02/24 0506)  BP: 133/62 (05/02/24 0506)  SpO2: (!) 94 % (05/02/24 0506) Vital Signs (24h Range):  Temp:  [97.8 °F (36.6 °C)-98.5 °F (36.9 °C)] 98.4 °F (36.9 °C)  Pulse:  [50-87] 83  Resp:  [15-18] 18  SpO2:  [93 %-97 %] 94 %  BP: (114-135)/(56-63) 133/62     Weight: 88.2 kg (194 lb 7.1 oz)  Body mass index is 32.36 kg/m².    SpO2: (!) 94 %         Intake/Output Summary (Last 24 hours) at 5/2/2024 0800  Last data filed at 5/2/2024 0500  Gross per 24 hour   Intake 960 ml   Output 1650 ml   Net -690 ml       Lines/Drains/Airways       Drain  Duration             Female External Urinary Catheter w/ Suction 04/26/24 1845 5 days              Peripheral Intravenous Line  Duration                  Peripheral IV - Single Lumen 04/24/24 0757 20 G Right Antecubital 8 days                    Physical Exam  Constitutional:       General: She is not in acute distress.     Appearance: Normal appearance. She is well-developed. She is not ill-appearing or toxic-appearing.   Eyes:      Conjunctiva/sclera:      Right eye: Right conjunctiva is not injected. No hemorrhage.     Left eye: Left conjunctiva is not injected. No hemorrhage.  Neck:      Vascular: No JVD.   Cardiovascular:      Rate and Rhythm: Normal rate and regular rhythm.      Heart sounds: S1 normal and S2 normal. No murmur heard.      Gallop present. S3 and S4 sounds present.   Pulmonary:      Effort: Pulmonary effort is normal.      Breath sounds: Normal breath sounds.   Chest:      Chest wall: No swelling or tenderness.   Abdominal:      Tenderness: There is no abdominal tenderness.   Musculoskeletal:      Right lower leg: Swelling present. 2+ Pitting Edema present.      Left lower leg: Swelling present. 2+ Pitting Edema present.   Skin:     General: Skin is warm and dry.      Findings: No rash.   Neurological:      General: No focal deficit present.      Mental Status: She is alert and oriented to person, place, and time. She is not disoriented.   Psychiatric:         Attention and Perception: Attention normal.         Mood and Affect: Mood normal.         Speech: Speech normal.         Behavior: Behavior normal.         Thought Content: Thought content normal.         Cognition and Memory: Cognition and memory normal.         Judgment: Judgment normal.         Current Medications:    Current Facility-Administered Medications   Medication Dose Route Frequency    atorvastatin  80 mg Oral Daily    empagliflozin  10 mg Oral Daily    folic acid  1 mg Oral Daily    furosemide (LASIX) injection  20 mg Intravenous Q12H    heparin (porcine)  5,000 Units Subcutaneous Q8H    LIDOcaine  1 patch Transdermal Q24H    metoprolol succinate  50 mg Oral Daily    miconazole NITRATE 2 %   Topical (Top) BID    sodium chloride 0.9%  10 mL Intravenous Q8H    spironolactone  25 mg Oral Daily    valsartan  80 mg Oral Daily     Current Laboratory Results:    Recent Results (from the past 24 hour(s))   POCT glucose    Collection Time: 05/01/24  9:07 AM   Result Value Ref Range    POCT Glucose 99 70 - 110 mg/dL   Basic metabolic panel    Collection Time: 05/02/24  4:29 AM   Result Value Ref Range    Sodium 140 136 - 145 mmol/L    Potassium 3.6 3.5 - 5.1 mmol/L    Chloride 100 95 - 110 mmol/L    CO2 30 (H) 23 - 29 mmol/L    Glucose 106 70 - 110 mg/dL    BUN 29 (H) 8 - 23  mg/dL    Creatinine 1.5 (H) 0.5 - 1.4 mg/dL    Calcium 9.5 8.7 - 10.5 mg/dL    Anion Gap 10 8 - 16 mmol/L    eGFR 37 (A) >60 mL/min/1.73 m^2   CBC Auto Differential    Collection Time: 05/02/24  4:29 AM   Result Value Ref Range    WBC 8.15 3.90 - 12.70 K/uL    RBC 2.64 (L) 4.00 - 5.40 M/uL    Hemoglobin 8.5 (L) 12.0 - 16.0 g/dL    Hematocrit 24.5 (L) 37.0 - 48.5 %    MCV 93 82 - 98 fL    MCH 32.2 (H) 27.0 - 31.0 pg    MCHC 34.7 32.0 - 36.0 g/dL    RDW 19.6 (H) 11.5 - 14.5 %    Platelets 174 150 - 450 K/uL    MPV 10.4 9.2 - 12.9 fL    Immature Granulocytes 0.4 0.0 - 0.5 %    Gran # (ANC) 4.6 1.8 - 7.7 K/uL    Immature Grans (Abs) 0.03 0.00 - 0.04 K/uL    Lymph # 2.4 1.0 - 4.8 K/uL    Mono # 0.9 0.3 - 1.0 K/uL    Eos # 0.2 0.0 - 0.5 K/uL    Baso # 0.03 0.00 - 0.20 K/uL    nRBC 0 0 /100 WBC    Gran % 56.7 38.0 - 73.0 %    Lymph % 29.2 18.0 - 48.0 %    Mono % 11.5 4.0 - 15.0 %    Eosinophil % 1.8 0.0 - 8.0 %    Basophil % 0.4 0.0 - 1.9 %    Differential Method Automated    BNP    Collection Time: 05/02/24  4:29 AM   Result Value Ref Range     (H) 0 - 99 pg/mL     Current Imaging Results:    X-Ray Chest AP   Final Result   Abnormal      Increase in size of the cardiac silhouette.      This report was flagged in Epic as abnormal.         Electronically signed by: Dulce Maria Rodriguez   Date:    04/23/2024   Time:    18:48          4/24/2024: Echo:  Left Ventricle: The left ventricle is moderately dilated. Moderately increased wall thickness. There is mild concentric hypertrophy. Severe global hypokinesis present. There is reduced systolic function. Ejection fraction by visual approximation is 25%. Grade II diastolic dysfunction. Elevated left ventricular filling pressure. Tissue Doppler velocity is reduced.  Right Ventricle: Mild right ventricular enlargement. Wall thickness is normal. Right ventricle wall motion has global hypokinesis. Systolic function is severely reduced.  Left Atrium: Left atrium is severely  dilated.  Right Atrium: Right atrium is severely dilated.  Aortic Valve: The aortic valve is a trileaflet valve. There is mild aortic valve sclerosis. Mildly restricted motion.  Mitral Valve: There is moderate regurgitation with a centrally directed jet.  Tricuspid Valve: There is moderate to severe regurgitation with a centrally directed jet.  Pulmonary Artery: There is severe pulmonary hypertension. The estimated pulmonary artery systolic pressure is 83 mmHg.  IVC/SVC: Elevated venous pressure at 15 mmHg.  Pericardium: There is a small circumferential effusion.        ECG: SR.    Assessment and Plan:     Problem List:    Active Diagnoses:    Diagnosis Date Noted POA    PRINCIPAL PROBLEM:  Acute on chronic diastolic heart failure [I50.33] 10/19/2020 Yes    MDD (major depressive disorder), recurrent, in partial remission [F33.41] 04/24/2024 Yes    Hemiplegia affecting right dominant side [G81.91] 04/24/2024 Yes    CKD (chronic kidney disease) stage 3, GFR 30-59 ml/min [N18.30] 02/20/2018 Yes     Chronic    Thrombocytopenia [D69.6] 07/13/2017 Yes     Chronic    Primary hypertension [I10] 07/31/2012 Yes     Chronic    Hyperlipidemia type II [E78.01] 07/31/2012 Yes     Chronic      Problems Resolved During this Admission:       Assessment and Plan:     Heart Failure, Systolic, Acute              4/24/2024: Presents fluid overloaded in HF.              4/24/2024: Echo: Moderately dilated left ventricle with severe systolic dysfunction. EF 25%. Mild LVH. Moderate diastolic dysfunction. LVEDP elevated. Mildly enlarged RV with sever systolic dysfunction. Severely enlarged LA. Moderate MR. Moderate to severe TR. SPAP 83 mmHg. RA 15 mmHg. Small pericardial effusion.    Betablocker, CHANELL blockade, MCA & SGLT2i.   4/26/2024: Valsartan 40 mg Q24 was begun. She appears to have had an reaction to ACEI in the past. Accordingly no ACEI or ARNI.  4/30/2024: Valsartan 40 mg Q24 was increased to valsartan to 80 mg Q24.   On metoprolol  "50 mg Q24, empagliflozin 10 mg Q24, valsartan 80 mg Q24 and spironolactone 25 mg Q24.   On furosemide 20 mg iv "Q12".   5/2/2024: .   Good diuresis. Still fluid overloaded.              Continue diuresis.   5/2/2024: Valsartan 80 mg Q24 to be increased to valsartan 160 mg Q24.  5/3/2024: Plan furosemide 40 mg Q24.       2. Pulmonary Hypertension  4/24/2024: Echo: SPAP 83 mmHg.   Most certainly due to left heart disease.     3. 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     4. Hypertension              2005: Diagnosed.              At home been on metoprolol 50 mg Q24, nifedipine 90 mg Q24 and hctz 25 mg Q24.    On metoprolol 50 mg Q24, empagliflozin 10 mg Q24, valsartan 160 mg Q24, spironolactone 25 mg Q24 and furosemide 40 mg Q24.    5. Hypercholesterolemia              On atorvastatin 80 mg Q24.     6. Diabetes Mellitus, Type 2  2005: Diagnosed. Complications: CVA. Medications: Diet.     7. Severe Obesity              4/25/2024: Weight 98 kg. BMI 36.    8. Chronic Kidney Disease, Stage 3   4/27/2024: BUN/crea 24/1.5. eGFR 37. K 4.1.     9. 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              4/23/2024: Plts 140.              Dr. Joao Howard.    10. Anemia   4/30/2024: H/H 7.7/22.6%. MCV 93.   Significant decline.   Consider further evaluation.     11. Primary Care              Dr. Ashley Harrell.     VTE Risk Mitigation (From admission, onward)           Ordered     Place ADRIÁN hose  Until discontinued         04/29/24 1440     heparin (porcine) injection 5,000 Units  Every 8 hours         04/24/24 0206     IP VTE HIGH RISK PATIENT  Once         04/24/24 0206     Place sequential compression device  Until discontinued         04/24/24 0206     Place sequential compression device  Until discontinued         04/24/24 0148                    Ismael Covarrubias MD  Cardiology  [facility] ([place])  "

## 2024-05-02 NOTE — PT/OT/SLP EVAL
Occupational Therapy   Evaluation and Treatment    Name: Wendy Viveros  MRN: [id number]  Admitting Diagnosis: Acute on chronic diastolic heart failure  Recent Surgery: * No surgery found *      Recommendations:     Discharge Recommendations: Low Intensity Therapy (Assist from family)  Discharge Equipment Recommendations:  bedside commode  Barriers to discharge:   (Current functional level)    Assessment:     Wendy Viveros is a 68 y.o. female with a medical diagnosis of Acute on chronic diastolic heart failure.  Pt has history of CVA with right hemiplegia in 2021.  She presents with the following performance deficits affecting function: weakness, impaired endurance, impaired self care skills, impaired functional mobility, gait instability, impaired balance, impaired sensation, decreased ROM, abnormal tone, decreased safety awareness, decreased lower extremity function, decreased upper extremity function, edema, impaired coordination, impaired fine motor, impaired cognition.      Rehab Prognosis:  Fair/Good to return to [facility] ; patient would benefit from acute skilled OT services to address these deficits and reach maximum level of function.       Plan:     Patient to be seen 5 x/week to address the above listed problems via self-care/home management, therapeutic activities, therapeutic exercises  Plan of Care Expires: 06/01/24  Plan of Care Reviewed with: patient    Subjective     Chief Complaint: Her right leg gives out on her.   Patient/Family Comments/goals: To return home and get more therapy.  Pt stating that she had a hard time getting to Outpatient Therapy in the past.    Occupational Profile:  Living Environment: Lives in [facility] with daughter and 3 y/o son, handrail on one side of stairs going to second floor, tub/shower  Previous level of function: Pt reporting she was Mod I with dressing and toileting but needed some assist with showering, walked with SPC or RW, falls due to right LE giving way  Roles  and Routines: Mother, grandmother, used to work in   Equipment Used at Home: cane, straight, walker, rolling, bath bench  Assistance upon Discharge: Daughter, who works from home.      Pain/Comfort:  Pain Rating 1: 0/10  Pain Rating Post-Intervention 1: 0/10    Patients cultural, spiritual, Spiritism conflicts given the current situation: no    Objective:     Communicated with: nurse, Julianna, prior to session.  Patient found up in chair with Pure wick, peripheral IV upon OT entry to room.  Pt incontinent of bladder and bowel but pt not aware.    General Precautions: Standard, fall (Right lona)  Orthopedic Precautions: N/A  Braces: N/A  Respiratory Status: Room air    Occupational Performance:    Bed Mobility:    NT    Functional Mobility/Transfers:  Sit to stand: CGA   Chair<>BSC: CGA with RW  Functional Mobility: No LOB, slow pace, hyperextension of right knee    Activities of Daily Living:  UB Dressing: Min A  LB Dressing: Min A   Toileting: Min A    Cognitive/Visual Perceptual:  Cognitive/Psychosocial Skills:     -       Oriented to: Person, Place, Time, and Situation   -       Follows Commands/attention: Follows 75% of one-step commands  -       Communication: clear/fluent  -       Memory: Impaired  -       Safety awareness/insight to disability: impaired   -       Mood/Affect/Coping skills/emotional control: Cooperative and Pleasant    Physical Exam:  Right UE Function: Abnormal tone (hypotonicity) right UE from CVA, limited movement in right shoulder, elbow flexion/extension 3-/5,  3-/5, right hand dominant, Coordination is Poor; Pt incorporates right UE into functional tasks without cues but with significant functional deficits  Left UE Function: Elbow flexion/extension 5/5,  4/5, Coordination is WFL for self care tasks  Sitting Balance: SBA  Standing Balance: CGA with bilateral UE support  Sensation: Impaired right UE  Skin: Visible skin intact  Edema: Bilateral LE     AMPAC 6 Click  ADL:  AMPAC Total Score: 19    Treatment & Education:  Role of OT, POC, safety with ADL and ADL mobility, use of call button, discharge recs     Patient left up in chair with all lines intact and call button in reach    GOALS:   Multidisciplinary Problems       Occupational Therapy Goals          Problem: Occupational Therapy    Goal Priority Disciplines Outcome Interventions   Occupational Therapy Goal     OT, PT/OT Progressing    Description: Goals to be met by: 5/18/24     Patient will increase functional independence with ADLs by performing:    UB dressing at Set up/SBA.  LB dressing at Set up/SBA.  Toileting at SBA.  Toilet transfers at SBA.   Grooming at SBA standing at sink.  Sponge bathing at Set up/SBA.                         History:     Past Medical History:   Diagnosis Date    Abnormal EKG     Anemia 07/13/2017    Aortic valve regurgitation     Arthritis     CKD (chronic kidney disease) stage 2, GFR 60-89 ml/min 08/08/2014    CKD (chronic kidney disease) stage 2, GFR 60-89 ml/min 08/08/2014    CVA (cerebral vascular accident)     2021 w/ residual R sided weakness    Diabetes mellitus     Diabetes mellitus type II     GERD (gastroesophageal reflux disease)     Gout, unspecified     Heart murmur     Hyperlipidemia     Hypertension     Tendonitis          Past Surgical History:   Procedure Laterality Date    breast reduction      CHOLECYSTECTOMY      JOINT REPLACEMENT      KNEE SURGERY  2018    POLYPECTOMY  05/05/2016    TOTAL REDUCTION MAMMOPLASTY      TOTAL SHOULDER ARTHROPLASTY Left     TUBAL LIGATION         Time Tracking:     OT Date of Treatment: 05/02/24  OT Start Time: 1150  OT Stop Time: 1226  OT Total Time (min): 36 min    Billable Minutes:Evaluation 12  Self Care/Home Management 24    5/2/2024

## 2024-05-02 NOTE — PLAN OF CARE
Problem: Adult Inpatient Plan of Care  Goal: Plan of Care Review  Outcome: Progressing  Goal: Patient-Specific Goal (Individualized)  Outcome: Progressing  Goal: Absence of Hospital-Acquired Illness or Injury  Outcome: Progressing  Goal: Optimal Comfort and Wellbeing  Outcome: Progressing  Goal: Readiness for Transition of Care  Outcome: Progressing     Problem: Skin Injury Risk Increased  Goal: Skin Health and Integrity  Outcome: Progressing

## 2024-05-02 NOTE — SUBJECTIVE & OBJECTIVE
Interval History: Patient in recliner with her legs up.  Swelling improved with legs elevated (usually has them down).  Fluid balance decreasing, diuretic dose increased.  She has started therapy.    Review of Systems   Constitutional:  Negative for chills and fever.   Respiratory:  Positive for shortness of breath. Negative for cough.    Cardiovascular:  Positive for leg swelling. Negative for chest pain and palpitations.   Neurological:  Positive for weakness.        Chronic right sided weakness     Objective:     Vital Signs (Most Recent):  Temp: 97.9 °F (36.6 °C) (05/02/24 1136)  Pulse: 64 (05/02/24 1136)  Resp: 16 (05/02/24 1136)  BP: (!) 119/56 (05/02/24 1136)  SpO2: (!) 94 % (05/02/24 1136) Vital Signs (24h Range):  Temp:  [97.9 °F (36.6 °C)-98.5 °F (36.9 °C)] 97.9 °F (36.6 °C)  Pulse:  [50-87] 64  Resp:  [15-18] 16  SpO2:  [92 %-97 %] 94 %  BP: (119-135)/(56-63) 119/56     Weight: 88.2 kg (194 lb 7.1 oz)  Body mass index is 32.36 kg/m².    Intake/Output Summary (Last 24 hours) at 5/2/2024 1509  Last data filed at 5/2/2024 0805  Gross per 24 hour   Intake 900 ml   Output 950 ml   Net -50 ml         Physical Exam  Cardiovascular:      Rate and Rhythm: Normal rate and regular rhythm.      Heart sounds: Normal heart sounds. No murmur heard.     No gallop.   Pulmonary:      Effort: Pulmonary effort is normal.      Breath sounds: Normal breath sounds.   Abdominal:      General: Bowel sounds are normal.      Palpations: Abdomen is soft.   Musculoskeletal:      Right lower leg: Edema present.      Left lower leg: Edema present.      Comments: 2+ pitting edema BLE, slightly worse on the right.   Skin:     General: Skin is warm and dry.   Neurological:      General: No focal deficit present.      Mental Status: She is alert.   Psychiatric:         Mood and Affect: Mood normal.         Behavior: Behavior normal.             Significant Labs: All pertinent labs within the past 24 hours have been reviewed.    Significant  Imaging: I have reviewed all pertinent imaging results/findings within the past 24 hours.

## 2024-05-03 ENCOUNTER — TELEPHONE (OUTPATIENT)
Dept: CARDIOLOGY | Facility: CLINIC | Age: 69
End: 2024-05-03
Payer: MEDICARE

## 2024-05-03 DIAGNOSIS — I50.43 ACUTE ON CHRONIC COMBINED SYSTOLIC AND DIASTOLIC HEART FAILURE: Primary | ICD-10-CM

## 2024-05-03 DIAGNOSIS — N18.31 STAGE 3A CHRONIC KIDNEY DISEASE: Chronic | ICD-10-CM

## 2024-05-03 LAB
ANION GAP SERPL CALC-SCNC: 11 MMOL/L (ref 8–16)
BUN SERPL-MCNC: 27 MG/DL (ref 8–23)
CALCIUM SERPL-MCNC: 9.3 MG/DL (ref 8.7–10.5)
CHLORIDE SERPL-SCNC: 100 MMOL/L (ref 95–110)
CO2 SERPL-SCNC: 31 MMOL/L (ref 23–29)
CREAT SERPL-MCNC: 1.6 MG/DL (ref 0.5–1.4)
EST. GFR  (NO RACE VARIABLE): 35 ML/MIN/1.73 M^2
GLUCOSE SERPL-MCNC: 89 MG/DL (ref 70–110)
POTASSIUM SERPL-SCNC: 3.6 MMOL/L (ref 3.5–5.1)
SODIUM SERPL-SCNC: 142 MMOL/L (ref 136–145)

## 2024-05-03 PROCEDURE — 36415 COLL VENOUS BLD VENIPUNCTURE: CPT | Performed by: PHYSICIAN ASSISTANT

## 2024-05-03 PROCEDURE — A4216 STERILE WATER/SALINE, 10 ML: HCPCS | Performed by: PHYSICIAN ASSISTANT

## 2024-05-03 PROCEDURE — 25000003 PHARM REV CODE 250: Performed by: INTERNAL MEDICINE

## 2024-05-03 PROCEDURE — 97535 SELF CARE MNGMENT TRAINING: CPT | Mod: CO

## 2024-05-03 PROCEDURE — 94761 N-INVAS EAR/PLS OXIMETRY MLT: CPT

## 2024-05-03 PROCEDURE — 25000003 PHARM REV CODE 250: Performed by: PHYSICIAN ASSISTANT

## 2024-05-03 PROCEDURE — 99233 SBSQ HOSP IP/OBS HIGH 50: CPT | Mod: ,,, | Performed by: INTERNAL MEDICINE

## 2024-05-03 PROCEDURE — 25000242 PHARM REV CODE 250 ALT 637 W/ HCPCS: Performed by: INTERNAL MEDICINE

## 2024-05-03 PROCEDURE — 80048 BASIC METABOLIC PNL TOTAL CA: CPT | Performed by: PHYSICIAN ASSISTANT

## 2024-05-03 PROCEDURE — 25000003 PHARM REV CODE 250: Performed by: NURSE PRACTITIONER

## 2024-05-03 PROCEDURE — 63600175 PHARM REV CODE 636 W HCPCS: Performed by: PHYSICIAN ASSISTANT

## 2024-05-03 PROCEDURE — 97530 THERAPEUTIC ACTIVITIES: CPT | Mod: CQ

## 2024-05-03 PROCEDURE — 97116 GAIT TRAINING THERAPY: CPT | Mod: CQ

## 2024-05-03 PROCEDURE — 11000001 HC ACUTE MED/SURG PRIVATE ROOM

## 2024-05-03 RX ADMIN — Medication 10 ML: at 09:05

## 2024-05-03 RX ADMIN — HEPARIN SODIUM 5000 UNITS: 5000 INJECTION INTRAVENOUS; SUBCUTANEOUS at 09:05

## 2024-05-03 RX ADMIN — ATORVASTATIN CALCIUM 80 MG: 20 TABLET, FILM COATED ORAL at 08:05

## 2024-05-03 RX ADMIN — MICONAZOLE NITRATE: 20 POWDER TOPICAL at 08:05

## 2024-05-03 RX ADMIN — HEPARIN SODIUM 5000 UNITS: 5000 INJECTION INTRAVENOUS; SUBCUTANEOUS at 02:05

## 2024-05-03 RX ADMIN — LIDOCAINE 5% 1 PATCH: 700 PATCH TOPICAL at 05:05

## 2024-05-03 RX ADMIN — METOPROLOL SUCCINATE 50 MG: 50 TABLET, EXTENDED RELEASE ORAL at 08:05

## 2024-05-03 RX ADMIN — HEPARIN SODIUM 5000 UNITS: 5000 INJECTION INTRAVENOUS; SUBCUTANEOUS at 05:05

## 2024-05-03 RX ADMIN — MICONAZOLE NITRATE: 20 POWDER TOPICAL at 09:05

## 2024-05-03 RX ADMIN — FOLIC ACID 1 MG: 1 TABLET ORAL at 08:05

## 2024-05-03 RX ADMIN — Medication 10 ML: at 02:05

## 2024-05-03 RX ADMIN — FUROSEMIDE 40 MG: 40 TABLET ORAL at 08:05

## 2024-05-03 RX ADMIN — EMPAGLIFLOZIN 10 MG: 10 TABLET, FILM COATED ORAL at 08:05

## 2024-05-03 RX ADMIN — SPIRONOLACTONE 25 MG: 25 TABLET, FILM COATED ORAL at 08:05

## 2024-05-03 RX ADMIN — VALSARTAN 160 MG: 80 TABLET, FILM COATED ORAL at 08:05

## 2024-05-03 NOTE — ASSESSMENT & PLAN NOTE
Creatine mildly elevated over baseline on presentation and has remained stable on diuretics and with the initiation of Jardiance.  Will need nephrology referral on discharge for CKD IIIb.    Continue to monitor UOP and serial BMP and adjust therapy as needed. Renally dose meds. Avoid nephrotoxic medications and procedures.

## 2024-05-03 NOTE — PLAN OF CARE
Problem: Adult Inpatient Plan of Care  Goal: Plan of Care Review  Outcome: Progressing  Goal: Patient-Specific Goal (Individualized)  Outcome: Progressing

## 2024-05-03 NOTE — PROGRESS NOTES
[facility] ([place])  Cardiology  Progress Note    Patient Name: Wendy Viveros  MRN: [id number]  Admission Date: 4/23/2024  Hospital Length of Stay: 10 days  Code Status: Full Code   Attending Physician: Ban Richey MD   Primary Care Physician: Ashley Harrell MD  Expected Discharge Date:   Principal Problem:Acute on chronic combined systolic and diastolic heart failure    Subjective:     Brief HPI:    Wendy Viveros is a 68 y.o.female with hypertension and diabetes mellitus type 2. She is severely obese. She suffered a cerebrovascular accident in 2021 causing right sided weakness. She has thrombocytopenia. Beginning early 4/2024 she has accumulated fluid - she has developed edema of her legs, the abdominal girth has increased and she has become short of breath at rest. She presented to the emergency room on 4/24/2024 and was admitted.    Hospital Course:     4/24/2024: Echo: Moderately dilated left ventricle with severe systolic dysfunction. EF 25%. Mild LVH. Moderate diastolic dysfunction. LVEDP elevated. Mildly enlarged RV with severe systolic dysfunction. Severely enlarged LA. Moderate MR. Moderate to severe TR. SPAP 83 mmHg. RA 15 mmHg. Small pericardial effusion.     Diuresis.    Initiation of HF regimen.    5/3/2024: On oral regimen.    Interval History:     Breathing well.     Still some edema of legs but much improved    Review of Systems   Constitutional: Negative for chills, fever and malaise/fatigue.   HENT:  Negative for nosebleeds.    Eyes:  Negative for vision loss in left eye and vision loss in right eye.   Cardiovascular:  Positive for leg swelling. Negative for chest pain, dyspnea on exertion, orthopnea, palpitations and paroxysmal nocturnal dyspnea.   Respiratory:  Negative for cough, hemoptysis, shortness of breath, sputum production and wheezing.    Hematologic/Lymphatic: Negative for bleeding problem. Does not bruise/bleed easily.   Skin:  Negative for color change and rash.    Musculoskeletal:  Negative for joint pain, muscle weakness and myalgias.   Gastrointestinal:  Negative for bloating, abdominal pain, heartburn, hematemesis, hematochezia, melena, nausea and vomiting.   Genitourinary:  Negative for hematuria.   Neurological:  Positive for weakness. Negative for dizziness, focal weakness, headaches, light-headedness and vertigo.   Psychiatric/Behavioral:  Positive for depression. Negative for altered mental status. The patient is not nervous/anxious.    Allergic/Immunologic: Negative for persistent infections.     Objective:     Vital Signs (Most Recent):  Temp: 98.7 °F (37.1 °C) (05/03/24 1211)  Pulse: 73 (05/03/24 1211)  Resp: 18 (05/03/24 1211)  BP: 139/77 (05/03/24 1211)  SpO2: (!) 94 % (05/03/24 1211) Vital Signs (24h Range):  Temp:  [97 °F (36.1 °C)-99.3 °F (37.4 °C)] 98.7 °F (37.1 °C)  Pulse:  [73-86] 73  Resp:  [16-18] 18  SpO2:  [93 %-99 %] 94 %  BP: (116-161)/(60-77) 139/77     Weight: 88.2 kg (194 lb 7.1 oz)  Body mass index is 32.36 kg/m².    SpO2: (!) 94 %         Intake/Output Summary (Last 24 hours) at 5/3/2024 1257  Last data filed at 5/3/2024 0900  Gross per 24 hour   Intake 520 ml   Output 1700 ml   Net -1180 ml       Lines/Drains/Airways       Drain  Duration             Female External Urinary Catheter w/ Suction 04/26/24 1845 6 days              Peripheral Intravenous Line  Duration                  Peripheral IV - Single Lumen 04/24/24 0757 20 G Right Antecubital 9 days                    Physical Exam  Constitutional:       General: She is not in acute distress.     Appearance: Normal appearance. She is well-developed. She is not ill-appearing or toxic-appearing.   Eyes:      Conjunctiva/sclera:      Right eye: Right conjunctiva is not injected. No hemorrhage.     Left eye: Left conjunctiva is not injected. No hemorrhage.  Neck:      Vascular: No JVD.   Cardiovascular:      Rate and Rhythm: Normal rate and regular rhythm.      Heart sounds: S1 normal and S2  normal. No murmur heard.     Gallop present. S3 and S4 sounds present.   Pulmonary:      Effort: Pulmonary effort is normal.      Breath sounds: Normal breath sounds.   Chest:      Chest wall: No swelling or tenderness.   Abdominal:      Tenderness: There is no abdominal tenderness.   Musculoskeletal:      Right lower leg: Swelling present. 1+ Pitting Edema present.      Left lower leg: Swelling present. 1+ Pitting Edema present.   Skin:     General: Skin is warm and dry.      Findings: No rash.   Neurological:      General: No focal deficit present.      Mental Status: She is alert and oriented to person, place, and time. She is not disoriented.   Psychiatric:         Attention and Perception: Attention normal.         Mood and Affect: Mood normal.         Speech: Speech normal.         Behavior: Behavior normal.         Thought Content: Thought content normal.         Cognition and Memory: Cognition and memory normal.         Judgment: Judgment normal.         Current Medications:    Current Facility-Administered Medications   Medication Dose Route Frequency    atorvastatin  80 mg Oral Daily    empagliflozin  10 mg Oral Daily    folic acid  1 mg Oral Daily    furosemide  40 mg Oral Daily    heparin (porcine)  5,000 Units Subcutaneous Q8H    LIDOcaine  1 patch Transdermal Q24H    metoprolol succinate  50 mg Oral Daily    miconazole NITRATE 2 %   Topical (Top) BID    sodium chloride 0.9%  10 mL Intravenous Q8H    spironolactone  25 mg Oral Daily    valsartan  160 mg Oral Daily     Current Laboratory Results:    Recent Results (from the past 24 hour(s))   Basic metabolic panel    Collection Time: 05/03/24  5:13 AM   Result Value Ref Range    Sodium 142 136 - 145 mmol/L    Potassium 3.6 3.5 - 5.1 mmol/L    Chloride 100 95 - 110 mmol/L    CO2 31 (H) 23 - 29 mmol/L    Glucose 89 70 - 110 mg/dL    BUN 27 (H) 8 - 23 mg/dL    Creatinine 1.6 (H) 0.5 - 1.4 mg/dL    Calcium 9.3 8.7 - 10.5 mg/dL    Anion Gap 11 8 - 16 mmol/L     eGFR 35 (A) >60 mL/min/1.73 m^2     Current Imaging Results:    X-Ray Chest AP   Final Result   Abnormal      Increase in size of the cardiac silhouette.      This report was flagged in Epic as abnormal.         Electronically signed by: Dulce Maria Rodriguez   Date:    04/23/2024   Time:    18:48          4/24/2024: Echo:  Left Ventricle: The left ventricle is moderately dilated. Moderately increased wall thickness. There is mild concentric hypertrophy. Severe global hypokinesis present. There is reduced systolic function. Ejection fraction by visual approximation is 25%. Grade II diastolic dysfunction. Elevated left ventricular filling pressure. Tissue Doppler velocity is reduced.  Right Ventricle: Mild right ventricular enlargement. Wall thickness is normal. Right ventricle wall motion has global hypokinesis. Systolic function is severely reduced.  Left Atrium: Left atrium is severely dilated.  Right Atrium: Right atrium is severely dilated.  Aortic Valve: The aortic valve is a trileaflet valve. There is mild aortic valve sclerosis. Mildly restricted motion.  Mitral Valve: There is moderate regurgitation with a centrally directed jet.  Tricuspid Valve: There is moderate to severe regurgitation with a centrally directed jet.  Pulmonary Artery: There is severe pulmonary hypertension. The estimated pulmonary artery systolic pressure is 83 mmHg.  IVC/SVC: Elevated venous pressure at 15 mmHg.  Pericardium: There is a small circumferential effusion.        ECG: SR.    Assessment and Plan:     Problem List:    Active Diagnoses:    Diagnosis Date Noted POA    PRINCIPAL PROBLEM:  Acute on chronic combined systolic and diastolic heart failure [I50.43] 10/19/2020 Yes    MDD (major depressive disorder), recurrent, in partial remission [F33.41] 04/24/2024 Yes    Hemiplegia affecting right dominant side [G81.91] 04/24/2024 Yes    CKD (chronic kidney disease) stage 3, GFR 30-59 ml/min [N18.30] 02/20/2018 Yes     Chronic     "Thrombocytopenia [D69.6] 07/13/2017 Yes     Chronic    Primary hypertension [I10] 07/31/2012 Yes     Chronic    Hyperlipidemia type II [E78.01] 07/31/2012 Yes     Chronic      Problems Resolved During this Admission:       Assessment and Plan:     Heart Failure, Systolic, Acute              4/24/2024: Presented fluid overloaded in HF. BNP 2,536. Received furosemide 20 mg iv "Q12".              4/24/2024: Echo: Moderately dilated left ventricle with severe systolic dysfunction. EF 25%. Mild LVH. Moderate diastolic dysfunction. LVEDP elevated. Mildly enlarged RV with sever systolic dysfunction. Severely enlarged LA. Moderate MR. Moderate to severe TR. SPAP 83 mmHg. RA 15 mmHg. Small pericardial effusion.    Betablocker, CHANELL blockade, MCA & SGLT2i.   5/2/2024: .   4/26/2024: Valsartan 40 mg Q24 was begun. She appears to have had an reaction to ACEI in the past. Accordingly no ACEI or ARNI.  4/30/2024: Valsartan 40 mg Q24 was increased to valsartan to 80 mg Q24.  5/2/2024: Valsartan 80 mg Q24 was increased to valsartan 160 mg Q24.  5/3/2024: Furosemide 40 mg Q24 was begun.   On metoprolol 50 mg Q24, empagliflozin 10 mg Q24, valsartan 80 mg Q24, spironolactone 25 mg Q24 and furosemide 40 mg Q24.   Fairly well compensated.   Stay on current regimen.   In 5 days: Do BMP and BNP.   Daily weights.       2. Pulmonary Hypertension  4/24/2024: Echo: SPAP 83 mmHg.   Most certainly due to left heart disease.     3. 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     4. Hypertension              2005: Diagnosed.              At home been on metoprolol 50 mg Q24, nifedipine 90 mg Q24 and hctz 25 mg Q24.    On metoprolol 50 mg Q24, empagliflozin 10 mg Q24, valsartan 160 mg Q24, spironolactone 25 mg Q24 and furosemide 40 mg Q24.    5. Hypercholesterolemia              On atorvastatin 80 mg Q24.     6. Diabetes Mellitus, Type 2  2005: Diagnosed. Complications: CVA. Medications: Diet.     7. Mild " Obesity              4/25/2024: Weight 98 kg. BMI 36.   5/3/2024: Weight 88 kg. BMI 32,    8. Chronic Kidney Disease, Stage 3   4/27/2024: BUN/crea 24/1.5. eGFR 37. K 4.1.   5/3/2024: BUN/crea 27/1.6 eGFR 35. K 3.6.     9. 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              4/23/2024: Plts 140.              Dr. Joao Howard.    10. Anemia   4/30/2024: H/H 7.7/22.6%. MCV 93.   Significant decline.   Consider further evaluation.     11. Primary Care              Dr. Ashley Harrell.     VTE Risk Mitigation (From admission, onward)           Ordered     Place ADRIÁN hose  Until discontinued         04/29/24 1440     heparin (porcine) injection 5,000 Units  Every 8 hours         04/24/24 0206     IP VTE HIGH RISK PATIENT  Once         04/24/24 0206     Place sequential compression device  Until discontinued         04/24/24 0206     Place sequential compression device  Until discontinued         04/24/24 0148                    Ismael Covarrubias MD  Cardiology  [facility] ([place])

## 2024-05-03 NOTE — ASSESSMENT & PLAN NOTE
Chronic, controlled. Latest blood pressure and vitals reviewed-     Temp:  [97.9 °F (36.6 °C)-99.3 °F (37.4 °C)]   Pulse:  [64-86]   Resp:  [16-18]   BP: (116-135)/(56-65)   SpO2:  [92 %-98 %] .   Home meds for hypertension were reviewed and noted below.   Hypertension Medications               hydroCHLOROthiazide (HYDRODIURIL) 25 MG tablet Take 1 tablet (25 mg total) by mouth once daily.    metoprolol succinate (TOPROL-XL) 50 MG 24 hr tablet Take 50 mg by mouth once daily.    NIFEdipine (PROCARDIA-XL) 90 MG (OSM) 24 hr tablet Take 1 tablet (90 mg total) by mouth 2 (two) times a day.            While in the hospital, will manage blood pressure as follows; Continue home antihypertensive regimen

## 2024-05-03 NOTE — ASSESSMENT & PLAN NOTE
- Patient presented with shortness of breath, HENRY, severe BLE leg swelling, orthopnea, elevated BNP and troponin.   - CXR without pulmonary edema but does show increased cardiac silhouette   - Continue IV diuresis, heart failure pathway initiated   - trended troponin : flat  - TTE: EF 25%, grade II diastolic dysfunction, severe pulmonary HTN, multiple valvular abnormalities  - net fluid balance 5/3 was -9.78L  - cardiology following; valsartan, Jardiance added to regimen  - will need close follow up at discharge

## 2024-05-03 NOTE — PLAN OF CARE
VSS on RA and afebrile this shift.   Repositions self independently in bed and ambulating around room x1 person assist using a walker.  Up inn the chair most of the hours during this shift.  Free from injury or skin breakdown; Fall precautions maintained and call light in reach.  POC updated questions answered and comments acknowledged.        Problem: Adult Inpatient Plan of Care  Goal: Plan of Care Review  Outcome: Progressing  Goal: Patient-Specific Goal (Individualized)  Outcome: Progressing  Goal: Absence of Hospital-Acquired Illness or Injury  Outcome: Progressing  Goal: Optimal Comfort and Wellbeing  Outcome: Progressing  Goal: Readiness for Transition of Care  Outcome: Progressing     Problem: Diabetes Comorbidity  Goal: Blood Glucose Level Within Targeted Range  Outcome: Progressing     Problem: Wound  Goal: Optimal Coping  Outcome: Progressing  Goal: Optimal Functional Ability  Outcome: Progressing  Goal: Absence of Infection Signs and Symptoms  Outcome: Progressing  Goal: Improved Oral Intake  Outcome: Progressing  Goal: Optimal Pain Control and Function  Outcome: Progressing  Goal: Skin Health and Integrity  Outcome: Progressing  Goal: Optimal Wound Healing  Outcome: Progressing     Problem: Skin Injury Risk Increased  Goal: Skin Health and Integrity  Outcome: Progressing     Problem: Fall Injury Risk  Goal: Absence of Fall and Fall-Related Injury  Outcome: Progressing

## 2024-05-03 NOTE — SUBJECTIVE & OBJECTIVE
Interval History: Very good diuresis with increased dose of furosemide yesterday.  Doing well with therapy although feels it is not aggressive enough.    Review of Systems   Constitutional:  Negative for chills and fever.   Respiratory:  Positive for shortness of breath. Negative for cough.    Cardiovascular:  Positive for leg swelling. Negative for chest pain and palpitations.   Neurological:  Positive for weakness.        Chronic right sided weakness     Objective:     Vital Signs (Most Recent):  Temp: 98.5 °F (36.9 °C) (05/03/24 1626)  Pulse: 69 (05/03/24 1626)  Resp: 18 (05/03/24 1626)  BP: 136/68 (05/03/24 1626)  SpO2: (!) 92 % (05/03/24 1626) Vital Signs (24h Range):  Temp:  [97 °F (36.1 °C)-99.3 °F (37.4 °C)] 98.5 °F (36.9 °C)  Pulse:  [69-86] 69  Resp:  [16-18] 18  SpO2:  [92 %-99 %] 92 %  BP: (116-161)/(60-77) 136/68     Weight: 88.2 kg (194 lb 7.1 oz)  Body mass index is 32.36 kg/m².    Intake/Output Summary (Last 24 hours) at 5/3/2024 1633  Last data filed at 5/3/2024 0900  Gross per 24 hour   Intake 520 ml   Output 1700 ml   Net -1180 ml         Physical Exam  Cardiovascular:      Rate and Rhythm: Normal rate and regular rhythm.      Heart sounds: Normal heart sounds. No murmur heard.     No gallop.   Pulmonary:      Effort: Pulmonary effort is normal.      Breath sounds: Normal breath sounds.   Abdominal:      General: Bowel sounds are normal.      Palpations: Abdomen is soft.   Musculoskeletal:      Right lower leg: Edema present.      Left lower leg: Edema present.      Comments: Good improvement in BLE edema.  Still more swollen on the right.   Skin:     General: Skin is warm and dry.   Neurological:      General: No focal deficit present.      Mental Status: She is alert.   Psychiatric:         Mood and Affect: Mood normal.         Behavior: Behavior normal.             Significant Labs: All pertinent labs within the past 24 hours have been reviewed.    Significant Imaging: I have reviewed all  pertinent imaging results/findings within the past 24 hours.

## 2024-05-03 NOTE — TELEPHONE ENCOUNTER
----- Message from Ismael Covarrubias MD sent at 5/3/2024  1:05 PM CDT -----  May go home today.    5/8/2024: BMP and BNP.    Appointment 7-10 days.    Ismael Covarrubias M.D.

## 2024-05-03 NOTE — PT/OT/SLP PROGRESS
Physical Therapy Treatment    Patient Name:  Wendy Viveros   MRN:  [id number]    Recommendations:     Discharge Recommendations: Low Intensity Therapy (and assistance from family)  Discharge Equipment Recommendations: bedside commode, cane, quad  Barriers to discharge: Inaccessible home and Decreased caregiver support    Assessment:     Wendy Viveros is a 68 y.o. female admitted with a medical diagnosis of Acute on chronic combined systolic and diastolic heart failure.  She presents with the following impairments/functional limitations: weakness, impaired endurance, impaired self care skills, impaired functional mobility, gait instability, impaired balance, decreased coordination, decreased upper extremity function, decreased lower extremity function, decreased safety awareness, decreased ROM, abnormal tone, edema ;pt with fair/ good mobility today, amb'd short distance in room w/ RW and CGA, though req'd Lesvia to stand initially. Pt also perf'd stair training (has 16 steps to enter home) w/ 1 rail and quad cane w/ CGA/Lesvia.    Rehab Prognosis: Good; patient would benefit from acute skilled PT services to address these deficits and reach maximum level of function.    Recent Surgery: * No surgery found *      Plan:     During this hospitalization, patient to be seen 4 x/week to address the identified rehab impairments via gait training, therapeutic activities, therapeutic exercises, neuromuscular re-education and progress toward the following goals:    Plan of Care Expires:  05/16/24    Subjective     Chief Complaint: pt reports not liking the food here.  Patient/Family Comments/goals: pt agreeable to session, pleasant.   Pain/Comfort:  Pain Rating 1: 0/10  Pain Rating Post-Intervention 1: 0/10      Objective:     Communicated with nurse prior to session.  Patient found up in chair with peripheral IV, PureWick upon PT entry to room.     General Precautions: Standard, fall  Orthopedic Precautions: N/A  Braces:  N/A  Respiratory Status: Room air     Functional Mobility:  Transfers:     Sit to Stand:  contact guard assistance and minimum assistance with rolling walker  Gait: amb'd 30' w/ RW and CGA  Stairs:  Pt ascended/descended 4 stair(s) with Quad Cane with left handrail with Contact Guard Assistance and Minimal Assistance.       AM-PAC 6 CLICK MOBILITY  Turning over in bed (including adjusting bedclothes, sheets and blankets)?: 3  Sitting down on and standing up from a chair with arms (e.g., wheelchair, bedside commode, etc.): 3  Moving from lying on back to sitting on the side of the bed?: 3  Moving to and from a bed to a chair (including a wheelchair)?: 3  Need to walk in hospital room?: 3  Climbing 3-5 steps with a railing?: 3  Basic Mobility Total Score: 18       Treatment & Education:  Perf'd commode t/f to bedside commode w/ CGA, pt able to perform hygiene, after urinating, w/ CGA for safety.  Pt req'd a lot of cueing for proper technique /safety on stairs today.     Patient left up in chair with all lines intact, call button in reach, nurse notified, and LE's elevated (some edema noted) , purewick not replaced, pt inst'd to call for assistance to bedside commode.    GOALS:   Multidisciplinary Problems       Physical Therapy Goals          Problem: Physical Therapy    Goal Priority Disciplines Outcome Goal Variances Interventions   Physical Therapy Goal     PT, PT/OT Progressing     Description: Goals to be met by: 24    Patient will increase functional independence with mobility by performin. Supine<>sit with supervision without use of HB features.   2. Sit<>stand with supervision with RW.  3. Gait x 100 feet with supervision with RW.  4. Ascend/descend 10 step(s) with least restrictive assistive device and uni HR with cane.                        Time Tracking:     PT Received On: 24  PT Start Time: 1332     PT Stop Time: 1402  PT Total Time (min): 30 min     Billable Minutes: Gait Training 20,  There Act. 10    Treatment Type: Treatment  PT/PTA: PTA     Number of PTA visits since last PT visit: 1 05/03/2024

## 2024-05-03 NOTE — PT/OT/SLP PROGRESS
"Occupational Therapy   Treatment    Name: Wendy Viveros  MRN: [id number]  Admitting Diagnosis:  Acute on chronic combined systolic and diastolic heart failure       Recommendations:     Discharge Recommendations: Low Intensity Therapy (assist from family)  Discharge Equipment Recommendations:  bedside commode  Barriers to discharge:   (current functional level)    Assessment:     Wendy Viveros is a 68 y.o. female with a medical diagnosis of Acute on chronic combined systolic and diastolic heart failure.  She presents with soiled  linen/pads upon RUT entry. Performance deficits affecting function are weakness, impaired endurance, impaired sensation, impaired self care skills, impaired functional mobility, gait instability, impaired balance, impaired coordination, impaired cognition, decreased upper extremity function, decreased lower extremity function, impaired fine motor, decreased safety awareness, pain, abnormal tone, decreased ROM, edema. Patient with CGA/Min A, RW for short household distance. Min A for BSC transfer. Set up for seated hygiene; no clothing management performed. CGA, RW for hand washing at the sink. Overall, tolerated session well and progressing toward goals.     Rehab Prognosis:  Good; patient would benefit from acute skilled OT services to address these deficits and reach maximum level of function.       Plan:     Patient to be seen 5 x/week to address the above listed problems via self-care/home management, therapeutic activities, therapeutic exercises  Plan of Care Expires: 06/01/24  Plan of Care Reviewed with: patient    Subjective     Chief Complaint: "I don't think this purwick is working"  Patient/Family Comments/goals: agreeable to participate in therapy for OT intervention   Pain/Comfort:  Pain Rating 1: 0/10    Objective:     Communicated with: RN prior to session.  Patient found sitting edge of bed with peripheral IV upon OT entry to room.    General Precautions: Standard, fall " (Right Hayder)    Orthopedic Precautions:N/A  Braces: N/A  Respiratory Status: Room air     Occupational Performance:     Bed Mobility:    NT d/t pt sitting EOB    Functional Mobility/Transfers:  Sit <> Stand: Min A   Functional Mobility: CGA/Min A for short household distance   BSC Transfer: Min A   Chair Transfer: Min A     Activities of Daily Living:  Toileting: Set up with seated hygiene; no clothing management   UB Dressing: Min A to don/doff front/back gown seated in chair   Grooming: CGA, RW for hand washing at the sink; pt already perform oral hygiene prior to session       AMPAC 6 Click ADL: 19    Treatment & Education:  OT role, plan of care, progression of goals, importance of continued OOB activity, ADL/functional transfer and mobility retraining, discharge recommendation, safety precautions, fall prevention.     Patient left up in chair with all lines intact, call button in reach, and RN notified    GOALS:   Multidisciplinary Problems       Occupational Therapy Goals          Problem: Occupational Therapy    Goal Priority Disciplines Outcome Interventions   Occupational Therapy Goal     OT, PT/OT Progressing    Description: Goals to be met by: 5/18/24     Patient will increase functional independence with ADLs by performing:    UB dressing at Set up/SBA.  LB dressing at Set up/SBA.  Toileting at SBA.  Toilet transfers at SBA.   Grooming at SBA standing at sink.  Sponge bathing at Set up/SBA.                         Time Tracking:     OT Date of Treatment: 05/03/24  OT Start Time: 0934  OT Stop Time: 0957  OT Total Time (min): 23 min    Billable Minutes:Self Care/Home Management 23 min    OT/RUT: RUT     Number of RUT visits since last OT visit: 1    5/3/2024

## 2024-05-03 NOTE — PROGRESS NOTES
[facility] Medicine  Progress Note    Patient Name: Wendy Viveros  MRN: [id number]  Patient Class: IP- Inpatient   Admission Date: 4/23/2024  Length of Stay: 10 days  Attending Physician: Ban Richey MD  Primary Care Provider: Ashley Harrell MD        Subjective:     Principal Problem:Acute on chronic combined systolic and diastolic heart failure        HPI:  HPI by Wendy Banegas PA:  Ms. Wendy Viveros is a 68 y.o. female, with PMH of HTN, T2DM, CKD-3, Anemia, chronic gout, prior CVA w/ right-sided weakness in 2021, who presented to [facility] ED on 4/23/24 due to progressive shortness of breath x 2 weeks. She notes associated b/l lower extremity edema and it worsens with exertion, she notes difficulty walking 2/2 leg swelling, she spends most of her time in bed, but her shortness of breath is worse when she is laying flat. She uses 4 pillows to prop herself up while sleeping. She states she used to take lasix, but does not currently. She states she has not followed with a doctor in recent years. She denied chest pain. She was evaluated in the ED with labs showing anemia with H&H of 9.4/27.6, and PLT of 140K. A metabolic panel showed elevated total bilirubin of 3.9. A BNP was elevated at 2536, with troponin of 0.051. A CXR showed increaed cardiac silhouette, but did not should pulmonary vascular congestion. She was treated in the ED with 40 mg IV lasix. She was admitted to inpatient status.         Overview/Hospital Course:  Wendy Viveros was admitted for acute CHF, CXR with cardiomegaly and BNP 2536. Started on lasix 20mg IVP with good urine output initially. Supplemental oxygen was weaned down and discontinued. TTE done and was notable for CHFrEF 25%, grade II diastolic dysfunction, multiple valvular abnormalities and severe pulmonary hypertension.  Patient's office cardiologist followed her while she was here and added valsartan and Jardiance to her medication regimen.   Diuretic dose was increased periodically to improve fluid balance.    Cardiology follow up at discharge was planned as well as a nephrology referral for CKD III, home health and [facility].  PT/OT consulted while patient was here given chronic right sided weakness since her stroke and her right leg giving out on her.  Home health with therapy recommended along with assistance from family.    Interval History: Very good diuresis with increased dose of furosemide yesterday.  Doing well with therapy although feels it is not aggressive enough.    Review of Systems   Constitutional:  Negative for chills and fever.   Respiratory:  Positive for shortness of breath. Negative for cough.    Cardiovascular:  Positive for leg swelling. Negative for chest pain and palpitations.   Neurological:  Positive for weakness.        Chronic right sided weakness     Objective:     Vital Signs (Most Recent):  Temp: 98.5 °F (36.9 °C) (05/03/24 1626)  Pulse: 69 (05/03/24 1626)  Resp: 18 (05/03/24 1626)  BP: 136/68 (05/03/24 1626)  SpO2: (!) 92 % (05/03/24 1626) Vital Signs (24h Range):  Temp:  [97 °F (36.1 °C)-99.3 °F (37.4 °C)] 98.5 °F (36.9 °C)  Pulse:  [69-86] 69  Resp:  [16-18] 18  SpO2:  [92 %-99 %] 92 %  BP: (116-161)/(60-77) 136/68     Weight: 88.2 kg (194 lb 7.1 oz)  Body mass index is 32.36 kg/m².    Intake/Output Summary (Last 24 hours) at 5/3/2024 1633  Last data filed at 5/3/2024 0900  Gross per 24 hour   Intake 520 ml   Output 1700 ml   Net -1180 ml         Physical Exam  Cardiovascular:      Rate and Rhythm: Normal rate and regular rhythm.      Heart sounds: Normal heart sounds. No murmur heard.     No gallop.   Pulmonary:      Effort: Pulmonary effort is normal.      Breath sounds: Normal breath sounds.   Abdominal:      General: Bowel sounds are normal.      Palpations: Abdomen is soft.   Musculoskeletal:      Right lower leg: Edema present.      Left lower leg: Edema present.      Comments: Good improvement in BLE  edema.  Still more swollen on the right.   Skin:     General: Skin is warm and dry.   Neurological:      General: No focal deficit present.      Mental Status: She is alert.   Psychiatric:         Mood and Affect: Mood normal.         Behavior: Behavior normal.             Significant Labs: All pertinent labs within the past 24 hours have been reviewed.    Significant Imaging: I have reviewed all pertinent imaging results/findings within the past 24 hours.    Assessment/Plan:      * Acute on chronic combined systolic and diastolic heart failure  - Patient presented with shortness of breath, HENRY, severe BLE leg swelling, orthopnea, elevated BNP and troponin.   - CXR without pulmonary edema but does show increased cardiac silhouette   - Continue IV diuresis, heart failure pathway initiated   - trended troponin : flat  - TTE: EF 25%, grade II diastolic dysfunction, severe pulmonary HTN, multiple valvular abnormalities  - net fluid balance 5/3 was -9.78L  - cardiology following; valsartan, Jardiance added to regimen  - will need close follow up at discharge        MDD (major depressive disorder), recurrent, in partial remission  - continue home meds      Hemiplegia affecting right dominant side   This patient has Chronic right hemiplegia due to stroke. Continue all standard measures for pressure injury prevention and consult wound care for any wounds (chronic or acute).  Consult PT/OT    CKD (chronic kidney disease) stage 3, GFR 30-59 ml/min  Creatine mildly elevated over baseline on presentation and has remained stable on diuretics and with the initiation of Jardiance.  Will need nephrology referral on discharge for CKD IIIb.    Continue to monitor UOP and serial BMP and adjust therapy as needed. Renally dose meds. Avoid nephrotoxic medications and procedures.    Thrombocytopenia  - improved compared to baseline   - History of ITP with hospitalization in November 2023 requiring prolonged steroid taper and treatment  with eltrombopag.  - no active bleeding   - monitor daily   - Will transfuse if platelet count is <50k (if undergoing surgical procedure or have active bleeding). Hold DVT prophylaxis if platelets are <50k.     Hyperlipidemia type II  - continue statin     Primary hypertension  Chronic, controlled. Latest blood pressure and vitals reviewed-     Temp:  [97.9 °F (36.6 °C)-99.3 °F (37.4 °C)]   Pulse:  [64-86]   Resp:  [16-18]   BP: (116-135)/(56-65)   SpO2:  [92 %-98 %] .   Home meds for hypertension were reviewed and noted below.   Hypertension Medications               hydroCHLOROthiazide (HYDRODIURIL) 25 MG tablet Take 1 tablet (25 mg total) by mouth once daily.    metoprolol succinate (TOPROL-XL) 50 MG 24 hr tablet Take 50 mg by mouth once daily.    NIFEdipine (PROCARDIA-XL) 90 MG (OSM) 24 hr tablet Take 1 tablet (90 mg total) by mouth 2 (two) times a day.            While in the hospital, will manage blood pressure as follows; Continue home antihypertensive regimen      VTE Risk Mitigation (From admission, onward)           Ordered     Place ADRIÁN hose  Until discontinued         04/29/24 1440     heparin (porcine) injection 5,000 Units  Every 8 hours         04/24/24 0206     IP VTE HIGH RISK PATIENT  Once         04/24/24 0206     Place sequential compression device  Until discontinued         04/24/24 0206     Place sequential compression device  Until discontinued         04/24/24 0148                    Discharge Planning   EL:      Code Status: Full Code   Is the patient medically ready for discharge?:     Reason for patient still in hospital (select all that apply): Patient trending condition, Consult recommendations, and PT / OT recommendations  Discharge Plan A: Home with family   Discharge Delays: None known at this time              Bna Cope MD  Department of Hospital Medicine   [facility]

## 2024-05-03 NOTE — PROGRESS NOTES
[facility] ([place])  Adult Nutrition  Progress Note    SUMMARY       Recommendations  1) Consider increasing diet order to 1800 kcal/day to better meet patient's EEN    2) Honor pt's food preference to encourage intake of meals - menu provided    3) Monitor weights, I/O's and nutrition related lab values    4) RD to monitor and follow up    Goals:   Pt to continue to tolerate % intake of meals by RD follow up  Nutrition Goal Status: new  Communication of RD Recs:  (POC)    Assessment and Plan  Nutrition Problem  Undesirable food choices    Related to (etiology):   Diagnosis r/symptoms: CHF    Signs and Symptoms (as evidenced by):   -9.6 L since admit with weight loss  Mealtime behavior - reports not liking food; consuming hamburgers throughout admit   Diet related co-morbidities TDII; Cardiac disease (HTN, CVA, CHF); Obesity; CKD III; anemia  Nutrition education provided 4/25    Interventions (treatment strategy):  Nutrition education  Mineral/CHO/fluid/kcal modified diet  Collaboration with other providers    Nutrition Diagnosis Status:   New      Malnutrition Assessment             Weight Loss (Malnutrition): greater than 2% in 1 week  Fluid Accumulation (Malnutrition): mild                         Reason for Assessment    Reason For Assessment: RD follow-up  Diagnosis:  (Acute on chronic combined systolic and diastolic heart failure)  Relevant Medical History:   Patient Active Problem List   Diagnosis    Primary hypertension    Hyperlipidemia type II    GERD (gastroesophageal reflux disease)    Chronic gout    Osteoarthritis    CKD (chronic kidney disease) stage 2, GFR 60-89 ml/min    Left shoulder pain    Adhesive capsulitis of left shoulder    Symptomatic anemia    Hepatosplenomegaly    Hypercalcemia    Hemolytic anemia    Thrombocytopenia    Anemia, unspecified    CKD (chronic kidney disease) stage 3, GFR 30-59 ml/min    History of CVA (cerebrovascular accident)    Morbid (severe) obesity due to  "excess calories    Carpal tunnel syndrome    Idiopathic thrombocytopenic purpura (ITP)    Leucocytosis    Acute on chronic combined systolic and diastolic heart failure    Hemiplegia affecting right dominant side    MDD (major depressive disorder), recurrent, in partial remission     Chief Complaint   Patient presents with    Shortness of Breath     SOB x 2 weeks, worsening with exertion; denies CP. BLE swelling x 2-3 weeks.      Interdisciplinary Rounds: did not attend  General Information Comments: LOS 10D currently receiving a diabetic, low sodium diet - restricted to 1500 kcal and 1500 mL fluid. Patient reports not liking food during hospital admit, had hamburger 3 days in a row. No GI intolerance reported to RD. Reinforced low sodium and fluid restrictions. Pt with good intake and appetite, tolerating ~75% of meals. 12% (24 lb) weight change since admitted. PIV. [place] 19 - perineum, ankle. NFPE pt is nourished.  Nutrition Discharge Planning: dc on heart healthy diet post discharge    Nutrition Related Social Determinants of Health: SDOH: Adequate food in home environment    Nutrition Risk Screen    Nutrition Risk Screen: no indicators present    Nutrition/Diet History    Spiritual, Cultural Beliefs, Yazidism Practices, Values that Affect Care: no  Factors Affecting Nutritional Intake: behavioral (mealtime)    Anthropometrics    Temp: 98.7 °F (37.1 °C)  Height: 5' 5" (165.1 cm)  Height (inches): 65 in  Weight Method: Bed Scale  Weight: 88.2 kg (194 lb 7.1 oz)  Weight (lb): 194.45 lb  Ideal Body Weight (IBW), Female: 125 lb  % Ideal Body Weight, Female (lb): 155.56 %  BMI (Calculated): 32.4  BMI Grade: 30 - 34.9- obesity - grade I  Weight Loss: unintentional  Usual Body Weight (UBW), k.09 kg (Admit weight )  % Usual Body Weight: 89.2  % Weight Change From Usual Weight: -10.99 %  Weight Loss Since Admission: 24 lb  % Weight Change Since Admission: 12.34       Lab/Procedures/Meds    Pertinent Labs " "Reviewed: reviewed  CBC:  No results for input(s): "WBC", "HGB", "HCT", "PLT" in the last 24 hours.  CMP:  Recent Labs   Lab 05/03/24  0513   CALCIUM 9.3      K 3.6   CO2 31*      BUN 27*   CREATININE 1.6*     BMP:   Recent Labs   Lab 04/30/24  0443 05/01/24  0455 05/03/24  0513      < > 89      < > 142   K 3.8   < > 3.6      < > 100   CO2 32*   < > 31*   BUN 32*   < > 27*   CREATININE 1.6*   < > 1.6*   CALCIUM 9.5   < > 9.3   MG 1.7  --   --     < > = values in this interval not displayed.       Pertinent Medications Reviewed: reviewed  Scheduled Meds:  Current Facility-Administered Medications   Medication Dose Route Frequency    atorvastatin  80 mg Oral Daily    empagliflozin  10 mg Oral Daily    folic acid  1 mg Oral Daily    furosemide  40 mg Oral Daily    heparin (porcine)  5,000 Units Subcutaneous Q8H    LIDOcaine  1 patch Transdermal Q24H    metoprolol succinate  50 mg Oral Daily    miconazole NITRATE 2 %   Topical (Top) BID    sodium chloride 0.9%  10 mL Intravenous Q8H    spironolactone  25 mg Oral Daily    valsartan  160 mg Oral Daily     Continuous Infusions:  Current Facility-Administered Medications   Medication Dose Route Frequency Last Rate Last Admin     PRN Meds:.  Current Facility-Administered Medications:     acetaminophen, 650 mg, Oral, Q6H PRN    HYDROcodone-acetaminophen, 1 tablet, Oral, Q6H PRN    melatonin, 6 mg, Oral, Nightly PRN    methocarbamoL, 1,000 mg, Oral, QID PRN    naloxone, 0.02 mg, Intravenous, PRN    senna-docusate 8.6-50 mg, 1 tablet, Oral, BID PRN    simethicone, 1 tablet, Oral, TID PRN    sodium chloride 0.9%, 10 mL, Intravenous, PRN    traZODone, 50 mg, Oral, Nightly PRN    Estimated/Assessed Needs    Weight Used For Calorie Calculations: 88.2 kg (194 lb 7.1 oz)  Energy Calorie Requirements (kcal): 1829  Energy Need Method: [place] (x 1.3)  Protein Requirements: 88 g (1 g/kg)  Weight Used For Protein Calculations: 88.2 kg (194 lb 7.1 " oz)  Fluid Requirements (mL): 1500 mL  Estimated Fluid Requirement Method:  (or per MD)  RDA Method (mL): 1829  CHO Requirement: 230 g      Nutrition Prescription Ordered    Current Diet Order: Diabetic, low sodium - 1500 kcal; 1500 mL fluid    Evaluation of Received Nutrient/Fluid Intake    I/O: - 9.6 L since admit  Energy Calories Required: not meeting needs  Protein Required: not meeting needs  Fluid Required: meeting needs  Comments: LBM: 5/2/24  Tolerance: tolerating  % Intake of Estimated Energy Needs: 75 - 100 %  % Meal Intake: 75 - 100 %    Intake/Output - Last 3 Shifts         05/01 0700  05/02 0659 05/02 0700  05/03 0659 05/03 0700  05/04 0659    P.O. 1260 520 180    Total Intake(mL/kg) 1260 (14.3) 520 (5.9) 180 (2)    Urine (mL/kg/hr) 1650 (0.8) 1700 (0.8)     Stool 0 0     Total Output 1650 1700     Net -390 -1180 +180           Urine Occurrence 1 x 2 x     Stool Occurrence 1 x 2 x             Nutrition Risk    Level of Risk/Frequency of Follow-up: low     Monitor and Evaluation    Food and Nutrient Intake: energy intake, food and beverage intake  Food and Nutrient Adminstration: diet order  Knowledge/Beliefs/Attitudes: food and nutrition knowledge/skill, beliefs and attitudes  Physical Activity and Function: nutrition-related ADLs and IADLs  Anthropometric Measurements: weight, weight change  Biochemical Data, Medical Tests and Procedures: electrolyte and renal panel, gastrointestinal profile, glucose/endocrine profile, inflammatory profile, lipid profile  Nutrition-Focused Physical Findings: overall appearance     Nutrition Follow-Up    RD Follow-up?: Yes    Deidre Carpenter RDN, [facility]ANN

## 2024-05-03 NOTE — PLAN OF CARE
Recommendations  1) Consider increasing diet order to 1800 kcal/day to better meet patient's EEN    2) Honor pt's food preference to encourage intake of meals - menu provided    3) Monitor weights, I/O's and nutrition related lab values    4) RD to monitor and follow up    Goals:   Pt to continue to tolerate % intake of meals by RD follow up  Nutrition Goal Status: new  Communication of RD Recs:  (POC)

## 2024-05-04 PROBLEM — N18.32 ANEMIA DUE TO STAGE 3B CHRONIC KIDNEY DISEASE: Status: ACTIVE | Noted: 2024-05-04

## 2024-05-04 PROBLEM — D63.1 ANEMIA DUE TO STAGE 3B CHRONIC KIDNEY DISEASE: Status: ACTIVE | Noted: 2024-05-04

## 2024-05-04 LAB
ANION GAP SERPL CALC-SCNC: 8 MMOL/L (ref 8–16)
BUN SERPL-MCNC: 27 MG/DL (ref 8–23)
CALCIUM SERPL-MCNC: 9.8 MG/DL (ref 8.7–10.5)
CHLORIDE SERPL-SCNC: 100 MMOL/L (ref 95–110)
CO2 SERPL-SCNC: 33 MMOL/L (ref 23–29)
CREAT SERPL-MCNC: 1.7 MG/DL (ref 0.5–1.4)
EST. GFR  (NO RACE VARIABLE): 32 ML/MIN/1.73 M^2
GLUCOSE SERPL-MCNC: 87 MG/DL (ref 70–110)
POTASSIUM SERPL-SCNC: 3.8 MMOL/L (ref 3.5–5.1)
SODIUM SERPL-SCNC: 141 MMOL/L (ref 136–145)

## 2024-05-04 PROCEDURE — 25000003 PHARM REV CODE 250: Performed by: PHYSICIAN ASSISTANT

## 2024-05-04 PROCEDURE — 11000001 HC ACUTE MED/SURG PRIVATE ROOM

## 2024-05-04 PROCEDURE — 63600175 PHARM REV CODE 636 W HCPCS: Performed by: PHYSICIAN ASSISTANT

## 2024-05-04 PROCEDURE — 80048 BASIC METABOLIC PNL TOTAL CA: CPT | Performed by: PHYSICIAN ASSISTANT

## 2024-05-04 PROCEDURE — 99233 SBSQ HOSP IP/OBS HIGH 50: CPT | Mod: ,,, | Performed by: INTERNAL MEDICINE

## 2024-05-04 PROCEDURE — 97530 THERAPEUTIC ACTIVITIES: CPT | Mod: CQ

## 2024-05-04 PROCEDURE — 25000003 PHARM REV CODE 250: Performed by: INTERNAL MEDICINE

## 2024-05-04 PROCEDURE — 36415 COLL VENOUS BLD VENIPUNCTURE: CPT | Performed by: PHYSICIAN ASSISTANT

## 2024-05-04 PROCEDURE — 94761 N-INVAS EAR/PLS OXIMETRY MLT: CPT

## 2024-05-04 PROCEDURE — A4216 STERILE WATER/SALINE, 10 ML: HCPCS | Performed by: PHYSICIAN ASSISTANT

## 2024-05-04 PROCEDURE — 97116 GAIT TRAINING THERAPY: CPT | Mod: CQ

## 2024-05-04 PROCEDURE — 25000003 PHARM REV CODE 250: Performed by: HOSPITALIST

## 2024-05-04 PROCEDURE — 25000003 PHARM REV CODE 250: Performed by: NURSE PRACTITIONER

## 2024-05-04 PROCEDURE — 25000242 PHARM REV CODE 250 ALT 637 W/ HCPCS: Performed by: INTERNAL MEDICINE

## 2024-05-04 RX ORDER — SPIRONOLACTONE 25 MG/1
25 TABLET ORAL DAILY
Qty: 90 TABLET | Refills: 0 | Status: SHIPPED | OUTPATIENT
Start: 2024-05-05 | End: 2024-05-17 | Stop reason: SDUPTHER

## 2024-05-04 RX ORDER — FLUOXETINE HYDROCHLORIDE 20 MG/1
20 CAPSULE ORAL DAILY
Status: DISCONTINUED | OUTPATIENT
Start: 2024-05-04 | End: 2024-05-05 | Stop reason: HOSPADM

## 2024-05-04 RX ORDER — LANOLIN ALCOHOL/MO/W.PET/CERES
1000 CREAM (GRAM) TOPICAL DAILY
Status: DISCONTINUED | OUTPATIENT
Start: 2024-05-04 | End: 2024-05-05 | Stop reason: HOSPADM

## 2024-05-04 RX ORDER — FOLIC ACID 1 MG/1
1 TABLET ORAL DAILY
Qty: 90 TABLET | Refills: 0 | Status: SHIPPED | OUTPATIENT
Start: 2024-05-04 | End: 2025-05-04

## 2024-05-04 RX ORDER — FUROSEMIDE 40 MG/1
40 TABLET ORAL DAILY
Qty: 90 TABLET | Refills: 0 | Status: SHIPPED | OUTPATIENT
Start: 2024-05-05 | End: 2024-05-17 | Stop reason: SDUPTHER

## 2024-05-04 RX ORDER — TRAZODONE HYDROCHLORIDE 50 MG/1
50 TABLET ORAL NIGHTLY PRN
Qty: 30 TABLET | Refills: 0 | Status: SHIPPED | OUTPATIENT
Start: 2024-05-04

## 2024-05-04 RX ORDER — LANOLIN ALCOHOL/MO/W.PET/CERES
1000 CREAM (GRAM) TOPICAL DAILY
Qty: 90 TABLET | Refills: 0 | Status: SHIPPED | OUTPATIENT
Start: 2024-05-04

## 2024-05-04 RX ORDER — VALSARTAN 160 MG/1
160 TABLET ORAL DAILY
Qty: 90 TABLET | Refills: 0 | Status: SHIPPED | OUTPATIENT
Start: 2024-05-05 | End: 2024-05-09

## 2024-05-04 RX ORDER — ATORVASTATIN CALCIUM 80 MG/1
80 TABLET, FILM COATED ORAL DAILY
Qty: 90 TABLET | Refills: 0 | Status: SHIPPED | OUTPATIENT
Start: 2024-05-04 | End: 2024-05-17 | Stop reason: SDUPTHER

## 2024-05-04 RX ADMIN — SPIRONOLACTONE 25 MG: 25 TABLET, FILM COATED ORAL at 09:05

## 2024-05-04 RX ADMIN — Medication 10 ML: at 06:05

## 2024-05-04 RX ADMIN — ATORVASTATIN CALCIUM 80 MG: 20 TABLET, FILM COATED ORAL at 09:05

## 2024-05-04 RX ADMIN — LIDOCAINE 5% 1 PATCH: 700 PATCH TOPICAL at 05:05

## 2024-05-04 RX ADMIN — MICONAZOLE NITRATE: 20 POWDER TOPICAL at 09:05

## 2024-05-04 RX ADMIN — EMPAGLIFLOZIN 10 MG: 10 TABLET, FILM COATED ORAL at 09:05

## 2024-05-04 RX ADMIN — HEPARIN SODIUM 5000 UNITS: 5000 INJECTION INTRAVENOUS; SUBCUTANEOUS at 09:05

## 2024-05-04 RX ADMIN — METOPROLOL SUCCINATE 50 MG: 50 TABLET, EXTENDED RELEASE ORAL at 09:05

## 2024-05-04 RX ADMIN — CYANOCOBALAMIN TAB 1000 MCG 1000 MCG: 1000 TAB at 11:05

## 2024-05-04 RX ADMIN — Medication 10 ML: at 05:05

## 2024-05-04 RX ADMIN — VALSARTAN 160 MG: 80 TABLET, FILM COATED ORAL at 09:05

## 2024-05-04 RX ADMIN — HEPARIN SODIUM 5000 UNITS: 5000 INJECTION INTRAVENOUS; SUBCUTANEOUS at 05:05

## 2024-05-04 RX ADMIN — HEPARIN SODIUM 5000 UNITS: 5000 INJECTION INTRAVENOUS; SUBCUTANEOUS at 02:05

## 2024-05-04 RX ADMIN — HYDROCODONE BITARTRATE AND ACETAMINOPHEN 1 TABLET: 5; 325 TABLET ORAL at 02:05

## 2024-05-04 RX ADMIN — FLUOXETINE 20 MG: 20 CAPSULE ORAL at 11:05

## 2024-05-04 RX ADMIN — FUROSEMIDE 40 MG: 40 TABLET ORAL at 09:05

## 2024-05-04 RX ADMIN — Medication 10 ML: at 09:05

## 2024-05-04 RX ADMIN — FOLIC ACID 1 MG: 1 TABLET ORAL at 09:05

## 2024-05-04 NOTE — PROGRESS NOTES
[facility] Medicine  Progress Note    Patient Name: Wendy Viveros  MRN: [id number]  Patient Class: IP- Inpatient   Admission Date: 4/23/2024  Length of Stay: 11 days  Attending Physician: Ban Richey MD  Primary Care Provider: Ashley Harrell MD        Subjective:     Principal Problem:Acute on chronic combined systolic and diastolic heart failure        HPI:  HPI by Wendy Banegas PA:  Ms. Wendy Viveros is a 68 y.o. female, with PMH of HTN, T2DM, CKD-3, Anemia, chronic gout, prior CVA w/ right-sided weakness in 2021, who presented to [facility] ED on 4/23/24 due to progressive shortness of breath x 2 weeks. She notes associated b/l lower extremity edema and it worsens with exertion, she notes difficulty walking 2/2 leg swelling, she spends most of her time in bed, but her shortness of breath is worse when she is laying flat. She uses 4 pillows to prop herself up while sleeping. She states she used to take lasix, but does not currently. She states she has not followed with a doctor in recent years. She denied chest pain. She was evaluated in the ED with labs showing anemia with H&H of 9.4/27.6, and PLT of 140K. A metabolic panel showed elevated total bilirubin of 3.9. A BNP was elevated at 2536, with troponin of 0.051. A CXR showed increaed cardiac silhouette, but did not should pulmonary vascular congestion. She was treated in the ED with 40 mg IV lasix. She was admitted to inpatient status.         Overview/Hospital Course:  Wendy Viveros was admitted for acute CHF, CXR with cardiomegaly and BNP 2536. Started on lasix 20mg IVP with good urine output initially. Supplemental oxygen was weaned down and discontinued. TTE done and was notable for CHFrEF 25%, grade II diastolic dysfunction, multiple valvular abnormalities and severe pulmonary hypertension.  Patient's office cardiologist followed her while she was here and added valsartan and Jardiance to her medication regimen.   Diuretic dose was increased periodically to improve fluid balance.    Cardiology follow up at discharge was planned as well as a nephrology referral for CKD III, home health and [facility].  PT/OT consulted while patient was here given chronic right sided weakness since her stroke and her right leg giving out on her.  Home health with therapy recommended along with assistance from family.    Interval History:  Patient off IV Lasix, changed to oral.  Has been working with PT and her right knee is swollen and painful.  Asks to hold off on discharge another day.    Review of Systems   Constitutional:  Negative for chills and fever.   Respiratory:  Negative for cough and shortness of breath.    Cardiovascular:  Positive for leg swelling. Negative for chest pain and palpitations.   Musculoskeletal:         Pain and swelling right knee   Neurological:  Positive for weakness.        Chronic right sided weakness     Objective:     Vital Signs (Most Recent):  Temp: 98.4 °F (36.9 °C) (05/04/24 0759)  Pulse: 74 (05/04/24 0759)  Resp: 18 (05/04/24 0759)  BP: (!) 141/65 (05/04/24 0759)  SpO2: 95 % (05/04/24 0900) Vital Signs (24h Range):  Temp:  [97.8 °F (36.6 °C)-99.4 °F (37.4 °C)] 98.4 °F (36.9 °C)  Pulse:  [69-81] 74  Resp:  [18] 18  SpO2:  [91 %-96 %] 95 %  BP: (118-141)/(60-77) 141/65     Weight: 84.6 kg (186 lb 8.2 oz)  Body mass index is 31.04 kg/m².    Intake/Output Summary (Last 24 hours) at 5/4/2024 1055  Last data filed at 5/4/2024 0631  Gross per 24 hour   Intake 270 ml   Output 600 ml   Net -330 ml         Physical Exam  Cardiovascular:      Rate and Rhythm: Normal rate and regular rhythm.      Heart sounds: Normal heart sounds. No murmur heard.     No gallop.   Pulmonary:      Effort: Pulmonary effort is normal.      Breath sounds: Normal breath sounds.   Abdominal:      General: Bowel sounds are normal.      Palpations: Abdomen is soft.   Musculoskeletal:      Right lower leg: Edema present.      Left  lower leg: No edema.      Comments: Good improvement in edema.  Right knee swollen.   Skin:     General: Skin is warm and dry.   Neurological:      General: No focal deficit present.      Mental Status: She is alert.   Psychiatric:         Mood and Affect: Mood normal.         Behavior: Behavior normal.             Significant Labs: All pertinent labs within the past 24 hours have been reviewed.    Significant Imaging: I have reviewed all pertinent imaging results/findings within the past 24 hours.    Assessment/Plan:      * Acute on chronic combined systolic and diastolic heart failure  - Patient presented with shortness of breath, HENRY, severe BLE leg swelling, orthopnea, elevated BNP and troponin.   - CXR without pulmonary edema but does show increased cardiac silhouette   - Continue IV diuresis, heart failure pathway initiated   - trended troponin : flat  - TTE: EF 25%, grade II diastolic dysfunction, severe pulmonary HTN, multiple valvular abnormalities  - net fluid balance 5/3 was -9.78L  - cardiology following; valsartan, Jardiance added to regimen.  Creatinine increasing a little as expected.  - will need close follow up at discharge        Anemia due to stage 3b chronic kidney disease  Patient with longstanding history of anemia, followed outpatient by Dr. Howard  Cause likely primarily CKD IIIb but not due to iron deficiency (studies are normal)  Follow up outpatient with Dr. Howard    MDD (major depressive disorder), recurrent, in partial remission  - continue home meds      Hemiplegia affecting right dominant side   This patient has Chronic right hemiplegia due to stroke. Continue all standard measures for pressure injury prevention and consult wound care for any wounds (chronic or acute).  Consult PT/OT    CKD (chronic kidney disease) stage 3, GFR 30-59 ml/min  Creatine mildly elevated over baseline on presentation and has remained stable/increased on diuretics and with the initiation of Jardiance.   Will need nephrology referral on discharge for CKD IIIb.    Continue to monitor UOP and serial BMP and adjust therapy as needed. Renally dose meds. Avoid nephrotoxic medications and procedures.    Thrombocytopenia  - improved compared to baseline   - History of ITP with hospitalization in November 2023 requiring prolonged steroid taper and treatment with eltrombopag.  - no active bleeding   - monitor daily   - Will transfuse if platelet count is <50k (if undergoing surgical procedure or have active bleeding). Hold DVT prophylaxis if platelets are <50k.   - Improved to 174 without intervention  - No longer on the eltrombopag  - Followed by Dr. Howard    Hyperlipidemia type II  - continue statin     Primary hypertension  Chronic, controlled. Latest blood pressure and vitals reviewed-     Temp:  [97.8 °F (36.6 °C)-99.4 °F (37.4 °C)]   Pulse:  [69-81]   Resp:  [18]   BP: (118-141)/(60-77)   SpO2:  [91 %-96 %] .   Home meds for hypertension were reviewed and noted below.   Hypertension Medications               hydroCHLOROthiazide (HYDRODIURIL) 25 MG tablet Take 1 tablet (25 mg total) by mouth once daily.    metoprolol succinate (TOPROL-XL) 50 MG 24 hr tablet Take 50 mg by mouth once daily.    NIFEdipine (PROCARDIA-XL) 90 MG (OSM) 24 hr tablet Take 1 tablet (90 mg total) by mouth 2 (two) times a day.            While in the hospital, will manage blood pressure with current regimen.  HCTZ and nifedipine discontinued.      VTE Risk Mitigation (From admission, onward)           Ordered     Place ADRIÁN hose  Until discontinued         04/29/24 1440     heparin (porcine) injection 5,000 Units  Every 8 hours         04/24/24 0206     IP VTE HIGH RISK PATIENT  Once         04/24/24 0206     Place sequential compression device  Until discontinued         04/24/24 0206     Place sequential compression device  Until discontinued         04/24/24 0148                    Discharge Planning   EL:      Code Status: Full Code   Is the  patient medically ready for discharge?:     Reason for patient still in hospital (select all that apply): Patient trending condition and PT / OT recommendations  Discharge Plan A: Home with family   Discharge Delays: None known at this time              Ban Cope MD  Department of Hospital Medicine   [facility] ([place])

## 2024-05-04 NOTE — ASSESSMENT & PLAN NOTE
Creatine mildly elevated over baseline on presentation and has remained stable/increased on diuretics and with the initiation of Jardiance.  Will need nephrology referral on discharge for CKD IIIb.    Continue to monitor UOP and serial BMP and adjust therapy as needed. Renally dose meds. Avoid nephrotoxic medications and procedures.

## 2024-05-04 NOTE — ASSESSMENT & PLAN NOTE
- Patient presented with shortness of breath, HENRY, severe BLE leg swelling, orthopnea, elevated BNP and troponin.   - CXR without pulmonary edema but does show increased cardiac silhouette   - Continue IV diuresis, heart failure pathway initiated   - trended troponin : flat  - TTE: EF 25%, grade II diastolic dysfunction, severe pulmonary HTN, multiple valvular abnormalities  - net fluid balance 5/3 was -9.78L  - cardiology following; valsartan, Jardiance added to regimen.  Creatinine increasing a little as expected.  - will need close follow up at discharge

## 2024-05-04 NOTE — SUBJECTIVE & OBJECTIVE
Interval History:  Patient off IV Lasix, changed to oral.  Has been working with PT and her right knee is swollen and painful.  Asks to hold off on discharge another day.    Review of Systems   Constitutional:  Negative for chills and fever.   Respiratory:  Negative for cough and shortness of breath.    Cardiovascular:  Positive for leg swelling. Negative for chest pain and palpitations.   Musculoskeletal:         Pain and swelling right knee   Neurological:  Positive for weakness.        Chronic right sided weakness     Objective:     Vital Signs (Most Recent):  Temp: 98.4 °F (36.9 °C) (05/04/24 0759)  Pulse: 74 (05/04/24 0759)  Resp: 18 (05/04/24 0759)  BP: (!) 141/65 (05/04/24 0759)  SpO2: 95 % (05/04/24 0900) Vital Signs (24h Range):  Temp:  [97.8 °F (36.6 °C)-99.4 °F (37.4 °C)] 98.4 °F (36.9 °C)  Pulse:  [69-81] 74  Resp:  [18] 18  SpO2:  [91 %-96 %] 95 %  BP: (118-141)/(60-77) 141/65     Weight: 84.6 kg (186 lb 8.2 oz)  Body mass index is 31.04 kg/m².    Intake/Output Summary (Last 24 hours) at 5/4/2024 1055  Last data filed at 5/4/2024 0631  Gross per 24 hour   Intake 270 ml   Output 600 ml   Net -330 ml         Physical Exam  Cardiovascular:      Rate and Rhythm: Normal rate and regular rhythm.      Heart sounds: Normal heart sounds. No murmur heard.     No gallop.   Pulmonary:      Effort: Pulmonary effort is normal.      Breath sounds: Normal breath sounds.   Abdominal:      General: Bowel sounds are normal.      Palpations: Abdomen is soft.   Musculoskeletal:      Right lower leg: Edema present.      Left lower leg: No edema.      Comments: Good improvement in edema.  Right knee swollen.   Skin:     General: Skin is warm and dry.   Neurological:      General: No focal deficit present.      Mental Status: She is alert.   Psychiatric:         Mood and Affect: Mood normal.         Behavior: Behavior normal.             Significant Labs: All pertinent labs within the past 24 hours have been  reviewed.    Significant Imaging: I have reviewed all pertinent imaging results/findings within the past 24 hours.

## 2024-05-04 NOTE — PROGRESS NOTES
[facility]    Date of admission:  4/23/2024 5/4/2024  12:38 PM    Interval history:    IV Lasix changed to p.o.    History  Past Medical History:   Diagnosis Date    Abnormal EKG     Anemia 07/13/2017    Aortic valve regurgitation     Arthritis     CKD (chronic kidney disease) stage 2, GFR 60-89 ml/min 08/08/2014    CKD (chronic kidney disease) stage 2, GFR 60-89 ml/min 08/08/2014    CVA (cerebral vascular accident)     2021 w/ residual R sided weakness    Diabetes mellitus     Diabetes mellitus type II     GERD (gastroesophageal reflux disease)     Gout, unspecified     Heart murmur     Hyperlipidemia     Hypertension     Tendonitis        Medications  Current Facility-Administered Medications   Medication Dose Route Frequency Provider Last Rate Last Admin    acetaminophen tablet 650 mg  650 mg Oral Q6H PRN Wendy Banegas PA-C   650 mg at 04/28/24 1742    atorvastatin tablet 80 mg  80 mg Oral Daily Wendy Banegas PA-C   80 mg at 05/04/24 0906    cyanocobalamin tablet 1,000 mcg  1,000 mcg Oral Daily Ban Richey MD   1,000 mcg at 05/04/24 1144    empagliflozin (Jardiance) tablet 10 mg  10 mg Oral Daily Ismael Covarrubias MD   10 mg at 05/04/24 0906    FLUoxetine capsule 20 mg  20 mg Oral Daily Ban Richey MD   20 mg at 05/04/24 1144    folic acid tablet 1 mg  1 mg Oral Daily Wendy Banegas PA-C   1 mg at 05/04/24 0906    furosemide tablet 40 mg  40 mg Oral Daily Ismael Covarrubias MD   40 mg at 05/04/24 0906    heparin (porcine) injection 5,000 Units  5,000 Units Subcutaneous Q8H Wendy Banegas PA-C   5,000 Units at 05/04/24 0529    HYDROcodone-acetaminophen 5-325 mg per tablet 1 tablet  1 tablet Oral Q6H PRN aHilee Ambriz NP   1 tablet at 04/28/24 2139    LIDOcaine 5 % patch 1 patch  1 patch Transdermal Q24H Wendy Banegas PA-C   1 patch at 05/04/24 0529    melatonin tablet 6 mg  6 mg Oral Nightly PRN Wendy Banegas PA-C         methocarbamoL tablet 1,000 mg  1,000 mg Oral QID PRN Hailee Ambriz, NP        metoprolol succinate (TOPROL-XL) 24 hr tablet 50 mg  50 mg Oral Daily Wendy Banegas PA-C   50 mg at 05/04/24 0906    miconazole NITRATE 2 % top powder   Topical (Top) BID Hailee Ambriz, NP   Given at 05/04/24 0907    naloxone 0.4 mg/mL injection 0.02 mg  0.02 mg Intravenous PRN Wendy Banegas PA-C        senna-docusate 8.6-50 mg per tablet 1 tablet  1 tablet Oral BID PRN Wendy Banegas PA-C        simethicone chewable tablet 80 mg  1 tablet Oral TID PRN Hailee Ambriz NP   80 mg at 04/30/24 2154    sodium chloride 0.9% flush 10 mL  10 mL Intravenous PRN Wendy Banegas PA-C   10 mL at 04/24/24 0600    sodium chloride 0.9% flush 10 mL  10 mL Intravenous Q8H Wendy Banegas PA-C   10 mL at 05/04/24 0648    spironolactone tablet 25 mg  25 mg Oral Daily Ismael Covarrubias MD   25 mg at 05/04/24 0907    traZODone tablet 50 mg  50 mg Oral Nightly PRN Wendy Banegas PA-C   50 mg at 04/26/24 2139    valsartan tablet 160 mg  160 mg Oral Daily Ismael Covarrubias MD   160 mg at 05/04/24 0907        Physical exam    Temp:  [97.8 °F (36.6 °C)-99.4 °F (37.4 °C)]   Pulse:  [65-81]   Resp:  [18]   BP: (118-155)/(60-68)   SpO2:  [91 %-96 %]    Wt Readings from Last 3 Encounters:   05/04/24 84.6 kg (186 lb 8.2 oz)   11/29/23 83.6 kg (184 lb 4.9 oz)   05/30/23 93 kg (205 lb)       Intake/Output Summary (Last 24 hours) at 5/4/2024 1238  Last data filed at 5/4/2024 0631  Gross per 24 hour   Intake 270 ml   Output 600 ml   Net -330 ml     Physical Exam  Constitutional:       General: She is not in acute distress.  HENT:      Head: Normocephalic and atraumatic.      Mouth/Throat:      Mouth: Mucous membranes are moist.   Eyes:      Extraocular Movements: Extraocular movements intact.      Pupils: Pupils are equal, round, and reactive to light.   Neck:      Vascular: No carotid bruit or JVD.      Comments:  JVP at the base of the neck at 45°  Cardiovascular:      Rate and Rhythm: Normal rate and regular rhythm.      Heart sounds: No murmur heard.     No friction rub. Gallop present. S3 and S4 sounds present.   Pulmonary:      Effort: Pulmonary effort is normal.      Breath sounds: Normal breath sounds.   Abdominal:      Tenderness: There is no abdominal tenderness. There is no guarding or rebound.   Musculoskeletal:      Right lower leg: Edema present.      Left lower leg: Edema present.      Comments: 1+ bilateral lower extremity edema   Skin:     General: Skin is warm and dry.      Capillary Refill: Capillary refill takes less than 2 seconds.   Neurological:      General: No focal deficit present.      Mental Status: She is alert.   Psychiatric:         Mood and Affect: Mood normal.         Labs  Recent Results (from the past 24 hour(s))   Basic metabolic panel    Collection Time: 05/04/24  5:29 AM   Result Value Ref Range    Sodium 141 136 - 145 mmol/L    Potassium 3.8 3.5 - 5.1 mmol/L    Chloride 100 95 - 110 mmol/L    CO2 33 (H) 23 - 29 mmol/L    Glucose 87 70 - 110 mg/dL    BUN 27 (H) 8 - 23 mg/dL    Creatinine 1.7 (H) 0.5 - 1.4 mg/dL    Calcium 9.8 8.7 - 10.5 mg/dL    Anion Gap 8 8 - 16 mmol/L    eGFR 32 (A) >60 mL/min/1.73 m^2         Telemetry  No events reported    EKG  Reviewed    Echocardiogram  Results for orders placed or performed during the hospital encounter of 04/23/24   Echo   Result Value Ref Range    LVOT stroke volume 33.43 cm3    LVIDd 5.92 3.5 - 6.0 cm    LV Systolic Volume 134.80 mL    LVIDs 5.29 (A) 2.1 - 4.0 cm    LV Diastolic Volume 174.53 mL    IVS 1.43 (A) 0.6 - 1.1 cm    LVOT diameter 1.95 cm    LVOT area 3.0 cm2    FS 11 (A) 28 - 44 %    Left Ventricle Relative Wall Thickness 0.39 cm    Posterior Wall 1.16 (A) 0.6 - 1.1 cm    LV mass 340.74 g    MV Peak E Adán 0.99 m/s    TDI LATERAL 0.05 m/s    TDI SEPTAL 0.04 m/s    E/E' ratio 22.00 m/s    MV Peak A Adán 0.84 m/s    TR Max Adán 4.12 m/s     E/A ratio 1.18     E wave deceleration time 119.03 msec    LV SEPTAL E/E' RATIO 24.75 m/s    LV LATERAL E/E' RATIO 19.80 m/s    PV Peak S Adán 0.62 m/s    PV Peak D Adán 0.64 m/s    Pulm vein S/D ratio 0.97     LVOT peak adán 0.65 m/s    Left Ventricular Outflow Tract Mean Velocity 0.44 cm/s    Left Ventricular Outflow Tract Mean Gradient 0.87 mmHg    LA size 4.33 cm    Left Atrium Minor Axis 6.26 cm    Left Atrium Major Axis 6.96 cm    LA volume (mod) 109.25 cm3    RA Major Axis 6.74 cm    AV mean gradient 5 mmHg    AV peak gradient 8 mmHg    Ao peak adán 1.44 m/s    Ao VTI 25.90 cm    LVOT peak VTI 11.20 cm    AV valve area 1.29 cm²    AV Velocity Ratio 0.45     AV index (prosthetic) 0.43     RYAN by Velocity Ratio 1.35 cm²    Mr max adán 5.87 m/s    MV stenosis pressure 1/2 time 34.52 ms    MV valve area p 1/2 method 6.37 cm2    TV mean gradient 35 mmHg    Triscuspid Valve Regurgitation Peak Gradient 68 mmHg    PV PEAK VELOCITY 0.87 m/s    PV peak gradient 3 mmHg    RVOT peak adán 0.66 m/s    STJ 2.36 cm    Ascending aorta 3.28 cm    IVC diameter 2.17 cm    Mean e' 0.05 m/s    LA volume 114.02 cm3    TAPSE 1.80 cm    LA WIDTH 4.7 cm    RA Width 4.1 cm    Sinus 2.6 cm    TV resting pulmonary artery pressure 83 mmHg    RV TB RVSP 19 mmHg    Est. RA pres 15 mmHg    EF 25 %    Narrative      Left Ventricle: The left ventricle is moderately dilated. Moderately   increased wall thickness. There is mild concentric hypertrophy. Severe   global hypokinesis present. There is reduced systolic function. Ejection   fraction by visual approximation is 25%. Grade II diastolic dysfunction.   Elevated left ventricular filling pressure. Tissue Doppler velocity is   reduced.    Right Ventricle: Mild right ventricular enlargement. Wall thickness is   normal. Right ventricle wall motion has global hypokinesis. Systolic   function is severely reduced.    Left Atrium: Left atrium is severely dilated.    Right Atrium: Right atrium is  severely dilated.    Aortic Valve: The aortic valve is a trileaflet valve. There is mild   aortic valve sclerosis. Mildly restricted motion.    Mitral Valve: There is moderate regurgitation with a centrally directed   jet.    Tricuspid Valve: There is moderate to severe regurgitation with a   centrally directed jet.    Pulmonary Artery: There is severe pulmonary hypertension. The estimated   pulmonary artery systolic pressure is 83 mmHg.    IVC/SVC: Elevated venous pressure at 15 mmHg.    Pericardium: There is a small circumferential effusion.         Imaging   Echo    Result Date: 4/24/2024    Left Ventricle: The left ventricle is moderately dilated. Moderately increased wall thickness. There is mild concentric hypertrophy. Severe global hypokinesis present. There is reduced systolic function. Ejection fraction by visual approximation is 25%. Grade II diastolic dysfunction. Elevated left ventricular filling pressure. Tissue Doppler velocity is reduced.   Right Ventricle: Mild right ventricular enlargement. Wall thickness is normal. Right ventricle wall motion has global hypokinesis. Systolic function is severely reduced.   Left Atrium: Left atrium is severely dilated.   Right Atrium: Right atrium is severely dilated.   Aortic Valve: The aortic valve is a trileaflet valve. There is mild aortic valve sclerosis. Mildly restricted motion.   Mitral Valve: There is moderate regurgitation with a centrally directed jet.   Tricuspid Valve: There is moderate to severe regurgitation with a centrally directed jet.   Pulmonary Artery: There is severe pulmonary hypertension. The estimated pulmonary artery systolic pressure is 83 mmHg.   IVC/SVC: Elevated venous pressure at 15 mmHg.   Pericardium: There is a small circumferential effusion.     X-Ray Chest AP    Result Date: 4/23/2024  EXAMINATION: AP PORTABLE CHEST CLINICAL HISTORY: shortness of breath; TECHNIQUE: AP portable chest radiograph was submitted. COMPARISON: 09/28/2021  "FINDINGS: AP portable chest radiograph demonstrates marked enlargement of the cardiac silhouette.  There is tortuosity of the descending thoracic aorta.  Vascular calcifications seen at the aortic knob.  There is no focal consolidation, pneumothorax, or pleural effusion. There is a left shoulder arthroplasty.  The bones are diffusely osteopenic.     Increase in size of the cardiac silhouette. This report was flagged in Epic as abnormal. Electronically signed by: Dulce Maria Rodriguez Date:    04/23/2024 Time:    18:48      Assessment and Plan:    Heart Failure, Systolic, Acute              4/24/2024: Presented fluid overloaded in HF. BNP 2,536. Received furosemide 20 mg iv "Q12".              4/24/2024: Echo: Moderately dilated left ventricle with severe systolic dysfunction. EF 25%. Mild LVH. Moderate diastolic dysfunction. LVEDP elevated. Mildly enlarged RV with sever systolic dysfunction. Severely enlarged LA. Moderate MR. Moderate to severe TR. SPAP 83 mmHg. RA 15 mmHg. Small pericardial effusion.               Betablocker, CHANELL blockade, MCA & SGLT2i.              5/2/2024: .              4/26/2024: Valsartan 40 mg Q24 was begun. She appears to have had an reaction to ACEI in the past. Accordingly no ACEI or ARNI.  4/30/2024: Valsartan 40 mg Q24 was increased to valsartan to 80 mg Q24.  5/2/2024: Valsartan 80 mg Q24 was increased to valsartan 160 mg Q24.  5/3/2024: Furosemide 40 mg Q24 was begun.              On metoprolol 50 mg Q24, empagliflozin 10 mg Q24, valsartan 160 mg Q24, spironolactone 25 mg Q24 and furosemide 40 mg Q24 PO.              Fairly well compensated.  Monitor renal function.   Would benefit from another day as an inpatient.                             2. Pulmonary Hypertension  4/24/2024: Echo: SPAP 83 mmHg.   Most certainly due to left heart disease.     3. 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     4. Hypertension              2005: Diagnosed.          "     At home been on metoprolol 50 mg Q24, nifedipine 90 mg Q24 and hctz 25 mg Q24.               On metoprolol 50 mg Q24, empagliflozin 10 mg Q24, valsartan 160 mg Q24, spironolactone 25 mg Q24 and furosemide 40 mg Q24.     5. Hypercholesterolemia              On atorvastatin 80 mg Q24.     6. Diabetes Mellitus, Type 2  2005: Diagnosed. Complications: CVA. Medications: Diet.     7. Mild Obesity              4/25/2024: Weight 98 kg. BMI 36.              5/3/2024: Weight 88 kg. BMI 32,     8. Chronic Kidney Disease, Stage 3              4/27/2024: BUN/crea 24/1.5. eGFR 37. K 4.1.              5/3/2024: BUN/crea 27/1.6 eGFR 35. K 3.6.   5/4/2024: BUN/Cr 27/1.7     9. 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              4/23/2024: Plts 140.              Dr. Joao Howard.     10. Anemia              4/30/2024: H/H 7.7/22.6%. MCV 93.              Significant decline.              Consider further evaluation.     11. Primary Care              Dr. Ashley Harrell.      Thank you for allowing me to participate in the care of Wendy Viveros.    Dov Woody MD, [facility], [facility]  Interventional Cardiology    The total time spent for evaluation and management on 05/04/2024 including reviewing separately obtained history, performing a medically appropriate exam and evaluation, documenting clinical information in the health record, independently interpreting results and communicating them to the patient/family/caregiver, and ordering medications/tests/procedures was > 35 minutes.

## 2024-05-04 NOTE — PLAN OF CARE
VSS on RA and afebrile this shift.  Up in the chair most of the time this shift.   Patient on Purewick . Free from injury or skin breakdown; Fall precautions maintained and call light in reach.  POC updated questions answered and comments acknowledged.      Problem: Adult Inpatient Plan of Care  Goal: Plan of Care Review  Outcome: Progressing  Goal: Patient-Specific Goal (Individualized)  Outcome: Progressing  Goal: Absence of Hospital-Acquired Illness or Injury  Outcome: Progressing  Goal: Optimal Comfort and Wellbeing  Outcome: Progressing  Goal: Readiness for Transition of Care  Outcome: Progressing     Problem: Diabetes Comorbidity  Goal: Blood Glucose Level Within Targeted Range  Outcome: Progressing     Problem: Wound  Goal: Optimal Coping  Outcome: Progressing  Goal: Optimal Functional Ability  Outcome: Progressing  Goal: Absence of Infection Signs and Symptoms  Outcome: Progressing  Goal: Improved Oral Intake  Outcome: Progressing  Goal: Optimal Pain Control and Function  Outcome: Progressing  Goal: Skin Health and Integrity  Outcome: Progressing  Goal: Optimal Wound Healing  Outcome: Progressing     Problem: Skin Injury Risk Increased  Goal: Skin Health and Integrity  Outcome: Progressing     Problem: Fall Injury Risk  Goal: Absence of Fall and Fall-Related Injury  Outcome: Progressing

## 2024-05-04 NOTE — PT/OT/SLP PROGRESS
Physical Therapy Treatment    Patient Name:  Wendy Viveros   MRN:  [id number]    Recommendations:     Discharge Recommendations: Low Intensity Therapy  Discharge Equipment Recommendations: bedside commode, walker, rolling  Barriers to discharge: None    Assessment:     Wendy Viveros is a 68 y.o. female admitted with a medical diagnosis of Acute on chronic combined systolic and diastolic heart failure.  She presents with the following impairments/functional limitations: weakness, impaired endurance, impaired self care skills, impaired functional mobility, gait instability, impaired balance, decreased coordination, decreased upper extremity function, decreased lower extremity function, decreased safety awareness, decreased ROM, edema.     Patient is found sitting up in her chair and is agreeable to therapy. Patient reports 5/10 R knee pain prior to the start of her tx. Patient stands using RW with min A and verbal cues provided to ensure a safe and effective stand/transfer. Patient ambulates 15 ft using RW with CGA verbal cues to flex R knee during swing phase and use BUE's to push through the walker to alleviate pressure on her LE's. Patient reports during ambulation that she feels as though her R knee is going to buckle. Patient request to urgently use the bathroom and has bowl movement on herself and hospital gown. She is able to transfer from chair <> toilet requiring min A and verbal cues for correct hand placement on commode railing. Patient transfers from commode <> chair requiring mod A. Following bowl movement patient is instructed to perform 2 x 20 heel slides on R knee to increase ROM and decrease tension and to help alleviate pain. Patient reports a decrease in pain following ROM exercise and reports that she feels relief from taking extra strength tylenol. Patient does not perform stair training due to R knee pain and worry of knee buckling. RN is notified following tx. Rolling walker rec is changed due  to her pain and instability of the R knee.     Rehab Prognosis: Good; patient would benefit from acute skilled PT services to address these deficits and reach maximum level of function.    Recent Surgery: * No surgery found *      Plan:     During this hospitalization, patient to be seen 4 x/week to address the identified rehab impairments via gait training, therapeutic activities, therapeutic exercises, neuromuscular re-education and progress toward the following goals:    Plan of Care Expires:  05/16/24    Subjective     Chief Complaint: pain in R knee  Patient/Family Comments/goals: none stated  Pain/Comfort:  Pain Rating 1: 5/10  Location - Side 1: Right  Location - Orientation 1: posterior  Location 1: knee  Pain Addressed 1: Nurse notified, Distraction, Cessation of Activity  Pain Rating Post-Intervention 1: 7/10  Location - Side 2: Right  Location - Orientation 2: posterior  Location 2: knee      Objective:     Communicated with RN prior to session.  Patient found up in chair with peripheral IV, PureWick upon PT entry to room.     General Precautions: Standard, fall  Orthopedic Precautions: N/A  Braces: N/A  Respiratory Status: Room air     Functional Mobility:  Transfers:     Sit to Stand:  minimum assistance with rolling walker  Gait: 15 ft using RW verbal cues provided to dec circumduction on R LE.       AM-PAC 6 CLICK MOBILITY  Turning over in bed (including adjusting bedclothes, sheets and blankets)?: 3  Sitting down on and standing up from a chair with arms (e.g., wheelchair, bedside commode, etc.): 3  Moving from lying on back to sitting on the side of the bed?: 3  Moving to and from a bed to a chair (including a wheelchair)?: 3  Need to walk in hospital room?: 3  Climbing 3-5 steps with a railing?: 3  Basic Mobility Total Score: 18       Treatment & Education:  RLE 2x20 : heel slides to get joint moving and help decrease pain. Good response from patient.     Patient left up in chair with all lines  intact, call button in reach, and RN notified..    GOALS:   Multidisciplinary Problems       Physical Therapy Goals          Problem: Physical Therapy    Goal Priority Disciplines Outcome Goal Variances Interventions   Physical Therapy Goal     PT, PT/OT Progressing     Description: Goals to be met by: 24    Patient will increase functional independence with mobility by performin. Supine<>sit with supervision without use of HB features.   2. Sit<>stand with supervision with RW.  3. Gait x 100 feet with supervision with RW.  4. Ascend/descend 10 step(s) with least restrictive assistive device and uni HR with cane.                        Time Tracking:     PT Received On: 24  PT Start Time: 1252     PT Stop Time: 1328  PT Total Time (min): 36 min     Billable Minutes: Gait Training 10 and Therapeutic Activity 26    Treatment Type: Treatment  PT/PTA: PTA     Number of PTA visits since last PT visit: 2     2024

## 2024-05-04 NOTE — CARE UPDATE
05/04/24 0900   PRE-TX-O2   Device (Oxygen Therapy) room air   SpO2 95 %   Pulse Oximetry Type Intermittent   $ Pulse Oximetry - Multiple Charge Pulse Oximetry - Multiple

## 2024-05-04 NOTE — ASSESSMENT & PLAN NOTE
- improved compared to baseline   - History of ITP with hospitalization in November 2023 requiring prolonged steroid taper and treatment with eltrombopag.  - no active bleeding   - monitor daily   - Will transfuse if platelet count is <50k (if undergoing surgical procedure or have active bleeding). Hold DVT prophylaxis if platelets are <50k.   - Improved to 174 without intervention  - No longer on the eltrombopag  - Followed by Dr. Howard

## 2024-05-04 NOTE — PLAN OF CARE
[facility] ([place])      HOME HEALTH ORDERS  FACE TO FACE ENCOUNTER    Patient Name: Wendy Viveros  YOB: 1955    PCP: Ashley Harrell MD   PCP Address: [address] [place] [address]  PCP Phone Number: [phone]  PCP Fax: [phone]    Encounter Date: 5/5/2024    Admit to Home Health    Diagnoses:  Active Hospital Problems    Diagnosis  POA    *Acute on chronic combined systolic and diastolic heart failure [I50.43]  Yes    MDD (major depressive disorder), recurrent, in partial remission [F33.41]  Yes    Hemiplegia affecting right dominant side [G81.91]  Yes    CKD (chronic kidney disease) stage 3, GFR 30-59 ml/min [N18.30]  Yes     Chronic    Thrombocytopenia [D69.6]  Yes     Chronic    Primary hypertension [I10]  Yes     Chronic    Hyperlipidemia type II [E78.01]  Yes     Chronic      Resolved Hospital Problems   No resolved problems to display.       Follow Up Appointments:  Future Appointments   Date Time Provider Department Center   5/9/2024  2:30 PM Sameer Aguirre MD [facility] CARDIO [facility]   5/17/2024  1:30 PM Ismael Covarrubias MD [facility] [id number]       Allergies:  Review of patient's allergies indicates:   Allergen Reactions    Colchicine analogues      diarrhea    Lisinopril      Other reaction(s): cough  Other reaction(s): cough    Losartan      Other reaction(s): blurry vision  Other reaction(s): blurry vision    Percocet [oxycodone-acetaminophen]      Pt gets pain from taking medicine.       Medications: Review discharge medications with patient and family and provide education.      Current Discharge Medication List        START taking these medications    Details   cyanocobalamin (VITAMIN B-12) 1000 MCG tablet Take 1 tablet (1,000 mcg total) by mouth once daily.  Qty: 90 tablet, Refills: 0      empagliflozin (JARDIANCE) 10 mg tablet Take 1 tablet (10 mg total) by mouth once daily.  Qty: 90 tablet, Refills: 0      furosemide (LASIX) 40 MG tablet  Take 1 tablet (40 mg total) by mouth once daily.  Qty: 90 tablet, Refills: 0      spironolactone (ALDACTONE) 25 MG tablet Take 1 tablet (25 mg total) by mouth once daily.  Qty: 90 tablet, Refills: 0    Comments: .      valsartan (DIOVAN) 160 MG tablet Take 1 tablet (160 mg total) by mouth once daily.  Qty: 90 tablet, Refills: 0    Comments: .           CONTINUE these medications which have CHANGED    Details   atorvastatin (LIPITOR) 80 MG tablet Take 1 tablet (80 mg total) by mouth once daily.  Qty: 90 tablet, Refills: 0      folic acid (FOLVITE) 1 MG tablet Take 1 tablet (1 mg total) by mouth once daily.  Qty: 90 tablet, Refills: 0      traZODone (DESYREL) 50 MG tablet Take 1 tablet (50 mg total) by mouth nightly as needed for Insomnia.  Qty: 30 tablet, Refills: 0           CONTINUE these medications which have NOT CHANGED    Details   metoprolol succinate (TOPROL-XL) 50 MG 24 hr tablet Take 50 mg by mouth once daily.      FLUoxetine 20 MG capsule Take 20 mg by mouth once daily.           STOP taking these medications       hydroCHLOROthiazide (HYDRODIURIL) 25 MG tablet Comments:   Reason for Stopping:         NIFEdipine (PROCARDIA-XL) 90 MG (OSM) 24 hr tablet Comments:   Reason for Stopping:                 I have seen and examined this patient within the last 30 days. My clinical findings that support the need for the home health skilled services and home bound status are the following:no   Weakness/numbness causing balance and gait disturbance due to Stroke making it taxing to leave home.     Diet:   diabetic diet 1800 calorie      Referrals/ Consults  Physical Therapy to evaluate and treat. Evaluate for home safety and equipment needs; Establish/upgrade home exercise program. Perform / instruct on therapeutic exercises, gait training, transfer training, and Range of Motion.  Occupational Therapy to evaluate and treat. Evaluate home environment for safety and equipment needs. Perform/Instruct on transfers, ADL  training, ROM, and therapeutic exercises.    Activities:   activity as tolerated    Nursing:   Agency to admit patient within 24 hours of hospital discharge unless specified on physician order or at patient request    SN to complete comprehensive assessment including routine vital signs. Instruct on disease process and s/s of complications to report to MD. Review/verify medication list sent home with the patient at time of discharge  and instruct patient/caregiver as needed. Frequency may be adjusted depending on start of care date.     Skilled nurse to perform up to 3 visits PRN for symptoms related to diagnosis    Notify MD if SBP > 160 or < 90; DBP > 90 or < 50; HR > 120 or < 50; Temp > 101; O2 < 88%    Ok to schedule additional visits based on staff availability and patient request on consecutive days within the home health episode.    When multiple disciplines ordered:    Start of Care occurs on Sunday - Wednesday schedule remaining discipline evaluations as ordered on separate consecutive days following the start of care.    Thursday SOC -schedule subsequent evaluations Friday and Monday the following week.     Friday - Saturday SOC - schedule subsequent discipline evaluations on consecutive days starting Monday of the following week.    For all post-discharge communication and subsequent orders please contact patient's primary care physician.       I certify that this patient is confined to her home and needs physical therapy and occupational therapy.

## 2024-05-04 NOTE — PLAN OF CARE
Pt remained free of falls and injuries. AAOx4. Pt calm, quiet, and cooperative. Purposeful hourly rounding performed. Pt swallows without difficulty. Pt received IV Lasix as ordered, IV flushed and saline locked. Pure wick still malfunctioning intermittently with weight shifting and repositioning, pt instructed if she wants to sit at edge of bed to call for checking placement of pure wick. Occurrences marked, see flowsheet for I's & O's. Pt ambulates using walker with X1 assist. R ankle pain well managed with scheduled lidocaine patch. VSS on RA. Pt resting in bed, NADN, denies needs at this time. Bed low and locked, bed alarm on, call light in reach. Side rails up x3. Will continue to monitor.

## 2024-05-04 NOTE — ASSESSMENT & PLAN NOTE
Patient with longstanding history of anemia, followed outpatient by Dr. Howard  Cause likely primarily CKD IIIb but not due to iron deficiency (studies are normal)  Follow up outpatient with Dr. Howard

## 2024-05-04 NOTE — ASSESSMENT & PLAN NOTE
Chronic, controlled. Latest blood pressure and vitals reviewed-     Temp:  [97.8 °F (36.6 °C)-99.4 °F (37.4 °C)]   Pulse:  [69-81]   Resp:  [18]   BP: (118-141)/(60-77)   SpO2:  [91 %-96 %] .   Home meds for hypertension were reviewed and noted below.   Hypertension Medications               hydroCHLOROthiazide (HYDRODIURIL) 25 MG tablet Take 1 tablet (25 mg total) by mouth once daily.    metoprolol succinate (TOPROL-XL) 50 MG 24 hr tablet Take 50 mg by mouth once daily.    NIFEdipine (PROCARDIA-XL) 90 MG (OSM) 24 hr tablet Take 1 tablet (90 mg total) by mouth 2 (two) times a day.            While in the hospital, will manage blood pressure with current regimen.  HCTZ and nifedipine discontinued.

## 2024-05-05 VITALS
TEMPERATURE: 98 F | RESPIRATION RATE: 18 BRPM | WEIGHT: 180.31 LBS | BODY MASS INDEX: 30.04 KG/M2 | DIASTOLIC BLOOD PRESSURE: 63 MMHG | HEART RATE: 68 BPM | SYSTOLIC BLOOD PRESSURE: 120 MMHG | OXYGEN SATURATION: 94 % | HEIGHT: 65 IN

## 2024-05-05 LAB
ANION GAP SERPL CALC-SCNC: 8 MMOL/L (ref 8–16)
BUN SERPL-MCNC: 31 MG/DL (ref 8–23)
CALCIUM SERPL-MCNC: 9.9 MG/DL (ref 8.7–10.5)
CHLORIDE SERPL-SCNC: 101 MMOL/L (ref 95–110)
CO2 SERPL-SCNC: 32 MMOL/L (ref 23–29)
CREAT SERPL-MCNC: 1.7 MG/DL (ref 0.5–1.4)
EST. GFR  (NO RACE VARIABLE): 32 ML/MIN/1.73 M^2
GLUCOSE SERPL-MCNC: 99 MG/DL (ref 70–110)
POTASSIUM SERPL-SCNC: 4 MMOL/L (ref 3.5–5.1)
SODIUM SERPL-SCNC: 141 MMOL/L (ref 136–145)

## 2024-05-05 PROCEDURE — 80048 BASIC METABOLIC PNL TOTAL CA: CPT | Performed by: PHYSICIAN ASSISTANT

## 2024-05-05 PROCEDURE — 99233 SBSQ HOSP IP/OBS HIGH 50: CPT | Mod: ,,, | Performed by: INTERNAL MEDICINE

## 2024-05-05 PROCEDURE — 25000242 PHARM REV CODE 250 ALT 637 W/ HCPCS: Performed by: INTERNAL MEDICINE

## 2024-05-05 PROCEDURE — 63600175 PHARM REV CODE 636 W HCPCS: Performed by: PHYSICIAN ASSISTANT

## 2024-05-05 PROCEDURE — 25000003 PHARM REV CODE 250: Performed by: HOSPITALIST

## 2024-05-05 PROCEDURE — A4216 STERILE WATER/SALINE, 10 ML: HCPCS | Performed by: PHYSICIAN ASSISTANT

## 2024-05-05 PROCEDURE — 25000003 PHARM REV CODE 250: Performed by: INTERNAL MEDICINE

## 2024-05-05 PROCEDURE — 36415 COLL VENOUS BLD VENIPUNCTURE: CPT | Performed by: PHYSICIAN ASSISTANT

## 2024-05-05 PROCEDURE — 25000003 PHARM REV CODE 250: Performed by: NURSE PRACTITIONER

## 2024-05-05 PROCEDURE — 25000003 PHARM REV CODE 250: Performed by: PHYSICIAN ASSISTANT

## 2024-05-05 RX ADMIN — FOLIC ACID 1 MG: 1 TABLET ORAL at 08:05

## 2024-05-05 RX ADMIN — FLUOXETINE 20 MG: 20 CAPSULE ORAL at 08:05

## 2024-05-05 RX ADMIN — FUROSEMIDE 40 MG: 40 TABLET ORAL at 08:05

## 2024-05-05 RX ADMIN — MICONAZOLE NITRATE: 20 POWDER TOPICAL at 08:05

## 2024-05-05 RX ADMIN — METOPROLOL SUCCINATE 50 MG: 50 TABLET, EXTENDED RELEASE ORAL at 08:05

## 2024-05-05 RX ADMIN — SPIRONOLACTONE 25 MG: 25 TABLET, FILM COATED ORAL at 08:05

## 2024-05-05 RX ADMIN — EMPAGLIFLOZIN 10 MG: 10 TABLET, FILM COATED ORAL at 08:05

## 2024-05-05 RX ADMIN — Medication 10 ML: at 05:05

## 2024-05-05 RX ADMIN — VALSARTAN 160 MG: 80 TABLET, FILM COATED ORAL at 08:05

## 2024-05-05 RX ADMIN — CYANOCOBALAMIN TAB 1000 MCG 1000 MCG: 1000 TAB at 08:05

## 2024-05-05 RX ADMIN — HEPARIN SODIUM 5000 UNITS: 5000 INJECTION INTRAVENOUS; SUBCUTANEOUS at 05:05

## 2024-05-05 RX ADMIN — LIDOCAINE 5% 1 PATCH: 700 PATCH TOPICAL at 05:05

## 2024-05-05 RX ADMIN — ATORVASTATIN CALCIUM 80 MG: 20 TABLET, FILM COATED ORAL at 08:05

## 2024-05-05 NOTE — PLAN OF CARE
05/05/24 0954   Medicare Message   Important Message from Medicare regarding Discharge Appeal Rights Given to patient/caregiver;Explained to patient/caregiver;Signed/date by patient/caregiver   Date IMM was signed 05/05/24   Time IMM was signed 0954

## 2024-05-05 NOTE — DISCHARGE SUMMARY
[facility] Medicine  Discharge Summary      Patient Name: Wendy Viveros  MRN: [id number]  TRAE: [id number]  Patient Class: IP- Inpatient  Admission Date: 4/23/2024  Hospital Length of Stay: 12 days  Discharge Date and Time:  05/05/2024 11:58 AM  Attending Physician: Ban Richey MD   Discharging Provider: Ban Richey MD  Primary Care Provider: Ashley Harrell MD    Primary Care Team: Networked reference to record PCT     HPI:   HPI by Wendy Banegas PA:  Ms. Wendy Viveros is a 68 y.o. female, with PMH of HTN, T2DM, CKD-3, Anemia, chronic gout, prior CVA w/ right-sided weakness in 2021, who presented to [facility] ED on 4/23/24 due to progressive shortness of breath x 2 weeks. She notes associated b/l lower extremity edema and it worsens with exertion, she notes difficulty walking 2/2 leg swelling, she spends most of her time in bed, but her shortness of breath is worse when she is laying flat. She uses 4 pillows to prop herself up while sleeping. She states she used to take lasix, but does not currently. She states she has not followed with a doctor in recent years. She denied chest pain. She was evaluated in the ED with labs showing anemia with H&H of 9.4/27.6, and PLT of 140K. A metabolic panel showed elevated total bilirubin of 3.9. A BNP was elevated at 2536, with troponin of 0.051. A CXR showed increaed cardiac silhouette, but did not should pulmonary vascular congestion. She was treated in the ED with 40 mg IV lasix. She was admitted to inpatient status.               Hospital Course:   Wendy Viverso was admitted for acute CHF, CXR with cardiomegaly and BNP 2536. Started on lasix 20mg IVP with good urine output initially. Supplemental oxygen was weaned down and discontinued. TTE done and was notable for CHFrEF 25%, grade II diastolic dysfunction, multiple valvular abnormalities and severe pulmonary hypertension.  Patient's office cardiologist followed her while she  was here and added valsartan and Jardiance to her medication regimen.  Diuretic dose was increased periodically to improve fluid balance.    Cardiology follow up at discharge was planned as well as a nephrology referral for CKD III, home health and [facility].  PT/OT consulted while patient was here given chronic right sided weakness since her stroke and her right leg giving out on her.  Home health with therapy recommended along with assistance from family.  Referral was also made for outpatient PT in case home health was inadequate to meet her needs.  Patient was seen and examined on the day of discharge and felt comfortable going home.     Goals of Care Treatment Preferences:  Code Status: Full Code      Consults:   Consults (From admission, onward)          Status Ordering Provider     Inpatient consult to Cardiology  Once        Provider:  Ismael Covarrubias MD    Completed AIDE DOMÍNGUEZ     Inpatient consult to Social Work/Case Management  Once        Provider:  (Not yet assigned)    Completed RHINA BERRIOS     Inpatient consult to Registered Dietitian/Nutritionist  Once        Provider:  (Not yet assigned)    Completed RHINA BERRIOS              Final Active Diagnoses:    Diagnosis Date Noted POA    PRINCIPAL PROBLEM:  Acute on chronic combined systolic and diastolic heart failure [I50.43] 10/19/2020 Yes    Anemia due to stage 3b chronic kidney disease [N18.32, D63.1] 05/04/2024 Yes    MDD (major depressive disorder), recurrent, in partial remission [F33.41] 04/24/2024 Yes    Hemiplegia affecting right dominant side [G81.91] 04/24/2024 Yes    CKD (chronic kidney disease) stage 3, GFR 30-59 ml/min [N18.30] 02/20/2018 Yes     Chronic    Thrombocytopenia [D69.6] 07/13/2017 Yes     Chronic    Primary hypertension [I10] 07/31/2012 Yes     Chronic    Hyperlipidemia type II [E78.01] 07/31/2012 Yes     Chronic      Problems Resolved During this Admission:       Discharged Condition:  stable    Disposition: Home-Health Care [facility]    Follow Up:   Follow-up Information       FRANCISCO JAIN Follow up on 5/1/2024.    Specialties: Home Health Services, Home Therapy Services, Home Living Aide Services  Why: They will contact you for home healthcare appointments.  Contact information:  [address] [place] [address]  [phone]             Ismael Covarrubias MD Follow up.    Specialties: Cardiology, Interventional Cardiology  Why: As scheduled  Contact information:  [address] [address] [phone]               Ashley Harrell MD Follow up.    Specialty: Internal Medicine  Why: Follow up in 1-2 weeks  Contact information:  [address] [facility] [address] [phone]                           Patient Instructions:      Ambulatory referral/consult to Cardiology   Standing Status: Future   Referral Priority: Routine Referral Type: Consultation   Referral Reason: Specialty Services Required   Requested Specialty: Cardiology   Number of Visits Requested: 1     Ambulatory referral/consult to Nephrology   Standing Status: Future   Referral Priority: Routine Referral Type: Consultation   Referral Reason: Specialty Services Required   Requested Specialty: Nephrology   Number of Visits Requested: 1     Ambulatory referral/consult to [facility]   Standing Status: Future   Referral Priority: Routine Referral Type: Consultation   Referral Reason: Specialty Services Required   Number of Visits Requested: 1     Ambulatory referral/consult to Physical/Occupational Therapy   Standing Status: Future   Referral Priority: Routine Referral Type: Physical Medicine   Referral Reason: Specialty Services Required   Requested Specialty: Physical Therapy   Number of Visits Requested: 1     Diet Cardiac     Activity as tolerated         Medications:  Reconciled Home Medications:      Medication List        START taking these medications       cyanocobalamin 1000 MCG tablet  Commonly known as: VITAMIN B-12  Take 1 tablet (1,000 mcg total) by mouth once daily.     empagliflozin 10 mg tablet  Commonly known as: Jardiance  Take 1 tablet (10 mg total) by mouth once daily.  Notes to patient: Heart failure      furosemide 40 MG tablet  Commonly known as: LASIX  Take 1 tablet (40 mg total) by mouth once daily.  Notes to patient: Fluid pill, heart failure     spironolactone 25 MG tablet  Commonly known as: ALDACTONE  Take 1 tablet (25 mg total) by mouth once daily.  Notes to patient: Heart medicine     valsartan 160 MG tablet  Commonly known as: DIOVAN  Take 1 tablet (160 mg total) by mouth once daily.  Notes to patient: Heart failure, blood pressure            CONTINUE taking these medications      atorvastatin 80 MG tablet  Commonly known as: LIPITOR  Take 1 tablet (80 mg total) by mouth once daily.  Notes to patient: cholesterol     FLUoxetine 20 MG capsule  Take 20 mg by mouth once daily.     folic acid 1 MG tablet  Commonly known as: FOLVITE  Take 1 tablet (1 mg total) by mouth once daily.     metoprolol succinate 50 MG 24 hr tablet  Commonly known as: TOPROL-XL  Take 50 mg by mouth once daily.  Notes to patient: Heart failure, blood pressure     traZODone 50 MG tablet  Commonly known as: DESYREL  Take 1 tablet (50 mg total) by mouth nightly as needed for Insomnia.  Notes to patient: Unable to sleep, insomnia            STOP taking these medications      hydroCHLOROthiazide 25 MG tablet  Commonly known as: HYDRODIURIL     NIFEdipine 90 MG (OSM) 24 hr tablet  Commonly known as: PROCARDIA-XL                Time spent on the discharge of patient: >30 minutes         Ban Cope MD  Department of Hospital Medicine  [facility])

## 2024-05-05 NOTE — NURSING
Pt awake and sitting on bed with no family at bedside.     AVS virtually reviewed with patient in its entirety with emphasis on medications, follow-up appointments and reasons to return to the ED or contact the [facility].     Spent about 15 minutes with patient reviewing CHF, low salt diet.  Pt participated very well and had lots of questions. All answered to pt satisfaction.     Patient also encouraged to utilize their patient portal. Ease and convenience of use reiterated. Education complete and patient voiced understanding. All questions answered. Discharge teaching complete.

## 2024-05-05 NOTE — PLAN OF CARE
05/05/24 0954   Final Note   Assessment Type Final Discharge Note   Anticipated Discharge Disposition Home-Health   Hospital Resources/Appts/Education Provided Provided patient/caregiver with written discharge plan information;Appointments scheduled and added to AVS   Post-Acute Status   Post-Acute Authorization Home Health   Home Health Status Set-up Complete/Auth obtained   Discharge Delays None known at this time     Patient will discharge home with home health. ([facility])    Patient family will provide transportation home at discharge.     Patient discharge needs/orders were addressed from a SW/CM standpoint.

## 2024-05-05 NOTE — PLAN OF CARE
POC reviewed with patient. AAOx4. stable on room air. No complaint of pain or discomfort during the shift. BSC and purewick utilized for voiding. Positions self x 1 assist. Turn Q2/frequent weight shift encouraged. Instructed to call for help ambulating. No injuries, falls, or trauma occurred during shift. Purposeful rounding completed. Bed low and locked with side rails up x 3 and call light within reach.      Problem: Adult Inpatient Plan of Care  Goal: Plan of Care Review  Outcome: Progressing  Goal: Patient-Specific Goal (Individualized)  Outcome: Progressing  Goal: Absence of Hospital-Acquired Illness or Injury  Outcome: Progressing  Goal: Optimal Comfort and Wellbeing  Outcome: Progressing  Goal: Readiness for Transition of Care  Outcome: Progressing

## 2024-05-05 NOTE — PROGRESS NOTES
[facility] CARDIOLOGY PROGRESS NOTE    Date of admission:  4/23/2024 5/5/2024  12:38 PM    Interval history:    No acute events    History  Past Medical History:   Diagnosis Date    Abnormal EKG     Anemia 07/13/2017    Aortic valve regurgitation     Arthritis     CKD (chronic kidney disease) stage 2, GFR 60-89 ml/min 08/08/2014    CKD (chronic kidney disease) stage 2, GFR 60-89 ml/min 08/08/2014    CVA (cerebral vascular accident)     2021 w/ residual R sided weakness    Diabetes mellitus     Diabetes mellitus type II     GERD (gastroesophageal reflux disease)     Gout, unspecified     Heart murmur     Hyperlipidemia     Hypertension     Tendonitis        Medications  Current Facility-Administered Medications   Medication Dose Route Frequency Provider Last Rate Last Admin    acetaminophen tablet 650 mg  650 mg Oral Q6H PRN Wendy Banegas PA-C   650 mg at 04/28/24 1742    atorvastatin tablet 80 mg  80 mg Oral Daily Wendy Banegas PA-C   80 mg at 05/05/24 0835    cyanocobalamin tablet 1,000 mcg  1,000 mcg Oral Daily Ban Richey MD   1,000 mcg at 05/05/24 0835    empagliflozin (Jardiance) tablet 10 mg  10 mg Oral Daily Ismael Covarrubias MD   10 mg at 05/05/24 0835    FLUoxetine capsule 20 mg  20 mg Oral Daily Ban Richey MD   20 mg at 05/05/24 0835    folic acid tablet 1 mg  1 mg Oral Daily Wendy Banegas PA-C   1 mg at 05/05/24 0835    furosemide tablet 40 mg  40 mg Oral Daily Ismael Covarrubias MD   40 mg at 05/05/24 0835    heparin (porcine) injection 5,000 Units  5,000 Units Subcutaneous Q8H Wendy Banegas PA-C   5,000 Units at 05/05/24 0536    HYDROcodone-acetaminophen 5-325 mg per tablet 1 tablet  1 tablet Oral Q6H PRN Hailee Ambriz NP   1 tablet at 05/04/24 1420    LIDOcaine 5 % patch 1 patch  1 patch Transdermal Q24H Wendy Banegas PA-C   1 patch at 05/05/24 0536    melatonin tablet 6 mg  6 mg Oral Nightly PRN Wendy Banegas PA-C         methocarbamoL tablet 1,000 mg  1,000 mg Oral QID PRN Hailee Ambriz, NP        metoprolol succinate (TOPROL-XL) 24 hr tablet 50 mg  50 mg Oral Daily Wendy Banegas PA-C   50 mg at 05/05/24 0835    miconazole NITRATE 2 % top powder   Topical (Top) BID Hailee Ambriz, NP   Given at 05/05/24 0836    naloxone 0.4 mg/mL injection 0.02 mg  0.02 mg Intravenous PRN Wendy Banegas PA-C        senna-docusate 8.6-50 mg per tablet 1 tablet  1 tablet Oral BID PRN Wendy Banegas PA-C        simethicone chewable tablet 80 mg  1 tablet Oral TID PRN Hailee Ambriz NP   80 mg at 04/30/24 2154    sodium chloride 0.9% flush 10 mL  10 mL Intravenous PRN Wendy Banegas PA-C   10 mL at 04/24/24 0600    sodium chloride 0.9% flush 10 mL  10 mL Intravenous Q8H Wendy Banegas PA-C   10 mL at 05/05/24 0540    spironolactone tablet 25 mg  25 mg Oral Daily Ismael Covarrubias MD   25 mg at 05/05/24 0835    traZODone tablet 50 mg  50 mg Oral Nightly PRN Wendy Banegas PA-C   50 mg at 04/26/24 2139    valsartan tablet 160 mg  160 mg Oral Daily Ismael Covarrubias MD   160 mg at 05/05/24 0835        Physical exam    Temp:  [98 °F (36.7 °C)-98.8 °F (37.1 °C)]   Pulse:  [67-84]   Resp:  [15-18]   BP: (110-137)/(53-67)   SpO2:  [92 %-97 %]    Wt Readings from Last 3 Encounters:   05/05/24 81.8 kg (180 lb 5.4 oz)   11/29/23 83.6 kg (184 lb 4.9 oz)   05/30/23 93 kg (205 lb)       Intake/Output Summary (Last 24 hours) at 5/5/2024 1207  Last data filed at 5/5/2024 0627  Gross per 24 hour   Intake 180 ml   Output 950 ml   Net -770 ml     Physical Exam  Constitutional:       General: She is not in acute distress.  HENT:      Head: Normocephalic and atraumatic.      Mouth/Throat:      Mouth: Mucous membranes are moist.   Eyes:      Extraocular Movements: Extraocular movements intact.      Pupils: Pupils are equal, round, and reactive to light.   Neck:      Vascular: No carotid bruit or JVD.      Comments:  JVP at the base of the neck at 45°  Cardiovascular:      Rate and Rhythm: Normal rate and regular rhythm.      Heart sounds: No murmur heard.     No friction rub. Gallop present. S3 and S4 sounds present.   Pulmonary:      Effort: Pulmonary effort is normal.      Breath sounds: Normal breath sounds.   Abdominal:      Tenderness: There is no abdominal tenderness. There is no guarding or rebound.   Musculoskeletal:      Right lower leg: Edema present.      Left lower leg: Edema present.      Comments: Trace bilateral lower extremity edema   Skin:     General: Skin is warm and dry.      Capillary Refill: Capillary refill takes less than 2 seconds.   Neurological:      General: No focal deficit present.      Mental Status: She is alert.   Psychiatric:         Mood and Affect: Mood normal.         Labs  Recent Results (from the past 24 hour(s))   Basic metabolic panel    Collection Time: 05/05/24  5:34 AM   Result Value Ref Range    Sodium 141 136 - 145 mmol/L    Potassium 4.0 3.5 - 5.1 mmol/L    Chloride 101 95 - 110 mmol/L    CO2 32 (H) 23 - 29 mmol/L    Glucose 99 70 - 110 mg/dL    BUN 31 (H) 8 - 23 mg/dL    Creatinine 1.7 (H) 0.5 - 1.4 mg/dL    Calcium 9.9 8.7 - 10.5 mg/dL    Anion Gap 8 8 - 16 mmol/L    eGFR 32 (A) >60 mL/min/1.73 m^2         Telemetry  No events reported    EKG  Reviewed    Echocardiogram  Results for orders placed or performed during the hospital encounter of 04/23/24   Echo   Result Value Ref Range    LVOT stroke volume 33.43 cm3    LVIDd 5.92 3.5 - 6.0 cm    LV Systolic Volume 134.80 mL    LVIDs 5.29 (A) 2.1 - 4.0 cm    LV Diastolic Volume 174.53 mL    IVS 1.43 (A) 0.6 - 1.1 cm    LVOT diameter 1.95 cm    LVOT area 3.0 cm2    FS 11 (A) 28 - 44 %    Left Ventricle Relative Wall Thickness 0.39 cm    Posterior Wall 1.16 (A) 0.6 - 1.1 cm    LV mass 340.74 g    MV Peak E Adán 0.99 m/s    TDI LATERAL 0.05 m/s    TDI SEPTAL 0.04 m/s    E/E' ratio 22.00 m/s    MV Peak A Adán 0.84 m/s    TR Max Adán 4.12  m/s    E/A ratio 1.18     E wave deceleration time 119.03 msec    LV SEPTAL E/E' RATIO 24.75 m/s    LV LATERAL E/E' RATIO 19.80 m/s    PV Peak S Adán 0.62 m/s    PV Peak D Adán 0.64 m/s    Pulm vein S/D ratio 0.97     LVOT peak adán 0.65 m/s    Left Ventricular Outflow Tract Mean Velocity 0.44 cm/s    Left Ventricular Outflow Tract Mean Gradient 0.87 mmHg    LA size 4.33 cm    Left Atrium Minor Axis 6.26 cm    Left Atrium Major Axis 6.96 cm    LA volume (mod) 109.25 cm3    RA Major Axis 6.74 cm    AV mean gradient 5 mmHg    AV peak gradient 8 mmHg    Ao peak adán 1.44 m/s    Ao VTI 25.90 cm    LVOT peak VTI 11.20 cm    AV valve area 1.29 cm²    AV Velocity Ratio 0.45     AV index (prosthetic) 0.43     RYAN by Velocity Ratio 1.35 cm²    Mr max adán 5.87 m/s    MV stenosis pressure 1/2 time 34.52 ms    MV valve area p 1/2 method 6.37 cm2    TV mean gradient 35 mmHg    Triscuspid Valve Regurgitation Peak Gradient 68 mmHg    PV PEAK VELOCITY 0.87 m/s    PV peak gradient 3 mmHg    RVOT peak adán 0.66 m/s    STJ 2.36 cm    Ascending aorta 3.28 cm    IVC diameter 2.17 cm    Mean e' 0.05 m/s    LA volume 114.02 cm3    TAPSE 1.80 cm    LA WIDTH 4.7 cm    RA Width 4.1 cm    Sinus 2.6 cm    TV resting pulmonary artery pressure 83 mmHg    RV TB RVSP 19 mmHg    Est. RA pres 15 mmHg    EF 25 %    Narrative      Left Ventricle: The left ventricle is moderately dilated. Moderately   increased wall thickness. There is mild concentric hypertrophy. Severe   global hypokinesis present. There is reduced systolic function. Ejection   fraction by visual approximation is 25%. Grade II diastolic dysfunction.   Elevated left ventricular filling pressure. Tissue Doppler velocity is   reduced.    Right Ventricle: Mild right ventricular enlargement. Wall thickness is   normal. Right ventricle wall motion has global hypokinesis. Systolic   function is severely reduced.    Left Atrium: Left atrium is severely dilated.    Right Atrium: Right atrium is  severely dilated.    Aortic Valve: The aortic valve is a trileaflet valve. There is mild   aortic valve sclerosis. Mildly restricted motion.    Mitral Valve: There is moderate regurgitation with a centrally directed   jet.    Tricuspid Valve: There is moderate to severe regurgitation with a   centrally directed jet.    Pulmonary Artery: There is severe pulmonary hypertension. The estimated   pulmonary artery systolic pressure is 83 mmHg.    IVC/SVC: Elevated venous pressure at 15 mmHg.    Pericardium: There is a small circumferential effusion.         Imaging   Echo    Result Date: 4/24/2024    Left Ventricle: The left ventricle is moderately dilated. Moderately increased wall thickness. There is mild concentric hypertrophy. Severe global hypokinesis present. There is reduced systolic function. Ejection fraction by visual approximation is 25%. Grade II diastolic dysfunction. Elevated left ventricular filling pressure. Tissue Doppler velocity is reduced.   Right Ventricle: Mild right ventricular enlargement. Wall thickness is normal. Right ventricle wall motion has global hypokinesis. Systolic function is severely reduced.   Left Atrium: Left atrium is severely dilated.   Right Atrium: Right atrium is severely dilated.   Aortic Valve: The aortic valve is a trileaflet valve. There is mild aortic valve sclerosis. Mildly restricted motion.   Mitral Valve: There is moderate regurgitation with a centrally directed jet.   Tricuspid Valve: There is moderate to severe regurgitation with a centrally directed jet.   Pulmonary Artery: There is severe pulmonary hypertension. The estimated pulmonary artery systolic pressure is 83 mmHg.   IVC/SVC: Elevated venous pressure at 15 mmHg.   Pericardium: There is a small circumferential effusion.     X-Ray Chest AP    Result Date: 4/23/2024  EXAMINATION: AP PORTABLE CHEST CLINICAL HISTORY: shortness of breath; TECHNIQUE: AP portable chest radiograph was submitted. COMPARISON: 09/28/2021  "FINDINGS: AP portable chest radiograph demonstrates marked enlargement of the cardiac silhouette.  There is tortuosity of the descending thoracic aorta.  Vascular calcifications seen at the aortic knob.  There is no focal consolidation, pneumothorax, or pleural effusion. There is a left shoulder arthroplasty.  The bones are diffusely osteopenic.     Increase in size of the cardiac silhouette. This report was flagged in Epic as abnormal. Electronically signed by: Dulce Maria Rodriguez Date:    04/23/2024 Time:    18:48      Assessment and Plan:    Heart Failure, Systolic, Acute              4/24/2024: Presented fluid overloaded in HF. BNP 2,536. Received furosemide 20 mg iv "Q12".              4/24/2024: Echo: Moderately dilated left ventricle with severe systolic dysfunction. EF 25%. Mild LVH. Moderate diastolic dysfunction. LVEDP elevated. Mildly enlarged RV with sever systolic dysfunction. Severely enlarged LA. Moderate MR. Moderate to severe TR. SPAP 83 mmHg. RA 15 mmHg. Small pericardial effusion.               Betablocker, CHANELL blockade, MCA & SGLT2i.              5/2/2024: .              4/26/2024: Valsartan 40 mg Q24 was begun. She appears to have had an reaction to ACEI in the past. Accordingly no ACEI or ARNI.  4/30/2024: Valsartan 40 mg Q24 was increased to valsartan to 80 mg Q24.  5/2/2024: Valsartan 80 mg Q24 was increased to valsartan 160 mg Q24.  5/3/2024: Furosemide 40 mg Q24 was begun.              On metoprolol 50 mg Q24, empagliflozin 10 mg Q24, valsartan 160 mg Q24, spironolactone 25 mg Q24 and furosemide 40 mg Q24 PO.   Patient states that she has a good response to p.o. Lasix.              Fairly well compensated.  Monitor renal function.                             2. Pulmonary Hypertension  4/24/2024: Echo: SPAP 83 mmHg.   Most certainly due to left heart disease.     3. 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     4. Hypertension              2005: " Diagnosed.              At home been on metoprolol 50 mg Q24, nifedipine 90 mg Q24 and hctz 25 mg Q24.               On metoprolol 50 mg Q24, empagliflozin 10 mg Q24, valsartan 160 mg Q24, spironolactone 25 mg Q24 and furosemide 40 mg Q24.     5. Hypercholesterolemia              On atorvastatin 80 mg Q24.     6. Diabetes Mellitus, Type 2  2005: Diagnosed. Complications: CVA. Medications: Diet.     7. Mild Obesity              4/25/2024: Weight 98 kg. BMI 36.              5/3/2024: Weight 88 kg. BMI 32,     8. Chronic Kidney Disease, Stage 3              4/27/2024: BUN/crea 24/1.5. eGFR 37. K 4.1.              5/3/2024: BUN/crea 27/1.6 eGFR 35. K 3.6.   5/4/2024: BUN/Cr 27/1.7   5/5/2024: BUN/Cr 31/1.7     9. 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              4/23/2024: Plts 140.              Dr. Joao Howard.     10. Anemia              4/30/2024: H/H 7.7/22.6%. MCV 93.              Significant decline.              Consider further evaluation.     11. Primary Care              Dr. Ashley Harrell.      Thank you for allowing me to participate in the care of Wendy Viveros.    Dov Woody MD, [facility] [place]  Interventional Cardiology    The total time spent for evaluation and management on 05/05/2024 including reviewing separately obtained history, performing a medically appropriate exam and evaluation, documenting clinical information in the health record, independently interpreting results and communicating them to the patient/family/caregiver, and ordering medications/tests/procedures was > 35 minutes.

## 2024-05-05 NOTE — PLAN OF CARE
Problem: Adult Inpatient Plan of Care  Goal: Plan of Care Review  Outcome: Progressing  Goal: Patient-Specific Goal (Individualized)  Outcome: Progressing  Goal: Absence of Hospital-Acquired Illness or Injury  Outcome: Progressing  Goal: Optimal Comfort and Wellbeing  Outcome: Progressing  Goal: Readiness for Transition of Care  Outcome: Progressing     Problem: Diabetes Comorbidity  Goal: Blood Glucose Level Within Targeted Range  Outcome: Progressing     Problem: Wound  Goal: Optimal Coping  Outcome: Progressing  Goal: Optimal Functional Ability  Outcome: Progressing  Goal: Absence of Infection Signs and Symptoms  Outcome: Progressing  Goal: Improved Oral Intake  Outcome: Progressing  Goal: Optimal Pain Control and Function  Outcome: Progressing  Goal: Skin Health and Integrity  Outcome: Progressing  Goal: Optimal Wound Healing  Outcome: Progressing     Problem: Skin Injury Risk Increased  Goal: Skin Health and Integrity  Outcome: Progressing     Problem: Fall Injury Risk  Goal: Absence of Fall and Fall-Related Injury  Outcome: Progressing

## 2024-05-07 ENCOUNTER — TELEPHONE (OUTPATIENT)
Dept: CARDIOLOGY | Facility: CLINIC | Age: 69
End: 2024-05-07
Payer: MEDICARE

## 2024-05-07 NOTE — TELEPHONE ENCOUNTER
Left a message on voice mail with appt date and time, to get labs drawn on 5/8/24.    ----- Message from Ismael Covarrubias MD sent at 5/3/2024  1:05 PM CDT -----  May go home today.    5/8/2024: BMP and BNP.    Appointment 7-10 days.    Ismael Covarrubias M.D.     11

## 2024-05-09 ENCOUNTER — OFFICE VISIT (OUTPATIENT)
Dept: CARDIOLOGY | Facility: CLINIC | Age: 69
End: 2024-05-09
Payer: MEDICARE

## 2024-05-09 VITALS
WEIGHT: 174 LBS | HEIGHT: 65 IN | HEART RATE: 67 BPM | BODY MASS INDEX: 28.99 KG/M2 | OXYGEN SATURATION: 98 % | SYSTOLIC BLOOD PRESSURE: 140 MMHG | DIASTOLIC BLOOD PRESSURE: 63 MMHG

## 2024-05-09 DIAGNOSIS — E66.01 MORBID (SEVERE) OBESITY DUE TO EXCESS CALORIES: ICD-10-CM

## 2024-05-09 DIAGNOSIS — I50.9 CONGESTIVE HEART FAILURE, UNSPECIFIED HF CHRONICITY, UNSPECIFIED HEART FAILURE TYPE: ICD-10-CM

## 2024-05-09 DIAGNOSIS — R93.1 ABNORMAL FINDINGS ON DIAGNOSTIC IMAGING OF HEART AND CORONARY CIRCULATION: ICD-10-CM

## 2024-05-09 DIAGNOSIS — R94.39 ABNORMAL RESULT OF OTHER CARDIOVASCULAR FUNCTION STUDY: ICD-10-CM

## 2024-05-09 PROCEDURE — 3078F DIAST BP <80 MM HG: CPT | Mod: CPTII,GC,S$GLB, | Performed by: STUDENT IN AN ORGANIZED HEALTH CARE EDUCATION/TRAINING PROGRAM

## 2024-05-09 PROCEDURE — 3008F BODY MASS INDEX DOCD: CPT | Mod: CPTII,GC,S$GLB, | Performed by: STUDENT IN AN ORGANIZED HEALTH CARE EDUCATION/TRAINING PROGRAM

## 2024-05-09 PROCEDURE — 3044F HG A1C LEVEL LT 7.0%: CPT | Mod: CPTII,GC,S$GLB, | Performed by: STUDENT IN AN ORGANIZED HEALTH CARE EDUCATION/TRAINING PROGRAM

## 2024-05-09 PROCEDURE — 99999 PR PBB SHADOW E&M-EST. PATIENT-LVL III: CPT | Mod: PBBFAC,GC,, | Performed by: STUDENT IN AN ORGANIZED HEALTH CARE EDUCATION/TRAINING PROGRAM

## 2024-05-09 PROCEDURE — 4010F ACE/ARB THERAPY RXD/TAKEN: CPT | Mod: CPTII,GC,S$GLB, | Performed by: STUDENT IN AN ORGANIZED HEALTH CARE EDUCATION/TRAINING PROGRAM

## 2024-05-09 PROCEDURE — 3077F SYST BP >= 140 MM HG: CPT | Mod: CPTII,GC,S$GLB, | Performed by: STUDENT IN AN ORGANIZED HEALTH CARE EDUCATION/TRAINING PROGRAM

## 2024-05-09 PROCEDURE — 1111F DSCHRG MED/CURRENT MED MERGE: CPT | Mod: CPTII,GC,S$GLB, | Performed by: STUDENT IN AN ORGANIZED HEALTH CARE EDUCATION/TRAINING PROGRAM

## 2024-05-09 PROCEDURE — 1126F AMNT PAIN NOTED NONE PRSNT: CPT | Mod: CPTII,GC,S$GLB, | Performed by: STUDENT IN AN ORGANIZED HEALTH CARE EDUCATION/TRAINING PROGRAM

## 2024-05-09 PROCEDURE — 99214 OFFICE O/P EST MOD 30 MIN: CPT | Mod: GC,S$GLB,, | Performed by: STUDENT IN AN ORGANIZED HEALTH CARE EDUCATION/TRAINING PROGRAM

## 2024-05-09 RX ORDER — SACUBITRIL AND VALSARTAN 49; 51 MG/1; MG/1
1 TABLET, FILM COATED ORAL 2 TIMES DAILY
Qty: 90 TABLET | Refills: 3 | Status: SHIPPED | OUTPATIENT
Start: 2024-05-09 | End: 2024-05-17

## 2024-05-09 NOTE — PROGRESS NOTES
Clinic Note  5/9/2024      Subjective:       Patient ID:  Naomi is a 68 y.o. female being seen for an established visit.    Chief Complaint: Hospital Follow Up    HPI  Pt is a 68 y.o female with h/o pmhx of htn, DMII, CKD3, gout, anemia, prior CVA with r sided weakness, HFrEF who presents to the clinic for hospital follow up.  She had a recent hospitalization for acute decompensated heart failure after presenting with SOB. Hospitalized from 4/23/24-5/5/24. Diuresed to euvolemia prior to discharge.  Since discharge from the hospital she reports feeling well. Lives with her daughter at home however performs all her ADLs independently. Denies any sxs of heart failure, arrhythmia, or ischemia.    TTE on 4/24/24: EF 25%, GIIDD, severely reduced RVSF.  Last normal echo on 10/26/2020 with EF 55-60%  Review of Systems   Constitutional:  Negative for chills, fever and weight loss.   HENT: Negative.     Eyes:  Negative for blurred vision.   Respiratory:  Negative for cough and shortness of breath.    Cardiovascular:  Negative for chest pain and palpitations.   Gastrointestinal:  Negative for abdominal pain, blood in stool, constipation, diarrhea, melena, nausea and vomiting.   Musculoskeletal:  Negative for myalgias.   Skin: Negative.    Neurological:  Negative for dizziness, loss of consciousness and weakness.       Past Medical History:   Diagnosis Date    Abnormal EKG     Anemia 07/13/2017    Aortic valve regurgitation     Arthritis     CKD (chronic kidney disease) stage 2, GFR 60-89 ml/min 08/08/2014    CKD (chronic kidney disease) stage 2, GFR 60-89 ml/min 08/08/2014    CVA (cerebral vascular accident)     2021 w/ residual R sided weakness    Diabetes mellitus     Diabetes mellitus type II     GERD (gastroesophageal reflux disease)     Gout, unspecified     Heart murmur     Hyperlipidemia     Hypertension     Tendonitis        Family History   Problem Relation Name Age of Onset    Heart disease Mother          MI at 71     Hypertension Mother      Hypertension Brother      Diabetes Brother      Hypertension Brother      Breast cancer Maternal Cousin      Colon cancer Neg Hx      Ovarian cancer Neg Hx          reports that she has never smoked. She has never used smokeless tobacco. She reports that she does not drink alcohol and does not use drugs.    Medication List with Changes/Refills   Current Medications    ATORVASTATIN (LIPITOR) 80 MG TABLET    Take 1 tablet (80 mg total) by mouth once daily.    CYANOCOBALAMIN (VITAMIN B-12) 1000 MCG TABLET    Take 1 tablet (1,000 mcg total) by mouth once daily.    EMPAGLIFLOZIN (JARDIANCE) 10 MG TABLET    Take 1 tablet (10 mg total) by mouth once daily.    FLUOXETINE 20 MG CAPSULE    Take 20 mg by mouth once daily.    FOLIC ACID (FOLVITE) 1 MG TABLET    Take 1 tablet (1 mg total) by mouth once daily.    FUROSEMIDE (LASIX) 40 MG TABLET    Take 1 tablet (40 mg total) by mouth once daily.    METOPROLOL SUCCINATE (TOPROL-XL) 50 MG 24 HR TABLET    Take 50 mg by mouth once daily.    SPIRONOLACTONE (ALDACTONE) 25 MG TABLET    Take 1 tablet (25 mg total) by mouth once daily.    TRAZODONE (DESYREL) 50 MG TABLET    Take 1 tablet (50 mg total) by mouth nightly as needed for Insomnia.    VALSARTAN (DIOVAN) 160 MG TABLET    Take 1 tablet (160 mg total) by mouth once daily.     Review of patient's allergies indicates:   Allergen Reactions    Colchicine analogues      diarrhea    Lisinopril      Other reaction(s): cough  Other reaction(s): cough    Losartan      Other reaction(s): blurry vision  Other reaction(s): blurry vision    Percocet [oxycodone-acetaminophen]      Pt gets pain from taking medicine.       Patient Active Problem List   Diagnosis    Primary hypertension    Hyperlipidemia type II    GERD (gastroesophageal reflux disease)    Chronic gout    Osteoarthritis    CKD (chronic kidney disease) stage 2, GFR 60-89 ml/min    Left shoulder pain    Adhesive capsulitis of left shoulder    Symptomatic  "anemia    Hepatosplenomegaly    Hypercalcemia    Hemolytic anemia    Thrombocytopenia    Anemia, unspecified    CKD (chronic kidney disease) stage 3, GFR 30-59 ml/min    History of CVA (cerebrovascular accident)    Morbid (severe) obesity due to excess calories    Carpal tunnel syndrome    Idiopathic thrombocytopenic purpura (ITP)    Leucocytosis    Acute on chronic combined systolic and diastolic heart failure    Hemiplegia affecting right dominant side    MDD (major depressive disorder), recurrent, in partial remission    Anemia due to stage 3b chronic kidney disease           Objective:      Ht 5' 5" (1.651 m)   Wt 78.9 kg (174 lb)   SpO2 98%   BMI 28.96 kg/m²   Estimated body mass index is 28.96 kg/m² as calculated from the following:    Height as of this encounter: 5' 5" (1.651 m).    Weight as of this encounter: 78.9 kg (174 lb).  Physical Exam  Constitutional:       General: She is not in acute distress.     Appearance: She is not diaphoretic.   HENT:      Head: Normocephalic and atraumatic.      Mouth/Throat:      Pharynx: No oropharyngeal exudate.   Eyes:      General: No scleral icterus.     Conjunctiva/sclera: Conjunctivae normal.      Pupils: Pupils are equal, round, and reactive to light.   Neck:      Vascular: No JVD.      Trachea: No tracheal deviation.   Cardiovascular:      Rate and Rhythm: Normal rate and regular rhythm.      Heart sounds: No murmur heard.     No friction rub. No gallop.   Pulmonary:      Effort: Pulmonary effort is normal. No respiratory distress.      Breath sounds: Normal breath sounds. No wheezing.   Chest:      Chest wall: No tenderness.   Abdominal:      General: Bowel sounds are normal. There is no distension.      Tenderness: There is no abdominal tenderness. There is no rebound.   Musculoskeletal:         General: No deformity. Normal range of motion.      Cervical back: Normal range of motion and neck supple.   Lymphadenopathy:      Cervical: No cervical adenopathy. " "  Skin:     General: Skin is warm and dry.   Neurological:      Mental Status: She is alert and oriented to person, place, and time.      Cranial Nerves: No cranial nerve deficit.   Psychiatric:         Mood and Affect: Affect normal.           Assessment and Plan:         Wendy Boothe" was seen today for hospital follow up.    Diagnoses and all orders for this visit:    Congestive heart failure, unspecified HF chronicity, unspecified heart failure type  TTE on 4/24/24: EF 25%, GIIDD, severely reduced RVSF.  Last normal echo on 10/26/2020 with EF 55-60%    Etiology unknown.  Ischemic workup is warranted  Will order cardiac PET (cr 1.7 therefore will forgo angiogram for now)  GDMT: Entresto 49-51 mg bid, Toprol 50 mg, Gardiance 10 mg qd, Aldactone 25 mg qd  BMP in 1 week    Htn  Cont entresto 49-51 mg bid, aldactone 25 mg qd  Pt to keep log of BP at home  If BP elevated will change Toprol to coreg    DMII  hgA1c:4.3    CKD3  Bl cr 1.4-1.8  Last cr at bl    Prior CVA  R sided deficit as result  Cont ASA and lipitor 80 mg qd    Hyperlipidemia  Last ldl was obtained in 2021; 108.6  Currently on lipitor 80 mg qd  Will repeat lipid panel    Other Orders Placed This Visit:  No orders of the defined types were placed in this encounter.        Sameer Aguirre        "

## 2024-05-10 ENCOUNTER — PATIENT MESSAGE (OUTPATIENT)
Dept: FAMILY MEDICINE | Facility: CLINIC | Age: 69
End: 2024-05-10
Payer: MEDICARE

## 2024-05-12 ENCOUNTER — PATIENT MESSAGE (OUTPATIENT)
Dept: CARDIOLOGY | Facility: CLINIC | Age: 69
End: 2024-05-12
Payer: MEDICARE

## 2024-05-17 ENCOUNTER — LAB VISIT (OUTPATIENT)
Dept: LAB | Facility: OTHER | Age: 69
End: 2024-05-17
Attending: INTERNAL MEDICINE
Payer: MEDICARE

## 2024-05-17 ENCOUNTER — PATIENT MESSAGE (OUTPATIENT)
Dept: CARDIOLOGY | Facility: CLINIC | Age: 69
End: 2024-05-17

## 2024-05-17 ENCOUNTER — OFFICE VISIT (OUTPATIENT)
Dept: CARDIOLOGY | Facility: CLINIC | Age: 69
End: 2024-05-17
Attending: INTERNAL MEDICINE
Payer: MEDICARE

## 2024-05-17 VITALS
HEIGHT: 65 IN | SYSTOLIC BLOOD PRESSURE: 128 MMHG | DIASTOLIC BLOOD PRESSURE: 72 MMHG | WEIGHT: 171.06 LBS | HEART RATE: 57 BPM | OXYGEN SATURATION: 100 % | BODY MASS INDEX: 28.5 KG/M2

## 2024-05-17 DIAGNOSIS — I50.22 HEART FAILURE, SYSTOLIC, CHRONIC: ICD-10-CM

## 2024-05-17 DIAGNOSIS — D63.8 ANEMIA IN OTHER CHRONIC DISEASES CLASSIFIED ELSEWHERE: ICD-10-CM

## 2024-05-17 DIAGNOSIS — I50.9 CONGESTIVE HEART FAILURE, UNSPECIFIED HF CHRONICITY, UNSPECIFIED HEART FAILURE TYPE: ICD-10-CM

## 2024-05-17 DIAGNOSIS — D59.8 OTHER ACQUIRED HEMOLYTIC ANEMIAS: ICD-10-CM

## 2024-05-17 DIAGNOSIS — D69.6 THROMBOCYTOPENIA: Chronic | ICD-10-CM

## 2024-05-17 DIAGNOSIS — I10 PRIMARY HYPERTENSION: ICD-10-CM

## 2024-05-17 DIAGNOSIS — D69.3 IDIOPATHIC THROMBOCYTOPENIC PURPURA (ITP): ICD-10-CM

## 2024-05-17 DIAGNOSIS — E66.3 OVERWEIGHT: ICD-10-CM

## 2024-05-17 DIAGNOSIS — E78.00 HYPERCHOLESTEROLEMIA: ICD-10-CM

## 2024-05-17 DIAGNOSIS — Z86.73 HISTORY OF CEREBROVASCULAR ACCIDENT: ICD-10-CM

## 2024-05-17 DIAGNOSIS — I27.22 PULMONARY HYPERTENSION DUE TO LEFT HEART DISEASE: ICD-10-CM

## 2024-05-17 DIAGNOSIS — N18.32 STAGE 3B CHRONIC KIDNEY DISEASE: ICD-10-CM

## 2024-05-17 LAB
ANION GAP SERPL CALC-SCNC: 11 MMOL/L (ref 8–16)
BNP SERPL-MCNC: 511 PG/ML (ref 0–99)
BUN SERPL-MCNC: 34 MG/DL (ref 8–23)
CALCIUM SERPL-MCNC: 10.5 MG/DL (ref 8.7–10.5)
CHLORIDE SERPL-SCNC: 109 MMOL/L (ref 95–110)
CHOLEST SERPL-MCNC: 131 MG/DL (ref 120–199)
CHOLEST/HDLC SERPL: 3.4 {RATIO} (ref 2–5)
CO2 SERPL-SCNC: 22 MMOL/L (ref 23–29)
CREAT SERPL-MCNC: 1.5 MG/DL (ref 0.5–1.4)
EST. GFR  (NO RACE VARIABLE): 37 ML/MIN/1.73 M^2
GLUCOSE SERPL-MCNC: 81 MG/DL (ref 70–110)
HDLC SERPL-MCNC: 38 MG/DL (ref 40–75)
HDLC SERPL: 29 % (ref 20–50)
LDLC SERPL CALC-MCNC: 69.6 MG/DL (ref 63–159)
NONHDLC SERPL-MCNC: 93 MG/DL
POTASSIUM SERPL-SCNC: 3.8 MMOL/L (ref 3.5–5.1)
SODIUM SERPL-SCNC: 142 MMOL/L (ref 136–145)
TRIGL SERPL-MCNC: 117 MG/DL (ref 30–150)

## 2024-05-17 PROCEDURE — 3288F FALL RISK ASSESSMENT DOCD: CPT | Mod: CPTII,S$GLB,, | Performed by: INTERNAL MEDICINE

## 2024-05-17 PROCEDURE — 1111F DSCHRG MED/CURRENT MED MERGE: CPT | Mod: CPTII,S$GLB,, | Performed by: INTERNAL MEDICINE

## 2024-05-17 PROCEDURE — 3078F DIAST BP <80 MM HG: CPT | Mod: CPTII,S$GLB,, | Performed by: INTERNAL MEDICINE

## 2024-05-17 PROCEDURE — 36415 COLL VENOUS BLD VENIPUNCTURE: CPT | Performed by: INTERNAL MEDICINE

## 2024-05-17 PROCEDURE — 83880 ASSAY OF NATRIURETIC PEPTIDE: CPT | Performed by: INTERNAL MEDICINE

## 2024-05-17 PROCEDURE — 99215 OFFICE O/P EST HI 40 MIN: CPT | Mod: S$GLB,,, | Performed by: INTERNAL MEDICINE

## 2024-05-17 PROCEDURE — 3008F BODY MASS INDEX DOCD: CPT | Mod: CPTII,S$GLB,, | Performed by: INTERNAL MEDICINE

## 2024-05-17 PROCEDURE — 1159F MED LIST DOCD IN RCRD: CPT | Mod: CPTII,S$GLB,, | Performed by: INTERNAL MEDICINE

## 2024-05-17 PROCEDURE — 80061 LIPID PANEL: CPT | Performed by: INTERNAL MEDICINE

## 2024-05-17 PROCEDURE — 4010F ACE/ARB THERAPY RXD/TAKEN: CPT | Mod: CPTII,S$GLB,, | Performed by: INTERNAL MEDICINE

## 2024-05-17 PROCEDURE — 80048 BASIC METABOLIC PNL TOTAL CA: CPT | Performed by: INTERNAL MEDICINE

## 2024-05-17 PROCEDURE — 1126F AMNT PAIN NOTED NONE PRSNT: CPT | Mod: CPTII,S$GLB,, | Performed by: INTERNAL MEDICINE

## 2024-05-17 PROCEDURE — 3074F SYST BP LT 130 MM HG: CPT | Mod: CPTII,S$GLB,, | Performed by: INTERNAL MEDICINE

## 2024-05-17 PROCEDURE — 1101F PT FALLS ASSESS-DOCD LE1/YR: CPT | Mod: CPTII,S$GLB,, | Performed by: INTERNAL MEDICINE

## 2024-05-17 PROCEDURE — 99999 PR PBB SHADOW E&M-EST. PATIENT-LVL III: CPT | Mod: PBBFAC,,, | Performed by: INTERNAL MEDICINE

## 2024-05-17 PROCEDURE — 3044F HG A1C LEVEL LT 7.0%: CPT | Mod: CPTII,S$GLB,, | Performed by: INTERNAL MEDICINE

## 2024-05-17 PROCEDURE — 1160F RVW MEDS BY RX/DR IN RCRD: CPT | Mod: CPTII,S$GLB,, | Performed by: INTERNAL MEDICINE

## 2024-05-17 RX ORDER — CARVEDILOL 6.25 MG/1
6.25 TABLET ORAL 2 TIMES DAILY
Qty: 180 TABLET | Refills: 3 | Status: SHIPPED | OUTPATIENT
Start: 2024-05-17 | End: 2024-06-17 | Stop reason: SDUPTHER

## 2024-05-17 RX ORDER — FUROSEMIDE 40 MG/1
40 TABLET ORAL DAILY
Qty: 120 TABLET | Refills: 3 | Status: SHIPPED | OUTPATIENT
Start: 2024-05-17 | End: 2024-06-17 | Stop reason: SDUPTHER

## 2024-05-17 RX ORDER — ATORVASTATIN CALCIUM 80 MG/1
80 TABLET, FILM COATED ORAL DAILY
Qty: 90 TABLET | Refills: 3 | Status: SHIPPED | OUTPATIENT
Start: 2024-05-17 | End: 2024-06-17 | Stop reason: SDUPTHER

## 2024-05-17 RX ORDER — VALSARTAN 160 MG/1
160 TABLET ORAL DAILY
Qty: 90 TABLET | Refills: 3 | Status: SHIPPED | OUTPATIENT
Start: 2024-05-17 | End: 2024-06-17 | Stop reason: SDUPTHER

## 2024-05-17 RX ORDER — SPIRONOLACTONE 25 MG/1
25 TABLET ORAL DAILY
Qty: 90 TABLET | Refills: 3 | Status: SHIPPED | OUTPATIENT
Start: 2024-05-17 | End: 2024-06-17 | Stop reason: SDUPTHER

## 2024-05-17 NOTE — PROGRESS NOTES
Subjective     Wendy Viveros is a 68 y.o. female with hypertension and diabetes mellitus type 2. She is overweight. She suffered a cerebrovascular accident in 2021 causing right sided weakness. She has thrombocytopenia and issues with anemia. Beginning early 4/2024 she accumulated fluid. She developed edema of her legs, the abdominal girth increased and she became short of breath at rest. She presented to the emergency room on 4/24/2024 and was admitted. On 4/24/2024 she had an echocardiogram. The left ventricle was moderately dilated with severe systolic dysfunction. The ejection fraction was 25%. There was mild left ventricular hypertrophy. There was moderate diastolic dysfunction. The left ventricular end diastolic pressure was elevated. The right ventricle was mildly enlarged with severe systolic dysfunction. The left atrium was severely enlarged There was moderate mitral regurgitation and moderate to severe tricuspid regurgitation. The systolic pulmonary artery pressure was 83 mmHg. The right atrial pressure was 15 mmHg. There was a small pericardial effusion. She was diuresed and a regimen for heart failure was initiated. On 5/3/2024 she went on an oral regimen. When she comes back to see me on 5/17/2024 she has lost 22 kg of fluid and feels well,       Congestive Heart Failure  Presents for follow-up visit. Associated symptoms include edema. Pertinent negatives include no abdominal pain, chest pain, chest pressure, claudication, fatigue, muscle weakness, near-syncope, nocturia, orthopnea, palpitations, paroxysmal nocturnal dyspnea, shortness of breath or unexpected weight change.   Cerebrovascular Accident  Pertinent negatives include no abdominal pain, anorexia, arthralgias, change in bowel habit, chest pain, chills, congestion, coughing, fatigue, fever, headaches, joint swelling, myalgias, nausea, neck pain, numbness, rash, sore throat, swollen glands, urinary symptoms, vertigo, visual change, vomiting or  weakness.   Hypertension  The current episode started more than 1 year ago. The problem is unchanged. The problem is controlled (usually 120-130/70-80 mmHg at home). Pertinent negatives include no anxiety, blurred vision, chest pain, headaches, malaise/fatigue, neck pain, orthopnea, palpitations, peripheral edema, PND, shortness of breath or sweats. Identifiable causes of hypertension include chronic renal disease.   Hyperlipidemia  Exacerbating diseases include chronic renal disease. She has no history of diabetes, hypothyroidism, liver disease, obesity or nephrotic syndrome. Pertinent negatives include no chest pain, focal sensory loss, focal weakness, leg pain, myalgias or shortness of breath.       Review of Systems   Constitutional: Negative for chills, fatigue, fever, malaise/fatigue and unexpected weight change.   HENT:  Negative for congestion, nosebleeds, sore throat and tinnitus.    Eyes:  Negative for blurred vision, double vision, vision loss in left eye and vision loss in right eye.   Cardiovascular:  Negative for chest pain, claudication, dyspnea on exertion, irregular heartbeat, leg swelling, near-syncope, orthopnea, palpitations, paroxysmal nocturnal dyspnea and syncope.   Respiratory:  Negative for cough, hemoptysis, shortness of breath and wheezing.    Endocrine: Negative for cold intolerance and heat intolerance.   Hematologic/Lymphatic: Negative for bleeding problem. Does not bruise/bleed easily.   Skin:  Negative for color change and rash.   Musculoskeletal:  Negative for arthralgias, back pain, falls, joint swelling, muscle weakness, myalgias and neck pain.   Gastrointestinal:  Negative for abdominal pain, anorexia, change in bowel habit, diarrhea, dysphagia, heartburn, hematemesis, hematochezia, hemorrhoids, jaundice, melena, nausea and vomiting.   Genitourinary:  Negative for dysuria, hematuria and nocturia.   Neurological:  Negative for dizziness, focal weakness, headaches, light-headedness,  "loss of balance, numbness, tremors, vertigo and weakness.   Psychiatric/Behavioral:  Negative for altered mental status, depression and memory loss. The patient is not nervous/anxious.    Allergic/Immunologic: Negative for hives and persistent infections.       Current Outpatient Medications on File Prior to Visit   Medication Sig Dispense Refill    atorvastatin (LIPITOR) 80 MG tablet Take 1 tablet (80 mg total) by mouth once daily. 90 tablet 0    cyanocobalamin (VITAMIN B-12) 1000 MCG tablet Take 1 tablet (1,000 mcg total) by mouth once daily. 90 tablet 0    empagliflozin (JARDIANCE) 10 mg tablet Take 1 tablet (10 mg total) by mouth once daily. 90 tablet 0    FLUoxetine 20 MG capsule Take 20 mg by mouth once daily.      folic acid (FOLVITE) 1 MG tablet Take 1 tablet (1 mg total) by mouth once daily. 90 tablet 0    furosemide (LASIX) 40 MG tablet Take 1 tablet (40 mg total) by mouth once daily. 90 tablet 0    metoprolol succinate (TOPROL-XL) 50 MG 24 hr tablet Take 50 mg by mouth once daily.      sacubitriL-valsartan (ENTRESTO) 49-51 mg per tablet Take 1 tablet by mouth 2 (two) times daily. 90 tablet 3    spironolactone (ALDACTONE) 25 MG tablet Take 1 tablet (25 mg total) by mouth once daily. 90 tablet 0    traZODone (DESYREL) 50 MG tablet Take 1 tablet (50 mg total) by mouth nightly as needed for Insomnia. 30 tablet 0     No current facility-administered medications on file prior to visit.        /72   Pulse (!) 57   Ht 5' 5" (1.651 m)   Wt 77.6 kg (171 lb 1.2 oz)   SpO2 100%   BMI 28.47 kg/m²     Objective     Physical Exam  Constitutional:       General: She is not in acute distress.     Appearance: Normal appearance. She is well-developed. She is not toxic-appearing or diaphoretic.   HENT:      Head: Normocephalic and atraumatic.      Nose: Nose normal.   Eyes:      General:         Right eye: No discharge.         Left eye: No discharge.      Conjunctiva/sclera:      Right eye: Right conjunctiva is " not injected.      Left eye: Left conjunctiva is not injected.      Pupils: Pupils are equal.      Right eye: Pupil is round.      Left eye: Pupil is round.   Neck:      Thyroid: No thyromegaly.      Vascular: No carotid bruit or JVD.   Cardiovascular:      Rate and Rhythm: Normal rate and regular rhythm. No extrasystoles are present.     Chest Wall: PMI is not displaced.      Pulses:           Radial pulses are 2+ on the right side and 2+ on the left side.        Femoral pulses are 2+ on the right side and 2+ on the left side.       Dorsalis pedis pulses are 2+ on the right side and 2+ on the left side.        Posterior tibial pulses are 2+ on the right side and 2+ on the left side.      Heart sounds: S1 normal and S2 normal. No murmur heard.     Gallop present. S3 and S4 sounds present.   Pulmonary:      Effort: Pulmonary effort is normal.      Breath sounds: Normal breath sounds.   Abdominal:      Palpations: Abdomen is soft.      Tenderness: There is no abdominal tenderness.   Musculoskeletal:      Cervical back: Neck supple.      Right lower leg: Swelling present. 1+ Edema present.      Left lower leg: Swelling present. 1+ Edema present.   Lymphadenopathy:      Head:      Right side of head: No submandibular adenopathy.      Left side of head: No submandibular adenopathy.      Cervical: No cervical adenopathy.   Skin:     General: Skin is warm and dry.      Findings: No rash.      Nails: There is no clubbing.   Neurological:      General: No focal deficit present.      Mental Status: She is alert and oriented to person, place, and time. She is not disoriented.      Cranial Nerves: No cranial nerve deficit.   Psychiatric:         Attention and Perception: Attention and perception normal.         Mood and Affect: Mood and affect normal.         Speech: Speech normal.         Behavior: Behavior normal.         Thought Content: Thought content normal.         Cognition and Memory: Cognition and memory normal.        "  Judgment: Judgment normal.         Assessment and Plan     1. Heart failure, systolic, chronic    2. Pulmonary hypertension due to left heart disease    3. History of cerebrovascular accident    4. Primary hypertension    5. Hypercholesterolemia    6. Overweight    7. Stage 3b chronic kidney disease    8. Thrombocytopenia    9. Idiopathic thrombocytopenic purpura (ITP)    10. Anemia in other chronic diseases classified elsewhere    11. Other acquired hemolytic anemias        Plan:     Heart Failure, Systolic, Chronic              4/24/2024: Presented fluid overloaded in HF. BNP 2,536. Received furosemide 20 mg iv "Q12".              4/24/2024: Echo: Moderately dilated left ventricle with severe systolic dysfunction. EF 25%. Mild LVH. Moderate diastolic dysfunction. LVEDP elevated. Mildly enlarged RV with sever systolic dysfunction. Severely enlarged LA. Moderate MR. Moderate to severe TR. SPAP 83 mmHg. RA 15 mmHg. Small pericardial effusion.               Betablocker, CHANELL blockade, MCA & SGLT2i.              5/2/2024: .              4/26/2024: Valsartan 40 mg Q24 was begun. She appears to have had an reaction to ACEI in the past. Accordingly no ACEI or ARNI.  4/30/2024: Valsartan 40 mg Q24 was increased to valsartan to 80 mg Q24.  5/2/2024: Valsartan 80 mg Q24 was increased to valsartan 160 mg Q24.  5/3/2024: Furosemide 40 mg Q24 was begun.              On metoprolol 50 mg Q24, empagliflozin 10 mg Q24, sacubitril 49 mg/valsartan 51 mg, spironolactone 25 mg Q24 and furosemide 40 mg Q24.              Appears well compensated.              Stay on current regimen for now.              5/17/2024: Do BMP and BNP. Carvedilol 6.25 mg Q12 to begin and metoprolol 50 mg q24 to be discontinued. Sacubitril 49 mg/valsartan 51 mg Q12 to be changed back to valsartan 160 mg Q24 with history of "reaction to ACEI" in the past.   8/2024: Plan next Echo. She does not want to go for the PET scan that was cancelled.              "                     2. Pulmonary Hypertension  4/24/2024: Echo: SPAP 83 mmHg.   Most certainly due to left heart disease.     3. 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     4. Hypertension              2005: Diagnosed.              At home been on metoprolol 50 mg Q24, nifedipine 90 mg Q24 and hctz 25 mg Q24.              On metoprolol 50 mg Q24, empagliflozin 10 mg Q24, sacubitril 49 mg/valsartan 51 mg, spironolactone 25 mg Q24 and furosemide 40 mg Q24.   Keeping log at home.   Well controlled.     5. Hypercholesterolemia              On atorvastatin 80 mg Q24.     6. Diabetes Mellitus, Type 2  2005: Diagnosed. Complications: CVA. Medications: Diet.  On empagliflozin 10 mg Q24 for HF.     7. Overweight              4/25/2024: Weight 98 kg. BMI 36.              5/3/2024: Weight 88 kg. BMI 32.   5/17/2024: Weight 78 kg. BMI 28.     8. Chronic Kidney Disease, Stage 3              4/27/2024: BUN/crea 24/1.5. eGFR 37. K 4.1.              5/3/2024: BUN/crea 27/1.6. eGFR 35. K 3.6.     9. 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              4/23/2024: Plts 140.              Dr. Joao Howard.     10. Anemia              4/30/2024: H/H 7.7/22.6%. MCV 93.              Significant decline.              Consider further evaluation.     11. Primary Care              Dr. Ashley Harrell.    F/u 4 weeks.    Ismael Covarrubias M.D.

## 2024-05-20 ENCOUNTER — TELEPHONE (OUTPATIENT)
Dept: CARDIOLOGY | Facility: CLINIC | Age: 69
End: 2024-05-20
Payer: MEDICARE

## 2024-05-20 NOTE — TELEPHONE ENCOUNTER
Left message on voice mail      ----- Message from Ismael Covarrubias MD sent at 5/17/2024  5:56 PM CDT -----  Laboratory data reviewed. All results acceptable.    Heart under less strain.    Stay with plan.    Please call the patient and inform the patient about results.     Ismael Covarrubias M.D.

## 2024-05-20 NOTE — TELEPHONE ENCOUNTER
Left message on voice mail    ----- Message from Ismael Covarrubias MD sent at 5/18/2024  8:50 AM CDT -----  Very favorable lipid panel.    Ismael Covarrubias M.D.

## 2024-06-04 ENCOUNTER — EXTERNAL HOME HEALTH (OUTPATIENT)
Dept: HOME HEALTH SERVICES | Facility: HOSPITAL | Age: 69
End: 2024-06-04
Payer: MEDICARE

## 2024-06-11 NOTE — PHYSICIAN QUERY
"Please clarify the conflicting documentation in regards to the TYPE and ACUITY of the heart failure  To respond, click "New Note" select your response press enter then sign to complete the query   .        Acute on Chronic Combined Systolic and Diastolic Heart Failure      "

## 2024-06-17 ENCOUNTER — HOSPITAL ENCOUNTER (OUTPATIENT)
Dept: RADIOLOGY | Facility: OTHER | Age: 69
Discharge: HOME | End: 2024-06-17
Attending: NURSE PRACTITIONER
Payer: MEDICARE

## 2024-06-17 ENCOUNTER — OFFICE VISIT (OUTPATIENT)
Dept: CARDIOLOGY | Facility: CLINIC | Age: 69
End: 2024-06-17
Attending: INTERNAL MEDICINE
Payer: MEDICARE

## 2024-06-17 VITALS
OXYGEN SATURATION: 96 % | DIASTOLIC BLOOD PRESSURE: 70 MMHG | BODY MASS INDEX: 29.16 KG/M2 | WEIGHT: 175 LBS | HEIGHT: 65 IN | SYSTOLIC BLOOD PRESSURE: 120 MMHG | HEART RATE: 75 BPM

## 2024-06-17 DIAGNOSIS — Z86.73 HISTORY OF CEREBROVASCULAR ACCIDENT: ICD-10-CM

## 2024-06-17 DIAGNOSIS — I10 PRIMARY HYPERTENSION: ICD-10-CM

## 2024-06-17 DIAGNOSIS — E11.59 TYPE 2 DIABETES MELLITUS WITH OTHER CIRCULATORY COMPLICATION, WITHOUT LONG-TERM CURRENT USE OF INSULIN: ICD-10-CM

## 2024-06-17 DIAGNOSIS — D69.6 THROMBOCYTOPENIA: Chronic | ICD-10-CM

## 2024-06-17 DIAGNOSIS — I50.22 HEART FAILURE, SYSTOLIC, CHRONIC: ICD-10-CM

## 2024-06-17 DIAGNOSIS — E66.3 OVERWEIGHT: ICD-10-CM

## 2024-06-17 DIAGNOSIS — E78.00 HYPERCHOLESTEROLEMIA: ICD-10-CM

## 2024-06-17 DIAGNOSIS — D69.3 IDIOPATHIC THROMBOCYTOPENIC PURPURA (ITP): ICD-10-CM

## 2024-06-17 DIAGNOSIS — D59.8 OTHER ACQUIRED HEMOLYTIC ANEMIAS: ICD-10-CM

## 2024-06-17 DIAGNOSIS — Z12.31 BREAST CANCER SCREENING BY MAMMOGRAM: ICD-10-CM

## 2024-06-17 DIAGNOSIS — I27.22 PULMONARY HYPERTENSION DUE TO LEFT HEART DISEASE: ICD-10-CM

## 2024-06-17 DIAGNOSIS — N18.32 STAGE 3B CHRONIC KIDNEY DISEASE: ICD-10-CM

## 2024-06-17 DIAGNOSIS — D63.8 ANEMIA IN OTHER CHRONIC DISEASES CLASSIFIED ELSEWHERE: ICD-10-CM

## 2024-06-17 PROBLEM — E11.9 DIABETES MELLITUS, TYPE 2: Status: ACTIVE | Noted: 2024-06-17

## 2024-06-17 PROCEDURE — 99215 OFFICE O/P EST HI 40 MIN: CPT | Mod: S$GLB,,, | Performed by: INTERNAL MEDICINE

## 2024-06-17 PROCEDURE — 99999 PR PBB SHADOW E&M-EST. PATIENT-LVL III: CPT | Mod: PBBFAC,,, | Performed by: INTERNAL MEDICINE

## 2024-06-17 PROCEDURE — 1159F MED LIST DOCD IN RCRD: CPT | Mod: CPTII,S$GLB,, | Performed by: INTERNAL MEDICINE

## 2024-06-17 PROCEDURE — 3044F HG A1C LEVEL LT 7.0%: CPT | Mod: CPTII,S$GLB,, | Performed by: INTERNAL MEDICINE

## 2024-06-17 PROCEDURE — 77067 SCR MAMMO BI INCL CAD: CPT | Mod: TC

## 2024-06-17 PROCEDURE — 3078F DIAST BP <80 MM HG: CPT | Mod: CPTII,S$GLB,, | Performed by: INTERNAL MEDICINE

## 2024-06-17 PROCEDURE — 1126F AMNT PAIN NOTED NONE PRSNT: CPT | Mod: CPTII,S$GLB,, | Performed by: INTERNAL MEDICINE

## 2024-06-17 PROCEDURE — 3008F BODY MASS INDEX DOCD: CPT | Mod: CPTII,S$GLB,, | Performed by: INTERNAL MEDICINE

## 2024-06-17 PROCEDURE — 3074F SYST BP LT 130 MM HG: CPT | Mod: CPTII,S$GLB,, | Performed by: INTERNAL MEDICINE

## 2024-06-17 PROCEDURE — 1160F RVW MEDS BY RX/DR IN RCRD: CPT | Mod: CPTII,S$GLB,, | Performed by: INTERNAL MEDICINE

## 2024-06-17 PROCEDURE — 4010F ACE/ARB THERAPY RXD/TAKEN: CPT | Mod: CPTII,S$GLB,, | Performed by: INTERNAL MEDICINE

## 2024-06-17 RX ORDER — CARVEDILOL 6.25 MG/1
6.25 TABLET ORAL 2 TIMES DAILY
Qty: 180 TABLET | Refills: 3 | Status: SHIPPED | OUTPATIENT
Start: 2024-06-17

## 2024-06-17 RX ORDER — VALSARTAN 160 MG/1
160 TABLET ORAL DAILY
Qty: 90 TABLET | Refills: 3 | Status: SHIPPED | OUTPATIENT
Start: 2024-06-17

## 2024-06-17 RX ORDER — FUROSEMIDE 40 MG/1
40 TABLET ORAL DAILY
Qty: 120 TABLET | Refills: 3 | Status: SHIPPED | OUTPATIENT
Start: 2024-06-17

## 2024-06-17 RX ORDER — SPIRONOLACTONE 25 MG/1
25 TABLET ORAL DAILY
Qty: 90 TABLET | Refills: 3 | Status: SHIPPED | OUTPATIENT
Start: 2024-06-17

## 2024-06-17 RX ORDER — ATORVASTATIN CALCIUM 80 MG/1
80 TABLET, FILM COATED ORAL DAILY
Qty: 90 TABLET | Refills: 3 | Status: SHIPPED | OUTPATIENT
Start: 2024-06-17

## 2024-06-17 NOTE — PROGRESS NOTES
Subjective     Wendy Viveros is a 68 y.o. female with hypertension and diabetes mellitus type 2. She is overweight. She suffered a cerebrovascular accident in 2021 causing right sided weakness. She has thrombocytopenia and issues with anemia. Beginning early 4/2024 she accumulated fluid. She developed edema of her legs, the abdominal girth increased and she became short of breath at rest. She presented to the emergency room on 4/24/2024 and was admitted. On 4/24/2024 she had an echocardiogram. The left ventricle was moderately dilated with severe systolic dysfunction. The ejection fraction was 25%. There was mild left ventricular hypertrophy. There was moderate diastolic dysfunction. The left ventricular end diastolic pressure was elevated. The right ventricle was mildly enlarged with severe systolic dysfunction. The left atrium was severely enlarged There was moderate mitral regurgitation and moderate to severe tricuspid regurgitation. The systolic pulmonary artery pressure was 83 mmHg. The right atrial pressure was 15 mmHg. There was a small pericardial effusion. She was diuresed and a regimen for heart failure was initiated. On 5/3/2024 she went on an oral regimen. When she came back to see me on 5/17/2024 she had lost 22 kg of fluid and felt well. No exertional chest pain or exertional shortness of breath. No palpitations or weak spells. No issues with any of her prescribed medications. Feeling well overall.       Congestive Heart Failure  Presents for follow-up visit. Associated symptoms include edema. Pertinent negatives include no abdominal pain, chest pain, chest pressure, claudication, fatigue, muscle weakness, near-syncope, nocturia, orthopnea, palpitations, paroxysmal nocturnal dyspnea, shortness of breath or unexpected weight change.   Cerebrovascular Accident  Pertinent negatives include no abdominal pain, anorexia, arthralgias, change in bowel habit, chest pain, chills, congestion, coughing, fatigue,  fever, headaches, joint swelling, myalgias, nausea, neck pain, numbness, rash, sore throat, swollen glands, urinary symptoms, vertigo, visual change, vomiting or weakness.   Hypertension  The current episode started more than 1 year ago. The problem is unchanged. The problem is controlled (usually 120-130/70-80 mmHg at home). Pertinent negatives include no anxiety, blurred vision, chest pain, headaches, malaise/fatigue, neck pain, orthopnea, palpitations, peripheral edema, PND, shortness of breath or sweats. Identifiable causes of hypertension include chronic renal disease.   Hyperlipidemia  Exacerbating diseases include chronic renal disease. She has no history of diabetes, hypothyroidism, liver disease, obesity or nephrotic syndrome. Pertinent negatives include no chest pain, focal sensory loss, focal weakness, leg pain, myalgias or shortness of breath.       Review of Systems   Constitutional: Negative for chills, fatigue, fever, malaise/fatigue and unexpected weight change.   HENT:  Negative for congestion, nosebleeds, sore throat and tinnitus.    Eyes:  Negative for blurred vision, double vision, vision loss in left eye and vision loss in right eye.   Cardiovascular:  Negative for chest pain, claudication, dyspnea on exertion, irregular heartbeat, leg swelling, near-syncope, orthopnea, palpitations, paroxysmal nocturnal dyspnea and syncope.   Respiratory:  Negative for cough, hemoptysis, shortness of breath and wheezing.    Endocrine: Negative for cold intolerance and heat intolerance.   Hematologic/Lymphatic: Negative for bleeding problem. Does not bruise/bleed easily.   Skin:  Negative for color change and rash.   Musculoskeletal:  Negative for arthralgias, back pain, falls, joint swelling, muscle weakness, myalgias and neck pain.   Gastrointestinal:  Negative for abdominal pain, anorexia, change in bowel habit, diarrhea, dysphagia, heartburn, hematemesis, hematochezia, hemorrhoids, jaundice, melena, nausea  "and vomiting.   Genitourinary:  Negative for dysuria, hematuria and nocturia.   Neurological:  Negative for dizziness, focal weakness, headaches, light-headedness, loss of balance, numbness, tremors, vertigo and weakness.   Psychiatric/Behavioral:  Negative for altered mental status, depression and memory loss. The patient is not nervous/anxious.    Allergic/Immunologic: Negative for hives and persistent infections.       Current Outpatient Medications on File Prior to Visit   Medication Sig Dispense Refill    cyanocobalamin (VITAMIN B-12) 1000 MCG tablet Take 1 tablet (1,000 mcg total) by mouth once daily. 90 tablet 0    FLUoxetine 20 MG capsule Take 20 mg by mouth once daily.      folic acid (FOLVITE) 1 MG tablet Take 1 tablet (1 mg total) by mouth once daily. 90 tablet 0    traZODone (DESYREL) 50 MG tablet Take 1 tablet (50 mg total) by mouth nightly as needed for Insomnia. 30 tablet 0    [DISCONTINUED] atorvastatin (LIPITOR) 80 MG tablet Take 1 tablet (80 mg total) by mouth once daily. 90 tablet 3    [DISCONTINUED] carvediloL (COREG) 6.25 MG tablet Take 1 tablet (6.25 mg total) by mouth 2 (two) times daily. 180 tablet 3    [DISCONTINUED] empagliflozin (JARDIANCE) 10 mg tablet Take 1 tablet (10 mg total) by mouth once daily. 90 tablet 3    [DISCONTINUED] furosemide (LASIX) 40 MG tablet Take 1 tablet (40 mg total) by mouth once daily. 120 tablet 3    [DISCONTINUED] spironolactone (ALDACTONE) 25 MG tablet Take 1 tablet (25 mg total) by mouth once daily. 90 tablet 3    [DISCONTINUED] valsartan (DIOVAN) 160 MG tablet Take 1 tablet (160 mg total) by mouth once daily. 90 tablet 3     No current facility-administered medications on file prior to visit.        /70 (BP Location: Right arm, Patient Position: Sitting, BP Method: Large (Manual))   Pulse 75   Ht 5' 5" (1.651 m)   Wt 79.4 kg (175 lb)   SpO2 96%   BMI 29.12 kg/m²     Objective     Physical Exam  Constitutional:       General: She is not in acute " distress.     Appearance: Normal appearance. She is well-developed. She is not toxic-appearing or diaphoretic.   HENT:      Head: Normocephalic and atraumatic.      Nose: Nose normal.   Eyes:      General:         Right eye: No discharge.         Left eye: No discharge.      Conjunctiva/sclera:      Right eye: Right conjunctiva is not injected.      Left eye: Left conjunctiva is not injected.      Pupils: Pupils are equal.      Right eye: Pupil is round.      Left eye: Pupil is round.   Neck:      Thyroid: No thyromegaly.      Vascular: No carotid bruit or JVD.   Cardiovascular:      Rate and Rhythm: Normal rate and regular rhythm. No extrasystoles are present.     Chest Wall: PMI is not displaced.      Pulses:           Radial pulses are 2+ on the right side and 2+ on the left side.        Femoral pulses are 2+ on the right side and 2+ on the left side.       Dorsalis pedis pulses are 2+ on the right side and 2+ on the left side.        Posterior tibial pulses are 2+ on the right side and 2+ on the left side.      Heart sounds: S1 normal and S2 normal. No murmur heard.     Gallop present. S3 and S4 sounds present.   Pulmonary:      Effort: Pulmonary effort is normal.      Breath sounds: Normal breath sounds.   Abdominal:      Palpations: Abdomen is soft.      Tenderness: There is no abdominal tenderness.   Musculoskeletal:      Cervical back: Neck supple.      Right lower leg: Swelling present. 1+ Edema present.      Left lower leg: Swelling present. 1+ Edema present.   Lymphadenopathy:      Head:      Right side of head: No submandibular adenopathy.      Left side of head: No submandibular adenopathy.      Cervical: No cervical adenopathy.   Skin:     General: Skin is warm and dry.      Findings: No rash.      Nails: There is no clubbing.   Neurological:      General: No focal deficit present.      Mental Status: She is alert and oriented to person, place, and time. She is not disoriented.      Cranial Nerves: No  "cranial nerve deficit.   Psychiatric:         Attention and Perception: Attention and perception normal.         Mood and Affect: Mood and affect normal.         Speech: Speech normal.         Behavior: Behavior normal.         Thought Content: Thought content normal.         Cognition and Memory: Cognition and memory normal.         Judgment: Judgment normal.         Assessment and Plan     1. Heart failure, systolic, chronic    2. Pulmonary hypertension due to left heart disease    3. History of cerebrovascular accident    4. Primary hypertension    5. Hypercholesterolemia    6. Type 2 diabetes mellitus with other circulatory complication, without long-term current use of insulin    7. Overweight    8. Stage 3b chronic kidney disease    9. Thrombocytopenia    10. Idiopathic thrombocytopenic purpura (ITP)    11. Anemia in other chronic diseases classified elsewhere    12. Other acquired hemolytic anemias        Plan:     Heart Failure, Systolic, Chronic              4/24/2024: Presented fluid overloaded in HF. BNP 2,536. Received furosemide 20 mg iv "Q12".              4/24/2024: Echo: Moderately dilated left ventricle with severe systolic dysfunction. EF 25%. Mild LVH. Moderate diastolic dysfunction. LVEDP elevated. Mildly enlarged RV with sever systolic dysfunction. Severely enlarged LA. Moderate MR. Moderate to severe TR. SPAP 83 mmHg. RA 15 mmHg. Small pericardial effusion.               Betablocker, CHANELL blockade, MCA & SGLT2i.              5/2/2024: .              4/26/2024: Valsartan 40 mg Q24 was begun. She appears to have had an reaction to ACEI in the past. Accordingly no ACEI or ARNI.  4/30/2024: Valsartan 40 mg Q24 was increased to valsartan to 80 mg Q24.  5/2/2024: Valsartan 80 mg Q24 was increased to valsartan 160 mg Q24.  5/3/2024: Furosemide 40 mg Q24 was begun.  5/17/2024: Carvedilol 6.25 mg Q12 was begun and metoprolol 50 mg Q24 to be discontinued. Sacubitril 49 mg/valsartan 51 mg Q12 to be " "changed back to valsartan 160 mg Q24 with history of "reaction to ACEI" in the past.              On carvedilol 6.25 Q124, empagliflozin 10 mg Q24, valsartan 160 mg Q24, spironolactone 25 mg Q24 and furosemide 40 mg Q24.              Appears well compensated.              Stay on current regimen.           8/2024: Plan next Echo. BNP & BMP.                                  2. Pulmonary Hypertension  4/24/2024: Echo: SPAP 83 mmHg.   Most certainly due to left heart disease.     3. 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     4. Hypertension              2005: Diagnosed.              At home been on metoprolol 50 mg Q24, nifedipine 90 mg Q24 and hctz 25 mg Q24.   On carvedilol 6.25 Q124, empagliflozin 10 mg Q24, valsartan 160 mg Q24, spironolactone 25 mg Q24 and furosemide 40 mg Q24.              Keeping log at home.   Well controlled.     5. Hypercholesterolemia              On atorvastatin 80 mg Q24.     6. Diabetes Mellitus, Type 2  2005: Diagnosed. Complications: CVA. Medications: Diet.  On empagliflozin 10 mg Q24 for HF.     7. Overweight              4/25/2024: Weight 98 kg. BMI 36.              5/3/2024: Weight 88 kg. BMI 32.   5/17/2024: Weight 78 kg. BMI 28.     8. Chronic Kidney Disease, Stage 3              4/27/2024: BUN/crea 24/1.5. eGFR 37. K 4.1.              5/3/2024: BUN/crea 27/1.6. eGFR 35. K 3.6.     9. 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              4/23/2024: Plts 140.              Dr. Joao Howard.     10. Anemia              4/30/2024: H/H 7.7/22.6%. MCV 93.              Significant decline.              Consider further evaluation.     11. Primary Care              Dr. Ashley Harrell.    F/u 2 months.    Ismael Covarrubias M.D.        "

## 2024-08-12 ENCOUNTER — HOSPITAL ENCOUNTER (OUTPATIENT)
Dept: CARDIOLOGY | Facility: OTHER | Age: 69
Discharge: HOME | End: 2024-08-12
Attending: INTERNAL MEDICINE
Payer: MEDICARE

## 2024-08-12 VITALS
BODY MASS INDEX: 29.16 KG/M2 | SYSTOLIC BLOOD PRESSURE: 120 MMHG | HEIGHT: 65 IN | WEIGHT: 175 LBS | DIASTOLIC BLOOD PRESSURE: 70 MMHG

## 2024-08-12 DIAGNOSIS — I50.22 HEART FAILURE, SYSTOLIC, CHRONIC: ICD-10-CM

## 2024-08-12 LAB
AORTIC ROOT ANNULUS: 2.97 CM
ASCENDING AORTA: 2.23 CM
AV INDEX (PROSTH): 0.47
AV MEAN GRADIENT: 9 MMHG
AV PEAK GRADIENT: 15 MMHG
AV VALVE AREA BY VELOCITY RATIO: 1.35 CM²
AV VALVE AREA: 1.18 CM²
AV VELOCITY RATIO: 0.54
BSA FOR ECHO PROCEDURE: 1.91 M2
CV ECHO LV RWT: 0.48 CM
DOP CALC AO PEAK VEL: 1.96 M/S
DOP CALC AO VTI: 49.5 CM
DOP CALC LVOT AREA: 2.5 CM2
DOP CALC LVOT DIAMETER: 1.78 CM
DOP CALC LVOT PEAK VEL: 1.06 M/S
DOP CALC LVOT STROKE VOLUME: 58.2 CM3
DOP CALCLVOT PEAK VEL VTI: 23.4 CM
E WAVE DECELERATION TIME: 234.56 MSEC
E/A RATIO: 0.69
E/E' RATIO: 13.2 M/S
ECHO LV POSTERIOR WALL: 1.2 CM (ref 0.6–1.1)
EJECTION FRACTION: 35 %
FRACTIONAL SHORTENING: 21 % (ref 28–44)
GLOBAL LONGITUIDAL STRAIN: 11 %
INTERVENTRICULAR SEPTUM: 1.16 CM (ref 0.6–1.1)
IVC DIAMETER: 0.86 CM
LA MAJOR: 6.63 CM
LA MINOR: 5.82 CM
LA WIDTH: 4.3 CM
LAAS-AP2: 26 CM2
LAAS-AP4: 31 CM2
LEFT ATRIUM AREA SYSTOLIC (APICAL 2 CHAMBER): 26.56 CM2
LEFT ATRIUM AREA SYSTOLIC (APICAL 4 CHAMBER): 31.1 CM2
LEFT ATRIUM SIZE: 3.76 CM
LEFT ATRIUM VOLUME INDEX MOD: 60.7 ML/M2
LEFT ATRIUM VOLUME INDEX: 45.6 ML/M2
LEFT ATRIUM VOLUME MOD: 113.51 CM3
LEFT ATRIUM VOLUME: 85.19 CM3
LEFT INTERNAL DIMENSION IN SYSTOLE: 3.97 CM (ref 2.1–4)
LEFT VENTRICLE DIASTOLIC VOLUME INDEX: 63.84 ML/M2
LEFT VENTRICLE DIASTOLIC VOLUME: 119.38 ML
LEFT VENTRICLE END SYSTOLIC VOLUME APICAL 2 CHAMBER: 98.75 ML
LEFT VENTRICLE END SYSTOLIC VOLUME APICAL 4 CHAMBER: 117.55 ML
LEFT VENTRICLE MASS INDEX: 123 G/M2
LEFT VENTRICLE SYSTOLIC VOLUME INDEX: 36.7 ML/M2
LEFT VENTRICLE SYSTOLIC VOLUME: 68.71 ML
LEFT VENTRICULAR INTERNAL DIMENSION IN DIASTOLE: 5.02 CM (ref 3.5–6)
LEFT VENTRICULAR MASS: 229.77 G
LV LATERAL E/E' RATIO: 13.2 M/S
LV SEPTAL E/E' RATIO: 13.2 M/S
LVED V (TEICH): 119.38 ML
LVES V (TEICH): 68.71 ML
LVOT MG: 2.21 MMHG
LVOT MV: 0.7 CM/S
MV PEAK A VEL: 0.96 M/S
MV PEAK E VEL: 0.66 M/S
MV STENOSIS PRESSURE HALF TIME: 68.02 MS
MV VALVE AREA P 1/2 METHOD: 3.23 CM2
OHS CV RV/LV RATIO: 0.48 CM
PISA MRMAX VEL: 6.28 M/S
PISA TR MAX VEL: 2.3 M/S
PV MV: 0.97 M/S
PV PEAK GRADIENT: 8 MMHG
PV PEAK VELOCITY: 1.41 M/S
RA MAJOR: 4.93 CM
RA PRESSURE ESTIMATED: 3 MMHG
RA WIDTH: 3.6 CM
RIGHT VENTRICULAR END-DIASTOLIC DIMENSION: 2.41 CM
RV TB RVSP: 5 MMHG
RV TISSUE DOPPLER FREE WALL SYSTOLIC VELOCITY 1 (APICAL 4 CHAMBER VIEW): 8.79 CM/S
SINUS: 2.6 CM
STJ: 2.38 CM
TDI LATERAL: 0.05 M/S
TDI SEPTAL: 0.05 M/S
TDI: 0.05 M/S
TR MAX PG: 21 MMHG
TRICUSPID ANNULAR PLANE SYSTOLIC EXCURSION: 1.3 CM
TV REST PULMONARY ARTERY PRESSURE: 24 MMHG
Z-SCORE OF LEFT VENTRICULAR DIMENSION IN END DIASTOLE: -0.38
Z-SCORE OF LEFT VENTRICULAR DIMENSION IN END SYSTOLE: 1.67

## 2024-08-12 PROCEDURE — 93306 TTE W/DOPPLER COMPLETE: CPT

## 2024-08-12 PROCEDURE — 93356 MYOCRD STRAIN IMG SPCKL TRCK: CPT

## 2024-08-12 PROCEDURE — 93306 TTE W/DOPPLER COMPLETE: CPT | Mod: 26,,, | Performed by: INTERNAL MEDICINE

## 2024-08-12 PROCEDURE — 93356 MYOCRD STRAIN IMG SPCKL TRCK: CPT | Mod: ,,, | Performed by: INTERNAL MEDICINE

## 2024-11-12 ENCOUNTER — LAB VISIT (OUTPATIENT)
Dept: LAB | Facility: OTHER | Age: 69
End: 2024-11-12
Attending: INTERNAL MEDICINE
Payer: MEDICARE

## 2024-11-12 ENCOUNTER — OFFICE VISIT (OUTPATIENT)
Dept: CARDIOLOGY | Facility: CLINIC | Age: 69
End: 2024-11-12
Attending: INTERNAL MEDICINE
Payer: MEDICARE

## 2024-11-12 VITALS
DIASTOLIC BLOOD PRESSURE: 80 MMHG | OXYGEN SATURATION: 100 % | SYSTOLIC BLOOD PRESSURE: 130 MMHG | WEIGHT: 171.06 LBS | HEIGHT: 65 IN | BODY MASS INDEX: 28.5 KG/M2 | HEART RATE: 71 BPM

## 2024-11-12 DIAGNOSIS — E11.59 TYPE 2 DIABETES MELLITUS WITH OTHER CIRCULATORY COMPLICATION, WITHOUT LONG-TERM CURRENT USE OF INSULIN: ICD-10-CM

## 2024-11-12 DIAGNOSIS — E78.00 HYPERCHOLESTEROLEMIA: ICD-10-CM

## 2024-11-12 DIAGNOSIS — D69.3 IDIOPATHIC THROMBOCYTOPENIC PURPURA (ITP): ICD-10-CM

## 2024-11-12 DIAGNOSIS — D63.8 ANEMIA IN OTHER CHRONIC DISEASES CLASSIFIED ELSEWHERE: ICD-10-CM

## 2024-11-12 DIAGNOSIS — Z86.73 HISTORY OF CEREBROVASCULAR ACCIDENT: ICD-10-CM

## 2024-11-12 DIAGNOSIS — E66.3 OVERWEIGHT: ICD-10-CM

## 2024-11-12 DIAGNOSIS — I10 PRIMARY HYPERTENSION: ICD-10-CM

## 2024-11-12 DIAGNOSIS — N18.32 STAGE 3B CHRONIC KIDNEY DISEASE: ICD-10-CM

## 2024-11-12 DIAGNOSIS — I50.22 HEART FAILURE, SYSTOLIC, CHRONIC: ICD-10-CM

## 2024-11-12 DIAGNOSIS — I27.22 PULMONARY HYPERTENSION DUE TO LEFT HEART DISEASE: ICD-10-CM

## 2024-11-12 DIAGNOSIS — D69.6 THROMBOCYTOPENIA: Chronic | ICD-10-CM

## 2024-11-12 LAB
ALBUMIN SERPL BCP-MCNC: 4.2 G/DL (ref 3.5–5.2)
ALP SERPL-CCNC: 53 U/L (ref 40–150)
ALT SERPL W/O P-5'-P-CCNC: 12 U/L (ref 10–44)
ANION GAP SERPL CALC-SCNC: 9 MMOL/L (ref 8–16)
AST SERPL-CCNC: 17 U/L (ref 10–40)
BASOPHILS # BLD AUTO: 0.04 K/UL (ref 0–0.2)
BASOPHILS NFR BLD: 0.4 % (ref 0–1.9)
BILIRUB SERPL-MCNC: 4.4 MG/DL (ref 0.1–1)
BNP SERPL-MCNC: 319 PG/ML (ref 0–99)
BUN SERPL-MCNC: 34 MG/DL (ref 8–23)
CALCIUM SERPL-MCNC: 10.4 MG/DL (ref 8.7–10.5)
CHLORIDE SERPL-SCNC: 108 MMOL/L (ref 95–110)
CHOLEST SERPL-MCNC: 96 MG/DL (ref 120–199)
CHOLEST/HDLC SERPL: 2.7 {RATIO} (ref 2–5)
CO2 SERPL-SCNC: 25 MMOL/L (ref 23–29)
CREAT SERPL-MCNC: 1.4 MG/DL (ref 0.5–1.4)
DIFFERENTIAL METHOD BLD: ABNORMAL
EOSINOPHIL # BLD AUTO: 0.1 K/UL (ref 0–0.5)
EOSINOPHIL NFR BLD: 1.4 % (ref 0–8)
ERYTHROCYTE [DISTWIDTH] IN BLOOD BY AUTOMATED COUNT: 18.4 % (ref 11.5–14.5)
EST. GFR  (NO RACE VARIABLE): 41 ML/MIN/1.73 M^2
GLUCOSE SERPL-MCNC: 97 MG/DL (ref 70–110)
HCT VFR BLD AUTO: 25.8 % (ref 37–48.5)
HDLC SERPL-MCNC: 35 MG/DL (ref 40–75)
HDLC SERPL: 36.5 % (ref 20–50)
HGB BLD-MCNC: 9.2 G/DL (ref 12–16)
IMM GRANULOCYTES # BLD AUTO: 0.12 K/UL (ref 0–0.04)
IMM GRANULOCYTES NFR BLD AUTO: 1.3 % (ref 0–0.5)
LDLC SERPL CALC-MCNC: 43.6 MG/DL (ref 63–159)
LYMPHOCYTES # BLD AUTO: 2 K/UL (ref 1–4.8)
LYMPHOCYTES NFR BLD: 21.4 % (ref 18–48)
MCH RBC QN AUTO: 35.2 PG (ref 27–31)
MCHC RBC AUTO-ENTMCNC: 35.7 G/DL (ref 32–36)
MCV RBC AUTO: 99 FL (ref 82–98)
MONOCYTES # BLD AUTO: 0.6 K/UL (ref 0.3–1)
MONOCYTES NFR BLD: 6.2 % (ref 4–15)
NEUTROPHILS # BLD AUTO: 6.6 K/UL (ref 1.8–7.7)
NEUTROPHILS NFR BLD: 69.3 % (ref 38–73)
NONHDLC SERPL-MCNC: 61 MG/DL
NRBC BLD-RTO: 0 /100 WBC
PLATELET # BLD AUTO: 109 K/UL (ref 150–450)
PMV BLD AUTO: 10.4 FL (ref 9.2–12.9)
POTASSIUM SERPL-SCNC: 4.8 MMOL/L (ref 3.5–5.1)
PROT SERPL-MCNC: 7 G/DL (ref 6–8.4)
RBC # BLD AUTO: 2.61 M/UL (ref 4–5.4)
SODIUM SERPL-SCNC: 142 MMOL/L (ref 136–145)
TRIGL SERPL-MCNC: 87 MG/DL (ref 30–150)
WBC # BLD AUTO: 9.45 K/UL (ref 3.9–12.7)

## 2024-11-12 PROCEDURE — 85025 COMPLETE CBC W/AUTO DIFF WBC: CPT | Performed by: INTERNAL MEDICINE

## 2024-11-12 PROCEDURE — 3044F HG A1C LEVEL LT 7.0%: CPT | Mod: CPTII,S$GLB,, | Performed by: INTERNAL MEDICINE

## 2024-11-12 PROCEDURE — 1159F MED LIST DOCD IN RCRD: CPT | Mod: CPTII,S$GLB,, | Performed by: INTERNAL MEDICINE

## 2024-11-12 PROCEDURE — 80061 LIPID PANEL: CPT | Performed by: INTERNAL MEDICINE

## 2024-11-12 PROCEDURE — 36415 COLL VENOUS BLD VENIPUNCTURE: CPT | Performed by: INTERNAL MEDICINE

## 2024-11-12 PROCEDURE — 80053 COMPREHEN METABOLIC PANEL: CPT | Performed by: INTERNAL MEDICINE

## 2024-11-12 PROCEDURE — 1101F PT FALLS ASSESS-DOCD LE1/YR: CPT | Mod: CPTII,S$GLB,, | Performed by: INTERNAL MEDICINE

## 2024-11-12 PROCEDURE — 83880 ASSAY OF NATRIURETIC PEPTIDE: CPT | Performed by: INTERNAL MEDICINE

## 2024-11-12 PROCEDURE — 3079F DIAST BP 80-89 MM HG: CPT | Mod: CPTII,S$GLB,, | Performed by: INTERNAL MEDICINE

## 2024-11-12 PROCEDURE — 3008F BODY MASS INDEX DOCD: CPT | Mod: CPTII,S$GLB,, | Performed by: INTERNAL MEDICINE

## 2024-11-12 PROCEDURE — 4010F ACE/ARB THERAPY RXD/TAKEN: CPT | Mod: CPTII,S$GLB,, | Performed by: INTERNAL MEDICINE

## 2024-11-12 PROCEDURE — 99214 OFFICE O/P EST MOD 30 MIN: CPT | Mod: S$GLB,,, | Performed by: INTERNAL MEDICINE

## 2024-11-12 PROCEDURE — 99999 PR PBB SHADOW E&M-EST. PATIENT-LVL III: CPT | Mod: PBBFAC,,, | Performed by: INTERNAL MEDICINE

## 2024-11-12 PROCEDURE — 3288F FALL RISK ASSESSMENT DOCD: CPT | Mod: CPTII,S$GLB,, | Performed by: INTERNAL MEDICINE

## 2024-11-12 PROCEDURE — 3075F SYST BP GE 130 - 139MM HG: CPT | Mod: CPTII,S$GLB,, | Performed by: INTERNAL MEDICINE

## 2024-11-12 PROCEDURE — 1126F AMNT PAIN NOTED NONE PRSNT: CPT | Mod: CPTII,S$GLB,, | Performed by: INTERNAL MEDICINE

## 2024-11-12 RX ORDER — SPIRONOLACTONE 25 MG/1
25 TABLET ORAL DAILY
Qty: 90 TABLET | Refills: 3 | Status: SHIPPED | OUTPATIENT
Start: 2024-11-12

## 2024-11-12 RX ORDER — ATORVASTATIN CALCIUM 80 MG/1
80 TABLET, FILM COATED ORAL DAILY
Qty: 90 TABLET | Refills: 3 | Status: SHIPPED | OUTPATIENT
Start: 2024-11-12

## 2024-11-12 RX ORDER — CARVEDILOL 12.5 MG/1
12.5 TABLET ORAL 2 TIMES DAILY
Qty: 180 TABLET | Refills: 3 | Status: SHIPPED | OUTPATIENT
Start: 2024-11-12

## 2024-11-12 RX ORDER — VALSARTAN 160 MG/1
160 TABLET ORAL 2 TIMES DAILY
Qty: 180 TABLET | Refills: 3 | Status: SHIPPED | OUTPATIENT
Start: 2024-11-12

## 2024-11-12 RX ORDER — FUROSEMIDE 40 MG/1
40 TABLET ORAL DAILY
Qty: 120 TABLET | Refills: 3 | Status: SHIPPED | OUTPATIENT
Start: 2024-11-12

## 2024-11-12 NOTE — PROGRESS NOTES
Subjective     Wendy Viveros is a 68 y.o. female with hypertension and diabetes mellitus type 2. She is overweight. She suffered a cerebrovascular accident in 2021 causing right sided weakness. She has thrombocytopenia and issues with anemia. Beginning early 4/2024 she accumulated fluid. She developed edema of her legs, the abdominal girth increased and she became short of breath at rest. She presented to the emergency room on 4/24/2024 and was admitted. On 4/24/2024 she had an echocardiogram. The left ventricle was moderately dilated with severe systolic dysfunction. The ejection fraction was 25%. There was mild left ventricular hypertrophy. There was moderate diastolic dysfunction. The left ventricular end diastolic pressure was elevated. The right ventricle was mildly enlarged with severe systolic dysfunction. The left atrium was severely enlarged There was moderate mitral regurgitation and moderate to severe tricuspid regurgitation. The systolic pulmonary artery pressure was 83 mmHg. The right atrial pressure was 15 mmHg. There was a small pericardial effusion. She was diuresed and a regimen for heart failure was initiated. On 5/3/2024 she went on an oral regimen. When she came back to see me on 5/17/2024 she had lost 22 kg of fluid and felt well. On 8/12/2024 she had an echocardiogram which revealed normal left ventricular size with moderate systolic dysfunction. The ejection fraction was 35%. The left ventricular global longitudinal strain  was -11%. There was mild left ventricular hypertrophy. Mild diastolic dysfunction. The left atrium was moderately dilated. There was mild aortic valve sclerosis and moderate mitral regurgitation. No exertional chest pain or exertional shortness of breath. No palpitations or weak spells. No issues with any of her prescribed medications. Feeling well overall.       Congestive Heart Failure  Presents for follow-up visit. Associated symptoms include edema. Pertinent negatives  include no abdominal pain, chest pain, chest pressure, claudication, fatigue, muscle weakness, near-syncope, nocturia, orthopnea, palpitations, paroxysmal nocturnal dyspnea, shortness of breath or unexpected weight change.   Cerebrovascular Accident  Pertinent negatives include no abdominal pain, anorexia, arthralgias, change in bowel habit, chest pain, chills, congestion, coughing, fatigue, fever, headaches, joint swelling, myalgias, nausea, neck pain, numbness, rash, sore throat, swollen glands, urinary symptoms, vertigo, visual change, vomiting or weakness.   Hypertension  The current episode started more than 1 year ago. The problem is unchanged. The problem is controlled (usually 120-130/70-80 mmHg at home). Pertinent negatives include no anxiety, blurred vision, chest pain, headaches, malaise/fatigue, neck pain, orthopnea, palpitations, peripheral edema, PND, shortness of breath or sweats. Identifiable causes of hypertension include chronic renal disease.   Hyperlipidemia  Exacerbating diseases include chronic renal disease. She has no history of diabetes, hypothyroidism, liver disease, obesity or nephrotic syndrome. Pertinent negatives include no chest pain, focal sensory loss, focal weakness, leg pain, myalgias or shortness of breath.       Review of Systems   Constitutional: Negative for chills, fatigue, fever, malaise/fatigue and unexpected weight change.   HENT:  Negative for congestion, nosebleeds, sore throat and tinnitus.    Eyes:  Negative for blurred vision, double vision, vision loss in left eye and vision loss in right eye.   Cardiovascular:  Negative for chest pain, claudication, dyspnea on exertion, irregular heartbeat, leg swelling, near-syncope, orthopnea, palpitations, paroxysmal nocturnal dyspnea and syncope.   Respiratory:  Negative for cough, hemoptysis, shortness of breath and wheezing.    Endocrine: Negative for cold intolerance and heat intolerance.   Hematologic/Lymphatic: Negative for  bleeding problem. Does not bruise/bleed easily.   Skin:  Negative for color change and rash.   Musculoskeletal:  Negative for arthralgias, back pain, falls, joint swelling, muscle weakness, myalgias and neck pain.   Gastrointestinal:  Negative for abdominal pain, anorexia, change in bowel habit, diarrhea, dysphagia, heartburn, hematemesis, hematochezia, hemorrhoids, jaundice, melena, nausea and vomiting.   Genitourinary:  Negative for dysuria, hematuria and nocturia.   Neurological:  Negative for dizziness, focal weakness, headaches, light-headedness, loss of balance, numbness, tremors, vertigo and weakness.   Psychiatric/Behavioral:  Negative for altered mental status, depression and memory loss. The patient is not nervous/anxious.    Allergic/Immunologic: Negative for hives and persistent infections.       Current Outpatient Medications on File Prior to Visit   Medication Sig Dispense Refill    atorvastatin (LIPITOR) 80 MG tablet Take 1 tablet (80 mg total) by mouth once daily. 90 tablet 3    carvediloL (COREG) 6.25 MG tablet Take 1 tablet (6.25 mg total) by mouth 2 (two) times daily. 180 tablet 3    cyanocobalamin (VITAMIN B-12) 1000 MCG tablet Take 1 tablet (1,000 mcg total) by mouth once daily. 90 tablet 0    folic acid (FOLVITE) 1 MG tablet Take 1 tablet (1 mg total) by mouth once daily. 90 tablet 0    spironolactone (ALDACTONE) 25 MG tablet Take 1 tablet (25 mg total) by mouth once daily. 90 tablet 3    valsartan (DIOVAN) 160 MG tablet Take 1 tablet (160 mg total) by mouth once daily. 90 tablet 3    empagliflozin (JARDIANCE) 10 mg tablet Take 1 tablet (10 mg total) by mouth once daily. (Patient not taking: Reported on 11/12/2024) 90 tablet 3    FLUoxetine 20 MG capsule Take 20 mg by mouth once daily. (Patient not taking: Reported on 11/12/2024)      furosemide (LASIX) 40 MG tablet Take 1 tablet (40 mg total) by mouth once daily. (Patient not taking: Reported on 11/12/2024) 120 tablet 3    traZODone (DESYREL)  "50 MG tablet Take 1 tablet (50 mg total) by mouth nightly as needed for Insomnia. (Patient not taking: Reported on 11/12/2024) 30 tablet 0     No current facility-administered medications on file prior to visit.       /80 Comment: average at home  Pulse 71   Ht 5' 5" (1.651 m)   Wt 77.6 kg (171 lb 1.2 oz)   SpO2 100%   BMI 28.47 kg/m²     Objective     Physical Exam  Constitutional:       General: She is not in acute distress.     Appearance: Normal appearance. She is well-developed. She is not toxic-appearing or diaphoretic.   HENT:      Head: Normocephalic and atraumatic.      Nose: Nose normal.   Eyes:      General:         Right eye: No discharge.         Left eye: No discharge.      Conjunctiva/sclera:      Right eye: Right conjunctiva is not injected.      Left eye: Left conjunctiva is not injected.      Pupils: Pupils are equal.      Right eye: Pupil is round.      Left eye: Pupil is round.   Neck:      Thyroid: No thyromegaly.      Vascular: No carotid bruit or JVD.   Cardiovascular:      Rate and Rhythm: Normal rate and regular rhythm. No extrasystoles are present.     Chest Wall: PMI is not displaced.      Pulses:           Radial pulses are 2+ on the right side and 2+ on the left side.        Femoral pulses are 2+ on the right side and 2+ on the left side.       Dorsalis pedis pulses are 2+ on the right side and 2+ on the left side.        Posterior tibial pulses are 2+ on the right side and 2+ on the left side.      Heart sounds: S1 normal and S2 normal. No murmur heard.     Gallop present. S3 and S4 sounds present.   Pulmonary:      Effort: Pulmonary effort is normal.      Breath sounds: Normal breath sounds.   Abdominal:      Palpations: Abdomen is soft.      Tenderness: There is no abdominal tenderness.   Musculoskeletal:      Cervical back: Neck supple.      Right lower leg: Swelling present. 1+ Edema present.      Left lower leg: Swelling present. 1+ Edema present.   Lymphadenopathy:      " "Head:      Right side of head: No submandibular adenopathy.      Left side of head: No submandibular adenopathy.      Cervical: No cervical adenopathy.   Skin:     General: Skin is warm and dry.      Findings: No rash.      Nails: There is no clubbing.   Neurological:      General: No focal deficit present.      Mental Status: She is alert and oriented to person, place, and time. She is not disoriented.      Cranial Nerves: No cranial nerve deficit.   Psychiatric:         Attention and Perception: Attention and perception normal.         Mood and Affect: Mood and affect normal.         Speech: Speech normal.         Behavior: Behavior normal.         Thought Content: Thought content normal.         Cognition and Memory: Cognition and memory normal.         Judgment: Judgment normal.         Assessment and Plan     1. Heart failure, systolic, chronic    2. Pulmonary hypertension due to left heart disease    3. History of cerebrovascular accident    4. Primary hypertension    5. Hypercholesterolemia    6. Type 2 diabetes mellitus with other circulatory complication, without long-term current use of insulin    7. Overweight    8. Stage 3b chronic kidney disease    9. Thrombocytopenia    10. Idiopathic thrombocytopenic purpura (ITP)    11. Anemia in other chronic diseases classified elsewhere        Plan:     Heart Failure, Systolic, Chronic              4/24/2024: Presented fluid overloaded in HF. BNP 2,536. Received furosemide 20 mg iv "Q12".              4/24/2024: Echo: Moderately dilated left ventricle with severe systolic dysfunction. EF 25%. Mild LVH. Moderate diastolic dysfunction. LVEDP elevated. Mildly enlarged RV with severe systolic dysfunction. Severely enlarged LA. Moderate MR. Moderate to severe TR. SPAP 83 mmHg. RA 15 mmHg. Small pericardial effusion.    8/12/2024: Normal left ventricular size with moderate systolic dysfunction. EF 35%. LVGLS -11%. Mild LVH. Mild diastolic dysfunction. Moderately dilated " "LA. Mild aortic valve sclerosis. Moderate MR.               Betablocker, CHANELL blockade, MCA & SGLT2i.              5/2/2024: .  5/17/2024: .              4/26/2024: Valsartan 40 mg Q24 was begun. She appears to have had an reaction to ACEI in the past. Accordingly no ACEI or ARNI.  4/30/2024: Valsartan 40 mg Q24 was increased to valsartan to 80 mg Q24.  5/2/2024: Valsartan 80 mg Q24 was increased to valsartan 160 mg Q24.  5/3/2024: Furosemide 40 mg Q24 was begun.  5/17/2024: Carvedilol 6.25 mg Q12 was begun and metoprolol 50 mg Q24 to be discontinued. Sacubitril 49 mg/valsartan 51 mg Q12 to be changed back to valsartan 160 mg Q24 with history of "reaction to ACEI" in the past.              On carvedilol 6.25 Q124, empagliflozin 10 mg Q24, valsartan 160 mg Q24, spironolactone 25 mg Q24 and furosemide 40 mg Q24.              Appears well compensated.              11/12/2024: Pressure still on high side. Carvedilol 6.25 Q12 to be increased to carvedilol 12.5 mg Q12 and valsartan 160 mg Q24 to be increased to valsartan 160 mg Q12.                                           2. Pulmonary Hypertension  4/24/2024: Echo: SPAP 83 mmHg.   Most certainly due to left heart disease.  Expect to come down with control of hypertensin and decrease in MR.     3. 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     4. Hypertension              2005: Diagnosed.              At home been on metoprolol 50 mg Q24, nifedipine 90 mg Q24 and hctz 25 mg Q24.   On carvedilol 6.25 Q12, empagliflozin 10 mg Q24, valsartan 160 mg Q24, spironolactone 25 mg Q24 and furosemide 40 mg Q24.              Keeping log at home.   Well controlled.   11/12/2024: Pressure still on high side. Carvedilol 6.25 Q12 to be increased to carvedilol 12.5 mg Q12 and valsartan 160 mg Q24 to be increased to valsartan 160 mg Q12.   Do BMP and BNP.                                   5. Hypercholesterolemia              On atorvastatin " 80 mg Q24.     6. Diabetes Mellitus, Type 2  2005: Diagnosed. Complications: CVA. Medications: Diet.  On empagliflozin 10 mg Q24 for HF.     7. Overweight              4/25/2024: Weight 98 kg. BMI 36.              5/3/2024: Weight 88 kg. BMI 32.   5/17/2024: Weight 78 kg. BMI 28.     8. Chronic Kidney Disease, Stage 3              4/27/2024: BUN/crea 24/1.5. eGFR 37. K 4.1.              5/3/2024: BUN/crea 27/1.6. eGFR 35. K 3.6.     9. 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              4/23/2024: Plts 140.              Dr. Joao Howard.     10. Anemia              4/30/2024: H/H 7.7/22.6%. MCV 93.              Significant decline.              Consider further evaluation.     11. Primary Care              Dr. Ashley Harrell.    F/u 2 months.    Ismael Covarrubias M.D.

## 2024-11-13 ENCOUNTER — TELEPHONE (OUTPATIENT)
Dept: CARDIOLOGY | Facility: CLINIC | Age: 69
End: 2024-11-13
Payer: MEDICARE

## 2024-11-13 NOTE — TELEPHONE ENCOUNTER
Left message on voice mail.      ----- Message from Ismael Covarrubias MD sent at 11/13/2024  9:00 AM CST -----  Laboratory data reviewed. All results favorable.    Stay with plan.     Please call the patient and inform the patient about results.     Ismael Covarrubias M.D.

## 2025-02-18 DIAGNOSIS — I50.32 CHRONIC DIASTOLIC HEART FAILURE: Primary | ICD-10-CM

## 2025-02-18 DIAGNOSIS — R17 INCREASED BILIRUBIN LEVEL: ICD-10-CM

## 2025-02-21 ENCOUNTER — TELEPHONE (OUTPATIENT)
Dept: PODIATRY | Facility: CLINIC | Age: 70
End: 2025-02-21
Payer: MEDICARE

## 2025-02-21 NOTE — TELEPHONE ENCOUNTER
Tried calling patient, phone kept ringing.    ----- Message from Tech Sendy sent at 2/21/2025 11:19 AM CST -----  Regarding: Patient call back  .Type: Patient Call BackWho called:Daughter What is the request in detail:running late;GPS did not give clear directions Can the clinic reply by [facility]?no Would the patient rather a call back or a response via [facility]? Call Best call back number:[phone] Additional Information:

## 2025-03-13 ENCOUNTER — HOSPITAL ENCOUNTER (OUTPATIENT)
Dept: RADIOLOGY | Facility: OTHER | Age: 70
Discharge: HOME | End: 2025-03-13
Attending: NURSE PRACTITIONER
Payer: MEDICARE

## 2025-03-13 DIAGNOSIS — I50.32 CHRONIC DIASTOLIC HEART FAILURE: ICD-10-CM

## 2025-03-13 DIAGNOSIS — R17 INCREASED BILIRUBIN LEVEL: ICD-10-CM

## 2025-03-13 PROCEDURE — 76700 US EXAM ABDOM COMPLETE: CPT | Mod: TC

## 2025-03-13 PROCEDURE — 76700 US EXAM ABDOM COMPLETE: CPT | Mod: 26,,, | Performed by: RADIOLOGY

## 2025-04-10 ENCOUNTER — OFFICE VISIT (OUTPATIENT)
Dept: CARDIOLOGY | Facility: CLINIC | Age: 70
End: 2025-04-10
Attending: INTERNAL MEDICINE
Payer: MEDICARE

## 2025-04-10 ENCOUNTER — LAB VISIT (OUTPATIENT)
Dept: LAB | Facility: OTHER | Age: 70
End: 2025-04-10
Attending: INTERNAL MEDICINE
Payer: MEDICARE

## 2025-04-10 ENCOUNTER — RESULTS FOLLOW-UP (OUTPATIENT)
Dept: CARDIOLOGY | Facility: OTHER | Age: 70
End: 2025-04-10

## 2025-04-10 VITALS
WEIGHT: 172.38 LBS | SYSTOLIC BLOOD PRESSURE: 134 MMHG | DIASTOLIC BLOOD PRESSURE: 68 MMHG | HEART RATE: 65 BPM | HEIGHT: 65 IN | OXYGEN SATURATION: 98 % | BODY MASS INDEX: 28.72 KG/M2

## 2025-04-10 DIAGNOSIS — D69.6 THROMBOCYTOPENIA: Chronic | ICD-10-CM

## 2025-04-10 DIAGNOSIS — D59.8 OTHER ACQUIRED HEMOLYTIC ANEMIAS: ICD-10-CM

## 2025-04-10 DIAGNOSIS — Z86.73 HISTORY OF CEREBROVASCULAR ACCIDENT: ICD-10-CM

## 2025-04-10 DIAGNOSIS — D63.8 ANEMIA IN OTHER CHRONIC DISEASES CLASSIFIED ELSEWHERE: ICD-10-CM

## 2025-04-10 DIAGNOSIS — I27.22 PULMONARY HYPERTENSION DUE TO LEFT HEART DISEASE: ICD-10-CM

## 2025-04-10 DIAGNOSIS — E78.00 HYPERCHOLESTEROLEMIA: ICD-10-CM

## 2025-04-10 DIAGNOSIS — D69.3 IDIOPATHIC THROMBOCYTOPENIC PURPURA (ITP): ICD-10-CM

## 2025-04-10 DIAGNOSIS — I50.22 HEART FAILURE, SYSTOLIC, CHRONIC: ICD-10-CM

## 2025-04-10 DIAGNOSIS — N18.32 STAGE 3B CHRONIC KIDNEY DISEASE: ICD-10-CM

## 2025-04-10 DIAGNOSIS — E66.3 OVERWEIGHT: ICD-10-CM

## 2025-04-10 DIAGNOSIS — I10 PRIMARY HYPERTENSION: ICD-10-CM

## 2025-04-10 DIAGNOSIS — E11.59 TYPE 2 DIABETES MELLITUS WITH OTHER CIRCULATORY COMPLICATION, WITHOUT LONG-TERM CURRENT USE OF INSULIN: ICD-10-CM

## 2025-04-10 LAB
ABSOLUTE EOSINOPHIL (OHS): 0.13 K/UL
ABSOLUTE MONOCYTE (OHS): 0.49 K/UL (ref 0.3–1)
ABSOLUTE NEUTROPHIL COUNT (OHS): 4.69 K/UL (ref 1.8–7.7)
ALBUMIN SERPL BCP-MCNC: 4.3 G/DL (ref 3.5–5.2)
ALP SERPL-CCNC: 55 UNIT/L (ref 40–150)
ALT SERPL W/O P-5'-P-CCNC: 13 UNIT/L (ref 10–44)
ANION GAP (OHS): 9 MMOL/L (ref 8–16)
AST SERPL-CCNC: 20 UNIT/L (ref 11–45)
BASOPHILS # BLD AUTO: 0.05 K/UL
BASOPHILS NFR BLD AUTO: 0.7 %
BILIRUB SERPL-MCNC: 4.2 MG/DL (ref 0.1–1)
BNP SERPL-MCNC: 271 PG/ML (ref 0–99)
BUN SERPL-MCNC: 45 MG/DL (ref 8–23)
CALCIUM SERPL-MCNC: 10.2 MG/DL (ref 8.7–10.5)
CHLORIDE SERPL-SCNC: 108 MMOL/L (ref 95–110)
CHOLEST SERPL-MCNC: 105 MG/DL (ref 120–199)
CHOLEST/HDLC SERPL: 3 {RATIO} (ref 2–5)
CO2 SERPL-SCNC: 26 MMOL/L (ref 23–29)
CREAT SERPL-MCNC: 1.6 MG/DL (ref 0.5–1.4)
ERYTHROCYTE [DISTWIDTH] IN BLOOD BY AUTOMATED COUNT: 17.5 % (ref 11.5–14.5)
GFR SERPLBLD CREATININE-BSD FMLA CKD-EPI: 35 ML/MIN/1.73/M2
GLUCOSE SERPL-MCNC: 90 MG/DL (ref 70–110)
HCT VFR BLD AUTO: 26 % (ref 37–48.5)
HDLC SERPL-MCNC: 35 MG/DL (ref 40–75)
HDLC SERPL: 33.3 % (ref 20–50)
HGB BLD-MCNC: 9.6 GM/DL (ref 12–16)
IMM GRANULOCYTES # BLD AUTO: 0.07 K/UL (ref 0–0.04)
IMM GRANULOCYTES NFR BLD AUTO: 0.9 % (ref 0–0.5)
LDLC SERPL CALC-MCNC: 48 MG/DL (ref 63–159)
LYMPHOCYTES # BLD AUTO: 2.19 K/UL (ref 1–4.8)
MCH RBC QN AUTO: 35.4 PG (ref 27–31)
MCHC RBC AUTO-ENTMCNC: 36.9 G/DL (ref 32–36)
MCV RBC AUTO: 96 FL (ref 82–98)
NONHDLC SERPL-MCNC: 70 MG/DL
NUCLEATED RBC (/100WBC) (OHS): 0 /100 WBC
PLATELET # BLD AUTO: 119 K/UL (ref 150–450)
PMV BLD AUTO: 11 FL (ref 9.2–12.9)
POTASSIUM SERPL-SCNC: 4.3 MMOL/L (ref 3.5–5.1)
PROT SERPL-MCNC: 7.4 GM/DL (ref 6–8.4)
RBC # BLD AUTO: 2.71 M/UL (ref 4–5.4)
RELATIVE EOSINOPHIL (OHS): 1.7 %
RELATIVE LYMPHOCYTE (OHS): 28.7 % (ref 18–48)
RELATIVE MONOCYTE (OHS): 6.4 % (ref 4–15)
RELATIVE NEUTROPHIL (OHS): 61.6 % (ref 38–73)
SODIUM SERPL-SCNC: 143 MMOL/L (ref 136–145)
TRIGL SERPL-MCNC: 110 MG/DL (ref 30–150)
WBC # BLD AUTO: 7.62 K/UL (ref 3.9–12.7)

## 2025-04-10 PROCEDURE — 80061 LIPID PANEL: CPT

## 2025-04-10 PROCEDURE — 99999 PR PBB SHADOW E&M-EST. PATIENT-LVL III: CPT | Mod: PBBFAC,,, | Performed by: INTERNAL MEDICINE

## 2025-04-10 PROCEDURE — 80053 COMPREHEN METABOLIC PANEL: CPT

## 2025-04-10 PROCEDURE — 93005 ELECTROCARDIOGRAM TRACING: CPT

## 2025-04-10 PROCEDURE — 83880 ASSAY OF NATRIURETIC PEPTIDE: CPT

## 2025-04-10 PROCEDURE — 85025 COMPLETE CBC W/AUTO DIFF WBC: CPT

## 2025-04-10 PROCEDURE — 36415 COLL VENOUS BLD VENIPUNCTURE: CPT

## 2025-04-10 RX ORDER — SPIRONOLACTONE 25 MG/1
25 TABLET ORAL DAILY
Qty: 90 TABLET | Refills: 3 | Status: SHIPPED | OUTPATIENT
Start: 2025-04-10

## 2025-04-10 RX ORDER — VALSARTAN 160 MG/1
160 TABLET ORAL 2 TIMES DAILY
Qty: 180 TABLET | Refills: 3 | Status: SHIPPED | OUTPATIENT
Start: 2025-04-10

## 2025-04-10 RX ORDER — FUROSEMIDE 40 MG/1
40 TABLET ORAL DAILY
Qty: 120 TABLET | Refills: 3 | Status: SHIPPED | OUTPATIENT
Start: 2025-04-10

## 2025-04-10 RX ORDER — CARVEDILOL 25 MG/1
25 TABLET ORAL 2 TIMES DAILY
Qty: 180 TABLET | Refills: 3 | Status: SHIPPED | OUTPATIENT
Start: 2025-04-10

## 2025-04-10 RX ORDER — ATORVASTATIN CALCIUM 80 MG/1
80 TABLET, FILM COATED ORAL DAILY
Qty: 90 TABLET | Refills: 3 | Status: SHIPPED | OUTPATIENT
Start: 2025-04-10

## 2025-04-10 NOTE — PROGRESS NOTES
Subjective     Wendy Viveros is a 69 y.o. female with hypertension and diabetes mellitus type 2. She is overweight. She suffered a cerebrovascular accident in 2021 causing right sided weakness. She has thrombocytopenia and issues with anemia. Beginning early 4/2024 she accumulated fluid. She developed edema of her legs, the abdominal girth increased and she became short of breath at rest. She presented to the emergency room on 4/24/2024 and was admitted. On 4/24/2024 she had an echocardiogram. The left ventricle was moderately dilated with severe systolic dysfunction. The ejection fraction was 25%. There was mild left ventricular hypertrophy. There was moderate diastolic dysfunction. The left ventricular end diastolic pressure was elevated. The right ventricle was mildly enlarged with severe systolic dysfunction. The left atrium was severely enlarged There was moderate mitral regurgitation and moderate to severe tricuspid regurgitation. The systolic pulmonary artery pressure was 83 mmHg. The right atrial pressure was 15 mmHg. There was a small pericardial effusion. She was diuresed and a regimen for heart failure was initiated. On 5/3/2024 she went on an oral regimen. When she came back to see me on 5/17/2024 she had lost 22 kg of fluid and felt well. On 8/12/2024 she had an echocardiogram which revealed normal left ventricular size with moderate systolic dysfunction. The ejection fraction was 35%. The left ventricular global longitudinal strain  was -11%. There was mild left ventricular hypertrophy. Mild diastolic dysfunction. The left atrium was moderately dilated. There was mild aortic valve sclerosis and moderate mitral regurgitation. Her medications were adjusted further. On 4/10/2025 she says she is no longer short of breath. The edema of her legs has resolved. No exertional chest pain or exertional shortness of breath. No palpitations or weak spells. No issues with any of her prescribed medications. Feeling  well overall.       Congestive Heart Failure  Presents for follow-up visit. Pertinent negatives include no abdominal pain, chest pain, chest pressure, claudication, edema, fatigue, muscle weakness, near-syncope, nocturia, orthopnea, palpitations, paroxysmal nocturnal dyspnea, shortness of breath or unexpected weight change.   Cerebrovascular Accident  Pertinent negatives include no abdominal pain, anorexia, arthralgias, change in bowel habit, chest pain, chills, congestion, coughing, fatigue, fever, headaches, joint swelling, myalgias, nausea, neck pain, numbness, rash, sore throat, swollen glands, urinary symptoms, vertigo, visual change, vomiting or weakness.   Hypertension  The current episode started more than 1 year ago. The problem is unchanged. The problem is controlled (usually 120-130/70-80 mmHg at home). Pertinent negatives include no anxiety, blurred vision, chest pain, headaches, malaise/fatigue, neck pain, orthopnea, palpitations, peripheral edema, PND, shortness of breath or sweats. Identifiable causes of hypertension include chronic renal disease.   Hyperlipidemia  Exacerbating diseases include chronic renal disease. She has no history of diabetes, hypothyroidism, liver disease, obesity or nephrotic syndrome. Pertinent negatives include no chest pain, focal sensory loss, focal weakness, leg pain, myalgias or shortness of breath.       Review of Systems   Constitutional: Negative for chills, fatigue, fever, malaise/fatigue and unexpected weight change.   HENT:  Negative for congestion, nosebleeds, sore throat and tinnitus.    Eyes:  Negative for blurred vision, double vision, vision loss in left eye and vision loss in right eye.   Cardiovascular:  Negative for chest pain, claudication, dyspnea on exertion, irregular heartbeat, leg swelling, near-syncope, orthopnea, palpitations, paroxysmal nocturnal dyspnea and syncope.   Respiratory:  Negative for cough, hemoptysis, shortness of breath and wheezing.     Endocrine: Negative for cold intolerance and heat intolerance.   Hematologic/Lymphatic: Negative for bleeding problem. Does not bruise/bleed easily.   Skin:  Negative for color change and rash.   Musculoskeletal:  Negative for arthralgias, back pain, falls, joint swelling, muscle weakness, myalgias and neck pain.   Gastrointestinal:  Negative for abdominal pain, anorexia, change in bowel habit, diarrhea, dysphagia, heartburn, hematemesis, hematochezia, hemorrhoids, jaundice, melena, nausea and vomiting.   Genitourinary:  Negative for dysuria, hematuria and nocturia.   Neurological:  Negative for dizziness, focal weakness, headaches, light-headedness, loss of balance, numbness, tremors, vertigo and weakness.   Psychiatric/Behavioral:  Negative for altered mental status, depression and memory loss. The patient is not nervous/anxious.    Allergic/Immunologic: Negative for hives and persistent infections.       Current Outpatient Medications on File Prior to Visit   Medication Sig Dispense Refill    atorvastatin (LIPITOR) 80 MG tablet Take 1 tablet (80 mg total) by mouth once daily. 90 tablet 3    carvediloL (COREG) 12.5 MG tablet Take 1 tablet (12.5 mg total) by mouth 2 (two) times daily. 180 tablet 3    cyanocobalamin (VITAMIN B-12) 1000 MCG tablet Take 1 tablet (1,000 mcg total) by mouth once daily. 90 tablet 0    empagliflozin (JARDIANCE) 10 mg tablet Take 1 tablet (10 mg total) by mouth once daily. 90 tablet 3    folic acid (FOLVITE) 1 MG tablet Take 1 tablet (1 mg total) by mouth once daily. 90 tablet 0    furosemide (LASIX) 40 MG tablet Take 1 tablet (40 mg total) by mouth once daily. 120 tablet 3    spironolactone (ALDACTONE) 25 MG tablet Take 1 tablet (25 mg total) by mouth once daily. 90 tablet 3    valsartan (DIOVAN) 160 MG tablet Take 1 tablet (160 mg total) by mouth 2 (two) times daily. 180 tablet 3    FLUoxetine 20 MG capsule Take 20 mg by mouth once daily. (Patient not taking: Reported on 4/10/2025)    "   traZODone (DESYREL) 50 MG tablet Take 1 tablet (50 mg total) by mouth nightly as needed for Insomnia. (Patient not taking: Reported on 11/12/2024) 30 tablet 0     No current facility-administered medications on file prior to visit.       /68   Pulse 65   Ht 5' 5" (1.651 m)   Wt 78.2 kg (172 lb 6.4 oz)   SpO2 98%   BMI 28.69 kg/m²     Objective     Physical Exam  Constitutional:       General: She is not in acute distress.     Appearance: Normal appearance. She is well-developed. She is not toxic-appearing or diaphoretic.   HENT:      Head: Normocephalic and atraumatic.      Nose: Nose normal.   Eyes:      General:         Right eye: No discharge.         Left eye: No discharge.      Conjunctiva/sclera:      Right eye: Right conjunctiva is not injected.      Left eye: Left conjunctiva is not injected.      Pupils: Pupils are equal.      Right eye: Pupil is round.      Left eye: Pupil is round.   Neck:      Thyroid: No thyromegaly.      Vascular: No carotid bruit or JVD.   Cardiovascular:      Rate and Rhythm: Normal rate and regular rhythm. No extrasystoles are present.     Chest Wall: PMI is not displaced.      Pulses:           Radial pulses are 2+ on the right side and 2+ on the left side.        Femoral pulses are 2+ on the right side and 2+ on the left side.       Dorsalis pedis pulses are 2+ on the right side and 2+ on the left side.        Posterior tibial pulses are 2+ on the right side and 2+ on the left side.      Heart sounds: S1 normal and S2 normal. No murmur heard.     Gallop present. S3 and S4 sounds present.   Pulmonary:      Effort: Pulmonary effort is normal.      Breath sounds: Normal breath sounds.   Abdominal:      Palpations: Abdomen is soft.      Tenderness: There is no abdominal tenderness.   Musculoskeletal:      Cervical back: Neck supple.      Right lower leg: Swelling present. No edema.      Left lower leg: Swelling present.   Lymphadenopathy:      Head:      Right side of " "head: No submandibular adenopathy.      Left side of head: No submandibular adenopathy.      Cervical: No cervical adenopathy.   Skin:     General: Skin is warm and dry.      Findings: No rash.      Nails: There is no clubbing.   Neurological:      General: No focal deficit present.      Mental Status: She is alert and oriented to person, place, and time. She is not disoriented.      Cranial Nerves: No cranial nerve deficit.   Psychiatric:         Attention and Perception: Attention and perception normal.         Mood and Affect: Mood and affect normal.         Speech: Speech normal.         Behavior: Behavior normal.         Thought Content: Thought content normal.         Cognition and Memory: Cognition and memory normal.         Judgment: Judgment normal.         Assessment and Plan     1. Heart failure, systolic, chronic    2. Pulmonary hypertension due to left heart disease    3. History of cerebrovascular accident    4. Primary hypertension    5. Hypercholesterolemia    6. Type 2 diabetes mellitus with other circulatory complication, without long-term current use of insulin    7. Overweight    8. Stage 3b chronic kidney disease    9. Thrombocytopenia    10. Idiopathic thrombocytopenic purpura (ITP)    11. Anemia in other chronic diseases classified elsewhere    12. Other acquired hemolytic anemias        Plan:     Heart Failure, Systolic, Chronic              4/24/2024: Presented fluid overloaded in HF. BNP 2,536. Received furosemide 20 mg iv "Q12".              4/24/2024: Echo: Moderately dilated left ventricle with severe systolic dysfunction. EF 25%. Mild LVH. Moderate diastolic dysfunction. LVEDP elevated. Mildly enlarged RV with severe systolic dysfunction. Severely enlarged LA. Moderate MR. Moderate to severe TR. SPAP 83 mmHg. RA 15 mmHg. Small pericardial effusion.    8/12/2024: Normal left ventricular size with moderate systolic dysfunction. EF 35%. LVGLS -11%. Mild LVH. Mild diastolic dysfunction. " "Moderately dilated LA. Mild aortic valve sclerosis. Moderate MR.               Betablocker, CHANELL blockade, MCA & SGLT2i.              5/2/2024: .  5/17/2024: .              4/26/2024: Valsartan 40 mg Q24 was begun. She appears to have had an reaction to ACEI in the past. Accordingly no ACEI or ARNI.  4/30/2024: Valsartan 40 mg Q24 was increased to valsartan to 80 mg Q24.  5/2/2024: Valsartan 80 mg Q24 was increased to valsartan 160 mg Q24.  5/3/2024: Furosemide 40 mg Q24 was begun.  5/17/2024: Carvedilol 6.25 mg Q12 was begun and metoprolol 50 mg Q24 to be discontinued. Sacubitril 49 mg/valsartan 51 mg Q12 to be changed back to valsartan 160 mg Q24 with history of "reaction to ACEI" in the past.  11/12/2024: Carvedilol 6.25 Q12 was increased to carvedilol 12.5 mg Q12 and valsartan 160 mg Q24 was increased to valsartan 160 mg Q12 as pressure still on high side.                   On carvedilol 12.5 mg Q12, empagliflozin 10 mg Q24, valsartan 160 mg Q12, spironolactone 25 mg Q24 and furosemide 40 mg Q24.              Appears well compensated.   4/10/2025: Carvedilol 12.5 mg Q12 to be increased to carvedilol 25 mg Q12. Do echo, CBC, CMP and BNP.                                                  2. Pulmonary Hypertension  4/24/2024: Echo: SPAP 83 mmHg.   Most certainly due to left heart disease.  Expect to come down with control of hypertensin and decrease in MR.     3. History of Cerebrovascular Accident              2021: CVA: Right hemiplegia.     4. Hypertension              2005: Diagnosed.              At home been on metoprolol 50 mg Q24, nifedipine 90 mg Q24 and hctz 25 mg Q24.   11/12/2024: Carvedilol 6.25 Q12 was increased to carvedilol 12.5 mg Q12 and valsartan 160 mg Q24 was increased to valsartan 160 mg Q12 as pressure still on high side.   On carvedilol 12.5 mg Q12, empagliflozin 10 mg Q24, valsartan 160 mg Q12, spironolactone 25 mg Q24 and furosemide 40 mg Q24.   Keeping log at home.   Well " controlled.                       5. Hypercholesterolemia              On atorvastatin 80 mg Q24.   11/12/2024: Chol 96. HDL 35. LDL 44. TG 87.   On atorvastatin 80 mg Q24.   Favorable lipid panel.     6. Diabetes Mellitus, Type 2  2005: Diagnosed. Complications: CVA. Medications: Diet.  On empagliflozin 10 mg Q24 for HF.     7. Overweight              4/25/2024: Weight 98 kg. BMI 36.              5/3/2024: Weight 88 kg. BMI 32.   5/17/2024: Weight 78 kg. BMI 28.     8. Chronic Kidney Disease, Stage 3              4/27/2024: BUN/crea 24/1.5. eGFR 37. K 4.1.              5/3/2024: BUN/crea 27/1.6. eGFR 35. K 3.6.   11/12/2024: BUN/crea 34/1.4. eGFR 41. K 4.8.     9. 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              4/23/2024: Plts 140.              Dr. Joao Howard.     10. Anemia              4/30/2024: H/H 7.7/22.6%. MCV 93.              Significant decline.              Consider further evaluation.     11. Primary Care              Dr. Ashley Harrell.    F/u 3 months.    Ismael Covarrubias M.D.

## 2025-04-11 ENCOUNTER — TELEPHONE (OUTPATIENT)
Dept: CARDIOLOGY | Facility: CLINIC | Age: 70
End: 2025-04-11
Payer: MEDICARE

## 2025-04-11 NOTE — TELEPHONE ENCOUNTER
Left message on voice mail    ----- Message from Ismael Covarrubias MD sent at 4/11/2025  7:48 AM CDT -----  Very favorable lipid panel.    Ismael Covarrubias M.D.    ----- Message -----  From: Lab, Background User  Sent: 4/10/2025   4:29 PM CDT  To: Ismael Covarrubias MD

## 2025-04-11 NOTE — TELEPHONE ENCOUNTER
Left detailed message on voice mail        ----- Message from Ismael Covarrubias MD sent at 4/10/2025  6:16 PM CDT -----  Kidneys moderate weak but stable.    HF test favorable.    Anemic as previously seen. Platelets low. Bilirubin high as before. Defer to Dr. Joao Howard.    Stay on current cardiac regimen.    Ismael Covarrubias M.D.      ----- Message -----  From: Lab, Background User  Sent: 4/10/2025   4:29 PM CDT  To: Ismael Covarrubias MD

## 2025-04-12 LAB
OHS QRS DURATION: 98 MS
OHS QTC CALCULATION: 401 MS

## 2025-04-30 ENCOUNTER — HOSPITAL ENCOUNTER (OUTPATIENT)
Dept: CARDIOLOGY | Facility: OTHER | Age: 70
Discharge: HOME | End: 2025-04-30
Attending: INTERNAL MEDICINE
Payer: MEDICARE

## 2025-04-30 VITALS
BODY MASS INDEX: 28.66 KG/M2 | HEIGHT: 65 IN | SYSTOLIC BLOOD PRESSURE: 134 MMHG | WEIGHT: 172 LBS | DIASTOLIC BLOOD PRESSURE: 68 MMHG | HEART RATE: 65 BPM

## 2025-04-30 DIAGNOSIS — I50.22 HEART FAILURE, SYSTOLIC, CHRONIC: ICD-10-CM

## 2025-04-30 LAB
AV INDEX (PROSTH): 0.71
AV MEAN GRADIENT: 8 MMHG
AV PEAK GRADIENT: 14 MMHG
AV VALVE AREA BY VELOCITY RATIO: 2.2 CM²
AV VALVE AREA: 2.4 CM²
AV VELOCITY RATIO: 0.63
BSA FOR ECHO PROCEDURE: 1.89 M2
CV ECHO LV RWT: 0.43 CM
DOP CALC AO PEAK VEL: 1.9 M/S
DOP CALC AO VTI: 44.5 CM
DOP CALC LVOT AREA: 3.5 CM2
DOP CALC LVOT DIAMETER: 2.1 CM
DOP CALC LVOT PEAK VEL: 1.2 M/S
DOP CALC LVOT STROKE VOLUME: 108.7 CM3
DOP CALCLVOT PEAK VEL VTI: 31.4 CM
E WAVE DECELERATION TIME: 281 MSEC
E/A RATIO: 0.8
E/E' RATIO: 16 M/S
ECHO LV POSTERIOR WALL: 1.2 CM (ref 0.6–1.1)
EJECTION FRACTION: 40 %
FRACTIONAL SHORTENING: 35.7 % (ref 28–44)
GLOBAL LONGITUIDAL STRAIN: 12.9 %
INTERVENTRICULAR SEPTUM: 1.2 CM (ref 0.6–1.1)
IVC DIAMETER: 1.07 CM
IVRT: 91 MSEC
LA MAJOR: 5.3 CM
LA MINOR: 5.5 CM
LA WIDTH: 5.1 CM
LEFT ATRIUM SIZE: 4.4 CM
LEFT ATRIUM VOLUME INDEX: 55 ML/M2
LEFT ATRIUM VOLUME: 103 CM3
LEFT INTERNAL DIMENSION IN SYSTOLE: 3.6 CM (ref 2.1–4)
LEFT VENTRICLE DIASTOLIC VOLUME INDEX: 81.18 ML/M2
LEFT VENTRICLE DIASTOLIC VOLUME: 151 ML
LEFT VENTRICLE MASS INDEX: 150.8 G/M2
LEFT VENTRICLE SYSTOLIC VOLUME INDEX: 30.1 ML/M2
LEFT VENTRICLE SYSTOLIC VOLUME: 56 ML
LEFT VENTRICULAR INTERNAL DIMENSION IN DIASTOLE: 5.6 CM (ref 3.5–6)
LEFT VENTRICULAR MASS: 280.5 G
LV LATERAL E/E' RATIO: 13.5 M/S
LV SEPTAL E/E' RATIO: 20.3 M/S
LVED V (TEICH): 151.2 ML
LVES V (TEICH): 55.58 ML
LVOT MG: 3.25 MMHG
LVOT MV: 0.86 CM/S
MV PEAK A VEL: 1.01 M/S
MV PEAK E VEL: 0.81 M/S
MV STENOSIS PRESSURE HALF TIME: 81.16 MS
MV VALVE AREA P 1/2 METHOD: 2.71 CM2
PISA MRMAX VEL: 5.35 M/S
PISA TR MAX VEL: 2.6 M/S
PULM VEIN S/D RATIO: 1.61
PV PEAK D VEL: 0.49 M/S
PV PEAK GRADIENT: 7 MMHG
PV PEAK S VEL: 0.79 M/S
PV PEAK VELOCITY: 1.36 M/S
RA MAJOR: 5.13 CM
RA PRESSURE ESTIMATED: 3 MMHG
RA WIDTH: 3 CM
RV TB RVSP: 6 MMHG
TDI LATERAL: 0.06 M/S
TDI SEPTAL: 0.04 M/S
TDI: 0.05 M/S
TR MAX PG: 28 MMHG
TV REST PULMONARY ARTERY PRESSURE: 30 MMHG
Z-SCORE OF LEFT VENTRICULAR DIMENSION IN END DIASTOLE: 0.79
Z-SCORE OF LEFT VENTRICULAR DIMENSION IN END SYSTOLE: 0.95

## 2025-04-30 PROCEDURE — 93356 MYOCRD STRAIN IMG SPCKL TRCK: CPT

## 2025-04-30 PROCEDURE — 93356 MYOCRD STRAIN IMG SPCKL TRCK: CPT | Mod: ,,, | Performed by: INTERNAL MEDICINE

## 2025-04-30 PROCEDURE — 93306 TTE W/DOPPLER COMPLETE: CPT | Mod: 26,,, | Performed by: INTERNAL MEDICINE

## 2025-06-04 ENCOUNTER — TELEPHONE (OUTPATIENT)
Dept: HEMATOLOGY/ONCOLOGY | Facility: CLINIC | Age: 70
End: 2025-06-04
Payer: MEDICARE

## 2025-06-16 ENCOUNTER — TELEPHONE (OUTPATIENT)
Dept: HEMATOLOGY/ONCOLOGY | Facility: CLINIC | Age: 70
End: 2025-06-16
Payer: MEDICARE

## 2025-06-16 NOTE — TELEPHONE ENCOUNTER
Returned call to patient.  Scheduled her for an 8 month FUV as transition from [facility] to [facility].  Patient agreeable to date and time given.  All matters handled at this time.      Copied from CRM #[id number]. Topic: General Inquiry - Return Call  >> Jun 16, 2025  3:36 PM Eli wrote:  Type:  Patient Returning Call    Who Called:pt   Who Left Message for Patient:Sania Orozco RN  Does the patient know what this is regarding?:yes   Would the patient rather a call back or a response via [facility]? Call   Best Call Back Number:[phone] (M)  Additional Information:

## 2025-06-18 ENCOUNTER — HOSPITAL ENCOUNTER (OUTPATIENT)
Dept: RADIOLOGY | Facility: OTHER | Age: 70
Discharge: HOME | End: 2025-06-18
Attending: INTERNAL MEDICINE
Payer: MEDICARE

## 2025-06-18 DIAGNOSIS — Z12.31 ENCOUNTER FOR SCREENING MAMMOGRAM FOR BREAST CANCER: ICD-10-CM

## 2025-06-18 PROCEDURE — 77067 SCR MAMMO BI INCL CAD: CPT | Mod: TC

## 2025-06-23 ENCOUNTER — TELEPHONE (OUTPATIENT)
Dept: CARDIOLOGY | Facility: CLINIC | Age: 70
End: 2025-06-23
Payer: MEDICARE

## 2025-06-23 NOTE — TELEPHONE ENCOUNTER
Returned call, placed on hold for 9 minutes.    Copied from CRM #[id number]. Topic: Medications - Medication Question  >> Jun 23, 2025  4:17 PM Iris wrote:  Type:  Pharmacy Calling to Clarify an RX    Name of Caller:Parish  Pharmacy Name:[facility]  Prescription Name:carvediloL (COREG) 25 MG tablet  What do they need to clarify?:if pt Rx is good for 25 MG or 12.5  Best Call Back Number:[phone]  Additional Information: Pt want to confirm if she get 25 MG now

## 2025-06-24 NOTE — ASSESSMENT & PLAN NOTE
Anemia of chronic disease  Hyperbilirubinemia  - History of hematologic abnormalities dating back to hospitalization in 2017.   - Seen by Dr. Howard in Hem/Onc clinic on 11/1/23 for mild anemia and thrombocytopenia noted for the past 7 months and new labs were ordered with follow up arranged for return; unable to see most recent outside labs. That note reports her H/H was recently 10.2/28, chronically elevated bilirubin, and depressed haptoglobin to undetectable levels.  - Severe thrombocytopenia highly suspicious for ITP, though failing to respond to date. Holding allopurinol, clopidogrel.  - Direct antiglobulin test negative. MCAMIN57 activity elevated; not consistent with TTP. U/S Abd with hepatomegaly, no splenomegaly. Broadening workup - check fibrinogen, antithrombin III, D-dimer, SPEP, immunofixation, ALFREDO, anti-dsDNA, ESR, reticulocytes. HIT added, though she denies any recent potential exposures to heparin.  - Additional 1U Plt today; 5th dose. Appreciate transfusion medicine recommendations, will give as 1/2 amounts q12hr today.  - Today will be day 4/4 of dexamethasone 40mg PO daily. Started IVIG yesterday; day 2/2 planned today. If fails to respond, may require bone marrow biopsy / rituximab initiation.  - Appreciate hematology assistance.   As we discussed your illness is viral.  No antibiotics are indicated at this time.  Rest and drink extra fluids.  OTC cough and cold medications as needed.  Tylenol or Motrin as needed for pain or fever.  Salt water gargles and throat lozenges for sore throat.  Follow up with PCP if no improvement.  Go to ER with worsening symptoms.  If no improvement in 2 days get strep testing done, you are already on antibiotic which would likely cover for strep.     Cold Symptoms   WHAT YOU NEED TO KNOW:   A cold is an infection caused by a virus. The infection causes your upper respiratory system to become inflamed. Common symptoms of a cold include sneezing, dry throat, a stuffy nose, headache, watery eyes, and a cough. Your cough may be dry, or you may cough up mucus. You may also have muscle aches, joint pain, and tiredness. Rarely, you may have a fever. Most colds go away without treatment.  DISCHARGE INSTRUCTIONS:   Return to the emergency department if:   You have increased tiredness and weakness.    You are unable to eat.     Your heart is beating much faster than usual for you.     You see white spots in the back of your throat and your neck is swollen and sore to the touch.     You see pinpoint or larger reddish-purple dots on your skin.  Contact your healthcare provider if:   You have a fever higher than 102°F (38.9°C).    You have new or worsening shortness of breath.     You have thick nasal drainage for more than 2 days.     Your symptoms do not improve or get worse within 5 days.     You have questions or concerns about your condition or care.  Medicines:  The following medicines may be suggested by your healthcare provider to decrease your cold symptoms. These medicines are available without a doctor's order. Ask which medicines to take and when to take them. Follow directions.  NSAIDs or acetaminophen  help to bring down a fever or decrease pain.    Decongestants  help decrease nasal stuffiness.      Antihistamines  help decrease sneezing and a runny nose.     Cough suppressants  help decrease how much you cough.    Expectorants  help loosen mucus so you can cough it up.    Take your medicine as directed.  Contact your healthcare provider if you think your medicine is not helping or if you have side effects. Tell him of her if you are allergic to any medicine. Keep a list of the medicines, vitamins, and herbs you take. Include the amounts, and when and why you take them. Bring the list or the pill bottles to follow-up visits. Carry your medicine list with you in case of an emergency.  Symptom relief:  The following may help relieve cold symptoms, such as a dry throat and congestion:  Gargle with mouthwash or warm salt water as directed.     Suck on throat lozenges or hard candy.     Use a cold or warm vaporizer or humidifier to ease your breathing.     Rest for at least 2 days and then as needed to decrease tiredness and weakness.     Use petroleum based jelly around your nostrils to decrease irritation from blowing your nose.  Drink liquids:  Liquids will help thin and loosen thick mucus so you can cough it up. Liquids will also keep you hydrated. Ask your healthcare provider which liquids are best for you and how much to drink each day.  Prevent the spread of germs:  You can spread your cold germs to others for at least 3 days after your symptoms start. Wash your hands often. Do not share items, such as eating utensils. Cover your nose and mouth when you cough or sneeze using the crook of your elbow instead of your hands. Throw used tissues in the garbage.  Do not smoke:  Smoking may worsen your symptoms and increase the length of time you feel sick. Talk with your healthcare provider if you need help to stop smoking.  Follow up with your healthcare provider as directed:  Write down your questions so you remember to ask them during your visits.   © 2017 [employer] Information is for End  User's use only and may not be sold, redistributed or otherwise used for commercial purposes. All illustrations and images included in CareNotes® are the copyrighted property of [employer] or [employer].  The above information is an  only. It is not intended as medical advice for individual conditions or treatments. Talk to your doctor, nurse or pharmacist before following any medical regimen to see if it is safe and effective for you.

## 2025-07-29 DIAGNOSIS — I50.22 HEART FAILURE, SYSTOLIC, CHRONIC: ICD-10-CM

## 2025-07-30 RX ORDER — VALSARTAN 160 MG/1
160 TABLET ORAL
Qty: 90 TABLET | Refills: 3 | Status: SHIPPED | OUTPATIENT
Start: 2025-07-30

## 2025-07-31 ENCOUNTER — LAB VISIT (OUTPATIENT)
Dept: LAB | Facility: OTHER | Age: 70
End: 2025-07-31
Attending: INTERNAL MEDICINE
Payer: MEDICARE

## 2025-07-31 ENCOUNTER — OFFICE VISIT (OUTPATIENT)
Dept: HEMATOLOGY/ONCOLOGY | Facility: CLINIC | Age: 70
End: 2025-07-31
Payer: MEDICARE

## 2025-07-31 VITALS
TEMPERATURE: 99 F | DIASTOLIC BLOOD PRESSURE: 77 MMHG | OXYGEN SATURATION: 97 % | BODY MASS INDEX: 27.58 KG/M2 | WEIGHT: 165.56 LBS | RESPIRATION RATE: 12 BRPM | HEIGHT: 65 IN | SYSTOLIC BLOOD PRESSURE: 137 MMHG | HEART RATE: 82 BPM

## 2025-07-31 DIAGNOSIS — D59.8 OTHER ACQUIRED HEMOLYTIC ANEMIAS: ICD-10-CM

## 2025-07-31 DIAGNOSIS — D59.8 OTHER ACQUIRED HEMOLYTIC ANEMIAS: Primary | ICD-10-CM

## 2025-07-31 LAB
ABSOLUTE EOSINOPHIL (OHS): 0.1 K/UL
ABSOLUTE MONOCYTE (OHS): 0.59 K/UL (ref 0.3–1)
ABSOLUTE NEUTROPHIL COUNT (OHS): 6.41 K/UL (ref 1.8–7.7)
ALBUMIN SERPL BCP-MCNC: 4.9 G/DL (ref 3.5–5.2)
ALP SERPL-CCNC: 60 UNIT/L (ref 40–150)
ALT SERPL W/O P-5'-P-CCNC: 16 UNIT/L (ref 10–44)
ANION GAP (OHS): 10 MMOL/L (ref 8–16)
AST SERPL-CCNC: 24 UNIT/L (ref 11–45)
BASOPHILS # BLD AUTO: 0.05 K/UL
BASOPHILS NFR BLD AUTO: 0.5 %
BILIRUB SERPL-MCNC: 6.2 MG/DL (ref 0.1–1)
BUN SERPL-MCNC: 32 MG/DL (ref 8–23)
CALCIUM SERPL-MCNC: 10.4 MG/DL (ref 8.7–10.5)
CHLORIDE SERPL-SCNC: 104 MMOL/L (ref 95–110)
CO2 SERPL-SCNC: 28 MMOL/L (ref 23–29)
CREAT SERPL-MCNC: 1.3 MG/DL (ref 0.5–1.4)
ERYTHROCYTE [DISTWIDTH] IN BLOOD BY AUTOMATED COUNT: 16.8 % (ref 11.5–14.5)
GFR SERPLBLD CREATININE-BSD FMLA CKD-EPI: 44 ML/MIN/1.73/M2
GLUCOSE SERPL-MCNC: 91 MG/DL (ref 70–110)
HAPTOGLOB SERPL-MCNC: <10 MG/DL (ref 30–250)
HCT VFR BLD AUTO: 29.9 % (ref 37–48.5)
HGB BLD-MCNC: 11.1 GM/DL (ref 12–16)
IMM GRANULOCYTES # BLD AUTO: 0.08 K/UL (ref 0–0.04)
IMM GRANULOCYTES NFR BLD AUTO: 0.8 % (ref 0–0.5)
LDH SERPL-CCNC: 242 U/L (ref 110–260)
LYMPHOCYTES # BLD AUTO: 2.49 K/UL (ref 1–4.8)
MCH RBC QN AUTO: 35.8 PG (ref 27–31)
MCHC RBC AUTO-ENTMCNC: 37.1 G/DL (ref 32–36)
MCV RBC AUTO: 97 FL (ref 82–98)
NUCLEATED RBC (/100WBC) (OHS): 0 /100 WBC
PLATELET # BLD AUTO: 134 K/UL (ref 150–450)
PMV BLD AUTO: 10 FL (ref 9.2–12.9)
POTASSIUM SERPL-SCNC: 3.8 MMOL/L (ref 3.5–5.1)
PROT SERPL-MCNC: 7.2 GM/DL (ref 6–8.4)
RBC # BLD AUTO: 3.1 M/UL (ref 4–5.4)
RELATIVE EOSINOPHIL (OHS): 1 %
RELATIVE LYMPHOCYTE (OHS): 25.6 % (ref 18–48)
RELATIVE MONOCYTE (OHS): 6.1 % (ref 4–15)
RELATIVE NEUTROPHIL (OHS): 66 % (ref 38–73)
SODIUM SERPL-SCNC: 142 MMOL/L (ref 136–145)
WBC # BLD AUTO: 9.72 K/UL (ref 3.9–12.7)

## 2025-07-31 PROCEDURE — 3075F SYST BP GE 130 - 139MM HG: CPT | Mod: CPTII,S$GLB,, | Performed by: INTERNAL MEDICINE

## 2025-07-31 PROCEDURE — 1159F MED LIST DOCD IN RCRD: CPT | Mod: CPTII,S$GLB,, | Performed by: INTERNAL MEDICINE

## 2025-07-31 PROCEDURE — 36415 COLL VENOUS BLD VENIPUNCTURE: CPT

## 2025-07-31 PROCEDURE — 1101F PT FALLS ASSESS-DOCD LE1/YR: CPT | Mod: CPTII,S$GLB,, | Performed by: INTERNAL MEDICINE

## 2025-07-31 PROCEDURE — 83010 ASSAY OF HAPTOGLOBIN QUANT: CPT

## 2025-07-31 PROCEDURE — 85025 COMPLETE CBC W/AUTO DIFF WBC: CPT

## 2025-07-31 PROCEDURE — 3078F DIAST BP <80 MM HG: CPT | Mod: CPTII,S$GLB,, | Performed by: INTERNAL MEDICINE

## 2025-07-31 PROCEDURE — 4010F ACE/ARB THERAPY RXD/TAKEN: CPT | Mod: CPTII,S$GLB,, | Performed by: INTERNAL MEDICINE

## 2025-07-31 PROCEDURE — 80053 COMPREHEN METABOLIC PANEL: CPT

## 2025-07-31 PROCEDURE — 83615 LACTATE (LD) (LDH) ENZYME: CPT

## 2025-07-31 PROCEDURE — G2211 COMPLEX E/M VISIT ADD ON: HCPCS | Mod: S$GLB,,, | Performed by: INTERNAL MEDICINE

## 2025-07-31 PROCEDURE — 99999 PR PBB SHADOW E&M-EST. PATIENT-LVL IV: CPT | Mod: PBBFAC,,, | Performed by: INTERNAL MEDICINE

## 2025-07-31 PROCEDURE — 1126F AMNT PAIN NOTED NONE PRSNT: CPT | Mod: CPTII,S$GLB,, | Performed by: INTERNAL MEDICINE

## 2025-07-31 PROCEDURE — 3008F BODY MASS INDEX DOCD: CPT | Mod: CPTII,S$GLB,, | Performed by: INTERNAL MEDICINE

## 2025-07-31 PROCEDURE — 99213 OFFICE O/P EST LOW 20 MIN: CPT | Mod: S$GLB,,, | Performed by: INTERNAL MEDICINE

## 2025-07-31 PROCEDURE — 3288F FALL RISK ASSESSMENT DOCD: CPT | Mod: CPTII,S$GLB,, | Performed by: INTERNAL MEDICINE

## 2025-07-31 NOTE — PROGRESS NOTES
Problem List: 69 yo woman with   Anemia              -           Elevated bilirubin and depressed haptoglobin              -           Recent PNH, G6PD, MIKKI, cold agglutinin negative, Neg plasmodium serologies              -           ? RBC inclusions on smear               -           Retic count elevated..... Abs retic 360K              -           BMBx at [facility] unremarkable  Severe Thrombocytopenia Nov 2023 ([facility])              -? ITP              -           Prednisone/Eltrombopag  Cardiomyoppathy              -           ? source  Positive ALFREDO (1:320)              -           ALFREDO subtypes negative            HTN  DM  Hx atrial fibrillaion  Hx CKD 3  Hx hepatic steatosis  [facility]              -           Residual R side weakness        History of Present Illness: Mrs Viveros returns for follow up of anemia. She comes in stating that she is feeling ok. She last had labs performed in April and plts were 119. She has modest anemia. No new events reported otherwise. No dark urine appreciated.,      Past Medical Hx: She has Hypertension and Diabetes. 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 She has atrial fibrillation but has not been placed on anticoagulation. She has CKD III. She has had cholecystectomy. She reports having a fatty liver.  She has JOSE LUIS and Gout . She has had knee replacement on both knees. She had rotator cuff repai in 2016     Medications:  Pantoprazole 40 every day  Valsartan 160 every day   Jardiance 10 mg daily  Folic Acid 1 mg daikly  Atorvastatin 80 every day    Spironolactone 25 mg daily  Lasix 40 qd     Allergies:   Cozaar (cough)     Social History: She was born and raised in [place] and has resided here throughout her life. She is . She lives alone. She is retired as a clerical spervisor for the dept of . She stopped smoking in the 89os after childbirth. No Hx ETOH use or abuse or illicit drug use or abuse     Family History:  Her mother  at 77 from unclear causes. She never knew her father.  She has 2 brothers and 1 sister. Her eldest brother  of unclear cause. She has a2 daughters. One daughter die from what is described as hemophagocytic syndrome at24. ANd the other is healthy     REVIEW OF SYSTEMS:  See HPI. g.      Physical Exam:  VS: 159/72       71      18       97.9      167   Gen: Awake and Alert, No Apparent Distress. Pale  HEENT: NCAT, PERRLA, EOMI,  Moist oral, nasal and ocular mucus membranes, OP Clear. Trace scleral and soft palate icterus  Neck: Supple, No JVD or Adenopathy. Trachea is Midline, No Thyromegaly, No thyroid masses  Heart: Regular Rhythm and rate, No murmurs, gallops or rubs  Lungs: Symmetric chest excursions bilaterally. No use of accessory respiratory muscles. Lungs are clear to auscultation bilaterally  Abdomen: Soft, Nontender/Nondistended. There are normoactive bowel sounds. There is no hepatosplenomegaly  Extremities:  There is no peripheral edema. No joint deformities, joint effusions or joint tenderness in the ankles, knees, hips, shoulders, elbows, wristst and digits. There is no cyanosis. 2+ radial and dorsalis pedis pulses.   Back: Straight, No paraspinal tenderness or CVA tenderness  Skin: Intact. No sites of breakdown. No subcutaneous nodules. No rashes. No petechiae. No ecchymoses.  Lymphatics: No cervical, supraclavicular, axillary or inguinal adenopathy  Neurologic: Awake, alert and fully oriented. Cranial nerves II-XII intact. Right sided weakness     Laboratory Data:   Results for orders placed or performed during the hospital encounter of 25   Echo Saline Bubble? No    Collection Time: 25  2:17 PM   Result Value Ref Range    BSA 1.89 m2    LVOT stroke volume 108.7 cm3    LVIDd 5.6 3.5 - 6.0 cm    LV Systolic Volume 56 mL    LV Systolic Volume Index 30.1 mL/m2    LVIDs 3.6 2.1 - 4.0 cm    LV Diastolic Volume 151 mL    LV Diastolic Volume Index 81.18 mL/m2    Left  Ventricular End Systolic Volume by Teichholz Method 55.58 mL    Left Ventricular End Diastolic Volume by Teichholz Method 151.20 mL    IVS 1.2 (A) 0.6 - 1.1 cm    LVOT diameter 2.1 cm    LVOT area 3.5 cm2    FS 35.7 28 - 44 %    Left Ventricle Relative Wall Thickness 0.43 cm    PW 1.2 (A) 0.6 - 1.1 cm    LV mass 280.5 g    LV Mass Index 150.8 g/m2    MV Peak E Adán 0.81 m/s    TDI LATERAL 0.06 m/s    TDI SEPTAL 0.04 m/s    E/E' ratio 16 m/s    MV Peak A Adán 1.01 m/s    TR Max Adán 2.6 m/s    E/A ratio 0.80     IVRT 91 msec    E wave deceleration time 281 msec    LV SEPTAL E/E' RATIO 20.3 m/s    LV LATERAL E/E' RATIO 13.5 m/s    PV Peak S Adán 0.79 m/s    PV Peak D Adán 0.49 m/s    Pulm vein S/D ratio 1.61     LVOT peak adán 1.2 m/s    Left Ventricular Outflow Tract Mean Velocity 0.86 cm/s    Left Ventricular Outflow Tract Mean Gradient 3.25 mmHg    LA size 4.4 cm    Left Atrium Major Axis 5.3 cm    RA Major Axis 5.13 cm    AV mean gradient 8 mmHg    AV peak gradient 14 mmHg    Ao peak adán 1.9 m/s    Ao VTI 44.5 cm    LVOT peak VTI 31.4 cm    AV valve area 2.4 cm²    AV Velocity Ratio 0.63     AV index (prosthetic) 0.71     RYAN by Velocity Ratio 2.2 cm²    Mr max adán 5.35 m/s    MV stenosis pressure 1/2 time 81.16 ms    MV valve area p 1/2 method 2.71 cm2    Triscuspid Valve Regurgitation Peak Gradient 28 mmHg    PV PEAK VELOCITY 1.36 m/s    PV peak gradient 7 mmHg    IVC diameter 1.07 cm    Mean e' 0.05 m/s    ZLVIDS 0.95     ZLVIDD 0.79     DARRYL 55 mL/m2    LA Vol 103 cm3    Left Atrium Minor Axis 5.5 cm    LA WIDTH 5.1 cm    RA Width 3.0 cm    TV resting pulmonary artery pressure 30 mmHg    RV TB RVSP 6 mmHg    Est. RA pres 3 mmHg    EF 40 %    GLS 12.9 %     *Note: Due to a large number of results and/or encounters for the requested time period, some results have not been displayed. A complete set of results can be found in Results Review.   \        Assessment and Plan:  Anemia with prior elevated bili and reduced  haptoglobin               -           Obtain updated counts and labs

## 2025-08-08 ENCOUNTER — OFFICE VISIT (OUTPATIENT)
Dept: CARDIOLOGY | Facility: CLINIC | Age: 70
End: 2025-08-08
Attending: INTERNAL MEDICINE
Payer: MEDICARE

## 2025-08-08 VITALS
OXYGEN SATURATION: 97 % | BODY MASS INDEX: 27.51 KG/M2 | DIASTOLIC BLOOD PRESSURE: 74 MMHG | SYSTOLIC BLOOD PRESSURE: 166 MMHG | HEART RATE: 74 BPM | WEIGHT: 165.31 LBS

## 2025-08-08 DIAGNOSIS — Z86.73 HISTORY OF CEREBROVASCULAR ACCIDENT: ICD-10-CM

## 2025-08-08 DIAGNOSIS — I10 PRIMARY HYPERTENSION: ICD-10-CM

## 2025-08-08 DIAGNOSIS — D69.3 IDIOPATHIC THROMBOCYTOPENIC PURPURA (ITP): ICD-10-CM

## 2025-08-08 DIAGNOSIS — E66.3 OVERWEIGHT: ICD-10-CM

## 2025-08-08 DIAGNOSIS — E78.00 HYPERCHOLESTEROLEMIA: ICD-10-CM

## 2025-08-08 DIAGNOSIS — D63.8 ANEMIA IN OTHER CHRONIC DISEASES CLASSIFIED ELSEWHERE: ICD-10-CM

## 2025-08-08 DIAGNOSIS — E11.59 TYPE 2 DIABETES MELLITUS WITH OTHER CIRCULATORY COMPLICATION, WITHOUT LONG-TERM CURRENT USE OF INSULIN: ICD-10-CM

## 2025-08-08 DIAGNOSIS — D69.6 THROMBOCYTOPENIA: Chronic | ICD-10-CM

## 2025-08-08 DIAGNOSIS — I50.22 HEART FAILURE, SYSTOLIC, CHRONIC: ICD-10-CM

## 2025-08-08 DIAGNOSIS — D59.8 OTHER ACQUIRED HEMOLYTIC ANEMIAS: ICD-10-CM

## 2025-08-08 DIAGNOSIS — N18.32 STAGE 3B CHRONIC KIDNEY DISEASE: ICD-10-CM

## 2025-08-08 DIAGNOSIS — I27.22 PULMONARY HYPERTENSION DUE TO LEFT HEART DISEASE: ICD-10-CM

## 2025-08-08 PROCEDURE — 99999 PR PBB SHADOW E&M-EST. PATIENT-LVL III: CPT | Mod: PBBFAC,,, | Performed by: INTERNAL MEDICINE

## 2025-08-08 RX ORDER — ATORVASTATIN CALCIUM 80 MG/1
80 TABLET, FILM COATED ORAL DAILY
Qty: 90 TABLET | Refills: 3 | Status: SHIPPED | OUTPATIENT
Start: 2025-08-08

## 2025-08-08 RX ORDER — FUROSEMIDE 40 MG/1
40 TABLET ORAL DAILY
Qty: 120 TABLET | Refills: 3 | Status: SHIPPED | OUTPATIENT
Start: 2025-08-08

## 2025-08-08 RX ORDER — VALSARTAN 160 MG/1
160 TABLET ORAL 2 TIMES DAILY
Qty: 180 TABLET | Refills: 3 | Status: SHIPPED | OUTPATIENT
Start: 2025-08-08

## 2025-08-08 RX ORDER — CARVEDILOL 12.5 MG/1
12.5 TABLET ORAL 2 TIMES DAILY
Qty: 180 TABLET | Refills: 3 | Status: SHIPPED | OUTPATIENT
Start: 2025-08-08

## 2025-08-08 RX ORDER — SPIRONOLACTONE 25 MG/1
25 TABLET ORAL DAILY
Qty: 90 TABLET | Refills: 3 | Status: SHIPPED | OUTPATIENT
Start: 2025-08-08

## 2025-08-08 NOTE — PROGRESS NOTES
Subjective:     Wendy Viveros is a 69 y.o. female with hypertension and diabetes mellitus type 2. She is overweight. She suffered a cerebrovascular accident in 2021 causing right sided weakness. She has thrombocytopenia and issues with anemia. Beginning early 4/2024 she accumulated fluid. She developed edema of her legs, the abdominal girth increased and she became short of breath at rest. She presented to the emergency room on 4/24/2024 and was admitted. On 4/24/2024 she had an echocardiogram. The left ventricle was moderately dilated with severe systolic dysfunction. The ejection fraction was 25%. There was mild left ventricular hypertrophy. There was moderate diastolic dysfunction. The left ventricular end diastolic pressure was elevated. The right ventricle was mildly enlarged with severe systolic dysfunction. The left atrium was severely enlarged There was moderate mitral regurgitation and moderate to severe tricuspid regurgitation. The systolic pulmonary artery pressure was 83 mmHg. The right atrial pressure was 15 mmHg. There was a small pericardial effusion. She was diuresed and a regimen for heart failure was initiated. On 5/3/2024 she went on an oral regimen. When she came back to see me on 5/17/2024 she had lost 22 kg of fluid and felt well. On 8/12/2024 she had an echocardiogram which revealed normal left ventricular size with moderate systolic dysfunction. The ejection fraction was 35%. The left ventricular global longitudinal strain  was -11%. There was mild left ventricular hypertrophy. Mild diastolic dysfunction. The left atrium was moderately dilated. There was mild aortic valve sclerosis and moderate mitral regurgitation. Her medications were adjusted further. On 4/10/2025 she says she is no longer short of breath. The edema of her legs has resolved. In 4/2025 the dose of carvedilol 12.5 mg Q12 was increased to carvedilol 25 mg Q12. She felt the carvedilol caused cramping in her right side of her  chest and she stopped the carvedilol all together. No exertional chest pain or exertional shortness of breath. No palpitations or weak spells. No issues with any of her prescribed medications. Feeling well overall.     Congestive Heart Failure  Presents for follow-up visit. Pertinent negatives include no abdominal pain, chest pain, chest pressure, claudication, edema, fatigue, muscle weakness, near-syncope, nocturia, orthopnea, palpitations, paroxysmal nocturnal dyspnea, shortness of breath or unexpected weight change.   Cerebrovascular Accident  Pertinent negatives include no abdominal pain, anorexia, arthralgias, change in bowel habit, chest pain, chills, congestion, coughing, fatigue, fever, headaches, joint swelling, myalgias, nausea, neck pain, numbness, rash, sore throat, swollen glands, urinary symptoms, vertigo, visual change, vomiting or weakness.   Hypertension  The current episode started more than 1 year ago. The problem is unchanged. The problem is controlled (usually 120-130/70-80 mmHg at home). Pertinent negatives include no anxiety, blurred vision, chest pain, headaches, malaise/fatigue, neck pain, orthopnea, palpitations, peripheral edema, PND, shortness of breath or sweats. Identifiable causes of hypertension include chronic renal disease.   Hyperlipidemia  Exacerbating diseases include chronic renal disease. She has no history of diabetes, hypothyroidism, liver disease, obesity or nephrotic syndrome. Pertinent negatives include no chest pain, focal sensory loss, focal weakness, leg pain, myalgias or shortness of breath.     Review of Systems   Constitutional: Negative for chills, fatigue, fever, malaise/fatigue and unexpected weight change.   HENT:  Negative for congestion, nosebleeds, sore throat and tinnitus.    Eyes:  Negative for blurred vision, double vision, vision loss in left eye and vision loss in right eye.   Cardiovascular:  Negative for chest pain, claudication, dyspnea on exertion,  irregular heartbeat, leg swelling, near-syncope, orthopnea, palpitations, paroxysmal nocturnal dyspnea and syncope.   Respiratory:  Negative for cough, hemoptysis, shortness of breath and wheezing.    Endocrine: Negative for cold intolerance and heat intolerance.   Hematologic/Lymphatic: Negative for bleeding problem. Does not bruise/bleed easily.   Skin:  Negative for color change and rash.   Musculoskeletal:  Negative for arthralgias, back pain, falls, joint swelling, muscle weakness, myalgias and neck pain.   Gastrointestinal:  Negative for abdominal pain, anorexia, change in bowel habit, diarrhea, dysphagia, heartburn, hematemesis, hematochezia, hemorrhoids, jaundice, melena, nausea and vomiting.   Genitourinary:  Negative for dysuria, hematuria and nocturia.   Neurological:  Negative for dizziness, focal weakness, headaches, light-headedness, loss of balance, numbness, tremors, vertigo and weakness.   Psychiatric/Behavioral:  Negative for altered mental status, depression and memory loss. The patient is not nervous/anxious.    Allergic/Immunologic: Negative for hives and persistent infections.     Current Outpatient Medications on File Prior to Visit   Medication Sig Dispense Refill    atorvastatin (LIPITOR) 80 MG tablet Take 1 tablet (80 mg total) by mouth once daily. 90 tablet 3    cyanocobalamin (VITAMIN B-12) 1000 MCG tablet Take 1 tablet (1,000 mcg total) by mouth once daily. 90 tablet 0    empagliflozin (JARDIANCE) 10 mg tablet Take 1 tablet (10 mg total) by mouth once daily. 90 tablet 3    folic acid (FOLVITE) 1 MG tablet Take 1 tablet (1 mg total) by mouth once daily. 90 tablet 0    furosemide (LASIX) 40 MG tablet Take 1 tablet (40 mg total) by mouth once daily. 120 tablet 3    spironolactone (ALDACTONE) 25 MG tablet Take 1 tablet (25 mg total) by mouth once daily. 90 tablet 3    valsartan (DIOVAN) 160 MG tablet Take 1 tablet by mouth once daily 90 tablet 3    carvediloL (COREG) 25 MG tablet Take 1  tablet (25 mg total) by mouth 2 (two) times daily. (Patient not taking: Reported on 8/8/2025) 180 tablet 3    FLUoxetine 20 MG capsule Take 20 mg by mouth once daily. (Patient not taking: Reported on 11/12/2024)       No current facility-administered medications on file prior to visit.     Objective:     BP (!) 166/74   Pulse 74   Wt 75 kg (165 lb 4.8 oz)   SpO2 97%   BMI 27.51 kg/m²     Physical Exam  Constitutional:       General: She is not in acute distress.     Appearance: Normal appearance. She is well-developed. She is not toxic-appearing or diaphoretic.   HENT:      Head: Normocephalic and atraumatic.      Nose: Nose normal.   Eyes:      General:         Right eye: No discharge.         Left eye: No discharge.      Conjunctiva/sclera:      Right eye: Right conjunctiva is not injected.      Left eye: Left conjunctiva is not injected.      Pupils: Pupils are equal.      Right eye: Pupil is round.      Left eye: Pupil is round.   Neck:      Thyroid: No thyromegaly.      Vascular: No carotid bruit or JVD.   Cardiovascular:      Rate and Rhythm: Normal rate and regular rhythm. No extrasystoles are present.     Chest Wall: PMI is not displaced.      Pulses:           Radial pulses are 2+ on the right side and 2+ on the left side.        Femoral pulses are 2+ on the right side and 2+ on the left side.       Dorsalis pedis pulses are 2+ on the right side and 2+ on the left side.        Posterior tibial pulses are 2+ on the right side and 2+ on the left side.      Heart sounds: S1 normal and S2 normal. No murmur heard.     Gallop present. S3 and S4 sounds present.   Pulmonary:      Effort: Pulmonary effort is normal.      Breath sounds: Normal breath sounds.   Abdominal:      Palpations: Abdomen is soft.      Tenderness: There is no abdominal tenderness.   Musculoskeletal:      Cervical back: Neck supple.      Right lower leg: Swelling present. No edema.      Left lower leg: Swelling present.   Lymphadenopathy:  "     Head:      Right side of head: No submandibular adenopathy.      Left side of head: No submandibular adenopathy.      Cervical: No cervical adenopathy.   Skin:     General: Skin is warm and dry.      Findings: No rash.      Nails: There is no clubbing.   Neurological:      General: No focal deficit present.      Mental Status: She is alert and oriented to person, place, and time. She is not disoriented.      Cranial Nerves: No cranial nerve deficit.   Psychiatric:         Attention and Perception: Attention and perception normal.         Mood and Affect: Mood and affect normal.         Speech: Speech normal.         Behavior: Behavior normal.         Thought Content: Thought content normal.         Cognition and Memory: Cognition and memory normal.         Judgment: Judgment normal.       Assessment:      1. Heart failure, systolic, chronic    2. Pulmonary hypertension due to left heart disease    3. History of cerebrovascular accident    4. Primary hypertension    5. Hypercholesterolemia    6. Type 2 diabetes mellitus with other circulatory complication, without long-term current use of insulin    7. Overweight    8. Stage 3b chronic kidney disease    9. Thrombocytopenia    10. Idiopathic thrombocytopenic purpura (ITP)    11. Anemia in other chronic diseases classified elsewhere    12. Other acquired hemolytic anemias      Plan:       Heart Failure, Systolic, Chronic              4/24/2024: Presented fluid overloaded in HF. BNP 2,536. Received furosemide 20 mg iv "Q12".              4/24/2024: Echo: Moderately dilated left ventricle with severe systolic dysfunction. EF 25%. Mild LVH. Moderate diastolic dysfunction. LVEDP elevated. Mildly enlarged RV with severe systolic dysfunction. Severely enlarged LA. Moderate MR. Moderate to severe TR. SPAP 83 mmHg. RA 15 mmHg. Small pericardial effusion.    8/12/2024: Normal left ventricular size with moderate systolic dysfunction. EF 35%. LVGLS -11%. Mild LVH. Mild " "diastolic dysfunction. Moderately dilated LA. Mild aortic valve sclerosis. Moderate MR.    4/30/2025: Echo: Mildly dilated left ventricle with mild systolic dysfunction. EF 40%. LVGLS -13%. Mild LVH. Mild diastolic dysfunction. Severely dilated LA. Mild aortic valve sclerosis. Moderate MR.              Betablocker, CHANELL blockade, MCA & SGLT2i.              5/2/2024: .  5/17/2024: .              4/26/2024: Valsartan 40 mg Q24 was begun. She appears to have had an reaction to ACEI in the past. Accordingly no ACEI or ARNI.  4/30/2024: Valsartan 40 mg Q24 was increased to valsartan to 80 mg Q24.  5/2/2024: Valsartan 80 mg Q24 was increased to valsartan 160 mg Q24.  5/3/2024: Furosemide 40 mg Q24 was begun.  5/17/2024: Carvedilol 6.25 mg Q12 was begun and metoprolol 50 mg Q24 to be discontinued. Sacubitril 49 mg/valsartan 51 mg Q12 to be changed back to valsartan 160 mg Q24 with history of "reaction to ACEI" in the past.  11/12/2024: Carvedilol 6.25 Q12 was increased to carvedilol 12.5 mg Q12 and valsartan 160 mg Q24 was increased to valsartan 160 mg Q12 as pressure still on high side.   4/10/2025: Carvedilol 12.5 mg Q12 was increased to carvedilol 25 mg Q12. She discontinued the carvedilol due to cramps in her chest.                 On empagliflozin 10 mg Q24, valsartan 160 mg Q24,  spironolactone 25 mg Q24 and furosemide 40 mg Q24.              Appears well compensated.    8/8/2025: Carvedilol 12.5 mg Q12 to begin and valsartan 160 mg Q24 to be increased to valsartan 160 mg Q12.                                             2. Pulmonary Hypertension  4/24/2024: Echo: SPAP 83 mmHg.   Most certainly due to left heart disease.  Expect to come down with control of hypertensin and decrease in MR.     3. History of Cerebrovascular Accident              2021: CVA: Right hemiplegia.     4. Hypertension              2005: Diagnosed.              At home been on metoprolol 50 mg Q24, nifedipine 90 mg Q24 and hctz 25 " mg Q24.   11/12/2024: Carvedilol 6.25 Q12 was increased to carvedilol 12.5 mg Q12 and valsartan 160 mg Q24 was increased to valsartan 160 mg Q12 as pressure still on high side.   On empagliflozin 10 mg Q24, valsartan 160 mg Q24, spironolactone 25 mg Q24 and furosemide 40 mg Q24.   Keeping log at home.   8/8/2025: As above.                     5. Hypercholesterolemia              On atorvastatin 80 mg Q24.   11/12/2024: Chol 96. HDL 35. LDL 44. TG 87.   On atorvastatin 80 mg Q24.   Favorable lipid panel.     6. Diabetes Mellitus, Type 2  2005: Diagnosed. Complications: CVA. Medications: Diet.  On empagliflozin 10 mg Q24 for HF.     7. Overweight              4/25/2024: Weight 98 kg. BMI 36.              5/3/2024: Weight 88 kg. BMI 32.   5/17/2024: Weight 78 kg. BMI 28.     8. Chronic Kidney Disease, Stage 3              4/27/2024: BUN/crea 24/1.5. eGFR 37. K 4.1.              5/3/2024: BUN/crea 27/1.6. eGFR 35. K 3.6.   11/12/2024: BUN/crea 34/1.4. eGFR 41. K 4.8.     9. 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              4/23/2024: Plts 140.              Dr. Joao Howard.     10. Anemia              4/30/2024: H/H 7.7/22.6%. MCV 93.              Significant decline.              Consider further evaluation.     11. Primary Care              Dr. Ashley Harrell.    F/u 2 months.    Ismael Covarrubias M.D.